# Patient Record
Sex: FEMALE | Race: WHITE | HISPANIC OR LATINO | Employment: OTHER | URBAN - METROPOLITAN AREA
[De-identification: names, ages, dates, MRNs, and addresses within clinical notes are randomized per-mention and may not be internally consistent; named-entity substitution may affect disease eponyms.]

---

## 2017-01-30 ENCOUNTER — ALLSCRIPTS OFFICE VISIT (OUTPATIENT)
Dept: OTHER | Facility: OTHER | Age: 82
End: 2017-01-30

## 2017-02-06 ENCOUNTER — ALLSCRIPTS OFFICE VISIT (OUTPATIENT)
Dept: OTHER | Facility: OTHER | Age: 82
End: 2017-02-06

## 2017-03-01 ENCOUNTER — TRANSCRIBE ORDERS (OUTPATIENT)
Dept: LAB | Facility: HOSPITAL | Age: 82
End: 2017-03-01

## 2017-03-02 ENCOUNTER — LAB CONVERSION - ENCOUNTER (OUTPATIENT)
Dept: OTHER | Facility: OTHER | Age: 82
End: 2017-03-02

## 2017-03-02 LAB
25(OH)D3 SERPL-MCNC: 38 NG/ML (ref 30–100)
CALCIUM SERPL-MCNC: 9.8 MG/DL (ref 8.6–10.4)
CREATININE, RANDOM URINE (HISTORICAL): 146 MG/DL (ref 20–320)
PROT UR-MCNC: 34 MG/DL (ref 5–24)
PROT/CREAT UR: 233 MG/G CREAT (ref 21–161)
PTH-INTACT SERPL-MCNC: 73 PG/ML (ref 14–64)

## 2017-03-03 ENCOUNTER — LAB CONVERSION - ENCOUNTER (OUTPATIENT)
Dept: OTHER | Facility: OTHER | Age: 82
End: 2017-03-03

## 2017-03-03 LAB
25(OH)D3 SERPL-MCNC: 38 NG/ML (ref 30–100)
CALCIUM SERPL-MCNC: 9.8 MG/DL (ref 8.6–10.4)
CREATININE, RANDOM URINE (HISTORICAL): 146 MG/DL (ref 20–320)
CULTURE RESULT (HISTORICAL): NORMAL
PROT UR-MCNC: 34 MG/DL (ref 5–24)
PROT/CREAT UR: 233 MG/G CREAT (ref 21–161)
PTH-INTACT SERPL-MCNC: 73 PG/ML (ref 14–64)

## 2017-03-13 ENCOUNTER — ALLSCRIPTS OFFICE VISIT (OUTPATIENT)
Dept: OTHER | Facility: OTHER | Age: 82
End: 2017-03-13

## 2017-03-24 ENCOUNTER — ALLSCRIPTS OFFICE VISIT (OUTPATIENT)
Dept: OTHER | Facility: OTHER | Age: 82
End: 2017-03-24

## 2017-04-06 DIAGNOSIS — M10.471 OTHER SECONDARY GOUT, RIGHT ANKLE AND FOOT: ICD-10-CM

## 2017-04-26 ENCOUNTER — ALLSCRIPTS OFFICE VISIT (OUTPATIENT)
Dept: OTHER | Facility: OTHER | Age: 82
End: 2017-04-26

## 2017-05-08 ENCOUNTER — ALLSCRIPTS OFFICE VISIT (OUTPATIENT)
Dept: OTHER | Facility: OTHER | Age: 82
End: 2017-05-08

## 2017-06-12 ENCOUNTER — LAB CONVERSION - ENCOUNTER (OUTPATIENT)
Dept: OTHER | Facility: OTHER | Age: 82
End: 2017-06-12

## 2017-06-12 LAB
A/G RATIO (HISTORICAL): 1.2 (CALC) (ref 1–2.5)
ALBUMIN SERPL BCP-MCNC: 4 G/DL (ref 3.6–5.1)
ALP SERPL-CCNC: 82 U/L (ref 33–130)
ALT SERPL W P-5'-P-CCNC: 10 U/L (ref 6–29)
AST SERPL W P-5'-P-CCNC: 14 U/L (ref 10–35)
BILIRUB SERPL-MCNC: 0.4 MG/DL (ref 0.2–1.2)
BUN SERPL-MCNC: 41 MG/DL (ref 7–25)
BUN/CREA RATIO (HISTORICAL): 25 (CALC) (ref 6–22)
CALCIUM SERPL-MCNC: 9.1 MG/DL (ref 8.6–10.4)
CHLORIDE SERPL-SCNC: 103 MMOL/L (ref 98–110)
CHOLEST SERPL-MCNC: 160 MG/DL (ref 125–200)
CHOLEST/HDLC SERPL: 2.3 (CALC)
CO2 SERPL-SCNC: 29 MMOL/L (ref 20–31)
CREAT SERPL-MCNC: 1.64 MG/DL (ref 0.6–0.88)
EGFR AFRICAN AMERICAN (HISTORICAL): 33 ML/MIN/1.73M2
EGFR-AMERICAN CALC (HISTORICAL): 29 ML/MIN/1.73M2
GAMMA GLOBULIN (HISTORICAL): 3.3 G/DL (CALC) (ref 1.9–3.7)
GLUCOSE (HISTORICAL): 124 MG/DL (ref 65–99)
HDLC SERPL-MCNC: 70 MG/DL
LDL CHOLESTEROL (HISTORICAL): 75 MG/DL (CALC)
NON-HDL-CHOL (CHOL-HDL) (HISTORICAL): 90 MG/DL (CALC)
POTASSIUM SERPL-SCNC: 4.3 MMOL/L (ref 3.5–5.3)
SODIUM SERPL-SCNC: 141 MMOL/L (ref 135–146)
TOTAL PROTEIN (HISTORICAL): 7.3 G/DL (ref 6.1–8.1)
TRIGL SERPL-MCNC: 74 MG/DL

## 2017-06-13 ENCOUNTER — LAB CONVERSION - ENCOUNTER (OUTPATIENT)
Dept: OTHER | Facility: OTHER | Age: 82
End: 2017-06-13

## 2017-06-13 LAB
25(OH)D3 SERPL-MCNC: 41 NG/ML (ref 30–100)
HBA1C MFR BLD HPLC: 6.3 % OF TOTAL HGB

## 2017-06-19 ENCOUNTER — ALLSCRIPTS OFFICE VISIT (OUTPATIENT)
Dept: OTHER | Facility: OTHER | Age: 82
End: 2017-06-19

## 2017-06-19 DIAGNOSIS — Z12.31 ENCOUNTER FOR SCREENING MAMMOGRAM FOR MALIGNANT NEOPLASM OF BREAST: ICD-10-CM

## 2017-06-21 ENCOUNTER — GENERIC CONVERSION - ENCOUNTER (OUTPATIENT)
Dept: OTHER | Facility: OTHER | Age: 82
End: 2017-06-21

## 2017-07-01 DIAGNOSIS — N18.30 CHRONIC KIDNEY DISEASE, STAGE III (MODERATE) (HCC): ICD-10-CM

## 2017-07-01 DIAGNOSIS — R10.13 EPIGASTRIC PAIN: ICD-10-CM

## 2017-07-01 DIAGNOSIS — R92.8 OTHER ABNORMAL AND INCONCLUSIVE FINDINGS ON DIAGNOSTIC IMAGING OF BREAST: ICD-10-CM

## 2017-07-03 ENCOUNTER — LAB CONVERSION - ENCOUNTER (OUTPATIENT)
Dept: OTHER | Facility: OTHER | Age: 82
End: 2017-07-03

## 2017-07-03 LAB
A/G RATIO (HISTORICAL): 1.3 (CALC) (ref 1–2.5)
ALBUMIN SERPL BCP-MCNC: 4.2 G/DL (ref 3.6–5.1)
ALP SERPL-CCNC: 83 U/L (ref 33–130)
ALT SERPL W P-5'-P-CCNC: 12 U/L (ref 6–29)
AST SERPL W P-5'-P-CCNC: 17 U/L (ref 10–35)
BASOPHILS # BLD AUTO: 0.5 %
BASOPHILS # BLD AUTO: 39 CELLS/UL (ref 0–200)
BILIRUB SERPL-MCNC: 0.3 MG/DL (ref 0.2–1.2)
BUN SERPL-MCNC: 36 MG/DL (ref 7–25)
BUN/CREA RATIO (HISTORICAL): 25 (CALC) (ref 6–22)
CALCIUM SERPL-MCNC: 10.1 MG/DL (ref 8.6–10.4)
CALCIUM SERPL-MCNC: 10.1 MG/DL (ref 8.6–10.4)
CHLORIDE SERPL-SCNC: 102 MMOL/L (ref 98–110)
CO2 SERPL-SCNC: 25 MMOL/L (ref 20–31)
CREAT SERPL-MCNC: 1.42 MG/DL (ref 0.6–0.88)
DEPRECATED RDW RBC AUTO: 13.7 % (ref 11–15)
EGFR AFRICAN AMERICAN (HISTORICAL): 40 ML/MIN/1.73M2
EGFR-AMERICAN CALC (HISTORICAL): 34 ML/MIN/1.73M2
EOSINOPHIL # BLD AUTO: 0.9 %
EOSINOPHIL # BLD AUTO: 69 CELLS/UL (ref 15–500)
GAMMA GLOBULIN (HISTORICAL): 3.3 G/DL (CALC) (ref 1.9–3.7)
GLUCOSE (HISTORICAL): 129 MG/DL (ref 65–99)
HCT VFR BLD AUTO: 40.4 % (ref 35–45)
HGB BLD-MCNC: 12.7 G/DL (ref 11.7–15.5)
LYMPHOCYTES # BLD AUTO: 3588 CELLS/UL (ref 850–3900)
LYMPHOCYTES # BLD AUTO: 46.6 %
MAGNESIUM SERPL-MCNC: 1.6 MG/DL (ref 1.5–2.5)
MCH RBC QN AUTO: 27.7 PG (ref 27–33)
MCHC RBC AUTO-ENTMCNC: 31.4 G/DL (ref 32–36)
MCV RBC AUTO: 88.1 FL (ref 80–100)
MONOCYTES # BLD AUTO: 400 CELLS/UL (ref 200–950)
MONOCYTES (HISTORICAL): 5.2 %
NEUTROPHILS # BLD AUTO: 3604 CELLS/UL (ref 1500–7800)
NEUTROPHILS # BLD AUTO: 46.8 %
PHOSPHATE SERPL-MCNC: 4 MG/DL (ref 2.1–4.3)
PLATELET # BLD AUTO: 266 THOUSAND/UL (ref 140–400)
PMV BLD AUTO: 10.6 FL (ref 7.5–12.5)
POTASSIUM SERPL-SCNC: 4.8 MMOL/L (ref 3.5–5.3)
RBC # BLD AUTO: 4.58 MILLION/UL (ref 3.8–5.1)
SODIUM SERPL-SCNC: 138 MMOL/L (ref 135–146)
TOTAL PROTEIN (HISTORICAL): 7.5 G/DL (ref 6.1–8.1)
URIC ACID (HISTORICAL): 7.9 MG/DL (ref 2.5–7)
WBC # BLD AUTO: 7.7 THOUSAND/UL (ref 3.8–10.8)

## 2017-07-04 ENCOUNTER — LAB CONVERSION - ENCOUNTER (OUTPATIENT)
Dept: OTHER | Facility: OTHER | Age: 82
End: 2017-07-04

## 2017-07-04 LAB — 25(OH)D3 SERPL-MCNC: 35 NG/ML (ref 30–100)

## 2017-07-05 ENCOUNTER — LAB CONVERSION - ENCOUNTER (OUTPATIENT)
Dept: OTHER | Facility: OTHER | Age: 82
End: 2017-07-05

## 2017-07-05 LAB
25(OH)D3 SERPL-MCNC: 35 NG/ML (ref 30–100)
A/G RATIO (HISTORICAL): 1.3 (CALC) (ref 1–2.5)
ALBUMIN SERPL BCP-MCNC: 4.2 G/DL (ref 3.6–5.1)
ALP SERPL-CCNC: 83 U/L (ref 33–130)
ALT SERPL W P-5'-P-CCNC: 12 U/L (ref 6–29)
AST SERPL W P-5'-P-CCNC: 17 U/L (ref 10–35)
BASOPHILS # BLD AUTO: 0.5 %
BASOPHILS # BLD AUTO: 39 CELLS/UL (ref 0–200)
BILIRUB SERPL-MCNC: 0.3 MG/DL (ref 0.2–1.2)
BUN SERPL-MCNC: 36 MG/DL (ref 7–25)
BUN/CREA RATIO (HISTORICAL): 25 (CALC) (ref 6–22)
CALCIUM SERPL-MCNC: 10.1 MG/DL (ref 8.6–10.4)
CALCIUM SERPL-MCNC: 10.1 MG/DL (ref 8.6–10.4)
CHLORIDE SERPL-SCNC: 102 MMOL/L (ref 98–110)
CO2 SERPL-SCNC: 25 MMOL/L (ref 20–31)
CREAT SERPL-MCNC: 1.42 MG/DL (ref 0.6–0.88)
DEPRECATED RDW RBC AUTO: 13.7 % (ref 11–15)
EGFR AFRICAN AMERICAN (HISTORICAL): 40 ML/MIN/1.73M2
EGFR-AMERICAN CALC (HISTORICAL): 34 ML/MIN/1.73M2
EOSINOPHIL # BLD AUTO: 0.9 %
EOSINOPHIL # BLD AUTO: 69 CELLS/UL (ref 15–500)
GAMMA GLOBULIN (HISTORICAL): 3.3 G/DL (CALC) (ref 1.9–3.7)
GLUCOSE (HISTORICAL): 129 MG/DL (ref 65–99)
HCT VFR BLD AUTO: 40.4 % (ref 35–45)
HGB BLD-MCNC: 12.7 G/DL (ref 11.7–15.5)
LYMPHOCYTES # BLD AUTO: 3588 CELLS/UL (ref 850–3900)
LYMPHOCYTES # BLD AUTO: 46.6 %
MAGNESIUM SERPL-MCNC: 1.6 MG/DL (ref 1.5–2.5)
MCH RBC QN AUTO: 27.7 PG (ref 27–33)
MCHC RBC AUTO-ENTMCNC: 31.4 G/DL (ref 32–36)
MCV RBC AUTO: 88.1 FL (ref 80–100)
MONOCYTES # BLD AUTO: 400 CELLS/UL (ref 200–950)
MONOCYTES (HISTORICAL): 5.2 %
NEUTROPHILS # BLD AUTO: 3604 CELLS/UL (ref 1500–7800)
NEUTROPHILS # BLD AUTO: 46.8 %
PHOSPHATE SERPL-MCNC: 4 MG/DL (ref 2.1–4.3)
PLATELET # BLD AUTO: 266 THOUSAND/UL (ref 140–400)
PMV BLD AUTO: 10.6 FL (ref 7.5–12.5)
POTASSIUM SERPL-SCNC: 4.8 MMOL/L (ref 3.5–5.3)
PTH-INTACT SERPL-MCNC: 55 PG/ML (ref 14–64)
RBC # BLD AUTO: 4.58 MILLION/UL (ref 3.8–5.1)
SODIUM SERPL-SCNC: 138 MMOL/L (ref 135–146)
TOTAL PROTEIN (HISTORICAL): 7.5 G/DL (ref 6.1–8.1)
URIC ACID (HISTORICAL): 7.9 MG/DL (ref 2.5–7)
WBC # BLD AUTO: 7.7 THOUSAND/UL (ref 3.8–10.8)

## 2017-07-10 ENCOUNTER — ALLSCRIPTS OFFICE VISIT (OUTPATIENT)
Dept: OTHER | Facility: OTHER | Age: 82
End: 2017-07-10

## 2017-07-10 ENCOUNTER — TRANSCRIBE ORDERS (OUTPATIENT)
Dept: ADMINISTRATIVE | Facility: HOSPITAL | Age: 82
End: 2017-07-10

## 2017-07-10 DIAGNOSIS — R10.13 DYSPEPSIA: Primary | ICD-10-CM

## 2017-07-17 ENCOUNTER — HOSPITAL ENCOUNTER (OUTPATIENT)
Dept: RADIOLOGY | Age: 82
Discharge: HOME | End: 2017-07-17
Payer: COMMERCIAL

## 2017-07-17 DIAGNOSIS — Z12.31 ENCOUNTER FOR SCREENING MAMMOGRAM FOR MALIGNANT NEOPLASM OF BREAST: ICD-10-CM

## 2017-07-17 PROCEDURE — G0202 SCR MAMMO BI INCL CAD: HCPCS

## 2017-07-21 ENCOUNTER — HOSPITAL ENCOUNTER (OUTPATIENT)
Dept: MAMMOGRAPHY | Facility: CLINIC | Age: 82
Discharge: HOME | End: 2017-07-21
Payer: COMMERCIAL

## 2017-07-21 ENCOUNTER — HOSPITAL ENCOUNTER (OUTPATIENT)
Dept: ULTRASOUND IMAGING | Facility: CLINIC | Age: 82
Discharge: HOME | End: 2017-07-21
Payer: COMMERCIAL

## 2017-07-21 DIAGNOSIS — R92.8 ABNORMAL MAMMOGRAM: ICD-10-CM

## 2017-07-21 DIAGNOSIS — R92.8 OTHER ABNORMAL AND INCONCLUSIVE FINDINGS ON DIAGNOSTIC IMAGING OF BREAST: ICD-10-CM

## 2017-07-21 PROCEDURE — 76642 ULTRASOUND BREAST LIMITED: CPT

## 2017-07-21 PROCEDURE — G0206 DX MAMMO INCL CAD UNI: HCPCS

## 2017-07-21 PROCEDURE — G0279 TOMOSYNTHESIS, MAMMO: HCPCS

## 2017-07-31 ENCOUNTER — HOSPITAL ENCOUNTER (OUTPATIENT)
Dept: ULTRASOUND IMAGING | Facility: CLINIC | Age: 82
Discharge: HOME | End: 2017-07-31
Payer: COMMERCIAL

## 2017-08-02 ENCOUNTER — ALLSCRIPTS OFFICE VISIT (OUTPATIENT)
Dept: OTHER | Facility: OTHER | Age: 82
End: 2017-08-02

## 2017-08-07 ENCOUNTER — HOSPITAL ENCOUNTER (OUTPATIENT)
Dept: MAMMOGRAPHY | Facility: CLINIC | Age: 82
Discharge: HOME | End: 2017-08-07
Payer: COMMERCIAL

## 2017-08-07 ENCOUNTER — HOSPITAL ENCOUNTER (OUTPATIENT)
Dept: ULTRASOUND IMAGING | Facility: CLINIC | Age: 82
Discharge: HOME | End: 2017-08-07
Payer: COMMERCIAL

## 2017-08-07 ENCOUNTER — HOSPITAL ENCOUNTER (OUTPATIENT)
Dept: ULTRASOUND IMAGING | Facility: CLINIC | Age: 82
Discharge: HOME | End: 2017-08-07
Admitting: RADIOLOGY
Payer: COMMERCIAL

## 2017-08-07 DIAGNOSIS — R92.8 OTHER ABNORMAL AND INCONCLUSIVE FINDINGS ON DIAGNOSTIC IMAGING OF BREAST: ICD-10-CM

## 2017-08-07 DIAGNOSIS — R92.8 ABNORMAL ULTRASOUND OF BREAST: ICD-10-CM

## 2017-08-07 DIAGNOSIS — R92.8 ABNORMAL MAMMOGRAM, UNSPECIFIED: ICD-10-CM

## 2017-08-07 DIAGNOSIS — Z98.890 STATUS POST BREAST BIOPSY: ICD-10-CM

## 2017-08-07 PROCEDURE — 19084 BX BREAST ADD LESION US IMAG: CPT

## 2017-08-07 PROCEDURE — 88305 TISSUE EXAM BY PATHOLOGIST: CPT | Performed by: FAMILY MEDICINE

## 2017-08-07 PROCEDURE — 19083 BX BREAST 1ST LESION US IMAG: CPT

## 2017-08-07 PROCEDURE — 88341 IMHCHEM/IMCYTCHM EA ADD ANTB: CPT | Performed by: FAMILY MEDICINE

## 2017-08-07 PROCEDURE — 88342 IMHCHEM/IMCYTCHM 1ST ANTB: CPT | Performed by: FAMILY MEDICINE

## 2017-08-07 RX ORDER — LIDOCAINE HYDROCHLORIDE 10 MG/ML
4 INJECTION, SOLUTION INFILTRATION; PERINEURAL ONCE
Status: CANCELLED | OUTPATIENT
Start: 2017-08-07

## 2017-08-07 RX ORDER — LIDOCAINE HYDROCHLORIDE 10 MG/ML
4 INJECTION, SOLUTION INFILTRATION; PERINEURAL ONCE
Status: COMPLETED | OUTPATIENT
Start: 2017-08-07 | End: 2017-08-07

## 2017-08-07 RX ADMIN — LIDOCAINE HYDROCHLORIDE 4 ML: 10 INJECTION, SOLUTION INFILTRATION; PERINEURAL at 11:25

## 2017-08-07 RX ADMIN — LIDOCAINE HYDROCHLORIDE 4 ML: 10 INJECTION, SOLUTION INFILTRATION; PERINEURAL at 11:30

## 2017-08-15 ENCOUNTER — ALLSCRIPTS OFFICE VISIT (OUTPATIENT)
Dept: OTHER | Facility: OTHER | Age: 82
End: 2017-08-15

## 2017-08-16 ENCOUNTER — TRANSCRIBE ORDERS (OUTPATIENT)
Dept: ADMINISTRATIVE | Facility: HOSPITAL | Age: 82
End: 2017-08-16

## 2017-08-16 DIAGNOSIS — R22.1 LUMP ON NECK: Primary | ICD-10-CM

## 2017-08-17 ENCOUNTER — ALLSCRIPTS OFFICE VISIT (OUTPATIENT)
Dept: OTHER | Facility: OTHER | Age: 82
End: 2017-08-17

## 2017-08-18 ENCOUNTER — HOSPITAL ENCOUNTER (OUTPATIENT)
Dept: RADIOLOGY | Facility: HOSPITAL | Age: 82
Discharge: HOME | End: 2017-08-18
Payer: COMMERCIAL

## 2017-08-18 DIAGNOSIS — R22.1 LUMP ON NECK: ICD-10-CM

## 2017-08-18 PROCEDURE — 76536 US EXAM OF HEAD AND NECK: CPT

## 2017-08-23 ENCOUNTER — GENERIC CONVERSION - ENCOUNTER (OUTPATIENT)
Dept: OTHER | Facility: OTHER | Age: 82
End: 2017-08-23

## 2017-08-23 LAB
LEFT EYE DIABETIC RETINOPATHY: NORMAL
RIGHT EYE DIABETIC RETINOPATHY: NORMAL

## 2017-08-24 ENCOUNTER — ALLSCRIPTS OFFICE VISIT (OUTPATIENT)
Dept: OTHER | Facility: OTHER | Age: 82
End: 2017-08-24

## 2017-08-25 ENCOUNTER — TRANSCRIBE ORDERS (OUTPATIENT)
Dept: ADMINISTRATIVE | Facility: HOSPITAL | Age: 82
End: 2017-08-25

## 2017-08-25 DIAGNOSIS — D05.02 LOBULAR CARCINOMA IN SITU OF LEFT BREAST: Primary | ICD-10-CM

## 2017-08-28 ENCOUNTER — APPOINTMENT (OUTPATIENT)
Dept: PREADMISSION TESTING | Facility: HOSPITAL | Age: 82
End: 2017-08-28
Payer: COMMERCIAL

## 2017-08-28 ENCOUNTER — APPOINTMENT (OUTPATIENT)
Dept: LAB | Facility: HOSPITAL | Age: 82
End: 2017-08-28
Attending: SURGERY
Payer: COMMERCIAL

## 2017-08-28 ENCOUNTER — HOSPITAL ENCOUNTER (OUTPATIENT)
Dept: NON INVASIVE DIAGNOSTICS | Facility: HOSPITAL | Age: 82
Discharge: HOME | End: 2017-08-28
Attending: SURGERY
Payer: COMMERCIAL

## 2017-08-28 ENCOUNTER — ANESTHESIA EVENT (OUTPATIENT)
Dept: PERIOP | Facility: HOSPITAL | Age: 82
End: 2017-08-28
Payer: COMMERCIAL

## 2017-08-28 ENCOUNTER — TRANSCRIBE ORDERS (OUTPATIENT)
Dept: ADMINISTRATIVE | Facility: HOSPITAL | Age: 82
End: 2017-08-28

## 2017-08-28 VITALS
RESPIRATION RATE: 16 BRPM | HEART RATE: 68 BPM | HEIGHT: 62 IN | BODY MASS INDEX: 25.62 KG/M2 | DIASTOLIC BLOOD PRESSURE: 52 MMHG | SYSTOLIC BLOOD PRESSURE: 122 MMHG | WEIGHT: 139.2 LBS | TEMPERATURE: 97 F

## 2017-08-28 DIAGNOSIS — Z01.818 PREOP EXAMINATION: ICD-10-CM

## 2017-08-28 DIAGNOSIS — D05.02 LOBULAR CARCINOMA IN SITU OF LEFT BREAST: ICD-10-CM

## 2017-08-28 DIAGNOSIS — Z01.818 PREOP EXAMINATION: Primary | ICD-10-CM

## 2017-08-28 LAB
ANION GAP SERPL CALCULATED.3IONS-SCNC: 6 MMOL/L (ref 4–13)
APTT PPP: 29 SECONDS (ref 23–35)
ATRIAL RATE: 56 BPM
BUN SERPL-MCNC: 38 MG/DL (ref 5–25)
CALCIUM SERPL-MCNC: 9.3 MG/DL (ref 8.3–10.1)
CHLORIDE SERPL-SCNC: 104 MMOL/L (ref 100–108)
CO2 SERPL-SCNC: 30 MMOL/L (ref 21–32)
CREAT SERPL-MCNC: 1.4 MG/DL (ref 0.6–1.3)
GFR SERPL CREATININE-BSD FRML MDRD: 35 ML/MIN/1.73SQ M
GLUCOSE SERPL-MCNC: 134 MG/DL (ref 65–140)
INR PPP: 0.93 (ref 0.86–1.16)
P AXIS: 59 DEGREES
POTASSIUM SERPL-SCNC: 5.1 MMOL/L (ref 3.5–5.3)
PR INTERVAL: 174 MS
PROTHROMBIN TIME: 12.5 SECONDS (ref 12.1–14.4)
QRS AXIS: 8 DEGREES
QRSD INTERVAL: 84 MS
QT INTERVAL: 396 MS
QTC INTERVAL: 382 MS
SODIUM SERPL-SCNC: 140 MMOL/L (ref 136–145)
T WAVE AXIS: 73 DEGREES
VENTRICULAR RATE: 56 BPM

## 2017-08-28 PROCEDURE — 36415 COLL VENOUS BLD VENIPUNCTURE: CPT

## 2017-08-28 PROCEDURE — 85610 PROTHROMBIN TIME: CPT

## 2017-08-28 PROCEDURE — 80048 BASIC METABOLIC PNL TOTAL CA: CPT

## 2017-08-28 PROCEDURE — 93005 ELECTROCARDIOGRAM TRACING: CPT

## 2017-08-28 PROCEDURE — 85730 THROMBOPLASTIN TIME PARTIAL: CPT

## 2017-08-28 RX ORDER — METOPROLOL TARTRATE 50 MG/1
25 TABLET, FILM COATED ORAL EVERY 12 HOURS SCHEDULED
COMMUNITY
End: 2018-03-07 | Stop reason: SDUPTHER

## 2017-08-28 RX ORDER — PANTOPRAZOLE SODIUM 40 MG/1
40 TABLET, DELAYED RELEASE ORAL 2 TIMES DAILY
COMMUNITY
End: 2018-03-16 | Stop reason: SDUPTHER

## 2017-08-28 RX ORDER — ACETAMINOPHEN 500 MG
500 TABLET ORAL EVERY 6 HOURS PRN
COMMUNITY
End: 2018-01-25 | Stop reason: SDUPTHER

## 2017-08-28 RX ORDER — B-COMPLEX WITH VITAMIN C
1 TABLET ORAL
COMMUNITY

## 2017-08-28 RX ORDER — SODIUM CHLORIDE 9 MG/ML
125 INJECTION, SOLUTION INTRAVENOUS CONTINUOUS
Status: CANCELLED | OUTPATIENT
Start: 2017-09-06

## 2017-08-28 RX ORDER — SPIRONOLACTONE 25 MG/1
25 TABLET ORAL
COMMUNITY
End: 2018-04-28 | Stop reason: SDUPTHER

## 2017-08-28 RX ORDER — GABAPENTIN 100 MG/1
100 CAPSULE ORAL
COMMUNITY
End: 2018-08-31 | Stop reason: SDUPTHER

## 2017-08-28 RX ORDER — NIFEDIPINE 30 MG/1
30 TABLET, EXTENDED RELEASE ORAL DAILY
COMMUNITY
End: 2018-07-06 | Stop reason: SDUPTHER

## 2017-09-06 ENCOUNTER — ANESTHESIA (OUTPATIENT)
Dept: PERIOP | Facility: HOSPITAL | Age: 82
End: 2017-09-06
Payer: COMMERCIAL

## 2017-09-06 ENCOUNTER — HOSPITAL ENCOUNTER (OUTPATIENT)
Dept: MAMMOGRAPHY | Facility: HOSPITAL | Age: 82
Setting detail: OUTPATIENT SURGERY
Discharge: HOME | End: 2017-09-06
Payer: COMMERCIAL

## 2017-09-06 ENCOUNTER — CONVERSION ENCOUNTER (OUTPATIENT)
Dept: MAMMOGRAPHY | Facility: HOSPITAL | Age: 82
End: 2017-09-06

## 2017-09-06 ENCOUNTER — HOSPITAL ENCOUNTER (OUTPATIENT)
Dept: MAMMOGRAPHY | Facility: HOSPITAL | Age: 82
Discharge: HOME | End: 2017-09-06
Attending: SURGERY
Payer: COMMERCIAL

## 2017-09-06 ENCOUNTER — HOSPITAL ENCOUNTER (OUTPATIENT)
Facility: HOSPITAL | Age: 82
Setting detail: OUTPATIENT SURGERY
Discharge: HOME | End: 2017-09-06
Attending: SURGERY | Admitting: SURGERY
Payer: COMMERCIAL

## 2017-09-06 VITALS
BODY MASS INDEX: 25.58 KG/M2 | OXYGEN SATURATION: 95 % | HEIGHT: 62 IN | RESPIRATION RATE: 18 BRPM | HEART RATE: 71 BPM | WEIGHT: 139 LBS | SYSTOLIC BLOOD PRESSURE: 174 MMHG | TEMPERATURE: 97.5 F | DIASTOLIC BLOOD PRESSURE: 71 MMHG

## 2017-09-06 DIAGNOSIS — D05.02 LOBULAR CARCINOMA IN SITU OF LEFT BREAST: ICD-10-CM

## 2017-09-06 LAB
GLUCOSE SERPL-MCNC: 115 MG/DL (ref 65–140)
GLUCOSE SERPL-MCNC: 117 MG/DL (ref 65–140)
GLUCOSE SERPL-MCNC: 129 MG/DL (ref 65–140)

## 2017-09-06 PROCEDURE — 19281 PERQ DEVICE BREAST 1ST IMAG: CPT

## 2017-09-06 PROCEDURE — 88305 TISSUE EXAM BY PATHOLOGIST: CPT | Performed by: SURGERY

## 2017-09-06 PROCEDURE — 88307 TISSUE EXAM BY PATHOLOGIST: CPT | Performed by: SURGERY

## 2017-09-06 PROCEDURE — 82948 REAGENT STRIP/BLOOD GLUCOSE: CPT

## 2017-09-06 RX ORDER — ONDANSETRON 2 MG/ML
INJECTION INTRAMUSCULAR; INTRAVENOUS AS NEEDED
Status: DISCONTINUED | OUTPATIENT
Start: 2017-09-06 | End: 2017-09-06 | Stop reason: SURG

## 2017-09-06 RX ORDER — HYDROCODONE BITARTRATE AND ACETAMINOPHEN 5; 325 MG/1; MG/1
1 TABLET ORAL EVERY 4 HOURS PRN
Qty: 30 TABLET | Refills: 0 | Status: SHIPPED | OUTPATIENT
Start: 2017-09-06 | End: 2017-09-06 | Stop reason: HOSPADM

## 2017-09-06 RX ORDER — MEPERIDINE HYDROCHLORIDE 50 MG/ML
12.5 INJECTION INTRAMUSCULAR; INTRAVENOUS; SUBCUTANEOUS AS NEEDED
Status: DISCONTINUED | OUTPATIENT
Start: 2017-09-06 | End: 2017-09-06 | Stop reason: HOSPADM

## 2017-09-06 RX ORDER — LIDOCAINE WITH 8.4% SOD BICARB 0.9%(10ML)
5 SYRINGE (ML) INJECTION ONCE
Status: DISCONTINUED | OUTPATIENT
Start: 2017-09-06 | End: 2017-09-07 | Stop reason: HOSPADM

## 2017-09-06 RX ORDER — MIDAZOLAM HYDROCHLORIDE 1 MG/ML
INJECTION INTRAMUSCULAR; INTRAVENOUS AS NEEDED
Status: DISCONTINUED | OUTPATIENT
Start: 2017-09-06 | End: 2017-09-06 | Stop reason: SURG

## 2017-09-06 RX ORDER — ACETAMINOPHEN AND CODEINE PHOSPHATE 300; 60 MG/1; MG/1
1 TABLET ORAL EVERY 4 HOURS PRN
Qty: 30 TABLET | Refills: 0 | Status: SHIPPED | OUTPATIENT
Start: 2017-09-06 | End: 2017-09-16

## 2017-09-06 RX ORDER — ACETAMINOPHEN 325 MG/1
650 TABLET ORAL EVERY 6 HOURS PRN
Status: DISCONTINUED | OUTPATIENT
Start: 2017-09-06 | End: 2017-09-06 | Stop reason: HOSPADM

## 2017-09-06 RX ORDER — FENTANYL CITRATE 50 UG/ML
INJECTION, SOLUTION INTRAMUSCULAR; INTRAVENOUS AS NEEDED
Status: DISCONTINUED | OUTPATIENT
Start: 2017-09-06 | End: 2017-09-06 | Stop reason: SURG

## 2017-09-06 RX ORDER — MORPHINE SULFATE 4 MG/ML
2 INJECTION, SOLUTION INTRAMUSCULAR; INTRAVENOUS EVERY 2 HOUR PRN
Status: DISCONTINUED | OUTPATIENT
Start: 2017-09-06 | End: 2017-09-06 | Stop reason: HOSPADM

## 2017-09-06 RX ORDER — PROPOFOL 10 MG/ML
INJECTION, EMULSION INTRAVENOUS AS NEEDED
Status: DISCONTINUED | OUTPATIENT
Start: 2017-09-06 | End: 2017-09-06 | Stop reason: SURG

## 2017-09-06 RX ORDER — SODIUM CHLORIDE, SODIUM LACTATE, POTASSIUM CHLORIDE, CALCIUM CHLORIDE 600; 310; 30; 20 MG/100ML; MG/100ML; MG/100ML; MG/100ML
80 INJECTION, SOLUTION INTRAVENOUS CONTINUOUS
Status: DISCONTINUED | OUTPATIENT
Start: 2017-09-06 | End: 2017-09-06 | Stop reason: HOSPADM

## 2017-09-06 RX ORDER — LIDOCAINE HYDROCHLORIDE 10 MG/ML
INJECTION, SOLUTION INFILTRATION; PERINEURAL AS NEEDED
Status: DISCONTINUED | OUTPATIENT
Start: 2017-09-06 | End: 2017-09-06 | Stop reason: SURG

## 2017-09-06 RX ORDER — HYDROCODONE BITARTRATE AND ACETAMINOPHEN 5; 325 MG/1; MG/1
1 TABLET ORAL EVERY 4 HOURS PRN
Status: DISCONTINUED | OUTPATIENT
Start: 2017-09-06 | End: 2017-09-06 | Stop reason: HOSPADM

## 2017-09-06 RX ORDER — SODIUM CHLORIDE, SODIUM LACTATE, POTASSIUM CHLORIDE, CALCIUM CHLORIDE 600; 310; 30; 20 MG/100ML; MG/100ML; MG/100ML; MG/100ML
75 INJECTION, SOLUTION INTRAVENOUS CONTINUOUS
Status: DISCONTINUED | OUTPATIENT
Start: 2017-09-06 | End: 2017-09-06 | Stop reason: HOSPADM

## 2017-09-06 RX ORDER — FENTANYL CITRATE/PF 50 MCG/ML
25 SYRINGE (ML) INJECTION
Status: DISCONTINUED | OUTPATIENT
Start: 2017-09-06 | End: 2017-09-06 | Stop reason: HOSPADM

## 2017-09-06 RX ORDER — ONDANSETRON 2 MG/ML
4 INJECTION INTRAMUSCULAR; INTRAVENOUS EVERY 4 HOURS PRN
Status: DISCONTINUED | OUTPATIENT
Start: 2017-09-06 | End: 2017-09-06 | Stop reason: HOSPADM

## 2017-09-06 RX ORDER — SODIUM CHLORIDE 9 MG/ML
125 INJECTION, SOLUTION INTRAVENOUS CONTINUOUS
Status: DISCONTINUED | OUTPATIENT
Start: 2017-09-06 | End: 2017-09-06 | Stop reason: HOSPADM

## 2017-09-06 RX ADMIN — ACETAMINOPHEN 650 MG: 325 TABLET, FILM COATED ORAL at 13:36

## 2017-09-06 RX ADMIN — SODIUM CHLORIDE: 0.9 INJECTION, SOLUTION INTRAVENOUS at 12:06

## 2017-09-06 RX ADMIN — SODIUM CHLORIDE 125 ML/HR: 0.9 INJECTION, SOLUTION INTRAVENOUS at 07:37

## 2017-09-06 RX ADMIN — ONDANSETRON HYDROCHLORIDE 4 MG: 2 INJECTION, SOLUTION INTRAVENOUS at 11:56

## 2017-09-06 RX ADMIN — MIDAZOLAM HYDROCHLORIDE 2 MG: 1 INJECTION, SOLUTION INTRAMUSCULAR; INTRAVENOUS at 11:35

## 2017-09-06 RX ADMIN — LIDOCAINE HYDROCHLORIDE 80 MG: 10 INJECTION, SOLUTION INFILTRATION; PERINEURAL at 11:38

## 2017-09-06 RX ADMIN — CEFAZOLIN SODIUM 1000 MG: 1 SOLUTION INTRAVENOUS at 11:39

## 2017-09-06 RX ADMIN — FENTANYL CITRATE 50 MCG: 50 INJECTION, SOLUTION INTRAMUSCULAR; INTRAVENOUS at 11:49

## 2017-09-06 RX ADMIN — FENTANYL CITRATE 50 MCG: 50 INJECTION, SOLUTION INTRAMUSCULAR; INTRAVENOUS at 11:40

## 2017-09-06 RX ADMIN — PROPOFOL 150 MG: 10 INJECTION, EMULSION INTRAVENOUS at 11:38

## 2017-09-06 RX ADMIN — DEXAMETHASONE SODIUM PHOSPHATE 4 MG: 10 INJECTION INTRAMUSCULAR; INTRAVENOUS at 11:56

## 2017-09-19 ENCOUNTER — GENERIC CONVERSION - ENCOUNTER (OUTPATIENT)
Dept: OTHER | Facility: OTHER | Age: 82
End: 2017-09-19

## 2017-09-20 ENCOUNTER — GENERIC CONVERSION - ENCOUNTER (OUTPATIENT)
Dept: OTHER | Facility: OTHER | Age: 82
End: 2017-09-20

## 2017-09-22 ENCOUNTER — GENERIC CONVERSION - ENCOUNTER (OUTPATIENT)
Dept: OTHER | Facility: OTHER | Age: 82
End: 2017-09-22

## 2017-09-26 ENCOUNTER — GENERIC CONVERSION - ENCOUNTER (OUTPATIENT)
Dept: OTHER | Facility: OTHER | Age: 82
End: 2017-09-26

## 2017-10-12 ENCOUNTER — HOSPITAL ENCOUNTER (OUTPATIENT)
Dept: NON INVASIVE DIAGNOSTICS | Facility: CLINIC | Age: 82
Discharge: HOME | End: 2017-10-12
Payer: COMMERCIAL

## 2017-10-12 DIAGNOSIS — I73.9 PERIPHERAL VASCULAR DISEASE (HCC): ICD-10-CM

## 2017-10-12 DIAGNOSIS — I65.29 OCCLUSION AND STENOSIS OF UNSPECIFIED CAROTID ARTERY: ICD-10-CM

## 2017-10-12 DIAGNOSIS — R10.30 LOWER ABDOMINAL PAIN: ICD-10-CM

## 2017-10-12 PROCEDURE — 93923 UPR/LXTR ART STDY 3+ LVLS: CPT

## 2017-10-12 PROCEDURE — 93925 LOWER EXTREMITY STUDY: CPT

## 2017-10-12 PROCEDURE — 93880 EXTRACRANIAL BILAT STUDY: CPT

## 2017-10-24 ENCOUNTER — GENERIC CONVERSION - ENCOUNTER (OUTPATIENT)
Dept: OTHER | Facility: OTHER | Age: 82
End: 2017-10-24

## 2017-10-30 DIAGNOSIS — R10.30 LOWER ABDOMINAL PAIN: ICD-10-CM

## 2017-10-30 DIAGNOSIS — I73.9 PERIPHERAL VASCULAR DISEASE (HCC): ICD-10-CM

## 2017-10-30 DIAGNOSIS — I65.29 OCCLUSION AND STENOSIS OF UNSPECIFIED CAROTID ARTERY: ICD-10-CM

## 2017-12-01 ENCOUNTER — TRANSCRIBE ORDERS (OUTPATIENT)
Dept: LAB | Facility: HOSPITAL | Age: 82
End: 2017-12-01

## 2017-12-01 ENCOUNTER — APPOINTMENT (OUTPATIENT)
Dept: LAB | Facility: HOSPITAL | Age: 82
End: 2017-12-01
Payer: COMMERCIAL

## 2017-12-01 DIAGNOSIS — M10.9 GOUT, ARTHRITIS: ICD-10-CM

## 2017-12-01 DIAGNOSIS — N18.30 CHRONIC KIDNEY DISEASE, STAGE III (MODERATE) (HCC): Primary | ICD-10-CM

## 2017-12-01 DIAGNOSIS — N18.30 CHRONIC KIDNEY DISEASE, STAGE III (MODERATE) (HCC): ICD-10-CM

## 2017-12-01 DIAGNOSIS — E78.5 HYPERLIPIDEMIA: ICD-10-CM

## 2017-12-01 DIAGNOSIS — E11.9 TYPE 2 DIABETES MELLITUS WITHOUT COMPLICATIONS (HCC): ICD-10-CM

## 2017-12-01 DIAGNOSIS — M10.471 OTHER SECONDARY GOUT, RIGHT ANKLE AND FOOT: ICD-10-CM

## 2017-12-01 LAB
ALBUMIN SERPL BCP-MCNC: 3.6 G/DL (ref 3.5–5)
ALP SERPL-CCNC: 88 U/L (ref 46–116)
ALT SERPL W P-5'-P-CCNC: 18 U/L (ref 12–78)
ANION GAP SERPL CALCULATED.3IONS-SCNC: 6 MMOL/L (ref 4–13)
AST SERPL W P-5'-P-CCNC: 14 U/L (ref 5–45)
BACTERIA UR QL AUTO: ABNORMAL /HPF
BASOPHILS # BLD AUTO: 0.01 THOUSANDS/ΜL (ref 0–0.1)
BASOPHILS NFR BLD AUTO: 0 % (ref 0–1)
BILIRUB SERPL-MCNC: 0.29 MG/DL (ref 0.2–1)
BILIRUB UR QL STRIP: NEGATIVE
BUN SERPL-MCNC: 37 MG/DL (ref 5–25)
CALCIUM SERPL-MCNC: 9 MG/DL (ref 8.3–10.1)
CHLORIDE SERPL-SCNC: 105 MMOL/L (ref 100–108)
CHOLEST SERPL-MCNC: 153 MG/DL (ref 50–200)
CLARITY UR: ABNORMAL
CO2 SERPL-SCNC: 29 MMOL/L (ref 21–32)
COLOR UR: YELLOW
CREAT SERPL-MCNC: 1.34 MG/DL (ref 0.6–1.3)
EOSINOPHIL # BLD AUTO: 0.05 THOUSAND/ΜL (ref 0–0.61)
EOSINOPHIL NFR BLD AUTO: 1 % (ref 0–6)
ERYTHROCYTE [DISTWIDTH] IN BLOOD BY AUTOMATED COUNT: 13.2 % (ref 11.6–15.1)
EST. AVERAGE GLUCOSE BLD GHB EST-MCNC: 131 MG/DL
GFR SERPL CREATININE-BSD FRML MDRD: 37 ML/MIN/1.73SQ M
GLUCOSE P FAST SERPL-MCNC: 114 MG/DL (ref 65–99)
GLUCOSE UR STRIP-MCNC: NEGATIVE MG/DL
HBA1C MFR BLD: 6.2 % (ref 4.2–6.3)
HCT VFR BLD AUTO: 38.7 % (ref 34.8–46.1)
HDLC SERPL-MCNC: 72 MG/DL (ref 40–60)
HGB BLD-MCNC: 12.2 G/DL (ref 11.5–15.4)
HGB UR QL STRIP.AUTO: NEGATIVE
HYALINE CASTS #/AREA URNS LPF: ABNORMAL /LPF
KETONES UR STRIP-MCNC: NEGATIVE MG/DL
LDLC SERPL CALC-MCNC: 61 MG/DL (ref 0–100)
LEUKOCYTE ESTERASE UR QL STRIP: ABNORMAL
LYMPHOCYTES # BLD AUTO: 2.73 THOUSANDS/ΜL (ref 0.6–4.47)
LYMPHOCYTES NFR BLD AUTO: 40 % (ref 14–44)
MAGNESIUM SERPL-MCNC: 1.9 MG/DL (ref 1.6–2.6)
MCH RBC QN AUTO: 28.4 PG (ref 26.8–34.3)
MCHC RBC AUTO-ENTMCNC: 31.5 G/DL (ref 31.4–37.4)
MCV RBC AUTO: 90 FL (ref 82–98)
MONOCYTES # BLD AUTO: 0.46 THOUSAND/ΜL (ref 0.17–1.22)
MONOCYTES NFR BLD AUTO: 7 % (ref 4–12)
NEUTROPHILS # BLD AUTO: 3.6 THOUSANDS/ΜL (ref 1.85–7.62)
NEUTS SEG NFR BLD AUTO: 52 % (ref 43–75)
NITRITE UR QL STRIP: NEGATIVE
NON-SQ EPI CELLS URNS QL MICRO: ABNORMAL /HPF
NRBC BLD AUTO-RTO: 0 /100 WBCS
PH UR STRIP.AUTO: 5.5 [PH] (ref 4.5–8)
PLATELET # BLD AUTO: 301 THOUSANDS/UL (ref 149–390)
PMV BLD AUTO: 11.5 FL (ref 8.9–12.7)
POTASSIUM SERPL-SCNC: 4.4 MMOL/L (ref 3.5–5.3)
PROT SERPL-MCNC: 8.3 G/DL (ref 6.4–8.2)
PROT UR STRIP-MCNC: ABNORMAL MG/DL
PTH-INTACT SERPL-MCNC: 242.4 PG/ML (ref 14–72)
RBC # BLD AUTO: 4.3 MILLION/UL (ref 3.81–5.12)
RBC #/AREA URNS AUTO: ABNORMAL /HPF
SODIUM SERPL-SCNC: 140 MMOL/L (ref 136–145)
SP GR UR STRIP.AUTO: 1.02 (ref 1–1.03)
TRIGL SERPL-MCNC: 101 MG/DL
URATE SERPL-MCNC: 8 MG/DL (ref 2–6.8)
UROBILINOGEN UR QL STRIP.AUTO: 1 E.U./DL
WBC # BLD AUTO: 6.85 THOUSAND/UL (ref 4.31–10.16)
WBC #/AREA URNS AUTO: ABNORMAL /HPF

## 2017-12-01 PROCEDURE — 36415 COLL VENOUS BLD VENIPUNCTURE: CPT

## 2017-12-01 PROCEDURE — 80061 LIPID PANEL: CPT

## 2017-12-01 PROCEDURE — 85025 COMPLETE CBC W/AUTO DIFF WBC: CPT

## 2017-12-01 PROCEDURE — 80053 COMPREHEN METABOLIC PANEL: CPT

## 2017-12-01 PROCEDURE — 83735 ASSAY OF MAGNESIUM: CPT

## 2017-12-01 PROCEDURE — 83970 ASSAY OF PARATHORMONE: CPT

## 2017-12-01 PROCEDURE — 84550 ASSAY OF BLOOD/URIC ACID: CPT

## 2017-12-01 PROCEDURE — 83036 HEMOGLOBIN GLYCOSYLATED A1C: CPT

## 2017-12-01 PROCEDURE — 81001 URINALYSIS AUTO W/SCOPE: CPT | Performed by: INTERNAL MEDICINE

## 2017-12-06 ENCOUNTER — APPOINTMENT (OUTPATIENT)
Dept: LAB | Facility: HOSPITAL | Age: 82
End: 2017-12-06
Attending: INTERNAL MEDICINE
Payer: COMMERCIAL

## 2017-12-06 DIAGNOSIS — N18.30 CHRONIC KIDNEY DISEASE, STAGE III (MODERATE) (HCC): ICD-10-CM

## 2017-12-06 PROCEDURE — 83945 ASSAY OF OXALATE: CPT

## 2017-12-13 LAB
OXALATE 24H UR-MRATE: 12 MG/24 HR (ref 4–31)
OXALATE UR-MCNC: 7 MG/L

## 2017-12-14 ENCOUNTER — ALLSCRIPTS OFFICE VISIT (OUTPATIENT)
Dept: OTHER | Facility: OTHER | Age: 82
End: 2017-12-14

## 2017-12-14 DIAGNOSIS — N28.1 ACQUIRED CYST OF KIDNEY: ICD-10-CM

## 2017-12-15 NOTE — PROGRESS NOTES
Assessment  1  Nephrolithiasis (592 0) (N20 0)   2  Renal cyst (753 10) (N28 1)    Plan  Nephrolithiasis    · [address]; Status:Hold For - Scheduling,Exact Date,RetrospectiveAuthorization; Requested for:Approx [id number];    Perform:[facility] Radiology; Due:33Trz3817; Last Updated Clarisa Pérez; 12/14/2017 1:30:45 PM;Ordered;For:Nephrolithiasis; Ordered By:Jess Beavers;  Renal cyst    · [facility]; Status:Hold For - Scheduling,Retrospective Authorization; Requested for:[id number];    Perform:[facility] Radiology; Due:78Ign9178; Last Updated Clarisa Pérez; 12/14/2017 1:29:21 PM;Ordered; For:Renal cyst; Ordered By:Jess Beavers;    Discussion/Summary  Discussion Summary:   59-year-old female managed by Dr Conor Baca  Nephrolithiasis2  Renal cyst containing a thin septationThe patient unfortunately did not have an ultrasound obtained prior to her visit today  She will have this obtained now and will call for the results  If no nephrolithiasis or changes in the cyst she can follow up in 2019 with an ultrasound prior  The patient understands and agrees this treatment plan  All questions and concerns have been addressed answered  Chief Complaint  Chief Complaint Free Text Note Form: pt here for nephrolithiasis; renal cyst      History of Present Illness  HPI: Aamir Brooks is an 59-year-old female here for follow-up evaluation of her nephrolithiasis and a renal cyst containing a thin septation  She presents today with no lower urinary tract complaints but does have some urgency  This is not bothersome to her  She continues to follow with Nephrology  Unfortunately, did not have an ultrasound obtained prior to her visit today  Review of Systems  Complete-Female Urology:  Constitutional: No fever, no chills, feels well, no tiredness, no recent weight gain or weight loss    Respiratory: No complaints of shortness of breath, no wheezing, no cough, no SOB on exertion, no orthopnea, no PND   Cardiovascular: No complaints of slow heart rate, no fast heart rate, no chest pain, no palpitations, no leg claudication, no lower extremity edema  Gastrointestinal: No complaints of abdominal pain, no constipation, no nausea or vomiting, no diarrhea, no bloody stools  Genitourinary: feelings of urinary urgency,-- Empty sensation-- and-- stream quality good, but-- no dysuria,-- no urinary hesitancy,-- no incontinence,-- no hematuria-- and-- no nocturia  Musculoskeletal: No complaints of arthralgias, no myalgias, no joint swelling or stiffness, no limb pain or swelling  Integumentary: No complaints of skin rash or lesions, no itching, no skin wounds, no breast pain or lump  Hematologic/Lymphatic: No complaints of swollen glands, no swollen glands in the neck, does not bleed easily, does not bruise easily  Neurological: No complaints of headache, no confusion, no convulsions, no numbness, no dizziness or fainting, no tingling, no limb weakness, no difficulty walking  ROS Reviewed:   ROS reviewed  Active Problems  1  A Fall Due To Slipping, Tripping, Or Stumbling (E885 9)   2  Abdominal pain, epigastric (789 06) (R10 13)   3  Abnormal EKG (794 31) (R94 31)   4  Abnormal mammogram (793 80) (R92 8)   5  Accelerated essential hypertension (401 0) (I10)   6  Allergic rhinitis (477 9) (J30 9)   7  Aortoiliac stenosis (440 0) (I70 0)   8  Atherosclerosis of native artery of extremity with intermittent claudication (440 21) (I70 219)   9  Back pain (724 5) (M54 9)   10  Benign essential hypertension (401 1) (I10)   11  Breast neoplasm, Tis (LCIS), 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)   12  Carotid stenosis, asymptomatic (433 10) (I65 29)   13  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)   14  Chronic kidney disease (CKD), stage II (mild) (585 2) (N18 2)   15  Chronic myofascial pain (729 1,338 29) (M79 1,G89 29)   16   Chronic pain of lower extremity (717 2,639 69) (M79 606,G89 29)   17  CKD (chronic kidney disease), stage III (585 3) (N18 3)   18  Constipation (564 00) (K59 00)   19  Diabetic retinopathy (250 50,362 01) (E11 319)   20  DMII (diabetes mellitus, type 2) (250 00) (E11 9)   21  Dysphagia (787 20) (R13 10)   22  Epigastric pain (789 06) (R10 13)   23  Esophageal reflux (530 81) (K21 9)   24  Fever of unknown origin (780 60) (R50 9)   25  Gastritis (535 50) (K29 70)   26  Gout (274 9) (M10 9)   27  History of influenza vaccination (V49 89) (Z92 29)   28  Hyperlipidemia (272 4) (E78 5)   29  Hypomagnesemia (275 2) (E83 42)   30  Hypomagnesemia (275 2) (E83 42)   31  Iliac 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)   32  Lymphadenopathy (785 6) (R59 1)   33  Microalbuminuria (791 0) (R80 9)   34  Nasal congestion (478 19) (R09 81)   35  Nausea (787 02) (R11 0)   36  Neck pain (723 1) (M54 2)   37  Nephrolithiasis (592 0) (N20 0)   38  Neuropathic pain, leg, right (355 8) (G57 91)   39  Nevus of cheek (216 3) (D22 39)   40  Osteoporosis (733 00) (M81 0)   41  PAD (peripheral artery disease) (443 9) (I73 9)   42  Pain syndrome, chronic (338 4) (G89 4)   43  Primary generalized (osteo)arthritis (715 09) (M15 0)   44  Proteinuria (791 0) (R80 9)   45  Renal cyst (753 10) (N28 1)   46  Secondary hyperparathyroidism (588 81) (N25 81)   47  Secondary hyperparathyroidism, renal (588 81) (N25 81)   48  Vaginal prolapse (618 00) (N81 10)   49  Vitamin D deficiency (268 9) (E55 9)    Past Medical History  1  History of Abdominal discomfort in right flank (789 09) (R10 9)   2  History of Abscess, neck (682 1) (L02 11)   3  History of Acute gout due to other secondary cause involving toe of right foot (274 01) (M10 471)   4  History of Acute upper respiratory infection (465 9) (J06 9)   5  History of Advance care planning (V65 49) (Z71 89)   6  History of Back muscle spasm (724 8) (M62 830)   7  Benign essential hypertension (401 1) (I10)   8  History of Cellulitis of foot (682 7) (L03 119)   9  History of Denial Of Any Significant Medical History   10  DMII (diabetes mellitus, type 2) (250 00) (E11 9)   11  History of Encounter for gynecological examination without abnormal finding (V72 31)  (Z01 419)   12  History of Encounter for mammogram to establish baseline mammogram (V76 12)  (Z12 31)   13  History of Encounter for routine gynecological examination (V72 31) (Z01 419)   14  History of Exposure to influenza (V01 79) (Z20 828)   15  History of Foreign body of back (911 6) (S20 459A)   16  History of Greater trochanteric bursitis of right hip (726 5) (M70 61)   17  History of abdominal pain (V13 89) (Z87 898)   18  History of acute pharyngitis (V12 69) (Z87 09)   19  History of back pain (V13 59) (Z87 39)   20  History of candidal vulvovaginitis (V13 29) (Z86 19)   21  History of constipation (V12 79) (Z87 19)   22  History of epiglottitis (V12 69) (Z87 09)   23  History of gastroesophageal reflux (GERD) (V12 79) (Z87 19)   24  History of hypercholesterolemia (V12 29) (Z86 39)   25  History of influenza vaccination (V49 89) (Z92 29)   26  History of influenza vaccination (V49 89) (Z92 29)   27  History of joint pain (V13 59) (Z87 39)   28  History of lymphadenopathy (V13 89) (Z87 898)   29  History of pneumococcal vaccination (V49 89) (Z92 29)   30  History of screening mammography (V15 89) (Z92 89)   31  History of shortness of breath (V13 89) (Z87 898)   32  History of sore throat (V12 60) (Z87 09)   33  History of stenosis of renal artery (V13 09) (Z86 79)   34  History of Infection of toe (686 9) (L08 9)   35  History of Knee pain (719 46) (M25 569)   36  History of Limb pain (729 5) (M79 609)   37  History of Nephrolithiasis (V13 01)   38  Normal delivery (650) (O80,Z37 9)   39  History of Pessary maintenance (V53 99) (Z46 89)   40  History of Pre-op evaluation (V72 84) (Z01 818)   41  History of Renovascular hypertension (405 91) (I15 0)   42  History of Rib tenderness (786 50) (R07 81)   43  History of Right groin pain (789 09) (R10 31)   44  History of Right-sided thoracic back pain (724 1) (M54 6)   45  History of Segmental and somatic dysfunction of cervical region (739 1) (M99 01)   46  History of Segmental and somatic dysfunction of lumbar region (739 3) (M99 03)   47  History of Segmental and somatic dysfunction of pelvic region (739 5) (M99 05)   48  History of Segmental and somatic dysfunction of thoracic region (739 2) (M99 02)   49  History of Somatic dysfunction of abdominal region (739 9) (M99 09)   50  History of Somatic dysfunction of head region (739 0) (M99 00)   51  History of Somatic dysfunction of lower extremities (739 6) (M99 06)   52  History of Somatic dysfunction of rib region (739 8) (M99 08)   53  History of Somatic dysfunction of thoracic region (739 2) (M99 02)   54  History of Somatic dysfunction of upper extremities (739 7) (M99 07)  Active Problems And Past Medical History Reviewed: The active problems and past medical history were reviewed and updated today  Surgical History  1  History of Breast Surgery Lumpectomy   2  History of Cataract Surgery   3  History of Cholecystectomy   4  History of Knee Surgery   5  History of PTA Iliac Initial Stenosis With Stent   6  History of Tubal Ligation  Surgical History Reviewed: The surgical history was reviewed and updated today  Family History  Mother    1  No pertinent family history  Maternal Aunt    2  Family history of Stroke Syndrome (V17 1)  Maternal Uncle    3  Family history of Stroke Syndrome (V17 1)  Unknown    4  Family history of Diabetes mellitus of other type with arthropathy, without long-term current use of insulin   5  Family history of hypertension (V17 49) (Z82 49)  Family History    6  Denied: Family history of gout   7  Denied: Family history of rheumatoid arthritis   8  Denied: Family history of systemic lupus erythematosus   9  Family history of Reported Family History Of Heart Disease   10  Family history of Stroke Syndrome (V17 1)  Family History Reviewed: The family history was reviewed and updated today  Social History   · Daily caffeine consumption, 1 serving a day   · Denied: History of Drug Use   · Former smoker (V15 82) (V44 431)   · Never Drank Alcohol   · No illicit drug use  Social History Reviewed: The social history was reviewed and updated today  The social history was reviewed and is unchanged  Current Meds   1  Adult Aspirin EC Low Strength 81 MG Oral Tablet Delayed Release; TAKE 1 TABLET DAILY; Therapy: (Recorded:28Jun2016) to Recorded   2  Calcitriol 0 25 MCG Oral Capsule; TAKE 1 CAPSULE BY MOUTH EVERY FRIDAY; Therapy: 52FLP8248 to (22 399296)  Requested for: 04OAO6454; Last Rx:56Yjq6521 Ordered   3  Fluticasone Propionate 50 MCG/ACT Nasal Suspension; USE 2 SPRAYS IN EACH NOSTRIL ONCE DAILY; Therapy: 12Dua5880 to (Arlette Dill)  Requested for: 69AZL3133; Last SQ:76APN4512 Ordered   4  Gabapentin 100 MG Oral Capsule; TAKE 1 CAPSULE Bedtime; Therapy: (Chon Benitez) to Recorded   5  Glimepiride 1 MG Oral Tablet; take 1/2 tablet by mouth daily with BREAKFAST; Therapy: 67Rqu7932 to (Evaluate:13Mar2018)  Requested for: 01Iow0224; Last Rx:35Whp7782 Ordered   6  Magnesium Oxide 400 MG Oral Tablet; TAKE 1 TABLET TWICE DAILY; Therapy: 76Khw4611 to (Evaluate:23Gxs8819); Last Rx:90Itw3131 Ordered   7  Metoprolol Tartrate 50 MG Oral Tablet; take 1 tablet by mouth twice daily; Therapy: 71RNW6333 to (Evaluate:28Izy7397)  Requested for: 86UFU4494; Last Rx:52Bbv6170 Ordered   8  NIFEdipine ER Osmotic Release 30 MG Oral Tablet Extended Release 24 Hour; Take 1 tablet daily; Therapy: 53Ylt9512 to (Evaluate:48Whw0304)  Requested for: 34Vxo5447; Last Rx:28Srl5652 Ordered   9  OneTouch Delica Lancets 65L Miscellaneous; test twice daily; Therapy: 10RSZ2570 to (Evaluate:16Jan2018)  Requested for: 93DFY6702; Last Rx:28Ibi3691 Ordered   10  OneTouch Ultra 2 w/Device Kit;  Test your sugar once in the morning and once in the  afternoon; Therapy: 29GOW2505 to (Last Rx:26Jan2015)  Requested for: 45NXW5393 Ordered   11  OneTouch Ultra Blue In [employer]; test twice daily; Therapy: 00Jhg5529 to (Evaluate:14Jan2018)  Requested for: 76ZME0331; Last  Rx:16Oct2017 Ordered   12  Oyster Calcium 500 MG TABS; Take 1 tablet  daily; Therapy: 01EGI9455 to Recorded   13  Pantoprazole Sodium 40 MG Oral Tablet Delayed Release; TAKE 1 TABLET BY MOUTH  TWICE DAILY 30 MINUTES BEFORE BREAKFAST AND DINNER; Therapy: 37ICZ0033 to (Evaluate:91Rdm8994)  Requested for: 43HMH4189; Last  Rx:19Fuq4657 Ordered   14  Pravastatin Sodium 40 MG Oral Tablet; take 1 tablet by mouth on monday and thrusday  only while on colcrys  once off of colcrys resume taking medication daily; Therapy: 42UYC2585 to (Davie Gandara)  Requested for: 32DZI9757; Last  Rx:41Wth4927 Ordered   15  Spironolactone 25 MG Oral Tablet; TAKE 1 TABLET BY MOUTH DAILY AT NIGHT BEFORE  BEDTIME; Therapy: 39GMF4471 to (RZJZHBMU:76AUL3757)  Requested for: 25Oct2017; Last  NV:25BSH3355 Ordered   16  Tylenol 325 MG Oral Capsule; TAKE 2 CAPSULE 3 times daily PRN muscle pain; Therapy: 17TZR7549 to (Evaluate:14Oct2017)  Requested for: 11Rjb4538; Last  Rx:35Ykg9042 Ordered  Medication List Reviewed: The medication list was reviewed and updated today  Allergies  1  Clonidine and derivs   2  Flagyl CAPS   3  Carvedilol TABS   4  Colchicine TABS   5  Iodinated Contrast Media   6  Diflucan TABS  7  Latex    Vitals  Vital Signs    Recorded: 42Ust6278 01:21PM   Heart Rate 62   Systolic 894   Diastolic 62   Height 5 ft 2 in   Weight 140 lb    BMI Calculated 25 61   BSA Calculated 1 64     Physical Exam   Constitutional  General appearance: No acute distress, well appearing and well nourished  Eyes  Conjunctiva and lids: No swelling, erythema or discharge     Ears, Nose, Mouth, and Throat  Hearing: Normal    Pulmonary  Respiratory effort: No increased work of breathing or signs of respiratory distress  Abdomen  Abdomen: Non-tender, no masses  Musculoskeletal  Gait and station: Normal    Psychiatric  Mood and affect: Normal        Future Appointments    Date/Time Provider Specialty Site   12/20/2017 01:20 PM CAMILLA Olvera  [address] [place] [address]   02/05/2018 02:40 PM CAMILLA Thakur   Cardiology [facility]     Signatures   Electronically signed by : Stephanie Sevilla, ; Dec 14 2017  1:36PM EST                       (Author)    Electronically signed by : CAMILLA Nieves ; Dec 14 2017  2:22PM EST

## 2017-12-20 ENCOUNTER — GENERIC CONVERSION - ENCOUNTER (OUTPATIENT)
Dept: OTHER | Facility: OTHER | Age: 82
End: 2017-12-20

## 2018-01-09 NOTE — MISCELLANEOUS
Message  patient's magnesium level back was normal  I called patient's  and let her know she can start taking omeprazole with close monitoring of mag level  Will repeat in the next few weeks  Plan   (1) MAGNESIUM; Status:Resulted - Requires Verification;   Done: 63KFT3220 12:00AM  Due:01Apr2017;Ordered;     For:Abdominal pain, epigastric; Ordered By:Yohannes Malagon;      Signatures   Electronically signed by : Gumaro White MD; May  5 2016 12:34PM EST                       (Author)

## 2018-01-10 ENCOUNTER — TRANSCRIBE ORDERS (OUTPATIENT)
Dept: RADIOLOGY | Facility: HOSPITAL | Age: 83
End: 2018-01-10

## 2018-01-10 ENCOUNTER — HOSPITAL ENCOUNTER (OUTPATIENT)
Dept: RADIOLOGY | Facility: HOSPITAL | Age: 83
Discharge: HOME | End: 2018-01-10
Payer: COMMERCIAL

## 2018-01-10 DIAGNOSIS — N28.1 ACQUIRED CYST OF KIDNEY: ICD-10-CM

## 2018-01-10 PROCEDURE — 76770 US EXAM ABDO BACK WALL COMP: CPT

## 2018-01-10 NOTE — MISCELLANEOUS
Message  Pt's son called stating that pts bp yesterday was 77/57 ,pt was very dizzy at the time  Per Dr Watson - pt to decrease nifedipine to 30 mg q am instead of 30 bid  Pt to monitor BP and call with any changes  Active Problems    1  A Fall Due To Slipping, Tripping, Or Stumbling (E885 9)   2  Abdominal discomfort in right flank (789 09) (R10 9)   3  Abdominal pain (789 00) (R10 9)   4  Abdominal pain, epigastric (789 06) (R10 13)   5  Abnormal EKG (794 31) (R94 31)   6  Abscess, neck (682 1) (L02 11)   7  Accelerated essential hypertension (401 0) (I10)   8  Acute pharyngitis (462) (J02 9)   9  Acute upper respiratory infection (465 9) (J06 9)   10  Allergic rhinitis (477 9) (J30 9)   11  Aortoiliac stenosis (440 0) (I70 0)   12  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)    (I70 219)   13  Back muscle spasm (724 8) (M62 830)   14  Back pain (724 5) (M54 9)   15  Backache (724 5) (M54 9)   16  Benign essential hypertension (401 1) (I10)   17  Carotid stenosis, asymptomatic (433 10) (I65 29)   18  Cellulitis of foot (682 7) (L03 119)   19  Chronic kidney disease (CKD), stage II (mild) (585 2) (N18 2)   20  Constipation (564 00) (K59 00)   21  Constipation (564 00) (K59 00)   22  Diabetic retinopathy (250 50,362 01) (E11 319)   23  DMII (diabetes mellitus, type 2) (250 00) (E11 9)   24  Dysphagia (787 20) (R13 10)   25  Encounter for gynecological examination without abnormal finding (V72 31) (Z01 419)   26  Encounter for mammogram to establish baseline mammogram (V76 12) (Z12 31)   27  Encounter for routine gynecological examination (V72 31) (Z01 419)   28  Epiglottitis (464 30) (J05 10)   29  Esophageal reflux (530 81) (K21 9)   30  Exposure to influenza (V01 79) (Z20 828)   31  Foreign body of back (911 6) (S20 459A)   32  Gastritis (535 50) (K29 70)   33  Hyperlipidemia (272 4) (E78 5)   34  Hypomagnesemia (275 2) (E83 42)   35   Hypomagnesemia (275 2) (E83 42)   36  Iliac 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)   37  Infection of toe (686 9) (L08 9)   38  Joint pain (719 40) (M25 50)   39  Knee pain (719 46) (M25 569)   40  Limb pain (729 5) (M79 609)   41  Lymphadenopathy (785 6) (R59 1)   42  Lymphadenopathy (785 6) (R59 1)   43  Microalbuminuria (791 0) (R80 9)   44  Nasal congestion (478 19) (R09 81)   45  Nausea (787 02) (R11 0)   46  Need for immunization against influenza (V04 81) (Z23)   47  Need for pneumococcal vaccine (V03 82) (Z23)   48  Need for prophylactic vaccination and inoculation against influenza (V04 81) (Z23)   49  Nephrolithiasis (592 0) (N20 0)   50  Nevus of cheek (216 3) (D22 39)   51  Osteoporosis (733 00) (M81 0)   52  Pessary maintenance (V53 99) (Z46 89)   53  Pre-op evaluation (V72 84) (Z01 818)   54  Renal artery stenosis (440 1) (I70 1)   55  Renal cyst (753 10) (N28 1)   56  Renovascular hypertension (405 91) (I15 0)   57  Rib tenderness (786 50) (R07 81)   58  Right groin pain (789 09) (R10 30)   59  Right-sided thoracic back pain (724 1) (M54 6)   60  Secondary hyperparathyroidism (588 81) (N25 81)   61  Secondary hyperparathyroidism, renal (588 81) (N25 81)   62  Segmental and somatic dysfunction of cervical region (739 1) (M99 01)   63  Segmental and somatic dysfunction of lumbar region (739 3) (M99 03)   64  Segmental and somatic dysfunction of pelvic region (739 5) (M99 05)   65  Segmental and somatic dysfunction of thoracic region (739 2) (M99 02)   66  Shortness of breath (786 05) (R06 02)   67  Somatic dysfunction of abdominal region (739 9) (M99 09)   68  Somatic dysfunction of head region (739 0) (M99 00)   69  Somatic dysfunction of lower extremities (739 6) (M99 06)   70  Somatic dysfunction of rib region (739 8) (M99 08)   71  Somatic dysfunction of thoracic region (739 2) (M99 02)   72  Somatic dysfunction of upper extremities (739 7) (M99 07)   73  Sore throat (462) (J02 9)   74  Vaginal prolapse (618 00) (N81 10)   75   Visit for screening mammogram (V76 12) (Z12 31)   76  Vitamin D deficiency (268 9) (E55 9)   77  Vulvovaginitis candida albicans (112 1) (B37 3)    Current Meds   1  Adult Aspirin EC Low Strength 81 MG Oral Tablet Delayed Release; TAKE 1 TABLET   DAILY; Therapy: (Recorded:28Jun2016) to Recorded   2  Fluticasone Propionate 50 MCG/ACT Nasal Suspension; USE 2 SPRAYS IN EACH   NOSTRIL ONCE DAILY; Therapy: 47Xdb6503 to (Evaluate:49Huw1182)  Requested for: 35WDE2531; Last   Rx:08Jun2016 Ordered   3  Gabapentin 300 MG Oral Capsule; take 1 capsule by mouth at bedtime; Therapy: 64MFJ5905 to (Evaluate:11Jun2016); Last Rx:44Ani6455 Ordered   4  Glimepiride 1 MG Oral Tablet; TAKE 1/2 TABLET DAILY WITH BREAKFAST  Requested   for: 27SLO2008; Last Rx:29Jun2016 Ordered   5  Lisinopril 10 MG Oral Tablet; Take 1 tablet daily; Therapy: 67Qjr2322 to  Requested for: 85Soo4431 Recorded   6  Magnesium Oxide 400 MG Oral Tablet; TAKE 1 TABLET TWICE DAILY; Therapy: 54Nfd1845 to (Evaluate:30Hmu4326); Last Rx:19Apr2016 Ordered   7  Metoprolol Tartrate 50 MG Oral Tablet; Take 1 tablet twice daily; Therapy: 82Myi1115 to  Requested for: 12Lmd8643 Recorded   8  Nifedical XL 30 MG Oral Tablet Extended Release 24 Hour; Take 1 tablet daily; Therapy: 93IZZ1449 to Recorded   9  Omeprazole 40 MG Oral Capsule Delayed Release; TAKE 1 CAPSULE TWICE DAILY; Therapy: 38PMY4637 to (Evaluate:89Nlu5860)  Requested for: 43Bxn8933; Last   Rx:42Atk4293 Ordered   10  OneTouch Delica Lancets 79J Miscellaneous; TEST 2 TIMES A DAY dx e11 9; Therapy: 28QGQ8472 to (Evaluate:20Apr2017)  Requested for: 78Rdg0396; Last    Rx:74Wns7466 Ordered   11  OneTouch Ultra 2 w/Device Kit; Test your sugar once in the morning and once in the    afternoon; Therapy: 15KWQ9157 to (Last Rx:26Jan2015)  Requested for: 56HUV7542 Ordered   12  OneTouch Ultra Blue In [employer] Strip; TEST 2 TIMES A DAY dx e11 9;     Therapy: 28Ykm0606 to (Evaluate:20Apr2017)  Requested for: 25Apr2016; Last    Rx:83Njp3834 Ordered   13  Oyster Calcium 500 MG TABS; Take 1 tablet  daily; Therapy: 51VXK3702 to Recorded   14  Pravastatin Sodium 40 MG Oral Tablet; take 1 tablet by mouth every day; Therapy: 26CYC3372 to (Evaluate:12Oct2016)  Requested for: 11Cjn5190; Last    Rx:84Tlt2278 Ordered   15  Spironolactone 25 MG Oral Tablet; take 1 tablet by mouth daily; Therapy: 96Ben5636 to (Evaluate:24Nov2016)  Requested for: 42Qtw5461; Last    Rx:92Hak8034 Ordered   16  Sucralfate 1 GM Oral Tablet (Carafate); TAKE 1 TABLET EVERY 12 HOURS DAILY; Therapy: 63NKX8191 to (Evaluate:20Yey7075)  Requested for: 44OVK0015; Last    Rx:47Ynu6648 Ordered    Allergies    1  Carvedilol TABS   2   Diflucan TABS   3  Flagyl CAPS    Signatures   Electronically signed by : Abdulaziz Wilson, ; Sep  7 2016  1:13PM EST                       (Author)

## 2018-01-10 NOTE — PROGRESS NOTES
Assessment    1  Pre-op evaluation (V72 84) (Z01 818)    Plan  Allergic rhinitis, Sore throat    · Fluticasone Propionate 50 MCG/ACT Nasal Suspension; USE 2 SPRAYS IN  EACH NOSTRIL ONCE DAILY    Discussion/Summary  Surgical Clearance: She is at a LOW risk from a cardiovascular standpoint at this time without any additional cardiac testing  Reevaluation needed, if she should present with symptoms prior to surgery/procedure  Comments:  Risk of major cardiac event  4%  Patient may proceed to operating room with no further testing necessary  Surgical clearance faxed to Dr [facility]  Chief Complaint  Surgery for Laryngeal mass      History of Present Illness  Pre-Op Visit (Brief): The patient is being seen for a preoperative visit and Vocal cord mass removal    Surgical Risk Assessment:   Prior Anesthesia: She had prior anesthesia, no prior adverse reaction to edidural anesthesia, no prior adverse reaction to spinal anesthesia and no prior adverse reaction to general anesthesia  Pertinent Past Medical History: CKD stage 2, DM type 2  Exercise Capacity: able to walk four blocks without symptoms and able to walk two flights of stairs without symptoms  Lifestyle Factors: denies alcohol use and Smoked for 74 years, 1 pack a day  Symptoms: no easy bleeding, no easy bruising, no heavy menses, no frequent nosebleeds, no chest pain, cough, no dyspnea, no edema, no palpitations, no wheezing and secondary to the vocal cord mass  Pertinent Family History: no pertinent family history  Living Situation: home is secure and supportive, no post-op concerns with her living situation and Lives alone, but always has her family  HPI: 81 y/o F comes in for preop clearance  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   At this time she denies any difficulty of swallowing  She does state that it was dx because she had trouble swallowing and was almost miss dx for an URI        Review of Systems    Constitutional: no fever, not feeling poorly and not feeling tired  Eyes: eye pain and Dry eyes  ENT: no complaints of earache, no loss of hearing, no nose bleeds, no nasal discharge, no sore throat, no hoarseness  Cardiovascular: No complaints of slow heart rate, no fast heart rate, no chest pain, no palpitations, no leg claudication, no lower extremity edema  Respiratory: No complaints of shortness of breath, no wheezing, no cough, no SOB on exertion, no orthopnea, no PND  Gastrointestinal: No complaints of abdominal pain, no constipation, no nausea or vomiting, no diarrhea, no bloody stools  Musculoskeletal: No complaints of arthralgias, no myalgias, no joint swelling or stiffness, no limb pain or swelling  Hematologic/Lymphatic: no tendency for easy bleeding  Active Problems    1  A Fall Due To Slipping, Tripping, Or Stumbling (E885 9)   2  Abdominal discomfort in right flank (789 09) (R10 9)   3  Abdominal pain, epigastric (789 06) (R10 13)   4  Abscess, neck (682 1) (L02 11)   5  Accelerated essential hypertension (401 0) (I10)   6  Acute pharyngitis (462) (J02 9)   7  Acute upper respiratory infection (465 9) (J06 9)   8  Allergic rhinitis (477 9) (J30 9)   9  Aortoiliac stenosis (440 0) (I70 0)   10  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)    (I70 219)   11  Back muscle spasm (724 8) (M62 830)   12  Back pain (724 5) (M54 9)   13  Backache (724 5) (M54 9)   14  Benign essential hypertension (401 1) (I10)   15  Carotid stenosis, asymptomatic (433 10) (I65 29)   16  Cellulitis of foot (682 7) (L03 119)   17  Chronic kidney disease (CKD), stage II (mild) (585 2) (N18 2)   18  Constipation (564 00) (K59 00)   19  Constipation (564 00) (K59 00)   20  Diabetic retinopathy (250 50,362 01) (E11 319)   21  DMII (diabetes mellitus, type 2) (250 00) (E11 9)   22  Dysphagia (787 20) (R13 10)   23   Encounter for gynecological examination without abnormal finding (V72 31) (Z01 419)   24  Encounter for mammogram to establish baseline mammogram (V76 12) (Z12 31)   25  Encounter for routine gynecological examination (V72 31) (Z01 419)   26  Epiglottitis (464 30) (J05 10)   27  Esophageal reflux (530 81) (K21 9)   28  Exposure to influenza (V01 79) (Z20 828)   29  Gastritis (535 50) (K29 70)   30  Hyperlipidemia (272 4) (E78 5)   31  Hypomagnesemia (275 2) (E83 42)   32  Hypomagnesemia (275 2) (E83 42)   33  Iliac 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)   34  Infection of toe (686 9) (L08 9)   35  Joint pain (719 40) (M25 50)   36  Knee pain (719 46) (M25 569)   37  Limb pain (729 5) (M79 609)   38  Lymphadenopathy (785 6) (R59 1)   39  Lymphadenopathy (785 6) (R59 1)   40  Microalbuminuria (791 0) (R80 9)   41  Nasal congestion (478 19) (R09 81)   42  Nausea (787 02) (R11 0)   43  Need for immunization against influenza (V04 81) (Z23)   44  Need for pneumococcal vaccine (V03 82) (Z23)   45  Need for prophylactic vaccination and inoculation against influenza (V04 81) (Z23)   46  Nephrolithiasis (592 0) (N20 0)   47  Nevus of cheek (216 3) (D22 39)   48  Osteoporosis (733 00) (M81 0)   49  Pessary maintenance (V53 99) (Z46 89)   50  Renal artery stenosis (440 1) (I70 1)   51  Renal cyst (753 10) (N28 1)   52  Rib tenderness (786 50) (R07 81)   53  Right-sided thoracic back pain (724 1) (M54 6)   54  Secondary hyperparathyroidism (588 81) (N25 81)   55  Secondary hyperparathyroidism, renal (588 81) (N25 81)   56  Segmental and somatic dysfunction of cervical region (739 1) (M99 01)   57  Segmental and somatic dysfunction of lumbar region (739 3) (M99 03)   58  Segmental and somatic dysfunction of pelvic region (739 5) (M99 05)   59  Segmental and somatic dysfunction of thoracic region (739 2) (M99 02)   60  Shortness of breath (786 05) (R06 02)   61  Somatic dysfunction of abdominal region (739 9) (M99 09)   62  Somatic dysfunction of head region (739 0) (M99 00)   63  Somatic dysfunction of lower extremities (739 6) (M99 06)   64  Somatic dysfunction of rib region (739 8) (M99 08)   65  Somatic dysfunction of thoracic region (739 2) (M99 02)   66  Somatic dysfunction of upper extremities (739 7) (M99 07)   67  Sore throat (462) (J02 9)   68  Vaginal prolapse (618 00) (N81 10)   69  Visit for screening mammogram (V76 12) (Z12 31)   70  Vitamin D deficiency (268 9) (E55 9)   71  Vulvovaginitis candida albicans (112 1) (B37 3)    Past Medical History    · History of Denial Of Any Significant Medical History   · History of gastroesophageal reflux (GERD) (V12 79) (Z87 19)   · Need for prophylactic vaccination and inoculation against influenza (V04 81) (Z23)   · History of Nephrolithiasis (V13 01)   · Normal delivery (650) (O80,Z37  9)    The active problems and past medical history were reviewed and updated today  Surgical History    · History of Cholecystectomy   · History of Knee Surgery   · History of PTA Iliac Initial Stenosis With Stent   · History of Tubal Ligation    The surgical history was reviewed and updated today  Family History  Mother    · No pertinent family history  Maternal Aunt    · Family history of Stroke Syndrome (V17 1)  Maternal Uncle    · Family history of Stroke Syndrome (V17 1)  Family History    · Family history of Reported Family History Of Heart Disease   · Family history of Stroke Syndrome (V17 1)    The family history was reviewed and updated today  Social History    · Daily caffeine consumption, 1 serving a day   · Denied: History of Drug Use   · Former smoker (N00 27) (Z47 120)   · Never Drank Alcohol  The social history was reviewed and updated today  Current Meds   1  Fluticasone Propionate 50 MCG/ACT Nasal Suspension; USE 2 SPRAYS IN EACH   NOSTRIL ONCE DAILY; Therapy: 22Aab9542 to (Leslie Karl)  Requested for: 41DXS5865; Last   TR:17ZSJ4484 Ordered   2   Gabapentin 300 MG Oral Capsule; take 1 capsule by mouth at bedtime; Therapy: 27GUS2286 to (Evaluate:11Jun2016); Last Rx:00Vyk7227 Ordered   3  Glimepiride 1 MG Oral Tablet; TAKE 1/2 TABLET DAILY WITH BREAKFAST  Requested   for: 30Apr2015; Last Rx:69Yij0544 Ordered   4  Hydrochlorothiazide 25 MG Oral Tablet; TAKE 1 TABLET DAILY; Therapy: 04SYP8526 to (Evaluate:19Mar2016)  Requested for: 78Fib8629; Last   Rx:36Dwq1601 Ordered   5  Lisinopril 30 MG Oral Tablet; Take 1 tablet twice daily; Therapy: 86ZHV7821 to (Evaluate:15Chh6972)  Requested for: 90Ler3763; Last   Rx:45Haz2777 Ordered   6  Magnesium Oxide 400 MG Oral Tablet; TAKE 1 TABLET TWICE DAILY; Therapy: 19Apr2016 to (Evaluate:19May2016); Last Rx:19Apr2016 Ordered   7  Omeprazole 40 MG Oral Capsule Delayed Release; TAKE 1 CAPSULE TWICE DAILY; Therapy: 08VKE3913 to (Evaluate:19Nov2016)  Requested for: 91ONF9178; Last   Rx:23May2016 Ordered   8  OneTouch Delica Lancets 05H Miscellaneous; TEST 2 TIMES A DAY dx e11 9; Therapy: 77EQJ5352 to (Evaluate:20Apr2017)  Requested for: 25Apr2016; Last   Rx:25Apr2016 Ordered   9  OneTouch Ultra 2 w/Device Kit; Test your sugar once in the morning and once in the   afternoon; Therapy: 00MDL5866 to (Last Rx:26Jan2015)  Requested for: 18MYH5005 Ordered   10  OneTouch Ultra Blue In Vitro Strip; TEST 2 TIMES A DAY dx e11 9; Therapy: 43Czs8782 to (Evaluate:20Apr2017)  Requested for: 97Xqy3787; Last    Rx:03Pzm8549 Ordered   11  Oyster Calcium 500 MG Oral Tablet; Take 1 tablet  daily; Therapy: 16RYK9692 to Recorded   12  Pravastatin Sodium 40 MG Oral Tablet; take 1 tablet by mouth every day; Therapy: 79UML8454 to (Evaluate:12Oct2016)  Requested for: 01Yba2558; Last    Rx:31Lvi9367 Ordered    The medication list was reviewed and updated today  Allergies    1  Carvedilol TABS   2   Diflucan TABS   3  Flagyl CAPS    Vitals   Recorded: 09XBE9573 03:11PM   Temperature 97 4 F   Heart Rate 66   Respiration 14   Systolic 394   Diastolic 60   Height 5 ft 5 in   Weight 148 lb 8 0 oz   BMI Calculated 24 71   BSA Calculated 1 75   Pain Scale 0     Physical Exam    Constitutional   General appearance: No acute distress, well appearing and well nourished  Head and Face   Head and face: Normal     Palpation of the face and sinuses: No sinus tenderness  Eyes   Conjunctiva and lids: No swelling, erythema or discharge  Pupils and irises: Equal, round, reactive to light  Ophthalmoscopic examination: Normal fundi and optic discs  Ears, Nose, Mouth, and Throat   External inspection of ears and nose: Normal     Oropharynx: Normal with no erythema, edema, exudate or lesions  Pulmonary   Respiratory effort: No increased work of breathing or signs of respiratory distress  Auscultation of lungs: Clear to auscultation  Cardiovascular   Auscultation of heart: Normal rate and rhythm, normal S1 and S2, no murmurs  Examination of extremities for edema and/or varicosities: Normal     Abdomen   Abdomen: Non-tender, no masses  Musculoskeletal   Gait and station: Normal     Range of motion: Normal     Stability: Normal     Muscle strength/tone: Normal     Neurologic   Cranial nerves: Cranial nerves II-XII intact  Psychiatric   Judgment and insight: Normal     Orientation to person, place, and time: Normal     Mood and affect: Normal        Attending Note  Attending Note: Attending Note: the staff discussed the patient on the day of the visit, I discussed the case with the Resident and reviewed the Resident's note, I supervised the Resident and I agree with the Resident management plan as it was presented to me  Level of Participation: I was present in clinic, but did not examine the patient  I agree with the Resident's note  End of Encounter Meds    1  Fluticasone Propionate 50 MCG/ACT Nasal Suspension; USE 2 SPRAYS IN EACH   NOSTRIL ONCE DAILY; Therapy: 51Cko4865 to (Evaluate:82Yig4253)  Requested for: 61QEF4361; Last   Rx:08Jun2016 Ordered    2   Gabapentin 300 MG Oral Capsule; take 1 capsule by mouth at bedtime; Therapy: 19LCT4536 to (Evaluate:11Jun2016); Last Rx:91Ykp2359 Ordered    3  Hydrochlorothiazide 25 MG Oral Tablet; TAKE 1 TABLET DAILY; Therapy: 41WGO9675 to (Evaluate:19Mar2016)  Requested for: 68Xpw6935; Last   Rx:98Grg6772 Ordered   4  Lisinopril 30 MG Oral Tablet; Take 1 tablet twice daily; Therapy: 76EYO9761 to (Evaluate:37Ygg1943)  Requested for: 45Hpi3297; Last   Rx:84Twx6956 Ordered    5  Glimepiride 1 MG Oral Tablet; TAKE 1/2 TABLET DAILY WITH BREAKFAST  Requested   for: 44Pyt9142; Last Rx:08Gaj5666 Ordered   6  OneTouch Delica Lancets 60Y Miscellaneous; TEST 2 TIMES A DAY dx e11 9; Therapy: 70JEP1701 to (Evaluate:20Apr2017)  Requested for: 64Boq0394; Last   Rx:26Hog0988 Ordered   7  OneTouch Ultra 2 w/Device Kit; Test your sugar once in the morning and once in the   afternoon; Therapy: 05MCE6473 to (Last Rx:26Jan2015)  Requested for: 01LFF7977 Ordered   8  OneTouch Ultra Blue In Vitro Strip; TEST 2 TIMES A DAY dx e11 9; Therapy: 03Gvl3652 to (Evaluate:20Apr2017)  Requested for: 80Udc2908; Last   Rx:64Hne4721 Ordered    9  Omeprazole 40 MG Oral Capsule Delayed Release; TAKE 1 CAPSULE TWICE DAILY; Therapy: 51VPV1044 to (Evaluate:19Nov2016)  Requested for: 39FLW5796; Last   Rx:15Weh3172 Ordered    10  Pravastatin Sodium 40 MG Oral Tablet; take 1 tablet by mouth every day; Therapy: 17WEM0742 to (Evaluate:12Oct2016)  Requested for: 90Pll7584; Last    Rx:32Oge6436 Ordered    11  Magnesium Oxide 400 MG Oral Tablet; TAKE 1 TABLET TWICE DAILY; Therapy: 57Ggw1015 to (Evaluate:11Xub4584); Last Rx:65Bih1607 Ordered    12  Oyster Calcium 500 MG Oral Tablet; Take 1 tablet  daily;     Therapy: 40ORU1927 to Recorded    Future Appointments    Date/Time Provider Specialty Site   06/24/2016 03:00 PM [address], [facility]   08/03/2016 04:00 PM Zara Welch, [facility] Adult [address] 06/23/2016 02:20 PM Chris Michelle, [address]     Signatures   Electronically signed by : CAMILLA Bernabe ; Jun 8 2016  5:41PM EST                       (Author)    Electronically signed by : Flori Walker DO; Jun 14 2016  9:21AM EST                       (Author)

## 2018-01-10 NOTE — PROCEDURES
Procedures by Shanice Kc MD at 9/2/2016  10:20 AM      Author:  Shanice Kc MD Service:  Interventional Radiology  Author Type:  Physician     Filed:  9/2/2016 10:21 AM Date of Service:  9/2/2016 10:20 AM Status:  Signed     :  Shanice Kc MD (Physician)            Procedures    INTERVENTIONAL RADIOLOGY PROCEDURE NOTE    Date: 09/02/16    Procedures:     1  US guided right common femoral arterial access  2  Abdominal aortogram   3  Bilateral selective renal angiogram   4  Groin closure with mynx device    Preoperative diagnosis: Uncontrolled hypertension  Postoperative diagnosis: Same    Surgeon: Shanice Kc MD     Assistant: None  No qualified resident was available  Blood loss: Minimal    Specimens: none    Findings: No significant renal artery stenosis identified  Complications: None    Anesthesia: IV moderate conscious sedation    Plan:  Bed rest 2 hours  Check groin puncture sites and right lower extremity pulses                  Received for:Provider  [facility]   Sep  2 2016 10:22AM [facility] Standard Time

## 2018-01-11 NOTE — CONSULTS
Chief Complaint  Surgery for Laryngeal mass      History of Present Illness  Pre-Op Visit (Brief): The patient is being seen for a preoperative visit and Vocal cord mass removal    Surgical Risk Assessment:   Prior Anesthesia: She had prior anesthesia, no prior adverse reaction to edidural anesthesia, no prior adverse reaction to spinal anesthesia and no prior adverse reaction to general anesthesia  Pertinent Past Medical History: CKD stage 2, DM type 2  Exercise Capacity: able to walk four blocks without symptoms and able to walk two flights of stairs without symptoms  Lifestyle Factors: denies alcohol use and Smoked for 74 years, 1 pack a day  Symptoms: no easy bleeding, no easy bruising, no heavy menses, no frequent nosebleeds, no chest pain, cough, no dyspnea, no edema, no palpitations, no wheezing and secondary to the vocal cord mass  Pertinent Family History: no pertinent family history  Living Situation: home is secure and supportive, no post-op concerns with her living situation and Lives alone, but always has her family  HPI: 81 y/o F comes in for preop clearance  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   At this time she denies any difficulty of swallowing  She does state that it was dx because she had trouble swallowing and was almost miss dx for an URI  Review of Systems    Constitutional: no fever, not feeling poorly and not feeling tired  Eyes: eye pain and Dry eyes  ENT: no complaints of earache, no loss of hearing, no nose bleeds, no nasal discharge, no sore throat, no hoarseness  Cardiovascular: No complaints of slow heart rate, no fast heart rate, no chest pain, no palpitations, no leg claudication, no lower extremity edema  Respiratory: No complaints of shortness of breath, no wheezing, no cough, no SOB on exertion, no orthopnea, no PND  Gastrointestinal: No complaints of abdominal pain, no constipation, no nausea or vomiting, no diarrhea, no bloody stools  Musculoskeletal: No complaints of arthralgias, no myalgias, no joint swelling or stiffness, no limb pain or swelling  Hematologic/Lymphatic: no tendency for easy bleeding  Active Problems    1  A Fall Due To Slipping, Tripping, Or Stumbling (E885 9)   2  Abdominal discomfort in right flank (789 09) (R10 9)   3  Abdominal pain, epigastric (789 06) (R10 13)   4  Abscess, neck (682 1) (L02 11)   5  Accelerated essential hypertension (401 0) (I10)   6  Acute pharyngitis (462) (J02 9)   7  Acute upper respiratory infection (465 9) (J06 9)   8  Allergic rhinitis (477 9) (J30 9)   9  Aortoiliac stenosis (440 0) (I70 0)   10  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)    (I70 219)   11  Back muscle spasm (724 8) (M62 830)   12  Back pain (724 5) (M54 9)   13  Backache (724 5) (M54 9)   14  Benign essential hypertension (401 1) (I10)   15  Carotid stenosis, asymptomatic (433 10) (I65 29)   16  Cellulitis of foot (682 7) (L03 119)   17  Chronic kidney disease (CKD), stage II (mild) (585 2) (N18 2)   18  Constipation (564 00) (K59 00)   19  Constipation (564 00) (K59 00)   20  Diabetic retinopathy (250 50,362 01) (E11 319)   21  DMII (diabetes mellitus, type 2) (250 00) (E11 9)   22  Dysphagia (787 20) (R13 10)   23  Encounter for gynecological examination without abnormal finding (V72 31) (Z01 419)   24  Encounter for mammogram to establish baseline mammogram (V76 12) (Z12 31)   25  Encounter for routine gynecological examination (V72 31) (Z01 419)   26  Epiglottitis (464 30) (J05 10)   27  Esophageal reflux (530 81) (K21 9)   28  Exposure to influenza (V01 79) (Z20 828)   29  Gastritis (535 50) (K29 70)   30  Hyperlipidemia (272 4) (E78 5)   31  Hypomagnesemia (275 2) (E83 42)   32  Hypomagnesemia (275 2) (E83 42)   33  Iliac 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)   34  Infection of toe (686 9) (L08 9)   35  Joint pain (719 40) (M25 50)   36  Knee pain (719 46) (M25 569)   37   Limb pain (729 5) (M79 609)   38  Lymphadenopathy (785 6) (R59 1)   39  Lymphadenopathy (785 6) (R59 1)   40  Microalbuminuria (791 0) (R80 9)   41  Nasal congestion (478 19) (R09 81)   42  Nausea (787 02) (R11 0)   43  Need for immunization against influenza (V04 81) (Z23)   44  Need for pneumococcal vaccine (V03 82) (Z23)   45  Need for prophylactic vaccination and inoculation against influenza (V04 81) (Z23)   46  Nephrolithiasis (592 0) (N20 0)   47  Nevus of cheek (216 3) (D22 39)   48  Osteoporosis (733 00) (M81 0)   49  Pessary maintenance (V53 99) (Z46 89)   50  Renal artery stenosis (440 1) (I70 1)   51  Renal cyst (753 10) (N28 1)   52  Rib tenderness (786 50) (R07 81)   53  Right-sided thoracic back pain (724 1) (M54 6)   54  Secondary hyperparathyroidism (588 81) (N25 81)   55  Secondary hyperparathyroidism, renal (588 81) (N25 81)   56  Segmental and somatic dysfunction of cervical region (739 1) (M99 01)   57  Segmental and somatic dysfunction of lumbar region (739 3) (M99 03)   58  Segmental and somatic dysfunction of pelvic region (739 5) (M99 05)   59  Segmental and somatic dysfunction of thoracic region (739 2) (M99 02)   60  Shortness of breath (786 05) (R06 02)   61  Somatic dysfunction of abdominal region (739 9) (M99 09)   62  Somatic dysfunction of head region (739 0) (M99 00)   63  Somatic dysfunction of lower extremities (739 6) (M99 06)   64  Somatic dysfunction of rib region (739 8) (M99 08)   65  Somatic dysfunction of thoracic region (739 2) (M99 02)   66  Somatic dysfunction of upper extremities (739 7) (M99 07)   67  Sore throat (462) (J02 9)   68  Vaginal prolapse (618 00) (N81 10)   69  Visit for screening mammogram (V76 12) (Z12 31)   70  Vitamin D deficiency (268 9) (E55 9)   71   Vulvovaginitis candida albicans (112 1) (B37 3)    Past Medical History    · History of Denial Of Any Significant Medical History   · History of gastroesophageal reflux (GERD) (V12 79) (Z87 19)   · Need for prophylactic vaccination and inoculation against influenza (V04 81) (Z23)   · History of Nephrolithiasis (V13 01)   · Normal delivery (650) (O80,Z37  9)    The active problems and past medical history were reviewed and updated today  Surgical History    · History of Cholecystectomy   · History of Knee Surgery   · History of PTA Iliac Initial Stenosis With Stent   · History of Tubal Ligation    The surgical history was reviewed and updated today  Family History    · No pertinent family history    · Family history of Stroke Syndrome (V17 1)    · Family history of Stroke Syndrome (V17 1)    · Family history of Reported Family History Of Heart Disease   · Family history of Stroke Syndrome (V17 1)    The family history was reviewed and updated today  Social History    · Daily caffeine consumption, 1 serving a day   · Denied: History of Drug Use   · Former smoker (G15 66) (E06 716)   · Never Drank Alcohol  The social history was reviewed and updated today  Current Meds   1  Fluticasone Propionate 50 MCG/ACT Nasal Suspension; USE 2 SPRAYS IN EACH   NOSTRIL ONCE DAILY; Therapy: 50Oyr8655 to (Shahla Urban)  Requested for: 46DLM0636; Last   FF:72CYU7643 Ordered   2  Gabapentin 300 MG Oral Capsule; take 1 capsule by mouth at bedtime; Therapy: 20EUN4537 to (Evaluate:11Jun2016); Last Rx:80Ewl8104 Ordered   3  Glimepiride 1 MG Oral Tablet; TAKE 1/2 TABLET DAILY WITH BREAKFAST  Requested   for: 86Ker0537; Last Rx:05Adf4226 Ordered   4  Hydrochlorothiazide 25 MG Oral Tablet; TAKE 1 TABLET DAILY; Therapy: 28YCU1796 to (Evaluate:19Mar2016)  Requested for: 31Npf0585; Last   Rx:62Qag3528 Ordered   5  Lisinopril 30 MG Oral Tablet; Take 1 tablet twice daily; Therapy: 73TGI7313 to (Evaluate:76Xfj6713)  Requested for: 74Wnv5793; Last   Rx:83Uln4197 Ordered   6  Magnesium Oxide 400 MG Oral Tablet; TAKE 1 TABLET TWICE DAILY; Therapy: 95Nhn4817 to (Evaluate:93Pxi0657); Last Rx:70Rgq6895 Ordered   7  Omeprazole 40 MG Oral Capsule Delayed Release; TAKE 1 CAPSULE TWICE DAILY; Therapy: 55MGY2331 to (Evaluate:19Nov2016)  Requested for: 47ZBI0020; Last   Rx:06Jjb1358 Ordered   8  OneTouch Delica Lancets 68K Miscellaneous; TEST 2 TIMES A DAY dx e11 9; Therapy: 71PUF7493 to (Evaluate:20Apr2017)  Requested for: 57Opt3841; Last   Rx:12Oby4248 Ordered   9  OneTouch Ultra 2 w/Device Kit; Test your sugar once in the morning and once in the   afternoon; Therapy: 47KVY1289 to (Last Rx:26Jan2015)  Requested for: 88JQP8945 Ordered   10  OneTouch Ultra Blue In Vitro Strip; TEST 2 TIMES A DAY dx e11 9; Therapy: 17Spf7901 to (Evaluate:20Apr2017)  Requested for: 17Ltr5478; Last    Rx:86Wew3710 Ordered   11  Oyster Calcium 500 MG Oral Tablet; Take 1 tablet  daily; Therapy: 25FMY5884 to Recorded   12  Pravastatin Sodium 40 MG Oral Tablet; take 1 tablet by mouth every day; Therapy: 16PDO6637 to (Evaluate:12Oct2016)  Requested for: 67Ilh0274; Last    Rx:41Heu7233 Ordered    The medication list was reviewed and updated today  Allergies    1  Carvedilol TABS   2  Diflucan TABS   3  Flagyl CAPS    Vitals  Signs [Data Includes: Current Encounter]    Temperature: 97 4 F  Heart Rate: 66  Respiration: 14  Systolic: 286  Diastolic: 60  Height: 5 ft 5 in  Weight: 148 lb 8 0 oz  BMI Calculated: 24 71  BSA Calculated: 1 75  Pain Scale: 0    Physical Exam    Constitutional   General appearance: No acute distress, well appearing and well nourished  Head and Face   Head and face: Normal     Palpation of the face and sinuses: No sinus tenderness  Eyes   Conjunctiva and lids: No swelling, erythema or discharge  Pupils and irises: Equal, round, reactive to light  Ophthalmoscopic examination: Normal fundi and optic discs  Ears, Nose, Mouth, and Throat   External inspection of ears and nose: Normal     Oropharynx: Normal with no erythema, edema, exudate or lesions      Pulmonary   Respiratory effort: No increased work of breathing or signs of respiratory distress  Auscultation of lungs: Clear to auscultation  Cardiovascular   Auscultation of heart: Normal rate and rhythm, normal S1 and S2, no murmurs  Examination of extremities for edema and/or varicosities: Normal     Abdomen   Abdomen: Non-tender, no masses  Musculoskeletal   Gait and station: Normal     Range of motion: Normal     Stability: Normal     Muscle strength/tone: Normal     Neurologic   Cranial nerves: Cranial nerves II-XII intact  Psychiatric   Judgment and insight: Normal     Orientation to person, place, and time: Normal     Mood and affect: Normal        Assessment    1  Pre-op evaluation (V72 84) (Z01 818)    Plan  Allergic rhinitis, Sore throat    · Fluticasone Propionate 50 MCG/ACT Nasal Suspension; USE 2 SPRAYS IN  EACH NOSTRIL ONCE DAILY    Discussion/Summary  Surgical Clearance: She is at a LOW risk from a cardiovascular standpoint at this time without any additional cardiac testing  Reevaluation needed, if she should present with symptoms prior to surgery/procedure  Comments:  Risk of major cardiac event  4%  Patient may proceed to operating room with no further testing necessary  Surgical clearance faxed to Dr [facility]  End of Encounter Meds    1  Fluticasone Propionate 50 MCG/ACT Nasal Suspension; USE 2 SPRAYS IN EACH   NOSTRIL ONCE DAILY; Therapy: 29Kwn0412 to (Evaluate:98Ylx4839)  Requested for: 16XQO9831; Last   Rx:08Jun2016 Ordered    2  Gabapentin 300 MG Oral Capsule; take 1 capsule by mouth at bedtime; Therapy: 81TYH0955 to (Evaluate:11Jun2016); Last Rx:21Etz1054 Ordered    3  Hydrochlorothiazide 25 MG Oral Tablet; TAKE 1 TABLET DAILY; Therapy: 47OOW2935 to (Evaluate:19Mar2016)  Requested for: 98Pbw7724; Last   Rx:57Cez7835 Ordered   4  Lisinopril 30 MG Oral Tablet; Take 1 tablet twice daily;    Therapy: 59WZD2597 to (Evaluate:51Jjs3397)  Requested for: 43Iqj8794; Last   Rx:19Uvk6090 Ordered    5  Glimepiride 1 MG Oral Tablet; TAKE 1/2 TABLET DAILY WITH BREAKFAST  Requested   for: 53Lch3508; Last Rx:00Anq1521 Ordered   6  OneTouch Delica Lancets 57M Miscellaneous; TEST 2 TIMES A DAY dx e11 9; Therapy: 70TIK9957 to (Evaluate:97Gvl9873)  Requested for: 54Umf6584; Last   Rx:47Xdj4818 Ordered   7  OneTouch Ultra 2 w/Device Kit; Test your sugar once in the morning and once in the   afternoon; Therapy: 33ELY0363 to (Last Rx:26Jan2015)  Requested for: 17TIB2753 Ordered   8  OneTouch Ultra Blue In Vitro Strip; TEST 2 TIMES A DAY dx e11 9; Therapy: 43Uyk8027 to (Evaluate:13Vwy0728)  Requested for: 60Ovl7360; Last   Rx:45Fxf3463 Ordered    9  Omeprazole 40 MG Oral Capsule Delayed Release; TAKE 1 CAPSULE TWICE DAILY; Therapy: 05SHW2847 to (Evaluate:23Brq2561)  Requested for: 80CUB6409; Last   Rx:36Icr5327 Ordered    10  Pravastatin Sodium 40 MG Oral Tablet; take 1 tablet by mouth every day; Therapy: 63TGR2107 to (Evaluate:98Jcm0898)  Requested for: 11Djz5825; Last    Rx:86Lwv5077 Ordered    11  Magnesium Oxide 400 MG Oral Tablet; TAKE 1 TABLET TWICE DAILY; Therapy: 45Sxc2146 to (Evaluate:88Zus8803); Last Rx:24Rnb2067 Ordered    12  Oyster Calcium 500 MG Oral Tablet; Take 1 tablet  daily;     Therapy: 52FNF2237 to Recorded    Signatures   Electronically signed by : CAMILLA Byrd ; Jun 8 2016  6:01PM EST                       (Author)    Electronically signed by : Valente Myers DO; Jun 14 2016  9:21AM EST                       (Author)

## 2018-01-12 NOTE — MISCELLANEOUS
Message  pt's  called, pt c/o pain R leg s/p agram 9/2  he states pain is constant, at first he stated it was R lat leg from groin to knee, then after he s/w pt he said it's the front of the leg, near the groin  due to language barrier difficult to obtain additional info, he said there is no lump  s/w TOMMY STERN and she can see pt today at [facility], s/w Dariana Caldwell and apt given to  for [phone] today  Active Problems    1  A Fall Due To Slipping, Tripping, Or Stumbling (E885 9)   2  Abdominal discomfort in right flank (789 09) (R10 9)   3  Abdominal pain (789 00) (R10 9)   4  Abdominal pain, epigastric (789 06) (R10 13)   5  Abnormal EKG (794 31) (R94 31)   6  Abscess, neck (682 1) (L02 11)   7  Accelerated essential hypertension (401 0) (I10)   8  Acute pharyngitis (462) (J02 9)   9  Acute upper respiratory infection (465 9) (J06 9)   10  Allergic rhinitis (477 9) (J30 9)   11  Aortoiliac stenosis (440 0) (I70 0)   12  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)    (I70 219)   13  Back muscle spasm (724 8) (M62 830)   14  Back pain (724 5) (M54 9)   15  Backache (724 5) (M54 9)   16  Benign essential hypertension (401 1) (I10)   17  Carotid stenosis, asymptomatic (433 10) (I65 29)   18  Cellulitis of foot (682 7) (L03 119)   19  Chronic kidney disease (CKD), stage II (mild) (585 2) (N18 2)   20  Constipation (564 00) (K59 00)   21  Constipation (564 00) (K59 00)   22  Diabetic retinopathy (250 50,362 01) (E11 319)   23  DMII (diabetes mellitus, type 2) (250 00) (E11 9)   24  Dysphagia (787 20) (R13 10)   25  Encounter for gynecological examination without abnormal finding (V72 31) (Z01 419)   26  Encounter for mammogram to establish baseline mammogram (V76 12) (Z12 31)   27  Encounter for routine gynecological examination (V72 31) (Z01 419)   28  Epiglottitis (464 30) (J05 10)   29  Esophageal reflux (530 81) (K21 9)   30   Exposure to influenza (V01 79) (Z20 828)   31  Foreign body of back (911 6) (S20 459A)   32  Gastritis (535 50) (K29 70)   33  Hyperlipidemia (272 4) (E78 5)   34  Hypomagnesemia (275 2) (E83 42)   35  Hypomagnesemia (275 2) (E83 42)   36  Iliac 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)   37  Infection of toe (686 9) (L08 9)   38  Joint pain (719 40) (M25 50)   39  Knee pain (719 46) (M25 569)   40  Limb pain (729 5) (M79 609)   41  Lymphadenopathy (785 6) (R59 1)   42  Lymphadenopathy (785 6) (R59 1)   43  Microalbuminuria (791 0) (R80 9)   44  Nasal congestion (478 19) (R09 81)   45  Nausea (787 02) (R11 0)   46  Need for immunization against influenza (V04 81) (Z23)   47  Need for pneumococcal vaccine (V03 82) (Z23)   48  Need for prophylactic vaccination and inoculation against influenza (V04 81) (Z23)   49  Nephrolithiasis (592 0) (N20 0)   50  Nevus of cheek (216 3) (D22 39)   51  Osteoporosis (733 00) (M81 0)   52  Pessary maintenance (V53 99) (Z46 89)   53  Pre-op evaluation (V72 84) (Z01 818)   54  Renal artery stenosis (440 1) (I70 1)   55  Renal cyst (753 10) (N28 1)   56  Renovascular hypertension (405 91) (I15 0)   57  Rib tenderness (786 50) (R07 81)   58  Right-sided thoracic back pain (724 1) (M54 6)   59  Secondary hyperparathyroidism (588 81) (N25 81)   60  Secondary hyperparathyroidism, renal (588 81) (N25 81)   61  Segmental and somatic dysfunction of cervical region (739 1) (M99 01)   62  Segmental and somatic dysfunction of lumbar region (739 3) (M99 03)   63  Segmental and somatic dysfunction of pelvic region (739 5) (M99 05)   64  Segmental and somatic dysfunction of thoracic region (739 2) (M99 02)   65  Shortness of breath (786 05) (R06 02)   66  Somatic dysfunction of abdominal region (739 9) (M99 09)   67  Somatic dysfunction of head region (739 0) (M99 00)   68  Somatic dysfunction of lower extremities (739 6) (M99 06)   69  Somatic dysfunction of rib region (739 8) (M99 08)   70   Somatic dysfunction of thoracic region (739 2) (M99 02)   71  Somatic dysfunction of upper extremities (739 7) (M99 07)   72  Sore throat (462) (J02 9)   73  Vaginal prolapse (618 00) (N81 10)   74  Visit for screening mammogram (V76 12) (Z12 31)   75  Vitamin D deficiency (268 9) (E55 9)   76  Vulvovaginitis candida albicans (112 1) (B37 3)    Current Meds   1  Adult Aspirin EC Low Strength 81 MG Oral Tablet Delayed Release; TAKE 1 TABLET   DAILY; Therapy: (Recorded:28Jun2016) to Recorded   2  Fluticasone Propionate 50 MCG/ACT Nasal Suspension; USE 2 SPRAYS IN EACH   NOSTRIL ONCE DAILY; Therapy: 04Unv8332 to (Evaluate:87Ydr7424)  Requested for: 14RLS3498; Last   Rx:08Jun2016 Ordered   3  Gabapentin 300 MG Oral Capsule; take 1 capsule by mouth at bedtime; Therapy: 21KED6666 to (Evaluate:11Jun2016); Last Rx:79Qhk8446 Ordered   4  Glimepiride 1 MG Oral Tablet; TAKE 1/2 TABLET DAILY WITH BREAKFAST  Requested   for: 80TFW9694; Last Rx:29Jun2016 Ordered   5  Hydrochlorothiazide 25 MG Oral Tablet; TAKE 1 TABLET DAILY; Therapy: 28PYM4921 to (Evaluate:86Twl9824)  Requested for: 89Dly6797; Last   Rx:98Bap0701 Ordered   6  Lisinopril 40 MG Oral Tablet; take 1 tablet by mouth daily; Therapy: 79Gxb5739 to (Evaluate:03Mar2017)  Requested for: 95Qte5202; Last   Rx:58Ndj7684 Ordered   7  Magnesium Oxide 400 MG Oral Tablet; TAKE 1 TABLET TWICE DAILY; Therapy: 07Zzq2209 to (Evaluate:38Tgn4304); Last Rx:03Ess5809 Ordered   8  Metoprolol Tartrate 100 MG Oral Tablet; take 1 tablet by mouth twice daily; Therapy: 53Euz4873 to (Aranza Watson)  Requested for: 17Dyp1908; Last   Rx:84Nyv8358 Ordered   9  Omeprazole 40 MG Oral Capsule Delayed Release; TAKE 1 CAPSULE TWICE DAILY; Therapy: 85XPP4938 to (Evaluate:38Brj5658)  Requested for: 71Aop5445; Last   Rx:61Joo1171 Ordered   10  OneTouch Delica Lancets 47Y Miscellaneous; TEST 2 TIMES A DAY dx e11 9; Therapy: 32IQZ2974 to (Evaluate:20Apr2017)  Requested for: 25Apr2016; Last    Rx:25Apr2016 Ordered   11   OneTouch Ultra 2 w/Device Kit; Test your sugar once in the morning and once in the    afternoon; Therapy: 80HRU2629 to (Last Rx:26Jan2015)  Requested for: 00LOH5911 Ordered   12  OneTouch Ultra Blue In Vitro Strip; TEST 2 TIMES A DAY dx e11 9; Therapy: 75Low3266 to (Evaluate:66Nlo7015)  Requested for: 79Qrz4780; Last    Rx:91Lqq8271 Ordered   13  Oyster Calcium 500 MG TABS; Take 1 tablet  daily; Therapy: 77RJQ3390 to Recorded   14  Pravastatin Sodium 40 MG Oral Tablet; take 1 tablet by mouth every day; Therapy: 55DRS8367 to (Evaluate:12Oct2016)  Requested for: 55Tdi9403; Last    Rx:73Vey2396 Ordered   15  Spironolactone 25 MG Oral Tablet; take 1 tablet by mouth daily; Therapy: 64Pjj6583 to (Evaluate:24Nov2016)  Requested for: 78Ple5914; Last    Rx:08Mhc5762 Ordered   16  Sucralfate 1 GM Oral Tablet (Carafate); TAKE 1 TABLET EVERY 12 HOURS DAILY; Therapy: 64VPK7606 to (Evaluate:78End2475)  Requested for: 87DHG8594; Last    Rx:88Ikg2418 Ordered    Allergies    1  Carvedilol TABS   2   Diflucan TABS   3  Flagyl CAPS    Signatures   Electronically signed by : Nidhi Luo, ; Sep  6 2016  9:52AM EST                       (Author)

## 2018-01-13 VITALS
DIASTOLIC BLOOD PRESSURE: 77 MMHG | TEMPERATURE: 97.5 F | HEIGHT: 62 IN | WEIGHT: 135.25 LBS | RESPIRATION RATE: 16 BRPM | BODY MASS INDEX: 24.89 KG/M2 | SYSTOLIC BLOOD PRESSURE: 123 MMHG | HEART RATE: 80 BPM

## 2018-01-13 VITALS
DIASTOLIC BLOOD PRESSURE: 60 MMHG | BODY MASS INDEX: 25.65 KG/M2 | HEIGHT: 62 IN | RESPIRATION RATE: 12 BRPM | TEMPERATURE: 98.8 F | SYSTOLIC BLOOD PRESSURE: 140 MMHG | WEIGHT: 139.38 LBS | HEART RATE: 64 BPM

## 2018-01-13 VITALS
HEART RATE: 69 BPM | DIASTOLIC BLOOD PRESSURE: 82 MMHG | WEIGHT: 138.38 LBS | BODY MASS INDEX: 25.47 KG/M2 | OXYGEN SATURATION: 98 % | SYSTOLIC BLOOD PRESSURE: 138 MMHG | HEIGHT: 62 IN | TEMPERATURE: 97.5 F

## 2018-01-13 VITALS
SYSTOLIC BLOOD PRESSURE: 170 MMHG | DIASTOLIC BLOOD PRESSURE: 55 MMHG | TEMPERATURE: 97.9 F | HEART RATE: 58 BPM | WEIGHT: 139.25 LBS | BODY MASS INDEX: 25.62 KG/M2 | HEIGHT: 62 IN

## 2018-01-13 NOTE — MISCELLANEOUS
Message  Message Free Text Note Form: Spoke with daughter she was concerned about the patient BP being low once yesterday  Her SBP was 117 and she was concerned because she once spoke with neuro or vascular that once wanted her to have her BP >150 to have more oxygen to the brain  I explained to the patients daughter that she was recently admitted for urgency  I told her the importance to have her BP < then 160  After the conversation was over the daughter had a better understanding  however she was very confused and scared about having such a low bp at first  I had her retake the bp on the phone an SBP was 198  I advise her to continue to monitor it   If it continue to stay high to please call the on call dr for guidance  Gave her the signs and symptoms to return to the hospital   At this time the patient is symptom free from any hypertensive or hypotensive symptoms  Plan     1  Sucralfate 1 GM Oral Tablet (Carafate); TAKE 1 TABLET EVERY 12 HOURS DAILY    2  Metoprolol Tartrate 50 MG Oral Tablet; TAKE 1 TABLET DAILY AS DIRECTED    Benign essential hypertension (401 1) (I10)       Abdominal pain (789 00) (R10 9)          Signatures   Electronically signed by : CAMILLA Rider ; Jul 7 2016  9:22PM EST                       (Author)    Electronically signed by :  Jessica Blanco MD; Jul 11 2016  8:56AM EST                       (Author)

## 2018-01-13 NOTE — MISCELLANEOUS
Message   Recorded as Task   Date: 09/07/2017 10:20 AM, Created By: [facility]   Task Name: Follow Up   Assigned To: [facility],Team   Regarding Patient: Jeremy Bailey, Status: Active   CommentCejennifer Co - 07 Sep 2017 10:20 AM     TASK CREATED  Message left for pt to return call for update post Let breast lumpectomy from 9/6  [facility] - 07 Sep 2017 12:53 PM     TASK EDITED  Tori Christian w/ son and pt is doing well, did c/o pain at about 5-6 but has not filled pain medication script yet  Advised to fill script and try apap in the meantime  Drsg is dry and intact on incision and no drainage noted  Moved bowels and is not having any N/V at this time  Advised to ambulate and contact office if any concerns or problems  Post Op f/u is set for 9/22 and pt is aware  Pathology pending  Morris Tamayo - 11 Sep 2017 2:23 PM     TASK EDITED  As per [facility], pathology results are in process  Tete Welch - 15 Sep 2017 3:44 PM     TASK EDITED  Please call [facility] lab to check on pathology  [facility] #[id number]   [facility] - 18 Sep 2017 8:26 AM     TASK EDITED  Spoke w/ Lab and specimen is still pending  Tete Welch - 18 Sep 2017 1:16 PM     TASK EDITED  Received finalized results faxed from pathology/lab  Results haven't yet flowed into [facility] [place] [facility]\""  Places in Dr Sailaja watson    Patient has POPV scheduled with TB this Friday, 9/22/17  [facility] - 18 Sep 2017 1:28 PM     TASK Wendy Gambino - 19 Sep 2017 4:28 PM     TASK EDITED  Pathology results in other task, left message to return call  Active Problems    1  A Fall Due To Slipping, Tripping, Or Stumbling (E885 9)   2  Abdominal pain, epigastric (789 06) (R10 13)   3  Abnormal EKG (794 31) (R94 31)   4  Abnormal mammogram (793 80) (R92 8)   5  Accelerated essential hypertension (401 0) (I10)   6  Allergic rhinitis (477 9) (J30 9)   7  Aortoiliac stenosis (440 0) (I70 0)   8   Atherosclerosis of native artery of extremity with intermittent claudication (440 21)   (I70 219)   9  Back pain (724 5) (M54 9)   10  Benign essential hypertension (401 1) (I10)   11  Breast neoplasm, Tis (LCIS), 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)   12  Carotid stenosis, asymptomatic (433 10) (I65 29)   13  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)   14  Chronic kidney disease (CKD), stage II (mild) (585 2) (N18 2)   15  Chronic myofascial pain (729 1,338 29) (M79 1,G89 29)   16  Chronic pain of lower extremity (729 5,338 29) (M79 606,G89 29)   17  CKD (chronic kidney disease), stage III (585 3) (N18 3)   18  Constipation (564 00) (K59 00)   19  Diabetic retinopathy (250 50,362 01) (E11 319)   20  DMII (diabetes mellitus, type 2) (250 00) (E11 9)   21  Dysphagia (787 20) (R13 10)   22  Epigastric pain (789 06) (R10 13)   23  Esophageal reflux (530 81) (K21 9)   24  Fever of unknown origin (780 60) (R50 9)   25  Gastritis (535 50) (K29 70)   26  History of influenza vaccination (V49 89) (Z92 29)   27  Hyperlipidemia (272 4) (E78 5)   28  Hypomagnesemia (275 2) (E83 42)   29  Hypomagnesemia (275 2) (E83 42)   30  Iliac 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)   31  Lymphadenopathy (785 6) (R59 1)   32  Microalbuminuria (791 0) (R80 9)   33  Nasal congestion (478 19) (R09 81)   34  Nausea (787 02) (R11 0)   35  Neck pain (723 1) (M54 2)   36  Nephrolithiasis (592 0) (N20 0)   37  Neuropathic pain, leg, right (355 8) (G57 91)   38  Nevus of cheek (216 3) (D22 39)   39  Osteoporosis (733 00) (M81 0)   40  PAD (peripheral artery disease) (443 9) (I73 9)   41  Pain syndrome, chronic (338 4) (G89 4)   42  Primary generalized (osteo)arthritis (715 09) (M15 0)   43  Proteinuria (791 0) (R80 9)   44  Renal cyst (753 10) (N28 1)   45  Secondary hyperparathyroidism (588 81) (N25 81)   46  Secondary hyperparathyroidism, renal (588 81) (N25 81)   47  Vaginal prolapse (618 00) (N81 10)   48   Vitamin D deficiency (268 9) (E55 9)    Current Meds   1  Adult Aspirin EC Low Strength 81 MG Oral Tablet Delayed Release; TAKE 1 TABLET   DAILY; Therapy: (Recorded:28Jun2016) to Recorded   2  Allopurinol 100 MG Oral Tablet; Take 1 tablet daily; Therapy: 20Hhu6877 to (Evaluate:29Jan2018)  Requested for: 86Vfr2853; Last   Rx:72Zpe3408 Ordered   3  Calcitriol 0 25 MCG Oral Capsule; TAKE 1 CAPSULE BY MOUTH EVERY FRIDAY; Therapy: 38KIN8789 to (Evaluate:24Sep2017)  Requested for: 24Ico0424; Last   Rx:21Aug2017 Ordered   4  Fluticasone Propionate 50 MCG/ACT Nasal Suspension; USE 2 SPRAYS IN EACH   NOSTRIL ONCE DAILY; Therapy: 21Sdp1338 to (Noemi Morgan)  Requested for: 03YYO0227; Last   DT:15JCU0627 Ordered   5  Gabapentin 100 MG Oral Capsule; TAKE 1 CAPSULE Bedtime; Therapy: (Emmanuel Doe) to Recorded   6  Glimepiride 1 MG Oral Tablet; take 1/2 tablet by mouth daily with BREAKFAST; Therapy: 35Mbz9246 to (Evaluate:13Mar2018)  Requested for: 93Avk5377; Last   Rx:14Sep2017 Ordered   7  Magnesium Oxide 400 MG Oral Tablet; TAKE 1 TABLET TWICE DAILY; Therapy: 16Bff3191 to (Evaluate:66Fny0724); Last Rx:19Apr2016 Ordered   8  Metoprolol Tartrate 50 MG Oral Tablet; take 1 tablet by mouth twice daily; Therapy: 65TSU5312 to (Evaluate:52Hpg9445)  Requested for: 89LFN7314; Last   ZY:53CSP0710 Ordered   9  Metoprolol Tartrate 50 MG Oral Tablet; Take 1 tablet twice daily; Therapy: 95Hby2271 to (Evaluate:12Cbi8648)  Requested for: 31LGQ5673; Last   Rx:94Mne6065 Ordered   10  NIFEdipine ER Osmotic Release 30 MG Oral Tablet Extended Release 24 Hour; Take 1    tablet daily; Therapy: 68Rdy8329 to (Noemi Edouard)  Requested for: 03Pad8760; Last    Rx:78Lim6138 Ordered   11  OneTouch Delica Lancets 21J Miscellaneous; test twice daily; Therapy: 67ZCC1972 to (Evaluate:17Oct2017)  Requested for: 88DVA5634; Last    Rx:67Xdw6022 Ordered   12  OneTouch Ultra 2 w/Device Kit;  Test your sugar once in the morning and once in the afternoon; Therapy: 86ZST4431 to (Last Rx:26Jan2015)  Requested for: 90PPH6476 Ordered   13  OneTouch Ultra Blue In [employer]; test twice daily; Therapy: 48Abn7594 to (Evaluate:17Oct2017)  Requested for: 02PWI8602; Last    Rx:60Lin4754 Ordered   14  Oyster Calcium 500 MG TABS; Take 1 tablet  daily; Therapy: 10SDO6653 to Recorded   15  Pantoprazole Sodium 40 MG Oral Tablet Delayed Release (Protonix); TAKE 1 TABLET    BY MOUTH TWICE DAILY 30 MINUTES BEFORE BREAKFAST AND DINNER; Therapy: 53XWX0259 to (Evaluate:16Aug2017)  Requested for: 39ISA7448; Last    Rx:96Lrh8226 Ordered   16  Pravastatin Sodium 40 MG Oral Tablet; take 1 tablet by mouth on monday and thrusday    only while on colcrys  once off of colcrys resume taking medication daily; Therapy: 07HAN1643 to ([employer]i [employer])  Requested for: 46MSM7218; Last    Rx:51Xdq6472 Ordered   17  PredniSONE 5 MG Oral Tablet; Take 1 tablet daily; Therapy: 07Cts1200 to (Evaluate:29Jan2018)  Requested for: 33Yks7230; Last    Rx:56Drl3479 Ordered   18  Spironolactone 25 MG Oral Tablet; TAKE 1 TABLET BY MOUTH DAILY AT NIGHT    BEFORE YOU GO TO BED;    Therapy: 00XDW3732 to (Marzella Close)  Requested for: 82GMT0813; Last    Rx:09Nov2016 Ordered   19  Tylenol 325 MG Oral Capsule; TAKE 2 CAPSULE 3 times daily PRN muscle pain; Therapy: 65MDU4729 to (Evaluate:14Oct2017)  Requested for: 47Jah1803; Last    Rx:96Wkv9463 Ordered    Allergies    1  Clonidine and derivs   2  Flagyl CAPS   3  Carvedilol TABS   4  Colchicine TABS   5  Iodinated Contrast Media   6  Diflucan TABS    7   Latex    Signatures   Electronically signed by : Wally Batres, ; Sep 19 2017  4:28PM EST                       (Author)

## 2018-01-13 NOTE — MISCELLANEOUS
Message   Recorded as Task   Date: 06/20/2017 01:01 PM, Created By: Carly Mask   Task Name: Miscellaneous   Assigned To: Carly Mask   Regarding Patient: Erickson Booker, Status: Active   Comment:    Nilda Rolle - 20 Jun 2017 1:01 PM     TASK CREATED  pt cancelled her appointment for tomorrow and wcb to schedule   Jose Jacobs - 20 Jun 2017 1:07 PM     TASK REPLIED TO: Previously Assigned To Diandra Gallagher  Provider aware  Thank you  Signatures   Electronically signed by :  Whitney Long, ; Jun 21 2017  7:39AM EST                       (Author)

## 2018-01-13 NOTE — PROGRESS NOTES
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)   2  Primary generalized (osteo)arthritis (715 09) (M15 0)   3  Chronic myofascial pain (729 1,338 29) (M79 1,G89 29)   4  Osteoporosis (733 00) (M81 0)   5  CKD (chronic kidney disease), stage III (585 3) (N18 3)   6  DMII (diabetes mellitus, type 2) (250 00) (E11 9)   7  Esophageal reflux (530 81) (K21 9)   8  Atherosclerosis of native artery of extremity with intermittent claudication (440 21)   (I70 219)   9  Benign essential hypertension (401 1) (I10)    Plan  Primary generalized (osteo)arthritis,     · Allopurinol 100 MG Oral Tablet; Take 1 tablet daily   · PredniSONE 5 MG Oral Tablet; Take 1 tablet daily   · (1) CBC/PLT/DIFF; Status:Active; Requested for:58Jrn8136;    · (1) COMPREHENSIVE METABOLIC PANEL; Status:Active; Requested for:07Qyj4374;    · (1) URIC ACID; Status:Active; Requested for:10Dms2854;    · Follow-up visit in 2 months Evaluation and Treatment  Follow-up  Status: Complete   Done: 29Hns3049  Unlinked    · Call [phone] if: New symptoms occur ; Status:Complete;   Done: 71LER2434   · Call [phone] if: The pain seems worse ; Status:Complete;   Done: 64MPS6760   · Call [phone] if: You have questions or concerns about your problem ;  Status:Complete;   Done: 96HLI7129    Discussion/Summary    Ms Carolyn Wills is an 59-year-old  female who presents the office with a history of gout  She was referred here by her nephrologist  She states that she has had intermittent issues with gout over the last 4 years  She states that she was diagnosed when she developed bilateral foot and ankle pain and swelling  This was a clinical diagnosis, as no crystal diagnosis was ever made  She does report that she went about 2 years without a flare, but then developed a severe flare in the bilateral feet which lasted 2-3 weeks   She did take Tylenol for the flare, and she is currently utilizing gabapentin 100 mg daily at bedtime for diabetic neuropathy, and she does report this does not help with her gout  She denies any trial of urate lowering therapies such as allopurinol or Uloric  She does report flares of only ever occurred in her feet  She complains of pain in her bilateral elbows, which has "always been there " She denies any obvious joint swelling  She reports morning stiffness typically lasting 30 minutes before improvement  She does report occasional difficulty sleeping due to pain, but she denies nonrestorative sleep or fatigue  On exam, there is no active synovitis  She does have mild osteoarthritic changes of the hands, as well as crepitus of the bilateral knees  There is a small tophaceous deposit noted in her right Achilles tendon  There is no objective muscular weakness  Review of laboratory studies from July 3, 2017 revealed a CMP with a creatinine of 1 42 with an estimated GFR 34  LFTs were within normal limits  Uric acid was elevated at 7 9  CBC was essentially normal  At this time, her history, exam, and laboratory studies do appear most consistent with chronic tophaceous gout  Her uric acid is not the goal for a gout patient, which would be less than 6 0 mg/dL  We did discuss several treatment options, and we have opted to start allopurinol 100 mg daily  She was unable to tolerate colchicine in the past due to dizziness, therefore I will prescribe prednisone 5 mg daily as a prophylactic to prevent any gout flares while instituting allopurinol therapy  She was advised to monitor her glucoses while on this medication  I would like to recheck a CBC, CMP, and uric acid before her follow-up  I will reevaluate her in 2 months  She will call in the interim if there are any questions or concerns  Patient is able to Self-Care        Counseling  Rheumatology Counseling Documentation: The patient and patient's family was counseled regarding diagnostic results, instructions for management, impressions and risks and benefits of treatment options  Chief Complaint  History of gout      History of Present Illness  Pt  is an 81 yo  female who presents with history of gout  Referred here by Nephro  Has intermittent issues with gout  Dx'ed with gout about 4 years ago -> had foot and ankle pain and swelling  No crystal Dx at that time or ever  Had gone about 2 years without a flare, but then developed a flare in both feet which lasted 2-3 weeks  Took Tylenol for flare and currently taking gabapentin 100mg QHS which does not help for gout  No trial of allopurinol or Uloric  Flares only occur in feet  c/o pain in elbows which has always been there  No obvious joint swelling  + AM stiffness x 30 minutes  + occasional difficulty sleeping due to pain  No non-restorative sleep  No fatigue   acted as   RAPID3: not completed      Review of Systems    Constitutional: no fever, no recent weight gain, fatigue, no chills, no recent weight loss and no anorexia  HEENT: not feeling congested and no sore throat  Cardiovascular: no chest pain or pressure, no dyspnea on exertion and no swelling in the arms or legs  Respiratory: no unusual or persistent cough, no shortness of breath and no pleurisy  Gastrointestinal: abdominal pain, heartburn and constipation, but no vomiting, no diarrhea, no melena and no BRBPR    The patient presents with complaints of occasional episodes of nausea  Genitourinary: no dysuria and no hematuria  Musculoskeletal: as noted in HPI  Integumentary no rash  Endocrine polyuria, but no polydipsia  Hematologic/Lymphatic: a tendency for easy bruising, but no unusual bleeding  Neurological: tingling, but no headache and no weakness    The patient presents with complaints of occasional episodes of vertigo or dizziness, described as lightheadedness  Symptoms are made worse by getting up quickly     Psychiatric: insomnia, but no non-restorative sleep, no depression and no anxiety  Active Problems    1  A Fall Due To Slipping, Tripping, Or Stumbling (E885 9)   2  Abdominal pain, epigastric (789 06) (R10 13)   3  Abnormal EKG (794 31) (R94 31)   4  Abnormal mammogram (793 80) (R92 8)   5  Accelerated essential hypertension (401 0) (I10)   6  Allergic rhinitis (477 9) (J30 9)   7  Aortoiliac stenosis (440 0) (I70 0)   8  Atherosclerosis of native artery of extremity with intermittent claudication (440 21)   (I70 219)   9  Back pain (724 5) (M54 9)   10  Benign essential hypertension (401 1) (I10)   11  Carotid stenosis, asymptomatic (433 10) (I65 29)   12  Chronic kidney disease (CKD), stage II (mild) (585 2) (N18 2)   13  Chronic myofascial pain (729 1,338 29) (M79 1,G89 29)   14  Chronic pain of lower extremity (729 5,338 29) (M79 606,G89 29)   15  CKD (chronic kidney disease), stage III (585 3) (N18 3)   16  Constipation (564 00) (K59 00)   17  Diabetic retinopathy (250 50,362 01) (E11 319)   18  DMII (diabetes mellitus, type 2) (250 00) (E11 9)   19  Dysphagia (787 20) (R13 10)   20  Epigastric pain (789 06) (R10 13)   21  Esophageal reflux (530 81) (K21 9)   22  Gastritis (535 50) (K29 70)   23  History of influenza vaccination (V49 89) (Z92 29)   24  Hyperlipidemia (272 4) (E78 5)   25  Hypomagnesemia (275 2) (E83 42)   26  Hypomagnesemia (275 2) (E83 42)   27  Iliac 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)   28  Lymphadenopathy (785 6) (R59 1)   29  Microalbuminuria (791 0) (R80 9)   30  Nasal congestion (478 19) (R09 81)   31  Nausea (787 02) (R11 0)   32  Nephrolithiasis (592 0) (N20 0)   33  Neuropathic pain, leg, right (355 8) (G57 91)   34  Nevus of cheek (216 3) (D22 39)   35  Osteoporosis (733 00) (M81 0)   36  PAD (peripheral artery disease) (443 9) (I73 9)   37  Pain syndrome, chronic (338 4) (G89 4)   38  Proteinuria (791 0) (R80 9)   39  Renal cyst (753 10) (N28 1)   40  Secondary hyperparathyroidism (588 81) (N25 81)   41   Secondary hyperparathyroidism, renal (588 81) (N25 81)   42  Vaginal prolapse (618 00) (N81 10)   43  Vitamin D deficiency (268 9) (E55 9)    Past Medical History    1  History of Abdominal discomfort in right flank (789 09) (R10 9)   2  History of Abscess, neck (682 1) (L02 11)   3  History of Acute gout due to other secondary cause involving toe of right foot (274 01)   (M10 471)   4  History of Acute upper respiratory infection (465 9) (J06 9)   5  History of Advance care planning (V65 49) (Z71 89)   6  History of Back muscle spasm (724 8) (M62 830)   7  Benign essential hypertension (401 1) (I10)   8  History of Cellulitis of foot (682 7) (L03 119)   9  History of Denial Of Any Significant Medical History   10  DMII (diabetes mellitus, type 2) (250 00) (E11 9)   11  History of Encounter for gynecological examination without abnormal finding (V72 31)    (Z01 419)   12  History of Encounter for mammogram to establish baseline mammogram (V76 12)    (Z12 31)   13  History of Encounter for routine gynecological examination (V72 31) (Z01 419)   14  History of Exposure to influenza (V01 79) (Z20 828)   15  History of Foreign body of back (911 6) (S20 459A)   16  History of Greater trochanteric bursitis of right hip (726 5) (M70 61)   17  History of abdominal pain (V13 89) (Z87 898)   18  History of acute pharyngitis (V12 69) (Z87 09)   19  History of back pain (V13 59) (Z87 39)   20  History of candidal vulvovaginitis (V13 29) (Z86 19)   21  History of constipation (V12 79) (Z87 19)   22  History of epiglottitis (V12 69) (Z87 09)   23  History of gastroesophageal reflux (GERD) (V12 79) (Z87 19)   24  History of hypercholesterolemia (V12 29) (Z86 39)   25  History of influenza vaccination (V49 89) (Z92 29)   26  History of influenza vaccination (V49 89) (Z92 29)   27  History of joint pain (V13 59) (Z87 39)   28  History of lymphadenopathy (V13 89) (Z87 898)   29  History of pneumococcal vaccination (V49 89) (Z92 29)   30   History of screening mammography (V15 89) (Z92 89)   31  History of shortness of breath (V13 89) (Z87 898)   32  History of sore throat (V12 60) (Z87 09)   33  History of stenosis of renal artery (V13 09) (Z86 79)   34  History of Infection of toe (686 9) (L08 9)   35  History of Knee pain (719 46) (M25 569)   36  History of Limb pain (729 5) (M79 609)   37  History of Nephrolithiasis (V13 01)   38  Normal delivery (650) (O80,Z37 9)   39  History of Pessary maintenance (V53 99) (Z46 89)   40  History of Pre-op evaluation (V72 84) (Z01 818)   41  History of Renovascular hypertension (405 91) (I15 0)   42  History of Rib tenderness (786 50) (R07 81)   43  History of Right groin pain (789 09) (R10 31)   44  History of Right-sided thoracic back pain (724 1) (M54 6)   45  History of Segmental and somatic dysfunction of cervical region (739 1) (M99 01)   46  History of Segmental and somatic dysfunction of lumbar region (739 3) (M99 03)   47  History of Segmental and somatic dysfunction of pelvic region (739 5) (M99 05)   48  History of Segmental and somatic dysfunction of thoracic region (739 2) (M99 02)   49  History of Somatic dysfunction of abdominal region (739 9) (M99 09)   50  History of Somatic dysfunction of head region (739 0) (M99 00)   51  History of Somatic dysfunction of lower extremities (739 6) (M99 06)   52  History of Somatic dysfunction of rib region (739 8) (M99 08)   53  History of Somatic dysfunction of thoracic region (739 2) (M99 02)   54  History of Somatic dysfunction of upper extremities (739 7) (M99 07)    Surgical History    1  History of Cataract Surgery   2  History of Cholecystectomy   3  History of Knee Surgery   4  History of PTA Iliac Initial Stenosis With Stent   5  History of Tubal Ligation    Family History  Maternal Aunt    1  Family history of Stroke Syndrome (V17 1)  Maternal Uncle    2  Family history of Stroke Syndrome (V17 1)  Unknown    3   Family history of Diabetes mellitus of other type with arthropathy, without long-term   current use of insulin   4  Family history of hypertension (V17 49) (Z82 49)  Family History    5  Denied: Family history of gout   6  Denied: Family history of rheumatoid arthritis   7  Denied: Family history of systemic lupus erythematosus   8  Family history of Reported Family History Of Heart Disease   9  Family history of Stroke Syndrome (V17 1)    Social History    · Daily caffeine consumption, 1 serving a day   · Denied: History of Drug Use   · Former smoker (V15 82) (T47 189)   · Never Drank Alcohol   · No illicit drug use    Current Meds   1  Adult Aspirin EC Low Strength 81 MG Oral Tablet Delayed Release; TAKE 1 TABLET   DAILY; Therapy: (Recorded:28Jun2016) to Recorded   2  Calcitriol 0 25 MCG Oral Capsule; please take once every friday; Therapy: 80EJF9089 to (Evaluate:15Jun2017)  Requested for: 24QKJ6662; Last   Rx:17Nov2016 Ordered   3  Fluticasone Propionate 50 MCG/ACT Nasal Suspension; USE 2 SPRAYS IN EACH   NOSTRIL ONCE DAILY; Therapy: 83Fvg1750 to (Benji Arriaza)  Requested for: 57PJT6849; Last   LJ:14DPR1126 Ordered   4  Gabapentin 100 MG Oral Capsule; TAKE 1 CAPSULE Bedtime; Therapy: (Arielle Fernández) to Recorded   5  Glimepiride 1 MG Oral Tablet; TAKE 1/2 TABLET DAILY WITH BREAKFAST  Requested   for: 58KMP0543; Last Rx:66Zsp6769 Ordered   6  Magnesium Oxide 400 MG Oral Tablet; TAKE 1 TABLET TWICE DAILY; Therapy: 11Axj7346 to (Evaluate:64Nes2854); Last Rx:60Gnc6491 Ordered   7  Metoprolol Tartrate 50 MG Oral Tablet; take 1 tablet by mouth twice daily; Therapy: 05YIL3311 to (Evaluate:44Vkp6602)  Requested for: 97MZS5491; Last   GA:89HHE6730 Ordered   8  Metoprolol Tartrate 50 MG Oral Tablet; Take 1 tablet twice daily; Therapy: 85Hya7472 to (Evaluate:16Jqm9966)  Requested for: 51EOB5400; Last   Rx:34Mii3316 Ordered   9  Nifedical XL 30 MG TB24; Take 1 tablet daily;    Therapy: 02Kia9221 to (Evaluate:39Wrh7940)  Requested for: 40Zvp7400; Last Rx: 67UJZ0038 Ordered   10  OneTouch Delica Lancets 79A Miscellaneous; test twice daily; Therapy: 98PDV3407 to (Evaluate:17Oct2017)  Requested for: 45SOP8684; Last    Rx:83Czs2876 Ordered   11  OneTouch Ultra 2 w/Device Kit; Test your sugar once in the morning and once in the    afternoon; Therapy: 92LKI9105 to (Last Rx:26Jan2015)  Requested for: 78FDI3338 Ordered   12  OneTouch Ultra Blue In [employer]; test twice daily; Therapy: 39Iwr5175 to (Evaluate:17Oct2017)  Requested for: 36QIP8669; Last    Rx:25Qpa1084 Ordered   13  Oyster Calcium 500 MG TABS; Take 1 tablet  daily; Therapy: 39WOL1727 to Recorded   14  Pantoprazole Sodium 40 MG Oral Tablet Delayed Release; TAKE 1 TABLET BY MOUTH    TWICE DAILY 30 MINUTES BEFORE BREAKFAST AND DINNER; Therapy: 87EGW0501 to (Evaluate:16Aug2017)  Requested for: 82QMI8242; Last    Rx:41Wjd5862 Ordered   15  Pravastatin Sodium 40 MG Oral Tablet; take 1 tablet by mouth on monday and thrusday    only while on colcrys  once off of colcrys resume taking medication daily; Therapy: 72HTL8473 to (Evaluate:05Sep2017)  Requested for: 00BMQ8474; Last    Rx:89Izj5314 Ordered   16  Spironolactone 25 MG Oral Tablet; TAKE 1 TABLET BY MOUTH DAILY AT NIGHT BEFORE    YOU GO TO BED;    Therapy: 14WLA5685 to (Akin Almanza)  Requested for: 43RGT2121; Last    Rx:09Nov2016 Ordered    Allergies    1  Clonidine and derivs   2  Flagyl CAPS   3  Carvedilol TABS   4  Colchicine TABS   5  Iodinated Contrast Media   6  Diflucan TABS    7  Latex    Vitals  Signs   Recorded: 02Aug2017 02:52PM   Heart Rate: 68  Systolic: 329  Diastolic: 58  Height: 5 ft 2 in  Weight: 140 lb   BMI Calculated: 25 61  BSA Calculated: 1 64    Physical Exam    Constitutional   General appearance: No acute distress, well appearing and well nourished  Eyes   Conjunctiva and lids: No swelling, erythema or discharge  Pupils and irises: Equal, round and reactive to light      Ears, Nose, Mouth, and Throat External inspection of ears and nose: Normal     Oropharynx: Normal with no erythema, edema, exudate lesions, or ulcers  Pulmonary   Respiratory effort: No increased work of breathing or signs of respiratory distress  Auscultation of lungs: Clear to auscultation  Cardiovascular   Auscultation of heart: Normal rate and rhythm, normal S1 and S2, without murmurs  Examination of extremities for edema and/or varicosities: Normal     Lymphatic   Palpation of lymph nodes in neck: No lymphadenopathy  Psychiatric   Orientation to person, place, and time: Normal     Mood and affect: Normal         Right Upper Extremity: Right Hand: Right Hand Appearance: Mable's node at the all PIPs digit  Right Wrist: Right Elbow: Right Shoulder:   Left Upper Extremity: Left Hand: Appearance: all PIPs PIP Mable's node(s)  Left Wrist: Left Elbow: Left Shoulder:   Musculoskeletal - Joints, bones, and muscles: Abnormal  Palpation - bilateral knee crepitus  Muscle strength/tone: Normal  Motor Strength Findings: right hand dominance, but normal upper extremity strength and normal lower extremity strength  Right upper extremity: shoulder flexion 5/5, shoulder extension 5/5, biceps 5/5, triceps 5/5, but normal hand   Left upper extremity shoulder flexion 5/5, shoulder extension 5/5, biceps 5/5, triceps 5/5, but normal hand   Right lower extremity strength: hip flexion 5/5, hip abduction 5/5, hip adduction 5/5, knee flexion 5/5, knee extension 5/5, ankle dorsiflexion 5/5, ankle plantar flexion 5/5  Left lower extremity strength: hip flexion 5/5, hip abduction 5/5, hip adduction 5/5, knee flexion 5/5, knee extension 5/5, ankle dorsiflexion 5/5, ankle plantar flexion 5/5     Skin - Skin and subcutaneous tissue: Normal    Neurologic - Reflexes: Normal  Deep tendon reflexes: 1+ right biceps, 1+ left biceps, 1+ right triceps, 1+ left triceps, 1+ right brachioradialis, 1+ left brachioradialis, 1+ right patella, 1+ left patella, 1+ right ankle jerk and 1+ left ankle jerk  Sensation: Normal       Results/Data  (1) URIC ACID [id number] 10:50AM Susy Kenny     Test Name Result Flag Reference   URIC ACID 7 9 mg/dL H 2 5-7 0   Therapeutic target for gout patients: <6 0 mg/dL     (1) COMPREHENSIVE METABOLIC PANEL [id number] 95:75PO Susy Kenny   REPORT COMMENT:  FASTING:YES     Test Name Result Flag Reference   GLUCOSE 129 mg/dL H 65-99   Fasting reference interval     For someone without known diabetes, a glucose  value >125 mg/dL indicates that they may have  diabetes and this should be confirmed with a  follow-up test    UREA NITROGEN (BUN) 36 mg/dL H 7-25   CREATININE 1 42 mg/dL H 0 60-0 88   For patients >52years of age, the reference limit  for Creatinine is approximately 13% higher for people  identified as -American  eGFR NON-AFR   AMERICAN 34 mL/min/1 73m2 L > OR = 60   eGFR AFRICAN AMERICAN 40 mL/min/1 73m2 L > OR = 60   BUN/CREATININE RATIO 25 (calc) H 6-22   SODIUM 138 mmol/L  135-146   POTASSIUM 4 8 mmol/L  3 5-5 3   CHLORIDE 102 mmol/L     CARBON DIOXIDE 25 mmol/L  20-31   CALCIUM 10 1 mg/dL  8 6-10 4   PROTEIN, TOTAL 7 5 g/dL  6 1-8 1   ALBUMIN 4 2 g/dL  3 6-5 1   GLOBULIN 3 3 g/dL (calc)  1 9-3 7   ALBUMIN/GLOBULIN RATIO 1 3 (calc)  1 0-2 5   BILIRUBIN, TOTAL 0 3 mg/dL  0 2-1 2   ALKALINE PHOSPHATASE 83 U/L     AST 17 U/L  10-35   ALT 12 U/L  6-29     (1) CBC/PLT/DIFF [id number] 10:50AM Susy Kenny   REPORT COMMENT:  FASTING:YES     Test Name Result Flag Reference   WHITE BLOOD CELL COUNT 7 7 Thousand/uL  3 8-10 8   RED BLOOD CELL COUNT 4 58 Million/uL  3 80-5 10   HEMOGLOBIN 12 7 g/dL  11 7-15 5   HEMATOCRIT 40 4 %  35 0-45 0   MCV 88 1 fL  80 0-100 0   MCH 27 7 pg  27 0-33 0   MCHC 31 4 g/dL L 32 0-36 0   RDW 13 7 %  11 0-15 0   PLATELET COUNT 632 Thousand/uL  140-400   ABSOLUTE NEUTROPHILS 3604 cells/uL  0395-3292   ABSOLUTE LYMPHOCYTES 3588 cells/uL  850-3900   ABSOLUTE MONOCYTES 400 cells/uL  200-950   ABSOLUTE EOSINOPHILS 69 cells/uL     ABSOLUTE BASOPHILS 39 cells/uL  0-200   NEUTROPHILS 46 8 %     LYMPHOCYTES 46 6 %     MONOCYTES 5 2 %     EOSINOPHILS 0 9 %     BASOPHILS 0 5 %     MPV 10 6 fL  7 5-12 5       Future Appointments    Date/Time Provider Specialty Site   09/01/2017 10:15 AM Julian Beltran MD Gastroenterology Adult [address] [address]   09/29/2017 03:00 PM Andrew Trevizo DO Rheumatology [facility]   12/21/2017 05:00 PM CAMILLA Pena   [address] [place] [address]     Signatures   Electronically signed by : Willy Arguello DO; Aug  2 2017  5:22PM EST                       (Author)

## 2018-01-14 VITALS
BODY MASS INDEX: 26.52 KG/M2 | HEART RATE: 86 BPM | WEIGHT: 144.13 LBS | HEIGHT: 62 IN | SYSTOLIC BLOOD PRESSURE: 152 MMHG | DIASTOLIC BLOOD PRESSURE: 82 MMHG

## 2018-01-14 VITALS
DIASTOLIC BLOOD PRESSURE: 60 MMHG | SYSTOLIC BLOOD PRESSURE: 124 MMHG | HEART RATE: 70 BPM | HEIGHT: 62 IN | RESPIRATION RATE: 16 BRPM | WEIGHT: 139 LBS | TEMPERATURE: 97.3 F | BODY MASS INDEX: 25.58 KG/M2

## 2018-01-14 VITALS
HEART RATE: 68 BPM | BODY MASS INDEX: 26.31 KG/M2 | WEIGHT: 143 LBS | SYSTOLIC BLOOD PRESSURE: 144 MMHG | HEIGHT: 62 IN | DIASTOLIC BLOOD PRESSURE: 50 MMHG

## 2018-01-14 VITALS
SYSTOLIC BLOOD PRESSURE: 120 MMHG | BODY MASS INDEX: 25.28 KG/M2 | RESPIRATION RATE: 12 BRPM | WEIGHT: 137.38 LBS | DIASTOLIC BLOOD PRESSURE: 60 MMHG | HEIGHT: 62 IN | HEART RATE: 72 BPM | TEMPERATURE: 98.8 F

## 2018-01-14 VITALS
SYSTOLIC BLOOD PRESSURE: 155 MMHG | HEART RATE: 61 BPM | WEIGHT: 141 LBS | TEMPERATURE: 98.4 F | BODY MASS INDEX: 25.95 KG/M2 | DIASTOLIC BLOOD PRESSURE: 58 MMHG | HEIGHT: 62 IN

## 2018-01-15 ENCOUNTER — ALLSCRIPTS OFFICE VISIT (OUTPATIENT)
Dept: OTHER | Facility: OTHER | Age: 83
End: 2018-01-15

## 2018-01-15 VITALS
WEIGHT: 137 LBS | SYSTOLIC BLOOD PRESSURE: 144 MMHG | DIASTOLIC BLOOD PRESSURE: 62 MMHG | BODY MASS INDEX: 25.21 KG/M2 | HEIGHT: 62 IN

## 2018-01-15 VITALS
DIASTOLIC BLOOD PRESSURE: 60 MMHG | BODY MASS INDEX: 25.95 KG/M2 | WEIGHT: 141 LBS | HEIGHT: 62 IN | SYSTOLIC BLOOD PRESSURE: 158 MMHG

## 2018-01-16 NOTE — MISCELLANEOUS
Assessment    1  Abdominal pain (789 00) (R10 9)   2  Accelerated essential hypertension (401 0) (I10)    Plan  Abdominal pain    · Sucralfate 1 GM Oral Tablet (Carafate); TAKE 1 TABLET EVERY 12 HOURS DAILY   Rx By: Minnie Guerra; Dispense: 30 Days ; #:60 Tablet; Refill: 1; For: Abdominal pain; CONNIE = N; Verified Transmission to [place] [address]; Last Updated By: System, [employer]; 7/6/2016 4:13:21 PM  Benign essential hypertension    · Metoprolol Tartrate 50 MG Oral Tablet; TAKE 1 TABLET DAILY AS DIRECTED   Rx By: Minnie Guerra; Dispense: 30 Days ; #:30 Tablet; Refill: 0; For: Benign essential hypertension; CONNIE = N; Verified Transmission to [address] [place] [address]; Msg to Pharmacy: Pleas take a bedtime; Last Updated By: System, [employer]; 7/6/2016 4:13:20 PM    Discussion/Summary  Discussion Summary:   1  Hypertension: Controlled  Patient has been symptomatic for hypotension at nighttime while being on 4 agents of anti hypertensive meds  Having her uncontrolled hyperglycemia recently, and hypotension in the middle of the night  We have decided to switch Lopressor to 100mg AM and 50mg PM  I advised her that our goal SBP  >140 <160  She understood the importance edy given her history of carotid stenosis and renal stenosis  She understands that low SBP won't help perfusion  She is to continue to log her BP daily  We have set up an appointment in 2 weeks to see how adjustment have helped her with her symptoms  2  Abdominal pain: Abdominal ulcer vs gastritis secondary to multiple medications  Patient was started on Carafate today for better protection of stomach  Will continue to monitor to see if she gets relief  She is to continue Omeprazole daily as well  RTC 2 weeks or sooner  Patient understands the return precaution to the ED if her symptoms worsen  Chief Complaint  Chief Complaint Free Text Note Form: Follow up after hospital visit        History of Present Illness  TCM Communication [facility]: The patient is being contacted for follow-up after hospitalization  Hospital records were reviewed  She was hospitalized at [facility]  The dates of hospitalization:, date of admission: 6/23/16, date of discharge: 6/27/16  Diagnosis: DIZZINESS  She was discharged to home  Medications reviewed and updated today  She scheduled a follow up appointment  Follow-up appointments with other specialists: NEED TO SET UP WITH VASCULAR Mikhail Paulino  #[facility]  The patient is currently asymptomatic  Counseling was provided to patient's family  Topics counseled included instructions for management and importance of compliance with treatment  Communication performed and completed by Domenica Link RN   HPI: 79 y/o comes into the clinic today very upset with her medications  She states that she is feeling very sick,headache, dizzy, feels like fainting at night time  Denies any CP or SOB, blurry vision  She has a log book with her, that shows SBP of 150-160 during the day and at night 110-117  She also states that her sugars are extremely uncontrolled since adjustment on meds  She states that she has started delaying taking her pills because she is scared of falling to the floor and her BP tanking  She is constantly drinking juice and salt crackers to make herself feel better  Sugars during these attacks have been 249-259  She also complain of epigastric pain  Review of Systems  Complete-Female:   Constitutional: feeling poorly and feeling tired, but no fever and no chills  Eyes: No complaints of eye pain, no red eyes, no eyesight problems, no discharge, no dry eyes, no itching of eyes  ENT: no complaints of earache, no loss of hearing, no nose bleeds, no nasal discharge, no sore throat, no hoarseness  Cardiovascular: No complaints of slow heart rate, no fast heart rate, no chest pain, no palpitations, no leg claudication, no lower extremity edema  Respiratory: No complaints of shortness of breath, no wheezing, no cough, no SOB on exertion, no orthopnea, no PND  Gastrointestinal: abdominal pain  Genitourinary: No complaints of dysuria, no incontinence, no pelvic pain, no dysmenorrhea, no vaginal discharge or bleeding  Musculoskeletal: No complaints of arthralgias, no myalgias, no joint swelling or stiffness, no limb pain or swelling  Integumentary: No complaints of skin rash or lesions, no itching, no skin wounds, no breast pain or lump  Neurological: headache, dizziness and fainting  Psychiatric: Not suicidal, no sleep disturbance, no anxiety or depression, no change in personality, no emotional problems  Endocrine: No complaints of proptosis, no hot flashes, no muscle weakness, no deepening of the voice, no feelings of weakness  Hematologic/Lymphatic: No complaints of swollen glands, no swollen glands in the neck, does not bleed easily, does not bruise easily  Active Problems     1  A Fall Due To Slipping, Tripping, Or Stumbling (E885 9)   2  Abdominal discomfort in right flank (789 09) (R10 9)   3  Abdominal pain, epigastric (789 06) (R10 13)   4  Abscess, neck (682 1) (L02 11)   5  Accelerated essential hypertension (401 0) (I10)   6  Acute pharyngitis (462) (J02 9)   7  Acute upper respiratory infection (465 9) (J06 9)   8  Allergic rhinitis (477 9) (J30 9)   9  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)   (I70 219)   10  Back muscle spasm (724 8) (M62 830)   11  Back pain (724 5) (M54 9)   12  Backache (724 5) (M54 9)   13  Cellulitis of foot (682 7) (L03 119)   14  Constipation (564 00) (K59 00)   15  Constipation (564 00) (K59 00)   16  Diabetic retinopathy (250 50,362 01) (E11 319)   17  DMII (diabetes mellitus, type 2) (250 00) (E11 9)   18  Dysphagia (787 20) (R13 10)   19  Encounter for gynecological examination without abnormal finding (V72 31) (Z01 419)   20   Encounter for mammogram to establish baseline mammogram (V76 12) (Z12 31)   21  Encounter for routine gynecological examination (V72 31) (Z01 419)   22  Epiglottitis (464 30) (J05 10)   23  Esophageal reflux (530 81) (K21 9)   24  Exposure to influenza (V01 79) (Z20 828)   25  Gastritis (535 50) (K29 70)   26  Hyperlipidemia (272 4) (E78 5)   27  Hypomagnesemia (275 2) (E83 42)   28  Hypomagnesemia (275 2) (E83 42)   29  Iliac 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)   30  Infection of toe (686 9) (L08 9)   31  Joint pain (719 40) (M25 50)   32  Knee pain (719 46) (M25 569)   33  Limb pain (729 5) (M79 609)   34  Lymphadenopathy (785 6) (R59 1)   35  Lymphadenopathy (785 6) (R59 1)   36  Microalbuminuria (791 0) (R80 9)   37  Nasal congestion (478 19) (R09 81)   38  Nausea (787 02) (R11 0)   39  Need for immunization against influenza (V04 81) (Z23)   40  Need for pneumococcal vaccine (V03 82) (Z23)   41  Need for prophylactic vaccination and inoculation against influenza (V04 81) (Z23)   42  Nephrolithiasis (592 0) (N20 0)   43  Nevus of cheek (216 3) (D22 39)   44  Osteoporosis (733 00) (M81 0)   45  Pessary maintenance (V53 99) (Z46 89)   46  Pre-op evaluation (V72 84) (Z01 818)   47  Renal artery stenosis (440 1) (I70 1)   48  Renal cyst (753 10) (N28 1)   49  Rib tenderness (786 50) (R07 81)   50  Right-sided thoracic back pain (724 1) (M54 6)   51  Secondary hyperparathyroidism (588 81) (N25 81)   52  Secondary hyperparathyroidism, renal (588 81) (N25 81)   53  Segmental and somatic dysfunction of cervical region (739 1) (M99 01)   54  Segmental and somatic dysfunction of lumbar region (739 3) (M99 03)   55  Segmental and somatic dysfunction of pelvic region (739 5) (M99 05)   56  Segmental and somatic dysfunction of thoracic region (739 2) (M99 02)   57  Shortness of breath (786 05) (R06 02)   58  Somatic dysfunction of abdominal region (739 9) (M99 09)   59  Somatic dysfunction of head region (739 0) (M99 00)   60   Somatic dysfunction of lower extremities (739 6) (M99 06)   61  Somatic dysfunction of rib region (739 8) (M99 08)   62  Somatic dysfunction of thoracic region (739 2) (M99 02)   63  Somatic dysfunction of upper extremities (739 7) (M99 07)   64  Sore throat (462) (J02 9)   65  Vaginal prolapse (618 00) (N81 10)   66  Visit for screening mammogram (V76 12) (Z12 31)   67  Vitamin D deficiency (268 9) (E55 9)   68  Vulvovaginitis candida albicans (112 1) (B37 3)    Benign essential hypertension (401 1) (I10)       Chronic kidney disease (CKD), stage II (mild) (585 2) (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 2)       Aortoiliac stenosis (440 0) (I70 0)       Carotid stenosis, asymptomatic (433 10) (I65 29)          Past Medical History    1  History of Denial Of Any Significant Medical History   2  History of gastroesophageal reflux (GERD) (V12 79) (Z87 19)   3  Need for prophylactic vaccination and inoculation against influenza (V04 81) (Z23)   4  History of Nephrolithiasis (V13 01)   5  Normal delivery 21   9)    Surgical History    1  History of Cholecystectomy   2  History of Knee Surgery   3  History of PTA Iliac Initial Stenosis With Stent   4  History of Tubal Ligation  Surgical History Reviewed: The surgical history was reviewed and updated today  Family History  Mother    1  No pertinent family history  Maternal Aunt    2  Family history of Stroke Syndrome (V17 1)  Maternal Uncle    3  Family history of Stroke Syndrome (V17 1)  Family History    4  Family history of Reported Family History Of Heart Disease   5  Family history of Stroke Syndrome (V17 1)  Family History Reviewed: The family history was reviewed and updated today  Social History    · Daily caffeine consumption, 1 serving a day   · Denied: History of Drug Use   · Former smoker (P02 20) (Y10 915)   · Never Drank Alcohol  Social History Reviewed: The social history was reviewed and updated today  Current Meds   1   Adult Aspirin EC Low Strength 81 MG Oral Tablet Delayed Release; TAKE 1 TABLET   DAILY; Therapy: (Recorded:28Jun2016) to Recorded   2  AmLODIPine Besylate 5 MG Oral Tablet; take 1 tablet every day as directed; Therapy: (Drury Jeans) to Recorded   3  Fluticasone Propionate 50 MCG/ACT Nasal Suspension; USE 2 SPRAYS IN EACH   NOSTRIL ONCE DAILY; Therapy: 71Bmq7514 to (Evaluate:73Dga1777)  Requested for: 86DIO5543; Last   Rx:08Jun2016 Ordered   4  Gabapentin 300 MG Oral Capsule; take 1 capsule by mouth at bedtime; Therapy: 24OQC9447 to (Evaluate:11Jun2016); Last Rx:44Pqj7754 Ordered   5  Glimepiride 1 MG Oral Tablet; TAKE 1/2 TABLET DAILY WITH BREAKFAST  Requested   for: 30Apr2015; Last Rx:30Apr2015 Ordered   6  Hydrochlorothiazide 25 MG Oral Tablet; TAKE 1 TABLET DAILY; Therapy: 17ZIS2115 to (Evaluate:19Mar2016)  Requested for: 21Xau5273; Last   Rx:17Rcy9586 Ordered   7  Lisinopril 30 MG Oral Tablet; Take 1 tablet twice daily; Therapy: 56YMP3515 to (Evaluate:40Txt2563)  Requested for: 21Gip8906; Last   Rx:78Zzy7248 Ordered   8  Lisinopril 40 MG Oral Tablet; TAKE 1 TABLET DAILY; Therapy: (Drury Jeans) to Recorded   9  Magnesium Oxide 400 MG Oral Tablet; TAKE 1 TABLET TWICE DAILY; Therapy: 19Apr2016 to (Evaluate:19May2016); Last Rx:19Apr2016 Ordered   10  Metoprolol Tartrate 100 MG Oral Tablet; TAKE 1 TABLET TWICE DAILY; Therapy: (Drury Jeans) to Recorded   11  Metoprolol Tartrate 50 MG Oral Tablet; take 1 tablet by mouth twice a day; Therapy: 87OGH4311 to (Jennifer Harrington)  Requested for: 06GRZ2414; Last    Rx:13Jun2016 Ordered   12  Omeprazole 40 MG Oral Capsule Delayed Release; TAKE 1 CAPSULE TWICE DAILY; Therapy: 50UJA9897 to (Evaluate:19Nov2016)  Requested for: 66YTR5494; Last    Rx:23May2016 Ordered   13  OneTouch Delica Lancets 87C Miscellaneous; TEST 2 TIMES A DAY dx e11 9; Therapy: 52OCF5480 to (Evaluate:20Apr2017)  Requested for: 25Apr2016; Last    Rx:25Apr2016 Ordered   14  OneTouch Ultra 2 w/Device Kit;  Test your sugar once in the morning and once in the    afternoon; Therapy: 99GOG5299 to (Last Rx:26Jan2015)  Requested for: 53OVV7706 Ordered   15  OneTouch Ultra Blue In Vitro Strip; TEST 2 TIMES A DAY dx e11 9; Therapy: 44Eod4830 to (Evaluate:20Apr2017)  Requested for: 25Apr2016; Last    Rx:88Fof5541 Ordered   16  Oyster Calcium 500 MG Oral Tablet; Take 1 tablet  daily; Therapy: 64DNA0624 to Recorded   17  Pravastatin Sodium 40 MG Oral Tablet; take 1 tablet by mouth every day; Therapy: 79XYW0255 to (Evaluate:12Oct2016)  Requested for: 78Liu5251; Last    Rx:23Lda9325 Ordered  Medication List Reviewed: The medication list was reviewed and updated today  Allergies    1  Carvedilol TABS   2  Diflucan TABS   3  Flagyl CAPS    Vitals  Signs [Data Includes: Current Encounter]   Recorded: 24ZWB7111 03:16PM   Temperature: 97 2 F  Heart Rate: 76  Respiration: 18  Systolic: 294  Diastolic: 70  Height: 5 ft 5 in  Weight: 144 lb 2 08 oz  BMI Calculated: 23 98  BSA Calculated: 1 72  Pain Scale: 10    Physical Exam    Constitutional   General appearance: No acute distress, well appearing and well nourished  Pulmonary   Respiratory effort: No increased work of breathing or signs of respiratory distress  Auscultation of lungs: Clear to auscultation  Cardiovascular   Auscultation of heart: Normal rate and rhythm, normal S1 and S2, without murmurs  Examination of extremities for edema and/or varicosities: Normal     Carotid pulses: Abnormal   right 1+, right carotid bruit heard, left 1+ and left carotid bruit heard  Abdomen   Abdomen: Non-tender, no masses  Liver and spleen: No hepatomegaly or splenomegaly  Musculoskeletal   Gait and station: Normal     Digits and nails: Normal without clubbing or cyanosis      Inspection/palpation of joints, bones, and muscles: Normal     Psychiatric   Orientation to person, place, and time: Normal     Mood and affect: Normal          Attending Note  Attending Note: Attending Note: the staff discussed the patient on the day of the visit and I agree with the Resident management plan as it was presented to me  Level of Participation: I was present in clinic, but did not examine the patient  Diagnosis and Plan: BP mgmt as above  I agree with the Resident's note  Message   Recorded as Task   Date: 06/27/2016 07:15 PM, Created By: System   Task Name: [facility]   Assigned To: [facility] [place] triage,Team   Regarding Patient: Vicky Mcgovern, Status: In Progress   Comment:    System - 27 Jun 2016 7:15 PM     Patient discharged from hospital   Patient Name: Radha Magallanes  Patient YOB: 1934  Discharge Date: 6/27/2016  Facility: [facility] 83 Jun 2016 8:35 AM     Alecia Pump - 28 Jun 2016 8:35 AM     TASK REASSIGNED: Previously Assigned To Alex Hanna - 28 Jun 2016 2:44 PM     TASK IN PROGRESS     Future Appointments    Date/Time Provider Specialty Site   11/17/2016 01:45 PM Nataliya Gordillo MD Vascular Surgery VASCULAR LAB [place]   07/20/2016 03:00 PM CAMILLA Chandler  Family Medicine [place] [address]   08/03/2016 04:00 PM Josué Hinson, [facility] Gastroenterology Adult [address]     Signatures   Electronically signed by : CAMILLA Rider ; Jul 7 2016  1:43AM EST                       (Author)    Electronically signed by : CAMILLA Rider ; Jul 7 2016  9:22PM EST                       (Author)    Electronically signed by :  Jessica Blanco MD; Jul 8 2016  4:00PM EST                       (Author)

## 2018-01-16 NOTE — PROGRESS NOTES
Assessment   1  CKD (chronic kidney disease), stage III (585 3) (N18 3)   2  Accelerated essential hypertension (401 0) (I10)   3  Proteinuria (791 0) (R80 9)   4  Secondary hyperparathyroidism (588 81) (N25 81)   5  Vitamin D deficiency (268 9) (E55 9)    Plan   CKD (chronic kidney disease), stage III    · (1) CBC/PLT/DIFF; Status:Active; Requested for:01May2018; Perform:[facility]; YZC:80DHM5258;IUMISGD; For:CKD (chronic kidney disease), stage III; Ordered By:Kenneth Watson;   · (1) COMPREHENSIVE METABOLIC PANEL; Status:Active; Requested for:01May2018; Perform:[facility]; KQT:79IKQ7064;KHCPOVL; For:CKD (chronic kidney disease), stage III; Ordered By:Kenneth Watson;   · (1) MAGNESIUM; Status:Active; Requested for:01May2018; Perform:[facility]; RBM:22WZU5556;UEGNYJA; For:CKD (chronic kidney disease), stage III; Ordered By:Kenneth Watson;   · (1) PHOSPHORUS; Status:Active; Requested for:01May2018; Perform:[facility]; NQF:23FCT8522;DGABKJP; For:CKD (chronic kidney disease), stage III; Ordered By:Kenneth Watson;   · (1) PTH N-TERMINAL (INTACT); Status:Active; Requested for:01May2018; Perform:[facility]; YHN:31OQL4786;UUKEOPP; For:CKD (chronic kidney disease), stage III; Ordered By:Kenneth Watson;   · (1) URINALYSIS WITH MICROSCOPIC; Status:Active; Requested for:01May2018; Perform:[facility]; UBT:67FDF6224;QLTOUSW; For:CKD (chronic kidney disease), stage III; Ordered By:Kenneth Watson;   · (1) URINE PROTEIN CREATININE RATIO; Status:Active; Requested for:01May2018; Perform:[facility]; FWA:84BRV6324;XFKHYLG; For:CKD (chronic kidney disease), stage III; Ordered By:Kenneth Watson;   · Follow-up visit in 4 Months Evaluation and Treatment  Follow-up  Status: Hold For -    Scheduling  Requested for: 65OYU6242   Ordered; For: CKD (chronic kidney disease), stage III; Ordered By: Rodolfo Figueroa Performed:  Due: 29LEF0298  Secondary hyperparathyroidism    · (1) COMPREHENSIVE METABOLIC PANEL; Status:Active; Requested for:19Feb2018; Perform:[facility]; OEI:81MHE2150;OOOSPSP; For:Secondary hyperparathyroidism; Ordered By:Kenneth Watson;   · (1) PHOSPHORUS; Status:Active; Requested for:19Feb2018; Perform:[facility]; JWP:56YLT1871;OORBRAV; For:Secondary hyperparathyroidism; Ordered By:Kenneth Watson;   · (1) PTH N-TERMINAL (INTACT); Status:Active; Requested for:19Feb2018; Perform:[facility]; CZP:44REJ8485;GQESBMJ; For:Secondary hyperparathyroidism; Ordered By:Kenneth Watson;  Secondary hyperparathyroidism, renal    · Calcitriol 0 25 MCG Oral Capsule; please take once every  tuesday andfriday   Rx By: Rodolfo Figueroa; Dispense: 30 Days ; #:10 Capsule; Refill: 6;For: Secondary hyperparathyroidism, renal; CONNIE = N; Sent To: [place] [phone]    Discussion/Summary   1  Stage 3 chronic kidney disease:  Baseline creatinine has been 1 3-1 5 and a creatinine is stable at this range 1 34     2  Accelerated essential hypertension:  Her blood pressure at home has been 204-678 systolic  When it had been tried a few months ago to increase her Procardia 60 mg, she had significant dizziness and relative hypotension to her baseline  Her blood pressure at that time I believe was 490-343 systolic  I thing she tolerates being at this blood pressure 140 to 150 will my other concern is that she has lower diastolic numbers and I would like to keep her diastolic blood pressure at least above 60  I did not make any changes in her regimen at this time  She is asymptomatic and feeling well  3   Secondary hyperparathyroidism:  Her last PTH was 55 and his most recent is 242  Her phosphorus levels have been stable  I asked her to increase her Rocaltrol to Tuesday and Friday  She states she has been taking it once a week    Will check follow-up phosphorus calcium and intact PTH levels in approximately 1 month  If all is stable within see her in 4 months here in follow-up          4  Proteinuria:  Tubular range proteinuria recheck protein creatinine ratio and urinalysis before next visit          5  Vitamin-D deficiency:  Last vitamin-D levels of 35 recheck before next visit    The patient, patient's family was counseled regarding diagnostic results,-- instructions for management  The patient has the current Goals: Maintain steady blood pressure and kidney function  The patent has the current Barriers: Patient is chronic kidney disease and is on medications for blood pressure  One of the challenges is remained the diastolic blood pressure getting too low  She is also probably is 2 higher blood pressure IE is systolic of 882-328  I believe she becomes symptomatic when her systolic blood pressures in the 120s to low 130  Patient agrees and allows to involve family/caregiver in development of care plan: Patient's  also serves as a   Possible side effects of new medications were reviewed with the patient/guardian today  The treatment plan was reviewed with the patient/guardian  The patient/guardian understands and agrees with the treatment plan      Reason For Visit   Patient is here for follow-up of kidney function and blood pressure management         History of Present Illness   She has been feeling well  Denies any recent gout flare  She has been ambulating without any chest pressure shortness of breath  Her blood pressures at home have been approximately 575-087 systolic for the top number but her  notices that her bottom number is usually in the 62s with sometimes can be in the low 50s  He usually takes her blood pressure in the morning and the evening      Review of Systems        Constitutional: no anorexia-- and-- no fatigue  Gastrointestinal: no nausea-- and-- no diarrhea        Cardiovascular: no chest pain,-- no palpitations-- and-- no lower extremity edema  Neurological: no headache  Genitourinary: no dysuria,-- no hematuria,-- no change in urinary frequency,-- no incomplete emptying of bladder-- and-- no foamy urine  Current Meds    1  Adult Aspirin EC Low Strength 81 MG Oral Tablet Delayed Release; TAKE 1 TABLET     DAILY; Therapy: (Recorded:28Jun2016) to Recorded   2  Calcitriol 0 25 MCG Oral Capsule; TAKE 1 CAPSULE BY MOUTH EVERY FRIDAY; Therapy: 35JSR6218 to Ingenio Cover)  Requested for: 74QRC9818; Last     Rx:06Jan2018 Ordered   3  Cyclobenzaprine HCl - 5 MG Oral Tablet; TAKE 1 TABLET 3 TIMES DAILY AS NEEDED; Therapy: 82Eal8508 to (Evaluate:60Jhn9300)  Requested for: 47Gnj7304; Last     Rx:32Rik6970 Ordered   4  Fluticasone Propionate 50 MCG/ACT Nasal Suspension; USE 2 SPRAYS IN EACH     NOSTRIL ONCE DAILY; Therapy: 10Hvy0057 to (Evaluate:40Ncp4102)  Requested for: 72Tlp0794; Last     Rx:83Ogy4168 Ordered   5  Gabapentin 100 MG Oral Capsule; TAKE 1 CAPSULE Bedtime; Therapy: (Emory Pereira) to Recorded   6  Glimepiride 1 MG Oral Tablet; take 1/2 tablet by mouth daily with BREAKFAST; Therapy: 51Kfr5616 to (Evaluate:13Mar2018)  Requested for: 23Pup1903; Last     Rx:75Vxe3378 Ordered   7  Magnesium Oxide 400 MG Oral Tablet; TAKE 1 TABLET TWICE DAILY; Therapy: 44Toa6222 to (Evaluate:60Owr0916); Last Rx:58Mrt2735 Ordered   8  Metoprolol Tartrate 50 MG Oral Tablet; take 1 tablet by mouth twice daily; Therapy: 87BRB6127 to (Evaluate:08Hlo7433)  Requested for: 84ZCY0775; Last     Rx:13Nau9048 Ordered   9  NIFEdipine ER Osmotic Release 30 MG Oral Tablet Extended Release 24 Hour; Take 1     tablet daily; Therapy: 71Vuy2296 to (Evaluate:86Gnh5133)  Requested for: 02Pkg8143; Last     Rx:67Yll1274 Ordered   10  OneTouch FinePoint Lancets Miscellaneous; BID USE AS DIRECTED DX e11 9; Therapy: 67SMV8085 to (Evaluate:20Mar2018)  Requested for: 97Umn5932; Last      Rx:56Knc1954 Ordered   11  OneTouch Ultra 2 w/Device Kit; Test your sugar once in the morning and once in the      afternoon; Therapy: 73XVW1890 to (Last Rx:26Jan2015)  Requested for: 33TRW4123 Ordered   12  OneTouch Ultra Blue In [employer]; test twice daily; Therapy: 19Eoy3602 to (Evaluate:12Apr2018)  Requested for: 17AOB3622; Last      Rx:12Jan2018 Ordered   13  Oyster Calcium 500 MG TABS; Take 1 tablet  daily; Therapy: 24BYC3427 to Recorded   14  Pantoprazole Sodium 40 MG Oral Tablet Delayed Release; TAKE 1 TABLET BY MOUTH      TWICE DAILY 30 MINUTES BEFORE BREAKFAST AND DINNER; Therapy: 50VHZ6985 to (Evaluate:26Mfx6885)  Requested for: 47ZNS1203; Last      Rx:98Uds5573 Ordered   15  Pravastatin Sodium 40 MG Oral Tablet; take 1 tablet by mouth on monday and thrusday      only while on colcrys  once off of colcrys resume taking medication daily; Therapy: 03WFC5927 to (Laura Riley)  Requested for: 91OWO8800; Last      Rx:58Eby2785 Ordered   16  Spironolactone 25 MG Oral Tablet; TAKE 1 TABLET BY MOUTH DAILY AT NIGHT BEFORE      BEDTIME; Therapy: 32EMK1121 to (VJOQXCVT:84AOY4459)  Requested for: 25Oct2017; Last      GM:12EIK3959 Ordered   17  Tylenol 325 MG Oral Capsule; TAKE 2 CAPSULE 3 times daily PRN muscle pain; Therapy: 66XBH9937 to (Evaluate:14Oct2017)  Requested for: 51Tpu5154; Last      Rx:34Qvk3202 Ordered    Allergies   1  Clonidine and derivs   2  Flagyl CAPS   3  Carvedilol TABS   4  Colchicine TABS   5  Iodinated Contrast Media   6  Diflucan TABS  7   Latex    Vitals   Vital Signs    Recorded: 78YSV2170 02:53PM Recorded: 73HVP0590 97:01DC   Systolic 402, RUE, Standing 156, RUE, Sitting    Diastolic 62, RUE, Standing 58, RUE, Sitting    BP Comments Patient is asymptomatic with standing approximately 90 seconds     Height   5 ft 2 in   Weight   139 lb 6 oz   BMI Calculated   25 49   BSA Calculated   1 64     Physical Exam        Constitutional: General appearance: No acute distress, well appearing and well nourished  Eyes: Anicteric sclerae  JVD:  No JVD present  Pulmonary:  Auscultation of lungs: Clear to auscultation  Cardiovascular: Auscultation of heart: Normal rate and rhythm, normal S1 and S2, without murmurs  Abdomen: Non-tender, no masses  Rash: No rash present  Neurologic: Non Focal          Results/Data   US KIDNEY AND BLADDER 88FMJ3305 02:01PM Toni Nicole Order Number: [id number]       - Patient Instructions: To schedule this appointment, please contact Central Scheduling at [phone]  Test Name Result Flag Reference   US KIDNEY AND BLADDER (Report)     This is a summary report  The complete report is available in the patient's medical record  If you cannot access the medical record, please contact the sending organization for a detailed fax or copy  RENAL ULTRASOUND           INDICATION: N28 1: Cyst of kidney, acquired  History taken directly from the electronic ordering system  COMPARISON: 10/11/2016; 9/5/2013           TECHNIQUE:  Ultrasound of the retroperitoneum was performed with a curvilinear transducer utilizing volumetric sweeps and still imaging techniques  FINDINGS:           KIDNEYS:      Symmetric and normal size  Right kidney: 8 8 cm  Normal echogenicity and contour  No suspicious masses detected  Within the midpole right kidney there is a 0 8 cm simple cyst  A tiny cortical cyst in the lower pole of the right kidney noted measuring 4 mm  No hydronephrosis  No shadowing calculi  No perinephric fluid collections  Left kidney: 8 4 cm  Normal echogenicity and contour  No suspicious masses detected  There is a 3 4 x 2 x 1 9 cm lobulated cystic lesion within internal septations, slightly larger than on the prior study  No hydronephrosis  No shadowing calculi  No perinephric fluid collections             URETERS: Nonvisualized  BLADDER:       Normally distended  No focal thickening or mass lesions  Bilateral ureteral jets detected  IMPRESSION:                1  Slight interval enlargement of a septated cystic lesion lower pole left kidney  Six-month follow-up ultrasound could be obtained  2  No hydronephrosis noted                 Workstation performed: [id number]           Signed by: Clifford Leon MD      1/11/18        Thank you very much for allowing me to participate in the care of this patient  If you have any questions, please do not hesitate to contact me  Future Appointments      Date/Time Provider Specialty Site   02/05/2018 02:40 PM CAMILLA Pearson   Cardiology [facility]   01/18/2018 02:20 PM Jw Sheppard DO Family Medicine [facility]     Signatures    Electronically signed by : Estevan Almodovar DO; Ayush 15 2018  2:58PM EST                       (Author)

## 2018-01-16 NOTE — MISCELLANEOUS
Message   Recorded as Task   Date: 09/25/2017 04:24 PM, Created By: Seema Amor   Task Name: Review Note   Assigned To: [facility],Team   Regarding Patient: Khushi Mendoza, Status: Active   CommentAlisaler Ras - 25 Sep 2017 4:24 PM     TASK CREATED  This lady probably should see Oncology to consider adjuvant therapy such as tamoxifen or Femara, some sort of estrogen inhibitor  She may not needed but she should at least have a consult  Please refer her to Oncology  Janell Mcbride - 26 Sep 2017 9:13 AM     TASK REASSIGNED: Previously Assigned To Henrik Brown, please call patient and inform her that Dr Alden Butler and I reviewed her pathology together and Dr Alden Butler requests she see Oncology (Dr Anselmo Santos or Dr Saurav Rehman) for an oncology opinion about possible hormone modulators, as above  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 - Marisel Rosario - 26 Sep 2017 10:40 AM     TASK EDITED  Left message to return call  Radha Nolasco - 26 Sep 2017 3:21 PM     TASK EDITED   Stager w/ daughter and made aware, info given for hematology/oncology specialists office  SH        Active Problems    1  A Fall Due To Slipping, Tripping, Or Stumbling (E885 9)   2  Abdominal pain, epigastric (789 06) (R10 13)   3  Abnormal EKG (794 31) (R94 31)   4  Abnormal mammogram (793 80) (R92 8)   5  Accelerated essential hypertension (401 0) (I10)   6  Allergic rhinitis (477 9) (J30 9)   7  Aortoiliac stenosis (440 0) (I70 0)   8  Atherosclerosis of native artery of extremity with intermittent claudication (440 21)   (I70 219)   9  Back pain (724 5) (M54 9)   10  Benign essential hypertension (401 1) (I10)   11  Breast neoplasm, Tis (LCIS), 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)   12  Carotid stenosis, asymptomatic (433 10) (I65 29)   13  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)   14   Chronic kidney disease (CKD), stage II (mild) (585 2) (N18 2)   15  Chronic myofascial pain (729 1,338 29) (M79 1,G89 29)   16  Chronic pain of lower extremity (729 5,338 29) (M79 606,G89 29)   17  CKD (chronic kidney disease), stage III (585 3) (N18 3)   18  Constipation (564 00) (K59 00)   19  Diabetic retinopathy (250 50,362 01) (E11 319)   20  DMII (diabetes mellitus, type 2) (250 00) (E11 9)   21  Dysphagia (787 20) (R13 10)   22  Epigastric pain (789 06) (R10 13)   23  Esophageal reflux (530 81) (K21 9)   24  Fever of unknown origin (780 60) (R50 9)   25  Gastritis (535 50) (K29 70)   26  History of influenza vaccination (V49 89) (Z92 29)   27  Hyperlipidemia (272 4) (E78 5)   28  Hypomagnesemia (275 2) (E83 42)   29  Hypomagnesemia (275 2) (E83 42)   30  Iliac 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)   31  Lymphadenopathy (785 6) (R59 1)   32  Microalbuminuria (791 0) (R80 9)   33  Nasal congestion (478 19) (R09 81)   34  Nausea (787 02) (R11 0)   35  Neck pain (723 1) (M54 2)   36  Nephrolithiasis (592 0) (N20 0)   37  Neuropathic pain, leg, right (355 8) (G57 91)   38  Nevus of cheek (216 3) (D22 39)   39  Osteoporosis (733 00) (M81 0)   40  PAD (peripheral artery disease) (443 9) (I73 9)   41  Pain syndrome, chronic (338 4) (G89 4)   42  Primary generalized (osteo)arthritis (715 09) (M15 0)   43  Proteinuria (791 0) (R80 9)   44  Renal cyst (753 10) (N28 1)   45  Secondary hyperparathyroidism (588 81) (N25 81)   46  Secondary hyperparathyroidism, renal (588 81) (N25 81)   47  Vaginal prolapse (618 00) (N81 10)   48  Vitamin D deficiency (268 9) (E55 9)    Current Meds   1  Adult Aspirin EC Low Strength 81 MG Oral Tablet Delayed Release; TAKE 1 TABLET   DAILY; Therapy: (Recorded:28Jun2016) to Recorded   2  Allopurinol 100 MG Oral Tablet; Take 1 tablet daily; Therapy: 02Aug2017 to (Evaluate:29Jan2018)  Requested for: 02Aug2017; Last   Rx:02Aug2017 Ordered   3   Calcitriol 0 25 MCG Oral Capsule; TAKE 1 CAPSULE BY MOUTH EVERY Royer Barbosa; Therapy: 70DXV9744 to (Prudence Fair)  Requested for: 36FHN0691; Last   Rx:08Eww0442 Ordered   4  Fluticasone Propionate 50 MCG/ACT Nasal Suspension; USE 2 SPRAYS IN EACH   NOSTRIL ONCE DAILY; Therapy: 89Zty6757 to (Ramandeepie Left)  Requested for: 89IKG8194; Last   NE:92BJP1264 Ordered   5  Gabapentin 100 MG Oral Capsule; TAKE 1 CAPSULE Bedtime; Therapy: (Fay Ivey) to Recorded   6  Glimepiride 1 MG Oral Tablet; take 1/2 tablet by mouth daily with BREAKFAST; Therapy: 04Nsl2491 to (Evaluate:13Mar2018)  Requested for: 52Kcz6546; Last   Rx:66Uor0389 Ordered   7  Magnesium Oxide 400 MG Oral Tablet; TAKE 1 TABLET TWICE DAILY; Therapy: 95Mid6096 to (Evaluate:12Gir8115); Last Rx:15Fae2712 Ordered   8  Metoprolol Tartrate 50 MG Oral Tablet; take 1 tablet by mouth twice daily; Therapy: 06LUJ3533 to (Evaluate:95Ypn2951)  Requested for: 18UFT3684; Last   Rx:23Vla3915 Ordered   9  Metoprolol Tartrate 50 MG Oral Tablet; Take 1 tablet twice daily; Therapy: 34Ibe8845 to (Evaluate:30Mct3624)  Requested for: 06XKJ0421; Last   Rx:43Inv4893 Ordered   10  NIFEdipine ER Osmotic Release 30 MG Oral Tablet Extended Release 24 Hour; Take 1    tablet daily; Therapy: 44Dqo0145 to (Avis Roy)  Requested for: 15Dkj3280; Last    Rx:43Otg0275 Ordered   11  OneTouch [employer]ica Lancets 55N Miscellaneous; test twice daily; Therapy: 17SBG6659 to (Evaluate:17Oct2017)  Requested for: 58BQP4000; Last    Rx:80Fjb4192 Ordered   12  OneTouch Ultra 2 w/Device Kit; Test your sugar once in the morning and once in the    afternoon; Therapy: 03DLG0203 to (Last Rx:26Jan2015)  Requested for: 70HCS2865 Ordered   13  OneTouch Ultra Blue In [employer]; test twice daily; Therapy: 02Gbu9873 to (Evaluate:17Oct2017)  Requested for: 27VBH8307; Last    Rx:43Jbx9286 Ordered   14  Oyster Calcium 500 MG TABS; Take 1 tablet  daily; Therapy: 70HRE5574 to Recorded   15   Pantoprazole Sodium 40 MG Oral Tablet Delayed Release (Protonix); TAKE 1 TABLET    BY MOUTH TWICE DAILY 30 MINUTES BEFORE BREAKFAST AND DINNER; Therapy: 80ZZP5673 to (Evaluate:12Qid2751)  Requested for: 04KBE3680; Last    Rx:65Phm2335 Ordered   16  Pravastatin Sodium 40 MG Oral Tablet; take 1 tablet by mouth on monday and thrusday    only while on colcrys  once off of colcrys resume taking medication daily; Therapy: 36GWQ4569 to (Shade Ocasio)  Requested for: 20LMC0732; Last    Rx:44Jef5999 Ordered   17  Spironolactone 25 MG Oral Tablet; TAKE 1 TABLET BY MOUTH DAILY AT NIGHT    BEFORE YOU GO TO BED;    Therapy: 41ZCB3334 to (Cherylle Fall)  Requested for: 03DLC5819; Last    Rx:60Nrs7183 Ordered   18  Tylenol 325 MG Oral Capsule; TAKE 2 CAPSULE 3 times daily PRN muscle pain; Therapy: 13PTO6993 to (Evaluate:14Oct2017)  Requested for: 30Xvq4187; Last    Rx:48Mzu1174 Ordered    Allergies    1  Clonidine and derivs   2  Flagyl CAPS   3  Carvedilol TABS   4  Colchicine TABS   5  Iodinated Contrast Media   6  Diflucan TABS    7   Latex    Signatures   Electronically signed by : Twin Kolb, ; Sep 26 2017  3:21PM EST                       (Author)

## 2018-01-16 NOTE — MISCELLANEOUS
Message  GI Reminder Recall [facility]:   Date: 11/02/2016   Dear Amberly Coombs:     Review of our records shows you are due for the following: EGD  We have been trying to reach you and have been unsuccessful, please contact our office to schedule our procedure  Thank you  Please call the following office to schedule your appointment:   [address] [facility], [address], [address] [facility] [phone]  We look forward to hearing from you!      Sincerely,         Signatures   Electronically signed by : Elvia Duams, ; Nov 2 2016 10:54AM EST                       (Author)

## 2018-01-16 NOTE — RESULT NOTES
Message   normal magnesium  please schedule patient a follow up appointment in 3 months  repeat magnesium level in 2 months  thanks  Verified Results  (1) MAGNESIUM 13Apr2016 03:02PM Kali Yusuf Order Number: [id number]     Test Name Result Flag Reference   MAGNESIUM 1 6 mg/dL  1 6-2 6       Plan  Hypomagnesemia    · (1) MAGNESIUM; Status:Active;  Requested YEX:91XPJ7028;

## 2018-01-17 NOTE — MISCELLANEOUS
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  Will probably need to see an oncologist to consider tamoxifen therapy  But good news is no cancer invasive in the specimen  See back in office  [facility] - 19 Sep 2017 4:24 PM     TASK EDITED  Left message to return call  Tania Quinonez - 20 Sep 2017 11:51 AM     TASK EDITED  [facility] w/ son and made aware of results, will discuss further w/ doctor at post op visit  Active Problems    1  A Fall Due To Slipping, Tripping, Or Stumbling (E885 9)   2  Abdominal pain, epigastric (789 06) (R10 13)   3  Abnormal EKG (794 31) (R94 31)   4  Abnormal mammogram (793 80) (R92 8)   5  Accelerated essential hypertension (401 0) (I10)   6  Allergic rhinitis (477 9) (J30 9)   7  Aortoiliac stenosis (440 0) (I70 0)   8  Atherosclerosis of native artery of extremity with intermittent claudication (440 21)   (I70 219)   9  Back pain (724 5) (M54 9)   10  Benign essential hypertension (401 1) (I10)   11  Breast neoplasm, Tis (LCIS), 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)   12  Carotid stenosis, asymptomatic (433 10) (I65 29)   13  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)   14  Chronic kidney disease (CKD), stage II (mild) (585 2) (N18 2)   15  Chronic myofascial pain (729 1,338 29) (M79 1,G89 29)   16  Chronic pain of lower extremity (729 5,338 29) (M79 606,G89 29)   17  CKD (chronic kidney disease), stage III (585 3) (N18 3)   18  Constipation (564 00) (K59 00)   19  Diabetic retinopathy (250 50,362 01) (E11 319)   20  DMII (diabetes mellitus, type 2) (250 00) (E11 9)   21   Dysphagia (787 20) (R13 10)   22  Epigastric pain (789 06) (R10 13) 23  Esophageal reflux (530 81) (K21 9)   24  Fever of unknown origin (780 60) (R50 9)   25  Gastritis (535 50) (K29 70)   26  History of influenza vaccination (V49 89) (Z92 29)   27  Hyperlipidemia (272 4) (E78 5)   28  Hypomagnesemia (275 2) (E83 42)   29  Hypomagnesemia (275 2) (E83 42)   30  Iliac 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)   31  Lymphadenopathy (785 6) (R59 1)   32  Microalbuminuria (791 0) (R80 9)   33  Nasal congestion (478 19) (R09 81)   34  Nausea (787 02) (R11 0)   35  Neck pain (723 1) (M54 2)   36  Nephrolithiasis (592 0) (N20 0)   37  Neuropathic pain, leg, right (355 8) (G57 91)   38  Nevus of cheek (216 3) (D22 39)   39  Osteoporosis (733 00) (M81 0)   40  PAD (peripheral artery disease) (443 9) (I73 9)   41  Pain syndrome, chronic (338 4) (G89 4)   42  Primary generalized (osteo)arthritis (715 09) (M15 0)   43  Proteinuria (791 0) (R80 9)   44  Renal cyst (753 10) (N28 1)   45  Secondary hyperparathyroidism (588 81) (N25 81)   46  Secondary hyperparathyroidism, renal (588 81) (N25 81)   47  Vaginal prolapse (618 00) (N81 10)   48  Vitamin D deficiency (268 9) (E55 9)    Current Meds   1  Adult Aspirin EC Low Strength 81 MG Oral Tablet Delayed Release; TAKE 1 TABLET   DAILY; Therapy: (Recorded:28Jun2016) to Recorded   2  Allopurinol 100 MG Oral Tablet; Take 1 tablet daily; Therapy: 08Hyn3930 to (Evaluate:29Jan2018)  Requested for: 85Xws0542; Last   Rx:06Jld0730 Ordered   3  Calcitriol 0 25 MCG Oral Capsule; TAKE 1 CAPSULE BY MOUTH EVERY FRIDAY; Therapy: 65OTG4784 to (Evaluate:85Uca0292)  Requested for: 49Svo4416; Last   Rx:83Tpq4867 Ordered   4  Fluticasone Propionate 50 MCG/ACT Nasal Suspension; USE 2 SPRAYS IN EACH   NOSTRIL ONCE DAILY; Therapy: 04Ved0799 to (August Mis)  Requested for: 95VHL5500; Last   HU:30PTA3218 Ordered   5  Gabapentin 100 MG Oral Capsule; TAKE 1 CAPSULE Bedtime; Therapy: (Eleanor Schaffer to Recorded   6   Glimepiride 1 MG Oral Tablet; take 1/2 tablet by mouth daily with BREAKFAST; Therapy: 77Iqv8086 to (Evaluate:13Mar2018)  Requested for: 07Glr5619; Last   Rx:91Nhx8129 Ordered   7  Magnesium Oxide 400 MG Oral Tablet; TAKE 1 TABLET TWICE DAILY; Therapy: 63Xqp1250 to (Evaluate:82Sxt5665); Last Rx:81Zsn7892 Ordered   8  Metoprolol Tartrate 50 MG Oral Tablet; take 1 tablet by mouth twice daily; Therapy: 41NLV2720 to (Evaluate:02Iyj6932)  Requested for: 02MCH6260; Last   DP:87SLL5514 Ordered   9  Metoprolol Tartrate 50 MG Oral Tablet; Take 1 tablet twice daily; Therapy: 78Evf9230 to (Evaluate:20Ayl9472)  Requested for: 55BZI2090; Last   Rx:60Pcz6831 Ordered   10  NIFEdipine ER Osmotic Release 30 MG Oral Tablet Extended Release 24 Hour; Take 1    tablet daily; Therapy: 12Ooc6085 to (Gab Mcgowan)  Requested for: 07Ayq7694; Last    Rx:39Rbv8808 Ordered   11  OneTouch Delica Lancets 09Q Miscellaneous; test twice daily; Therapy: 48CEM6920 to (Evaluate:17Oct2017)  Requested for: 46DMQ1208; Last    Rx:57Glh6744 Ordered   12  OneTouch Ultra 2 w/Device Kit; Test your sugar once in the morning and once in the    afternoon; Therapy: 98RNK6095 to (Last Rx:26Jan2015)  Requested for: 20OPQ2074 Ordered   13  OneTouch Ultra Blue In [employer]; test twice daily; Therapy: 84Fow0804 to (Evaluate:17Oct2017)  Requested for: 21ZOK1097; Last    Rx:09Qyv1161 Ordered   14  Oyster Calcium 500 MG TABS; Take 1 tablet  daily; Therapy: 60RCS1045 to Recorded   15  Pantoprazole Sodium 40 MG Oral Tablet Delayed Release (Protonix); TAKE 1 TABLET    BY MOUTH TWICE DAILY 30 MINUTES BEFORE BREAKFAST AND DINNER; Therapy: 17OMA0034 to (Evaluate:06Ugu3922)  Requested for: 10EKX6771; Last    Rx:06Ppt6626 Ordered   16  Pravastatin Sodium 40 MG Oral Tablet; take 1 tablet by mouth on monday and thrusday    only while on colcrys  once off of colcrys resume taking medication daily; Therapy: 96LYN0115 to (Dorene Bundy)  Requested for: 49ITU4122; Last    Rx:93Ios6638 Ordered   17  PredniSONE 5 MG Oral Tablet; Take 1 tablet daily; Therapy: 21Apr1868 to (Evaluate:29Jan2018)  Requested for: 81Oxs6902; Last    Rx:61Jvr8317 Ordered   18  Spironolactone 25 MG Oral Tablet; TAKE 1 TABLET BY MOUTH DAILY AT NIGHT    BEFORE YOU GO TO BED;    Therapy: 34KVS3755 to (Reagan Hartman)  Requested for: 43LBZ2563; Last    Rx:09Nov2016 Ordered   19  Tylenol 325 MG Oral Capsule; TAKE 2 CAPSULE 3 times daily PRN muscle pain; Therapy: 32QWU7880 to (Evaluate:14Oct2017)  Requested for: 05Way0027; Last    Rx:44Ann4225 Ordered    Allergies    1  Clonidine and derivs   2  Flagyl CAPS   3  Carvedilol TABS   4  Colchicine TABS   5  Iodinated Contrast Media   6  Diflucan TABS    7   Latex    Signatures   Electronically signed by : Malcolm Cortes, ; Sep 20 2017 11:52AM EST                       (Author)

## 2018-01-17 NOTE — PROGRESS NOTES
Chief Complaint  Pt came in today with complaint of swelling in her legs she feels is related to her Norvasc  She has trace pedal and ankle edema and +1 pretibial edema bilaterally  She states it was much worse yesterday and she stopped taking her norvasc the past 2 days and it is improving  Pt bp upon arrival was 180/50 and rechecked by Dr Solis Duran 831 systolic  Dr Solis Duran did evaluate med regimen and will remain off Norvasc and recheck next week  Pt did bring bp log which showed bp in normal range they will continue to monitor at home  Active Problems     1  A Fall Due To Slipping, Tripping, Or Stumbling (E885 9)   2  Abdominal discomfort in right flank (789 09) (R10 9)   3  Abdominal pain (789 00) (R10 9)   4  Abscess, neck (682 1) (L02 11)   5  Accelerated essential hypertension (401 0) (I10)   6  Acute pharyngitis (462) (J02 9)   7  Acute upper respiratory infection (465 9) (J06 9)   8  Allergic rhinitis (477 9) (J30 9)   9  Aortoiliac stenosis (440 0) (I70 0)   10  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)    (I70 219)   11  Back muscle spasm (724 8) (M62 830)   12  Back pain (724 5) (M54 9)   13  Backache (724 5) (M54 9)   14  Benign essential hypertension (401 1) (I10)   15  Carotid stenosis, asymptomatic (433 10) (I65 29)   16  Cellulitis of foot (682 7) (L03 119)   17  Chronic kidney disease (CKD), stage II (mild) (585 2) (N18 2)   18  Constipation (564 00) (K59 00)   19  Constipation (564 00) (K59 00)   20  Diabetic retinopathy (250 50,362 01) (E11 319)   21  DMII (diabetes mellitus, type 2) (250 00) (E11 9)   22  Dysphagia (787 20) (R13 10)   23  Encounter for gynecological examination without abnormal finding (V72 31) (Z01 419)   24  Encounter for mammogram to establish baseline mammogram (V76 12) (Z12 31)   25  Encounter for routine gynecological examination (V72 31) (Z01 419)   26  Epiglottitis (464 30) (J05 10)   27   Esophageal reflux (503 81) (K21 9)   28  Exposure to influenza (V01 79) (Z20 828)   29  Foreign body of back (911 6) (S20 459A)   30  Hyperlipidemia (272 4) (E78 5)   31  Hypomagnesemia (275 2) (E83 42)   32  Hypomagnesemia (275 2) (E83 42)   33  Iliac 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)   34  Infection of toe (686 9) (L08 9)   35  Joint pain (719 40) (M25 50)   36  Knee pain (719 46) (M25 569)   37  Limb pain (729 5) (M79 609)   38  Lymphadenopathy (785 6) (R59 1)   39  Lymphadenopathy (785 6) (R59 1)   40  Microalbuminuria (791 0) (R80 9)   41  Nasal congestion (478 19) (R09 81)   42  Nausea (787 02) (R11 0)   43  Need for immunization against influenza (V04 81) (Z23)   44  Need for pneumococcal vaccine (V03 82) (Z23)   45  Need for prophylactic vaccination and inoculation against influenza (V04 81) (Z23)   46  Nephrolithiasis (592 0) (N20 0)   47  Nevus of cheek (216 3) (D22 39)   48  Osteoporosis (733 00) (M81 0)   49  Pessary maintenance (V53 99) (Z46 89)   50  Pre-op evaluation (V72 84) (Z01 818)   51  Renal artery stenosis (440 1) (I70 1)   52  Renal cyst (753 10) (N28 1)   53  Rib tenderness (786 50) (R07 81)   54  Right-sided thoracic back pain (724 1) (M54 6)   55  Secondary hyperparathyroidism (588 81) (N25 81)   56  Secondary hyperparathyroidism, renal (588 81) (N25 81)   57  Segmental and somatic dysfunction of cervical region (739 1) (M99 01)   58  Segmental and somatic dysfunction of lumbar region (739 3) (M99 03)   59  Segmental and somatic dysfunction of pelvic region (739 5) (M99 05)   60  Segmental and somatic dysfunction of thoracic region (739 2) (M99 02)   61  Shortness of breath (786 05) (R06 02)   62  Somatic dysfunction of abdominal region (739 9) (M99 09)   63  Somatic dysfunction of head region (739 0) (M99 00)   64  Somatic dysfunction of lower extremities (739 6) (M99 06)   65  Somatic dysfunction of rib region (739 8) (M99 08)   66  Somatic dysfunction of thoracic region (739 2) (M99 02)   67   Somatic dysfunction of upper extremities (739 7) (M99 07)   68  Sore throat (462) (J02 9)   69  Vaginal prolapse (618 00) (N81 10)   70  Visit for screening mammogram (V76 12) (Z12 31)   71  Vitamin D deficiency (268 9) (E55 9)   72  Vulvovaginitis candida albicans (112 1) (B37 3)    Abdominal pain, epigastric (789 06) (R10 13)       Gastritis (535 50) (K29 70)          Current Meds   1  Adult Aspirin EC Low Strength 81 MG Oral Tablet Delayed Release; TAKE 1 TABLET   DAILY; Therapy: (Recorded:28Jun2016) to Recorded   2  AmLODIPine Besylate 5 MG Oral Tablet; TAKE 1 TABLET BY MOUTH EVERY DAY AS   DIRECTED; Therapy: 73Bdo0146 to (Evaluate:20Oct2016)  Requested for: 86Frs5011; Last   Rx:14Msh6915 Ordered   3  Fluticasone Propionate 50 MCG/ACT Nasal Suspension; USE 2 SPRAYS IN EACH   NOSTRIL ONCE DAILY; Therapy: 71Xdu3740 to (Evaluate:06Sep2016)  Requested for: 14CUK9963; Last   Rx:08Jun2016 Ordered   4  Gabapentin 300 MG Oral Capsule; take 1 capsule by mouth at bedtime; Therapy: 61RAD7526 to (Evaluate:11Jun2016); Last Rx:38Pwg2342 Ordered   5  Glimepiride 1 MG Oral Tablet; TAKE 1/2 TABLET DAILY WITH BREAKFAST  Requested   for: 96CNY7332; Last Rx:29Jun2016 Ordered   6  Hydrochlorothiazide 25 MG Oral Tablet; TAKE 1 TABLET DAILY; Therapy: 93PSU4226 to (Evaluate:19Mar2016)  Requested for: 88Udx8092; Last   Rx:84Zco4298 Ordered   7  Lisinopril 40 MG Oral Tablet; take 1 tablet by mouth daily; Therapy: 12Nwm1494 to (Evaluate:20Oct2016)  Requested for: 05Eog2342; Last   Rx:46Ono1508 Ordered   8  Magnesium Oxide 400 MG Oral Tablet; TAKE 1 TABLET TWICE DAILY; Therapy: 19Apr2016 to (Evaluate:15Ftf2256); Last Rx:19Apr2016 Ordered   9  Metoprolol Tartrate 100 MG Oral Tablet; TAKE 1 TABLET TWICE DAILY; Therapy: (Hossein Felipe) to Recorded   10  Metoprolol Tartrate 50 MG Oral Tablet; TAKE 1 TABLET DAILY AS DIRECTED; Therapy: 37AUW0730 to (Evaluate:21Jnq8072)  Requested for: 38GWO6118; Last    Rx:16Wbl8509 Ordered   11   Omeprazole 40 MG Oral Capsule Delayed Release; TAKE 1 CAPSULE TWICE DAILY; Therapy: 25UPT1490 to (Evaluate:16Jan2017)  Requested for: 74Dvo9334; Last    Rx:34Wcs3189 Ordered   12  OneTouch Delica Lancets 46T Miscellaneous; TEST 2 TIMES A DAY dx e11 9; Therapy: 83FTL3627 to (Evaluate:20Apr2017)  Requested for: 16Obu9261; Last    Rx:90Fms6482 Ordered   13  OneTouch Ultra 2 w/Device Kit; Test your sugar once in the morning and once in the    afternoon; Therapy: 68LWL6832 to (Last Rx:26Jan2015)  Requested for: 78DVO1571 Ordered   14  OneTouch Ultra Blue In [employer] Strip; TEST 2 TIMES A DAY dx e11 9; Therapy: 98Rhb0989 to (Evaluate:20Apr2017)  Requested for: 29Ujy5994; Last    Rx:58Qto1076 Ordered   15  Oyster Calcium 500 MG TABS; Take 1 tablet  daily; Therapy: 53BHP8525 to Recorded   16  Pravastatin Sodium 40 MG Oral Tablet; take 1 tablet by mouth every day; Therapy: 29PJT2914 to (Evaluate:12Oct2016)  Requested for: 64Cro5770; Last    Rx:56Ycc0603 Ordered   17  Sucralfate 1 GM Oral Tablet; TAKE 1 TABLET EVERY 12 HOURS DAILY; Therapy: 58XXV6905 to (Evaluate:50Cue2437)  Requested for: 53VZC8855; Last    Rx:47Tjq5979 Ordered    Allergies    1  Carvedilol TABS   2  Diflucan TABS   3  Flagyl CAPS    Discussion/Summary    1  HTN: 1, Hypertension: At goal for Vascular recommendations SBP of 140-160 per home log  This patient has anxiety at first when brought into the office  Her repeat BP was 136/62  She is to continue Lopressor to 100mg AM and 50mg PM, lisinopril 40mg daily  HCTZ 25mg  At this time with DC amlodipine  Advised the patient the importance of seeing the nurse in 1 week for another BP Check  Please wait for the patient to settle for 5 mins before taking BP           Future Appointments    Date/Time Provider Specialty Site   10/20/2016 10:00 AM Jere Michael MD Gastroenterology [facility] [place] OUTPATIENT   11/17/2016 01:45 PM Vanita Paul MD Vascular Surgery [facility] VASCULAR [facility]   08/05/2016 03:40 PM CAMILLA Frazier  [address] [facility]   09/23/2016 03:00 PM CAMILLA Frazier  [address] PRACTICE   08/09/2016 03:20 PM CAMILLA Patton   Cardiology [facility]     Signatures   Electronically signed by : CAMILLA Rodriguez ; Jul 27 2016  8:03PM EST                       (Author)    Electronically signed by : Karsten Sesay DO; Aug  3 2016  9:47PM EST                       (Author)

## 2018-01-22 VITALS
HEART RATE: 68 BPM | BODY MASS INDEX: 25.76 KG/M2 | WEIGHT: 140 LBS | HEIGHT: 62 IN | SYSTOLIC BLOOD PRESSURE: 122 MMHG | DIASTOLIC BLOOD PRESSURE: 58 MMHG

## 2018-01-22 VITALS
DIASTOLIC BLOOD PRESSURE: 60 MMHG | TEMPERATURE: 96.7 F | RESPIRATION RATE: 16 BRPM | SYSTOLIC BLOOD PRESSURE: 140 MMHG | BODY MASS INDEX: 25.33 KG/M2 | HEART RATE: 80 BPM | WEIGHT: 138.5 LBS

## 2018-01-22 VITALS
BODY MASS INDEX: 25.58 KG/M2 | SYSTOLIC BLOOD PRESSURE: 140 MMHG | TEMPERATURE: 97.7 F | HEART RATE: 76 BPM | DIASTOLIC BLOOD PRESSURE: 50 MMHG | WEIGHT: 139 LBS | RESPIRATION RATE: 16 BRPM | HEIGHT: 62 IN

## 2018-01-22 VITALS
HEART RATE: 62 BPM | BODY MASS INDEX: 25.76 KG/M2 | WEIGHT: 140 LBS | SYSTOLIC BLOOD PRESSURE: 124 MMHG | HEIGHT: 62 IN | DIASTOLIC BLOOD PRESSURE: 62 MMHG

## 2018-01-23 VITALS
WEIGHT: 139.38 LBS | HEIGHT: 62 IN | SYSTOLIC BLOOD PRESSURE: 158 MMHG | DIASTOLIC BLOOD PRESSURE: 62 MMHG | BODY MASS INDEX: 25.65 KG/M2

## 2018-01-23 NOTE — CONSULTS
I had the pleasure of evaluating your patient, Mitali Vo  My full evaluation follows:      Reason For Visit  Patient is here for follow-up of kidney function and blood pressure management        History of Present Illness  She has been feeling well  Denies any recent gout flare  She has been ambulating without any chest pressure shortness of breath  Her blood pressures at home have been approximately 545-458 systolic for the top number but her  notices that her bottom number is usually in the 62s with sometimes can be in the low 50s  He usually takes her blood pressure in the morning and the evening     Review of Systems    Constitutional: no anorexia and no fatigue  Gastrointestinal: no nausea and no diarrhea  Cardiovascular: no chest pain, no palpitations and no lower extremity edema  Neurological: no headache  Genitourinary: no dysuria, no hematuria, no change in urinary frequency, no incomplete emptying of bladder and no foamy urine  Current Meds   1  Adult Aspirin EC Low Strength 81 MG Oral Tablet Delayed Release; TAKE 1 TABLET   DAILY; Therapy: (Recorded:28Jun2016) to Recorded   2  Calcitriol 0 25 MCG Oral Capsule; TAKE 1 CAPSULE BY MOUTH EVERY FRIDAY; Therapy: 48NND8202 to Deepti Mays)  Requested for: 61SZS0029; Last   Rx:06Jan2018 Ordered   3  Cyclobenzaprine HCl - 5 MG Oral Tablet; TAKE 1 TABLET 3 TIMES DAILY AS NEEDED; Therapy: 10Bdq7951 to (Evaluate:42Aeo2634)  Requested for: 33Wzl8867; Last   Rx:54Xsm5092 Ordered   4  Fluticasone Propionate 50 MCG/ACT Nasal Suspension; USE 2 SPRAYS IN EACH   NOSTRIL ONCE DAILY; Therapy: 89Xdo7922 to (Evaluate:48Hxv5804)  Requested for: 77Pou1073; Last   Rx:71Ysu2186 Ordered   5  Gabapentin 100 MG Oral Capsule; TAKE 1 CAPSULE Bedtime; Therapy: (Evonne Mcdonald) to Recorded   6  Glimepiride 1 MG Oral Tablet; take 1/2 tablet by mouth daily with BREAKFAST;    Therapy: 37Ujy0508 to (Evaluate:13Mar2018)  Requested for: 17XUV3041; Last   Rx:49Vvd1817 Ordered   7  Magnesium Oxide 400 MG Oral Tablet; TAKE 1 TABLET TWICE DAILY; Therapy: 97Dmo6073 to (Evaluate:67Ohi0946); Last Rx:48Giw5317 Ordered   8  Metoprolol Tartrate 50 MG Oral Tablet; take 1 tablet by mouth twice daily; Therapy: 43GJX8819 to (Evaluate:72Hmd2254)  Requested for: 08NAQ7271; Last   Rx:09Jyr3017 Ordered   9  NIFEdipine ER Osmotic Release 30 MG Oral Tablet Extended Release 24 Hour; Take 1   tablet daily; Therapy: 77Oqs9310 to (Evaluate:14Lqd1132)  Requested for: 94Qnj8842; Last   Rx:01Iuv0896 Ordered   10  OneTouch FinePoint Lancets Miscellaneous; BID USE AS DIRECTED DX e11 9; Therapy: 60XWH6968 to (Evaluate:20Mar2018)  Requested for: 99Kpo1183; Last    Rx:52Jqq3918 Ordered   11  OneTouch Ultra 2 w/Device Kit; Test your sugar once in the morning and once in the    afternoon; Therapy: 43XTN2279 to (Last Rx:26Jan2015)  Requested for: 17RWL9090 Ordered   12  OneTouch Ultra Blue In [employer]; test twice daily; Therapy: 86Tup0711 to (Evaluate:12Apr2018)  Requested for: 56KRI7445; Last    Rx:12Jan2018 Ordered   13  Oyster Calcium 500 MG TABS; Take 1 tablet  daily; Therapy: 11QIQ7367 to Recorded   14  Pantoprazole Sodium 40 MG Oral Tablet Delayed Release; TAKE 1 TABLET BY MOUTH    TWICE DAILY 30 MINUTES BEFORE BREAKFAST AND DINNER; Therapy: 97LGS3645 to (Evaluate:85Dsc5352)  Requested for: 66WII1985; Last    Rx:30Tbu9756 Ordered   15  Pravastatin Sodium 40 MG Oral Tablet; take 1 tablet by mouth on monday and thrusday    only while on colcrys  once off of colcrys resume taking medication daily; Therapy: 90UZK1727 to (Trang Rodriguez)  Requested for: 04WXX2637; Last    Rx:45Xem2432 Ordered   16  Spironolactone 25 MG Oral Tablet; TAKE 1 TABLET BY MOUTH DAILY AT NIGHT BEFORE    BEDTIME; Therapy: 46YGF6899 to (PRNEXXZW:76ILA7258)  Requested for: 25Oct2017; Last    GL:04VWK2748 Ordered   17   Tylenol 325 MG Oral Capsule; TAKE 2 CAPSULE 3 times daily PRN muscle pain; Therapy: 48TUC3590 to (Evaluate:07Gwq4127)  Requested for: 58Sce8073; Last    Rx:98Mgl9566 Ordered    Allergies    1  Clonidine and derivs   2  Flagyl CAPS   3  Carvedilol TABS   4  Colchicine TABS   5  Iodinated Contrast Media   6  Diflucan TABS    7  Latex    Vitals   Recorded: [id number] 02:53PM Recorded: 61RWC1625 38:48IR   Systolic 076, RUE, Standing 156, RUE, Sitting    Diastolic 62, RUE, Standing 58, RUE, Sitting    BP Comments Patient is asymptomatic with standing approximately 90 seconds     Height   5 ft 2 in   Weight   139 lb 6 oz   BMI Calculated   25 49   BSA Calculated   1 64     Physical Exam    Constitutional: General appearance: No acute distress, well appearing and well nourished  Eyes: Anicteric sclerae  JVD:  No JVD present  Pulmonary:  Auscultation of lungs: Clear to auscultation  Cardiovascular: Auscultation of heart: Normal rate and rhythm, normal S1 and S2, without murmurs  Abdomen: Non-tender, no masses  Rash: No rash present  Neurologic: Non Focal         Results/Data  US KIDNEY AND BLADDER [id number] 02:01PM [place]  Order Number: [id number]    - Patient Instructions: To schedule this appointment, please contact Central Scheduling at [phone]  Test Name Result Flag Reference   [facility] BLADDER (Report)     This is a summary report  The complete report is available in the patient's medical record  If you cannot access the medical record, please contact the sending organization for a detailed fax or copy  RENAL ULTRASOUND     INDICATION: N28 1: Cyst of kidney, acquired  History taken directly from the electronic ordering system  COMPARISON: 10/11/2016; 9/5/2013     TECHNIQUE:  Ultrasound of the retroperitoneum was performed with a curvilinear transducer utilizing volumetric sweeps and still imaging techniques  FINDINGS:     KIDNEYS:   Symmetric and normal size  Right kidney: 8 8 cm     Normal echogenicity and contour  No suspicious masses detected  Within the midpole right kidney there is a 0 8 cm simple cyst  A tiny cortical cyst in the lower pole of the right kidney noted measuring 4 mm  No hydronephrosis  No shadowing calculi  No perinephric fluid collections  Left kidney: 8 4 cm  Normal echogenicity and contour  No suspicious masses detected  There is a 3 4 x 2 x 1 9 cm lobulated cystic lesion within internal septations, slightly larger than on the prior study  No hydronephrosis  No shadowing calculi  No perinephric fluid collections  URETERS:   Nonvisualized  BLADDER:    Normally distended  No focal thickening or mass lesions  Bilateral ureteral jets detected  IMPRESSION:       1  Slight interval enlargement of a septated cystic lesion lower pole left kidney  Six-month follow-up ultrasound could be obtained  2  No hydronephrosis noted        Workstation performed: [id number]     Signed by: Bran Omalley MD   1/11/18       Assessment    1  CKD (chronic kidney disease), stage III (585 3) (N18 3)   2  Accelerated essential hypertension (401 0) (I10)   3  Proteinuria (791 0) (R80 9)   4  Secondary hyperparathyroidism (588 81) (N25 81)   5  Vitamin D deficiency (268 9) (E55 9)    Plan  CKD (chronic kidney disease), stage III    · (1) CBC/PLT/DIFF; Status:Active; Requested for:01May2018; Perform:[facility]; FELICITA:97VFV8482;ETOONSP; For:CKD (chronic kidney disease), stage III; Ordered By:Kenneth Watson;   · (1) COMPREHENSIVE METABOLIC PANEL; Status:Active; Requested for:01May2018; Perform:[facility]; NOL:42XWZ9014;UHVXHYR; For:CKD (chronic kidney disease), stage III; Ordered By:Kenneth Watson;   · (1) MAGNESIUM; Status:Active; Requested for:01May2018; Perform:[facility]; CLX:10AQC1536;GPDHABS; For:CKD (chronic kidney disease), stage III; Ordered By:Kenneth Watson;   · (1) PHOSPHORUS; Status:Active;  Requested for:01May2018; Perform:[facility]; SWR:55BNW9942;LRLIWYG; For:CKD (chronic kidney disease), stage III; Ordered By:Kenneth Watson;   · (1) PTH N-TERMINAL (INTACT); Status:Active; Requested for:01May2018; Perform:[facility]; VIOLA:95ILI9254;ACIUKRT; For:CKD (chronic kidney disease), stage III; Ordered By:Kenneth Watson;   · (1) URINALYSIS WITH MICROSCOPIC; Status:Active; Requested for:01May2018; Perform:[facility]; MBC:79KAZ6852;SOQNZCZ; For:CKD (chronic kidney disease), stage III; Ordered By:Kenneth Watson;   · (1) URINE PROTEIN CREATININE RATIO; Status:Active; Requested for:01May2018; Perform:[facility]; AHS:76QYH8410;PPGLAFG; For:CKD (chronic kidney disease), stage III; Ordered By:Kenneth Watson;   · Follow-up visit in 4 Months Evaluation and Treatment  Follow-up  Status: Hold For -  Scheduling  Requested for: 91NHQ8431   Ordered; For: CKD (chronic kidney disease), stage III; Ordered By: China Bauer Performed:  Due: 99VGU2474  Secondary hyperparathyroidism    · (1) COMPREHENSIVE METABOLIC PANEL; Status:Active; Requested for:19Feb2018; Perform:[facility]; FOF:58GGP4877;DYDSSXV; For:Secondary hyperparathyroidism; Ordered By:Kenneth Watson;   · (1) PHOSPHORUS; Status:Active; Requested for:19Feb2018; Perform:[facility]; VNF:59LBP1364;XGSMNWE; For:Secondary hyperparathyroidism; Ordered By:Kenneth Watson;   · (1) PTH N-TERMINAL (INTACT); Status:Active; Requested for:19Feb2018; Perform:[facility]; SYY:84YYI9288;OKIMBIT; For:Secondary hyperparathyroidism; Ordered By:Kenneth Watson;  Secondary hyperparathyroidism, renal    · Calcitriol 0 25 MCG Oral Capsule; please take once every  tuesday andfriday   Rx By: China Bauer; Dispense: 30 Days ; #:10 Capsule; Refill: 6; For: Secondary hyperparathyroidism, renal; CONNIE = N; Sent To: [facility] [address] [id number]    Discussion/Summary  1    Stage 3 chronic kidney disease:  Baseline creatinine has been 1 3-1 5 and a creatinine is stable at this range 1 34  2  Accelerated essential hypertension:  Her blood pressure at home has been 059-685 systolic  When it had been tried a few months ago to increase her Procardia 60 mg, she had significant dizziness and relative hypotension to her baseline  Her blood pressure at that time I believe was 857-568 systolic  I thing she tolerates being at this blood pressure 140 to 150 will my other concern is that she has lower diastolic numbers and I would like to keep her diastolic blood pressure at least above 60  I did not make any changes in her regimen at this time  She is asymptomatic and feeling well  3   Secondary hyperparathyroidism:  Her last PTH was 55 and his most recent is 242  Her phosphorus levels have been stable  I asked her to increase her Rocaltrol to Tuesday and Friday  She states she has been taking it once a week  Will check follow-up phosphorus calcium and intact PTH levels in approximately 1 month  If all is stable within see her in 4 months here in follow-up    4  Proteinuria:  Tubular range proteinuria recheck protein creatinine ratio and urinalysis before next visit    5  Vitamin-D deficiency:  Last vitamin-D levels of 35 recheck before next visit   The patient, patient's family was counseled regarding diagnostic results, instructions for management  The patient has the current Goals: Maintain steady blood pressure and kidney function  The patent has the current Barriers: Patient is chronic kidney disease and is on medications for blood pressure  One of the challenges is remained the diastolic blood pressure getting too low  She is also probably is 2 higher blood pressure IE is systolic of 562-210  I believe she becomes symptomatic when her systolic blood pressures in the 120s to low 130     Patient agrees and allows to involve family/caregiver in development of care plan: Patient's  also serves as a   Possible side effects of new medications were reviewed with the patient/guardian today  The treatment plan was reviewed with the patient/guardian  The patient/guardian understands and agrees with the treatment plan      Thank you very much for allowing me to participate in the care of this patient  If you have any questions, please do not hesitate to contact me        Future Appointments    Signatures   Electronically signed by : Norberto Dash DO; Ayush 15 2018  2:58PM EST                       (Author)

## 2018-01-24 VITALS
HEART RATE: 72 BPM | WEIGHT: 138.38 LBS | TEMPERATURE: 96.3 F | HEIGHT: 62 IN | SYSTOLIC BLOOD PRESSURE: 150 MMHG | BODY MASS INDEX: 25.47 KG/M2 | DIASTOLIC BLOOD PRESSURE: 60 MMHG | RESPIRATION RATE: 16 BRPM

## 2018-01-25 ENCOUNTER — OFFICE VISIT (OUTPATIENT)
Dept: FAMILY MEDICINE CLINIC | Facility: CLINIC | Age: 83
End: 2018-01-25
Payer: COMMERCIAL

## 2018-01-25 VITALS — WEIGHT: 140 LBS | BODY MASS INDEX: 25.76 KG/M2 | HEIGHT: 62 IN

## 2018-01-25 DIAGNOSIS — M62.838 NECK MUSCLE SPASM: Primary | ICD-10-CM

## 2018-01-25 DIAGNOSIS — M62.830 BACK MUSCLE SPASM: ICD-10-CM

## 2018-01-25 PROCEDURE — 99213 OFFICE O/P EST LOW 20 MIN: CPT | Performed by: FAMILY MEDICINE

## 2018-01-25 RX ORDER — CYCLOBENZAPRINE HCL 5 MG
1 TABLET ORAL 3 TIMES DAILY PRN
COMMUNITY
Start: 2017-12-20 | End: 2018-04-18 | Stop reason: ALTCHOICE

## 2018-01-25 RX ORDER — CYCLOSPORINE 0.5 MG/ML
EMULSION OPHTHALMIC
Refills: 3 | Status: ON HOLD | COMMUNITY
Start: 2017-10-20 | End: 2019-07-24 | Stop reason: ALTCHOICE

## 2018-01-25 RX ORDER — ASPIRIN 81 MG/1
1 TABLET, COATED ORAL DAILY
COMMUNITY

## 2018-01-25 RX ORDER — FLUTICASONE PROPIONATE 50 MCG
2 SPRAY, SUSPENSION (ML) NASAL DAILY
COMMUNITY
Start: 2015-04-17 | End: 2018-01-25 | Stop reason: SDUPTHER

## 2018-01-25 RX ORDER — CALCITRIOL 0.25 UG/1
CAPSULE, LIQUID FILLED ORAL
COMMUNITY
Start: 2016-11-17 | End: 2018-01-25 | Stop reason: SDUPTHER

## 2018-01-25 RX ORDER — CALCIUM CARBONATE 500(1250)
1 TABLET ORAL DAILY
COMMUNITY
Start: 2014-10-03 | End: 2018-08-23

## 2018-01-25 RX ORDER — GABAPENTIN 100 MG/1
1 CAPSULE ORAL
COMMUNITY
End: 2018-01-25 | Stop reason: SDUPTHER

## 2018-01-25 RX ORDER — NIFEDIPINE 30 MG/1
1 TABLET, EXTENDED RELEASE ORAL DAILY
COMMUNITY
Start: 2016-09-07 | End: 2018-01-25 | Stop reason: SDUPTHER

## 2018-01-25 RX ORDER — ALLOPURINOL 100 MG/1
TABLET ORAL
Refills: 1 | COMMUNITY
Start: 2017-10-30 | End: 2019-03-14 | Stop reason: ALTCHOICE

## 2018-01-25 RX ORDER — GLIMEPIRIDE 1 MG/1
TABLET ORAL
COMMUNITY
Start: 2017-09-14 | End: 2018-03-13 | Stop reason: SDUPTHER

## 2018-01-25 RX ORDER — OMEPRAZOLE 40 MG/1
CAPSULE, DELAYED RELEASE ORAL
Refills: 3 | COMMUNITY
Start: 2017-11-28 | End: 2018-03-16

## 2018-01-25 RX ORDER — SPIRONOLACTONE 25 MG/1
TABLET ORAL
COMMUNITY
Start: 2016-09-22 | End: 2018-01-25 | Stop reason: SDUPTHER

## 2018-01-25 RX ORDER — LANCETS
EACH MISCELLANEOUS
Refills: 0 | COMMUNITY
Start: 2017-12-20 | End: 2018-02-11 | Stop reason: SDUPTHER

## 2018-01-25 RX ORDER — METOPROLOL TARTRATE 50 MG/1
1 TABLET, FILM COATED ORAL 2 TIMES DAILY
COMMUNITY
Start: 2015-01-15 | End: 2018-01-25 | Stop reason: SDUPTHER

## 2018-01-25 RX ORDER — PRAVASTATIN SODIUM 40 MG
TABLET ORAL
COMMUNITY
Start: 2015-06-15 | End: 2018-01-25 | Stop reason: SDUPTHER

## 2018-01-26 NOTE — PROGRESS NOTES
Assessment/Plan     1  Neck muscle spasm/back muscle spasm, chronic and moderately severe  - Somatic dysfunction to cranial, cervical, thoracic, lumbar regions  -OMT to these regions including SCS, MFR and ME  -Pt tolerated OMT well, mm tension and restricted ROM greatly improved  -recommended conservative management including heat, stretching, tylenol as needed for soreness over the next 2 days after treatment    Recommended she return in next 2 weeks for repeat OMT    Diagnoses and all orders for this visit:    Neck muscle spasm    Back muscle spasm    Other orders  -     aspirin (ADULT ASPIRIN EC LOW STRENGTH) 81 mg EC tablet; Take 1 tablet by mouth daily  -     allopurinol (ZYLOPRIM) 100 mg tablet; TK 1 T PO D  -     cyclobenzaprine (FLEXERIL) 5 mg tablet; Take 1 tablet by mouth 3 (three) times a day as needed  -     RESTASIS 0 05 % ophthalmic emulsion; INSTILL 1 GTT INTO OU BID  -     ONE TOUCH ULTRA TEST test strip; TEST BID  -     Lancets (ONETOUCH ULTRASOFT) lancets; U BID UTD  -     omeprazole (PriLOSEC) 40 MG capsule; TK 1 C PO QD  -     Oyster Shell (OYSTER CALCIUM) 500 MG TABS; Take 1 tablet by mouth daily  -     Discontinue: calcitriol (ROCALTROL) 0 25 mcg capsule; Take by mouth  -     Discontinue: fluticasone (FLONASE) 50 mcg/act nasal spray; 2 sprays into each nostril daily  -     Discontinue: gabapentin (NEURONTIN) 100 mg capsule; Take 1 capsule by mouth  -     glimepiride (AMARYL) 1 mg tablet; Take by mouth  -     Discontinue: metoprolol tartrate (LOPRESSOR) 50 mg tablet; Take 1 tablet by mouth 2 (two) times a day  -     Discontinue: pravastatin (PRAVACHOL) 40 mg tablet; Take by mouth  -     Discontinue: NIFEdipine (PROCARDIA XL) 30 mg 24 hr tablet; Take 1 tablet by mouth daily  -     Discontinue: spironolactone (ALDACTONE) 25 mg tablet; Take by mouth  -     Acetaminophen (TYLENOL) 325 MG CAPS;  Take by mouth every 8 (eight) hours         Subjective     Senlizzy Auguste presents for chronic left neck pain and right upper-to-mid back pain, that also radiates to her low back  This has been present for several years and she associates it with arthritis  She denies any aggravating or relieving factors, and says the pain is just constant  She does not take any medications for it  No numbness or tingling of her arms or legs, no weakness  She has previously had physical therapy for this pain years ago, and claims it never helped  The following portions of the patient's history were reviewed and updated as appropriate:   She  has a past medical history of Arthritis; Back pain; Benign essential hypertension; Carotid artery stenosis; CKD (chronic kidney disease) stage 2, GFR 60-89 ml/min; Constipation, chronic; Diabetes mellitus ([facility] [address] ); DMII (diabetes mellitus, type 2) ([facility] [address] ); Epiglottitis; Gastritis; GERD (gastroesophageal reflux disease); Gout; H/O blood clots; History of stenosis of renal artery; Hypercholesteremia; Hypercholesterolemia; Hyperlipidemia; Hyperparathyroidism ([facility] [address] ); Hypertension; Iliac artery stenosis, bilateral ([facility] [address] ); Presence of pessary; Renal artery stenosis ([facility] [address] ); Renal disorder; Renovascular hypertension; Segmental and somatic dysfunction of cervical region; Segmental and somatic dysfunction of lumbar region; Segmental and somatic dysfunction of pelvic region; Segmental and somatic dysfunction of thoracic region; Somatic dysfunction of abdominal region; Somatic dysfunction of head region; Somatic dysfunction of lower extremities; Somatic dysfunction of rib region; Somatic dysfunction of thoracic region; Somatic dysfunction of upper extremities; and Teeth missing  She  does not have any pertinent problems on file  She  has a past surgical history that includes Angioplasty / stenting iliac; Cholecystectomy; Tubal ligation; Eye surgery; Vascular surgery; Throat surgery; pr laryngoscopy,dirct,op scop,exc tumr (N/A, 6/10/2016); Cataract extraction (Bilateral);  Colonoscopy; Knee arthroscopy (Right); Breast surgery (Left); pr mastectomy, partial (Left, 9/6/2017); Breast lumpectomy (Left, 09/06/2017); Knee surgery (Right); and Iliac vein angioplasty / stenting (Right)  Her family history includes Diabetes in her other; Gout in her family; Heart disease in her brother, family, and maternal grandmother; Hypertension in her other; Lupus in her family; No Known Problems in her father and mother; Rheum arthritis in her family; Stroke in her family, maternal aunt, and maternal uncle  She  reports that she quit smoking about 14 years ago  She has a 64 00 pack-year smoking history  She has never used smokeless tobacco  She reports that she does not drink alcohol or use drugs  Current Outpatient Prescriptions   Medication Sig Dispense Refill    Acetaminophen (TYLENOL) 325 MG CAPS Take by mouth every 8 (eight) hours      allopurinol (ZYLOPRIM) 100 mg tablet TK 1 T PO D  1    aspirin (ADULT ASPIRIN EC LOW STRENGTH) 81 mg EC tablet Take 1 tablet by mouth daily      calcitriol (ROCALTROL) 0 25 mcg capsule Take 0 25 mcg by mouth once a week Pt takes every friday       calcium carbonate-vitamin D (OSCAL-D) 500 mg-200 units per tablet Take 1 tablet by mouth daily with breakfast      cyclobenzaprine (FLEXERIL) 5 mg tablet Take 1 tablet by mouth 3 (three) times a day as needed      fluticasone (FLONASE) 50 mcg/act nasal spray 1 spray into each nostril daily        gabapentin (NEURONTIN) 100 mg capsule Take 100 mg by mouth daily at bedtime      glimepiride (AMARYL) 1 mg tablet Take by mouth      Lancets (ONETOUCH ULTRASOFT) lancets U BID UTD  0    magnesium oxide (MAG-OX) 400 mg Take 400 mg by mouth 2 (two) times a day      metoprolol tartrate (LOPRESSOR) 50 mg tablet Take 25 mg by mouth every 12 (twelve) hours      NIFEdipine (NIFEDICAL XL) 30 mg 24 hr tablet Take 30 mg by mouth daily      omeprazole (PriLOSEC) 40 MG capsule TK 1 C PO QD  3    ONE TOUCH ULTRA TEST test strip TEST BID  0    [employer] (OYSTER CALCIUM) 500 MG TABS Take 1 tablet by mouth daily      pantoprazole (PROTONIX) 40 mg tablet Take 40 mg by mouth 2 (two) times a day      RESTASIS 0 05 % ophthalmic emulsion INSTILL 1 GTT INTO OU BID  3    spironolactone (ALDACTONE) 25 mg tablet Take 25 mg by mouth daily at bedtime      pravastatin (PRAVACHOL) 40 mg tablet Take by mouth daily         No current facility-administered medications for this visit  Current Outpatient Prescriptions on File Prior to Visit   Medication Sig    calcitriol (ROCALTROL) 0 25 mcg capsule Take 0 25 mcg by mouth once a week Pt takes every friday     calcium carbonate-vitamin D (OSCAL-D) 500 mg-200 units per tablet Take 1 tablet by mouth daily with breakfast    fluticasone (FLONASE) 50 mcg/act nasal spray 1 spray into each nostril daily   gabapentin (NEURONTIN) 100 mg capsule Take 100 mg by mouth daily at bedtime    magnesium oxide (MAG-OX) 400 mg Take 400 mg by mouth 2 (two) times a day    metoprolol tartrate (LOPRESSOR) 50 mg tablet Take 25 mg by mouth every 12 (twelve) hours    NIFEdipine (NIFEDICAL XL) 30 mg 24 hr tablet Take 30 mg by mouth daily    pantoprazole (PROTONIX) 40 mg tablet Take 40 mg by mouth 2 (two) times a day    spironolactone (ALDACTONE) 25 mg tablet Take 25 mg by mouth daily at bedtime    pravastatin (PRAVACHOL) 40 mg tablet Take by mouth daily       No current facility-administered medications on file prior to visit  She is allergic to clonidine derivatives; diflucan [fluconazole]; flagyl [metronidazole]; latex; other; carvedilol; and lipitor [atorvastatin]       Review of Systems   Constitutional: Negative for chills, fatigue and fever  Respiratory: Negative for cough and shortness of breath  Cardiovascular: Negative for chest pain and palpitations  Gastrointestinal: Negative for abdominal pain, constipation, diarrhea, nausea and vomiting     Genitourinary:        No urinary incontinence   Musculoskeletal: Positive for arthralgias, back pain and myalgias  Skin: Negative for rash  Neurological: Negative for weakness and numbness  Objective     Vitals:Height 5' 2 1" (1 577 m), weight 63 5 kg (140 lb), not currently breastfeeding  Physical Exam:  Physical Exam   Constitutional: She is oriented to person, place, and time  She appears well-developed and well-nourished  No distress  HENT:   Head: Normocephalic and atraumatic  Eyes: Conjunctivae and EOM are normal  Right eye exhibits no discharge  Left eye exhibits no discharge  No scleral icterus  Neck: Normal range of motion  Neck supple  No tracheal deviation present  Cardiovascular: Intact distal pulses  Pulmonary/Chest: Effort normal  No respiratory distress  Musculoskeletal: She exhibits no edema or deformity  Neurological: She is alert and oriented to person, place, and time  She exhibits normal muscle tone  Skin: Skin is warm and dry  No rash noted  She is not diaphoretic  No erythema  Psychiatric: She has a normal mood and affect  Her behavior is normal  Judgment and thought content normal    Vitals reviewed      OMT Exam  Head:   Somatic Dysfunction:  Tissue Texture Changes and Asymmetry   Severity :  1  Osteopathic Findings: hypertonicity of suboccipital region  Treatment Method: MFR  Response: improved  Cervical:   Somatic Dysfunction:  Tissue Texture Changes, Asymmetry , Restriction and Tenderness  Severity :  2  Osteopathic Findings: restriction in ROM with rotation to L and SB to R, cervical paraspinal mm hypertonicity, R>L, upper trapezius mm hypertonicity, C3FRSL  Treatment Method: ME and MFR  Response: improved  Thoracic T1-4:   Somatic Dysfunction:  Tissue Texture Changes, Asymmetry , Restriction and Tenderness  Severity :  2  Osteopathic Findings: extreme hypertonicity of R thoracic paraspinal mm, T3FRSR  Treatment Method: ME and MFR  Response: improved  Thoracic T5-9:   Somatic Dysfunction:  Tissue Texture Changes, Asymmetry , Restriction and Tenderness  Severity :  2  Osteopathic Findings: extreme hypertonicity of right paraspinal mm  Treatment Method: MFR  Response: improved  Thoracic T10-12:  Somatic Dysfunction:  Tissue Texture Changes, Asymmetry , Restriction and Tenderness  Severity :  1  Osteopathic Findings: thoracic paraspinal mm hypertonicity  Treatment Method: MFR  Response: improved  Lumbar:  Somatic Dysfunction:  Tissue Texture Changes and Asymmetry   Severity :  1  Osteopathic Findings: lumbar hypertonicity of paraspinal mm  Treatment Method: MFR  Response: improved    Lab Results:

## 2018-01-29 NOTE — PROGRESS NOTES
Attending Note:  I discussed the case with the resident and reviewed the resident's note  I was present in the clinic and supervised the resident, I agree with the resident management plan as it was presented to me  I interviewed and examined the patient no  I agree with the resident's documentation

## 2018-02-05 ENCOUNTER — OFFICE VISIT (OUTPATIENT)
Dept: CARDIOLOGY CLINIC | Facility: CLINIC | Age: 83
End: 2018-02-05
Payer: COMMERCIAL

## 2018-02-05 VITALS
HEART RATE: 66 BPM | SYSTOLIC BLOOD PRESSURE: 140 MMHG | BODY MASS INDEX: 25.58 KG/M2 | DIASTOLIC BLOOD PRESSURE: 66 MMHG | HEIGHT: 62 IN | WEIGHT: 139 LBS

## 2018-02-05 DIAGNOSIS — I10 ESSENTIAL HYPERTENSION: Primary | ICD-10-CM

## 2018-02-05 DIAGNOSIS — E78.00 HYPERCHOLESTEREMIA: ICD-10-CM

## 2018-02-05 PROCEDURE — 99213 OFFICE O/P EST LOW 20 MIN: CPT | Performed by: INTERNAL MEDICINE

## 2018-02-05 NOTE — PROGRESS NOTES
Cardiology Follow Up    Christopher Rosen  1934  [address] [facility]  [address] [place] [address] [address]    1  Essential hypertension     2  Hypercholesteremia         Interval History:  Cardiology follow-up, overall doing well, admits to be very sedentary, compliant with low-sodium diet, blood pressures been well controlled  Compliant with low-cholesterol diet  On medium intensity statin therapy  LDL 61 last month  With an HDL 72  Excellent control      Patient Active Problem List   Diagnosis    Renal artery atherosclerosis, bilateral ([address] )    Vocal cord mass    Carotid artery stenosis    Iliac artery stenosis, bilateral (HCC)    Nausea    Chronic kidney disease, stage II (mild)    Hyperparathyroidism (HCC)    GERD (gastroesophageal reflux disease)    Arthritis    Diabetes mellitus ([facility] )    Hypercholesteremia    Hyperlipidemia    Hypertension    EKG abnormalities    Anemia     Past Medical History:   Diagnosis Date    Arthritis     Back pain     Benign essential hypertension     LAST ASSESSED: [id number]    Carotid artery stenosis     CKD (chronic kidney disease) stage 2, GFR 60-89 ml/min     Constipation, chronic     "drinks prune juice"    Diabetes mellitus ([facility] [address] [facility] )     DMII (diabetes mellitus, type 2) ([facility]     Epiglottitis     RESOLVED: [id number]    Gastritis     GERD (gastroesophageal reflux disease)     Gout     H/O blood clots     left lower leg,,,11 yrs ago    History of stenosis of renal artery     RESOLVED: [id number]    Hypercholesteremia     Hypercholesterolemia     Hyperlipidemia     Hyperparathyroidism ([facility] [address] [facility] )     Hypertension     Iliac artery stenosis, bilateral (HCC)     Presence of pessary     Renal artery stenosis (HCC)     Renal disorder     Renovascular hypertension     RESOLVED: [id number]    Segmental and somatic dysfunction of cervical region     RESOLVED: 26CDD4921    Segmental and somatic dysfunction of lumbar region     RESOLVED: 23DQW0812    Segmental and somatic dysfunction of pelvic region     RESOLVED: [id number]    Segmental and somatic dysfunction of thoracic region     RESOLVED: 53IPW2337    Somatic dysfunction of abdominal region     RESOLVED: 92MHZ4123    Somatic dysfunction of head region     RESOLVED: 77VPW4586    Somatic dysfunction of lower extremities     RESOLVED: [id number]    Somatic dysfunction of rib region     RESOLVED: 46QNQ0167    Somatic dysfunction of thoracic region     RESOLVED: 27GGD8792    Somatic dysfunction of upper extremities     RESOLVED: 12LTA4144    Teeth missing      Social History     Social History    Marital status:      Spouse name: N/A    Number of children: N/A    Years of education: N/A     Occupational History    Not on file       Social History Main Topics    Smoking status: Former Smoker     Packs/day: 1 00     Years: 64 00     Quit date: 2004    Smokeless tobacco: Never Used      Comment: quit in 2003 1 ppd x 50 years    Alcohol use No    Drug use: No    Sexual activity: Not on file     Other Topics Concern    Not on file     Social History Narrative    Daily caffeine consumption, 1 serving a day       Family History   Problem Relation Age of Onset    Heart disease Brother     Heart disease Maternal Grandmother     Diabetes Other      of other type with arthropathy, without long term current use of insulin    Hypertension Other     No Known Problems Mother     Gout Family     Rheum arthritis Family     Lupus Family      systematic erythematosus    Heart disease Family     Stroke Family      syndrome    Stroke Maternal Uncle      syndrome    Stroke Maternal Aunt      syndrome    No Known Problems Father      Past Surgical History:   Procedure Laterality Date    ANGIOPLASTY / STENTING ILIAC      BREAST LUMPECTOMY Left 09/06/2017    LAST ASSESSED: [id number]    BREAST SURGERY Left biopsy    CATARACT EXTRACTION Bilateral     CHOLECYSTECTOMY      COLONOSCOPY      EYE SURGERY      ILIAC VEIN ANGIOPLASTY / STENTING Right     INITIAL STENOSIS: ONSET: [id number]    KNEE ARTHROSCOPY Right     KNEE SURGERY Right     ONSET: [id number]    MA LARYNGOSCOPY,DIRCT,OP SCOP,EXC TUMR N/A 6/10/2016    Procedure: MICRO DIRECT LARYNGOSCOPY WITH VOCAL FOLD MASS EXCISION ;  Surgeon: Vincent Rios MD;  [facility];  Service: ENT    MA MASTECTOMY, PARTIAL Left 9/6/2017    Procedure: LUMPECTOMY BREAST NEEDLE LOCALIZED WITH FAXITRON NEEDLE @ 0830;  Surgeon: Lia Long MD;  Location: [facility];  Service: General    THROAT SURGERY      lump removed    TUBAL LIGATION      VASCULAR SURGERY         Current Outpatient Prescriptions:     Acetaminophen (TYLENOL) 325 MG CAPS, Take by mouth every 8 (eight) hours, Disp: , Rfl:     allopurinol (ZYLOPRIM) 100 mg tablet, TK 1 T PO D, Disp: , Rfl: 1    aspirin (ADULT ASPIRIN EC LOW STRENGTH) 81 mg EC tablet, Take 1 tablet by mouth daily, Disp: , Rfl:     calcitriol (ROCALTROL) 0 25 mcg capsule, Take 0 25 mcg by mouth once a week Pt takes every friday , Disp: , Rfl:     calcium carbonate-vitamin D (OSCAL-D) 500 mg-200 units per tablet, Take 1 tablet by mouth daily with breakfast, Disp: , Rfl:     cyclobenzaprine (FLEXERIL) 5 mg tablet, Take 1 tablet by mouth 3 (three) times a day as needed, Disp: , Rfl:     fluticasone (FLONASE) 50 mcg/act nasal spray, 1 spray into each nostril daily  , Disp: , Rfl:     gabapentin (NEURONTIN) 100 mg capsule, Take 100 mg by mouth daily at bedtime, Disp: , Rfl:     glimepiride (AMARYL) 1 mg tablet, Take by mouth, Disp: , Rfl:     Lancets (ONETOUCH ULTRASOFT) lancets, U BID UTD, Disp: , Rfl: 0    magnesium oxide (MAG-OX) 400 mg, Take 400 mg by mouth 2 (two) times a day, Disp: , Rfl:     metoprolol tartrate (LOPRESSOR) 50 mg tablet, Take 25 mg by mouth every 12 (twelve) hours, Disp: , Rfl:     NIFEdipine (NIFEDICAL XL) 30 mg 24 hr tablet, Take 30 mg by mouth daily, Disp: , Rfl:     omeprazole (PriLOSEC) 40 MG capsule, TK 1 C PO QD, Disp: , Rfl: 3    ONE TOUCH ULTRA TEST test strip, TEST BID, Disp: , Rfl: 0    Oyster Shell (OYSTER CALCIUM) 500 MG TABS, Take 1 tablet by mouth daily, Disp: , Rfl:     pantoprazole (PROTONIX) 40 mg tablet, Take 40 mg by mouth 2 (two) times a day, Disp: , Rfl:     pravastatin (PRAVACHOL) 40 mg tablet, Take by mouth daily  , Disp: , Rfl:     RESTASIS 0 05 % ophthalmic emulsion, INSTILL 1 GTT INTO OU BID, Disp: , Rfl: 3    spironolactone (ALDACTONE) 25 mg tablet, Take 25 mg by mouth daily at bedtime, Disp: , Rfl:   Allergies   Allergen Reactions    Clonidine Derivatives Swelling    Diflucan [Fluconazole] Rash    Flagyl [Metronidazole] Anaphylaxis    Latex Rash    Other Palpitations     IV DYE    Carvedilol Hives     And legs swelled    Lipitor [Atorvastatin]      Legs swelled       Labs:  Appointment on 12/06/2017   Component Date Value    Oxalate, Ur 12/06/2017 7     Oxalate, 24H Ur 12/06/2017 12    Appointment on 12/01/2017   Component Date Value    Cholesterol 12/01/2017 153     Triglycerides 12/01/2017 101     HDL, Direct 12/01/2017 72*    LDL Calculated 12/01/2017 61     WBC 12/01/2017 6 85     RBC 12/01/2017 4 30     Hemoglobin 12/01/2017 12 2     Hematocrit 12/01/2017 38 7     MCV 12/01/2017 90     MCH 12/01/2017 28 4     MCHC 12/01/2017 31 5     RDW 12/01/2017 13 2     MPV 12/01/2017 11 5     Platelets 22/65/6197 301     nRBC 12/01/2017 0     Neutrophils Relative 12/01/2017 52     Lymphocytes Relative 12/01/2017 40     Monocytes Relative 12/01/2017 7     Eosinophils Relative 12/01/2017 1     Basophils Relative 12/01/2017 0     Neutrophils Absolute 12/01/2017 3 60     Lymphocytes Absolute 12/01/2017 2 73     Monocytes Absolute 12/01/2017 0 46     Eosinophils Absolute 12/01/2017 0 05     Basophils Absolute 12/01/2017 0 01     Uric Acid 12/01/2017 8 0*    Sodium 12/01/2017 140     Potassium 12/01/2017 4 4     Chloride 12/01/2017 105     CO2 12/01/2017 29     Anion Gap 12/01/2017 6     BUN 12/01/2017 37*    Creatinine 12/01/2017 1 34*    Glucose, Fasting 12/01/2017 114*    Calcium 12/01/2017 9 0     AST 12/01/2017 14     ALT 12/01/2017 18     Alkaline Phosphatase 12/01/2017 88     Total Protein 12/01/2017 8 3*    Albumin 12/01/2017 3 6     Total Bilirubin 12/01/2017 0 29     eGFR 12/01/2017 37     Magnesium 12/01/2017 1 9     PTH 12/01/2017 242 4*    Hemoglobin A1C 12/01/2017 6 2     EAG 12/01/2017 131    Transcribe Orders on 12/01/2017   Component Date Value    Color, UA 12/01/2017 Yellow     Clarity, UA 12/01/2017 Cloudy     Specific Gravity, UA 12/01/2017 1 018     pH, UA 12/01/2017 5 5     Leukocytes, UA 12/01/2017 Moderate*    Nitrite, UA 12/01/2017 Negative     Protein, UA 12/01/2017 100 (2+)*    Glucose, UA 12/01/2017 Negative     Ketones, UA 12/01/2017 Negative     Urobilinogen, UA 12/01/2017 1 0     Bilirubin, UA 12/01/2017 Negative     Blood, UA 12/01/2017 Negative     RBC, UA 12/01/2017 None Seen     WBC, UA 12/01/2017 10-20*    Epithelial Cells 12/01/2017 Moderate*    Bacteria, UA 12/01/2017 Occasional     Hyaline Casts, UA 12/01/2017 None Seen    Admission on 09/06/2017, Discharged on 09/06/2017   Component Date Value    POC Glucose 09/06/2017 117     POC Glucose 09/06/2017 129     Case Report 09/06/2017                      Value:Surgical Pathology Report                         Case: [id number]                                   Authorizing Provider:  Garrett Simon MD        Collected:           09/06/2017 1154              Ordering Location:     [facility]        Received:            09/06/2017 [address] [facility] [address]                                                     Pathologist:           Mortimer Adler, MD Specimens:   A) - Breast, Left, Short Superior Long Lateral Left Breast lumpectomy                               B) - Breast, Left, Posterior Lateral Margin Left Breast lumpectomy                         Final Diagnosis 09/06/2017                      Value: This result contains rich text formatting which cannot be displayed here   Note 09/06/2017                      Value: This result contains rich text formatting which cannot be displayed here   Additional Information 09/06/2017                      Value: This result contains rich text formatting which cannot be displayed here  [facility] Gross Description 09/06/2017                      Value: This result contains rich text formatting which cannot be displayed here      POC Glucose 09/06/2017 115    Hospital Outpatient Visit on 08/28/2017   Component Date Value    Ventricular Rate 08/28/2017 56     Atrial Rate 08/28/2017 56     [place] Interval 08/28/2017 174     QRSD Interval 08/28/2017 84     QT Interval 08/28/2017 396     QTC Interval 08/28/2017 382     P Axis 08/28/2017 59     QRS Axis 08/28/2017 8     T Wave [place] 08/28/2017 73    Appointment on 08/28/2017   Component Date Value    Sodium 08/28/2017 140     Potassium 08/28/2017 5 1     Chloride 08/28/2017 104     CO2 08/28/2017 30     Anion Gap 08/28/2017 6     BUN 08/28/2017 38*    Creatinine 08/28/2017 1 40*    Glucose 08/28/2017 134     Calcium 08/28/2017 9 3     eGFR 08/28/2017 35     PTT 08/28/2017 29     Protime 08/28/2017 12 5     INR 08/28/2017 0 93    Hospital Outpatient Visit on 08/07/2017   Component Date Value    Case Report 08/07/2017                      Value:Surgical Pathology Report                         Case: [id number]                                   Authorizing Provider:  Jodi Tamayo MD        Collected:           08/07/2017 1130              Ordering Location:     [facility] Received:            08/07/2017 [address]                                                                       Pathologist:           Beckie Gonzalez DO                                                            Specimens:   A) - Breast, Left, US BX LT BREAST 200 6 CMFN 12G YOHAN  1 PASS                                      B) - Breast, Left, US BX LT BREAST 600 3 CMFN 12G NEEDLE YOHAN  2 PASSES                    Final Diagnosis 08/07/2017                      Value: This result contains rich text formatting which cannot be displayed here   Additional Information 08/07/2017                      Value: This result contains rich text formatting which cannot be displayed here  [facility] Gross Description 08/07/2017                      Value: This result contains rich text formatting which cannot be displayed here   Clinical Information 08/07/2017                      Value:Fixed in formalin     Imaging: Us Kidney And Bladder    Result Date: 1/11/2018  Narrative: RENAL ULTRASOUND INDICATION: N28 1: Cyst of kidney, acquired  History taken directly from the electronic ordering system  COMPARISON: 10/11/2016; 9/5/2013 TECHNIQUE:   Ultrasound of the retroperitoneum was performed with a curvilinear transducer utilizing volumetric sweeps and still imaging techniques  FINDINGS: KIDNEYS: Symmetric and normal size  Right kidney:  8 8 cm  Normal echogenicity and contour  No suspicious masses detected  Within the midpole right kidney there is a 0 8 cm simple cyst   A tiny cortical cyst in the lower pole of the right kidney noted measuring 4 mm  No hydronephrosis  No shadowing calculi  No perinephric fluid collections  Left kidney:  8 4 cm  Normal echogenicity and contour  No suspicious masses detected  There is a 3 4 x 2 x 1 9 cm lobulated cystic lesion within internal septations, slightly larger than on the prior study  No hydronephrosis  No shadowing calculi  No perinephric fluid collections  URETERS: Nonvisualized   BLADDER: Normally distended  No focal thickening or mass lesions  Bilateral ureteral jets detected  Impression: 1  Slight interval enlargement of a septated cystic lesion lower pole left kidney  Six-month follow-up ultrasound could be obtained  2   No hydronephrosis noted Workstation performed: [id number]       Review of Systems:  Review of Systems   Constitutional: Negative for fatigue and unexpected weight change  Respiratory: Negative for shortness of breath  Cardiovascular: Negative for chest pain, palpitations and leg swelling  Gastrointestinal: Negative for abdominal pain  Neurological: Negative for dizziness, seizures, syncope, speech difficulty and headaches  Hematological: Does not bruise/bleed easily  Physical Exam:  Physical Exam   Constitutional: She is oriented to person, place, and time  She appears well-developed and well-nourished  No distress  Neck: No JVD present  Cardiovascular: Normal rate, regular rhythm, normal heart sounds and intact distal pulses  Exam reveals no gallop and no friction rub  No murmur heard  Pulmonary/Chest: Effort normal and breath sounds normal  No respiratory distress  She has no wheezes  She has no rales  She exhibits no tenderness  Neurological: She is alert and oriented to person, place, and time  Skin: Skin is warm and dry  She is not diaphoretic  Psychiatric: She has a normal mood and affect  Discussion/Summary:  Hypertension, well controlled clinical regimen  Questionable renovascular hypertension although an angiogram did not corroborate that  Continue clinical regimen  Lipids are well controlled  Stress test 2016 was negative for ischemia    An echocardiogram revealed normal left ventricular systolic function with moderate left ventricular hypertrophy and left atrial enlargement will reassess that with the another echocardiogram

## 2018-02-11 DIAGNOSIS — E11.9 TYPE 2 DIABETES MELLITUS WITHOUT COMPLICATION, UNSPECIFIED LONG TERM INSULIN USE STATUS: Primary | ICD-10-CM

## 2018-02-11 RX ORDER — LANCETS 33 GAUGE
EACH MISCELLANEOUS
Qty: 200 EACH | Refills: 0 | Status: SHIPPED | OUTPATIENT
Start: 2018-02-11 | End: 2018-03-17 | Stop reason: SDUPTHER

## 2018-02-13 DIAGNOSIS — E11.9 TYPE 2 DIABETES MELLITUS WITHOUT COMPLICATION, WITH LONG-TERM CURRENT USE OF INSULIN (HCC): Primary | ICD-10-CM

## 2018-02-13 DIAGNOSIS — Z79.4 TYPE 2 DIABETES MELLITUS WITHOUT COMPLICATION, WITH LONG-TERM CURRENT USE OF INSULIN (HCC): Primary | ICD-10-CM

## 2018-02-16 ENCOUNTER — HOSPITAL ENCOUNTER (OUTPATIENT)
Dept: NON INVASIVE DIAGNOSTICS | Facility: CLINIC | Age: 83
Discharge: HOME | End: 2018-02-16
Payer: COMMERCIAL

## 2018-02-16 DIAGNOSIS — I10 ESSENTIAL HYPERTENSION: ICD-10-CM

## 2018-02-16 PROCEDURE — 93306 TTE W/DOPPLER COMPLETE: CPT | Performed by: INTERNAL MEDICINE

## 2018-02-16 PROCEDURE — 93306 TTE W/DOPPLER COMPLETE: CPT

## 2018-02-19 DIAGNOSIS — N25.81 SECONDARY HYPERPARATHYROIDISM OF RENAL ORIGIN (HCC): ICD-10-CM

## 2018-03-06 ENCOUNTER — APPOINTMENT (OUTPATIENT)
Dept: LAB | Facility: HOSPITAL | Age: 83
End: 2018-03-06
Attending: INTERNAL MEDICINE
Payer: COMMERCIAL

## 2018-03-06 DIAGNOSIS — N25.81 SECONDARY HYPERPARATHYROIDISM OF RENAL ORIGIN (HCC): ICD-10-CM

## 2018-03-06 LAB
ALBUMIN SERPL BCP-MCNC: 3.8 G/DL (ref 3.5–5)
ALP SERPL-CCNC: 82 U/L (ref 46–116)
ALT SERPL W P-5'-P-CCNC: 20 U/L (ref 12–78)
ANION GAP SERPL CALCULATED.3IONS-SCNC: 6 MMOL/L (ref 4–13)
AST SERPL W P-5'-P-CCNC: 17 U/L (ref 5–45)
BILIRUB SERPL-MCNC: 0.28 MG/DL (ref 0.2–1)
BUN SERPL-MCNC: 38 MG/DL (ref 5–25)
CALCIUM SERPL-MCNC: 9.2 MG/DL (ref 8.3–10.1)
CHLORIDE SERPL-SCNC: 105 MMOL/L (ref 100–108)
CO2 SERPL-SCNC: 29 MMOL/L (ref 21–32)
CREAT SERPL-MCNC: 1.41 MG/DL (ref 0.6–1.3)
GFR SERPL CREATININE-BSD FRML MDRD: 34 ML/MIN/1.73SQ M
GLUCOSE SERPL-MCNC: 112 MG/DL (ref 65–140)
PHOSPHATE SERPL-MCNC: 3.5 MG/DL (ref 2.3–4.1)
POTASSIUM SERPL-SCNC: 4.8 MMOL/L (ref 3.5–5.3)
PROT SERPL-MCNC: 8.4 G/DL (ref 6.4–8.2)
PTH-INTACT SERPL-MCNC: 177.7 PG/ML (ref 14–72)
SODIUM SERPL-SCNC: 140 MMOL/L (ref 136–145)

## 2018-03-06 PROCEDURE — 83970 ASSAY OF PARATHORMONE: CPT

## 2018-03-06 PROCEDURE — 80053 COMPREHEN METABOLIC PANEL: CPT

## 2018-03-06 PROCEDURE — 84100 ASSAY OF PHOSPHORUS: CPT

## 2018-03-06 PROCEDURE — 36415 COLL VENOUS BLD VENIPUNCTURE: CPT

## 2018-03-07 DIAGNOSIS — I10 HYPERTENSION, UNSPECIFIED TYPE: Primary | ICD-10-CM

## 2018-03-08 RX ORDER — METOPROLOL TARTRATE 50 MG/1
TABLET, FILM COATED ORAL
Qty: 180 TABLET | Refills: 0 | Status: SHIPPED | OUTPATIENT
Start: 2018-03-08 | End: 2018-06-03 | Stop reason: SDUPTHER

## 2018-03-13 ENCOUNTER — TELEPHONE (OUTPATIENT)
Dept: NEPHROLOGY | Facility: CLINIC | Age: 83
End: 2018-03-13

## 2018-03-13 DIAGNOSIS — E11.9 TYPE 2 DIABETES MELLITUS WITHOUT COMPLICATION, WITHOUT LONG-TERM CURRENT USE OF INSULIN (HCC): Primary | ICD-10-CM

## 2018-03-13 RX ORDER — GLIMEPIRIDE 1 MG/1
TABLET ORAL
Qty: 90 TABLET | Refills: 0 | Status: SHIPPED | OUTPATIENT
Start: 2018-03-13 | End: 2018-04-26

## 2018-03-14 DIAGNOSIS — E78.5 HLD (HYPERLIPIDEMIA): Primary | ICD-10-CM

## 2018-03-14 RX ORDER — PRAVASTATIN SODIUM 40 MG
TABLET ORAL
Qty: 90 TABLET | Refills: 0 | Status: SHIPPED | OUTPATIENT
Start: 2018-03-14 | End: 2018-04-26

## 2018-03-15 ENCOUNTER — OFFICE VISIT (OUTPATIENT)
Dept: FAMILY MEDICINE CLINIC | Facility: CLINIC | Age: 83
End: 2018-03-15
Payer: COMMERCIAL

## 2018-03-15 VITALS — HEIGHT: 62 IN | WEIGHT: 138 LBS | BODY MASS INDEX: 25.4 KG/M2

## 2018-03-15 DIAGNOSIS — N25.81 SECONDARY HYPERPARATHYROIDISM (HCC): Primary | ICD-10-CM

## 2018-03-15 DIAGNOSIS — M54.2 NECK PAIN: ICD-10-CM

## 2018-03-15 DIAGNOSIS — R30.0 DYSURIA: Primary | ICD-10-CM

## 2018-03-15 LAB
BACTERIA UR QL AUTO: ABNORMAL /HPF
BILIRUB UR QL STRIP: NEGATIVE
CLARITY UR: CLEAR
COLOR UR: YELLOW
GLUCOSE UR STRIP-MCNC: NEGATIVE MG/DL
HGB UR QL STRIP.AUTO: NEGATIVE
HYALINE CASTS #/AREA URNS LPF: ABNORMAL /LPF
KETONES UR STRIP-MCNC: NEGATIVE MG/DL
LEUKOCYTE ESTERASE UR QL STRIP: ABNORMAL
NITRITE UR QL STRIP: NEGATIVE
NON-SQ EPI CELLS URNS QL MICRO: ABNORMAL /HPF
PH UR STRIP.AUTO: 5.5 [PH] (ref 4.5–8)
PROT UR STRIP-MCNC: ABNORMAL MG/DL
RBC #/AREA URNS AUTO: ABNORMAL /HPF
SL AMB  POCT GLUCOSE, UA: NEGATIVE
SL AMB LEUKOCYTE ESTERASE,UA: NORMAL
SL AMB POCT BILIRUBIN,UA: NEGATIVE
SL AMB POCT BLOOD,UA: NEGATIVE
SL AMB POCT CLARITY,UA: CLEAR
SL AMB POCT COLOR,UA: YELLOW
SL AMB POCT KETONES,UA: NEGATIVE
SL AMB POCT NITRITE,UA: NEGATIVE
SL AMB POCT PH,UA: 5
SL AMB POCT SPECIFIC GRAVITY,UA: 1.01
SL AMB POCT URINE PROTEIN: NORMAL
SL AMB POCT UROBILINOGEN: 0.2
SP GR UR STRIP.AUTO: 1.02 (ref 1–1.03)
UROBILINOGEN UR QL STRIP.AUTO: 1 E.U./DL
WBC #/AREA URNS AUTO: ABNORMAL /HPF

## 2018-03-15 PROCEDURE — 99213 OFFICE O/P EST LOW 20 MIN: CPT | Performed by: FAMILY MEDICINE

## 2018-03-15 PROCEDURE — 81001 URINALYSIS AUTO W/SCOPE: CPT | Performed by: FAMILY MEDICINE

## 2018-03-15 PROCEDURE — 81003 URINALYSIS AUTO W/O SCOPE: CPT | Performed by: FAMILY MEDICINE

## 2018-03-15 RX ORDER — CALCITRIOL 0.25 UG/1
0.25 CAPSULE, LIQUID FILLED ORAL 2 TIMES WEEKLY
Qty: 10 CAPSULE | Refills: 5 | Status: SHIPPED | OUTPATIENT
Start: 2018-03-15 | End: 2018-04-18 | Stop reason: ALTCHOICE

## 2018-03-15 NOTE — PROGRESS NOTES
Mily Knott 1934 female MRN: [id number]    Family Medicine Follow-up Visit    ASSESSMENT/PLAN  Problem List Items Addressed This Visit     Neck pain      Other Visit Diagnoses     Dysuria    -  Primary    Relevant Orders    UA w Reflex to Microscopic w Reflex to Culture - Clinic Collect    POCT urine dip auto non-scope (Completed)        Somatic dysfunction to cranial, cervical, thoracic regions  -OMT to these regions including MFR and ME  -Pt tolerated OMT well, mm tension and restricted ROM greatly improved  -recommended conservative management including heat, stretching, tylenol as needed for soreness   - recommend repeat OMT in next 2-4 weeks      **Of note, Pt is complaining of dysuria and sore throat  Unable to address today  Did collect urine sample to send for urinalysis with reflex culture and Pt to schedule acute visit to address these complaints tomorrow**      Future Appointments  Date Time Provider Valdemar Crocker   3/16/2018 12:30 PM Lacy Santa MD [facility]   3/22/2018 3:20 PM Lacy Santa MD [facility]   4/3/2018 3:40 PM Pamela Serrano DO [facility]   7/23/2018 1:00 PM [facility]          SUBJECTIVE  CC: Follow-up (PT IS HERE FOR OMT for neck pain)      HPI:  Mily Knott is a 80 y o  female who presents for OMT to [facility] presents for follow up OMT for her chronic neck pain  She felt significant relief after her last OMT appointment for roughly one week, but she was unable to make it for follow up until today due to a gout attack  She continues to take tylenol 500mg every 4h at this time for both the gout and her neck pain, which does seem to help  Denies any numbness/tingling down her arms, no headaches  No upper back pain  Review of Systems   Constitutional: Negative for chills, fatigue and fever  Respiratory: Negative for cough and shortness of breath      Cardiovascular: Negative for chest pain and palpitations  Gastrointestinal: Negative for abdominal pain, constipation, diarrhea, nausea and vomiting  Genitourinary: Positive for dysuria  Musculoskeletal: Positive for neck pain  Negative for back pain  Skin: Negative for rash  Neurological: Negative for numbness         Historical Information   The patient history was reviewed as follows:    Past Medical History:   Diagnosis Date    Arthritis     Back pain     Benign essential hypertension     LAST ASSESSED: [id number]    Carotid artery stenosis     CKD (chronic kidney disease) stage 2, GFR 60-89 ml/min     Constipation, chronic     "drinks prune juice"    Diabetes mellitus ([facility] )     DMII (diabetes mellitus, type 2) ([facility])     Epiglottitis     RESOLVED: [id number]    Gastritis     GERD (gastroesophageal reflux disease)     Gout     H/O blood clots     left lower leg,,,11 yrs ago    History of stenosis of renal artery     RESOLVED: [id number]    Hypercholesteremia     Hypercholesterolemia     Hyperlipidemia     Hyperparathyroidism ([facility] )     Hypertension     Iliac artery stenosis, bilateral ([facility])     Presence of pessary     Renal artery stenosis ([place] [address] )     Renal disorder     Renovascular hypertension     RESOLVED: 59KNP3761    Segmental and somatic dysfunction of cervical region     RESOLVED: 92EDD4091    Segmental and somatic dysfunction of lumbar region     RESOLVED: 22JSF1351    Segmental and somatic dysfunction of pelvic region     RESOLVED: 32AOH8270    Segmental and somatic dysfunction of thoracic region     RESOLVED: [id number]    Somatic dysfunction of abdominal region     RESOLVED: [id number]    Somatic dysfunction of head region     RESOLVED: 85CMQ0497    Somatic dysfunction of lower extremities     RESOLVED: 36UDL0684    Somatic dysfunction of rib region     RESOLVED: 14IFN3525    Somatic dysfunction of thoracic region     RESOLVED: 00OSH2561    Somatic dysfunction of upper extremities     RESOLVED: 45MJE6148    Teeth missing      Past Surgical History:   Procedure Laterality Date    ANGIOPLASTY / STENTING ILIAC      BREAST LUMPECTOMY Left 09/06/2017    LAST ASSESSED: [id number]    BREAST SURGERY Left     biopsy    CATARACT EXTRACTION Bilateral     CHOLECYSTECTOMY      COLONOSCOPY      EYE SURGERY      ILIAC VEIN ANGIOPLASTY / STENTING Right     INITIAL STENOSIS: ONSET: [id number]    KNEE ARTHROSCOPY Right     KNEE SURGERY Right     ONSET: [id number]    PA LARYNGOSCOPY,DIRCT,OP SCOP,EXC TUMR N/A 6/10/2016    Procedure: MICRO DIRECT LARYNGOSCOPY WITH VOCAL FOLD MASS EXCISION ;  Surgeon: Samuel Altman MD;  [facility];  Service: ENT    PA MASTECTOMY, PARTIAL Left 9/6/2017    Procedure: LUMPECTOMY BREAST NEEDLE LOCALIZED WITH FAXITRON NEEDLE @ 0830;  Surgeon: Meka Mark MD;  [facility];  Service: General    THROAT SURGERY      lump removed    TUBAL LIGATION      VASCULAR SURGERY       Family History   Problem Relation Age of Onset    Heart disease Brother     Heart disease Maternal Grandmother     Diabetes Other      of other type with arthropathy, without long term current use of insulin    Hypertension Other     No Known Problems Mother     Gout Family     Rheum arthritis Family     Lupus Family      systematic erythematosus    Heart disease Family     Stroke Family      syndrome    Stroke Maternal Uncle      syndrome    Stroke Maternal Aunt      syndrome    No Known Problems Father       Social History   History   Alcohol Use No     History   Drug Use No     History   Smoking Status    Former Smoker    Packs/day: 1 00    Years: 64 00    Quit date: 2004   Smokeless Tobacco    Never Used     Comment: quit in 2003 1 ppd x 50 years       Medications:     Current Outpatient Prescriptions:     Acetaminophen (TYLENOL) 325 MG CAPS, Take by mouth every 8 (eight) hours, Disp: , Rfl:     allopurinol (ZYLOPRIM) 100 mg tablet, TK 1 T PO D, Disp: , Rfl: 1    aspirin (ADULT ASPIRIN EC LOW STRENGTH) 81 mg EC tablet, Take 1 tablet by mouth daily, Disp: , Rfl:     calcitriol (ROCALTROL) 0 25 mcg capsule, Take 0 25 mcg by mouth once a week Pt takes every friday , Disp: , Rfl:     calcium carbonate-vitamin D (OSCAL-D) 500 mg-200 units per tablet, Take 1 tablet by mouth daily with breakfast, Disp: , Rfl:     cyclobenzaprine (FLEXERIL) 5 mg tablet, Take 1 tablet by mouth 3 (three) times a day as needed, Disp: , Rfl:     fluticasone (FLONASE) 50 mcg/act nasal spray, 1 spray into each nostril daily  , Disp: , Rfl:     gabapentin (NEURONTIN) 100 mg capsule, Take 100 mg by mouth daily at bedtime, Disp: , Rfl:     glimepiride (AMARYL) 1 mg tablet, TAKE 1/2 TABLET BY MOUTH DAILY WITH BREAKFAST, Disp: 90 tablet, Rfl: 0    magnesium oxide (MAG-OX) 400 mg, Take 400 mg by mouth 2 (two) times a day, Disp: , Rfl:     metoprolol tartrate (LOPRESSOR) 50 mg tablet, TAKE 1 TABLET BY MOUTH TWICE DAILY, Disp: 180 tablet, Rfl: 0    NIFEdipine (NIFEDICAL XL) 30 mg 24 hr tablet, Take 30 mg by mouth daily, Disp: , Rfl:     omeprazole (PriLOSEC) 40 MG capsule, TK 1 C PO QD, Disp: , Rfl: 3    ONE TOUCH ULTRA TEST test strip, 1 each by Other route 2 (two) times a day Use as instructed, Disp: 100 each, Rfl: 5    ONETOUCH DELICA LANCETS 13I MISC, TEST TWICE DAILY, Disp: 200 each, Rfl: 0    Oyster Shell (OYSTER CALCIUM) 500 MG TABS, Take 1 tablet by mouth daily, Disp: , Rfl:     pantoprazole (PROTONIX) 40 mg tablet, Take 40 mg by mouth 2 (two) times a day, Disp: , Rfl:     pravastatin (PRAVACHOL) 40 mg tablet, TAKE 1 TABLET BY MOUTH ON MONDAY AND THURSDAY ONLY WHILE ON COLCRYS   ONCE OFF COLCRYS RESUME TAKING MEDICATION DAILY, Disp: 90 tablet, Rfl: 0    RESTASIS 0 05 % ophthalmic emulsion, INSTILL 1 GTT INTO OU BID, Disp: , Rfl: 3    spironolactone (ALDACTONE) 25 mg tablet, Take 25 mg by mouth daily at bedtime, Disp: , Rfl:   Allergies   Allergen Reactions    Clonidine Derivatives Swelling    Diflucan [Fluconazole] Rash    Flagyl [Metronidazole] Anaphylaxis    Latex Rash    Other Palpitations     IV DYE    Carvedilol Hives     And legs swelled    Lipitor [Atorvastatin]      Legs swelled       OBJECTIVE    Vitals:   Vitals:    03/15/18 1255   Weight: 62 6 kg (138 lb)   Height: 5' 1 8" (1 57 m)     Wt Readings from Last 3 Encounters:   03/15/18 62 6 kg (138 lb)   02/05/18 63 kg (139 lb)   01/25/18 63 5 kg (140 lb)     Body mass index is 25 4 kg/m²  Physical Exam   Constitutional: She is oriented to person, place, and time  She appears well-developed and well-nourished  No distress  HENT:   Head: Normocephalic and atraumatic  Eyes: Conjunctivae and EOM are normal  Right eye exhibits discharge (clear)  Left eye exhibits discharge (clear)  No scleral icterus  Neck: Normal range of motion  Neck supple  No tracheal deviation present  Pulmonary/Chest: Effort normal  No respiratory distress  Musculoskeletal: She exhibits no edema or deformity  Neurological: She is alert and oriented to person, place, and time  She exhibits normal muscle tone  Skin: Skin is warm and dry  No rash noted  She is not diaphoretic  No erythema  Psychiatric: She has a normal mood and affect  Judgment and thought content normal    Vitals reviewed       OMT Exam  Head:   Somatic Dysfunction:  Tissue Texture Changes, Asymmetry  and Restriction  Severity :  1  Osteopathic Findings: suboccipital mm ropey and cold  Treatment Method: MFR  Response: improved  Cervical:   Somatic Dysfunction:  Tissue Texture Changes, Asymmetry , Restriction and Tenderness  Severity :  2  Osteopathic Findings: C3FRSr, C5FRSr, restriction in rotation and SB b/l, SCM ropey and boggy b/l, cervical paraspinal mm ropey and cold on R  Treatment Method: ME and MFR  Response: improved  Thoracic T1-4:   Somatic Dysfunction:  Tissue Texture Changes, Asymmetry  and Restriction  Severity :  1  Osteopathic Findings: T2FRSr  Treatment Method: ME  Response: improved    Lab:  I have personally reviewed all pertinent results       Office Visit on 03/15/2018   Component Date Value Ref Range Status     COLOR,UA 03/15/2018 yellow   Final     CLARITY,UA 03/15/2018 clear   Final     SPECIFIC GRAVITY,UA 03/15/2018 1 015   Final     PH,UA 03/15/2018 5 0   Final    LEUKOCYTE ESTERASE,UA 03/15/2018 large   Final     NITRITE,UA 03/15/2018 negative   Final    GLUCOSE, UA 03/15/2018 negative   Final     KETONES,UA 03/15/2018 negative   Final     BILIRUBIN,UA 03/15/2018 negative   Final     BLOOD,UA 03/15/2018 negative   Final    SL AMB POCT URINE PROTEIN 03/15/2018 large   Final    SL AMB POCT UROBILINOGEN 03/15/2018 0 2   Final   Appointment on 03/06/2018   Component Date Value Ref Range Status    Sodium 03/06/2018 140  136 - 145 mmol/L Final    Potassium 03/06/2018 4 8  3 5 - 5 3 mmol/L Final    Chloride 03/06/2018 105  100 - 108 mmol/L Final    CO2 03/06/2018 29  21 - 32 mmol/L Final    Anion Gap 03/06/2018 6  4 - 13 mmol/L Final    BUN 03/06/2018 38* 5 - 25 mg/dL Final    Creatinine 03/06/2018 1 41* 0 60 - 1 30 mg/dL Final    Glucose 03/06/2018 112  65 - 140 mg/dL Final    Calcium 03/06/2018 9 2  8 3 - 10 1 mg/dL Final    AST 03/06/2018 17  5 - 45 U/L Final    ALT 03/06/2018 20  12 - 78 U/L Final    Alkaline Phosphatase 03/06/2018 82  46 - 116 U/L Final    Total Protein 03/06/2018 8 4* 6 4 - 8 2 g/dL Final    Albumin 03/06/2018 3 8  3 5 - 5 0 g/dL Final    Total Bilirubin 03/06/2018 0 28  0 20 - 1 00 mg/dL Final    eGFR 03/06/2018 34  ml/min/1 73sq m Final    Phosphorus 03/06/2018 3 5  2 3 - 4 1 mg/dL Final    PTH 03/06/2018 177 7* 14 0 - 72 0 pg/mL Final       Appointment on 03/06/2018   Component Date Value Ref Range Status    Sodium 03/06/2018 140  136 - 145 mmol/L Final    Potassium 03/06/2018 4 8  3 5 - 5 3 mmol/L Final    Chloride 03/06/2018 105  100 - 108 mmol/L Final    CO2 03/06/2018 29  21 - 32 mmol/L Final    Anion Gap 03/06/2018 6  4 - 13 mmol/L Final    BUN 03/06/2018 38* 5 - 25 mg/dL Final    Creatinine 03/06/2018 1 41* 0 60 - 1 30 mg/dL Final    Glucose 03/06/2018 112  65 - 140 mg/dL Final    Calcium 03/06/2018 9 2  8 3 - 10 1 mg/dL Final    AST 03/06/2018 17  5 - 45 U/L Final    ALT 03/06/2018 20  12 - 78 U/L Final    Alkaline Phosphatase 03/06/2018 82  46 - 116 U/L Final    Total Protein 03/06/2018 8 4* 6 4 - 8 2 g/dL Final    Albumin 03/06/2018 3 8  3 5 - 5 0 g/dL Final    Total Bilirubin 03/06/2018 0 28  0 20 - 1 00 mg/dL Final    eGFR 03/06/2018 34  ml/min/1 73sq m Final    Phosphorus 03/06/2018 3 5  2 3 - 4 1 mg/dL Final    PTH 03/06/2018 177 7* 14 0 - 72 0 pg/mL Final       Imaging:  I have personally reviewed all pertinent results        Nelda Campbell DO, PGY-3  [facility]   3/15/2018

## 2018-03-15 NOTE — TELEPHONE ENCOUNTER
Pt  states she was advised to take Gaviscon for GERD and was told to check with you if this is okay or if you would recommend something else?

## 2018-03-16 ENCOUNTER — OFFICE VISIT (OUTPATIENT)
Dept: FAMILY MEDICINE CLINIC | Facility: CLINIC | Age: 83
End: 2018-03-16
Payer: COMMERCIAL

## 2018-03-16 VITALS
HEART RATE: 64 BPM | HEIGHT: 62 IN | DIASTOLIC BLOOD PRESSURE: 76 MMHG | SYSTOLIC BLOOD PRESSURE: 132 MMHG | BODY MASS INDEX: 25.4 KG/M2 | TEMPERATURE: 97 F | WEIGHT: 138 LBS | RESPIRATION RATE: 16 BRPM

## 2018-03-16 DIAGNOSIS — K21.9 GASTROESOPHAGEAL REFLUX DISEASE WITHOUT ESOPHAGITIS: ICD-10-CM

## 2018-03-16 DIAGNOSIS — J38.7 MASS OF LARYNX: ICD-10-CM

## 2018-03-16 DIAGNOSIS — K21.9 GASTROESOPHAGEAL REFLUX DISEASE WITHOUT ESOPHAGITIS: Primary | ICD-10-CM

## 2018-03-16 DIAGNOSIS — R30.0 DYSURIA: ICD-10-CM

## 2018-03-16 PROCEDURE — 99213 OFFICE O/P EST LOW 20 MIN: CPT | Performed by: FAMILY MEDICINE

## 2018-03-16 RX ORDER — PANTOPRAZOLE SODIUM 40 MG/1
TABLET, DELAYED RELEASE ORAL
Qty: 180 TABLET | Refills: 0 | Status: SHIPPED | OUTPATIENT
Start: 2018-03-16 | End: 2018-12-07 | Stop reason: ALTCHOICE

## 2018-03-16 RX ORDER — PANTOPRAZOLE SODIUM 40 MG/1
40 TABLET, DELAYED RELEASE ORAL 2 TIMES DAILY
Qty: 60 TABLET | Refills: 0 | Status: SHIPPED | OUTPATIENT
Start: 2018-03-16 | End: 2018-03-16 | Stop reason: SDUPTHER

## 2018-03-16 NOTE — PROGRESS NOTES
Lavelle Anton 1934 female MRN: [id number]    Family Medicine Follow-up Visit    ASSESSMENT/PLAN  Lavelle Anton is a 80 y o  female  Presents for    Diagnoses and all orders for this visit:    Gastroesophageal reflux disease without esophagitis  Comments:  - Uncontrolled, with a history of taking Zantac, Prilosec  Increased Protonix BID, and encourage to continue usings Tums  No signs of bleeding, mild epigastric tenderness on exam  Will refer to GI for EGD  -per patient ENT stated that the patient is suffering from a larynx  mass likely secondary to acid reflux  Orders:  -     Discontinue: pantoprazole (PROTONIX) 40 mg tablet; Take 1 tablet (40 mg total) by mouth 2 (two) times a day  -     Ambulatory referral to Gastroenterology; Future    Mass of larynx  Comments:  - Discovered by ENT  Will need to obtain records for further information  Patient scheduled for MRI for evaluation  Likely will need removal per patient  Dysuria  Comments:  - UA (-) for any signs of infection  - If the symptoms continue , encourage the patient to have a vaginal exam at our next visit  At this time she will continue to hydrate and use supportive care  Disposition: Return to the clinic in for medicare wellness visit  Future Appointments  Date Time Provider Valdemar Crocker   3/16/2018 12:30 PM Buckley Schlatter, MD [facility]   3/22/2018 3:20 PM Buckley Schlatter, MD [facility]   4/3/2018 3:40 PM Alaina Asher DO [facility]   7/23/2018 1:00 PM [facility] 2 [facility]          SUBJECTIVE  CC: Urinary Tract Infection      HPI:  Lavelle Anton is a 80 y o  female who presents for evaluation of worsening reflux  Patient states that she was evaluated by ENT and was found to have a larynx mass  She is scheduled for an MRI  Patient states that her doctor advised her that this is likely due to a collection of acid when patient is sleeping    He advised her to ask me if she could have an increase in her acid reflux medications  Patient states that she has mild epigastric pain but is able to tolerate p  o  intake  She states that the pain is aggravated mostly after meals  Patient also complains of dysuria that has been present for 2-3 days  She denies any bleeding or vaginal discharge  After review of the UA being negative patient will continue supportive care with hydration  She states that she will call us if she has any other difficulties with urination  Review of Systems   Constitutional: Negative for activity change and appetite change  HENT: Negative for congestion and sore throat  Respiratory: Negative for cough, chest tightness and shortness of breath  Cardiovascular: Negative for chest pain  Gastrointestinal: Positive for abdominal pain  Negative for abdominal distention, diarrhea and nausea  Acid reflux uncontrolled   Musculoskeletal: Negative for arthralgias and back pain  Skin: Negative for rash  Neurological: Negative for dizziness  All other systems reviewed and are negative        Historical Information   The patient history was reviewed as follows:    Past Medical History:   Diagnosis Date    Arthritis     Back pain     Benign essential hypertension     LAST ASSESSED: [id number]    Carotid artery stenosis     CKD (chronic kidney disease) stage 2, GFR 60-89 ml/min     Constipation, chronic     "drinks prune juice"    Diabetes mellitus [facility] )     DMII [facility]diabetes mellitus, type 2) ([facility]     Epiglottitis     RESOLVED: [id number]    Gastritis     GERD (gastroesophageal reflux disease)     Gout     H/O blood clots     left lower leg,,,11 yrs ago    History of stenosis of renal artery     RESOLVED: [id number]    Hypercholesteremia     Hypercholesterolemia     Hyperlipidemia     Hyperparathyroidism ([address] )     Hypertension     Iliac artery stenosis, bilateral ([facility])     Presence of pessary     Renal artery stenosis ([facility]     Renal disorder     Renovascular hypertension     RESOLVED: 59QBF4301    Segmental and somatic dysfunction of cervical region     RESOLVED: 97IWD2105    Segmental and somatic dysfunction of lumbar region     RESOLVED: 34NPF6358    Segmental and somatic dysfunction of pelvic region     RESOLVED: 25XQT7791    Segmental and somatic dysfunction of thoracic region     RESOLVED: 29WGW9899    Somatic dysfunction of abdominal region     RESOLVED: 08GFJ1057    Somatic dysfunction of head region     RESOLVED: 05WFJ0365    Somatic dysfunction of lower extremities     RESOLVED: 53ZUW3772    Somatic dysfunction of rib region     RESOLVED: 85HBY1201    Somatic dysfunction of thoracic region     RESOLVED: 48NZN8850    Somatic dysfunction of upper extremities     RESOLVED: 54KUK8011    Teeth missing      Past Surgical History:   Procedure Laterality Date    ANGIOPLASTY / STENTING ILIAC      BREAST LUMPECTOMY Left 09/06/2017    LAST ASSESSED: [id number]    BREAST SURGERY Left     biopsy    CATARACT EXTRACTION Bilateral     CHOLECYSTECTOMY      COLONOSCOPY      EYE SURGERY      ILIAC VEIN ANGIOPLASTY / STENTING Right     INITIAL STENOSIS: ONSET: [id number]    KNEE ARTHROSCOPY Right     KNEE SURGERY Right     ONSET: 24UNI3740    MT LARYNGOSCOPY,DIRCT,OP SCOP,EXC TUMR N/A 6/10/2016    Procedure: MICRO DIRECT LARYNGOSCOPY WITH VOCAL FOLD MASS EXCISION ;  Surgeon: Ramona Winston MD;  [facility];  Service: ENT    MT MASTECTOMY, PARTIAL Left 9/6/2017    Procedure: LUMPECTOMY BREAST NEEDLE LOCALIZED WITH FAXITRON NEEDLE @ 0830;  Surgeon: Nica Eduardo MD;  [facility];  Service: General    THROAT SURGERY      lump removed    TUBAL LIGATION      VASCULAR SURGERY       Family History   Problem Relation Age of Onset    Heart disease Brother     Heart disease Maternal Grandmother     Diabetes Other      of other type with arthropathy, without long term current use of insulin    Hypertension Other     No Known Problems Mother     Gout Family     Rheum arthritis Family     Lupus Family      systematic erythematosus    Heart disease Family     Stroke Family      syndrome    Stroke Maternal Uncle      syndrome    Stroke Maternal Aunt      syndrome    No Known Problems Father       Social History   History   Alcohol Use No     History   Drug Use No     History   Smoking Status    Former Smoker    Packs/day: 1 00    Years: 64 00    Quit date: 2004   Smokeless Tobacco    Never Used     Comment: quit in 2003 1 ppd x 50 years       Medications:     Current Outpatient Prescriptions:     Acetaminophen (TYLENOL) 325 MG CAPS, Take by mouth every 8 (eight) hours, Disp: , Rfl:     allopurinol (ZYLOPRIM) 100 mg tablet, TK 1 T PO D, Disp: , Rfl: 1    aspirin (ADULT ASPIRIN EC LOW STRENGTH) 81 mg EC tablet, Take 1 tablet by mouth daily, Disp: , Rfl:     calcitriol (ROCALTROL) 0 25 mcg capsule, Take 0 25 mcg by mouth once a week Pt takes every friday , Disp: , Rfl:     calcitriol (ROCALTROL) 0 25 mcg capsule, Take 1 capsule (0 25 mcg total) by mouth 2 (two) times a week, Disp: 10 capsule, Rfl: 5    calcium carbonate-vitamin D (OSCAL-D) 500 mg-200 units per tablet, Take 1 tablet by mouth daily with breakfast, Disp: , Rfl:     cyclobenzaprine (FLEXERIL) 5 mg tablet, Take 1 tablet by mouth 3 (three) times a day as needed, Disp: , Rfl:     fluticasone (FLONASE) 50 mcg/act nasal spray, 1 spray into each nostril daily  , Disp: , Rfl:     gabapentin (NEURONTIN) 100 mg capsule, Take 100 mg by mouth daily at bedtime, Disp: , Rfl:     glimepiride (AMARYL) 1 mg tablet, TAKE 1/2 TABLET BY MOUTH DAILY WITH BREAKFAST, Disp: 90 tablet, Rfl: 0    magnesium oxide (MAG-OX) 400 mg, Take 400 mg by mouth 2 (two) times a day, Disp: , Rfl:     metoprolol tartrate (LOPRESSOR) 50 mg tablet, TAKE 1 TABLET BY MOUTH TWICE DAILY, Disp: 180 tablet, Rfl: 0    NIFEdipine (NIFEDICAL XL) 30 mg 24 hr tablet, Take 30 mg by mouth daily, Disp: , Rfl:     omeprazole (PriLOSEC) 40 MG capsule, TK 1 C PO QD, Disp: , Rfl: 3    ONE TOUCH ULTRA TEST test strip, 1 each by Other route 2 (two) times a day Use as instructed, Disp: 100 each, Rfl: 5    ONETOUCH DELICA LANCETS 55F MISC, TEST TWICE DAILY, Disp: 200 each, Rfl: 0    Oyster Shell (OYSTER CALCIUM) 500 MG TABS, Take 1 tablet by mouth daily, Disp: , Rfl:     pantoprazole (PROTONIX) 40 mg tablet, Take 40 mg by mouth 2 (two) times a day, Disp: , Rfl:     pravastatin (PRAVACHOL) 40 mg tablet, TAKE 1 TABLET BY MOUTH ON MONDAY AND THURSDAY ONLY WHILE ON COLCRYS  ONCE OFF COLCRYS RESUME TAKING MEDICATION DAILY, Disp: 90 tablet, Rfl: 0    RESTASIS 0 05 % ophthalmic emulsion, INSTILL 1 GTT INTO OU BID, Disp: , Rfl: 3    spironolactone (ALDACTONE) 25 mg tablet, Take 25 mg by mouth daily at bedtime, Disp: , Rfl:   Allergies   Allergen Reactions    Clonidine Derivatives Swelling    Diflucan [Fluconazole] Rash    Flagyl [Metronidazole] Anaphylaxis    Latex Rash    Other Palpitations     IV DYE    Carvedilol Hives     And legs swelled    Lipitor [Atorvastatin]      Legs swelled       OBJECTIVE    Vitals:   Vitals:    03/16/18 1211   BP: 132/76   Pulse: 64   Resp: 16   Temp: (!) 97 °F (36 1 °C)   Weight: 62 6 kg (138 lb)   Height: 5' 1 8" (1 57 m)           Physical Exam   Constitutional: She appears well-developed and well-nourished  HENT:   Head: Normocephalic and atraumatic  Eyes: Conjunctivae and EOM are normal  Pupils are equal, round, and reactive to light  Neck: Normal range of motion  Neck supple  Cardiovascular: Normal rate and regular rhythm  No murmur heard  Pulmonary/Chest: Effort normal and breath sounds normal  No respiratory distress  She has no wheezes  Abdominal: Soft  Bowel sounds are normal  She exhibits no distension  There is tenderness (Epigastric tenderness)  Musculoskeletal: Normal range of motion  She exhibits no edema  Neurological: She is alert     Skin: Skin is warm and dry  No rash noted  Psychiatric: She has a normal mood and affect  Judgment normal    Vitals reviewed           John Osei MD, PGY-3  [facility]   3/16/2018

## 2018-03-17 DIAGNOSIS — E11.9 TYPE 2 DIABETES MELLITUS WITHOUT COMPLICATION, UNSPECIFIED LONG TERM INSULIN USE STATUS: ICD-10-CM

## 2018-03-17 RX ORDER — LANCETS
EACH MISCELLANEOUS
Qty: 200 EACH | Refills: 0 | Status: SHIPPED | OUTPATIENT
Start: 2018-03-17 | End: 2018-06-27 | Stop reason: SDUPTHER

## 2018-03-19 ENCOUNTER — TRANSCRIBE ORDERS (OUTPATIENT)
Dept: ADMINISTRATIVE | Facility: HOSPITAL | Age: 83
End: 2018-03-19

## 2018-03-19 DIAGNOSIS — J38.7 MASS OF LARYNX: Primary | ICD-10-CM

## 2018-03-22 ENCOUNTER — OFFICE VISIT (OUTPATIENT)
Dept: FAMILY MEDICINE CLINIC | Facility: CLINIC | Age: 83
End: 2018-03-22
Payer: COMMERCIAL

## 2018-03-22 VITALS
HEIGHT: 62 IN | WEIGHT: 138.8 LBS | RESPIRATION RATE: 16 BRPM | SYSTOLIC BLOOD PRESSURE: 132 MMHG | BODY MASS INDEX: 25.54 KG/M2 | HEART RATE: 64 BPM | TEMPERATURE: 96.8 F | DIASTOLIC BLOOD PRESSURE: 58 MMHG

## 2018-03-22 DIAGNOSIS — Z00.00 MEDICARE ANNUAL WELLNESS VISIT, SUBSEQUENT: Primary | ICD-10-CM

## 2018-03-22 PROCEDURE — G0439 PPPS, SUBSEQ VISIT: HCPCS | Performed by: FAMILY MEDICINE

## 2018-03-22 NOTE — PROGRESS NOTES
HPI:  Patrica Geiger is a 80 y o  female here for her Subsequent Wellness Visit    Patient denies any complaints at this time      Patient Active Problem List   Diagnosis    Renal artery atherosclerosis, bilateral ([address] )    Vocal cord mass    Carotid artery stenosis    Iliac artery stenosis, bilateral (HCC)    Nausea    Chronic kidney disease, stage II (mild)    Hyperparathyroidism (HCC)    GERD (gastroesophageal reflux disease)    Arthritis    Diabetes mellitus [facility]    Hypercholesteremia    Hyperlipidemia    Hypertension    EKG abnormalities    Anemia    Neck pain    Dysuria     Past Medical History:   Diagnosis Date    Arthritis     Back pain     Benign essential hypertension     LAST ASSESSED: [id number]    Carotid artery stenosis     CKD (chronic kidney disease) stage 2, GFR 60-89 ml/min     Constipation, chronic     "drinks prune juice"    Diabetes mellitus ([facility]     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[facility]di[facility] mellitus, type 2) ([facility]     Epiglottitis     RESOLVED: [id number]    Gastritis     GERD (gastroesophageal reflux disease)     Gout     H/O blood clots     left lower leg,,,11 yrs ago    History of stenosis of renal artery     RESOLVED: [id number]    Hypercholesteremia     Hypercholesterolemia     Hyperlipidemia     Hyperparathyroidism ([facility]     Hypertension     Iliac artery stenosis, bilateral (HCC)     Presence of pessary     Renal artery stenosis (HCC)     Renal disorder     Renovascular hypertension     RESOLVED: [id number]    Segmental and somatic dysfunction of cervical region     RESOLVED: 33VST3393    Segmental and somatic dysfunction of lumbar region     RESOLVED: 17WVA7869    Segmental and somatic dysfunction of pelvic region     RESOLVED: 88AMI8002    Segmental and somatic dysfunction of thoracic region     RESOLVED: 91KNC0053    Somatic dysfunction of abdominal region     RESOLVED: 30UKB2906    Somatic dysfunction of head region     RESOLVED: 16JQM1830    Somatic dysfunction of lower extremities     RESOLVED: 96DJG0583    Somatic dysfunction of rib region     RESOLVED: 62VUG8278    Somatic dysfunction of thoracic region     RESOLVED: 07OIV2315    Somatic dysfunction of upper extremities     RESOLVED: 21RUU8381    Teeth missing      Past Surgical History:   Procedure Laterality Date    ANGIOPLASTY / STENTING ILIAC      BREAST LUMPECTOMY Left 09/06/2017    LAST ASSESSED: [id number]    BREAST SURGERY Left     biopsy    CATARACT EXTRACTION Bilateral     CHOLECYSTECTOMY      COLONOSCOPY      EYE SURGERY      ILIAC VEIN ANGIOPLASTY / STENTING Right     INITIAL STENOSIS: ONSET: [id number]    KNEE ARTHROSCOPY Right     KNEE SURGERY Right     ONSET: [id number]    CT LARYNGOSCOPY,DIRCT,OP SCOP,EXC TUMR N/A 6/10/2016    Procedure: MICRO DIRECT LARYNGOSCOPY WITH VOCAL FOLD MASS EXCISION ;  Surgeon: Aleisha Zapata MD;  [facility];  Service: ENT    CT MASTECTOMY, PARTIAL Left 9/6/2017    Procedure: LUMPECTOMY BREAST NEEDLE LOCALIZED WITH FAXITRON NEEDLE @ 0830;  Surgeon: Robby Livingston MD;  [facility];  Service: General    THROAT SURGERY      lump removed    TUBAL LIGATION      VASCULAR SURGERY       Family History   Problem Relation Age of Onset    Heart disease Brother     Heart disease Maternal Grandmother     Diabetes Other      of other type with arthropathy, without long term current use of insulin    Hypertension Other     No Known Problems Mother     Gout Family     Rheum arthritis Family     Lupus Family      systematic erythematosus    Heart disease Family     Stroke Family      syndrome    Stroke Maternal Uncle      syndrome    Stroke Maternal Aunt      syndrome    No Known Problems Father      History   Smoking Status    Former Smoker    Packs/day: 1 00    Years: 64 00    Quit date: 2004   Smokeless Tobacco    Never Used     Comment: quit in 2003 1 ppd x 50 years     History   Alcohol Use No      History   Drug Use No     /58 Pulse 64   Temp (!) 96 8 °F (36 °C)   Resp 16   Ht 5' 1 8" (1 57 m)   Wt 63 kg (138 lb 12 8 oz)   LMP  (LMP Unknown)   BMI 25 55 kg/m²       Current Outpatient Prescriptions   Medication Sig Dispense Refill    Acetaminophen (TYLENOL) 325 MG CAPS Take by mouth every 8 (eight) hours      allopurinol (ZYLOPRIM) 100 mg tablet TK 1 T PO D  1    aspirin (ADULT ASPIRIN EC LOW STRENGTH) 81 mg EC tablet Take 1 tablet by mouth daily      calcitriol (ROCALTROL) 0 25 mcg capsule Take 0 25 mcg by mouth once a week Pt takes every friday       calcitriol (ROCALTROL) 0 25 mcg capsule Take 1 capsule (0 25 mcg total) by mouth 2 (two) times a week 10 capsule 5    calcium carbonate-vitamin D (OSCAL-D) 500 mg-200 units per tablet Take 1 tablet by mouth daily with breakfast      cyclobenzaprine (FLEXERIL) 5 mg tablet Take 1 tablet by mouth 3 (three) times a day as needed      fluticasone (FLONASE) 50 mcg/act nasal spray 1 spray into each nostril daily   gabapentin (NEURONTIN) 100 mg capsule Take 100 mg by mouth daily at bedtime      glimepiride (AMARYL) 1 mg tablet TAKE 1/2 TABLET BY MOUTH DAILY WITH BREAKFAST 90 tablet 0    Lancets (ONETOUCH ULTRASOFT) lancets USE TWICE DAILY AS DIRECTED 200 each 0    magnesium oxide (MAG-OX) 400 mg Take 400 mg by mouth 2 (two) times a day      metoprolol tartrate (LOPRESSOR) 50 mg tablet TAKE 1 TABLET BY MOUTH TWICE DAILY 180 tablet 0    NIFEdipine (NIFEDICAL XL) 30 mg 24 hr tablet Take 30 mg by mouth daily      ONE TOUCH ULTRA TEST test strip 1 each by Other route 2 (two) times a day Use as instructed 100 each 5    Oyster Shell (OYSTER CALCIUM) 500 MG TABS Take 1 tablet by mouth daily      pantoprazole (PROTONIX) 40 mg tablet TAKE 1 TABLET(40 MG) BY MOUTH TWICE DAILY 180 tablet 0    pravastatin (PRAVACHOL) 40 mg tablet TAKE 1 TABLET BY MOUTH ON MONDAY AND THURSDAY ONLY WHILE ON COLCRYS   ONCE OFF COLCRYS RESUME TAKING MEDICATION DAILY 90 tablet 0    RESTASIS 0 05 % ophthalmic emulsion INSTILL 1 GTT INTO OU BID  3    spironolactone (ALDACTONE) 25 mg tablet Take 25 mg by mouth daily at bedtime       No current facility-administered medications for this visit        Allergies   Allergen Reactions    Clonidine Derivatives Swelling    Diflucan [Fluconazole] Rash    Flagyl [Metronidazole] Anaphylaxis    Latex Rash    Other Palpitations     IV DYE    Carvedilol Hives     And legs swelled    Lipitor [Atorvastatin]      Legs swelled     Immunization History   Administered Date(s) Administered    Influenza Quadrivalent Preservative Free 3 years and older IM 11/20/2014    Influenza TIV (IM) 01/13/2014    Pneumococcal Polysaccharide PPV23 09/04/2007, 11/20/2014    Tdap 09/04/2007       Patient Care Team:  Mikki Banuelos MD as PCP - General  Kam Rosado, DPCAMILLA Poole, MD Luis Armstrong, MD Quynh Baird, MD Stefanie Gómez, MD Lorin Mehta, DO Tamiko Hall MD      Medicare Screening Tests and Risk Assessments:  AW Clinical     ISAR:   Previous hospitalizations?:  No       Once in a Lifetime Medicare Screening:       Medicare Screening Tests and Risk Assessment:   AAA Risk Assessment    None Indicated:  Yes    Osteoporosis Risk Assessment    HIV Risk Assessment        Drug and Alcohol Use:   Tobacco use    Cigarettes:  never smoker    Tobacco use duration    Tobacco Cessation Readiness    Alcohol use    Alcohol use:  never    Alcohol Treatment Readiness   Illicit Drug Use    Drug use:  never        Diet & Exercise:   Diet   How many servings a day of the following:   Fruits and Vegetables:  0 Meat:  0, 1-2   Whole Grains:  1     Soda:  0   Coffee:  2 Tea:  0   Exercise    Do you currently exercise?:  currently not exercising       Cognitive Impairment Screening:   Cognitive Impairment Screening    Do you have difficulty learning or retaining new information?:  No    Do you have difficulty with reasoning?:  No        Functional Ability/Level of Safety:   Hearing    Hearing difficulties:  No    Hearing Impairment Assessment    Current Activities    Help needed with the folllowing:    Using the phone:  No Transportation:  Yes   Shopping:  No Preparing Meals:  No   Doing Housework:  No Doing Laundry:  No   Managing Medications:  No Managing Money:  No   ADL    Fall Risk   Have you fallen in the last 12 months?:  No Are you unsteady on your feet?:  No   Injury History       Home Safety:   Home Safety Risk Factors    Uneven floors:  No   Stairs or handrail saftey risk:  Yes Loose rugs:  No   Household clutter:  No Poor household lighting:  No   No grab bars in bathroom:  No        Advanced Directives:   Advanced Directives    Living Will:  No Durable POA for healthcare:  No   Advanced directive:  No    Patient's End of Life Decisions        Urinary Incontinence:       Glaucoma:            Provider Screening    No data filed        No exam data present  Reviewed Updated [facility] Prior Wellness Visits:   Last Medicare wellness visit information was reviewed, patient interviewed , no change since last AWV: none  Last Medicare wellness visit information was reviewed, patient interviewed and updates made to the record today : yes    Assessment and Plan:  1  Medicare annual wellness visit, subsequent       -  No concerns at this time  Patient update on all screening evaluation  Will set 3 month appointment for chronic conditions

## 2018-03-24 DIAGNOSIS — K21.9 GASTROESOPHAGEAL REFLUX DISEASE WITHOUT ESOPHAGITIS: Primary | ICD-10-CM

## 2018-03-24 PROCEDURE — 1101F PT FALLS ASSESS-DOCD LE1/YR: CPT | Performed by: FAMILY MEDICINE

## 2018-03-24 RX ORDER — SUCRALFATE ORAL 1 G/10ML
1 SUSPENSION ORAL 4 TIMES DAILY
Qty: 420 ML | Refills: 0 | Status: SHIPPED | OUTPATIENT
Start: 2018-03-24 | End: 2018-04-26 | Stop reason: SDUPTHER

## 2018-03-30 ENCOUNTER — HOSPITAL ENCOUNTER (OUTPATIENT)
Dept: RADIOLOGY | Facility: HOSPITAL | Age: 83
Discharge: HOME | End: 2018-03-30
Attending: OTOLARYNGOLOGY
Payer: COMMERCIAL

## 2018-03-30 DIAGNOSIS — J38.7 MASS OF LARYNX: ICD-10-CM

## 2018-03-30 PROCEDURE — 70540 MRI ORBIT/FACE/NECK W/O DYE: CPT

## 2018-04-03 ENCOUNTER — OFFICE VISIT (OUTPATIENT)
Dept: FAMILY MEDICINE CLINIC | Facility: CLINIC | Age: 83
End: 2018-04-03
Payer: COMMERCIAL

## 2018-04-03 VITALS — BODY MASS INDEX: 25.58 KG/M2 | HEIGHT: 62 IN | WEIGHT: 139 LBS

## 2018-04-03 DIAGNOSIS — M54.2 NECK PAIN: Primary | ICD-10-CM

## 2018-04-03 DIAGNOSIS — G89.29 CHRONIC BILATERAL THORACIC BACK PAIN: ICD-10-CM

## 2018-04-03 DIAGNOSIS — M54.6 CHRONIC BILATERAL THORACIC BACK PAIN: ICD-10-CM

## 2018-04-03 PROCEDURE — 99213 OFFICE O/P EST LOW 20 MIN: CPT | Performed by: FAMILY MEDICINE

## 2018-04-03 NOTE — PROGRESS NOTES
Yariel Houston 1934 female MRN: [id number]    Family Medicine Follow-up Visit    ASSESSMENT/PLAN  Problem List Items Addressed This Visit     Neck pain - Primary    Chronic thoracic back pain          Somatic dysfunction to cranial, cervical, thoracic regions  -OMT to these regions including MFR and ME  -Pt tolerated OMT well, mm tension greatly improved  -recommended conservative management including heat, stretching, tylenol as needed for soreness   -Pt will follow up as needed in the future for OMT    Future Appointments  Date Time Provider Valdemar Crocker   4/26/2018 4:40 PM Stevie Olszewski, MD S [facility]   5/21/2018 2:20 PM Magdaleno Bates DO [address] [place] [address]   7/23/2018 1:00 PM BE [facility]          SUBJECTIVE  CC: Follow-up      HPI:  Yariel Houston is a 80 y o  female who presents for follow up OMT for neck pain    Pt actually has no pain or concerns at this time, but came for OMT because she enjoys it  She and her son explain that after her last treatment her neck and upper back pain improved significantly        Review of Systems   Constitutional: Negative for chills, fatigue and fever  Respiratory: Negative for cough and shortness of breath  Cardiovascular: Negative for chest pain and palpitations  Gastrointestinal: Negative for abdominal pain, constipation, diarrhea, nausea and vomiting  Skin: Negative for rash         Historical Information   The patient history was reviewed as follows:    Past Medical History:   Diagnosis Date    Arthritis     Back pain     Benign essential hypertension     LAST ASSESSED: [id number]    Carotid artery stenosis     CKD (chronic kidney disease) stage 2, GFR 60-89 ml/min     Constipation, chronic     "drinks prune juice"    Diabetes mellitus [facility]     DMII [facility]diabetes mellitus, type 2) ([facility])     Epiglottitis     RESOLVED: [id number]    Gastritis     GERD (gastroesophageal reflux disease)     Gout     H/O blood clots     left lower leg,,,11 yrs ago    History of stenosis of renal artery     RESOLVED: [id number]    Hypercholesteremia     Hypercholesterolemia     Hyperlipidemia     Hyperparathyroidism ([address] )     Hypertension     Iliac artery stenosis, bilateral (HCC)     Presence of pessary     Renal artery stenosis (HCC)     Renal disorder     Renovascular hypertension     RESOLVED: [id number]    Segmental and somatic dysfunction of cervical region     RESOLVED: 63IJH3012    Segmental and somatic dysfunction of lumbar region     RESOLVED: 36NMO3247    Segmental and somatic dysfunction of pelvic region     RESOLVED: 41ASG7209    Segmental and somatic dysfunction of thoracic region     RESOLVED: 86QAH0052    Somatic dysfunction of abdominal region     RESOLVED: 99AQZ2816    Somatic dysfunction of head region     RESOLVED: 65NRS5884    Somatic dysfunction of lower extremities     RESOLVED: 34FMW5913    Somatic dysfunction of rib region     RESOLVED: 92YTX7718    Somatic dysfunction of thoracic region     RESOLVED: 41COJ7282    Somatic dysfunction of upper extremities     RESOLVED: 43JOF3110    Teeth missing      Past Surgical History:   Procedure Laterality Date    ANGIOPLASTY / STENTING ILIAC      BREAST LUMPECTOMY Left 09/06/2017    LAST ASSESSED: [id number]    BREAST SURGERY Left     biopsy    CATARACT EXTRACTION Bilateral     CHOLECYSTECTOMY      COLONOSCOPY      EYE SURGERY      ILIAC VEIN ANGIOPLASTY / STENTING Right     INITIAL STENOSIS: ONSET: [id number]    KNEE ARTHROSCOPY Right     KNEE SURGERY Right     ONSET: 96UWO6537    MN LARYNGOSCOPY,DIRCT,OP SCOP,EXC TUMR N/A 6/10/2016    Procedure: MICRO DIRECT LARYNGOSCOPY WITH VOCAL FOLD MASS EXCISION ;  Surgeon: Mark Howell MD;  [facility];  Service: ENT    MN MASTECTOMY, PARTIAL Left 9/6/2017    Procedure: LUMPECTOMY BREAST NEEDLE LOCALIZED WITH FAXITRON NEEDLE @ 0830;  Surgeon: Nathen Sacks, MD;  [facility]; Service: General    THROAT SURGERY      lump removed    TUBAL LIGATION      VASCULAR SURGERY       Family History   Problem Relation Age of Onset    Heart disease Brother     Heart disease Maternal Grandmother     Diabetes Other      of other type with arthropathy, without long term current use of insulin    Hypertension Other     No Known Problems Mother     Gout Family     Rheum arthritis Family     Lupus Family      systematic erythematosus    Heart disease Family     Stroke Family      syndrome    Stroke Maternal Uncle      syndrome    Stroke Maternal Aunt      syndrome    No Known Problems Father       Social History   History   Alcohol Use No     History   Drug Use No     History   Smoking Status    Former Smoker    Packs/day: 1 00    Years: 64 00    Quit date: 2004   Smokeless Tobacco    Never Used     Comment: quit in 2003 1 ppd x 50 years       Medications:     Current Outpatient Prescriptions:     Acetaminophen (TYLENOL) 325 MG CAPS, Take by mouth every 8 (eight) hours, Disp: , Rfl:     allopurinol (ZYLOPRIM) 100 mg tablet, TK 1 T PO D, Disp: , Rfl: 1    aspirin (ADULT ASPIRIN EC LOW STRENGTH) 81 mg EC tablet, Take 1 tablet by mouth daily, Disp: , Rfl:     calcitriol (ROCALTROL) 0 25 mcg capsule, Take 0 25 mcg by mouth once a week Pt takes every friday , Disp: , Rfl:     calcitriol (ROCALTROL) 0 25 mcg capsule, Take 1 capsule (0 25 mcg total) by mouth 2 (two) times a week, Disp: 10 capsule, Rfl: 5    calcium carbonate-vitamin D (OSCAL-D) 500 mg-200 units per tablet, Take 1 tablet by mouth daily with breakfast, Disp: , Rfl:     cyclobenzaprine (FLEXERIL) 5 mg tablet, Take 1 tablet by mouth 3 (three) times a day as needed, Disp: , Rfl:     fluticasone (FLONASE) 50 mcg/act nasal spray, 1 spray into each nostril daily  , Disp: , Rfl:     gabapentin (NEURONTIN) 100 mg capsule, Take 100 mg by mouth daily at bedtime, Disp: , Rfl:     glimepiride (AMARYL) 1 mg tablet, TAKE 1/2 TABLET BY MOUTH DAILY WITH BREAKFAST, Disp: 90 tablet, Rfl: 0    Lancets (ONETOUCH ULTRASOFT) lancets, USE TWICE DAILY AS DIRECTED, Disp: 200 each, Rfl: 0    magnesium oxide (MAG-OX) 400 mg, Take 400 mg by mouth 2 (two) times a day, Disp: , Rfl:     metoprolol tartrate (LOPRESSOR) 50 mg tablet, TAKE 1 TABLET BY MOUTH TWICE DAILY, Disp: 180 tablet, Rfl: 0    NIFEdipine (NIFEDICAL XL) 30 mg 24 hr tablet, Take 30 mg by mouth daily, Disp: , Rfl:     ONE TOUCH ULTRA TEST test strip, 1 each by Other route 2 (two) times a day Use as instructed, Disp: 100 each, Rfl: 5    Oyster Shell (OYSTER CALCIUM) 500 MG TABS, Take 1 tablet by mouth daily, Disp: , Rfl:     pantoprazole (PROTONIX) 40 mg tablet, TAKE 1 TABLET(40 MG) BY MOUTH TWICE DAILY, Disp: 180 tablet, Rfl: 0    pravastatin (PRAVACHOL) 40 mg tablet, TAKE 1 TABLET BY MOUTH ON MONDAY AND THURSDAY ONLY WHILE ON COLCRYS  ONCE OFF COLCRYS RESUME TAKING MEDICATION DAILY, Disp: 90 tablet, Rfl: 0    RESTASIS 0 05 % ophthalmic emulsion, INSTILL 1 GTT INTO OU BID, Disp: , Rfl: 3    spironolactone (ALDACTONE) 25 mg tablet, Take 25 mg by mouth daily at bedtime, Disp: , Rfl:     sucralfate (CARAFATE) 1 g/10 mL suspension, Take 10 mL (1 g total) by mouth 4 (four) times a day, Disp: 420 mL, Rfl: 0  Allergies   Allergen Reactions    Clonidine Derivatives Swelling    Diflucan [Fluconazole] Rash    Flagyl [Metronidazole] Anaphylaxis    Latex Rash    Other Palpitations     IV DYE    Carvedilol Hives     And legs swelled    Lipitor [Atorvastatin]      Legs swelled       OBJECTIVE    Vitals:   Vitals:    04/03/18 1554   Weight: 63 kg (139 lb)   Height: 5' 1 8" (1 57 m)     Wt Readings from Last 3 Encounters:   04/03/18 63 kg (139 lb)   03/22/18 63 kg (138 lb 12 8 oz)   03/16/18 62 6 kg (138 lb)     Body mass index is 25 59 kg/m²  Physical Exam   Constitutional: She is oriented to person, place, and time  She appears well-developed and well-nourished  No distress     HENT: Head: Normocephalic and atraumatic  Eyes: Conjunctivae and EOM are normal  Right eye exhibits no discharge  Left eye exhibits no discharge  No scleral icterus  Neck: Normal range of motion  Neck supple  No tracheal deviation present  Pulmonary/Chest: Effort normal  No respiratory distress  Musculoskeletal: She exhibits no edema or deformity  Neurological: She is alert and oriented to person, place, and time  She exhibits normal muscle tone  Skin: Skin is warm and dry  No rash noted  She is not diaphoretic  No erythema  Psychiatric: She has a normal mood and affect  Judgment and thought content normal    Vitals reviewed  OMT Exam  Head:   Somatic Dysfunction:  Tissue Texture Changes  Severity :  1  Osteopathic Findings: suboccipital mm hypotonic, cool, mildly ropey, mildly edematous in frontal and maxillary sinuses  Treatment Method: MFR  Response: improved  Cervical:   Somatic Dysfunction:  Tissue Texture Changes, Asymmetry  and Restriction  Severity :  1  Osteopathic Findings: cervical paraspinal mm ropey, hypotonic, cool, thoracic outlet restricted to L and inferiorly   Treatment Method: MFR  Response: improved  Thoracic T1-4:   Somatic Dysfunction:  Tissue Texture Changes and Asymmetry   Severity :  1  Osteopathic Findings: thoracic paraspinal mm hypotonic and ropey, R>L  Treatment Method: MFR  Response: improved  Thoracic T5-9:   Somatic Dysfunction:  Tissue Texture Changes and Asymmetry   Severity :  1  Osteopathic Findings: thoracic paraspinal mm hypotonic and ropey, R>L  Treatment Method: MFR  Response: improved    Lab:  I have personally reviewed all pertinent results  Imaging:  I have personally reviewed all pertinent results        Trey Baumann DO, PGY-3  [facility]   4/4/2018

## 2018-04-04 PROBLEM — G89.29 CHRONIC THORACIC BACK PAIN: Status: ACTIVE | Noted: 2018-04-04

## 2018-04-04 PROBLEM — M54.6 CHRONIC THORACIC BACK PAIN: Status: ACTIVE | Noted: 2018-04-04

## 2018-04-18 ENCOUNTER — OFFICE VISIT (OUTPATIENT)
Dept: OBGYN CLINIC | Facility: CLINIC | Age: 83
End: 2018-04-18
Payer: COMMERCIAL

## 2018-04-18 VITALS — SYSTOLIC BLOOD PRESSURE: 170 MMHG | BODY MASS INDEX: 25.4 KG/M2 | WEIGHT: 138 LBS | DIASTOLIC BLOOD PRESSURE: 50 MMHG

## 2018-04-18 DIAGNOSIS — B37.3 CANDIDAL VULVOVAGINITIS: Primary | ICD-10-CM

## 2018-04-18 PROBLEM — N18.30 CKD (CHRONIC KIDNEY DISEASE), STAGE III (HCC): Status: ACTIVE | Noted: 2017-03-24

## 2018-04-18 PROBLEM — R92.8 ABNORMAL MAMMOGRAM: Status: ACTIVE | Noted: 2017-07-20

## 2018-04-18 PROBLEM — M79.18 CHRONIC MYOFASCIAL PAIN: Status: ACTIVE | Noted: 2017-04-26

## 2018-04-18 PROBLEM — D05.02 BREAST NEOPLASM, TIS (LCIS), LEFT: Status: ACTIVE | Noted: 2017-08-15

## 2018-04-18 PROBLEM — R50.9 FEVER OF UNKNOWN ORIGIN: Status: ACTIVE | Noted: 2017-08-15

## 2018-04-18 PROBLEM — G89.4 PAIN SYNDROME, CHRONIC: Status: ACTIVE | Noted: 2017-04-26

## 2018-04-18 PROBLEM — M79.2 NEUROPATHIC PAIN, LEG, RIGHT: Status: ACTIVE | Noted: 2017-03-13

## 2018-04-18 PROBLEM — M1A.39X1 CHRONIC GOUT DUE TO RENAL IMPAIRMENT OF MULTIPLE SITES WITH TOPHUS: Status: ACTIVE | Noted: 2017-08-02

## 2018-04-18 PROBLEM — M15.0 PRIMARY GENERALIZED (OSTEO)ARTHRITIS: Status: ACTIVE | Noted: 2017-08-02

## 2018-04-18 PROBLEM — R10.13 EPIGASTRIC PAIN: Status: ACTIVE | Noted: 2017-07-10

## 2018-04-18 PROBLEM — M10.9 GOUT: Status: ACTIVE | Noted: 2017-10-24

## 2018-04-18 PROBLEM — I73.9 PAD (PERIPHERAL ARTERY DISEASE) (HCC): Status: ACTIVE | Noted: 2017-01-30

## 2018-04-18 PROBLEM — I65.23 BILATERAL CAROTID ARTERY STENOSIS: Status: ACTIVE | Noted: 2018-01-10

## 2018-04-18 PROBLEM — G89.29 CHRONIC MYOFASCIAL PAIN: Status: ACTIVE | Noted: 2017-04-26

## 2018-04-18 PROCEDURE — 99202 OFFICE O/P NEW SF 15 MIN: CPT | Performed by: OBSTETRICS & GYNECOLOGY

## 2018-04-18 NOTE — PROGRESS NOTES
Patient is here for complaint about burning and since recently also itching on the vulvar region    Patient was seen last 4 years ago for a  Vaginal prolapse   she was subsequently seen by Dr Ricki Sanders  Till this  present day for pessary maintenance   patient denies any vaginal bleeding   patient has some relieved with use of Vagisil     physical exam:    Pessary in place   pessary was removed   normal vulva vagina no erosions no irritation or inflammation no vaginal discharge   healthy cervix   normal size anteverted flexed uterus   no adnexal masses or tenderness     impression vulvovaginitis possible  Candida   plan treat with 7 days of Terazol

## 2018-04-26 ENCOUNTER — OFFICE VISIT (OUTPATIENT)
Dept: FAMILY MEDICINE CLINIC | Facility: CLINIC | Age: 83
End: 2018-04-26
Payer: COMMERCIAL

## 2018-04-26 VITALS
DIASTOLIC BLOOD PRESSURE: 60 MMHG | TEMPERATURE: 97.6 F | WEIGHT: 137.6 LBS | RESPIRATION RATE: 12 BRPM | BODY MASS INDEX: 24.38 KG/M2 | HEIGHT: 63 IN | SYSTOLIC BLOOD PRESSURE: 160 MMHG | HEART RATE: 74 BPM

## 2018-04-26 DIAGNOSIS — K21.9 GASTROESOPHAGEAL REFLUX DISEASE WITHOUT ESOPHAGITIS: ICD-10-CM

## 2018-04-26 DIAGNOSIS — E11.9 TYPE 2 DIABETES MELLITUS WITHOUT COMPLICATION, WITHOUT LONG-TERM CURRENT USE OF INSULIN (HCC): ICD-10-CM

## 2018-04-26 DIAGNOSIS — H10.31 ACUTE BACTERIAL CONJUNCTIVITIS OF RIGHT EYE: Primary | ICD-10-CM

## 2018-04-26 LAB — SL AMB POCT HEMOGLOBIN AIC: 6.1

## 2018-04-26 PROCEDURE — 99213 OFFICE O/P EST LOW 20 MIN: CPT | Performed by: FAMILY MEDICINE

## 2018-04-26 PROCEDURE — 83036 HEMOGLOBIN GLYCOSYLATED A1C: CPT | Performed by: FAMILY MEDICINE

## 2018-04-26 RX ORDER — OFLOXACIN 3 MG/ML
1 SOLUTION/ DROPS OPHTHALMIC 4 TIMES DAILY
Qty: 5 ML | Refills: 0 | Status: SHIPPED | OUTPATIENT
Start: 2018-04-26 | End: 2018-05-03

## 2018-04-26 RX ORDER — SUCRALFATE ORAL 1 G/10ML
1 SUSPENSION ORAL 4 TIMES DAILY
Qty: 420 ML | Refills: 0 | Status: SHIPPED | OUTPATIENT
Start: 2018-04-26 | End: 2018-07-18 | Stop reason: SDUPTHER

## 2018-04-26 RX ORDER — OMEPRAZOLE 40 MG/1
40 CAPSULE, DELAYED RELEASE ORAL 2 TIMES DAILY
Qty: 60 CAPSULE | Refills: 3 | Status: SHIPPED | OUTPATIENT
Start: 2018-04-26 | End: 2018-05-03 | Stop reason: SDUPTHER

## 2018-04-26 NOTE — PROGRESS NOTES
Renzo Bob 1934 female MRN: [id number]    Family Medicine Follow-up Visit    ASSESSMENT/PLAN  Renzo Bob is a 80 y o  female  presents to the office for     1  Dm type 2  - A1c-> 6 1 goal of 8 3  -Given that the patient has symptoms of hypoglycemia will stop glimepiride for the time being and monitor the patient for 2 weeks with her blood sugars  2 Right eye conjunctivitis  - Started on Ofloxacin 2 drops for 7 days  - encourage the patient to call her ophthalmologist, or call her insurance to establish care with a different ophthalmologist who takes her insurance  - no concerns of glaucoma at this time patient has previously suffers from dry eyes likely secondary to this    3  Gerd:   - patient has been taking omeprazole 40 mg b i d given worsening   She states that her GERD is finally under control  She has a GI specialist appointment in May  Recommend endoscopy for further evaluation  - will prescribe sucralfate      -currently under evaluation for laryngeal mass awaiting specialists recommendations    Disposition: Return to the clinic in 2 weeks for follow-up on right eye conjunctivitis      Future Appointments  Date Time Provider Valdemar Crocker   5/11/2018 3:00 PM Haider Diop, DO [address] [facility]   5/21/2018 2:20 PM Dawson Cornejo  [address] [place] [address]   7/23/2018 1:00 PM [facility]          SUBJECTIVE  CC: Follow-up      HPI:  Renzo Bob is a 80 y o  female presents to the office for 3 month follow-up  The patient states that she has been getting worked up for laryngeal mass  She recently had an MRI performed that she does not know the results for  She states that since she increased her GERD medication to omeprazole b i d  dosing she has had control of her reflux  She has an upcoming appointment with her GI specialist in May    The patient says that sucralfate state out on the counter and was spoiled therefore she will need a refill that medication  The patient has had 2-3 days of right eye pain with discharge  The patient denies any past medical history of glaucoma  She states that she called her ophthalmologist who no longer takes her insurance therefore she is currently trying to locate an ophthalmologist through her insurance  At this moment in time she denies any URI symptoms  She states that she just woke up 1 morning with the symptoms  Diabetes type 2:  Continues to take glimepiride  The patient states that she has had moments where her sugars have dropped to 70s and has become symptomatic with hypoglycemia  She recalls that last visit we discussed if her A1c continue to be 6 1 we would monitor her off the clip remember I would she believes that this is a good idea given the fact that she has been hypoglycemic  The patient denies any other complaints at this time  Review of Systems   Constitutional: Negative for activity change and appetite change  HENT: Positive for ear discharge  Negative for congestion, sinus pain, sinus pressure, sore throat and trouble swallowing  Respiratory: Negative for cough, chest tightness and shortness of breath  Cardiovascular: Negative for chest pain  Gastrointestinal: Negative for abdominal distention and abdominal pain  Musculoskeletal: Negative for arthralgias and back pain  Skin: Negative for rash  Neurological: Positive for dizziness  Positive dizziness when hypoglycemic   All other systems reviewed and are negative        Historical Information   The patient history was reviewed as follows:    Past Medical History:   Diagnosis Date    Arthritis     Back pain     Benign essential hypertension     LAST ASSESSED: [id number]    Carotid artery stenosis     CKD (chronic kidney disease) stage 2, GFR 60-89 ml/min     Constipation, chronic     "drinks prune juice"    Diabetes [facility]     DMII (diabetes mellitus, type 2) ([facility] )     Epiglottitis     RESOLVED: [id number]  Gastritis     GERD (gastroesophageal reflux disease)     Gout     H/O blood clots     left lower leg,,,11 yrs ago    History of stenosis of renal artery     RESOLVED: [id number]    Hypercholesteremia     Hypercholesterolemia     Hyperlipidemia     Hyperparathyroidism ([address] )     Hypertension     Iliac artery stenosis, bilateral (HCC)     Presence of pessary     Renal artery stenosis (HCC)     Renal disorder     Renovascular hypertension     RESOLVED: [id number]    Segmental and somatic dysfunction of cervical region     RESOLVED: [id number]    Segmental and somatic dysfunction of lumbar region     RESOLVED: 61LYT9335    Segmental and somatic dysfunction of pelvic region     RESOLVED: [id number]    Segmental and somatic dysfunction of thoracic region     RESOLVED: [id number]    Somatic dysfunction of abdominal region     RESOLVED: [id number]    Somatic dysfunction of head region     RESOLVED: [id number]    Somatic dysfunction of lower extremities     RESOLVED: 60YLN2513    Somatic dysfunction of rib region     RESOLVED: [id number]    Somatic dysfunction of thoracic region     RESOLVED: 66VLK8912    Somatic dysfunction of upper extremities     RESOLVED: [id number]    Teeth missing      Past Surgical History:   Procedure Laterality Date    ANGIOPLASTY / STENTING ILIAC      BREAST LUMPECTOMY Left 09/06/2017    LAST ASSESSED: [id number]    BREAST SURGERY Left     biopsy    CATARACT EXTRACTION Bilateral     CHOLECYSTECTOMY      COLONOSCOPY      EYE SURGERY      ILIAC VEIN ANGIOPLASTY / STENTING Right     INITIAL STENOSIS: ONSET: [id number]    KNEE ARTHROSCOPY Right     KNEE SURGERY Right     ONSET: [id number]    SD LARYNGOSCOPY,DIRCT,OP SCOP,EXC TUMR N/A 6/10/2016    Procedure: MICRO DIRECT LARYNGOSCOPY WITH VOCAL FOLD MASS EXCISION ;  Surgeon: Concepcion Greer MD;  [facility];  Service: ENT    SD MASTECTOMY, PARTIAL Left 9/6/2017    Procedure: LUMPECTOMY BREAST NEEDLE LOCALIZED WITH FAXITRON NEEDLE @ 5722;  Surgeon: Rochelle Bryant MD;  Location: [facility] OR;  Service: General    THROAT SURGERY      lump removed    TUBAL LIGATION      VASCULAR SURGERY       Family History   Problem Relation Age of Onset    Heart disease Brother     Heart disease Maternal Grandmother     Diabetes Other      of other type with arthropathy, without long term current use of insulin    Hypertension Other     No Known Problems Mother     Gout Family     Rheum arthritis Family     Lupus Family      systematic erythematosus    Heart disease Family     Stroke Family      syndrome    Stroke Maternal Uncle      syndrome    Stroke Maternal Aunt      syndrome    No Known Problems Father       Social History   History   Alcohol Use No     History   Drug Use No     History   Smoking Status    Former Smoker    Packs/day: 1 00    Years: 64 00    Quit date: 2004   Smokeless Tobacco    Never Used     Comment: quit in 2003 1 ppd x 50 years       Medications:     Current Outpatient Prescriptions:     Acetaminophen (TYLENOL) 325 MG CAPS, Take by mouth every 8 (eight) hours, Disp: , Rfl:     allopurinol (ZYLOPRIM) 100 mg tablet, TK 1 T PO D, Disp: , Rfl: 1    aspirin (ADULT ASPIRIN EC LOW STRENGTH) 81 mg EC tablet, Take 1 tablet by mouth daily, Disp: , Rfl:     calcitriol (ROCALTROL) 0 25 mcg capsule, Take 0 25 mcg by mouth once a week Pt takes every friday , Disp: , Rfl:     calcium carbonate-vitamin D (OSCAL-D) 500 mg-200 units per tablet, Take 1 tablet by mouth daily with breakfast, Disp: , Rfl:     fluticasone (FLONASE) 50 mcg/act nasal spray, 1 spray into each nostril daily  , Disp: , Rfl:     gabapentin (NEURONTIN) 100 mg capsule, Take 100 mg by mouth daily at bedtime, Disp: , Rfl:     glimepiride (AMARYL) 1 mg tablet, TAKE 1/2 TABLET BY MOUTH DAILY WITH BREAKFAST, Disp: 90 tablet, Rfl: 0    Lancets (ONETOUCH ULTRASOFT) lancets, USE TWICE DAILY AS DIRECTED, Disp: 200 each, Rfl: 0    magnesium oxide (MAG-OX) 400 mg, Take 400 mg by mouth 2 (two) times a day, Disp: , Rfl:     metoprolol tartrate (LOPRESSOR) 50 mg tablet, TAKE 1 TABLET BY MOUTH TWICE DAILY, Disp: 180 tablet, Rfl: 0    NIFEdipine (NIFEDICAL XL) 30 mg 24 hr tablet, Take 30 mg by mouth daily, Disp: , Rfl:     ONE TOUCH ULTRA TEST test strip, 1 each by Other route 2 (two) times a day Use as instructed, Disp: 100 each, Rfl: 5    Oyster Shell (OYSTER CALCIUM) 500 MG TABS, Take 1 tablet by mouth daily, Disp: , Rfl:     pantoprazole (PROTONIX) 40 mg tablet, TAKE 1 TABLET(40 MG) BY MOUTH TWICE DAILY, Disp: 180 tablet, Rfl: 0    pravastatin (PRAVACHOL) 40 mg tablet, TAKE 1 TABLET BY MOUTH ON MONDAY AND THURSDAY ONLY WHILE ON COLCRYS  ONCE OFF COLCRYS RESUME TAKING MEDICATION DAILY, Disp: 90 tablet, Rfl: 0    RESTASIS 0 05 % ophthalmic emulsion, INSTILL 1 GTT INTO OU BID, Disp: , Rfl: 3    spironolactone (ALDACTONE) 25 mg tablet, Take 25 mg by mouth daily at bedtime, Disp: , Rfl:     sucralfate (CARAFATE) 1 g/10 mL suspension, Take 10 mL (1 g total) by mouth 4 (four) times a day, Disp: 420 mL, Rfl: 0    terconazole (TERAZOL 7) 0 4 % vaginal cream, Insert 1 applicator into the vagina daily at bedtime, Disp: 45 g, Rfl: 0  Allergies   Allergen Reactions    Clonidine Derivatives Swelling    Diflucan [Fluconazole] Rash    Flagyl [Metronidazole] Anaphylaxis    Carvedilol Hives     And legs swelled    Latex Rash    Lipitor [Atorvastatin]      Legs swelled    Other Palpitations     IV DYE       OBJECTIVE    Vitals:   Vitals:    04/26/18 1628   BP: 160/60   BP Location: Right arm   Patient Position: Sitting   Cuff Size: Standard   Pulse: 74   Resp: 12   Temp: 97 6 °F (36 4 °C)   TempSrc: Tympanic   Weight: 62 4 kg (137 lb 9 6 oz)   Height: 5' 2 6" (1 59 m)           Physical Exam   Constitutional: She is oriented to person, place, and time  She appears well-developed and well-nourished  HENT:   Head: Normocephalic and atraumatic     Right Ear: External ear normal    Left Ear: External ear normal    Eyes: EOM are normal  Pupils are equal, round, and reactive to light  Right eye exhibits discharge  No foreign body present in the right eye  Left eye exhibits no discharge  Right conjunctiva is injected  Right eye exhibits normal extraocular motion and no nystagmus  Right pupil is round and reactive  Neck: Normal range of motion  Neck supple  Cardiovascular: Normal rate, regular rhythm, normal heart sounds and intact distal pulses  No murmur heard  Pulmonary/Chest: Effort normal and breath sounds normal  No respiratory distress  She has no wheezes  Abdominal: Soft  Bowel sounds are normal  She exhibits no distension  There is no tenderness  Musculoskeletal: Normal range of motion  She exhibits no edema  Neurological: She is alert and oriented to person, place, and time  Skin: Skin is warm and dry  No rash noted  Psychiatric: She has a normal mood and affect  Her behavior is normal  Judgment and thought content normal    Vitals reviewed           Magdaleno Roblero MD, PGY-3  [place] [address]

## 2018-04-27 ENCOUNTER — TELEPHONE (OUTPATIENT)
Dept: FAMILY MEDICINE CLINIC | Facility: CLINIC | Age: 83
End: 2018-04-27

## 2018-04-27 NOTE — TELEPHONE ENCOUNTER
Patient called triage line regarding refill of spironolactone  He saw you in office on 04/25/2018 and said that all the medications were refilled except for spironolactone  I did not see in your note anywhere to stop spironolactone but please can you follow-up  Thank you

## 2018-04-28 DIAGNOSIS — I10 ESSENTIAL HYPERTENSION: Primary | ICD-10-CM

## 2018-04-28 RX ORDER — SPIRONOLACTONE 25 MG/1
25 TABLET ORAL
Qty: 90 TABLET | Refills: 6 | Status: SHIPPED | OUTPATIENT
Start: 2018-04-28 | End: 2018-07-18 | Stop reason: SDUPTHER

## 2018-04-30 ENCOUNTER — TELEPHONE (OUTPATIENT)
Dept: FAMILY MEDICINE CLINIC | Facility: CLINIC | Age: 83
End: 2018-04-30

## 2018-05-01 DIAGNOSIS — N18.30 CHRONIC KIDNEY DISEASE, STAGE III (MODERATE) (HCC): ICD-10-CM

## 2018-05-03 DIAGNOSIS — K21.9 GASTROESOPHAGEAL REFLUX DISEASE WITHOUT ESOPHAGITIS: ICD-10-CM

## 2018-05-03 RX ORDER — OMEPRAZOLE 40 MG/1
CAPSULE, DELAYED RELEASE ORAL
Qty: 180 CAPSULE | Refills: 3 | Status: SHIPPED | OUTPATIENT
Start: 2018-05-03 | End: 2019-03-14 | Stop reason: SDUPTHER

## 2018-05-10 ENCOUNTER — OFFICE VISIT (OUTPATIENT)
Dept: FAMILY MEDICINE CLINIC | Facility: CLINIC | Age: 83
End: 2018-05-10
Payer: COMMERCIAL

## 2018-05-10 VITALS
HEIGHT: 62 IN | TEMPERATURE: 97.7 F | WEIGHT: 136.2 LBS | RESPIRATION RATE: 14 BRPM | DIASTOLIC BLOOD PRESSURE: 76 MMHG | BODY MASS INDEX: 25.06 KG/M2 | HEART RATE: 72 BPM | SYSTOLIC BLOOD PRESSURE: 118 MMHG

## 2018-05-10 DIAGNOSIS — E11.9 TYPE 2 DIABETES MELLITUS WITHOUT COMPLICATION, WITHOUT LONG-TERM CURRENT USE OF INSULIN (HCC): Primary | ICD-10-CM

## 2018-05-10 DIAGNOSIS — H10.9 CONJUNCTIVITIS OF RIGHT EYE, UNSPECIFIED CONJUNCTIVITIS TYPE: ICD-10-CM

## 2018-05-10 PROBLEM — N28.1 ACQUIRED COMPLEX CYST OF KIDNEY: Status: ACTIVE | Noted: 2018-05-10

## 2018-05-10 PROCEDURE — 99213 OFFICE O/P EST LOW 20 MIN: CPT | Performed by: FAMILY MEDICINE

## 2018-05-10 NOTE — PROGRESS NOTES
Montrell Maldonado 1934 female MRN: [id number]    Family Medicine Follow-up Visit    ASSESSMENT/PLAN  Montrell Maldonado is a 80 y o  female  Presents to the office for    1)  Right eye conjunctivitis:  -  Resolved at this time  Encouraged the patient to see Ophthalmology for health maintenance    2)  Type 2 diabetes well controlled without any complications:  -  At her last visit her A1c was 6 1  Given that the patient is 80years old the patient is at high risk of hypoglycemia  She was having hypoglycemic events with taking glimepiride  It was discontinued at that time  Patient has reported sugars greater than 200 to 3 times a week  At this time will like to continue the patient off meds for 3 months  If her A1c goes above 8 then will discuss about restarting a different agent such as Januvia,  However at this time I do not believe the patient needs any medications  Disposition: Return to the clinic in 3 months  for diabetes check    Future Appointments  Date Time Provider Valdemar Crocker   5/11/2018 3:00 PM Dottie Gan DO [facility]   5/21/2018 2:20 PM Junior Cole DO [address] [place] [address]   7/23/2018 1:00 PM [facility]          SUBJECTIVE  CC: pt  in office today for f/u well visit      HPI:  Montrell Maldonado is a 80 y o  female  Presents to a follow-up appointment for her right eye conjunctivitis  The patient states that she used antibiotics for 7 days and the symptoms resolved  She denies any blurry vision or headaches from the pain  -  Diabetes type 2:  Patient has stopped taking her medications as we discussed at her last visit  The patient reports to have 1 sugar greater than 200 postprandial after 2 hours  She is concerned that her sugar levels might increase    She denied any symptoms at the time of the level and stated that it decreased to 160 after couple more hours awaiting   - patient would like us to do an ear exam today to see if she has any cerumen      Denies any complaints or concerns today    Review of Systems   Constitutional: Negative for activity change and appetite change  HENT: Negative for congestion and sore throat  Respiratory: Negative for cough, chest tightness and shortness of breath  Cardiovascular: Negative for chest pain  Gastrointestinal: Negative for abdominal distention and abdominal pain  Musculoskeletal: Negative for arthralgias and back pain  Skin: Negative for rash  Neurological: Negative for dizziness  All other systems reviewed and are negative        Historical Information   The patient history was reviewed as follows:    Past Medical History:   Diagnosis Date    Arthritis     Back pain     Benign essential hypertension     LAST ASSESSED: [id number]    Carotid artery stenosis     CKD (chronic kidney disease) stage 2, GFR 60-89 ml/min     Constipation, chronic     "drinks prune juice"    [facility]     DMII (diabetes mellitus, type 2) ([facility]     Epiglottitis     RESOLVED: [id number]    Gastritis     GERD (gastroesophageal reflux disease)     Gout     H/O blood clots     left lower leg,,,11 yrs ago    History of stenosis of renal artery     RESOLVED: 29APA0067    Hypercholesteremia     Hypercholesterolemia     Hyperlipidemia     Hyperparathyroidism ([place] [address] )     Hypertension     Iliac artery stenosis, bilateral ([facility])     Presence of pessary     Renal artery stenosis ([place] [address] )     Renal disorder     Renovascular hypertension     RESOLVED: [id number]    Segmental and somatic dysfunction of cervical region     RESOLVED: [id number]    Segmental and somatic dysfunction of lumbar region     RESOLVED: [id number]    Segmental and somatic dysfunction of pelvic region     RESOLVED: 34BPN0043    Segmental and somatic dysfunction of thoracic region     RESOLVED: 66VPJ4289    Somatic dysfunction of abdominal region     RESOLVED: 94YYR9989    Somatic dysfunction of head region     RESOLVED: 11ADG2913    Somatic dysfunction of lower extremities     RESOLVED: 99SFA2556    Somatic dysfunction of rib region     RESOLVED: 42SHY1683    Somatic dysfunction of thoracic region     RESOLVED: 79HZY0548    Somatic dysfunction of upper extremities     RESOLVED: 96ZGJ9993    Teeth missing      Past Surgical History:   Procedure Laterality Date    ANGIOPLASTY / STENTING ILIAC      BREAST LUMPECTOMY Left 09/06/2017    LAST ASSESSED: [id number]    BREAST SURGERY Left     biopsy    CATARACT EXTRACTION Bilateral     CHOLECYSTECTOMY      COLONOSCOPY      EYE SURGERY      ILIAC VEIN ANGIOPLASTY / STENTING Right     INITIAL STENOSIS: ONSET: [id number]    KNEE ARTHROSCOPY Right     KNEE SURGERY Right     ONSET: [id number]    UT LARYNGOSCOPY,DIRCT,OP SCOP,EXC TUMR N/A 6/10/2016    Procedure: MICRO DIRECT LARYNGOSCOPY WITH VOCAL FOLD MASS EXCISION ;  Surgeon: Marva Melo MD;  [facility];  Service: ENT    UT MASTECTOMY, PARTIAL Left 9/6/2017    Procedure: LUMPECTOMY BREAST NEEDLE LOCALIZED WITH FAXITRON NEEDLE @ 0830;  Surgeon: José Manuel Bonilla MD;  [facility];  Service: General    THROAT SURGERY      lump removed    TUBAL LIGATION      VASCULAR SURGERY       Family History   Problem Relation Age of Onset    Heart disease Brother     Heart disease Maternal Grandmother     Diabetes Other      of other type with arthropathy, without long term current use of insulin    Hypertension Other     No Known Problems Mother     Gout Family     Rheum arthritis Family     Lupus Family      systematic erythematosus    Heart disease Family     Stroke Family      syndrome    Stroke Maternal Uncle      syndrome    Stroke Maternal Aunt      syndrome    No Known Problems Father       Social History   History   Alcohol Use No     History   Drug Use No     History   Smoking Status    Former Smoker    Packs/day: 1 00    Years: 64 00    Quit date: 2004   Smokeless Tobacco    Never Used Comment: quit in 2003 1 ppd x 50 years       Medications:     Current Outpatient Prescriptions:     Acetaminophen (TYLENOL) 325 MG CAPS, Take by mouth every 8 (eight) hours, Disp: , Rfl:     allopurinol (ZYLOPRIM) 100 mg tablet, TK 1 T PO D, Disp: , Rfl: 1    aspirin (ADULT ASPIRIN EC LOW STRENGTH) 81 mg EC tablet, Take 1 tablet by mouth daily, Disp: , Rfl:     calcitriol (ROCALTROL) 0 25 mcg capsule, Take 0 25 mcg by mouth once a week Pt takes every friday , Disp: , Rfl:     calcium carbonate-vitamin D (OSCAL-D) 500 mg-200 units per tablet, Take 1 tablet by mouth daily with breakfast, Disp: , Rfl:     fluticasone (FLONASE) 50 mcg/act nasal spray, 1 spray into each nostril daily  , Disp: , Rfl:     gabapentin (NEURONTIN) 100 mg capsule, Take 100 mg by mouth daily at bedtime, Disp: , Rfl:     Lancets (ONETOUCH ULTRASOFT) lancets, USE TWICE DAILY AS DIRECTED, Disp: 200 each, Rfl: 0    magnesium oxide (MAG-OX) 400 mg, Take 400 mg by mouth 2 (two) times a day, Disp: , Rfl:     metoprolol tartrate (LOPRESSOR) 50 mg tablet, TAKE 1 TABLET BY MOUTH TWICE DAILY, Disp: 180 tablet, Rfl: 0    NIFEdipine (NIFEDICAL XL) 30 mg 24 hr tablet, Take 30 mg by mouth daily, Disp: , Rfl:     omeprazole (PriLOSEC) 40 MG capsule, TAKE 1 CAPSULE(40 MG) BY MOUTH TWICE DAILY, Disp: 180 capsule, Rfl: 3    ONE TOUCH ULTRA TEST test strip, 1 each by Other route 2 (two) times a day Use as instructed, Disp: 100 each, Rfl: 5    Oyster Shell (OYSTER CALCIUM) 500 MG TABS, Take 1 tablet by mouth daily, Disp: , Rfl:     pantoprazole (PROTONIX) 40 mg tablet, TAKE 1 TABLET(40 MG) BY MOUTH TWICE DAILY, Disp: 180 tablet, Rfl: 0    RESTASIS 0 05 % ophthalmic emulsion, INSTILL 1 GTT INTO OU BID, Disp: , Rfl: 3    spironolactone (ALDACTONE) 25 mg tablet, Take 1 tablet (25 mg total) by mouth daily at bedtime, Disp: 90 tablet, Rfl: 6    sucralfate (CARAFATE) 1 g/10 mL suspension, Take 10 mL (1 g total) by mouth 4 (four) times a day, Disp: 420 mL, Rfl: 0    terconazole (TERAZOL 7) 0 4 % vaginal cream, Insert 1 applicator into the vagina daily at bedtime, Disp: 45 g, Rfl: 0  Allergies   Allergen Reactions    Clonidine Derivatives Swelling    Diflucan [Fluconazole] Rash    Flagyl [Metronidazole] Anaphylaxis    Carvedilol Hives     And legs swelled    Latex Rash    Lipitor [Atorvastatin]      Legs swelled    Other Palpitations     IV DYE       OBJECTIVE    Vitals:   Vitals:    05/10/18 1553   BP: 118/76   BP Location: Left arm   Patient Position: Sitting   Cuff Size: Standard   Pulse: 72   Resp: 14   Temp: 97 7 °F (36 5 °C)   TempSrc: Oral   Weight: 61 8 kg (136 lb 3 2 oz)   Height: 5' 1 7" (1 567 m)           Physical Exam   Constitutional: She is oriented to person, place, and time  She appears well-developed and well-nourished  HENT:   Head: Normocephalic and atraumatic  No cerumen present   Eyes: Conjunctivae and EOM are normal  Pupils are equal, round, and reactive to light  Neck: Normal range of motion  Neck supple  Cardiovascular: Normal rate, regular rhythm, normal heart sounds and intact distal pulses  No murmur heard  Pulmonary/Chest: Effort normal and breath sounds normal  No respiratory distress  She has no wheezes  Abdominal: Soft  Bowel sounds are normal  She exhibits no distension  There is no tenderness  Musculoskeletal: Normal range of motion  She exhibits no edema  Neurological: She is alert and oriented to person, place, and time  Skin: Skin is warm and dry  No rash noted  Psychiatric: She has a normal mood and affect  Her behavior is normal  Judgment and thought content normal    Vitals reviewed           Angela Sifuentes MD, PGY-3  [place] [address]

## 2018-05-11 ENCOUNTER — OFFICE VISIT (OUTPATIENT)
Dept: NEPHROLOGY | Facility: CLINIC | Age: 83
End: 2018-05-11
Payer: COMMERCIAL

## 2018-05-11 VITALS — BODY MASS INDEX: 25.49 KG/M2 | HEIGHT: 61 IN | WEIGHT: 135 LBS

## 2018-05-11 DIAGNOSIS — N28.1 ACQUIRED COMPLEX CYST OF KIDNEY: ICD-10-CM

## 2018-05-11 DIAGNOSIS — R80.1 PERSISTENT PROTEINURIA: ICD-10-CM

## 2018-05-11 DIAGNOSIS — N18.30 CKD (CHRONIC KIDNEY DISEASE), STAGE III (HCC): Primary | ICD-10-CM

## 2018-05-11 DIAGNOSIS — I10 BENIGN ESSENTIAL HYPERTENSION: ICD-10-CM

## 2018-05-11 DIAGNOSIS — N25.81 SECONDARY HYPERPARATHYROIDISM, RENAL (HCC): ICD-10-CM

## 2018-05-11 PROCEDURE — 99213 OFFICE O/P EST LOW 20 MIN: CPT | Performed by: INTERNAL MEDICINE

## 2018-05-11 NOTE — ASSESSMENT & PLAN NOTE
Intact PTH had been 177 calcitriol was increased to Tuesday and Friday in March    Recheck  Intact PTH and phosphorus level before next visit

## 2018-05-11 NOTE — PROGRESS NOTES
Assessment/Plan:    CKD (chronic kidney disease), stage III  Her baseline Cr has been 1 3-1 5  From March 2018 Cr was 1 4  Recheck bmp before next visit    Secondary hyperparathyroidism ([address] )  Last intact pth was 177 and phos is within range  She should have increased the rocaltroal twice a week    Persistent proteinuria  Patient has a prior tubular range proteinuria, check p/c ratio  UA showed trace protein and no red cells    Secondary hyperparathyroidism, renal (HCC)   Intact PTH had been 177 calcitriol was increased to Tuesday and Friday in March  Recheck  Intact PTH and phosphorus level before next visit    Benign essential hypertension   Blood pressure has been stable she is running 130-150 at home  Blood pressure here is 136/62  No change in medications  Subjective:      Patient ID: Alyx [place] is a 80 y o  female  HPI     patient is here in follow-up with her  no acute complaints during his visit medications and lab work reviewed  Her blood pressure at home has been stable    Review of Systems   No chest pressure or shortness of Breath no abdominal discomfort no back pain urgency frequency    Objective:      Ht 5' 1" (1 549 m)   Wt 61 2 kg (135 lb)   LMP  (LMP Unknown)   BMI 25 51 kg/m²      blood pressure is 138/72 in the right arm       Physical Exam   Constitutional: She is oriented to person, place, and time  She appears well-nourished  Eyes: No scleral icterus  Neck: Neck supple  Cardiovascular: Normal rate and regular rhythm  Pulmonary/Chest: Breath sounds normal    Abdominal: Soft  Bowel sounds are normal    Musculoskeletal: She exhibits no edema  Lymphadenopathy:     She has no cervical adenopathy  Neurological: She is alert and oriented to person, place, and time  Skin: Skin is warm and dry

## 2018-05-11 NOTE — PATIENT INSTRUCTIONS
1  Your blood pressure is excellent and your kidney function has been stable    2   Thank you for coming in to see me today; it was nice visiting with you

## 2018-05-11 NOTE — ASSESSMENT & PLAN NOTE
Blood pressure has been stable she is running 130-150 at home  Blood pressure here is 136/62  No change in medications

## 2018-05-11 NOTE — ASSESSMENT & PLAN NOTE
Last intact pth was 177 and phos is within range   She should have increased the rocaltroal twice a week

## 2018-05-11 NOTE — ASSESSMENT & PLAN NOTE
Patient has a prior tubular range proteinuria, check p/c ratio   UA showed trace protein and no red cells

## 2018-05-21 ENCOUNTER — OFFICE VISIT (OUTPATIENT)
Dept: GASTROENTEROLOGY | Facility: CLINIC | Age: 83
End: 2018-05-21
Payer: COMMERCIAL

## 2018-05-21 VITALS
HEIGHT: 61 IN | DIASTOLIC BLOOD PRESSURE: 66 MMHG | WEIGHT: 135.8 LBS | BODY MASS INDEX: 25.64 KG/M2 | HEART RATE: 62 BPM | SYSTOLIC BLOOD PRESSURE: 158 MMHG | TEMPERATURE: 97.5 F

## 2018-05-21 DIAGNOSIS — K21.9 GASTROESOPHAGEAL REFLUX DISEASE WITHOUT ESOPHAGITIS: ICD-10-CM

## 2018-05-21 DIAGNOSIS — K59.04 CHRONIC IDIOPATHIC CONSTIPATION: Primary | ICD-10-CM

## 2018-05-21 PROCEDURE — 99214 OFFICE O/P EST MOD 30 MIN: CPT | Performed by: INTERNAL MEDICINE

## 2018-05-21 NOTE — LETTER
May 21, 2018     Matthew Glez MD  [facility] [address] [place] [address]    Patient: Kam Kelsey   YOB: 1934   Date of Visit: 5/21/2018       Dear Dr Solis Duran: Thank you for referring Kam Kelsey to me for evaluation  Below are my notes for this consultation  If you have questions, please do not hesitate to call me  I look forward to following your patient along with you           Sincerely,        Armani Moncada,         CC: Alberto Silveira MD

## 2018-05-21 NOTE — PROGRESS NOTES
[facility] - Outpatient Follow-up Note  Iris Mac 80 y o  female MRN: [id number]  Encounter: [id number]          ASSESSMENT AND PLAN:      1  Gastroesophageal reflux disease without esophagitis  -continue prilosec and carafate as needed  - no recent CBC so will reorder    2  Chronic idiopathic constipation  -continue prune juice as needed    3  Weight loss and abdominal pain   -will do EGD given her point tenderness in the epigastrium  And if neg will plan CT scan and/or colonoscopy  Will monitor for continued weight loss      Follow up in four months    ______________________________________________________________________    SUBJECTIVE:    Pt here for follow up  She has seen her primary doctor recently and is doing well  Taking PPI BID and carafate as needed  Recent 5 lb weight loss  Tolerating diet fine  Her abdominal pain is much improved but she feels discomfort or a lump near her stomach  REVIEW OF SYSTEMS IS OTHERWISE NEGATIVE        Historical Information   Past Medical History:   Diagnosis Date    Arthritis     Back pain     Benign essential hypertension     LAST ASSESSED: [id number]    Carotid artery stenosis     CKD (chronic kidney disease) stage 2, GFR 60-89 ml/min     Constipation, chronic     "drinks prune juice"    [facility]     DMII (diabetes mellitus, type 2) ([facility]     Epiglottitis     RESOLVED: [id number]    Gastritis     GERD (gastroesophageal reflux disease)     Gout     H/O blood clots     left lower leg,,,11 yrs ago    History of stenosis of renal artery     RESOLVED: [id number]    Hypercholesteremia     Hypercholesterolemia     Hyperlipidemia     [facility]roidism [facility]     Hypertension     Iliac artery stenosis, bilateral ([facility])     Presence of pessary     Ren[facility] artery stenosis [facility]     Renal disorder     Renovascular hypertension     RESOLVED: 89VRN5606    Segmental and somatic dysfunction of cervical region RESOLVED: 04NCY7035    Segmental and somatic dysfunction of lumbar region     RESOLVED: 45WZS4388    Segmental and somatic dysfunction of pelvic region     RESOLVED: 96WZC5902    Segmental and somatic dysfunction of thoracic region     RESOLVED: [id number]    Somatic dysfunction of abdominal region     RESOLVED: 01YPZ0721    Somatic dysfunction of head region     RESOLVED: 68ZHL1228    Somatic dysfunction of lower extremities     RESOLVED: 89UPF5112    Somatic dysfunction of rib region     RESOLVED: 42OVX2096    Somatic dysfunction of thoracic region     RESOLVED: 05SBZ1650    Somatic dysfunction of upper extremities     RESOLVED: [id number]    Teeth missing      Past Surgical History:   Procedure Laterality Date    ANGIOPLASTY / STENTING ILIAC      BREAST LUMPECTOMY Left 09/06/2017    LAST ASSESSED: [id number]    BREAST SURGERY Left     biopsy    CATARACT EXTRACTION Bilateral     CHOLECYSTECTOMY      COLONOSCOPY      EYE SURGERY      ILIAC VEIN ANGIOPLASTY / STENTING Right     INITIAL STENOSIS: ONSET: [id number]    KNEE ARTHROSCOPY Right     KNEE SURGERY Right     ONSET: [id number]    WA LARYNGOSCOPY,DIRCT,OP SCOP,EXC TUMR N/A 6/10/2016    Procedure: MICRO DIRECT LARYNGOSCOPY WITH VOCAL FOLD MASS EXCISION ;  Surgeon: Lorraine Chaudhary MD;  [facility];  Service: ENT    WA MASTECTOMY, PARTIAL Left 9/6/2017    Procedure: LUMPECTOMY BREAST NEEDLE LOCALIZED WITH FAXITRON NEEDLE @ 0830;  Surgeon: Rochelle Bryant MD;  Location: [facility];  Service: General    THROAT SURGERY      lump removed    TUBAL LIGATION      VASCULAR SURGERY       Social History   History   Alcohol Use No     History   Drug Use No     History   Smoking Status    Former Smoker    Packs/day: 1 00    Years: 64 00    Quit date: 2004   Smokeless Tobacco    Never Used     Comment: quit in 2003 1 ppd x 50 years     Family History   Problem Relation Age of Onset    Heart disease Brother     Heart disease Maternal Grandmother     Diabetes Other      of other type with arthropathy, without long term current use of insulin    Hypertension Other     No Known Problems Mother     Gout Family     Rheum arthritis Family     Lupus Family      systematic erythematosus    Heart disease Family     Stroke Family      syndrome    Stroke Maternal Uncle      syndrome    Stroke Maternal Aunt      syndrome    No Known Problems Father        Meds/Allergies       Current Outpatient Prescriptions:     Acetaminophen (TYLENOL) 325 MG CAPS    allopurinol (ZYLOPRIM) 100 mg tablet    aspirin (ADULT ASPIRIN EC LOW STRENGTH) 81 mg EC tablet    calcitriol (ROCALTROL) 0 25 mcg capsule    calcium carbonate-vitamin D (OSCAL-D) 500 mg-200 units per tablet    fluticasone (FLONASE) 50 mcg/act nasal spray    gabapentin (NEURONTIN) 100 mg capsule    Lancets (ONETOUCH ULTRASOFT) lancets    magnesium oxide (MAG-OX) 400 mg    metoprolol tartrate (LOPRESSOR) 50 mg tablet    NIFEdipine (NIFEDICAL XL) 30 mg 24 hr tablet    omeprazole (PriLOSEC) 40 MG capsule    ONE TOUCH ULTRA TEST test strip    Oyster Shell (OYSTER CALCIUM) 500 MG TABS    pantoprazole (PROTONIX) 40 mg tablet    RESTASIS 0 05 % ophthalmic emulsion    spironolactone (ALDACTONE) 25 mg tablet    sucralfate (CARAFATE) 1 g/10 mL suspension    terconazole (TERAZOL 7) 0 4 % vaginal cream    Allergies   Allergen Reactions    Clonidine Derivatives Swelling    Diflucan [Fluconazole] Rash    Flagyl [Metronidazole] Anaphylaxis    Carvedilol Hives     And legs swelled    Latex Rash    Lipitor [Atorvastatin]      Legs swelled    Other Palpitations     IV DYE           Objective     Blood pressure 158/66, pulse 62, temperature 97 5 °F (36 4 °C), temperature source Tympanic, height 5' 1" (1 549 m), weight 61 6 kg (135 lb 12 8 oz), not currently breastfeeding  Body mass index is 25 66 kg/m²        PHYSICAL EXAM:      General Appearance:   Alert, cooperative, no distress   HEENT:   Normocephalic, atraumatic, anicteric      Neck:  Supple, symmetrical, trachea midline   Lungs:   Clear to auscultation bilaterally; no rales, rhonchi or wheezing; respirations unlabored    Heart[de-identified]   Regular rate and rhythm; no murmur, rub, or gallop  Abdomen:   Soft, ttp in the epigastrium, non-distended; normal bowel sounds; no masses, no organomegaly    Genitalia:   Deferred    Rectal:   Deferred    Extremities:  No cyanosis, clubbing or edema    Pulses:  2+ and symmetric    Skin:  No jaundice, rashes, or lesions    Lymph nodes:  No palpable cervical lymphadenopathy        Lab Results:   No visits with results within 1 Day(s) from this visit  Latest known visit with results is:   Office Visit on 04/26/2018   Component Date Value     HGB A1C 04/26/2018 6 1          Radiology Results:   No results found

## 2018-06-03 DIAGNOSIS — I10 HYPERTENSION, UNSPECIFIED TYPE: ICD-10-CM

## 2018-06-04 RX ORDER — METOPROLOL TARTRATE 50 MG/1
TABLET, FILM COATED ORAL
Qty: 180 TABLET | Refills: 0 | Status: SHIPPED | OUTPATIENT
Start: 2018-06-04 | End: 2018-07-18 | Stop reason: SDUPTHER

## 2018-06-10 DIAGNOSIS — E78.5 HLD (HYPERLIPIDEMIA): ICD-10-CM

## 2018-06-11 RX ORDER — PRAVASTATIN SODIUM 40 MG
TABLET ORAL
Qty: 90 TABLET | Refills: 0 | Status: SHIPPED | OUTPATIENT
Start: 2018-06-11 | End: 2018-10-05 | Stop reason: SDUPTHER

## 2018-06-27 DIAGNOSIS — E11.9 TYPE 2 DIABETES MELLITUS WITHOUT COMPLICATION (HCC): ICD-10-CM

## 2018-06-28 RX ORDER — LANCETS
EACH MISCELLANEOUS
Qty: 200 EACH | Refills: 9 | Status: SHIPPED | OUTPATIENT
Start: 2018-06-28 | End: 2019-08-02 | Stop reason: ALTCHOICE

## 2018-07-06 DIAGNOSIS — I10 ESSENTIAL HYPERTENSION: Primary | ICD-10-CM

## 2018-07-06 RX ORDER — NIFEDIPINE 30 MG/1
30 TABLET, EXTENDED RELEASE ORAL DAILY
Qty: 90 TABLET | Refills: 3 | Status: SHIPPED | OUTPATIENT
Start: 2018-07-06 | End: 2018-07-18 | Stop reason: SDUPTHER

## 2018-07-13 ENCOUNTER — TELEPHONE (OUTPATIENT)
Dept: FAMILY MEDICINE CLINIC | Facility: CLINIC | Age: 83
End: 2018-07-13

## 2018-07-16 ENCOUNTER — APPOINTMENT (OUTPATIENT)
Dept: LAB | Facility: HOSPITAL | Age: 83
End: 2018-07-16
Attending: INTERNAL MEDICINE
Payer: COMMERCIAL

## 2018-07-16 DIAGNOSIS — N18.30 CHRONIC KIDNEY DISEASE, STAGE III (MODERATE) (HCC): ICD-10-CM

## 2018-07-16 LAB
ALBUMIN SERPL BCP-MCNC: 3.7 G/DL (ref 3.5–5)
ALP SERPL-CCNC: 83 U/L (ref 46–116)
ALT SERPL W P-5'-P-CCNC: 18 U/L (ref 12–78)
ANION GAP SERPL CALCULATED.3IONS-SCNC: 7 MMOL/L (ref 4–13)
AST SERPL W P-5'-P-CCNC: 18 U/L (ref 5–45)
BACTERIA UR QL AUTO: ABNORMAL /HPF
BASOPHILS # BLD AUTO: 0.02 THOUSANDS/ΜL (ref 0–0.1)
BASOPHILS NFR BLD AUTO: 0 % (ref 0–1)
BILIRUB SERPL-MCNC: 0.39 MG/DL (ref 0.2–1)
BILIRUB UR QL STRIP: NEGATIVE
BUN SERPL-MCNC: 34 MG/DL (ref 5–25)
CALCIUM SERPL-MCNC: 9.6 MG/DL (ref 8.3–10.1)
CHLORIDE SERPL-SCNC: 106 MMOL/L (ref 100–108)
CLARITY UR: ABNORMAL
CO2 SERPL-SCNC: 25 MMOL/L (ref 21–32)
COLOR UR: YELLOW
CREAT SERPL-MCNC: 1.38 MG/DL (ref 0.6–1.3)
CREAT UR-MCNC: 74.3 MG/DL
EOSINOPHIL # BLD AUTO: 0.06 THOUSAND/ΜL (ref 0–0.61)
EOSINOPHIL NFR BLD AUTO: 1 % (ref 0–6)
ERYTHROCYTE [DISTWIDTH] IN BLOOD BY AUTOMATED COUNT: 13 % (ref 11.6–15.1)
GFR SERPL CREATININE-BSD FRML MDRD: 35 ML/MIN/1.73SQ M
GLUCOSE SERPL-MCNC: 112 MG/DL (ref 65–140)
GLUCOSE UR STRIP-MCNC: NEGATIVE MG/DL
HCT VFR BLD AUTO: 40.2 % (ref 34.8–46.1)
HGB BLD-MCNC: 12.3 G/DL (ref 11.5–15.4)
HGB UR QL STRIP.AUTO: NEGATIVE
HYALINE CASTS #/AREA URNS LPF: ABNORMAL /LPF
IMM GRANULOCYTES # BLD AUTO: 0.01 THOUSAND/UL (ref 0–0.2)
IMM GRANULOCYTES NFR BLD AUTO: 0 % (ref 0–2)
KETONES UR STRIP-MCNC: NEGATIVE MG/DL
LEUKOCYTE ESTERASE UR QL STRIP: ABNORMAL
LYMPHOCYTES # BLD AUTO: 2.89 THOUSANDS/ΜL (ref 0.6–4.47)
LYMPHOCYTES NFR BLD AUTO: 42 % (ref 14–44)
MAGNESIUM SERPL-MCNC: 1.9 MG/DL (ref 1.6–2.6)
MCH RBC QN AUTO: 28 PG (ref 26.8–34.3)
MCHC RBC AUTO-ENTMCNC: 30.6 G/DL (ref 31.4–37.4)
MCV RBC AUTO: 92 FL (ref 82–98)
MONOCYTES # BLD AUTO: 0.52 THOUSAND/ΜL (ref 0.17–1.22)
MONOCYTES NFR BLD AUTO: 8 % (ref 4–12)
NEUTROPHILS # BLD AUTO: 3.37 THOUSANDS/ΜL (ref 1.85–7.62)
NEUTS SEG NFR BLD AUTO: 49 % (ref 43–75)
NITRITE UR QL STRIP: NEGATIVE
NON-SQ EPI CELLS URNS QL MICRO: ABNORMAL /HPF
NRBC BLD AUTO-RTO: 0 /100 WBCS
PH UR STRIP.AUTO: 5.5 [PH] (ref 4.5–8)
PHOSPHATE SERPL-MCNC: 3.9 MG/DL (ref 2.3–4.1)
PLATELET # BLD AUTO: 267 THOUSANDS/UL (ref 149–390)
PMV BLD AUTO: 11.3 FL (ref 8.9–12.7)
POTASSIUM SERPL-SCNC: 5.5 MMOL/L (ref 3.5–5.3)
PROT SERPL-MCNC: 8.2 G/DL (ref 6.4–8.2)
PROT UR STRIP-MCNC: ABNORMAL MG/DL
PROT UR-MCNC: 55 MG/DL
PROT/CREAT UR: 0.74 MG/G{CREAT} (ref 0–0.1)
PTH-INTACT SERPL-MCNC: 122.6 PG/ML (ref 18.4–80.1)
RBC # BLD AUTO: 4.39 MILLION/UL (ref 3.81–5.12)
RBC #/AREA URNS AUTO: ABNORMAL /HPF
SODIUM SERPL-SCNC: 138 MMOL/L (ref 136–145)
SP GR UR STRIP.AUTO: 1.01 (ref 1–1.03)
UROBILINOGEN UR QL STRIP.AUTO: 0.2 E.U./DL
WBC # BLD AUTO: 6.87 THOUSAND/UL (ref 4.31–10.16)
WBC #/AREA URNS AUTO: ABNORMAL /HPF

## 2018-07-16 PROCEDURE — 83735 ASSAY OF MAGNESIUM: CPT

## 2018-07-16 PROCEDURE — 81001 URINALYSIS AUTO W/SCOPE: CPT

## 2018-07-16 PROCEDURE — 36415 COLL VENOUS BLD VENIPUNCTURE: CPT

## 2018-07-16 PROCEDURE — 83970 ASSAY OF PARATHORMONE: CPT

## 2018-07-16 PROCEDURE — 80053 COMPREHEN METABOLIC PANEL: CPT

## 2018-07-16 PROCEDURE — 82570 ASSAY OF URINE CREATININE: CPT

## 2018-07-16 PROCEDURE — 85025 COMPLETE CBC W/AUTO DIFF WBC: CPT

## 2018-07-16 PROCEDURE — 84156 ASSAY OF PROTEIN URINE: CPT

## 2018-07-16 PROCEDURE — 84100 ASSAY OF PHOSPHORUS: CPT

## 2018-07-17 ENCOUNTER — DOCUMENTATION (OUTPATIENT)
Dept: NEPHROLOGY | Facility: CLINIC | Age: 83
End: 2018-07-17

## 2018-07-17 DIAGNOSIS — E87.5 HYPERKALEMIA: Primary | ICD-10-CM

## 2018-07-18 ENCOUNTER — OFFICE VISIT (OUTPATIENT)
Dept: FAMILY MEDICINE CLINIC | Facility: CLINIC | Age: 83
End: 2018-07-18
Payer: COMMERCIAL

## 2018-07-18 VITALS
HEIGHT: 61 IN | SYSTOLIC BLOOD PRESSURE: 150 MMHG | BODY MASS INDEX: 25.11 KG/M2 | TEMPERATURE: 97.3 F | WEIGHT: 133 LBS | RESPIRATION RATE: 16 BRPM | DIASTOLIC BLOOD PRESSURE: 70 MMHG | HEART RATE: 68 BPM

## 2018-07-18 DIAGNOSIS — J30.9 ALLERGIC RHINITIS, UNSPECIFIED SEASONALITY, UNSPECIFIED TRIGGER: Primary | ICD-10-CM

## 2018-07-18 DIAGNOSIS — I10 ESSENTIAL HYPERTENSION: ICD-10-CM

## 2018-07-18 DIAGNOSIS — K21.9 GASTROESOPHAGEAL REFLUX DISEASE WITHOUT ESOPHAGITIS: ICD-10-CM

## 2018-07-18 DIAGNOSIS — I10 HYPERTENSION, UNSPECIFIED TYPE: ICD-10-CM

## 2018-07-18 PROBLEM — R30.0 DYSURIA: Status: RESOLVED | Noted: 2018-03-16 | Resolved: 2018-07-18

## 2018-07-18 PROBLEM — R10.13 EPIGASTRIC PAIN: Status: RESOLVED | Noted: 2017-07-10 | Resolved: 2018-07-18

## 2018-07-18 PROBLEM — G89.4 PAIN SYNDROME, CHRONIC: Status: RESOLVED | Noted: 2017-04-26 | Resolved: 2018-07-18

## 2018-07-18 PROCEDURE — 3725F SCREEN DEPRESSION PERFORMED: CPT | Performed by: FAMILY MEDICINE

## 2018-07-18 PROCEDURE — 99213 OFFICE O/P EST LOW 20 MIN: CPT | Performed by: FAMILY MEDICINE

## 2018-07-18 RX ORDER — FLUTICASONE PROPIONATE 50 MCG
1 SPRAY, SUSPENSION (ML) NASAL DAILY
Qty: 16 G | Refills: 0 | Status: SHIPPED | OUTPATIENT
Start: 2018-07-18 | End: 2018-09-18 | Stop reason: SDUPTHER

## 2018-07-18 RX ORDER — AMOXICILLIN 500 MG/1
CAPSULE ORAL
Refills: 0 | COMMUNITY
Start: 2018-07-12 | End: 2018-08-23

## 2018-07-18 RX ORDER — NIFEDIPINE 30 MG/1
30 TABLET, EXTENDED RELEASE ORAL DAILY
Qty: 90 TABLET | Refills: 0 | Status: SHIPPED | OUTPATIENT
Start: 2018-07-18 | End: 2019-01-16 | Stop reason: SDUPTHER

## 2018-07-18 RX ORDER — SUCRALFATE ORAL 1 G/10ML
1 SUSPENSION ORAL 4 TIMES DAILY
Qty: 420 ML | Refills: 0 | Status: SHIPPED | OUTPATIENT
Start: 2018-07-18 | End: 2019-01-16 | Stop reason: ALTCHOICE

## 2018-07-18 RX ORDER — METOPROLOL TARTRATE 50 MG/1
50 TABLET, FILM COATED ORAL 2 TIMES DAILY
Qty: 180 TABLET | Refills: 0 | Status: SHIPPED | OUTPATIENT
Start: 2018-07-18 | End: 2018-12-04 | Stop reason: SDUPTHER

## 2018-07-18 RX ORDER — GLIMEPIRIDE 1 MG/1
TABLET ORAL
Refills: 0 | COMMUNITY
Start: 2018-06-10 | End: 2018-09-12 | Stop reason: SDUPTHER

## 2018-07-18 RX ORDER — SPIRONOLACTONE 25 MG/1
25 TABLET ORAL
Qty: 90 TABLET | Refills: 0 | Status: SHIPPED | OUTPATIENT
Start: 2018-07-18 | End: 2018-10-15 | Stop reason: SDUPTHER

## 2018-07-18 NOTE — PROGRESS NOTES
Assessment/Plan:    Hypertension  Patient is a 79 yo F who comes to the office for a clearance for a dental procedure     -Dental procedure approved if BZ<122 systolic on dental appointment  -BP previously uncontrolled  Today 150/70  -No new medications added   -Previous history of dizziness and hypotension with higher does of the current medications  - Follow up in 1 month or previous scheduled appointment       Diagnoses and all orders for this visit:    Allergic rhinitis, unspecified seasonality, unspecified trigger  -     fluticasone (FLONASE) 50 mcg/act nasal spray; 1 spray into each nostril daily    Gastroesophageal reflux disease without esophagitis  -     sucralfate (CARAFATE) 1 g/10 mL suspension; Take 10 mL (1 g total) by mouth 4 (four) times a day    Essential hypertension  -     NIFEdipine (NIFEDICAL XL) 30 mg 24 hr tablet; Take 1 tablet (30 mg total) by mouth daily  -     spironolactone (ALDACTONE) 25 mg tablet; Take 1 tablet (25 mg total) by mouth daily at bedtime    Hypertension, unspecified type  -     metoprolol tartrate (LOPRESSOR) 50 mg tablet; Take 1 tablet (50 mg total) by mouth 2 (two) times a day        Subjective:      Patient ID: Grant Darby is a 80 y o  female  Patient is a 79 yo F who comes to the office for clearance for dental procedure  Patient has previous hx of uncontrolled blood pressure  At dental office she had 155/54 and 162/49  Today 150/70  The following portions of the patient's history were reviewed and updated as appropriate: past family history and past social history  Review of Systems   Constitutional: Negative for appetite change and fatigue  HENT: Positive for dental problem  Respiratory: Negative for cough, choking, chest tightness, shortness of breath and wheezing  Cardiovascular: Negative for chest pain, palpitations and leg swelling  Gastrointestinal: Positive for constipation   Negative for abdominal distention, abdominal pain, diarrhea, nausea and vomiting  Chronic constipation  Better with plum juice  Neurological: Negative for dizziness, syncope, speech difficulty, light-headedness, numbness and headaches  Psychiatric/Behavioral: Negative for agitation, behavioral problems and confusion  The patient is not nervous/anxious  Objective:      /70   Pulse 68   Temp (!) 97 3 °F (36 3 °C)   Resp 16   Ht 5' 1" (1 549 m)   Wt 60 3 kg (133 lb)   LMP  (LMP Unknown)   BMI 25 13 kg/m²          Physical Exam   Constitutional: She is oriented to person, place, and time  She appears well-developed and well-nourished  No distress  HENT:   Head: Normocephalic and atraumatic  Eyes: Conjunctivae and EOM are normal  Pupils are equal, round, and reactive to light  Right eye exhibits no discharge  Left eye exhibits no discharge  No scleral icterus  Neck: Normal range of motion  Neck supple  No tracheal deviation present  No thyromegaly present  Cardiovascular: Normal rate, regular rhythm, normal heart sounds and intact distal pulses  Exam reveals no gallop and no friction rub  No murmur heard  Pulmonary/Chest: Effort normal and breath sounds normal  No respiratory distress  She has no wheezes  She has no rales  Abdominal: Soft  Bowel sounds are normal  She exhibits distension  There is no tenderness  There is no rebound and no guarding  Musculoskeletal: She exhibits no edema, tenderness or deformity  Neurological: She is alert and oriented to person, place, and time  Skin: Skin is warm  She is not diaphoretic  Psychiatric: She has a normal mood and affect   Her behavior is normal  Judgment normal

## 2018-07-18 NOTE — ASSESSMENT & PLAN NOTE
Patient is a 79 yo F who comes to the office for a clearance for a dental procedure     -Dental procedure approved if GS<198 systolic on dental appointment  -BP previously uncontrolled  Today 150/70  -No new medications added   -Previous history of dizziness and hypotension with higher does of the current medications    - Follow up in 1 month or previous scheduled appointment

## 2018-07-23 ENCOUNTER — HOSPITAL ENCOUNTER (OUTPATIENT)
Dept: RADIOLOGY | Age: 83
Discharge: HOME | End: 2018-07-23
Payer: COMMERCIAL

## 2018-07-23 ENCOUNTER — TRANSCRIBE ORDERS (OUTPATIENT)
Dept: ADMINISTRATIVE | Age: 83
End: 2018-07-23

## 2018-07-23 DIAGNOSIS — Z12.39 BREAST CANCER SCREENING: Primary | ICD-10-CM

## 2018-07-23 DIAGNOSIS — Z12.31 ENCOUNTER FOR SCREENING MAMMOGRAM FOR MALIGNANT NEOPLASM OF BREAST: ICD-10-CM

## 2018-07-23 DIAGNOSIS — Z12.39 BREAST CANCER SCREENING: ICD-10-CM

## 2018-07-23 PROCEDURE — 77067 SCR MAMMO BI INCL CAD: CPT

## 2018-07-26 ENCOUNTER — ANESTHESIA (OUTPATIENT)
Dept: GASTROENTEROLOGY | Facility: HOSPITAL | Age: 83
End: 2018-07-26
Payer: COMMERCIAL

## 2018-07-26 ENCOUNTER — HOSPITAL ENCOUNTER (OUTPATIENT)
Facility: HOSPITAL | Age: 83
Setting detail: OUTPATIENT SURGERY
Discharge: HOME | End: 2018-07-26
Attending: INTERNAL MEDICINE | Admitting: INTERNAL MEDICINE
Payer: COMMERCIAL

## 2018-07-26 ENCOUNTER — ANESTHESIA EVENT (OUTPATIENT)
Dept: GASTROENTEROLOGY | Facility: HOSPITAL | Age: 83
End: 2018-07-26
Payer: COMMERCIAL

## 2018-07-26 VITALS
HEIGHT: 64 IN | HEART RATE: 64 BPM | WEIGHT: 134 LBS | OXYGEN SATURATION: 94 % | RESPIRATION RATE: 20 BRPM | SYSTOLIC BLOOD PRESSURE: 155 MMHG | TEMPERATURE: 97.7 F | DIASTOLIC BLOOD PRESSURE: 63 MMHG | BODY MASS INDEX: 22.88 KG/M2

## 2018-07-26 DIAGNOSIS — K21.9 GASTROESOPHAGEAL REFLUX DISEASE WITHOUT ESOPHAGITIS: ICD-10-CM

## 2018-07-26 PROCEDURE — 88305 TISSUE EXAM BY PATHOLOGIST: CPT | Performed by: PATHOLOGY

## 2018-07-26 PROCEDURE — 43239 EGD BIOPSY SINGLE/MULTIPLE: CPT | Performed by: INTERNAL MEDICINE

## 2018-07-26 RX ORDER — LIDOCAINE HYDROCHLORIDE 10 MG/ML
INJECTION, SOLUTION INFILTRATION; PERINEURAL AS NEEDED
Status: DISCONTINUED | OUTPATIENT
Start: 2018-07-26 | End: 2018-07-26 | Stop reason: SURG

## 2018-07-26 RX ORDER — ONDANSETRON 2 MG/ML
4 INJECTION INTRAMUSCULAR; INTRAVENOUS ONCE AS NEEDED
Status: CANCELLED | OUTPATIENT
Start: 2018-07-26

## 2018-07-26 RX ORDER — PROPOFOL 10 MG/ML
INJECTION, EMULSION INTRAVENOUS CONTINUOUS PRN
Status: DISCONTINUED | OUTPATIENT
Start: 2018-07-26 | End: 2018-07-26 | Stop reason: SURG

## 2018-07-26 RX ORDER — SODIUM CHLORIDE 9 MG/ML
INJECTION, SOLUTION INTRAVENOUS CONTINUOUS PRN
Status: DISCONTINUED | OUTPATIENT
Start: 2018-07-26 | End: 2018-07-26 | Stop reason: SURG

## 2018-07-26 RX ORDER — PROPOFOL 10 MG/ML
INJECTION, EMULSION INTRAVENOUS AS NEEDED
Status: DISCONTINUED | OUTPATIENT
Start: 2018-07-26 | End: 2018-07-26 | Stop reason: SURG

## 2018-07-26 RX ORDER — ALBUTEROL SULFATE 2.5 MG/3ML
2.5 SOLUTION RESPIRATORY (INHALATION) ONCE AS NEEDED
Status: CANCELLED | OUTPATIENT
Start: 2018-07-26

## 2018-07-26 RX ORDER — SODIUM CHLORIDE 9 MG/ML
125 INJECTION, SOLUTION INTRAVENOUS CONTINUOUS
Status: CANCELLED | OUTPATIENT
Start: 2018-07-26

## 2018-07-26 RX ORDER — MEPERIDINE HYDROCHLORIDE 25 MG/ML
12.5 INJECTION INTRAMUSCULAR; INTRAVENOUS; SUBCUTANEOUS AS NEEDED
Status: CANCELLED | OUTPATIENT
Start: 2018-07-26

## 2018-07-26 RX ADMIN — LIDOCAINE HYDROCHLORIDE 50 MG: 10 INJECTION, SOLUTION INFILTRATION; PERINEURAL at 15:28

## 2018-07-26 RX ADMIN — PROPOFOL 50 MG: 10 INJECTION, EMULSION INTRAVENOUS at 15:30

## 2018-07-26 RX ADMIN — PROPOFOL 100 MCG/KG/MIN: 10 INJECTION, EMULSION INTRAVENOUS at 15:30

## 2018-07-26 RX ADMIN — SODIUM CHLORIDE: 0.9 INJECTION, SOLUTION INTRAVENOUS at 15:27

## 2018-07-26 NOTE — ANESTHESIA POSTPROCEDURE EVALUATION
Post-Op Assessment Note      CV Status:  Stable    Mental Status:  Alert and awake    Hydration Status:  Euvolemic    PONV Controlled:  Controlled    Airway Patency:  Patent    Post Op Vitals Reviewed: Yes          Staff: CRNA           BP  124/53   Temp      Pulse  78   Resp   14   SpO2   98       Patient conversing with staff    Instructed to take deep breaths

## 2018-07-26 NOTE — OP NOTE
**** GI/ENDOSCOPY REPORT ****     PATIENT NAME: Jacobo Wood - VISIT ID:  Patient ID: [id number] YOB: 1934     INTRODUCTION: Esophagogastroduodenoscopy - A 80 female patient presents   for an outpatient Esophagogastroduodenoscopy at [facility]  INDICATIONS:     CONSENT: The benefits, risks, and alternatives to the procedure were   discussed and informed consent was obtained from the patient  PREPARATION:  EKG, pulse, pulse oximetry and blood pressure were monitored   throughout the procedure  ASA Classification: Class 3 - Patient has severe   systemic disturbance that may or may not be related to the disorder   requiring surgery  MEDICATIONS: See anesthesia report  PROCEDURE:  The endoscope was passed without difficulty through the mouth   under direct visualization and advanced to the 2nd portion of the   duodenum  The scope was withdrawn and the mucosa was carefully examined  FINDINGS:   Esophagus: The esophagus appeared to be normal   GE junction:   The GE junction appeared to be normal   Stomach: The stomach appeared to   be normal  A cold forceps biopsy was taken  The specimen was collected to   assess for H  pylori  On retroflexed view, the stomach appeared to be   normal   Duodenum: The 1st portion of the duodenum and 2nd portion of the   duodenum appeared to be normal      COMPLICATIONS: There were no complications  IMPRESSIONS: Normal esophagus  Normal GE junction  Normal stomach  Biopsy   taken  Normal 1st portion of the duodenum and 2nd portion of the duodenum  RECOMMENDATIONS:Follow pathology report and follow up at the office     ESTIMATED BLOOD LOSS:     PATHOLOGY SPECIMENS: Cold forceps random biopsy taken  Specimen collected   for an H  pylori antibody test      PROCEDURE CODES:     ICD-9 Codes:     ICD-10 Codes:     PERFORMED BY: JULIETA Segal  on 07/26/2018    Version 1, electronically signed by   JULIETA Saavedra  on   07/26/2018 at 15:47

## 2018-07-31 ENCOUNTER — APPOINTMENT (OUTPATIENT)
Dept: LAB | Facility: HOSPITAL | Age: 83
End: 2018-07-31
Attending: INTERNAL MEDICINE
Payer: COMMERCIAL

## 2018-07-31 DIAGNOSIS — D05.02 LOBULAR CARCINOMA IN SITU OF LEFT BREAST: ICD-10-CM

## 2018-07-31 DIAGNOSIS — E87.5 HYPERKALEMIA: ICD-10-CM

## 2018-07-31 DIAGNOSIS — K59.04 CHRONIC IDIOPATHIC CONSTIPATION: ICD-10-CM

## 2018-07-31 DIAGNOSIS — K21.9 GASTROESOPHAGEAL REFLUX DISEASE WITHOUT ESOPHAGITIS: ICD-10-CM

## 2018-07-31 LAB
ANION GAP SERPL CALCULATED.3IONS-SCNC: 6 MMOL/L (ref 4–13)
BASOPHILS # BLD AUTO: 0.02 THOUSANDS/ΜL (ref 0–0.1)
BASOPHILS NFR BLD AUTO: 0 % (ref 0–1)
BUN SERPL-MCNC: 34 MG/DL (ref 5–25)
CALCIUM SERPL-MCNC: 9.5 MG/DL (ref 8.3–10.1)
CHLORIDE SERPL-SCNC: 105 MMOL/L (ref 100–108)
CO2 SERPL-SCNC: 28 MMOL/L (ref 21–32)
CREAT SERPL-MCNC: 1.39 MG/DL (ref 0.6–1.3)
EOSINOPHIL # BLD AUTO: 0.07 THOUSAND/ΜL (ref 0–0.61)
EOSINOPHIL NFR BLD AUTO: 1 % (ref 0–6)
ERYTHROCYTE [DISTWIDTH] IN BLOOD BY AUTOMATED COUNT: 13.1 % (ref 11.6–15.1)
GFR SERPL CREATININE-BSD FRML MDRD: 35 ML/MIN/1.73SQ M
GLUCOSE P FAST SERPL-MCNC: 106 MG/DL (ref 65–99)
HCT VFR BLD AUTO: 39.4 % (ref 34.8–46.1)
HGB BLD-MCNC: 12.2 G/DL (ref 11.5–15.4)
IMM GRANULOCYTES # BLD AUTO: 0.01 THOUSAND/UL (ref 0–0.2)
IMM GRANULOCYTES NFR BLD AUTO: 0 % (ref 0–2)
LYMPHOCYTES # BLD AUTO: 2.86 THOUSANDS/ΜL (ref 0.6–4.47)
LYMPHOCYTES NFR BLD AUTO: 47 % (ref 14–44)
MCH RBC QN AUTO: 28.2 PG (ref 26.8–34.3)
MCHC RBC AUTO-ENTMCNC: 31 G/DL (ref 31.4–37.4)
MCV RBC AUTO: 91 FL (ref 82–98)
MONOCYTES # BLD AUTO: 0.46 THOUSAND/ΜL (ref 0.17–1.22)
MONOCYTES NFR BLD AUTO: 8 % (ref 4–12)
NEUTROPHILS # BLD AUTO: 2.65 THOUSANDS/ΜL (ref 1.85–7.62)
NEUTS SEG NFR BLD AUTO: 44 % (ref 43–75)
NRBC BLD AUTO-RTO: 0 /100 WBCS
PLATELET # BLD AUTO: 278 THOUSANDS/UL (ref 149–390)
PMV BLD AUTO: 11.9 FL (ref 8.9–12.7)
POTASSIUM SERPL-SCNC: 5.1 MMOL/L (ref 3.5–5.3)
RBC # BLD AUTO: 4.33 MILLION/UL (ref 3.81–5.12)
SODIUM SERPL-SCNC: 139 MMOL/L (ref 136–145)
WBC # BLD AUTO: 6.07 THOUSAND/UL (ref 4.31–10.16)

## 2018-07-31 PROCEDURE — 36415 COLL VENOUS BLD VENIPUNCTURE: CPT

## 2018-07-31 PROCEDURE — 80048 BASIC METABOLIC PNL TOTAL CA: CPT

## 2018-07-31 PROCEDURE — 85025 COMPLETE CBC W/AUTO DIFF WBC: CPT

## 2018-08-02 ENCOUNTER — HOSPITAL ENCOUNTER (OUTPATIENT)
Dept: MAMMOGRAPHY | Facility: CLINIC | Age: 83
Discharge: HOME | End: 2018-08-02
Payer: COMMERCIAL

## 2018-08-02 ENCOUNTER — HOSPITAL ENCOUNTER (OUTPATIENT)
Dept: ULTRASOUND IMAGING | Facility: CLINIC | Age: 83
Discharge: HOME | End: 2018-08-02
Payer: COMMERCIAL

## 2018-08-02 DIAGNOSIS — R92.8 ABNORMAL MAMMOGRAM: ICD-10-CM

## 2018-08-02 PROCEDURE — 76642 ULTRASOUND BREAST LIMITED: CPT

## 2018-08-02 PROCEDURE — G0279 TOMOSYNTHESIS, MAMMO: HCPCS

## 2018-08-02 PROCEDURE — 77065 DX MAMMO INCL CAD UNI: CPT

## 2018-08-02 NOTE — PROGRESS NOTES
Met with patient and Dr Tana Alexis  regarding recommendation for;      _____ RIGHT __x____LEFT      __x_Ultrasound guided  ______Stereotactic  Breast biopsy  ___x__Verbalized understanding  Blood thinners:  ___x__yes _____no  Pearly Buttner    Date stopped: ___n/a_______    Biopsy teaching sheet given:  ___x____yes ______no Jossue and Michael Krishnamurthy understands some English but her daughter Olga Garayting interpreted  Lena/Zoraida state she has hx of NIDDM, htn, reflux, neuropathy and stents in both legs

## 2018-08-10 ENCOUNTER — HOSPITAL ENCOUNTER (OUTPATIENT)
Dept: MAMMOGRAPHY | Facility: CLINIC | Age: 83
Discharge: HOME | End: 2018-08-10

## 2018-08-10 ENCOUNTER — HOSPITAL ENCOUNTER (OUTPATIENT)
Dept: ULTRASOUND IMAGING | Facility: CLINIC | Age: 83
Discharge: HOME | End: 2018-08-10
Payer: COMMERCIAL

## 2018-08-10 ENCOUNTER — HOSPITAL ENCOUNTER (OUTPATIENT)
Dept: ULTRASOUND IMAGING | Facility: CLINIC | Age: 83
Discharge: HOME | End: 2018-08-10
Admitting: RADIOLOGY
Payer: COMMERCIAL

## 2018-08-10 VITALS — DIASTOLIC BLOOD PRESSURE: 84 MMHG | SYSTOLIC BLOOD PRESSURE: 142 MMHG | HEART RATE: 80 BPM

## 2018-08-10 DIAGNOSIS — R92.8 ABNORMAL FINDINGS ON DIAGNOSTIC IMAGING OF BREAST: ICD-10-CM

## 2018-08-10 DIAGNOSIS — R92.8 ABNORMAL MAMMOGRAM: ICD-10-CM

## 2018-08-10 DIAGNOSIS — Z98.890 STATUS POST BREAST BIOPSY: ICD-10-CM

## 2018-08-10 PROCEDURE — 19083 BX BREAST 1ST LESION US IMAG: CPT

## 2018-08-10 PROCEDURE — 88361 TUMOR IMMUNOHISTOCHEM/COMPUT: CPT | Performed by: PATHOLOGY

## 2018-08-10 PROCEDURE — 88305 TISSUE EXAM BY PATHOLOGIST: CPT | Performed by: PATHOLOGY

## 2018-08-10 RX ORDER — LIDOCAINE HYDROCHLORIDE 10 MG/ML
5 INJECTION, SOLUTION INFILTRATION; PERINEURAL ONCE
Status: COMPLETED | OUTPATIENT
Start: 2018-08-10 | End: 2018-08-10

## 2018-08-10 RX ADMIN — LIDOCAINE HYDROCHLORIDE 5 ML: 10 INJECTION, SOLUTION INFILTRATION; PERINEURAL at 14:20

## 2018-08-10 NOTE — PROGRESS NOTES
Procedure type:    __X___ultrasound guided _____stereotactic    Breast:    ___X__Left _____Right    Location:     Needle:12ga Marquee    # of passes:4    Clip: Nicole(Cynarely)    Performed by:Dr Maritza Keating held for 5 minutes by:  Shawnee FieldsPCA)    Steri Strips:    __X___yes _____no    Band aid:    ___X__yes_____no    Tape and guaze:    _____yes _____no    Tolerated procedure:    ___X__yes _____no

## 2018-08-10 NOTE — PROGRESS NOTES
Ice pack given:    ___X__yes _____no    Discharge instructions signed by patient:    ___X__yes _____no    Discharge instructions given to patient:    __X___yes _____no    Discharged via:    ___X__amulatory    _____wheelchair    _____stretcher    Stable on discharge:    __X___yes ____no

## 2018-08-13 NOTE — PROGRESS NOTES
Post procedure call completed on 8/13/18 at 1504    Bleeding: _____yes __x___no    Pain: _____yes ___x___no    Redness/Swelling: ___x___yes ______no    Band aid removed: _____yes ___x__no    Steri-Strips intact: ___x___yes _____no  Pt and her male friend supplied post procedure information   Pt states she has bruising and swelling in her left breast

## 2018-08-14 ENCOUNTER — TELEPHONE (OUTPATIENT)
Dept: MAMMOGRAPHY | Facility: CLINIC | Age: 83
End: 2018-08-14

## 2018-08-15 ENCOUNTER — TELEPHONE (OUTPATIENT)
Dept: MAMMOGRAPHY | Facility: CLINIC | Age: 83
End: 2018-08-15

## 2018-08-15 PROBLEM — C50.912 MUCINOUS CARCINOMA OF BREAST, LEFT (HCC): Status: ACTIVE | Noted: 2018-08-15

## 2018-08-15 NOTE — PROGRESS NOTES
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

## 2018-08-16 DIAGNOSIS — I73.9 PERIPHERAL VASCULAR DISEASE (HCC): ICD-10-CM

## 2018-08-16 DIAGNOSIS — R10.30 LOWER ABDOMINAL PAIN: ICD-10-CM

## 2018-08-16 DIAGNOSIS — I65.29 OCCLUSION AND STENOSIS OF UNSPECIFIED CAROTID ARTERY: Primary | ICD-10-CM

## 2018-08-17 ENCOUNTER — OFFICE VISIT (OUTPATIENT)
Dept: SURGICAL ONCOLOGY | Facility: CLINIC | Age: 83
End: 2018-08-17
Payer: COMMERCIAL

## 2018-08-17 VITALS
HEIGHT: 64 IN | SYSTOLIC BLOOD PRESSURE: 140 MMHG | TEMPERATURE: 98.4 F | RESPIRATION RATE: 14 BRPM | BODY MASS INDEX: 23.08 KG/M2 | HEART RATE: 70 BPM | WEIGHT: 135.2 LBS | DIASTOLIC BLOOD PRESSURE: 70 MMHG

## 2018-08-17 DIAGNOSIS — C50.912 MUCINOUS CARCINOMA OF BREAST, LEFT (HCC): Primary | ICD-10-CM

## 2018-08-17 PROCEDURE — 99205 OFFICE O/P NEW HI 60 MIN: CPT | Performed by: SURGERY

## 2018-08-17 RX ORDER — OXYCODONE HYDROCHLORIDE AND ACETAMINOPHEN 5; 325 MG/1; MG/1
1 TABLET ORAL EVERY 4 HOURS PRN
Qty: 6 TABLET | Refills: 0 | Status: SHIPPED | OUTPATIENT
Start: 2018-08-17 | End: 2018-10-04

## 2018-08-17 NOTE — LETTER
August 17, 2018     Alberto Silveira MD  [address] [facility] [address]    Patient: Kam Kelsey   YOB: 1934   Date of Visit: 8/17/2018       Dear Dr Toño Garcia: Thank you for referring Kam Kelsey to me for evaluation  Below are my notes for this consultation  If you have questions, please do not hesitate to call me  I look forward to following your patient along with you  Sincerely,        Khang Ybarra MD        CC: No Recipients  Khang Ybarra MD  8/17/2018 11:12 AM  Sign at close encounter               Surgical Oncology Consult Note       [address] [place] [address]  1934  [id number]  [address]  [facility] [place] [id number]      Chief Complaint:     Chief Complaint   Patient presents with    Consult     New diagnosis left breast mucinous carcinoma, 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  Assessment and Plan:   Assessment/Plan   Patient presents with a new diagnosis of left breast cancer  Patient was diagnosed last year with 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  This tumor is a mucinous cancer in the upper inner quadrant  Patient presents now for an opinion regarding further management  Oncology History:        Mucinous carcinoma of breast, left ([address] )    8/10/2018 Biopsy     Left breast biopsy  Invasive mucinous  Grade 1      Her 2 1+            History of Present Illness: This is a 15-year-old woman who presents with a new diagnosis of left breast cancer  The patient went for a screening mammogram which demonstrated a new 1 2 cm mass  This was in a separate area from her previous biopsy  The tumor was at the 2 o'clock position 8 cm from nipple  This was biopsied and shown to be a mucinous carcinoma 1 2 cm in size grade 1 % % HER2 negative  Patient has no family history of breast cancer  She presents now for an opinion regarding further management  Review of Systems:   Review of Systems   Constitutional: Negative for activity change, appetite change and fatigue  HENT: Negative  Eyes: Negative  Respiratory: Negative for cough, shortness of breath and wheezing  Cardiovascular: Negative for chest pain and leg swelling  Gastrointestinal: Negative  Endocrine: Negative  Genitourinary: Negative  Musculoskeletal: Positive for arthralgias  No new changes or complaints of bone pain   Skin: Negative  Allergic/Immunologic: Negative  Neurological: Negative  Hematological: Negative  Psychiatric/Behavioral: Negative          Past Medical History:      Patient Active Problem List   Diagnosis    Renal artery atherosclerosis, bilateral (HCC)    Vocal cord mass    Iliac artery stenosis, bilateral (HCC)    [facility]roidism [facility]    Arthritis    Hypertension    EKG abnormalities    Anemia    Neck pain    Chronic thoracic back pain    Fall from other slipping, tripping, or stumbling    Abnormal EKG    Abnormal mammogram    Accelerated essential hypertension    Allergic rhinitis    Atherosclerosis of native artery of extremity with intermittent claudication ([facility])    Back pain    Benign essential hypertension    Bilateral carotid artery stenosis    Breast neoplasm, Tis (LCIS), left    Carotid stenosis, asymptomatic    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    Chronic myofascial pain    Chronic pain of lower extremity    CKD (chronic kidney disease), stage III    Constipation    Diabetic retinopathy ([address] )    DMII (diabetes mellitus, type 2) ([facility])    Dysphagia    Fever of unknown origin    Gastritis    Gout    Hypomagnesemia    Atherosclerosis of other specified arteries    Lymphadenopathy    Microalbuminuria    Nephrolithiasis    Neuropathic pain, leg, right    Osteoporosis    PAD [facility]peripheral artery disease) [facility]    Primary generalized (osteo)arthritis    Persistent proteinuria    Renal cyst    Secondary hyperparathyroidism, renal (HCC)    Vaginal prolapse    Vitamin D deficiency    Acquired complex cyst of kidney    Gastroesophageal reflux disease without esophagitis    Mucinous carcinoma of breast, left ([facility])        Past Medical History:   Diagnosis Date    Arthritis     Back pain     Benign essential hypertension     LAST ASSESSED: [id number]    Carotid artery stenosis     CKD (chronic kidney disease) stage 2, GFR 60-89 ml/min     Constipation, chronic     "drinks prune juice"    Diabetes mellitus ([facility] )     DMII (diabetes mellitus, type 2) ([facility]     Epiglottitis     RESOLVED: [id number]    Gastritis     GERD (gastroesophageal reflux disease)     Gout     H/O blood clots     left lower leg,,,11 yrs ago    History of stenosis of renal artery     RESOLVED: [id number]    Hypercholesteremia     Hypercholesterolemia     Hyperlipidemia     Hyperparathyroidism ([facility] )     Hypertension     Iliac artery stenosis, bilateral (HCC)     Mucinous carcinoma of breast, left ([facility] ) 8/15/2018    Presence of pessary     Renal artery stenosis ([facility]     Renal disorder     Renovascular hypertension     RESOLVED: [id number]    Segmental and somatic dysfunction of cervical region     RESOLVED: 48PEV5149    Segmental and somatic dysfunction of lumbar region     RESOLVED: [id number]    Segmental and somatic dysfunction of pelvic region     RESOLVED: [id number]    Segmental and somatic dysfunction of thoracic region     RESOLVED: [id number]    Somatic dysfunction of abdominal region     RESOLVED: [id number]    Somatic dysfunction of head region     RESOLVED: [id number]    Somatic dysfunction of lower extremities     RESOLVED: [id number]    Somatic dysfunction of rib region     RESOLVED: 11AHU5356    Somatic dysfunction of thoracic region     RESOLVED: [id number]    Somatic dysfunction of upper extremities     RESOLVED: [id number]    Teeth missing         Past Surgical History:   Procedure Laterality Date    ANGIOPLASTY / STENTING ILIAC      BREAST LUMPECTOMY Left 09/06/2017    LAST ASSESSED: [id number]    BREAST SURGERY Left     biopsy    CATARACT EXTRACTION Bilateral     CHOLECYSTECTOMY      COLONOSCOPY      EYE SURGERY      ILIAC VEIN ANGIOPLASTY / STENTING Right     INITIAL STENOSIS: ONSET: [id number]    KNEE ARTHROSCOPY Right     KNEE SURGERY Right     ONSET: [id number]    SC ESOPHAGOGASTRODUODENOSCOPY TRANSORAL DIAGNOSTIC N/A 7/26/2018    Procedure: ESOPHAGOGASTRODUODENOSCOPY (EGD); Surgeon: Magdaleno Bates DO;  [facility]; Service: Gastroenterology    SC LARYNGOSCOPY,DIRCT,OP [facility] N/A 6/10/2016    Procedure: MICRO DIRECT LARYNGOSCOPY WITH VOCAL FOLD MASS EXCISION ;  Surgeon: Leslie Belle MD;  [facility];  Service: ENT    SC MASTECTOMY, PARTIAL Left 9/6/2017    Procedure: LUMPECTOMY BREAST NEEDLE LOCALIZED WITH FAXITRON NEEDLE @ 0830;  Surgeon: Ayse Walters MD;  [facility];  Service: General    THROAT SURGERY      lump removed    TUBAL LIGATION      US GUIDED BREAST BIOPSY LEFT COMPLETE Left 8/10/2018    VASCULAR SURGERY          Family History   Problem Relation Age of Onset    Heart disease Brother     Heart disease Maternal Grandmother     Diabetes Other         of other type with arthropathy, without long term current use of insulin    Hypertension Other     No Known Problems Mother     Gout Family     Rheum arthritis Family     Lupus Family         systematic erythematosus    Heart disease Family     Stroke Family         syndrome    Stroke Maternal Uncle         syndrome    Stroke Maternal Aunt         syndrome    No Known Problems Father         Social History     Social History    Marital status:       Spouse name: N/A    Number of children: N/A    Years of education: N/A Occupational History    Not on file       Social History Main Topics    Smoking status: Former Smoker     Packs/day: 1 00     Years: 64 00     Quit date: 2004    Smokeless tobacco: Never Used      Comment: quit in 2003 1 ppd x 50 years    Alcohol use No    Drug use: No    Sexual activity: Not on file     Other Topics Concern    Not on file     Social History Narrative    Daily caffeine consumption, 1 serving a day         Current Outpatient Prescriptions:     Acetaminophen (TYLENOL) 325 MG CAPS, Take by mouth every 8 (eight) hours, Disp: , Rfl:     allopurinol (ZYLOPRIM) 100 mg tablet, TK 1 T PO D, Disp: , Rfl: 1    amoxicillin (AMOXIL) 500 mg capsule, TK 1 T PO Q 8 H, Disp: , Rfl: 0    aspirin (ADULT ASPIRIN EC LOW STRENGTH) 81 mg EC tablet, Take 1 tablet by mouth daily, Disp: , Rfl:     calcitriol (ROCALTROL) 0 25 mcg capsule, Take 0 25 mcg by mouth once a week Pt takes every friday , Disp: , Rfl:     calcium carbonate-vitamin D (OSCAL-D) 500 mg-200 units per tablet, Take 1 tablet by mouth daily with breakfast, Disp: , Rfl:     fluticasone (FLONASE) 50 mcg/act nasal spray, 1 spray into each nostril daily, Disp: 16 g, Rfl: 0    gabapentin (NEURONTIN) 100 mg capsule, Take 100 mg by mouth daily at bedtime, Disp: , Rfl:     glimepiride (AMARYL) 1 mg tablet, TK 1/2 T PO D WITH BREAKFAST, Disp: , Rfl: 0    Lancets (ONETOUCH ULTRASOFT) lancets, USE TWICE DAILY, Disp: 200 each, Rfl: 9    magnesium oxide (MAG-OX) 400 mg, Take 400 mg by mouth 2 (two) times a day, Disp: , Rfl:     metoprolol tartrate (LOPRESSOR) 50 mg tablet, Take 1 tablet (50 mg total) by mouth 2 (two) times a day, Disp: 180 tablet, Rfl: 0    NIFEdipine (NIFEDICAL XL) 30 mg 24 hr tablet, Take 1 tablet (30 mg total) by mouth daily, Disp: 90 tablet, Rfl: 0    omeprazole (PriLOSEC) 40 MG capsule, TAKE 1 CAPSULE(40 MG) BY MOUTH TWICE DAILY, Disp: 180 capsule, Rfl: 3    ONE TOUCH ULTRA TEST test strip, 1 each by Other route 2 (two) times a day Use as instructed, Disp: 100 each, Rfl: 5    Oyster Shell (OYSTER CALCIUM) 500 MG TABS, Take 1 tablet by mouth daily, Disp: , Rfl:     pantoprazole (PROTONIX) 40 mg tablet, TAKE 1 TABLET(40 MG) BY MOUTH TWICE DAILY, Disp: 180 tablet, Rfl: 0    pravastatin (PRAVACHOL) 40 mg tablet, TAKE 1 TABLET BY MOUTH ON MONDAY AND THURSDAY WHILE ON COLCRYS  ONCE OFF COLCRYS RESUME TAKING 1 TABLET BY MOUTH EVERY DAY, Disp: 90 tablet, Rfl: 0    RESTASIS 0 05 % ophthalmic emulsion, INSTILL 1 GTT INTO OU BID, Disp: , Rfl: 3    spironolactone (ALDACTONE) 25 mg tablet, Take 1 tablet (25 mg total) by mouth daily at bedtime, Disp: 90 tablet, Rfl: 0    sucralfate (CARAFATE) 1 g/10 mL suspension, Take 10 mL (1 g total) by mouth 4 (four) times a day, Disp: 420 mL, Rfl: 0    terconazole (TERAZOL 7) 0 4 % vaginal cream, Insert 1 applicator into the vagina daily at bedtime, Disp: 45 g, Rfl: 0     Allergies   Allergen Reactions    Clonidine Derivatives Swelling    Diflucan [Fluconazole] Rash    Flagyl [Metronidazole] Anaphylaxis    Carvedilol Hives     And legs swelled    Latex Rash    Lipitor [Atorvastatin]      Legs swelled    Other Palpitations     IV DYE       Physical Exam:     Vitals:    08/17/18 1009   BP: 140/70   Pulse: 70   Resp: 14   Temp: 98 4 °F (36 9 °C)     Physical Exam   Constitutional: She is oriented to person, place, and time  She appears well-developed and well-nourished  HENT:   Head: Normocephalic and atraumatic  Mouth/Throat: Oropharynx is clear and moist    Eyes: EOM are normal  Pupils are equal, round, and reactive to light  Neck: Normal range of motion  Neck supple  No JVD present  No tracheal deviation present  No thyromegaly present  Cardiovascular: Normal rate, regular rhythm, normal heart sounds and intact distal pulses  Exam reveals no gallop and no friction rub  No murmur heard  Pulmonary/Chest: Effort normal and breath sounds normal  No respiratory distress  She has no wheezes  She has no rales  Examination the right breast in both the sitting and supine position demonstrate no skin changes dominant masses or axillary adenopathy  Examination of the left breast demonstrates a lower outer quadrant incision  There is ecchymosis in the upper outer quadrant from her biopsy with a small underlying hematoma  There is no evidence of any axillary adenopathy  The remainder of the breast is free of masses  Abdominal: Soft  She exhibits no distension and no mass  There is no hepatomegaly  There is no tenderness  There is no rebound and no guarding  Musculoskeletal: Normal range of motion  She exhibits no edema or tenderness  Lymphadenopathy:     She has no cervical adenopathy  Neurological: She is alert and oriented to person, place, and time  No cranial nerve deficit  Skin: Skin is warm and dry  No rash noted  No erythema  Psychiatric: She has a normal mood and affect  Her behavior is normal    Vitals reviewed  Results:   Pathology:  I reviewed her pathology  Imaging  I reviewed her imaging  The patient does not have all of her studies on line  The patient had a 2nd clip placed at the time of her original biopsy to with the LCIS some films are missing those will be loaded I have talked to Radiology about this  This area is well away on the MLO view  Discussion/Summary:   Clinically this is a mucinous ER positive stage I left breast cancer  I have recommended a needle localization lumpectomy  Since she is clinically node-negative and at the age of 80 I have recommended not perform a sentinel lymph node  Also explained that she may not need radiation therapy but that would be decided afterwards  We also talked about the possibility of taken anti hormonal pill  We subsequent to the process of informed consent for left breast needle localization lumpectomy  All questions were answered the patient's satisfaction    This was translated through the language line, [id number]     Advance Care Planning/Advance Directives:  I discussed the disease status, treatment plans and follow-up with the patient

## 2018-08-17 NOTE — PROGRESS NOTES
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  Assessment and Plan:   Assessment/Plan   Patient presents with a new diagnosis of left breast cancer  Patient was diagnosed last year with 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  This tumor is a mucinous cancer in the upper inner quadrant  Patient presents now for an opinion regarding further management  Oncology History:        Mucinous carcinoma of breast, left ([address] )    8/10/2018 Biopsy     Left breast biopsy  Invasive mucinous  Grade 1      Her 2 1+            History of Present Illness: This is a 55-year-old woman who presents with a new diagnosis of left breast cancer  The patient went for a screening mammogram which demonstrated a new 1 2 cm mass  This was in a separate area from her previous biopsy  The tumor was at the 2 o'clock position 8 cm from nipple  This was biopsied and shown to be a mucinous carcinoma 1 2 cm in size grade 1 % % HER2 negative  Patient has no family history of breast cancer  She presents now for an opinion regarding further management  Review of Systems:   Review of Systems   Constitutional: Negative for activity change, appetite change and fatigue  HENT: Negative  Eyes: Negative  Respiratory: Negative for cough, shortness of breath and wheezing  Cardiovascular: Negative for chest pain and leg swelling  Gastrointestinal: Negative  Endocrine: Negative  Genitourinary: Negative  Musculoskeletal: Positive for arthralgias          No new changes or complaints of bone pain   Skin: Negative  Allergic/Immunologic: Negative  Neurological: Negative  Hematological: Negative  Psychiatric/Behavioral: Negative          Past Medical History:      Patient Active Problem List   Diagnosis    Renal artery atherosclerosis, bilateral (HCC)    Vocal cord mass    Iliac artery stenosis, bilateral (HCC)    Hyperparathyroidism ([address] )    Arthritis    Hypertension    EKG abnormalities    Anemia    Neck pain    Chronic thoracic back pain    Fall from other slipping, tripping, or stumbling    Abnormal EKG    Abnormal mammogram    Accelerated essential hypertension    Allergic rhinitis    Atherosclerosis of native artery of extremity with intermittent claudication ([facility])    Back pain    Benign essential hypertension    Bilateral carotid artery stenosis    Breast neoplasm, Tis (LCIS), left    Carotid stenosis, asymptomatic    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    Chronic myofascial pain    Chronic pain of lower extremity    CKD (chronic kidney disease), stage III    Constipation    Diabetic retinopathy ([address] )    DMII (diabetes mellitus, type 2) ([facility])    Dysphagia    Fever of unknown origin    Gastritis    Gout    Hypomagnesemia    Atherosclerosis of other specified arteries    Lymphadenopathy    Microalbuminuria    Nephrolithiasis    Neuropathic pain, leg, right    Osteoporosis    PAD (peripheral artery disease) ([address] )    Primary generalized (osteo)arthritis    Persistent proteinuria    Renal cyst    Secondary hyperparathyroidism, renal ([facility] )    Vaginal prolapse    Vitamin D deficiency    Acquired complex cyst of kidney    Gastroesophageal reflux disease without esophagitis    Mucinous carcinoma of breast, left ([facility])        Past Medical History:   Diagnosis Date    Arthritis     Back pain     Benign essential hypertension     LAST ASSESSED: [id number]    Carotid artery stenosis     CKD (chronic kidney disease) stage 2, GFR 60-89 ml/min     Constipation, chronic     "drinks prune juice"    Diabetes mellitus ([facility] )     DMII (diabetes mellitus, type 2) ([facility])     Epiglottitis     RESOLVED: [id number]    Gastritis     GERD (gastroesophageal reflux disease)     Gout     H/O blood clots     left lower leg,,,11 yrs ago    History of stenosis of renal artery     RESOLVED: [id number]    Hypercholesteremia     Hypercholesterolemia     Hyperlipidemia     Hyperparathyroidism ([facility] )     Hypertension     Iliac artery stenosis, bilateral (HCC)     Mucinous carcinoma of breast, left ([facility] ) 8/15/2018    Presence of pessary     Renal artery stenosis (HCC)     Renal disorder     Renovascular hypertension     RESOLVED: [id number]    Segmental and somatic dysfunction of cervical region     RESOLVED: [id number]    Segmental and somatic dysfunction of lumbar region     RESOLVED: [id number]    Segmental and somatic dysfunction of pelvic region     RESOLVED: [id number]    Segmental and somatic dysfunction of thoracic region     RESOLVED: [id number]    Somatic dysfunction of abdominal region     RESOLVED: [id number]    Somatic dysfunction of head region     RESOLVED: 79HUT2399    Somatic dysfunction of lower extremities     RESOLVED: [id number]    Somatic dysfunction of rib region     RESOLVED: [id number]    Somatic dysfunction of thoracic region     RESOLVED: 71TGW4003    Somatic dysfunction of upper extremities     RESOLVED: [id number]    Teeth missing         Past Surgical History:   Procedure Laterality Date    ANGIOPLASTY / STENTING ILIAC      BREAST LUMPECTOMY Left 09/06/2017    LAST ASSESSED: [id number]    BREAST SURGERY Left     biopsy    CATARACT EXTRACTION Bilateral     CHOLECYSTECTOMY      COLONOSCOPY      EYE SURGERY      ILIAC VEIN ANGIOPLASTY / STENTING Right     INITIAL STENOSIS: ONSET: [id number]    KNEE ARTHROSCOPY Right     KNEE SURGERY Right     ONSET: [id number]    CA ESOPHAGOGASTRODUODENOSCOPY TRANSORAL DIAGNOSTIC N/A 7/26/2018    Procedure: ESOPHAGOGASTRODUODENOSCOPY (EGD); Surgeon: [employer], DO;  Location: [facility]; Service: Gastroenterology    TN LARYNGOSCOPY,DIRCT,OP [facility] N/A 6/10/2016    Procedure: MICRO DIRECT LARYNGOSCOPY WITH VOCAL FOLD MASS EXCISION ;  Surgeon: Karsten Vilchis MD;  Location[facility];  Service: ENT    TN MASTECTOMY, PARTIAL Left 9/6/2017    Procedure: LUMPECTOMY BREAST NEEDLE LOCALIZED WITH FAXITRON NEEDLE @ 0830;  Surgeon: Ricki Pereira MD;  Location: [facility];  Service: General    THROAT SURGERY      lump removed    TUBAL LIGATION      US GUIDED BREAST BIOPSY LEFT COMPLETE Left 8/10/2018    VASCULAR SURGERY          Family History   Problem Relation Age of Onset    Heart disease Brother     Heart disease Maternal Grandmother     Diabetes Other         of other type with arthropathy, without long term current use of insulin    Hypertension Other     No Known Problems Mother     Gout Family     Rheum arthritis Family     Lupus Family         systematic erythematosus    Heart disease Family     Stroke Family         syndrome    Stroke Maternal Uncle         syndrome    Stroke Maternal Aunt         syndrome    No Known Problems Father         Social History     Social History    Marital status:      Spouse name: N/A    Number of children: N/A    Years of education: N/A     Occupational History    Not on file       Social History Main Topics    Smoking status: Former Smoker     Packs/day: 1 00     Years: 64 00     Quit date: 2004    Smokeless tobacco: Never Used      Comment: quit in 2003 1 ppd x 50 years    Alcohol use No    Drug use: No    Sexual activity: Not on file     Other Topics Concern    Not on file     Social History Narrative    Daily caffeine consumption, 1 serving a day         Current Outpatient Prescriptions:     Acetaminophen (TYLENOL) 325 MG CAPS, Take by mouth every 8 (eight) hours, Disp: , Rfl:     allopurinol (ZYLOPRIM) 100 mg tablet, TK 1 T PO D, Disp: , Rfl: 1    amoxicillin (AMOXIL) 500 mg capsule, TK 1 T PO Q 8 H, Disp: , Rfl: 0    aspirin (ADULT ASPIRIN EC LOW STRENGTH) 81 mg EC tablet, Take 1 tablet by mouth daily, Disp: , Rfl:     calcitriol (ROCALTROL) 0 25 mcg capsule, Take 0 25 mcg by mouth once a week Pt takes every friday , Disp: , Rfl:     calcium carbonate-vitamin D (OSCAL-D) 500 mg-200 units per tablet, Take 1 tablet by mouth daily with breakfast, Disp: , Rfl:     fluticasone (FLONASE) 50 mcg/act nasal spray, 1 spray into each nostril daily, Disp: 16 g, Rfl: 0    gabapentin (NEURONTIN) 100 mg capsule, Take 100 mg by mouth daily at bedtime, Disp: , Rfl:     glimepiride (AMARYL) 1 mg tablet, TK 1/2 T PO D WITH BREAKFAST, Disp: , Rfl: 0    Lancets (ONETOUCH ULTRASOFT) lancets, USE TWICE DAILY, Disp: 200 each, Rfl: 9    magnesium oxide (MAG-OX) 400 mg, Take 400 mg by mouth 2 (two) times a day, Disp: , Rfl:     metoprolol tartrate (LOPRESSOR) 50 mg tablet, Take 1 tablet (50 mg total) by mouth 2 (two) times a day, Disp: 180 tablet, Rfl: 0    NIFEdipine (NIFEDICAL XL) 30 mg 24 hr tablet, Take 1 tablet (30 mg total) by mouth daily, Disp: 90 tablet, Rfl: 0    omeprazole (PriLOSEC) 40 MG capsule, TAKE 1 CAPSULE(40 MG) BY MOUTH TWICE DAILY, Disp: 180 capsule, Rfl: 3    ONE TOUCH ULTRA TEST test strip, 1 each by Other route 2 (two) times a day Use as instructed, Disp: 100 each, Rfl: 5    Oyster Shell (OYSTER CALCIUM) 500 MG TABS, Take 1 tablet by mouth daily, Disp: , Rfl:     pantoprazole (PROTONIX) 40 mg tablet, TAKE 1 TABLET(40 MG) BY MOUTH TWICE DAILY, Disp: 180 tablet, Rfl: 0    pravastatin (PRAVACHOL) 40 mg tablet, TAKE 1 TABLET BY MOUTH ON MONDAY AND THURSDAY WHILE ON COLCRYS   ONCE OFF COLCRYS RESUME TAKING 1 TABLET BY MOUTH EVERY DAY, Disp: 90 tablet, Rfl: 0    RESTASIS 0 05 % ophthalmic emulsion, INSTILL 1 GTT INTO OU BID, Disp: , Rfl: 3    spironolactone (ALDACTONE) 25 mg tablet, Take 1 tablet (25 mg total) by mouth daily at bedtime, Disp: 90 tablet, Rfl: 0    sucralfate (CARAFATE) 1 g/10 mL suspension, Take 10 mL (1 g total) by mouth 4 (four) times a day, Disp: 420 mL, Rfl: 0    terconazole (TERAZOL 7) 0 4 % vaginal cream, Insert 1 applicator into the vagina daily at bedtime, Disp: 45 g, Rfl: 0     Allergies   Allergen Reactions    Clonidine Derivatives Swelling    Diflucan [Fluconazole] Rash    Flagyl [Metronidazole] Anaphylaxis    Carvedilol Hives     And legs swelled    Latex Rash    Lipitor [Atorvastatin]      Legs swelled    Other Palpitations     IV DYE       Physical Exam:     Vitals:    08/17/18 1009   BP: 140/70   Pulse: 70   Resp: 14   Temp: 98 4 °F (36 9 °C)     Physical Exam   Constitutional: She is oriented to person, place, and time  She appears well-developed and well-nourished  HENT:   Head: Normocephalic and atraumatic  Mouth/Throat: Oropharynx is clear and moist    Eyes: EOM are normal  Pupils are equal, round, and reactive to light  Neck: Normal range of motion  Neck supple  No JVD present  Carotid bruit is present (on the left side, the patient and family are aware, they follow with Dr Austin Miller)  No tracheal deviation present  No thyromegaly present  Cardiovascular: Normal rate, regular rhythm, normal heart sounds and intact distal pulses  Exam reveals no gallop and no friction rub  No murmur heard  Pulmonary/Chest: Effort normal and breath sounds normal  No respiratory distress  She has no wheezes  She has no rales  Examination the right breast in both the sitting and supine position demonstrate no skin changes dominant masses or axillary adenopathy  Examination of the left breast demonstrates a lower outer quadrant incision  There is ecchymosis in the upper outer quadrant from her biopsy with a small underlying hematoma  There is no evidence of any axillary adenopathy  The remainder of the breast is free of masses  Abdominal: Soft  She exhibits no distension and no mass  There is no hepatomegaly  There is no tenderness  There is no rebound and no guarding  Musculoskeletal: Normal range of motion  She exhibits no edema or tenderness  Lymphadenopathy:     She has no cervical adenopathy  Neurological: She is alert and oriented to person, place, and time  No cranial nerve deficit  Skin: Skin is warm and dry  No rash noted  No erythema  Psychiatric: She has a normal mood and affect  Her behavior is normal    Vitals reviewed  Results:   Pathology:  I reviewed her pathology  Imaging  I reviewed her imaging  The patient does not have all of her studies on line  The patient had a 2nd clip placed at the time of her original biopsy to with the LCIS some films are missing those will be loaded I have talked to Radiology about this  This area is well away on the MLO view  Discussion/Summary:   Clinically this is a mucinous ER positive stage I left breast cancer  I have recommended a needle localization lumpectomy  Since she is clinically node-negative and at the age of 80 I have recommended not perform a sentinel lymph node  Also explained that she may not need radiation therapy but that would be decided afterwards  We also talked about the possibility of taken anti hormonal pill  We subsequent to the process of informed consent for left breast needle localization lumpectomy  All questions were answered the patient's satisfaction  This was translated through the language line, [id number]     Advance Care Planning/Advance Directives:  I discussed the disease status, treatment plans and follow-up with the patient

## 2018-08-21 ENCOUNTER — HOSPITAL ENCOUNTER (OUTPATIENT)
Dept: RADIOLOGY | Facility: HOSPITAL | Age: 83
Discharge: HOME | End: 2018-08-21
Payer: COMMERCIAL

## 2018-08-21 ENCOUNTER — APPOINTMENT (OUTPATIENT)
Dept: LAB | Facility: HOSPITAL | Age: 83
End: 2018-08-21
Attending: SURGERY
Payer: COMMERCIAL

## 2018-08-21 DIAGNOSIS — C50.912 MUCINOUS CARCINOMA OF BREAST, LEFT (HCC): ICD-10-CM

## 2018-08-21 LAB
ALBUMIN SERPL BCP-MCNC: 3.6 G/DL (ref 3.5–5)
ALP SERPL-CCNC: 84 U/L (ref 46–116)
ALT SERPL W P-5'-P-CCNC: 16 U/L (ref 12–78)
ANION GAP SERPL CALCULATED.3IONS-SCNC: 4 MMOL/L (ref 4–13)
AST SERPL W P-5'-P-CCNC: 14 U/L (ref 5–45)
ATRIAL RATE: 57 BPM
BASOPHILS # BLD AUTO: 0.02 THOUSANDS/ΜL (ref 0–0.1)
BASOPHILS NFR BLD AUTO: 0 % (ref 0–1)
BILIRUB SERPL-MCNC: 0.42 MG/DL (ref 0.2–1)
BUN SERPL-MCNC: 39 MG/DL (ref 5–25)
CALCIUM SERPL-MCNC: 9.7 MG/DL (ref 8.3–10.1)
CHLORIDE SERPL-SCNC: 104 MMOL/L (ref 100–108)
CO2 SERPL-SCNC: 30 MMOL/L (ref 21–32)
CREAT SERPL-MCNC: 1.32 MG/DL (ref 0.6–1.3)
EOSINOPHIL # BLD AUTO: 0.05 THOUSAND/ΜL (ref 0–0.61)
EOSINOPHIL NFR BLD AUTO: 1 % (ref 0–6)
ERYTHROCYTE [DISTWIDTH] IN BLOOD BY AUTOMATED COUNT: 13.1 % (ref 11.6–15.1)
GFR SERPL CREATININE-BSD FRML MDRD: 37 ML/MIN/1.73SQ M
GLUCOSE P FAST SERPL-MCNC: 111 MG/DL (ref 65–99)
HCT VFR BLD AUTO: 40 % (ref 34.8–46.1)
HGB BLD-MCNC: 12.3 G/DL (ref 11.5–15.4)
IMM GRANULOCYTES # BLD AUTO: 0.01 THOUSAND/UL (ref 0–0.2)
IMM GRANULOCYTES NFR BLD AUTO: 0 % (ref 0–2)
LYMPHOCYTES # BLD AUTO: 2.77 THOUSANDS/ΜL (ref 0.6–4.47)
LYMPHOCYTES NFR BLD AUTO: 41 % (ref 14–44)
MCH RBC QN AUTO: 27.9 PG (ref 26.8–34.3)
MCHC RBC AUTO-ENTMCNC: 30.8 G/DL (ref 31.4–37.4)
MCV RBC AUTO: 91 FL (ref 82–98)
MONOCYTES # BLD AUTO: 0.48 THOUSAND/ΜL (ref 0.17–1.22)
MONOCYTES NFR BLD AUTO: 7 % (ref 4–12)
NEUTROPHILS # BLD AUTO: 3.38 THOUSANDS/ΜL (ref 1.85–7.62)
NEUTS SEG NFR BLD AUTO: 51 % (ref 43–75)
NRBC BLD AUTO-RTO: 0 /100 WBCS
P AXIS: 67 DEGREES
PLATELET # BLD AUTO: 286 THOUSANDS/UL (ref 149–390)
PMV BLD AUTO: 11.8 FL (ref 8.9–12.7)
POTASSIUM SERPL-SCNC: 5.5 MMOL/L (ref 3.5–5.3)
PR INTERVAL: 164 MS
PROT SERPL-MCNC: 8.2 G/DL (ref 6.4–8.2)
QRS AXIS: -9 DEGREES
QRSD INTERVAL: 88 MS
QT INTERVAL: 408 MS
QTC INTERVAL: 397 MS
RBC # BLD AUTO: 4.41 MILLION/UL (ref 3.81–5.12)
SODIUM SERPL-SCNC: 138 MMOL/L (ref 136–145)
T WAVE AXIS: 67 DEGREES
VENTRICULAR RATE: 57 BPM
WBC # BLD AUTO: 6.71 THOUSAND/UL (ref 4.31–10.16)

## 2018-08-21 PROCEDURE — 93010 ELECTROCARDIOGRAM REPORT: CPT | Performed by: INTERNAL MEDICINE

## 2018-08-21 PROCEDURE — 93005 ELECTROCARDIOGRAM TRACING: CPT

## 2018-08-21 PROCEDURE — 36415 COLL VENOUS BLD VENIPUNCTURE: CPT | Performed by: SURGERY

## 2018-08-21 PROCEDURE — 80053 COMPREHEN METABOLIC PANEL: CPT | Performed by: SURGERY

## 2018-08-21 PROCEDURE — 85025 COMPLETE CBC W/AUTO DIFF WBC: CPT | Performed by: SURGERY

## 2018-08-21 PROCEDURE — 71046 X-RAY EXAM CHEST 2 VIEWS: CPT

## 2018-08-22 ENCOUNTER — OFFICE VISIT (OUTPATIENT)
Dept: FAMILY MEDICINE CLINIC | Facility: CLINIC | Age: 83
End: 2018-08-22
Payer: COMMERCIAL

## 2018-08-22 VITALS
WEIGHT: 132.6 LBS | DIASTOLIC BLOOD PRESSURE: 74 MMHG | BODY MASS INDEX: 24.4 KG/M2 | RESPIRATION RATE: 16 BRPM | HEIGHT: 62 IN | SYSTOLIC BLOOD PRESSURE: 138 MMHG | HEART RATE: 72 BPM | TEMPERATURE: 97.7 F

## 2018-08-22 DIAGNOSIS — K21.9 GASTROESOPHAGEAL REFLUX DISEASE WITHOUT ESOPHAGITIS: ICD-10-CM

## 2018-08-22 DIAGNOSIS — E11.21 CONTROLLED TYPE 2 DIABETES MELLITUS WITH DIABETIC NEPHROPATHY, WITHOUT LONG-TERM CURRENT USE OF INSULIN (HCC): ICD-10-CM

## 2018-08-22 DIAGNOSIS — B37.0 ORAL CANDIDIASIS: Primary | ICD-10-CM

## 2018-08-22 PROCEDURE — 99214 OFFICE O/P EST MOD 30 MIN: CPT | Performed by: FAMILY MEDICINE

## 2018-08-22 NOTE — ASSESSMENT & PLAN NOTE
Patient is a 79 yo who complains of pain in her tongue and oral trush  - Mycostatin 100,000 units/ml suspension twice a day for 2 weeks    - FU in 3 months

## 2018-08-22 NOTE — PROGRESS NOTES
Assessment/Plan:    Oral candidiasis  Patient is a 79 yo who complains of pain in her tongue and oral trush  - Mycostatin 100,000 units/ml suspension twice a day for 2 weeks  - FU in 3 months    DMII (diabetes mellitus, type 2) ([facility] )  Lab Results   Component Value Date    HGBA1C 6 1 04/26/2018       - Ordered micoralbumin/creatinine ratio   - Continue home medications      Hypertension  Patient states BP has been stable  -Today /74  - Continue current medication regimen         Randi Laboy Subjective:      Patient ID: Yesy Ruiz is a 80 y o  female  Patient is a 79 yo who comes to the office complaining of pain and a whitish material in her tongue that she noticed a few days ago  She has a PMH of HTN, DM type 2, GERD, and a new diagnosis of mucinous carcinoma of the breast on 8/17  She's being followed up by oncology  CBC is WNL  BMP with K of 5 5 closely followed up by nephrology, and Cr  1 32 showing mild improvement form baseline  She states her BG is stable at home, fasting between 100-110, posprandial <160  The following portions of the patient's history were reviewed and updated as appropriate: allergies, current medications, past family history, past medical history, past social history, past surgical history and problem list       Review of Systems   Constitutional: Negative for activity change and fatigue  HENT: Negative for sore throat and trouble swallowing  Whitish material and pain in her tongue   Respiratory: Negative for cough, chest tightness and shortness of breath  Cardiovascular: Negative for chest pain, palpitations and leg swelling  Gastrointestinal: Negative for abdominal distention, abdominal pain, constipation and diarrhea  Neurological: Negative for weakness and headaches           Objective:      /74 (BP Location: Right arm, Patient Position: Sitting, Cuff Size: Standard)   Pulse 72   Temp 97 7 °F (36 5 °C) (Tympanic)   Resp 16   Ht 5' 2" (1 575 m)   Wt 60 1 kg (132 lb 9 6 oz)   LMP  (LMP Unknown)   BMI 24 25 kg/m²          Physical Exam   Constitutional: She is oriented to person, place, and time  She appears well-developed and well-nourished  No distress  HENT:   Head: Normocephalic and atraumatic  Oral trush noted   Cardiovascular: Regular rhythm, normal heart sounds and intact distal pulses  Exam reveals no gallop and no friction rub  No murmur heard  Bradychardic   Pulmonary/Chest: Effort normal and breath sounds normal  No respiratory distress  She has no wheezes  She has no rales  Abdominal: Soft  Bowel sounds are normal  She exhibits no distension  There is no tenderness  There is no rebound  Musculoskeletal: Normal range of motion  She exhibits no edema or tenderness  Neurological: She is alert and oriented to person, place, and time  Skin: Skin is warm  No rash noted  She is not diaphoretic  No erythema  Psychiatric: She has a normal mood and affect   Her behavior is normal  Thought content normal

## 2018-08-22 NOTE — ASSESSMENT & PLAN NOTE
Lab Results   Component Value Date    HGBA1C 6 1 04/26/2018       - Ordered micoralbumin/creatinine ratio   - Continue home medications

## 2018-08-23 NOTE — PRE-PROCEDURE INSTRUCTIONS
Pre-Surgery Instructions:   Medication Instructions    Acetaminophen (TYLENOL) 325 MG CAPS Instructed patient per Anesthesia Guidelines   allopurinol (ZYLOPRIM) 100 mg tablet Instructed patient per Anesthesia Guidelines   aspirin (ADULT ASPIRIN EC LOW STRENGTH) 81 mg EC tablet Patient was instructed by Physician and understands   calcitriol (ROCALTROL) 0 25 mcg capsule Patient was instructed by Physician and understands   calcium carbonate-vitamin D (OSCAL-D) 500 mg-200 units per tablet Patient was instructed by Physician and understands   fluticasone (FLONASE) 50 mcg/act nasal spray Instructed patient per Anesthesia Guidelines   gabapentin (NEURONTIN) 100 mg capsule Instructed patient per Anesthesia Guidelines   glimepiride (AMARYL) 1 mg tablet Patient was instructed by Physician and understands   metoprolol tartrate (LOPRESSOR) 50 mg tablet Instructed patient per Anesthesia Guidelines   NIFEdipine (NIFEDICAL XL) 30 mg 24 hr tablet Instructed patient per Anesthesia Guidelines   nystatin (MYCOSTATIN) 100,000 units/mL suspension Instructed patient per Anesthesia Guidelines   omeprazole (PriLOSEC) 40 MG capsule Instructed patient per Anesthesia Guidelines   pantoprazole (PROTONIX) 40 mg tablet Instructed patient per Anesthesia Guidelines   pravastatin (PRAVACHOL) 40 mg tablet Instructed patient per Anesthesia Guidelines   spironolactone (ALDACTONE) 25 mg tablet Instructed patient per Anesthesia Guidelines   sucralfate (CARAFATE) 1 g/10 mL suspension Instructed patient per Anesthesia Guidelines  Pre op and bathing instructions reviewed  Pt will get hibiclens

## 2018-08-28 DIAGNOSIS — E87.5 HYPERKALEMIA: Primary | ICD-10-CM

## 2018-08-29 ENCOUNTER — LAB (OUTPATIENT)
Dept: LAB | Facility: HOSPITAL | Age: 83
End: 2018-08-29
Attending: INTERNAL MEDICINE
Payer: COMMERCIAL

## 2018-08-29 DIAGNOSIS — E87.5 HYPERKALEMIA: ICD-10-CM

## 2018-08-29 LAB
ANION GAP SERPL CALCULATED.3IONS-SCNC: 4 MMOL/L (ref 4–13)
BUN SERPL-MCNC: 37 MG/DL (ref 5–25)
CALCIUM SERPL-MCNC: 9.1 MG/DL (ref 8.3–10.1)
CHLORIDE SERPL-SCNC: 104 MMOL/L (ref 100–108)
CO2 SERPL-SCNC: 30 MMOL/L (ref 21–32)
CREAT SERPL-MCNC: 1.44 MG/DL (ref 0.6–1.3)
CREAT UR-MCNC: 217 MG/DL
GFR SERPL CREATININE-BSD FRML MDRD: 34 ML/MIN/1.73SQ M
GLUCOSE P FAST SERPL-MCNC: 203 MG/DL (ref 65–99)
MICROALBUMIN UR-MCNC: 425 MG/L (ref 0–20)
MICROALBUMIN/CREAT 24H UR: 196 MG/G CREATININE (ref 0–30)
POTASSIUM SERPL-SCNC: 4.4 MMOL/L (ref 3.5–5.3)
SODIUM SERPL-SCNC: 138 MMOL/L (ref 136–145)

## 2018-08-29 PROCEDURE — 82570 ASSAY OF URINE CREATININE: CPT | Performed by: FAMILY MEDICINE

## 2018-08-29 PROCEDURE — 80048 BASIC METABOLIC PNL TOTAL CA: CPT

## 2018-08-29 PROCEDURE — 36415 COLL VENOUS BLD VENIPUNCTURE: CPT

## 2018-08-29 PROCEDURE — 82043 UR ALBUMIN QUANTITATIVE: CPT | Performed by: FAMILY MEDICINE

## 2018-08-29 NOTE — PROGRESS NOTES
I was emailed by Dr Raymundo Hernandez re: increased K  This is for pre-op in anticipation of surgery on sep 11  Patient is on aldactone  Cr is stable 1 3  Will repeat bmp this week

## 2018-08-30 ENCOUNTER — TELEPHONE (OUTPATIENT)
Dept: FAMILY MEDICINE CLINIC | Facility: CLINIC | Age: 83
End: 2018-08-30

## 2018-08-30 ENCOUNTER — DOCUMENTATION (OUTPATIENT)
Dept: NEPHROLOGY | Facility: CLINIC | Age: 83
End: 2018-08-30

## 2018-08-30 NOTE — TELEPHONE ENCOUNTER
Pt called needs refill of her Gabapentin , sent to [facility] , she is currently out of this medication   She last seen Lianet Barker on 7/18

## 2018-08-31 DIAGNOSIS — G89.29 CHRONIC PAIN: Primary | ICD-10-CM

## 2018-08-31 RX ORDER — GABAPENTIN 100 MG/1
100 CAPSULE ORAL
Qty: 60 CAPSULE | Refills: 1 | Status: SHIPPED | OUTPATIENT
Start: 2018-08-31 | End: 2019-01-08 | Stop reason: SDUPTHER

## 2018-09-06 ENCOUNTER — OFFICE VISIT (OUTPATIENT)
Dept: VASCULAR SURGERY | Facility: CLINIC | Age: 83
End: 2018-09-06
Payer: COMMERCIAL

## 2018-09-06 VITALS
SYSTOLIC BLOOD PRESSURE: 140 MMHG | TEMPERATURE: 98.1 F | HEART RATE: 60 BPM | HEIGHT: 62 IN | WEIGHT: 131 LBS | DIASTOLIC BLOOD PRESSURE: 58 MMHG | BODY MASS INDEX: 24.11 KG/M2

## 2018-09-06 DIAGNOSIS — I65.23 BILATERAL CAROTID ARTERY STENOSIS: ICD-10-CM

## 2018-09-06 DIAGNOSIS — I70.213 ATHEROSCLEROSIS OF NATIVE ARTERY OF BOTH LOWER EXTREMITIES WITH INTERMITTENT CLAUDICATION (HCC): Primary | ICD-10-CM

## 2018-09-06 PROCEDURE — 99213 OFFICE O/P EST LOW 20 MIN: CPT | Performed by: NURSE PRACTITIONER

## 2018-09-06 NOTE — PATIENT INSTRUCTIONS
Enfermedad arterial periférica   CUIDADO AMBULATORIO:   La enfermedad arterial periférica (EAP)  se refiere a las arterias estrechas, débiles u obstruidas  Puede afectar cualquier arteria fuera del corazón y el cerebro  La enfermedad arterial periférica generalmente es el resultado de la acumulación de grasfransico y [place], (también se le denomina placa), alrededor de las marin de la arteria  Dianna inflamación, un coágulo de jose m o un crecimiento anormal de las células puede también obstruir las arterias  La enfermedad arterial periférica impide el flujo normal de jose m a sara piernas y brazos  Usted corre el riesgo de dianna amputación si la falta de circulación de la jose m impide que las heridas cicatricen o produce grangrena (muerte del tejido)  Sin tratamiento, la enfermedad arterial periférica también puede causar un ataque cardíaco o un derrame cerebral   Los síntomas comunes incluyen:  La enfermedad arterial periférica leve por lo general no causa síntomas  A medida que el tiempo pasa y la enfermedad afshan MEDRANO puede tener cualquiera de lo siguiente:  · Dolor o calambres en las piernas o en las caderas mientras usted camina     · Dianna sensación de entumecimiento, debilidad o pesadez en sara piernas     · La piel de sara piernas está seca, escamosa, enrojecida o pálida     · Uñas gruesas o quebradizas o la caída del vello en sara brazos y piernas     · Llagas en los pies que no cicatrizan     · Sensación quemante o dolorosa en sara pies o dedos de los pies mientras está en reposo (se puede empeorar al acostarse)  Llame al 911 en madhav de presentar lo siguiente:   · Usted tiene alguno de los siguientes signos de un ataque cardíaco:      ¨ Estornudos, presión, o dolor en bethea pecho que dura mas de 5 minutos o regresa      ¨ Malestar o dolor en bethea espalda, jes, mandíbula, abdomen, o brazo     ¨ Dificultad para respirar    ¨ Náuseas o vómito    ¨ Siente un desvanecimiento o tiene sudores fríos especialmente en el [place] para respirar  · Usted tiene alguno de los siguientes signos de derrame cerebral:      ¨ Adormecimiento o caída de un lado de bethea ruben     ¨ Debilidad en un brazo o dianna pierna    ¨ Confusión o debilidad para hablar    ¨ Mareos o dolor de manuel intenso, o pérdida de la visión  Busque atención médica de inmediato si:   · Usted tiene llagas o heridas que no cicatrizan  · Usted nota que la piel en jihan brazos o piernas tiene dianna tonalidad laura o está descolorida  · Bethea piel se siente fría al tacto  Pregúntele a bethea [place] vitaminas y minerales son adecuados para usted  · Usted tiene [employer] piernas cuando camina 1/8 stevo (200 metros) o menos, incluso con [place]  · Jihan piernas están enrojecidas, resecas o pálidas, incluso con el tratamiento  · Usted tiene preguntas o inquietudes acerca de bethea condición o cuidado  El tratamiento para la enfermedad arterial periférica  puede ayudar a reducir el riesgo de un ataque cardíaco, un accidente cerebrovascular o dianna amputación  Usted podría necesitar más de fabricio de los siguientes:  · Medicamentos,  que se administran para prevenir coágulos de jose m y reducir el riesgo de un ataque cardíaco o accidente cerebrovascular  Es posible que le administren medicamento para evitar que empeore la enfermedad arterial periférica  · Un programa de ejercicio supervisado  lo ayuda a mantenerse [employer] de la tato cotidiana y podría prevenir la discapacidad  Los médicos le van ayudar a caminar o realizar ejercicios de resistencia en un entorno seguro 3 veces a la semana dylan 30 a 60 minutos  Usted va hacer esto por varios meses, luego hace la transición de caminar por sí mismo  · Angioplastia  es un procedimiento para abrir bethea arteria para que la jose m pueda circular normalmente  Un catéter, es dianna sonda diminuta, que se [place] para introducir un globo en bethea arteria   El globo se infla para abrir bethea arteria bloqueada y luego se procede a extraerlo  Un tubo de drea metálica que se conoce brenda stent se coloca en el interior de la arteria para mantenerla abierta  · Cirugía de bypass o derivación  se utiliza para crear dianna nueva conexión a bethea arteria con dianna vena de otra parte de bethea cuerpo, o un injerto artificial  La vena o el injerto se conecta a bethea arteria por encima o por debajo de la obstrucción  Lo cual permite que la jose m fluya alrededor del tramo de la arteria bloqueada  Controle y evite la enfermedad arterial periférica:   · El control de la enfermedad arterial periférica:Camine de 30 a 60 minutos por lo menos 4 veces a la semana  Bethea médico también podría remitirlo a un programa de ejercicio supervisado  El programa ayuda a aumentar la distancia que usted puede caminar sin sentir dolor  El programa lo ayuda a mantenerse [employer] actividades de la tato cotidiana y podría prevenir la discapacidad provocada por la enfermedad arterial periférica  · No fume  La nicotina y otros químicos en los cigarrillos y cigarros pueden empeorar la enfermedad arterial periférica  El tabaquismo también aumenta el riesgo de un ataque cardíaco o accidente cerebrovascular  Pida información a bethea médico si usted actualmente fuma y necesita ayuda para dejar de fumar  Los cigarrillos electrónicos o tabaco sin humo todavía contienen nicotina  Consulte con bethea médico antes de [employer]  · Controle otras afecciones de [place] sara medicamentos según lo indicado  Siga las instrucciones de bethea médico si usted sufre de hipertensión o colesterol alto  Cuide de sara pies y revise sara niveles de azúcar en la jose m según indicaciones y si tiene diabetes  · Consuma alimentos saludables para bethea corazón  Consuma granos enteros, frutas y vegetales diariamente  Limite la sal y los alimentos altos en grasas  Consulte a bethea médico si desea obtener más información acerca de dianna dieta saludable para el corazón  Pregunte si es necesario que baje de [place]  Bethea médico puede ayudarle a crear un plan para bajar de peso de manera saludable  Acuda a sara consultas de control con bethea médico según le indicaron  Anote sara preguntas para que se acuerde de hacerlas dylan sara visitas  © 2017 [address] Information is for End User's use only and may not be sold, redistributed or otherwise used for commercial purposes  All illustrations and images included in CareNotes® are the copyrighted property of A D A M , Inc  or Yoan Posada  Esta información es sólo para uso en educación  Bethea intención no es darle un consejo médico sobre enfermedades o tratamientos  Colsulte con bethea Catherne Marco farmacéutico antes de seguir cualquier régimen médico para saber si es seguro y efectivo para usted

## 2018-09-06 NOTE — PROGRESS NOTES
Assessment/Plan:  80year old female with HTN,HLD,DM,CKDII,  carotid stenosis, Aortoiliac occlusive disease with history of R EIA/VIDA and L EIA PTA/stent 1/7/2010 and left breast adenocarcinoma who presents for vascular risk factor modification visit  1    Aortoiliac peripheral arterial occlusive disease  -Last imaging 10/12/17 showed R SFA stenosis MICHEAL 0 61 and L CFA stenosis MICHEAL 0 83  -She is experiencing bilateral calf claudication symptoms without limitation or daily activities  She is minimally active  -Upcoming imaging scheduled in October will include aortoiliac and lower extremity arterial study  -Continue aspirin and pravastatin  -Encouraged routine walking given her peripheral arterial occlusive disease  -Yearly office visit for risk factor modification    2  Carotid artery stenosis  Asymptomatic  -Last imaging 10/12/17 showed right ICA 50-69% stenosis in left ICA less than 50% stenosis  -Upcoming carotid duplex scheduled  -Continue current medical regimen  -Yearly office visit for risk factor modification     Problem List Items Addressed This Visit        Cardiovascular and Mediastinum    Atherosclerosis of native artery of extremity with intermittent claudication ([facility] [address] ) - Primary    Bilateral carotid artery stenosis            Subjective:      Patient ID: King Blackburn is a 80 y o  female  Patient is here today for a routine follow up visit  She states that she does get pain in both legs while walking from time to time, however she continues to walk and it goes away  Patient is unable to describe the type of pain she gets while walking  HPI  80year old female with HTN,HLD,DM,CKDII,  carotid stenosis, Aortoiliac occlusive disease with history of R EIA/VIDA and L EIA PTA/stent 1/7/2010 and left breast adenocarcinoma who presents for vascular risk factor modification visit  Patient was lost to follow up though office recently asked for clearance for upcoming left breast lumpectomy   Patient is scheduled for vascular studies 10/15 + 10/22 and comes in today for risk factor modification visit  She is ambulating with bilateral calf claudication symptoms though no limitation in her daily activities  She has not exercised a walk routinely  She denies ischemic rest pain and is not experiencing any tissue loss  She denies any focal neurological events consistent with TIA/CVA  She does have history of renal artery stenosis on duplex and underwent renal angiography without any significant renal artery disease  The following portions of the patient's history were reviewed and updated as appropriate: allergies, current medications, past family history, past medical history, past social history, past surgical history and problem list     Review of Systems   Constitutional: Positive for unexpected weight change  Weight loss   HENT: Positive for dental problem  Eyes: Negative  Respiratory: Negative  Cardiovascular: Negative  Gastrointestinal: Negative  Endocrine: Negative  Genitourinary: Negative  Musculoskeletal: Negative  Skin: Negative  Allergic/Immunologic: Negative  Hematological: Negative  Psychiatric/Behavioral: Negative            Objective:    Vitals:    09/06/18 1533   BP: 140/58   BP Location: Left arm   Patient Position: Sitting   Pulse: 60   Temp: 98 1 °F (36 7 °C)   TempSrc: Tympanic   Weight: 59 4 kg (131 lb)   Height: 5' 2" (1 575 m)       Patient Active Problem List   Diagnosis    Vocal cord mass    Iliac artery stenosis, bilateral (HCC)    Hyperparathyroidism (HCC)    Arthritis    Hypertension    EKG abnormalities    Anemia    Neck pain    Chronic thoracic back pain    Fall from other slipping, tripping, or stumbling    Abnormal EKG    Abnormal mammogram    Accelerated essential hypertension    Allergic rhinitis    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)    Back pain    Bilateral carotid artery stenosis    Breast neoplasm, Tis (LCIS), left    Carotid stenosis, asymptomatic    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    Chronic myofascial pain    Chronic pain of lower extremity    CKD (chronic kidney disease), stage III    Constipation    Di[facility] retinopathy ([facility])    DMII (diabetes mellitus, type 2) ([facility])    Dysphagia    Fever of unknown origin    Gastritis    Gout    Hypomagnesemia    Atherosclerosis of other specified arteries    Lymphadenopathy    Microalbuminuria    Nephrolithiasis    Neuropathic pain, leg, right    Osteoporosis    PAD (peripheral artery disease) ([facility])    Primary generalized (osteo)arthritis    Persistent proteinuria    Renal cyst    Secondary hyperparathyroidism, renal ([address] )    Vaginal prolapse    Vitamin D deficiency    Acquired complex cyst of kidney    Gastroesophageal reflux disease without esophagitis    Mucinous carcinoma of breast, left ([facility])    Oral candidiasis       Past Surgical History:   Procedure Laterality Date    ANGIOPLASTY / STENTING ILIAC      BREAST LUMPECTOMY Left 09/06/2017    LAST ASSESSED: [id number]    BREAST SURGERY Left     biopsy    CATARACT EXTRACTION Bilateral     CHOLECYSTECTOMY      COLONOSCOPY      EYE SURGERY      ILIAC VEIN ANGIOPLASTY / STENTING Right     INITIAL STENOSIS: ONSET: [id number]    KNEE ARTHROSCOPY Right     KNEE SURGERY Right     ONSET: [id number]    GA ESOPHAGOGASTRODUODENOSCOPY TRANSORAL DIAGNOSTIC N/A 7/26/2018    Procedure: ESOPHAGOGASTRODUODENOSCOPY (EGD); Surgeon: Macho Corona DO;  [facility];   Service: Gastroenterology    GA LARYNGOSCOPY,DIRCT,OP [facility] N/A 6/10/2016    Procedure: MICRO DIRECT LARYNGOSCOPY WITH VOCAL FOLD MASS EXCISION ;  Surgeon: Helder Pavon MD;  [facility];  Service: ENT    GA MASTECTOMY, PARTIAL Left 9/6/2017    Procedure: LUMPECTOMY BREAST NEEDLE LOCALIZED WITH FAXITRON NEEDLE @ 0830;  Surgeon: Andrea Ogden MD;  [facility];  Service: General    THROAT SURGERY      lump removed    TUBAL LIGATION      US GUIDED BREAST BIOPSY LEFT COMPLETE Left 8/10/2018    VASCULAR SURGERY         Family History   Problem Relation Age of Onset    Heart disease Brother     Heart disease Maternal Grandmother     Diabetes Other         of other type with arthropathy, without long term current use of insulin    Hypertension Other     No Known Problems Mother     Gout Family     Rheum arthritis Family     Lupus Family         systematic erythematosus    Heart disease Family     Stroke Family         syndrome    Stroke Maternal Uncle         syndrome    Stroke Maternal Aunt         syndrome    No Known Problems Father        Social History     Social History    Marital status:      Spouse name: N/A    Number of children: N/A    Years of education: N/A     Occupational History    Not on file       Social History Main Topics    Smoking status: Former Smoker     Packs/day: 1 00     Years: 64 00     Quit date: 2004    Smokeless tobacco: Never Used      Comment: quit in 2003 1 ppd x 50 years    Alcohol use No    Drug use: No    Sexual activity: Not on file     Other Topics Concern    Not on file     Social History Narrative    Daily caffeine consumption, 1 serving a day        Allergies   Allergen Reactions    Clonidine Derivatives Swelling    Diflucan [Fluconazole] Rash    Flagyl [Metronidazole] Anaphylaxis    Carvedilol Hives     And legs swelled    Latex Rash    Lipitor [Atorvastatin]      Legs swelled    Other Palpitations     IV DYE         Current Outpatient Prescriptions:     Acetaminophen (TYLENOL) 325 MG CAPS, Take by mouth every 8 (eight) hours, Disp: , Rfl:     allopurinol (ZYLOPRIM) 100 mg tablet, TK 1 T PO D, Disp: , Rfl: 1    aspirin (ADULT ASPIRIN EC LOW STRENGTH) 81 mg EC tablet, Take 1 tablet by mouth daily, Disp: , Rfl:     calcitriol (ROCALTROL) 0 25 mcg capsule, Take 0 25 mcg by mouth once a week Pt takes every friday , Disp: , Rfl:     calcium carbonate-vitamin D (OSCAL-D) 500 mg-200 units per tablet, Take 1 tablet by mouth daily with breakfast, Disp: , Rfl:     fluticasone (FLONASE) 50 mcg/act nasal spray, 1 spray into each nostril daily, Disp: 16 g, Rfl: 0    gabapentin (NEURONTIN) 100 mg capsule, Take 1 capsule (100 mg total) by mouth daily at bedtime, Disp: 60 capsule, Rfl: 1    glimepiride (AMARYL) 1 mg tablet, TK 1/2 T PO D WITH BREAKFAST, Disp: , Rfl: 0    Lancets (ONETOUCH ULTRASOFT) lancets, USE TWICE DAILY, Disp: 200 each, Rfl: 9    metoprolol tartrate (LOPRESSOR) 50 mg tablet, Take 1 tablet (50 mg total) by mouth 2 (two) times a day, Disp: 180 tablet, Rfl: 0    NIFEdipine (NIFEDICAL XL) 30 mg 24 hr tablet, Take 1 tablet (30 mg total) by mouth daily, Disp: 90 tablet, Rfl: 0    nystatin (MYCOSTATIN) 100,000 units/mL suspension, Apply 5 mL (500,000 Units total) to the mouth or throat 4 (four) times a day, Disp: 60 mL, Rfl: 0    omeprazole (PriLOSEC) 40 MG capsule, TAKE 1 CAPSULE(40 MG) BY MOUTH TWICE DAILY, Disp: 180 capsule, Rfl: 3    ONE TOUCH ULTRA TEST test strip, 1 each by Other route 2 (two) times a day Use as instructed, Disp: 100 each, Rfl: 5    pantoprazole (PROTONIX) 40 mg tablet, TAKE 1 TABLET(40 MG) BY MOUTH TWICE DAILY, Disp: 180 tablet, Rfl: 0    pravastatin (PRAVACHOL) 40 mg tablet, TAKE 1 TABLET BY MOUTH ON MONDAY AND THURSDAY WHILE ON COLCRYS   ONCE OFF COLCRYS RESUME TAKING 1 TABLET BY MOUTH EVERY DAY, Disp: 90 tablet, Rfl: 0    RESTASIS 0 05 % ophthalmic emulsion, INSTILL 1 GTT INTO OU BID, Disp: , Rfl: 3    spironolactone (ALDACTONE) 25 mg tablet, Take 1 tablet (25 mg total) by mouth daily at bedtime, Disp: 90 tablet, Rfl: 0    sucralfate (CARAFATE) 1 g/10 mL suspension, Take 10 mL (1 g total) by mouth 4 (four) times a day, Disp: 420 mL, Rfl: 0    terconazole (TERAZOL 7) 0 4 % vaginal cream, Insert 1 applicator into the vagina daily at bedtime, Disp: 45 g, Rfl: 0    oxyCODONE-acetaminophen (PERCOCET) 5-325 mg per tablet, Take 1 tablet by mouth every 4 (four) hours as needed for moderate pain Max Daily Amount: 6 tablets (Patient not taking: Reported on 9/6/2018 ), Disp: 6 tablet, Rfl: 0    /58 (BP Location: Left arm, Patient Position: Sitting)   Pulse 60   Temp 98 1 °F (36 7 °C) (Tympanic)   Ht 5' 2" (1 575 m)   Wt 59 4 kg (131 lb)   LMP  (LMP Unknown)   BMI 23 96 kg/m²          Physical Exam   Constitutional: She is oriented to person, place, and time  She appears well-nourished  HENT:   Head: Normocephalic and atraumatic  Eyes: EOM are normal    Neck:   + L carotid bruit    Cardiovascular: Normal heart sounds  Pulses:       Femoral pulses are 1+ on the right side with bruit, and 1+ on the left side with bruit  Dorsalis pedis pulses are 1+ on the right side, and 0 on the left side  Posterior tibial pulses are 0 on the right side, and 0 on the left side  Pulmonary/Chest: Effort normal and breath sounds normal    Abdominal: Soft  Bowel sounds are normal    Musculoskeletal: Normal range of motion  She exhibits no edema  Neurological: She is alert and oriented to person, place, and time  Skin: Skin is warm  Psychiatric: Her behavior is normal    Nursing note and vitals reviewed

## 2018-09-10 ENCOUNTER — ANESTHESIA EVENT (OUTPATIENT)
Dept: PERIOP | Facility: HOSPITAL | Age: 83
End: 2018-09-10
Payer: COMMERCIAL

## 2018-09-11 ENCOUNTER — HOSPITAL ENCOUNTER (OUTPATIENT)
Facility: HOSPITAL | Age: 83
Setting detail: OUTPATIENT SURGERY
Discharge: HOME | End: 2018-09-11
Attending: SURGERY | Admitting: SURGERY
Payer: COMMERCIAL

## 2018-09-11 ENCOUNTER — CONVERSION ENCOUNTER (OUTPATIENT)
Dept: MAMMOGRAPHY | Facility: HOSPITAL | Age: 83
End: 2018-09-11

## 2018-09-11 ENCOUNTER — HOSPITAL ENCOUNTER (OUTPATIENT)
Dept: MAMMOGRAPHY | Facility: HOSPITAL | Age: 83
Discharge: HOME | End: 2018-09-11
Attending: SURGERY
Payer: COMMERCIAL

## 2018-09-11 ENCOUNTER — ANESTHESIA (OUTPATIENT)
Dept: PERIOP | Facility: HOSPITAL | Age: 83
End: 2018-09-11
Payer: COMMERCIAL

## 2018-09-11 ENCOUNTER — APPOINTMENT (OUTPATIENT)
Dept: MAMMOGRAPHY | Facility: HOSPITAL | Age: 83
End: 2018-09-11
Payer: COMMERCIAL

## 2018-09-11 VITALS
TEMPERATURE: 97.3 F | WEIGHT: 132 LBS | HEART RATE: 60 BPM | BODY MASS INDEX: 24.29 KG/M2 | DIASTOLIC BLOOD PRESSURE: 68 MMHG | OXYGEN SATURATION: 96 % | SYSTOLIC BLOOD PRESSURE: 136 MMHG | RESPIRATION RATE: 18 BRPM | HEIGHT: 62 IN

## 2018-09-11 DIAGNOSIS — C50.912 MUCINOUS CARCINOMA OF BREAST, LEFT (HCC): ICD-10-CM

## 2018-09-11 LAB
GLUCOSE SERPL-MCNC: 128 MG/DL (ref 65–140)
GLUCOSE SERPL-MCNC: 158 MG/DL (ref 65–140)

## 2018-09-11 PROCEDURE — 88307 TISSUE EXAM BY PATHOLOGIST: CPT | Performed by: PATHOLOGY

## 2018-09-11 PROCEDURE — 19281 PERQ DEVICE BREAST 1ST IMAG: CPT

## 2018-09-11 PROCEDURE — 82948 REAGENT STRIP/BLOOD GLUCOSE: CPT

## 2018-09-11 PROCEDURE — 19301 PARTIAL MASTECTOMY: CPT | Performed by: SURGERY

## 2018-09-11 RX ORDER — DIPHENHYDRAMINE HYDROCHLORIDE 50 MG/ML
12.5 INJECTION INTRAMUSCULAR; INTRAVENOUS ONCE AS NEEDED
Status: DISCONTINUED | OUTPATIENT
Start: 2018-09-11 | End: 2018-09-11 | Stop reason: HOSPADM

## 2018-09-11 RX ORDER — SODIUM CHLORIDE, SODIUM LACTATE, POTASSIUM CHLORIDE, CALCIUM CHLORIDE 600; 310; 30; 20 MG/100ML; MG/100ML; MG/100ML; MG/100ML
125 INJECTION, SOLUTION INTRAVENOUS CONTINUOUS
Status: DISCONTINUED | OUTPATIENT
Start: 2018-09-11 | End: 2018-09-11 | Stop reason: HOSPADM

## 2018-09-11 RX ORDER — SODIUM CHLORIDE 9 MG/ML
INJECTION, SOLUTION INTRAVENOUS CONTINUOUS PRN
Status: DISCONTINUED | OUTPATIENT
Start: 2018-09-11 | End: 2018-09-11

## 2018-09-11 RX ORDER — FENTANYL CITRATE/PF 50 MCG/ML
25 SYRINGE (ML) INJECTION
Status: DISCONTINUED | OUTPATIENT
Start: 2018-09-11 | End: 2018-09-11 | Stop reason: HOSPADM

## 2018-09-11 RX ORDER — BUPIVACAINE HYDROCHLORIDE AND EPINEPHRINE 2.5; 5 MG/ML; UG/ML
INJECTION, SOLUTION INFILTRATION; PERINEURAL AS NEEDED
Status: DISCONTINUED | OUTPATIENT
Start: 2018-09-11 | End: 2018-09-11 | Stop reason: HOSPADM

## 2018-09-11 RX ORDER — ONDANSETRON 2 MG/ML
INJECTION INTRAMUSCULAR; INTRAVENOUS AS NEEDED
Status: DISCONTINUED | OUTPATIENT
Start: 2018-09-11 | End: 2018-09-11 | Stop reason: SURG

## 2018-09-11 RX ORDER — PROPOFOL 10 MG/ML
INJECTION, EMULSION INTRAVENOUS AS NEEDED
Status: DISCONTINUED | OUTPATIENT
Start: 2018-09-11 | End: 2018-09-11 | Stop reason: SURG

## 2018-09-11 RX ORDER — LIDOCAINE WITH 8.4% SOD BICARB 0.9%(10ML)
5 SYRINGE (ML) INJECTION ONCE
Status: COMPLETED | OUTPATIENT
Start: 2018-09-11 | End: 2018-09-11

## 2018-09-11 RX ORDER — SODIUM CHLORIDE 9 MG/ML
50 INJECTION, SOLUTION INTRAVENOUS CONTINUOUS
Status: DISCONTINUED | OUTPATIENT
Start: 2018-09-11 | End: 2018-09-11 | Stop reason: HOSPADM

## 2018-09-11 RX ORDER — FENTANYL CITRATE 50 UG/ML
INJECTION, SOLUTION INTRAMUSCULAR; INTRAVENOUS AS NEEDED
Status: DISCONTINUED | OUTPATIENT
Start: 2018-09-11 | End: 2018-09-11 | Stop reason: SURG

## 2018-09-11 RX ORDER — MAGNESIUM HYDROXIDE 1200 MG/15ML
LIQUID ORAL AS NEEDED
Status: DISCONTINUED | OUTPATIENT
Start: 2018-09-11 | End: 2018-09-11 | Stop reason: HOSPADM

## 2018-09-11 RX ORDER — ONDANSETRON 2 MG/ML
4 INJECTION INTRAMUSCULAR; INTRAVENOUS ONCE AS NEEDED
Status: DISCONTINUED | OUTPATIENT
Start: 2018-09-11 | End: 2018-09-11 | Stop reason: HOSPADM

## 2018-09-11 RX ORDER — OXYCODONE HYDROCHLORIDE AND ACETAMINOPHEN 5; 325 MG/1; MG/1
1 TABLET ORAL EVERY 4 HOURS PRN
Status: DISCONTINUED | OUTPATIENT
Start: 2018-09-11 | End: 2018-09-11 | Stop reason: HOSPADM

## 2018-09-11 RX ADMIN — FENTANYL CITRATE 25 MCG: 50 INJECTION INTRAMUSCULAR; INTRAVENOUS at 14:34

## 2018-09-11 RX ADMIN — FENTANYL CITRATE 25 MCG: 50 INJECTION INTRAMUSCULAR; INTRAVENOUS at 15:51

## 2018-09-11 RX ADMIN — FENTANYL CITRATE 25 MCG: 50 INJECTION INTRAMUSCULAR; INTRAVENOUS at 14:23

## 2018-09-11 RX ADMIN — OXYCODONE HYDROCHLORIDE AND ACETAMINOPHEN 1 TABLET: 5; 325 TABLET ORAL at 16:28

## 2018-09-11 RX ADMIN — FENTANYL CITRATE 25 MCG: 50 INJECTION INTRAMUSCULAR; INTRAVENOUS at 14:21

## 2018-09-11 RX ADMIN — SODIUM CHLORIDE: 0.9 INJECTION, SOLUTION INTRAVENOUS at 12:30

## 2018-09-11 RX ADMIN — FENTANYL CITRATE 25 MCG: 50 INJECTION INTRAMUSCULAR; INTRAVENOUS at 15:57

## 2018-09-11 RX ADMIN — LIDOCAINE HYDROCHLORIDE 80 MG: 20 INJECTION, SOLUTION INTRAVENOUS at 14:20

## 2018-09-11 RX ADMIN — FENTANYL CITRATE 25 MCG: 50 INJECTION INTRAMUSCULAR; INTRAVENOUS at 15:44

## 2018-09-11 RX ADMIN — FENTANYL CITRATE 25 MCG: 50 INJECTION INTRAMUSCULAR; INTRAVENOUS at 15:10

## 2018-09-11 RX ADMIN — PROPOFOL 130 MG: 10 INJECTION, EMULSION INTRAVENOUS at 14:20

## 2018-09-11 RX ADMIN — DEXAMETHASONE SODIUM PHOSPHATE 5 MG: 10 INJECTION INTRAMUSCULAR; INTRAVENOUS at 14:23

## 2018-09-11 RX ADMIN — LIDOCAINE HYDROCHLORIDE: 10 INJECTION, SOLUTION INFILTRATION; PERINEURAL at 13:12

## 2018-09-11 RX ADMIN — ONDANSETRON 4 MG: 2 INJECTION INTRAMUSCULAR; INTRAVENOUS at 15:09

## 2018-09-11 RX ADMIN — CEFAZOLIN SODIUM 1000 MG: 1 SOLUTION INTRAVENOUS at 14:19

## 2018-09-11 NOTE — H&P (VIEW-ONLY)
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  Assessment and Plan:   Assessment/Plan   Patient presents with a new diagnosis of left breast cancer  Patient was diagnosed last year with 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  This tumor is a mucinous cancer in the upper inner quadrant  Patient presents now for an opinion regarding further management  Oncology History:        Mucinous carcinoma of breast, left ([address] )    8/10/2018 Biopsy     Left breast biopsy  Invasive mucinous  Grade 1      Her 2 1+            History of Present Illness: This is a 28-year-old woman who presents with a new diagnosis of left breast cancer  The patient went for a screening mammogram which demonstrated a new 1 2 cm mass  This was in a separate area from her previous biopsy  The tumor was at the 2 o'clock position 8 cm from nipple  This was biopsied and shown to be a mucinous carcinoma 1 2 cm in size grade 1 % % HER2 negative  Patient has no family history of breast cancer  She presents now for an opinion regarding further management  Review of Systems:   Review of Systems   Constitutional: Negative for activity change, appetite change and fatigue  HENT: Negative  Eyes: Negative  Respiratory: Negative for cough, shortness of breath and wheezing  Cardiovascular: Negative for chest pain and leg swelling  Gastrointestinal: Negative  Endocrine: Negative  Genitourinary: Negative  Musculoskeletal: Positive for arthralgias          No new changes or complaints of bone pain   Skin: Negative  Allergic/Immunologic: Negative  Neurological: Negative  Hematological: Negative  Psychiatric/Behavioral: Negative          Past Medical History:      Patient Active Problem List   Diagnosis    Renal artery atherosclerosis, bilateral (HCC)    Vocal cord mass    Iliac artery stenosis, bilateral (HCC)    [facility]roidism ([facility] )    Arthritis    Hypertension    EKG abnormalities    Anemia    Neck pain    Chronic thoracic back pain    Fall from other slipping, tripping, or stumbling    Abnormal EKG    Abnormal mammogram    Accelerated essential hypertension    Allergic rhinitis    Atherosclerosis of native artery of extremity with intermittent claudication ([facility])    Back pain    Benign essential hypertension    Bilateral carotid artery stenosis    Breast neoplasm, Tis (LCIS), left    Carotid stenosis, asymptomatic    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    Chronic myofascial pain    Chronic pain of lower extremity    CKD (chronic kidney disease), stage III    Constipation    Diabetic retinopathy ([facility] )    DMII (diabetes mellitus, type 2) ([facility])    Dysphagia    Fever of unknown origin    Gastritis    Gout    Hypomagnesemia    Atherosclerosis of other specified arteries    Lymphadenopathy    Microalbuminuria    Nephrolithiasis    Neuropathic pain, leg, right    Osteoporosis    PAD (peripheral artery disease) ([facility] )    Primary generalized (osteo)arthritis    Persistent proteinuria    Renal cyst    Secondary hyperparathyroidism, renal ([facility] )    Vaginal prolapse    Vitamin D deficiency    Acquired complex cyst of kidney    Gastroesophageal reflux disease without esophagitis    Mucinous carcinoma of breast, left ([facility])        Past Medical History:   Diagnosis Date    Arthritis     Back pain     Benign essential hypertension     LAST ASSESSED: [id number]    Carotid artery stenosis     CKD (chronic kidney disease) stage 2, GFR 60-89 ml/min     Constipation, chronic     "drinks prune juice"    Diabetes mellitus ([facility] )     DMII (diabetes mellitus, type 2) ([facility]     Epiglottitis     RESOLVED: [id number]    Gastritis     GERD (gastroesophageal reflux disease)     Gout     H/O blood clots     left lower leg,,,11 yrs ago    History of stenosis of renal artery     RESOLVED: [id number]    Hypercholesteremia     Hypercholesterolemia     Hyperlipidemia     Hyperparathyroidism ([facility] )     Hypertension     Iliac artery stenosis, bilateral (HCC)     Mucinous carcinoma of breast, left ([facility] ) 8/15/2018    Presence of pessary     Renal artery stenosis (HCC)     Renal disorder     Renovascular hypertension     RESOLVED: [id number]    Segmental and somatic dysfunction of cervical region     RESOLVED: [id number]    Segmental and somatic dysfunction of lumbar region     RESOLVED: 68BSC0654    Segmental and somatic dysfunction of pelvic region     RESOLVED: 07JSH8514    Segmental and somatic dysfunction of thoracic region     RESOLVED: 52AWG0053    Somatic dysfunction of abdominal region     RESOLVED: 07JIV7031    Somatic dysfunction of head region     RESOLVED: 64MHQ4959    Somatic dysfunction of lower extremities     RESOLVED: 07GSG5896    Somatic dysfunction of rib region     RESOLVED: 61NEG5620    Somatic dysfunction of thoracic region     RESOLVED: 57PXX4524    Somatic dysfunction of upper extremities     RESOLVED: 40QTK7335    Teeth missing         Past Surgical History:   Procedure Laterality Date    ANGIOPLASTY / STENTING ILIAC      BREAST LUMPECTOMY Left 09/06/2017    LAST ASSESSED: [id number]    BREAST SURGERY Left     biopsy    CATARACT EXTRACTION Bilateral     CHOLECYSTECTOMY      COLONOSCOPY      EYE SURGERY      ILIAC VEIN ANGIOPLASTY / STENTING Right     INITIAL STENOSIS: ONSET: [id number]    KNEE ARTHROSCOPY Right     KNEE SURGERY Right     ONSET: [id number]    MA ESOPHAGOGASTRODUODENOSCOPY TRANSORAL DIAGNOSTIC N/A 7/26/2018    Procedure: ESOPHAGOGASTRODUODENOSCOPY (EGD); Surgeon: Magdaleno Bates DO;  [facility]; Service: Gastroenterology    SD LARYNGOSCOPY,DIRCT,OP [facility] N/A 6/10/2016    Procedure: MICRO DIRECT LARYNGOSCOPY WITH VOCAL FOLD MASS EXCISION ;  Surgeon: Leslie Belle MD;  Location: [facility];  Service: ENT    SD MASTECTOMY, PARTIAL Left 9/6/2017    Procedure: LUMPECTOMY BREAST NEEDLE LOCALIZED WITH FAXITRON NEEDLE @ 0830;  Surgeon: Talia Swartz MD;  Location: [facility];  Service: General    THROAT SURGERY      lump removed    TUBAL LIGATION      US GUIDED BREAST BIOPSY LEFT COMPLETE Left 8/10/2018    VASCULAR SURGERY          Family History   Problem Relation Age of Onset    Heart disease Brother     Heart disease Maternal Grandmother     Diabetes Other         of other type with arthropathy, without long term current use of insulin    Hypertension Other     No Known Problems Mother     Gout Family     Rheum arthritis Family     Lupus Family         systematic erythematosus    Heart disease Family     Stroke Family         syndrome    Stroke Maternal Uncle         syndrome    Stroke Maternal Aunt         syndrome    No Known Problems Father         Social History     Social History    Marital status:      Spouse name: N/A    Number of children: N/A    Years of education: N/A     Occupational History    Not on file       Social History Main Topics    Smoking status: Former Smoker     Packs/day: 1 00     Years: 64 00     Quit date: 2004    Smokeless tobacco: Never Used      Comment: quit in 2003 1 ppd x 50 years    Alcohol use No    Drug use: No    Sexual activity: Not on file     Other Topics Concern    Not on file     Social History Narrative    Daily caffeine consumption, 1 serving a day         Current Outpatient Prescriptions:     Acetaminophen (TYLENOL) 325 MG CAPS, Take by mouth every 8 (eight) hours, Disp: , Rfl:     allopurinol (ZYLOPRIM) 100 mg tablet, TK 1 T PO D, Disp: , Rfl: 1    amoxicillin (AMOXIL) 500 mg capsule, TK 1 T PO Q 8 H, Disp: , Rfl: 0    aspirin (ADULT ASPIRIN EC LOW STRENGTH) 81 mg EC tablet, Take 1 tablet by mouth daily, Disp: , Rfl:     calcitriol (ROCALTROL) 0 25 mcg capsule, Take 0 25 mcg by mouth once a week Pt takes every friday , Disp: , Rfl:     calcium carbonate-vitamin D (OSCAL-D) 500 mg-200 units per tablet, Take 1 tablet by mouth daily with breakfast, Disp: , Rfl:     fluticasone (FLONASE) 50 mcg/act nasal spray, 1 spray into each nostril daily, Disp: 16 g, Rfl: 0    gabapentin (NEURONTIN) 100 mg capsule, Take 100 mg by mouth daily at bedtime, Disp: , Rfl:     glimepiride (AMARYL) 1 mg tablet, TK 1/2 T PO D WITH BREAKFAST, Disp: , Rfl: 0    Lancets (ONETOUCH ULTRASOFT) lancets, USE TWICE DAILY, Disp: 200 each, Rfl: 9    magnesium oxide (MAG-OX) 400 mg, Take 400 mg by mouth 2 (two) times a day, Disp: , Rfl:     metoprolol tartrate (LOPRESSOR) 50 mg tablet, Take 1 tablet (50 mg total) by mouth 2 (two) times a day, Disp: 180 tablet, Rfl: 0    NIFEdipine (NIFEDICAL XL) 30 mg 24 hr tablet, Take 1 tablet (30 mg total) by mouth daily, Disp: 90 tablet, Rfl: 0    omeprazole (PriLOSEC) 40 MG capsule, TAKE 1 CAPSULE(40 MG) BY MOUTH TWICE DAILY, Disp: 180 capsule, Rfl: 3    ONE TOUCH ULTRA TEST test strip, 1 each by Other route 2 (two) times a day Use as instructed, Disp: 100 each, Rfl: 5    Oyster Shell (OYSTER CALCIUM) 500 MG TABS, Take 1 tablet by mouth daily, Disp: , Rfl:     pantoprazole (PROTONIX) 40 mg tablet, TAKE 1 TABLET(40 MG) BY MOUTH TWICE DAILY, Disp: 180 tablet, Rfl: 0    pravastatin (PRAVACHOL) 40 mg tablet, TAKE 1 TABLET BY MOUTH ON MONDAY AND THURSDAY WHILE ON COLCRYS   ONCE OFF COLCRYS RESUME TAKING 1 TABLET BY MOUTH EVERY DAY, Disp: 90 tablet, Rfl: 0    RESTASIS 0 05 % ophthalmic emulsion, INSTILL 1 GTT INTO OU BID, Disp: , Rfl: 3    spironolactone (ALDACTONE) 25 mg tablet, Take 1 tablet (25 mg total) by mouth daily at bedtime, Disp: 90 tablet, Rfl: 0    sucralfate (CARAFATE) 1 g/10 mL suspension, Take 10 mL (1 g total) by mouth 4 (four) times a day, Disp: 420 mL, Rfl: 0    terconazole (TERAZOL 7) 0 4 % vaginal cream, Insert 1 applicator into the vagina daily at bedtime, Disp: 45 g, Rfl: 0     Allergies   Allergen Reactions    Clonidine Derivatives Swelling    Diflucan [Fluconazole] Rash    Flagyl [Metronidazole] Anaphylaxis    Carvedilol Hives     And legs swelled    Latex Rash    Lipitor [Atorvastatin]      Legs swelled    Other Palpitations     IV DYE       Physical Exam:     Vitals:    08/17/18 1009   BP: 140/70   Pulse: 70   Resp: 14   Temp: 98 4 °F (36 9 °C)     Physical Exam   Constitutional: She is oriented to person, place, and time  She appears well-developed and well-nourished  HENT:   Head: Normocephalic and atraumatic  Mouth/Throat: Oropharynx is clear and moist    Eyes: EOM are normal  Pupils are equal, round, and reactive to light  Neck: Normal range of motion  Neck supple  No JVD present  Carotid bruit is present (on the left side, the patient and family are aware, they follow with Dr Vanessa Severin)  No tracheal deviation present  No thyromegaly present  Cardiovascular: Normal rate, regular rhythm, normal heart sounds and intact distal pulses  Exam reveals no gallop and no friction rub  No murmur heard  Pulmonary/Chest: Effort normal and breath sounds normal  No respiratory distress  She has no wheezes  She has no rales  Examination the right breast in both the sitting and supine position demonstrate no skin changes dominant masses or axillary adenopathy  Examination of the left breast demonstrates a lower outer quadrant incision  There is ecchymosis in the upper outer quadrant from her biopsy with a small underlying hematoma  There is no evidence of any axillary adenopathy  The remainder of the breast is free of masses  Abdominal: Soft  She exhibits no distension and no mass  There is no hepatomegaly  There is no tenderness  There is no rebound and no guarding  Musculoskeletal: Normal range of motion  She exhibits no edema or tenderness  Lymphadenopathy:     She has no cervical adenopathy  Neurological: She is alert and oriented to person, place, and time  No cranial nerve deficit  Skin: Skin is warm and dry  No rash noted  No erythema  Psychiatric: She has a normal mood and affect  Her behavior is normal    Vitals reviewed  Results:   Pathology:  I reviewed her pathology  Imaging  I reviewed her imaging  The patient does not have all of her studies on line  The patient had a 2nd clip placed at the time of her original biopsy to with the LCIS some films are missing those will be loaded I have talked to Radiology about this  This area is well away on the MLO view  Discussion/Summary:   Clinically this is a mucinous ER positive stage I left breast cancer  I have recommended a needle localization lumpectomy  Since she is clinically node-negative and at the age of 80 I have recommended not perform a sentinel lymph node  Also explained that she may not need radiation therapy but that would be decided afterwards  We also talked about the possibility of taken anti hormonal pill  We subsequent to the process of informed consent for left breast needle localization lumpectomy  All questions were answered the patient's satisfaction  This was translated through the language line, [id number]     Advance Care Planning/Advance Directives:  I discussed the disease status, treatment plans and follow-up with the patient

## 2018-09-11 NOTE — ANESTHESIA PREPROCEDURE EVALUATION
Review of Systems/Medical History          Cardiovascular  Hyperlipidemia, Hypertension , DVT   Pulmonary       GI/Hepatic    GERD ,        Kidney stones,        Endo/Other  Diabetes ,      GYN       Hematology  Anemia ,     Musculoskeletal    Arthritis     Neurology   Psychology           Physical Exam    Airway    Mallampati score: II         Dental   No notable dental hx     Cardiovascular      Pulmonary      Other Findings        Anesthesia Plan  ASA Score- 3     Anesthesia Type- general with ASA Monitors  Additional Monitors:   Airway Plan:     Comment: I, Dr Chico Nunez, the attending physician, have personally seen and evaluated the patient prior to anesthetic care  I have reviewed the pre-anesthetic record, and other medical records if appropriate to the anesthetic care  If a CRNA is involved in the case, I have reviewed the CRNA assessment, if present, and agree  The patient is in a suitable condition to proceed with my formulated anesthetic plan        Plan Factors-    Induction- intravenous  Postoperative Plan-     Informed Consent- Anesthetic plan and risks discussed with patient  I personally reviewed this patient with the CRNA  Discussed and agreed on the Anesthesia Plan with the CRNA  Nicole Mann

## 2018-09-11 NOTE — ANESTHESIA POSTPROCEDURE EVALUATION
Post-Op Assessment Note      CV Status:  Stable    Mental Status:  Alert and awake    Hydration Status:  Euvolemic    PONV Controlled:  Controlled    Airway Patency:  Patent and adequate    Post Op Vitals Reviewed: Yes          Staff: CRNA           BP  155/69   Temp  97 5   Pulse  58   Resp   15   SpO2   100%

## 2018-09-11 NOTE — OP NOTE
OPERATIVE REPORT  PATIENT NAME: Dharmesh Morales    :  1934  MRN: [id number]  Pt Location: [facility] OR ROOM 02    SURGERY DATE: 2018    Surgeon(s) and Role:     * Alison Artis MD - Primary     * Machelle Bowling MD - Assisting    Preop Diagnosis:  Mucinous carcinoma of breast, left ([facility] ) [C50 912]    Post-Op Diagnosis Codes:     * Mucinous carcinoma of breast, left ([facility] ) [C50 912]    Procedure(s) (LRB):  BREAST LUMPECTOMY; BREAST NEEDLE LOCALIZATION (NEEDLE LOC AT 1330) (Left)    Specimen(s):  ID Type Source Tests Collected by Time Destination   1 : Left breast lumpectomy Tissue Breast, Left TISSUE EXAM Alison Artis MD 2018 1437    2 : Left breast lumpectomy-anterior margin-green Tissue Breast, Left TISSUE EXAM Alison Artis MD 2018 1438    3 : Left breast lumpectomy-inferior margin-blue Tissue Breast, Left TISSUE EXAM Alison Artis MD 2018 1439    4 : Left breast lumpectomy-lateral margin-red Tissue Breast, Left TISSUE EXAM Alison Artis MD 2018 1439    5 : Left breast lumpectomy-medial margin-yellow Tissue Breast, Left TISSUE EXAM Alison Artis MD 2018 1439    6 : Left breast lumpectomy-superior margin-orange Tissue Breast, Left TISSUE EXAM Alison Artis MD 2018 1439        Estimated Blood Loss:   Minimal    Drains:       Anesthesia Type:   General    Operative Indications:  Mucinous carcinoma of breast, left ([facility] ) [C50 912]      Operative Findings:  Good specimen radiograph    Complications:   None    Procedure and Technique:  I previously reviewed the needle localization images  Lumpectomy  The patient was brought to the operation room and placed under general anesthesia  Attention was paid to ensure appropriate padding and correct positioning  The left breast was prepped and draped in a sterile fashion  I initiated a time-out, identifying the patient, the correct side and the above procedure  All parties agreed with the time out       The planned incision was then injected with 0 25% Marcaine and epinephrine for local anesthesia  The incision was sharply incised  The localizing wire was used to guide the dissection  Superior, inferior, medial and lateral planes were developed around the localizing wire and the specimen truncated posteriorly with electrocautery just beyond the tip of the wire  The specimen was then inked for orientation purposes  A specimen radiograph was taken in two dimensions and additional margins were removed to optimize complete removal of the tumor  The additional margins were inked on the most distal area  Muscle was posterior  All specimens were sent to pathology for permanent analysis  Superficial bleeding controlled with electrocautery and the wound was extensively irrigated  The wound was closed with two layers, an inner layer of 3-0 vicryl and a subcuticular layer of 4-0 monocryl  Histocryl and steri-strips were applied     I was present for the entire procedure    Patient Disposition:  PACU     SIGNATURE: Roberth Orosco MD  DATE: September 11, 2018  TIME: 3:06 PM

## 2018-09-11 NOTE — DISCHARGE INSTRUCTIONS
POST-OPERATIVE BREAST CARE INSTRUCTIONS    Wound Closure/Dressing: Your wound is closed with:   Steri strips-white pieces of tape that hold your incision together along with surgical glue           Dissolvable sutures-underneath the skin    Wound care:     If you have gauze over your wound you may remove it the day after surgery  Leave the Steri-strips on, they will fall off on their own in 1-2 weeks   You may shower using soap and water to clean your wound  Gently pat dry  Drain Care: If you do not have a drain, disregard this section   Visiting Nursing should have been arranged upon discharge from hospital   A nurse will call your home to schedule an initial visit  Please call the number below if you have not received a call within 48 hours of hospital discharge   You may shower with the TAO drains  Wash area around the drains with soap and water and pat dry   Record drain outputs on chart given to you at pre op visit and bring that chart to office at post op appt   TAO drains can be removed in the office by a nurse either at post op visit OR when drain output is 30 ccs or less in a 24 hour period for three days in a row   If you had plastic surgery or reconstructive surgery, the plastic surgeon will manage the drains  Other:       You can begin wearing your own bra when it feels comfortable   You may resume using deodorant the day after surgery                 Post-op visit:  your post-op visit is scheduled for:   2018 @ 12:30 PM    Call our office at [phone] if you have any  of the followin  Redness, swelling, heat, unusual drainage or heavy bleeding from wound  2  Fever greater than 101 °  3  Pain not relieved by medication

## 2018-09-12 DIAGNOSIS — Z79.4 TYPE 2 DIABETES MELLITUS WITHOUT COMPLICATION, WITH LONG-TERM CURRENT USE OF INSULIN (HCC): Primary | ICD-10-CM

## 2018-09-12 DIAGNOSIS — E11.9 TYPE 2 DIABETES MELLITUS WITHOUT COMPLICATION, WITH LONG-TERM CURRENT USE OF INSULIN (HCC): Primary | ICD-10-CM

## 2018-09-12 RX ORDER — GLIMEPIRIDE 1 MG/1
0.5 TABLET ORAL
Qty: 90 TABLET | Refills: 1 | Status: SHIPPED | OUTPATIENT
Start: 2018-09-12 | End: 2019-01-16 | Stop reason: SDUPTHER

## 2018-09-13 ENCOUNTER — TELEPHONE (OUTPATIENT)
Dept: SURGICAL ONCOLOGY | Facility: CLINIC | Age: 83
End: 2018-09-13

## 2018-09-13 NOTE — TELEPHONE ENCOUNTER
Post op phone call  No questions or concerns following surgery on 9/11    Patient has follow up appointment with Dr Melissa Francisco on 9/26

## 2018-09-18 DIAGNOSIS — J30.9 ALLERGIC RHINITIS, UNSPECIFIED SEASONALITY, UNSPECIFIED TRIGGER: ICD-10-CM

## 2018-09-18 RX ORDER — FLUTICASONE PROPIONATE 50 MCG
SPRAY, SUSPENSION (ML) NASAL
Qty: 1 BOTTLE | Refills: 0 | Status: SHIPPED | OUTPATIENT
Start: 2018-09-18 | End: 2018-12-07 | Stop reason: SDUPTHER

## 2018-09-20 DIAGNOSIS — Z79.4 TYPE 2 DIABETES MELLITUS WITHOUT COMPLICATION, WITH LONG-TERM CURRENT USE OF INSULIN (HCC): ICD-10-CM

## 2018-09-20 DIAGNOSIS — E11.9 TYPE 2 DIABETES MELLITUS WITHOUT COMPLICATION, WITH LONG-TERM CURRENT USE OF INSULIN (HCC): ICD-10-CM

## 2018-09-20 NOTE — TELEPHONE ENCOUNTER
Pt is out of test strips please address ASAP although it does appear this refill just came in today from the pharmcy   Thank you

## 2018-09-26 ENCOUNTER — OFFICE VISIT (OUTPATIENT)
Dept: SURGICAL ONCOLOGY | Facility: CLINIC | Age: 83
End: 2018-09-26

## 2018-09-26 VITALS
WEIGHT: 133 LBS | HEART RATE: 74 BPM | SYSTOLIC BLOOD PRESSURE: 124 MMHG | TEMPERATURE: 98.3 F | DIASTOLIC BLOOD PRESSURE: 66 MMHG | HEIGHT: 62 IN | RESPIRATION RATE: 16 BRPM | BODY MASS INDEX: 24.48 KG/M2

## 2018-09-26 DIAGNOSIS — C50.912 MUCINOUS CARCINOMA OF BREAST, LEFT (HCC): Primary | ICD-10-CM

## 2018-09-26 PROCEDURE — 99024 POSTOP FOLLOW-UP VISIT: CPT | Performed by: SURGERY

## 2018-09-26 NOTE — LETTER
September 26, 2018     Louis Molina, [address] [facility] [address]    Patient: Naomi Akbar   YOB: 1934   Date of Visit: 9/26/2018       Dear Dr Idris Ramirez: Thank you for referring Naomi Akbar to me for evaluation  Below are my notes for this consultation  If you have questions, please do not hesitate to call me  I look forward to following your patient along with you  Sincerely,        Gary Rivera MD        CC: No Recipients  Gary Rivera MD  9/26/2018 12:42 PM  Sign at close encounter     Surgical Oncology Breast Post-Op       [address]  1934  [id number]  [address]  [facility] [place] [id number]    Chief Complaint:   Michel Bauer is seen for a post-operative visit of her recent left breast surgery  Oncology History:        Mucinous carcinoma of breast, left ([facility] [address] )    8/10/2018 Biopsy     Left breast biopsy  Invasive mucinous  Grade 1      Her 2 1+         9/11/2018 Surgery     Left lumpectomy  Invasive mucinous carcinoma  Grade 1  1 1 cm in size  Margins negative      Her 2 1+  Stage 1A            Assessment & Plan:   Assessment/Plan    The patient is doing well from her surgery  She has no complaints referable to her surgery  Her margins were all clean  I recommended she see Dr Giana Garcia for an opinion regarding adjuvant hormonal therapy  Given her overall good prognosis I have not sent her to radiation therapy  I will see her back in 3 months    History of Present Illness:   See above    Interval History:   See above    Review of Systems:   Review of Systems    Past Medical History:     Patient Active Problem List   Diagnosis    Vocal cord mass    Iliac artery stenosis, bilateral ([address]    Hyperparathyroidism ([address] )    Arthritis    Hypertension    EKG abnormalities    Anemia    Neck pain    Chronic thoracic back pain    Fall from other slipping, tripping, or stumbling    Abnormal EKG    Abnormal mammogram    Accelerated essential hypertension    Allergic rhinitis    Atherosclerosis of native artery of extremity with intermittent claudication ([facility])    Back pain    Bilateral carotid artery stenosis    Carotid stenosis, asymptomatic    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    Chronic myofascial pain    Chronic pain of lower extremity    CKD (chronic kidney disease), stage III    Constipation    Diabetic retinopathy ([facility])    DMII (diabetes mellitus, type 2) ([facility])    Dysphagia    Fever of unknown origin    Gastritis    Gout    Hypomagnesemia    Atherosclerosis of other specified arteries    Lymphadenopathy    Microalbuminuria    Nephrolithiasis    Neuropathic pain, leg, right    Osteoporosis    PAD (peripheral artery disease) ([facility])    Primary generalized (osteo)arthritis    Persistent proteinuria    Renal cyst    Secondary hyperparathyroidism, renal ([facility])    Vaginal prolapse    Vitamin D deficiency    Acquired complex cyst of kidney    Gastroesophageal reflux disease without esophagitis    Mucinous carcinoma of breast, left ([facility])    Oral candidiasis     Past Medical History:   Diagnosis Date    Arthritis     Back pain     Benign essential hypertension     LAST ASSESSED: [id number]    Carotid artery stenosis     CKD (chronic kidney disease) stage 2, GFR 60-89 ml/min     Constipation, chronic     "drinks prune juice"    Diabetes mellitus ([address] )     DMII (diabetes mellitus, type 2) ([facility])     Epiglottitis     RESOLVED: [id number]    Gastritis     GERD (gastroesophageal reflux disease)     Gout     H/O blood clots     left lower leg,,,11 yrs ago    History of stenosis of renal artery     RESOLVED: [id number]    Hypercholesteremia     Hypercholesterolemia     Hyperlipidemia     Hyperparathyroidism ([facility]     Hypertension     Iliac artery stenosis, bilateral (HCC)     Kidney stone     Mucinous carcinoma of breast, left ([facility] ) 8/15/2018    Presence of pessary     Renal artery stenosis (HCC)     Renal disorder     Renovascular hypertension     RESOLVED: [id number]    Segmental and somatic dysfunction of cervical region     RESOLVED: 76NUO9144    Segmental and somatic dysfunction of lumbar region     RESOLVED: 66BEW1569    Segmental and somatic dysfunction of pelvic region     RESOLVED: [id number]    Segmental and somatic dysfunction of thoracic region     RESOLVED: [id number]    Somatic dysfunction of abdominal region     RESOLVED: [id number]    Somatic dysfunction of head region     RESOLVED: 16ZFY1276    Somatic dysfunction of lower extremities     RESOLVED: 78KWO3598    Somatic dysfunction of rib region     RESOLVED: 40ESC5958    Somatic dysfunction of thoracic region     RESOLVED: 70PFQ2403    Somatic dysfunction of upper extremities     RESOLVED: 92PKC8400    Teeth missing      Past Surgical History:   Procedure Laterality Date    ANGIOPLASTY / STENTING ILIAC      BREAST LUMPECTOMY Left 09/06/2017    LAST ASSESSED: [id number]    BREAST SURGERY Left     biopsy    CATARACT EXTRACTION Bilateral     CHOLECYSTECTOMY      COLONOSCOPY      EYE SURGERY      ILIAC VEIN ANGIOPLASTY / STENTING Right     INITIAL STENOSIS: ONSET: [id number]    KNEE ARTHROSCOPY Right     KNEE SURGERY Right     ONSET: [id number]    MAMMO NEEDLE LOCALIZATION LEFT (ALL INC) Left 9/11/2018    GA ESOPHAGOGASTRODUODENOSCOPY TRANSORAL DIAGNOSTIC N/A 7/26/2018    Procedure: ESOPHAGOGASTRODUODENOSCOPY (EGD); Surgeon: Trent Spicer DO;  [facility];   Service: Gastroenterology    GA LARYNGOSCOPY,DIRCT,OP [facility] N/A 6/10/2016    Procedure: MICRO DIRECT LARYNGOSCOPY WITH VOCAL FOLD MASS EXCISION ;  Surgeon: Carl Liao MD;  [facility];  Service: ENT    GA MASTECTOMY, PARTIAL Left 9/6/2017    Procedure: LUMPECTOMY BREAST NEEDLE LOCALIZED WITH FAXITRON NEEDLE @ 0830;  Surgeon: Georgia Álvarez MD;  [facility];  Service: General    SC PERQ DEVICE PLACEMT BREAST LOC 1ST LES W [place] Left 9/11/2018    Procedure: BREAST LUMPECTOMY; BREAST NEEDLE LOCALIZATION (NEEDLE LOC AT 1330); Surgeon: Codie Jaime MD;  [facility];  Service: Surgical Oncology    THROAT SURGERY      lump removed    TUBAL LIGATION      US GUIDED BREAST BIOPSY LEFT COMPLETE Left 8/10/2018    VASCULAR SURGERY       Family History   Problem Relation Age of Onset    Heart disease Brother     Heart disease Maternal Grandmother     Diabetes Other         of other type with arthropathy, without long term current use of insulin    Hypertension Other     No Known Problems Mother     Gout Family     Rheum arthritis Family     Lupus Family         systematic erythematosus    Heart disease Family     Stroke Family         syndrome    Stroke Maternal Uncle         syndrome    Stroke Maternal Aunt         syndrome    No Known Problems Father      Social History     Social History    Marital status:      Spouse name: N/A    Number of children: N/A    Years of education: N/A     Occupational History    Not on file       Social History Main Topics    Smoking status: Former Smoker     Packs/day: 1 00     Years: 64 00     Quit date: 2004    Smokeless tobacco: Never Used      Comment: quit in 2003 1 ppd x 50 years    Alcohol use No    Drug use: No    Sexual activity: Not on file     Other Topics Concern    Not on file     Social History Narrative    Daily caffeine consumption, 1 serving a day        Current Outpatient Prescriptions:     Acetaminophen (TYLENOL) 325 MG CAPS, Take by mouth every 8 (eight) hours, Disp: , Rfl:     allopurinol (ZYLOPRIM) 100 mg tablet, TK 1 T PO D, Disp: , Rfl: 1    aspirin (ADULT ASPIRIN EC LOW STRENGTH) 81 mg EC tablet, Take 1 tablet by mouth daily, Disp: , Rfl:     calcitriol (ROCALTROL) 0 25 mcg capsule, Take 0 25 mcg by mouth once a week Pt takes every friday , Disp: , Rfl:     calcium carbonate-vitamin D (OSCAL-D) 500 mg-200 units per tablet, Take 1 tablet by mouth daily with breakfast, Disp: , Rfl:     fluticasone (FLONASE) 50 mcg/act nasal spray, INHALE 1 SPRAY IN EACH NOSTRIL DAILY, Disp: 1 Bottle, Rfl: 0    gabapentin (NEURONTIN) 100 mg capsule, Take 1 capsule (100 mg total) by mouth daily at bedtime, Disp: 60 capsule, Rfl: 1    glimepiride (AMARYL) 1 mg tablet, Take 0 5 tablets (0 5 mg total) by mouth daily with breakfast, Disp: 90 tablet, Rfl: 1    Lancets (ONETOUCH ULTRASOFT) lancets, USE TWICE DAILY, Disp: 200 each, Rfl: 9    metoprolol tartrate (LOPRESSOR) 50 mg tablet, Take 1 tablet (50 mg total) by mouth 2 (two) times a day, Disp: 180 tablet, Rfl: 0    NIFEdipine (NIFEDICAL XL) 30 mg 24 hr tablet, Take 1 tablet (30 mg total) by mouth daily, Disp: 90 tablet, Rfl: 0    nystatin (MYCOSTATIN) 100,000 units/mL suspension, Apply 5 mL (500,000 Units total) to the mouth or throat 4 (four) times a day, Disp: 60 mL, Rfl: 0    omeprazole (PriLOSEC) 40 MG capsule, TAKE 1 CAPSULE(40 MG) BY MOUTH TWICE DAILY, Disp: 180 capsule, Rfl: 3    ONE TOUCH ULTRA TEST test strip, TEST TWICE DAILY AS INSTRUCTED, Disp: 100 each, Rfl: 1    oxyCODONE-acetaminophen (PERCOCET) 5-325 mg per tablet, Take 1 tablet by mouth every 4 (four) hours as needed for moderate pain Max Daily Amount: 6 tablets, Disp: 6 tablet, Rfl: 0    pantoprazole (PROTONIX) 40 mg tablet, TAKE 1 TABLET(40 MG) BY MOUTH TWICE DAILY, Disp: 180 tablet, Rfl: 0    pravastatin (PRAVACHOL) 40 mg tablet, TAKE 1 TABLET BY MOUTH ON MONDAY AND THURSDAY WHILE ON COLCRYS   ONCE OFF COLCRYS RESUME TAKING 1 TABLET BY MOUTH EVERY DAY, Disp: 90 tablet, Rfl: 0    RESTASIS 0 05 % ophthalmic emulsion, INSTILL 1 GTT INTO OU BID, Disp: , Rfl: 3    spironolactone (ALDACTONE) 25 mg tablet, Take 1 tablet (25 mg total) by mouth daily at bedtime, Disp: 90 tablet, Rfl: 0    sucralfate (CARAFATE) 1 g/10 mL suspension, Take 10 mL (1 g total) by mouth 4 (four) times a day, Disp: 420 mL, Rfl: 0    terconazole (TERAZOL 7) 0 4 % vaginal cream, Insert 1 applicator into the vagina daily at bedtime, Disp: 45 g, Rfl: 0  Allergies   Allergen Reactions    Clonidine Derivatives Swelling    Diflucan [Fluconazole] Rash    Flagyl [Metronidazole] Anaphylaxis    Carvedilol Hives     And legs swelled    Latex Rash    Lipitor [Atorvastatin]      Legs swelled    Other Palpitations     IV DYE       Physical Exams:     Vitals:    09/26/18 1226   BP: 124/66   Pulse: 74   Resp: 16   Temp: 98 3 °F (36 8 °C)     Physical Exam   Pulmonary/Chest:       Examination of the left breast demonstrates a well-healed incision  I removed her Steri-Strips  There is no evidence of infection         Results:       Labs:       Discussion/Summary:   See above

## 2018-09-26 NOTE — PROGRESS NOTES
Surgical Oncology Breast Post-Op       [address]  [facility] [phone]    Bubba Pittman  1934  [id number]  [address] [facility] [address]  [facility] [phone]    Chief Complaint:   Romayne Poncho is seen for a post-operative visit of her recent left breast surgery  Oncology History:        Mucinous carcinoma of breast, left ([address] )    8/10/2018 Biopsy     Left breast biopsy  Invasive mucinous  Grade 1      Her 2 1+         9/11/2018 Surgery     Left lumpectomy  Invasive mucinous carcinoma  Grade 1  1 1 cm in size  Margins negative      Her 2 1+  Stage 1A            Assessment & Plan:   Assessment/Plan    The patient is doing well from her surgery  She has no complaints referable to her surgery  Her margins were all clean  I recommended she see Dr Nida Crawford for an opinion regarding adjuvant hormonal therapy  Given her overall good prognosis I have not sent her to radiation therapy  I will see her back in 3 months    History of Present Illness:   See above    Interval History:   See above    Review of Systems:   Review of Systems    Past Medical History:     Patient Active Problem List   Diagnosis    Vocal cord mass    Iliac artery stenosis, bilateral ([address] )    Hyperparathyroidism ([address] )    Arthritis    Hypertension    EKG abnormalities    Anemia    Neck pain    Chronic thoracic back pain    Fall from other slipping, tripping, or stumbling    Abnormal EKG    Abnormal mammogram    Accelerated essential hypertension    Allergic rhinitis    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)    Back pain    Bilateral carotid artery stenosis    Carotid stenosis, asymptomatic    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    Chronic myofascial pain    Chronic pain of lower extremity  CKD (chronic kidney disease), stage III    Constipation    Diabetic retinopathy ([facility])    DMII (diabetes mellitus, type 2) ([facility])    Dysphagia    Fever of unknown origin    Gastritis    Gout    Hypomagnesemia    Atherosclerosis of other specified arteries    Lymphadenopathy    Microalbuminuria    Nephrolithiasis    Neuropathic pain, leg, right    Osteoporosis    [facility]) [facility]    Primary generalized (osteo)arthritis    Persistent proteinuria    Renal cyst    Secondary hyperparathyroidism, renal ([facility]    Vaginal prolapse    Vitamin D deficiency    Acquired complex cyst of kidney    Gastroesophageal reflux disease without esophagitis    Mucinous carcinoma of breast, left ([facility])    Oral candidiasis     Past Medical History:   Diagnosis Date    Arthritis     Back pain     Benign essential hypertension     LAST ASSESSED: [id number]    Carotid artery stenosis     CKD (chronic kidney disease) stage 2, GFR 60-89 ml/min     Constipation, chronic     "drinks prune juice"    Diabetes mellitus ([facility] )     DMII (diabetes mellitus, type 2) ([facility])     Epiglottitis     RESOLVED: [id number]    Gastritis     GERD (gastroesophageal reflux disease)     Gout     H/O blood clots     left lower leg,,,11 yrs ago    History of stenosis of renal artery     RESOLVED: 82KPE2489    Hypercholesteremia     Hypercholesterolemia     Hyperlipidemia     Hyperparathyroidism ([facility] )     Hypertension     Iliac artery stenosis, bilateral ([facility]     Kidney stone     Mucinous carcinoma of breast, left ([facility] ) 8/15/2018    Presence of pessary     Renal artery stenosis ([facility])     Renal disorder     Renovascular hypertension     RESOLVED: [id number]    Segmental and somatic dysfunction of cervical region     RESOLVED: 75ZES4058    Segmental and somatic dysfunction of lumbar region     RESOLVED: [id number]    Segmental and somatic dysfunction of pelvic region     RESOLVED: 49YIN8614    Segmental and somatic dysfunction of thoracic region     RESOLVED: [id number]    Somatic dysfunction of abdominal region     RESOLVED: 98FRH9946    Somatic dysfunction of head region     RESOLVED: 93LAL8393    Somatic dysfunction of lower extremities     RESOLVED: [id number]    Somatic dysfunction of rib region     RESOLVED: 43DVN6548    Somatic dysfunction of thoracic region     RESOLVED: 99ILR9844    Somatic dysfunction of upper extremities     RESOLVED: 74FKS1575    Teeth missing      Past Surgical History:   Procedure Laterality Date    ANGIOPLASTY / STENTING ILIAC      BREAST LUMPECTOMY Left 09/06/2017    LAST ASSESSED: [id number]    BREAST SURGERY Left     biopsy    CATARACT EXTRACTION Bilateral     CHOLECYSTECTOMY      COLONOSCOPY      EYE SURGERY      ILIAC VEIN ANGIOPLASTY / STENTING Right     INITIAL STENOSIS: ONSET: [id number]    KNEE ARTHROSCOPY Right     KNEE SURGERY Right     ONSET: 92MMH2863    MAMMO NEEDLE LOCALIZATION LEFT (ALL INC) Left 9/11/2018    MS ESOPHAGOGASTRODUODENOSCOPY TRANSORAL DIAGNOSTIC N/A 7/26/2018    Procedure: ESOPHAGOGASTRODUODENOSCOPY (EGD); Surgeon: Macho Corona DO;  [facility]; Service: Gastroenterology    MS [facility] N/A 6/10/2016    Procedure: MICRO DIRECT LARYNGOSCOPY WITH VOCAL FOLD MASS EXCISION ;  Surgeon: Helder Pavon MD;  [facility];  Service: ENT    MS MASTECTOMY, PARTIAL Left 9/6/2017    Procedure: LUMPECTOMY BREAST NEEDLE LOCALIZED WITH FAXITRON NEEDLE @ 0830;  Surgeon: Andrea Ogden MD;  [facility];  Service: General    MS PERQ DEVICE PLACEMT BREAST LOC 1ST LES W Timoteo Barron Left 9/11/2018    Procedure: BREAST LUMPECTOMY; BREAST NEEDLE LOCALIZATION (NEEDLE LOC AT 1330);   Surgeon: Smiley Garcia MD;  [facility];  Service: Surgical Oncology    THROAT SURGERY      lump removed    TUBAL LIGATION      US GUIDED BREAST BIOPSY LEFT COMPLETE Left 8/10/2018    VASCULAR SURGERY       Family History   Problem Relation Age of Onset    Heart disease Brother     Heart disease Maternal Grandmother     Diabetes Other         of other type with arthropathy, without long term current use of insulin    Hypertension Other     No Known Problems Mother     Gout Family     Rheum arthritis Family     Lupus Family         systematic erythematosus    Heart disease Family     Stroke Family         syndrome    Stroke Maternal Uncle         syndrome    Stroke Maternal Aunt         syndrome    No Known Problems Father      Social History     Social History    Marital status:      Spouse name: N/A    Number of children: N/A    Years of education: N/A     Occupational History    Not on file       Social History Main Topics    Smoking status: Former Smoker     Packs/day: 1 00     Years: 64 00     Quit date: 2004    Smokeless tobacco: Never Used      Comment: quit in 2003 1 ppd x 50 years    Alcohol use No    Drug use: No    Sexual activity: Not on file     Other Topics Concern    Not on file     Social History Narrative    Daily caffeine consumption, 1 serving a day        Current Outpatient Prescriptions:     Acetaminophen (TYLENOL) 325 MG CAPS, Take by mouth every 8 (eight) hours, Disp: , Rfl:     allopurinol (ZYLOPRIM) 100 mg tablet, TK 1 T PO D, Disp: , Rfl: 1    aspirin (ADULT ASPIRIN EC LOW STRENGTH) 81 mg EC tablet, Take 1 tablet by mouth daily, Disp: , Rfl:     calcitriol (ROCALTROL) 0 25 mcg capsule, Take 0 25 mcg by mouth once a week Pt takes every friday , Disp: , Rfl:     calcium carbonate-vitamin D (OSCAL-D) 500 mg-200 units per tablet, Take 1 tablet by mouth daily with breakfast, Disp: , Rfl:     fluticasone (FLONASE) 50 mcg/act nasal spray, INHALE 1 SPRAY IN EACH NOSTRIL DAILY, Disp: 1 Bottle, Rfl: 0    gabapentin (NEURONTIN) 100 mg capsule, Take 1 capsule (100 mg total) by mouth daily at bedtime, Disp: 60 capsule, Rfl: 1    glimepiride (AMARYL) 1 mg tablet, Take 0 5 tablets (0 5 mg total) by mouth daily with breakfast, Disp: 90 tablet, Rfl: 1    Lancets (ONETOUCH ULTRASOFT) lancets, USE TWICE DAILY, Disp: 200 each, Rfl: 9    metoprolol tartrate (LOPRESSOR) 50 mg tablet, Take 1 tablet (50 mg total) by mouth 2 (two) times a day, Disp: 180 tablet, Rfl: 0    NIFEdipine (NIFEDICAL XL) 30 mg 24 hr tablet, Take 1 tablet (30 mg total) by mouth daily, Disp: 90 tablet, Rfl: 0    nystatin (MYCOSTATIN) 100,000 units/mL suspension, Apply 5 mL (500,000 Units total) to the mouth or throat 4 (four) times a day, Disp: 60 mL, Rfl: 0    omeprazole (PriLOSEC) 40 MG capsule, TAKE 1 CAPSULE(40 MG) BY MOUTH TWICE DAILY, Disp: 180 capsule, Rfl: 3    ONE TOUCH ULTRA TEST test strip, TEST TWICE DAILY AS INSTRUCTED, Disp: 100 each, Rfl: 1    oxyCODONE-acetaminophen (PERCOCET) 5-325 mg per tablet, Take 1 tablet by mouth every 4 (four) hours as needed for moderate pain Max Daily Amount: 6 tablets, Disp: 6 tablet, Rfl: 0    pantoprazole (PROTONIX) 40 mg tablet, TAKE 1 TABLET(40 MG) BY MOUTH TWICE DAILY, Disp: 180 tablet, Rfl: 0    pravastatin (PRAVACHOL) 40 mg tablet, TAKE 1 TABLET BY MOUTH ON MONDAY AND THURSDAY WHILE ON COLCRYS   ONCE OFF COLCRYS RESUME TAKING 1 TABLET BY MOUTH EVERY DAY, Disp: 90 tablet, Rfl: 0    RESTASIS 0 05 % ophthalmic emulsion, INSTILL 1 GTT INTO OU BID, Disp: , Rfl: 3    spironolactone (ALDACTONE) 25 mg tablet, Take 1 tablet (25 mg total) by mouth daily at bedtime, Disp: 90 tablet, Rfl: 0    sucralfate (CARAFATE) 1 g/10 mL suspension, Take 10 mL (1 g total) by mouth 4 (four) times a day, Disp: 420 mL, Rfl: 0    terconazole (TERAZOL 7) 0 4 % vaginal cream, Insert 1 applicator into the vagina daily at bedtime, Disp: 45 g, Rfl: 0  Allergies   Allergen Reactions    Clonidine Derivatives Swelling    Diflucan [Fluconazole] Rash    Flagyl [Metronidazole] Anaphylaxis    Carvedilol Hives     And legs swelled    Latex Rash    Lipitor [Atorvastatin]      Legs swelled    Other Palpitations     IV DYE       Physical Exams:     Vitals:    09/26/18 1226   BP: 124/66   Pulse: 74   Resp: 16   Temp: 98 3 °F (36 8 °C)     Physical Exam   Pulmonary/Chest:       Examination of the left breast demonstrates a well-healed incision  I removed her Steri-Strips  There is no evidence of infection         Results:       Labs:       Discussion/Summary:   See above

## 2018-09-28 ENCOUNTER — OFFICE VISIT (OUTPATIENT)
Dept: NEPHROLOGY | Facility: CLINIC | Age: 83
End: 2018-09-28
Payer: COMMERCIAL

## 2018-09-28 VITALS — BODY MASS INDEX: 24.37 KG/M2 | HEIGHT: 62 IN | WEIGHT: 132.4 LBS

## 2018-09-28 DIAGNOSIS — R80.1 PERSISTENT PROTEINURIA: ICD-10-CM

## 2018-09-28 DIAGNOSIS — N18.30 CKD (CHRONIC KIDNEY DISEASE), STAGE III (HCC): Primary | ICD-10-CM

## 2018-09-28 DIAGNOSIS — N25.81 SECONDARY HYPERPARATHYROIDISM, RENAL (HCC): ICD-10-CM

## 2018-09-28 DIAGNOSIS — I10 BENIGN ESSENTIAL HYPERTENSION: ICD-10-CM

## 2018-09-28 PROCEDURE — 99214 OFFICE O/P EST MOD 30 MIN: CPT | Performed by: INTERNAL MEDICINE

## 2018-09-28 RX ORDER — AMOXICILLIN 500 MG/1
500 CAPSULE ORAL EVERY 8 HOURS SCHEDULED
COMMUNITY
End: 2018-10-04

## 2018-09-28 NOTE — PATIENT INSTRUCTIONS
1  Your kidney function and blood pressure is stable      2  Thank you for coming in to see me today

## 2018-09-28 NOTE — ASSESSMENT & PLAN NOTE
She has a baseline creatinine of 1 4-1 6    Labs from August 29 showed a creatinine 1 44 and she is within her baseline range

## 2018-09-28 NOTE — ASSESSMENT & PLAN NOTE
Her prior intact PTH level had been 177  This was in March 2018 She is on a vitamin-D analog  In July 2018 her intact PTH had decreased to 126  Will recheck labs next month  Check phosphorus calcium intact PTH and vitamin-D level  she is only able a tolerate taking the calcitriol once weekly as when she takes it twice weekly she gets nauseous  Recheck blood work before next visit

## 2018-09-28 NOTE — PROGRESS NOTES
Assessment/Plan:    CKD (chronic kidney disease), stage III  She has a baseline creatinine of 1 4-1 6  Labs from August 29 showed a creatinine 1 44 and she is within her baseline range    Secondary hyperparathyroidism, renal ([address] )  Her prior intact PTH level had been 177  This was in March 2018 She is on a vitamin-D analog  In July 2018 her intact PTH had decreased to 126  Will recheck labs next month  Check phosphorus calcium intact PTH and vitamin-D level  she is only able a tolerate taking the calcitriol once weekly as when she takes it twice weekly she gets nauseous  Recheck blood work before next visit  Persistent proteinuria  She is an albumin creatinine ratio 425  This may be secondary to diabetic nephropathy  In July her protein creatinine ratio 0 7 which is higher than 0 2  She is on Aldactone unable to add ACE-inhibitor or ARB secondary to history of renal artery stenosis  Hypertension  Her blood pressure has been stable on current medication it was noted in August her blood pressure was measured at 138/74  Today, her blood pressure is    Benign essential hypertension  Blood pressure is stable at 136/60 in the right arm no change in current regimen  Subjective:      Patient ID: Yesy Ruiz is a 80 y o  female  HPI    Patient here in follow-up for chronic kidney disease and blood pressure management  No acute complaints  No chest pressure or shortness of Breath  Nausea vomiting diarrhea or dizziness  Patient had recent lumpectomy and tolerated procedure well  Her last potassium was 4 4  Review of Systems      Objective:      Ht 5' 2" (1 575 m)   Wt 60 1 kg (132 lb 6 4 oz)   LMP  (LMP Unknown)   BMI 24 22 kg/m²     Blood pressure is 136/60 in the right arm     Physical Exam   Constitutional: She appears well-nourished  Eyes: No scleral icterus  Neck: Neck supple  Cardiovascular: Regular rhythm      Pulmonary/Chest: Effort normal    Abdominal: Bowel sounds are normal    Musculoskeletal: She exhibits no edema  Lymphadenopathy:     She has no cervical adenopathy  Neurological:   nonfocal   Skin: Skin is warm and dry

## 2018-09-28 NOTE — ASSESSMENT & PLAN NOTE
She is an albumin creatinine ratio 425  This may be secondary to diabetic nephropathy  In July her protein creatinine ratio 0 7 which is higher than 0 2  She is on Aldactone unable to add ACE-inhibitor or ARB secondary to history of renal artery stenosis

## 2018-09-28 NOTE — ASSESSMENT & PLAN NOTE
Her blood pressure has been stable on current medication it was noted in August her blood pressure was measured at 138/74    Today, her blood pressure is

## 2018-10-04 ENCOUNTER — TELEPHONE (OUTPATIENT)
Dept: HEMATOLOGY ONCOLOGY | Facility: CLINIC | Age: 83
End: 2018-10-04

## 2018-10-04 ENCOUNTER — OFFICE VISIT (OUTPATIENT)
Dept: HEMATOLOGY ONCOLOGY | Facility: CLINIC | Age: 83
End: 2018-10-04
Payer: COMMERCIAL

## 2018-10-04 VITALS
OXYGEN SATURATION: 98 % | RESPIRATION RATE: 16 BRPM | WEIGHT: 132 LBS | TEMPERATURE: 97.6 F | BODY MASS INDEX: 24.29 KG/M2 | SYSTOLIC BLOOD PRESSURE: 122 MMHG | DIASTOLIC BLOOD PRESSURE: 60 MMHG | HEIGHT: 62 IN | HEART RATE: 59 BPM

## 2018-10-04 DIAGNOSIS — C50.912 MUCINOUS CARCINOMA OF BREAST, LEFT (HCC): ICD-10-CM

## 2018-10-04 PROCEDURE — 99204 OFFICE O/P NEW MOD 45 MIN: CPT | Performed by: INTERNAL MEDICINE

## 2018-10-04 NOTE — TELEPHONE ENCOUNTER
Left message to see if patient can move her 4pm appointment for today (10/4) to 12:40pm today instead  Asked for a call back

## 2018-10-04 NOTE — PROGRESS NOTES
Hematology / Oncology Outpatient Consult Note    Steff Leong 80 y o  female [id number]         Date:  10/4/2018    Assessment / Plan:  A 79-year-old postmenopausal woman with newly diagnosed stage IA left breast cancer with invasive mucinous histology, grade 1, % positive, % positive, HER2 negative disease  She underwent lumpectomy, resulting in TANGELA  She presents today with her son to discuss adjuvant treatment options  We had extensive discussion regarding the diagnosis, staging information, low-grade histology, tumor phenotype, good prognosis and treatment options  Since she has mucinous histology, this is considered to be low-grade histology  She has very good prognosis  We discussed possible adjuvant hormonal therapy with aromatase inhibitor  We discussed the side effects of AI which include but not limited to hot flashes, musculoskeletal symptoms and bone mineral density loss  Because of the low-grade histology, as well as stage and her age, her absolute benefit from aromatase inhibitor is quite small  Therefore, she has an option not to have adjuvant hormonal therapy  After the lengthy discussion, she feels most comfortable not to take aromatase inhibitor  She is going to be followed by Dr Caitlyn Ley clinically as well as annual mammography  She and her son's questions were answered to their satisfaction  Subjective:     HPI:  A 79-year-old postmenopausal woman who was found to have radiographic abnormality in her left breast, based on a screening mammography  Therefore, she underwent left breast biopsy in August 10, 2018 which showed invasive mucinous carcinoma, grade 1  This was % positive, % positive, HER2 negative disease  She subsequently underwent lumpectomy by Dr Caitlyn Ley in September 4, 2018  She had 1 1 cm of invasive mucinous carcinoma, grade 1  There was no evidence of lymphovascular invasion    Because of her age, sentinel lymph node biopsy was not performed  She presents today with her son to discuss adjuvant treatment options  She has multiple medical condition, including diabetes, gastritis, hypertension, CKD as well as arthritis  She lived by herself  She has some soreness in left breast   Otherwise, she has no breast related symptomatology  Her weight is stable  She has no respiratory symptoms  She is fully ambulatory  She has no family history of breast or ovarian cancer  Interval History:          Objective:     Primary Diagnosis:    Left breast cancer, stage IA (pT1c, pNx, M0) with invasive mucinous histology, grade 1, % positive, % positive, HER2 negative disease  Diagnosed in September 2018  Cancer Staging:  Cancer Staging  Mucinous carcinoma of breast, left (HCC)  Staging form: Breast, AJCC 8th Edition  - Pathologic: Stage IA (pT1c, pN0, cM0, G1, ER: Positive, MT: Positive, HER2: Negative) - Unsigned        Previous Hematologic/ Oncologic Treatment:         Current Hematologic/ Oncologic Treatment:      Observation  Disease Status:     TANGELA status post lumpectomy with sentinel lymph node biopsy  Test Results:    Pathology:    1 1 cm of invasive mucinous carcinoma, grade 1  No evidence of lymphovascular invasion  [place] lymph node was not taken  % positive, % positive, HER2 negative disease  Stage IA (pT1c, pNX, M0)    Radiology:    Chest x-ray was negative for pulmonary disease  Laboratory:    Creatinine 1 3  Otherwise unremarkable CBC and CMP  Physical Exam:      General Appearance:    Alert, oriented        Eyes:    PERRL   Ears:    Normal external ear canals, both ears   Nose:   Nares normal, septum midline   Throat:   Mucosa moist  Pharynx without injection      Neck:   Supple       Lungs:     Clear to auscultation bilaterally   Chest Wall:    No tenderness or deformity    Heart:    Regular rate and rhythm       Abdomen:     Soft, non-tender, bowel sounds +, no organomegaly           Extremities:   Extremities no cyanosis or edema       Skin:   no rash or icterus  Lymph nodes:   Cervical, supraclavicular, and axillary nodes normal   Neurologic:   CNII-XII intact, normal strength, sensation and reflexes     Throughout          Breast exam:   Lumpectomy scar at outer upper quadrant of the left breast with no palpable abnormality  Right breast exam is negative  ROS: Review of Systems   All other systems reviewed and are negative  Imaging: Mammo Needle Localization Left ([employer])    Result Date: 9/11/2018  Narrative: Mammo Needle Localization Left ([employer]): September 11, 2018 - Check In #: [de-identified] CC and LM view(s) were taken of the left breast  Technologist: LEAH Palomares)(M) Patient was prepped in standard sterile fashion  Under mammographic guidance, a 5 cm localization needle was advanced via a left lateral approach  Appropriate positioning was confirmed via mammography  Localization wire was subsequently deployed  Patient was sent to the operating room in good condition  Subsequent specimen radiographs demonstrate presence of target clip   Transcription Location: [address] [place], [place] [address]way: [id number]        Labs:   Lab Results   Component Value Date    WBC 6 71 08/21/2018    HGB 12 3 08/21/2018    HCT 40 0 08/21/2018    MCV 91 08/21/2018     08/21/2018     Lab Results   Component Value Date     08/29/2018    K 4 4 08/29/2018     08/29/2018    CO2 30 08/29/2018    ANIONGAP 7 12/09/2015    BUN 37 (H) 08/29/2018    CREATININE 1 44 (H) 08/29/2018    GLUCOSE 210 (H) 12/09/2015    GLUF 203 (H) 08/29/2018    CALCIUM 9 1 08/29/2018    AST 14 08/21/2018    ALT 16 08/21/2018    ALKPHOS 84 08/21/2018    PROT 7 5 07/03/2017    PROT 7 5 07/03/2017    BILITOT 0 3 07/03/2017    BILITOT 0 3 07/03/2017    EGFR 34 08/29/2018       Lab Results   Component Value Date    UPEP  12/09/2013     The serum total protein and albumin are within normal limits  The serumprotein electrophoresis shows a faint possible band in the gamma region  Immunofixation to be performed  Reviewed by: Radha Swartz, [id number]) **Electronic Signature**         Vital Sign:    Body surface area is 1 6 meters squared      Wt Readings from Last 3 Encounters:   10/04/18 59 9 kg (132 lb)   09/28/18 60 1 kg (132 lb 6 4 oz)   09/26/18 60 3 kg (133 lb)        Temp Readings from Last 3 Encounters:   10/04/18 97 6 °F (36 4 °C) (Tympanic)   09/26/18 98 3 °F (36 8 °C) (Oral)   09/11/18 (!) 97 3 °F (36 3 °C)        BP Readings from Last 3 Encounters:   10/04/18 122/60   09/26/18 124/66   09/11/18 136/68         Pulse Readings from Last 3 Encounters:   10/04/18 59   09/26/18 74   09/11/18 60     @LASTSAO2(3)@    Active Problems:   Patient Active Problem List   Diagnosis    Vocal cord mass    Iliac artery stenosis, bilateral ([address] )    Hyperparathyroidism ([address] )    Arthritis    Hypertension    EKG abnormalities    Anemia    Neck pain    Chronic thoracic back pain    Fall from other slipping, tripping, or stumbling    Abnormal EKG    Abnormal mammogram    Accelerated essential hypertension    Allergic rhinitis    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)    Back pain    Bilateral carotid artery stenosis    Carotid stenosis, asymptomatic    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    Chronic myofascial pain    Chronic pain of lower extremity    CKD (chronic kidney disease), stage III (HCC)    Constipation    Diabetic retinopathy ([address] )    DMII (diabetes mellitus, type 2) (HCC)    Dysphagia    Fever of unknown origin    Gastritis    Gout    Hypomagnesemia    Atherosclerosis of other specified arteries    Lymphadenopathy    Microalbuminuria    Nephrolithiasis    Neuropathic pain, leg, right    Osteoporosis    PAD (peripheral artery disease) ([address] )    Primary generalized (osteo)arthritis    Persistent proteinuria    Renal cyst    Secondary hyperparathyroidism, renal (HCC)    Vaginal prolapse    Vitamin D deficiency    Acquired complex cyst of kidney    Gastroesophageal reflux disease without esophagitis    Mucinous carcinoma of breast, left ([facility])    Oral candidiasis       Past Medical History:   Past Medical History:   Diagnosis Date    Arthritis     Back pain     Benign essential hypertension     LAST ASSESSED: [id number]    Carotid artery stenosis     CKD (chronic kidney disease) stage 2, GFR 60-89 ml/min     Constipation, chronic     "drinks prune juice"    Diabetes mellitus [facility]     DMII (diabetes mellitus, type 2) ([facility])     Epiglottitis     RESOLVED: [id number]    Gastritis     GERD (gastroesophageal reflux disease)     Gout     H/O blood clots     left lower leg,,,11 yrs ago    History of stenosis of renal artery     RESOLVED: [id number]    Hypercholesteremia     Hypercholesterolemia     Hyperlipidemia     Hyperparathyroidism ([facility] )     Hypertension     Iliac artery stenosis, bilateral (HCC)     Kidney stone     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    Presence of pessary     Renal artery stenosis ([facility])     Renal disorder     Renovascular hypertension     RESOLVED: [id number]    Segmental and somatic dysfunction of cervical region     RESOLVED: [id number]    Segmental and somatic dysfunction of lumbar region     RESOLVED: 76QSX4700    Segmental and somatic dysfunction of pelvic region     RESOLVED: 95GWL9711    Segmental and somatic dysfunction of thoracic region     RESOLVED: [id number]    Somatic dysfunction of abdominal region     RESOLVED: [id number]    Somatic dysfunction of head region     RESOLVED: 25QAF0697    Somatic dysfunction of lower extremities     RESOLVED: [id number]    Somatic dysfunction of rib region     RESOLVED: 42VWN0201    Somatic dysfunction of thoracic region     RESOLVED: 66VXN4091    Somatic dysfunction of upper extremities     RESOLVED: 65HGW2504    Teeth missing        Surgical History:   Past Surgical History:   Procedure Laterality Date    ANGIOPLASTY / STENTING ILIAC      BREAST LUMPECTOMY Left 09/06/2017    LAST ASSESSED: [id number]    BREAST SURGERY Left     biopsy    CATARACT EXTRACTION Bilateral     CHOLECYSTECTOMY      COLONOSCOPY      EYE SURGERY      ILIAC VEIN ANGIOPLASTY / STENTING Right     INITIAL STENOSIS: ONSET: [id number]    KNEE ARTHROSCOPY Right     KNEE SURGERY Right     ONSET: [id number]    MAMMO NEEDLE LOCALIZATION LEFT ([employer]) Left 9/11/2018    MA ESOPHAGOGASTRODUODENOSCOPY TRANSORAL DIAGNOSTIC N/A 7/26/2018    Procedure: ESOPHAGOGASTRODUODENOSCOPY (EGD); Surgeon: Vi Yanez DO;  [facility]: [facility]; Service: Gastroenterology    MA LARYNGOSCOPY,DIRCT,OP [facility] N/A 6/10/2016    Procedure: MICRO DIRECT LARYNGOSCOPY WITH VOCAL FOLD MASS EXCISION ;  Surgeon: Radha Gurrola MD;  [facility];  Service: ENT    MA MASTECTOMY, PARTIAL Left 9/6/2017    Procedure: LUMPECTOMY BREAST NEEDLE LOCALIZED WITH FAXITRON NEEDLE @ 0830;  Surgeon: Aspen Vasques MD;  [facility];  Service: General    MA PERQ DEVICE PLACEMT BREAST LOC 1ST LES W Dodie Stager Left 9/11/2018    Procedure: BREAST LUMPECTOMY; BREAST NEEDLE LOCALIZATION (NEEDLE LOC AT 1330);   Surgeon: China Cobian MD;  [facility];  Service: Surgical Oncology    THROAT SURGERY      lump removed    TUBAL LIGATION      US GUIDED BREAST BIOPSY LEFT COMPLETE Left 8/10/2018    VASCULAR SURGERY         Family History:    Family History   Problem Relation Age of Onset    Heart disease Brother     Heart disease Maternal Grandmother     Diabetes Other         of other type with arthropathy, without long term current use of insulin    Hypertension Other     No Known Problems Mother     Gout Family     Rheum arthritis Family     Lupus Family         systematic erythematosus    Heart disease Family     Stroke Family         syndrome    Stroke Maternal Uncle         syndrome    Stroke Maternal Aunt         syndrome    No Known Problems Father        Cancer-related family history is not on file  Social History:   Social History     Social History    Marital status:      Spouse name: N/A    Number of children: N/A    Years of education: N/A     Occupational History    Not on file       Social History Main Topics    Smoking status: Former Smoker     Packs/day: 1 00     Years: 64 00     Quit date: 2004    Smokeless tobacco: Never Used      Comment: quit in 2003 1 ppd x 50 years    Alcohol use No    Drug use: No    Sexual activity: Not on file     Other Topics Concern    Not on file     Social History Narrative    Daily caffeine consumption, 1 serving a day        Current Medications:   Current Outpatient Prescriptions   Medication Sig Dispense Refill    Acetaminophen (TYLENOL) 325 MG CAPS Take by mouth every 8 (eight) hours      allopurinol (ZYLOPRIM) 100 mg tablet TK 1 T PO D  1    aspirin (ADULT ASPIRIN EC LOW STRENGTH) 81 mg EC tablet Take 1 tablet by mouth daily      calcitriol (ROCALTROL) 0 25 mcg capsule Take 0 25 mcg by mouth once a week Pt takes every friday       calcium carbonate-vitamin D (OSCAL-D) 500 mg-200 units per tablet Take 1 tablet by mouth daily with breakfast      fluticasone (FLONASE) 50 mcg/act nasal spray INHALE 1 SPRAY IN EACH NOSTRIL DAILY 1 Bottle 0    gabapentin (NEURONTIN) 100 mg capsule Take 1 capsule (100 mg total) by mouth daily at bedtime 60 capsule 1    glimepiride (AMARYL) 1 mg tablet Take 0 5 tablets (0 5 mg total) by mouth daily with breakfast 90 tablet 1    Lancets (ONETOUCH ULTRASOFT) lancets USE TWICE DAILY 200 each 9    Magnesium 400 MG CAPS Take 400 mg by mouth daily      metoprolol tartrate (LOPRESSOR) 50 mg tablet Take 1 tablet (50 mg total) by mouth 2 (two) times a day 180 tablet 0    NIFEdipine (NIFEDICAL XL) 30 mg 24 hr tablet Take 1 tablet (30 mg total) by mouth daily 90 tablet 0    nystatin (MYCOSTATIN) 100,000 units/mL suspension Apply 5 mL (500,000 Units total) to the mouth or throat 4 (four) times a day 60 mL 0    omeprazole (PriLOSEC) 40 MG capsule TAKE 1 CAPSULE(40 MG) BY MOUTH TWICE DAILY 180 capsule 3    ONE TOUCH ULTRA TEST test strip TEST TWICE DAILY AS INSTRUCTED 100 each 1    pantoprazole (PROTONIX) 40 mg tablet TAKE 1 TABLET(40 MG) BY MOUTH TWICE DAILY 180 tablet 0    pravastatin (PRAVACHOL) 40 mg tablet TAKE 1 TABLET BY MOUTH ON MONDAY AND THURSDAY WHILE ON COLCRYS  ONCE OFF COLCRYS RESUME TAKING 1 TABLET BY MOUTH EVERY DAY 90 tablet 0    RESTASIS 0 05 % ophthalmic emulsion INSTILL 1 GTT INTO OU BID  3    spironolactone (ALDACTONE) 25 mg tablet Take 1 tablet (25 mg total) by mouth daily at bedtime 90 tablet 0    sucralfate (CARAFATE) 1 g/10 mL suspension Take 10 mL (1 g total) by mouth 4 (four) times a day 420 mL 0    terconazole (TERAZOL 7) 0 4 % vaginal cream Insert 1 applicator into the vagina daily at bedtime 45 g 0     No current facility-administered medications for this visit  Allergies:    Allergies   Allergen Reactions    Clonidine Derivatives Swelling    Diflucan [Fluconazole] Rash    Flagyl [Metronidazole] Anaphylaxis    Carvedilol Hives     And legs swelled    Latex Rash    Lipitor [Atorvastatin]      Legs swelled    Other Palpitations     IV DYE

## 2018-10-04 NOTE — LETTER
October 4, 2018     Annie Brody MD  [address] [place] [id number]    Patient: Seymour Nissen   YOB: 1934   Date of Visit: 10/4/2018       Dear Dr Marcy Mondragon:    Thank you for referring Seymour Nissen to me for evaluation  Below are my notes for this consultation  If you have questions, please do not hesitate to call me  I look forward to following your patient along with you  Sincerely,        Ina Driscoll MD        CC: MD Ina Blakely MD  10/4/2018  1:17 PM  Sign at close encounter  Hematology / Oncology Outpatient Consult Note    Seymour Nissen 80 y o  female [id number]         Date:  10/4/2018    Assessment / Plan:  A 14-year-old postmenopausal woman with newly diagnosed stage IA left breast cancer with invasive mucinous histology, grade 1, % positive, % positive, HER2 negative disease  She underwent lumpectomy, resulting in TANGELA  She presents today with her son to discuss adjuvant treatment options  We had extensive discussion regarding the diagnosis, staging information, low-grade histology, tumor phenotype, good prognosis and treatment options  Since she has mucinous histology, this is considered to be low-grade histology  She has very good prognosis  We discussed possible adjuvant hormonal therapy with aromatase inhibitor  We discussed the side effects of AI which include but not limited to hot flashes, musculoskeletal symptoms and bone mineral density loss  Because of the low-grade histology, as well as stage and her age, her absolute benefit from aromatase inhibitor is quite small  Therefore, she has an option not to have adjuvant hormonal therapy  After the lengthy discussion, she feels most comfortable not to take aromatase inhibitor  She is going to be followed by Dr Marcy Mondragon clinically as well as annual mammography  She and her son's questions were answered to their satisfaction          Subjective: HPI:  A 72-year-old postmenopausal woman who was found to have radiographic abnormality in her left breast, based on a screening mammography  Therefore, she underwent left breast biopsy in August 10, 2018 which showed invasive mucinous carcinoma, grade 1  This was % positive, % positive, HER2 negative disease  She subsequently underwent lumpectomy by Dr Consuelo Fletcher in September 4, 2018  She had 1 1 cm of invasive mucinous carcinoma, grade 1  There was no evidence of lymphovascular invasion  Because of her age, sentinel lymph node biopsy was not performed  She presents today with her son to discuss adjuvant treatment options  She has multiple medical condition, including diabetes, gastritis, hypertension, CKD as well as arthritis  She lived by herself  She has some soreness in left breast   Otherwise, she has no breast related symptomatology  Her weight is stable  She has no respiratory symptoms  She is fully ambulatory  She has no family history of breast or ovarian cancer  Interval History:          Objective:     Primary Diagnosis:    Left breast cancer, stage IA (pT1c, pNx, M0) with invasive mucinous histology, grade 1, % positive, % positive, HER2 negative disease  Diagnosed in September 2018  Cancer Staging:  Cancer Staging  Mucinous carcinoma of breast, left (HCC)  Staging form: Breast, AJCC 8th Edition  - Pathologic: Stage IA (pT1c, pN0, cM0, G1, ER: Positive, TN: Positive, HER2: Negative) - Unsigned        Previous Hematologic/ Oncologic Treatment:         Current Hematologic/ Oncologic Treatment:      Observation  Disease Status:     TANGELA status post lumpectomy with sentinel lymph node biopsy  Test Results:    Pathology:    1 1 cm of invasive mucinous carcinoma, grade 1  No evidence of lymphovascular invasion  [place] lymph node was not taken  % positive, % positive, HER2 negative disease    Stage IA (pT1c, pNX, M0)    Radiology:    Chest x-ray was negative for pulmonary disease  Laboratory:    Creatinine 1 3  Otherwise unremarkable CBC and CMP  Physical Exam:      General Appearance:    Alert, oriented        Eyes:    PERRL   Ears:    Normal external ear canals, both ears   Nose:   Nares normal, septum midline   Throat:   Mucosa moist  Pharynx without injection  Neck:   Supple       Lungs:     Clear to auscultation bilaterally   Chest Wall:    No tenderness or deformity    Heart:    Regular rate and rhythm       Abdomen:     Soft, non-tender, bowel sounds +, no organomegaly           Extremities:   Extremities no cyanosis or edema       Skin:   no rash or icterus  Lymph nodes:   Cervical, supraclavicular, and axillary nodes normal   Neurologic:   CNII-XII intact, normal strength, sensation and reflexes     Throughout          Breast exam:   Lumpectomy scar at outer upper quadrant of the left breast with no palpable abnormality  Right breast exam is negative  ROS: Review of Systems   All other systems reviewed and are negative  Imaging: Mammo Needle Localization Left ([employer])    Result Date: 9/11/2018  Narrative: Mammo Needle Localization Left ([employer]): September 11, 2018 - Check In #: [de-identified] CC and LM view(s) were taken of the left breast  Technologist: LEAH Melendez (LEAH)(M) Patient was prepped in standard sterile fashion  Under mammographic guidance, a 5 cm localization needle was advanced via a left lateral approach  Appropriate positioning was confirmed via mammography  Localization wire was subsequently deployed  Patient was sent to the operating room in good condition  Subsequent specimen radiographs demonstrate presence of target clip   Transcription Location: [facility]: [id number]        Labs:   Lab Results   Component Value Date    WBC 6 71 08/21/2018    HGB 12 3 08/21/2018    HCT 40 0 08/21/2018    MCV 91 08/21/2018     08/21/2018     Lab Results Component Value Date     08/29/2018    K 4 4 08/29/2018     08/29/2018    CO2 30 08/29/2018    ANIONGAP 7 12/09/2015    BUN 37 (H) 08/29/2018    CREATININE 1 44 (H) 08/29/2018    GLUCOSE 210 (H) 12/09/2015    GLUF 203 (H) 08/29/2018    CALCIUM 9 1 08/29/2018    AST 14 08/21/2018    ALT 16 08/21/2018    ALKPHOS 84 08/21/2018    PROT 7 5 07/03/2017    PROT 7 5 07/03/2017    BILITOT 0 3 07/03/2017    BILITOT 0 3 07/03/2017    EGFR 34 08/29/2018       Lab Results   Component Value Date    UPEP  12/09/2013     The serum total protein and albumin are within normal limits  The serumprotein electrophoresis shows a faint possible band in the gamma region  Immunofixation to be performed  Reviewed by: Jimmy Swartz, [id number]) **Electronic Signature**         Vital Sign:    Body surface area is 1 6 meters squared      Wt Readings from Last 3 Encounters:   10/04/18 59 9 kg (132 lb)   09/28/18 60 1 kg (132 lb 6 4 oz)   09/26/18 60 3 kg (133 lb)        Temp Readings from Last 3 Encounters:   10/04/18 97 6 °F (36 4 °C) (Tympanic)   09/26/18 98 3 °F (36 8 °C) (Oral)   09/11/18 (!) 97 3 °F (36 3 °C)        BP Readings from Last 3 Encounters:   10/04/18 122/60   09/26/18 124/66   09/11/18 136/68         Pulse Readings from Last 3 Encounters:   10/04/18 59   09/26/18 74   09/11/18 60     @LASTSAO2(3)@    Active Problems:   Patient Active Problem List   Diagnosis    Vocal cord mass    Iliac artery stenosis, bilateral ([address] )    Hyperparathyroidism ([facility] [address] )    Arthritis    Hypertension    EKG abnormalities    Anemia    Neck pain    Chronic thoracic back pain    Fall from other slipping, tripping, or stumbling    Abnormal EKG    Abnormal mammogram    Accelerated essential hypertension    Allergic rhinitis    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)    Back pain    Bilateral carotid artery stenosis    Carotid stenosis, asymptomatic    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)    Constipation    Diabetic retinopathy ([facility]    DMII (diabetes mellitus, type 2) ([facility])    Dysphagia    Fever of unknown origin    Gastritis    Gout    Hypomagnesemia    Atherosclerosis of other specified arteries    Lymphadenopathy    Microalbuminuria    Nephrolithiasis    Neuropathic pain, leg, right    Osteoporosis    PAD (peripheral artery disease) ([facility] )    Primary generalized (osteo)arthritis    Persistent proteinuria    Renal cyst    Secondary hyperparathyroidism, renal ([facility] )    Vaginal prolapse    Vitamin D deficiency    Acquired complex cyst of kidney    Gastroesophageal reflux disease without esophagitis    Mucinous carcinoma of breast, left ([facility])    Oral candidiasis       Past Medical History:   Past Medical History:   Diagnosis Date    Arthritis     Back pain     Benign essential hypertension     LAST ASSESSED: [id number]    Carotid artery stenosis     CKD (chronic kidney disease) stage 2, GFR 60-89 ml/min     Constipation, chronic     "drinks prune juice"    Diabetes mellitus ([facility]     DMII (diabetes mellitus, type 2) ([facility])     Epiglottitis     RESOLVED: [id number]    Gastritis     GERD (gastroesophageal reflux disease)     Gout     H/O blood clots     left lower leg,,,11 yrs ago    History of stenosis of renal artery     RESOLVED: [id number]    Hypercholesteremia     Hypercholesterolemia     Hyperlipidemia     Hyperparathyroidism ([facility] )     Hypertension     Iliac artery stenosis, bilateral ([facility] [address] )     Kidney stone     [facility] 8/15/2018    Presence of pessary     Renal artery stenosis ([facility])     Renal disorder     Renovascular hypertension     RESOLVED: [id number]    Segmental and somatic dysfunction of cervical region     RESOLVED: 03GXN2891    Segmental and somatic dysfunction of lumbar region     RESOLVED: 37FVN3373    Segmental and somatic dysfunction of pelvic region     RESOLVED: 90PIM0740    Segmental and somatic dysfunction of thoracic region     RESOLVED: 47MRA9682    Somatic dysfunction of abdominal region     RESOLVED: 17MYN7549    Somatic dysfunction of head region     RESOLVED: 16ZHA0606    Somatic dysfunction of lower extremities     RESOLVED: 13TVJ5182    Somatic dysfunction of rib region     RESOLVED: 83SMU7888    Somatic dysfunction of thoracic region     RESOLVED: 56SAJ2350    Somatic dysfunction of upper extremities     RESOLVED: 97VUC3231    Teeth missing        Surgical History:   Past Surgical History:   Procedure Laterality Date    ANGIOPLASTY / STENTING ILIAC      BREAST LUMPECTOMY Left 09/06/2017    LAST ASSESSED: [id number]    BREAST SURGERY Left     biopsy    CATARACT EXTRACTION Bilateral     CHOLECYSTECTOMY      COLONOSCOPY      EYE SURGERY      ILIAC VEIN ANGIOPLASTY / STENTING Right     INITIAL STENOSIS: ONSET: [id number]    KNEE ARTHROSCOPY Right     KNEE SURGERY Right     ONSET: 16WEP9103    MAMMO NEEDLE LOCALIZATION LEFT ([employer]) Left 9/11/2018    MN ESOPHAGOGASTRODUODENOSCOPY TRANSORAL DIAGNOSTIC N/A 7/26/2018    Procedure: ESOPHAGOGASTRODUODENOSCOPY (EGD); Surgeon: Kenzie Murillo DO;  Location: [facility]; Service: Gastroenterology    MN LARYNGOSCOPY,DIRCT,[facility] N/A 6/10/2016    Procedure: MICRO DIRECT LARYNGOSCOPY WITH VOCAL FOLD MASS EXCISION ;  Surgeon: Caryle Billings, MD;  [facility];  Service: ENT    MN MASTECTOMY, PARTIAL Left 9/6/2017    Procedure: LUMPECTOMY BREAST NEEDLE LOCALIZED WITH FAXITRON NEEDLE @ 0830;  Surgeon: Ca Oden MD;  [facility];  Service: General    MN PERQ DEVICE PLACEMT BREAST LOC 1ST LES W Ailyn Men Left 9/11/2018    Procedure: BREAST LUMPECTOMY; BREAST NEEDLE LOCALIZATION (NEEDLE LOC AT 1330);   Surgeon: Primo Mcarthur MD;  [facility];  Service: Surgical Oncology    THROAT SURGERY      lump removed    TUBAL LIGATION      US GUIDED BREAST BIOPSY LEFT COMPLETE Left 8/10/2018    VASCULAR SURGERY         Family History:    Family History   Problem Relation Age of Onset    Heart disease Brother     Heart disease Maternal Grandmother     Diabetes Other         of other type with arthropathy, without long term current use of insulin    Hypertension Other     No Known Problems Mother     Gout Family     Rheum arthritis Family     Lupus Family         systematic erythematosus    Heart disease Family     Stroke Family         syndrome    Stroke Maternal Uncle         syndrome    Stroke Maternal Aunt         syndrome    No Known Problems Father        Cancer-related family history is not on file  Social History:   Social History     Social History    Marital status:      Spouse name: N/A    Number of children: N/A    Years of education: N/A     Occupational History    Not on file       Social History Main Topics    Smoking status: Former Smoker     Packs/day: 1 00     Years: 64 00     Quit date: 2004    Smokeless tobacco: Never Used      Comment: quit in 2003 1 ppd x 50 years    Alcohol use No    Drug use: No    Sexual activity: Not on file     Other Topics Concern    Not on file     Social History Narrative    Daily caffeine consumption, 1 serving a day        Current Medications:   Current Outpatient Prescriptions   Medication Sig Dispense Refill    Acetaminophen (TYLENOL) 325 MG CAPS Take by mouth every 8 (eight) hours      allopurinol (ZYLOPRIM) 100 mg tablet TK 1 T PO D  1    aspirin (ADULT ASPIRIN EC LOW STRENGTH) 81 mg EC tablet Take 1 tablet by mouth daily      calcitriol (ROCALTROL) 0 25 mcg capsule Take 0 25 mcg by mouth once a week Pt takes every friday       calcium carbonate-vitamin D (OSCAL-D) 500 mg-200 units per tablet Take 1 tablet by mouth daily with breakfast      fluticasone (FLONASE) 50 mcg/act nasal spray INHALE 1 SPRAY IN EACH NOSTRIL DAILY 1 Bottle 0    gabapentin (NEURONTIN) 100 mg capsule Take 1 capsule (100 mg total) by mouth daily at bedtime 60 capsule 1    glimepiride (AMARYL) 1 mg tablet Take 0 5 tablets (0 5 mg total) by mouth daily with breakfast 90 tablet 1    Lancets (ONETOUCH ULTRASOFT) lancets USE TWICE DAILY 200 each 9    Magnesium 400 MG CAPS Take 400 mg by mouth daily      metoprolol tartrate (LOPRESSOR) 50 mg tablet Take 1 tablet (50 mg total) by mouth 2 (two) times a day 180 tablet 0    NIFEdipine (NIFEDICAL XL) 30 mg 24 hr tablet Take 1 tablet (30 mg total) by mouth daily 90 tablet 0    nystatin (MYCOSTATIN) 100,000 units/mL suspension Apply 5 mL (500,000 Units total) to the mouth or throat 4 (four) times a day 60 mL 0    omeprazole (PriLOSEC) 40 MG capsule TAKE 1 CAPSULE(40 MG) BY MOUTH TWICE DAILY 180 capsule 3    ONE TOUCH ULTRA TEST test strip TEST TWICE DAILY AS INSTRUCTED 100 each 1    pantoprazole (PROTONIX) 40 mg tablet TAKE 1 TABLET(40 MG) BY MOUTH TWICE DAILY 180 tablet 0    pravastatin (PRAVACHOL) 40 mg tablet TAKE 1 TABLET BY MOUTH ON MONDAY AND THURSDAY WHILE ON COLCRYS  ONCE OFF COLCRYS RESUME TAKING 1 TABLET BY MOUTH EVERY DAY 90 tablet 0    RESTASIS 0 05 % ophthalmic emulsion INSTILL 1 GTT INTO OU BID  3    spironolactone (ALDACTONE) 25 mg tablet Take 1 tablet (25 mg total) by mouth daily at bedtime 90 tablet 0    sucralfate (CARAFATE) 1 g/10 mL suspension Take 10 mL (1 g total) by mouth 4 (four) times a day 420 mL 0    terconazole (TERAZOL 7) 0 4 % vaginal cream Insert 1 applicator into the vagina daily at bedtime 45 g 0     No current facility-administered medications for this visit  Allergies:    Allergies   Allergen Reactions    Clonidine Derivatives Swelling    Diflucan [Fluconazole] Rash    Flagyl [Metronidazole] Anaphylaxis    Carvedilol Hives     And legs swelled    Latex Rash    Lipitor [Atorvastatin]      Legs swelled    Other Palpitations IV DYE

## 2018-10-05 ENCOUNTER — TELEPHONE (OUTPATIENT)
Dept: FAMILY MEDICINE CLINIC | Facility: CLINIC | Age: 83
End: 2018-10-05

## 2018-10-05 DIAGNOSIS — E78.5 HYPERLIPIDEMIA, UNSPECIFIED HYPERLIPIDEMIA TYPE: ICD-10-CM

## 2018-10-05 RX ORDER — PRAVASTATIN SODIUM 40 MG
40 TABLET ORAL DAILY
Qty: 90 TABLET | Refills: 0 | Status: SHIPPED | OUTPATIENT
Start: 2018-10-05 | End: 2018-12-22 | Stop reason: SDUPTHER

## 2018-10-05 NOTE — TELEPHONE ENCOUNTER
Pt son called and mentioned pt needs refill on pravastatin , pt has none left and hasn't taken it for 2 days already  Pharmacy said they sent a fax over to us  please have it sent to [facility]   Thanks :)

## 2018-10-15 DIAGNOSIS — I10 ESSENTIAL HYPERTENSION: ICD-10-CM

## 2018-10-15 RX ORDER — SPIRONOLACTONE 25 MG/1
TABLET ORAL
Qty: 90 TABLET | Refills: 0 | Status: SHIPPED | OUTPATIENT
Start: 2018-10-15 | End: 2019-01-16 | Stop reason: SDUPTHER

## 2018-10-25 ENCOUNTER — HOSPITAL ENCOUNTER (OUTPATIENT)
Dept: NON INVASIVE DIAGNOSTICS | Facility: CLINIC | Age: 83
Discharge: HOME | End: 2018-10-25
Payer: COMMERCIAL

## 2018-10-25 DIAGNOSIS — I65.29 OCCLUSION AND STENOSIS OF UNSPECIFIED CAROTID ARTERY: ICD-10-CM

## 2018-10-25 DIAGNOSIS — R10.30 LOWER ABDOMINAL PAIN: ICD-10-CM

## 2018-10-25 DIAGNOSIS — I73.9 PERIPHERAL VASCULAR DISEASE (HCC): ICD-10-CM

## 2018-10-25 PROCEDURE — 93925 LOWER EXTREMITY STUDY: CPT | Performed by: SURGERY

## 2018-10-25 PROCEDURE — 93923 UPR/LXTR ART STDY 3+ LVLS: CPT

## 2018-10-25 PROCEDURE — 93880 EXTRACRANIAL BILAT STUDY: CPT

## 2018-10-25 PROCEDURE — 93922 UPR/L XTREMITY ART 2 LEVELS: CPT | Performed by: SURGERY

## 2018-10-25 PROCEDURE — 93880 EXTRACRANIAL BILAT STUDY: CPT | Performed by: SURGERY

## 2018-10-25 PROCEDURE — 93925 LOWER EXTREMITY STUDY: CPT

## 2018-11-01 ENCOUNTER — HOSPITAL ENCOUNTER (OUTPATIENT)
Dept: NON INVASIVE DIAGNOSTICS | Facility: CLINIC | Age: 83
Discharge: HOME | End: 2018-11-01
Payer: COMMERCIAL

## 2018-11-01 DIAGNOSIS — I70.0 ATHEROSCLEROSIS OF AORTA (HCC): ICD-10-CM

## 2018-11-01 DIAGNOSIS — I70.1 ATHEROSCLEROSIS OF RENAL ARTERY (HCC): ICD-10-CM

## 2018-11-01 PROCEDURE — 93978 VASCULAR STUDY: CPT

## 2018-11-01 PROCEDURE — 93975 VASCULAR STUDY: CPT

## 2018-11-02 PROCEDURE — 93978 VASCULAR STUDY: CPT | Performed by: SURGERY

## 2018-11-02 PROCEDURE — 93975 VASCULAR STUDY: CPT | Performed by: SURGERY

## 2018-12-01 DIAGNOSIS — I10 HYPERTENSION, UNSPECIFIED TYPE: ICD-10-CM

## 2018-12-01 RX ORDER — METOPROLOL TARTRATE 50 MG/1
TABLET, FILM COATED ORAL
Qty: 180 TABLET | Refills: 0 | Status: CANCELLED | OUTPATIENT
Start: 2018-12-01

## 2018-12-03 ENCOUNTER — PATIENT OUTREACH (OUTPATIENT)
Dept: OTHER | Facility: HOSPITAL | Age: 83
End: 2018-12-03

## 2018-12-04 DIAGNOSIS — I10 HYPERTENSION, UNSPECIFIED TYPE: ICD-10-CM

## 2018-12-04 RX ORDER — METOPROLOL TARTRATE 50 MG/1
50 TABLET, FILM COATED ORAL 2 TIMES DAILY
Qty: 180 TABLET | Refills: 1 | Status: SHIPPED | OUTPATIENT
Start: 2018-12-04 | End: 2019-01-16 | Stop reason: SDUPTHER

## 2018-12-07 ENCOUNTER — OFFICE VISIT (OUTPATIENT)
Dept: FAMILY MEDICINE CLINIC | Facility: CLINIC | Age: 83
End: 2018-12-07
Payer: COMMERCIAL

## 2018-12-07 VITALS
HEIGHT: 62 IN | RESPIRATION RATE: 12 BRPM | BODY MASS INDEX: 24.03 KG/M2 | TEMPERATURE: 98.1 F | SYSTOLIC BLOOD PRESSURE: 126 MMHG | HEART RATE: 64 BPM | DIASTOLIC BLOOD PRESSURE: 72 MMHG | WEIGHT: 130.6 LBS

## 2018-12-07 DIAGNOSIS — K21.9 GASTROESOPHAGEAL REFLUX DISEASE WITHOUT ESOPHAGITIS: Primary | ICD-10-CM

## 2018-12-07 DIAGNOSIS — J30.9 ALLERGIC RHINITIS, UNSPECIFIED SEASONALITY, UNSPECIFIED TRIGGER: ICD-10-CM

## 2018-12-07 DIAGNOSIS — K21.9 GASTROESOPHAGEAL REFLUX DISEASE WITHOUT ESOPHAGITIS: ICD-10-CM

## 2018-12-07 PROCEDURE — 4040F PNEUMOC VAC/ADMIN/RCVD: CPT | Performed by: EMERGENCY MEDICINE

## 2018-12-07 PROCEDURE — 99213 OFFICE O/P EST LOW 20 MIN: CPT | Performed by: EMERGENCY MEDICINE

## 2018-12-07 PROCEDURE — 1160F RVW MEDS BY RX/DR IN RCRD: CPT | Performed by: EMERGENCY MEDICINE

## 2018-12-07 PROCEDURE — 3008F BODY MASS INDEX DOCD: CPT | Performed by: EMERGENCY MEDICINE

## 2018-12-07 RX ORDER — FLUTICASONE PROPIONATE 50 MCG
1 SPRAY, SUSPENSION (ML) NASAL DAILY
Qty: 1 BOTTLE | Refills: 0 | Status: SHIPPED | OUTPATIENT
Start: 2018-12-07 | End: 2019-04-04 | Stop reason: SDUPTHER

## 2018-12-07 RX ORDER — RANITIDINE 150 MG/1
150 TABLET ORAL 2 TIMES DAILY
Qty: 90 TABLET | Refills: 2 | Status: SHIPPED | OUTPATIENT
Start: 2018-12-07 | End: 2018-12-07 | Stop reason: SDUPTHER

## 2018-12-07 NOTE — PATIENT INSTRUCTIONS
Please take the ranitidine twice a day as directed along with your regularly scheduled omeprazole, and make sure to elevate the head of your bed and not to eat anything before sleeping

## 2018-12-07 NOTE — PROGRESS NOTES
Assessment/Plan:    No problem-specific Assessment & Plan notes found for this encounter  Diagnoses and all orders for this visit:    Gastroesophageal reflux disease without esophagitis  -     ranitidine (ZANTAC) 150 mg tablet; Take 1 tablet (150 mg total) by mouth 2 (two) times a day    Allergic rhinitis, unspecified seasonality, unspecified trigger  -     fluticasone (FLONASE) 50 mcg/act nasal spray; 1 spray into each nostril daily          Subjective: Acid reflux     Patient ID: Bubba Figueroa is a 80 y o  female  Patient states for approximately the last 3 months she has been having increasing acid reflux, while continuing to take her normally dosed omeprazole of 40mg/day and elevating the head of her bed and not eating about 2 hours prior to sleeping  She states the symptoms only occur at night, but that they occur every night and they are very uncomfortable, and she also has been having a bad/bitter taste in her mouth with this  Denies sore throat, cough, abdominal pain, nausea/vomiting, diarrhea, melena, hematochezia, hematemesis  Denies any history of ulcers  Heartburn   She complains of heartburn and a sore throat (related to reflux)  She reports no abdominal pain, no chest pain, no choking, no coughing, no nausea or no wheezing  Pertinent negatives include no fatigue  The following portions of the patient's history were reviewed and updated as appropriate: allergies, current medications, past family history, past medical history, past social history, past surgical history and problem list     Review of Systems   Constitutional: Negative for chills, diaphoresis, fatigue and fever  HENT: Positive for sore throat (related to reflux)  Negative for congestion, ear discharge, ear pain, facial swelling, mouth sores, nosebleeds, postnasal drip, rhinorrhea, sinus pain, sinus pressure and sneezing      Respiratory: Negative for cough, choking, chest tightness, shortness of breath and wheezing  Cardiovascular: Negative for chest pain, palpitations and leg swelling  Gastrointestinal: Positive for constipation (chronic and well controlled per patient/family) and heartburn  Negative for abdominal distention, abdominal pain, diarrhea, nausea and vomiting  Genitourinary: Negative for difficulty urinating, dysuria, frequency and urgency  Musculoskeletal: Negative for back pain, joint swelling, myalgias, neck pain and neck stiffness  Skin: Negative for color change, pallor, rash and wound  Neurological: Negative for dizziness, weakness, light-headedness, numbness and headaches  Objective:      /72 (BP Location: Left arm, Patient Position: Sitting, Cuff Size: Standard)   Pulse 64   Temp 98 1 °F (36 7 °C) (Tympanic)   Resp 12   Ht 5' 2" (1 575 m)   Wt 59 2 kg (130 lb 9 6 oz)   LMP  (LMP Unknown)   BMI 23 89 kg/m²          Physical Exam   Constitutional: She is oriented to person, place, and time  She appears well-developed and well-nourished  No distress  HENT:   Head: Normocephalic and atraumatic  Right Ear: External ear normal    Left Ear: External ear normal    Nose: Nose normal    Mouth/Throat: Oropharynx is clear and moist and mucous membranes are normal  No oropharyngeal exudate, posterior oropharyngeal edema, posterior oropharyngeal erythema or tonsillar abscesses  Eyes: Conjunctivae are normal  Right eye exhibits no discharge  Left eye exhibits no discharge  No scleral icterus  Neck: Normal range of motion  Neck supple  Cardiovascular: Normal rate, regular rhythm and normal heart sounds  Exam reveals no gallop and no friction rub  No murmur heard  Pulmonary/Chest: Effort normal and breath sounds normal  No respiratory distress  She has no wheezes  She has no rales  She exhibits no tenderness  Abdominal: Soft  Bowel sounds are normal  She exhibits no distension and no mass  There is no tenderness  There is no rebound and no guarding  Musculoskeletal: Normal range of motion  She exhibits no edema, tenderness or deformity  Neurological: She is alert and oriented to person, place, and time  Skin: Skin is warm and dry  No rash noted  She is not diaphoretic  No erythema  No pallor  Psychiatric: She has a normal mood and affect  Her behavior is normal  Judgment and thought content normal    Nursing note and vitals reviewed

## 2018-12-10 RX ORDER — RANITIDINE 150 MG/1
TABLET ORAL
Qty: 180 TABLET | Refills: 2 | Status: SHIPPED | OUTPATIENT
Start: 2018-12-10 | End: 2019-01-16 | Stop reason: ALTCHOICE

## 2018-12-20 DIAGNOSIS — E11.9 TYPE 2 DIABETES MELLITUS WITHOUT COMPLICATION, WITH LONG-TERM CURRENT USE OF INSULIN (HCC): ICD-10-CM

## 2018-12-20 DIAGNOSIS — Z79.4 TYPE 2 DIABETES MELLITUS WITHOUT COMPLICATION, WITH LONG-TERM CURRENT USE OF INSULIN (HCC): ICD-10-CM

## 2018-12-22 DIAGNOSIS — E78.5 HYPERLIPIDEMIA, UNSPECIFIED HYPERLIPIDEMIA TYPE: ICD-10-CM

## 2018-12-23 RX ORDER — PRAVASTATIN SODIUM 40 MG
TABLET ORAL
Qty: 90 TABLET | Refills: 0 | Status: SHIPPED | OUTPATIENT
Start: 2018-12-23 | End: 2019-01-16 | Stop reason: SDUPTHER

## 2019-01-02 ENCOUNTER — OFFICE VISIT (OUTPATIENT)
Dept: SURGICAL ONCOLOGY | Facility: CLINIC | Age: 84
End: 2019-01-02
Payer: COMMERCIAL

## 2019-01-02 VITALS
HEIGHT: 62 IN | BODY MASS INDEX: 24.66 KG/M2 | HEART RATE: 61 BPM | RESPIRATION RATE: 14 BRPM | TEMPERATURE: 98.7 F | DIASTOLIC BLOOD PRESSURE: 76 MMHG | WEIGHT: 134 LBS | SYSTOLIC BLOOD PRESSURE: 144 MMHG

## 2019-01-02 DIAGNOSIS — C50.912 MUCINOUS CARCINOMA OF BREAST, LEFT (HCC): Primary | ICD-10-CM

## 2019-01-02 PROCEDURE — 99214 OFFICE O/P EST MOD 30 MIN: CPT | Performed by: SURGERY

## 2019-01-02 NOTE — LETTER
January 2, 2019     Javan Stack, [address] [facility] [address] [place] [address]    Patient: Lavelle Anton   YOB: 1934   Date of Visit: 1/2/2019       Dear Dr Antonette Murphy: Thank you for referring Lavelle Anton to me for evaluation  Below are my notes for this consultation  If you have questions, please do not hesitate to call me  I look forward to following your patient along with you  Sincerely,        Herminia Reeves MD        CC: No Recipients  Herminia Reeves MD  1/2/2019  1:39 PM  Sign at close encounter     Surgical Oncology Follow Up       [address]  1934  [id number]  [address]  [facility] [place] [id number]    Chief Complaint   Patient presents with    Breast Cancer     Pt is here for a three month follow up          Assessment & Plan:   Patient presents for a three-month follow-up visit  She has no complaints other than some occasional sharp spasms in the incision site  Clinical exam shows no evidence of local regional or distant recurrence disease  We will coordinate her mammograms for 6 months  We will see her back shortly thereafter  She is agreeable to seeing our advanced practitioner at that time  Cancer History:        Mucinous carcinoma of breast, left ([address] )    8/10/2018 Biopsy     Left breast biopsy  Invasive mucinous  Grade 1      Her 2 1+         9/11/2018 Surgery     Left lumpectomy  Invasive mucinous carcinoma  Grade 1  1 1 cm in size  Margins negative      Her 2 1+  Stage 1A         10/4/2018 -  Hormone Therapy     Dr Elissa Loo  No benefit of AI              Interval History:   See above    Review of Systems:   Review of Systems   Constitutional: Negative for activity change, appetite change and fatigue  HENT: Negative      Eyes: Negative  Respiratory: Negative for cough, shortness of breath and wheezing  Cardiovascular: Negative for chest pain and leg swelling  Gastrointestinal: Negative  Endocrine: Negative  Genitourinary: Negative  Musculoskeletal:        No new changes or complaints of bone pain   Skin: Negative  Allergic/Immunologic: Negative  Neurological: Negative  Hematological: Negative  Psychiatric/Behavioral: Negative          Past Medical History     Patient Active Problem List   Diagnosis    Vocal cord mass    Iliac artery stenosis, bilateral ([place] [address] )    Hyperparathyroidism ([place] [address] )    Arthritis    Hypertension    EKG abnormalities    Anemia    Neck pain    Chronic thoracic back pain    Fall from other slipping, tripping, or stumbling    Abnormal EKG    Abnormal mammogram    Accelerated essential hypertension    Allergic rhinitis    Atherosclerosis of native artery of extremity with intermittent claudication ([facility])    Back pain    Bilateral carotid artery stenosis    Carotid stenosis, asymptomatic    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    Chronic myofascial pain    Chronic pain of lower extremity    CKD (chronic kidney disease), stage III ([facility])    Constipation    Diabetic retinopathy ([place] [address] )    DMII (diabetes mellitus, type 2) ([facility])    Dysphagia    Fever of unknown origin    Gastritis    Gout    Hypomagnesemia    Atherosclerosis of other specified arteries    Lymphadenopathy    Microalbuminuria    Nephrolithiasis    Neuropathic pain, leg, right    Osteoporosis    PAD (peripheral artery disease) ([place] [address] )    Primary generalized (osteo)arthritis    Persistent proteinuria    Renal cyst    Secondary hyperparathyroidism, renal ([facility])    Vaginal prolapse    Vitamin D deficiency    Acquired complex cyst of kidney    Gastroesophageal reflux disease without esophagitis    Mucinous carcinoma of breast, left ([facility])    Oral candidiasis     Past Medical History:   Diagnosis Date    Arthritis     Back pain     Benign essential hypertension     LAST ASSESSED: [id number]    Carotid artery stenosis     CKD (chronic kidney disease) stage 2, GFR 60-89 ml/min     Constipation, chronic     "drinks prune juice"    Diabetes mellitus ([facility] )     DMII (diabetes mellitus, type 2) ([facility])     Epiglottitis     RESOLVED: [id number]    Gastritis     GERD (gastroesophageal reflux disease)     Gout     H/O blood clots     left lower leg,,,11 yrs ago    History of stenosis of renal artery     RESOLVED: [id number]    Hypercholesteremia     Hypercholesterolemia     Hyperlipidemia     Hyperparathyroidism ([facility] )     Hypertension     Iliac artery stenosis, bilateral ([facility] )     Kidney stone     Mucinous carcinoma of breast, left ([place] [address] ) 8/15/2018    Presence of pessary     Renal artery stenosis ([facility])     Renal disorder     Renovascular hypertension     RESOLVED: [id number]    Segmental and somatic dysfunction of cervical region     RESOLVED: [id number]    Segmental and somatic dysfunction of lumbar region     RESOLVED: [id number]    Segmental and somatic dysfunction of pelvic region     RESOLVED: [id number]    Segmental and somatic dysfunction of thoracic region     RESOLVED: [id number]    Somatic dysfunction of abdominal region     RESOLVED: [id number]    Somatic dysfunction of head region     RESOLVED: [id number]    Somatic dysfunction of lower extremities     RESOLVED: [id number]    Somatic dysfunction of rib region     RESOLVED: [id number]    Somatic dysfunction of thoracic region     RESOLVED: [id number]    Somatic dysfunction of upper extremities     RESOLVED: [id number]    Teeth missing      Past Surgical History:   Procedure Laterality Date    ANGIOPLASTY / STENTING ILIAC      BREAST LUMPECTOMY Left 09/06/2017    LAST ASSESSED: [id number]    BREAST SURGERY Left     biopsy    CATARACT EXTRACTION Bilateral     CHOLECYSTECTOMY      COLONOSCOPY      EYE SURGERY      ILIAC VEIN ANGIOPLASTY / STENTING Right     INITIAL STENOSIS: ONSET: [id number]    KNEE ARTHROSCOPY Right     KNEE SURGERY Right     ONSET: [id number]    MAMMO NEEDLE LOCALIZATION LEFT (ALL INC) Left 9/11/2018    MAMMO NEEDLE LOCALIZATION LEFT ([employer]) Left 9/6/2017    NJ ESOPHAGOGASTRODUODENOSCOPY TRANSORAL DIAGNOSTIC N/A 7/26/2018    Procedure: ESOPHAGOGASTRODUODENOSCOPY (EGD); Surgeon: Kenzie Murillo DO;  [facility]: [facility]; Service: Gastroenterology    NJ LARYNGOSCOPY,DIRCT,OP [facility] TUMR N/A 6/10/2016    Procedure: MICRO DIRECT LARYNGOSCOPY WITH VOCAL FOLD MASS EXCISION ;  Surgeon: Caryle Billings, MD;  [facility];  Service: ENT    NJ MASTECTOMY, PARTIAL Left 9/6/2017    Procedure: LUMPECTOMY BREAST NEEDLE LOCALIZED WITH FAXITRON NEEDLE @ 0830;  Surgeon: Ca Oden MD;  [facility];  Service: General    NJ PERQ DEVICE PLACEMT BREAST LOC 1ST LES W Ailyn Men Left 9/11/2018    Procedure: BREAST LUMPECTOMY; BREAST NEEDLE LOCALIZATION (NEEDLE LOC AT 1330);   Surgeon: Primo Mcarthur MD;  [facility];  Service: Surgical Oncology    THROAT SURGERY      lump removed    TUBAL LIGATION      US GUIDANCE BREAST BIOPSY LEFT EACH ADDITIONAL Left 8/7/2017    US GUIDED BREAST BIOPSY LEFT COMPLETE Left 8/10/2018    US GUIDED BREAST BIOPSY LEFT COMPLETE Left 8/7/2017    VASCULAR SURGERY       Family History   Problem Relation Age of Onset    Heart disease Brother     Heart disease Maternal Grandmother     Diabetes Other         of other type with arthropathy, without long term current use of insulin    Hypertension Other     No Known Problems Mother     Gout Family     Rheum arthritis Family     Lupus Family         systematic erythematosus    Heart disease Family     Stroke Family         syndrome    Stroke Maternal Uncle         syndrome    Stroke Maternal Aunt         syndrome    No Known Problems Father      Social History     Social History    Marital status:      Spouse name: N/A    Number of children: N/A    Years of education: N/A     Occupational History    Not on file       Social History Main Topics    Smoking status: Former Smoker     Packs/day: 1 00     Years: 64 00     Quit date: 2004    Smokeless tobacco: Never Used      Comment: quit in 2003 1 ppd x 50 years    Alcohol use No    Drug use: No    Sexual activity: Not on file     Other Topics Concern    Not on file     Social History Narrative    Daily caffeine consumption, 1 serving a day        Current Outpatient Prescriptions:     Acetaminophen (TYLENOL) 325 MG CAPS, Take by mouth every 8 (eight) hours, Disp: , Rfl:     allopurinol (ZYLOPRIM) 100 mg tablet, TK 1 T PO D, Disp: , Rfl: 1    aspirin (ADULT ASPIRIN EC LOW STRENGTH) 81 mg EC tablet, Take 1 tablet by mouth daily, Disp: , Rfl:     calcitriol (ROCALTROL) 0 25 mcg capsule, Take 0 25 mcg by mouth once a week Pt takes every friday , Disp: , Rfl:     calcium carbonate-vitamin D (OSCAL-D) 500 mg-200 units per tablet, Take 1 tablet by mouth daily with breakfast, Disp: , Rfl:     fluticasone (FLONASE) 50 mcg/act nasal spray, 1 spray into each nostril daily, Disp: 1 Bottle, Rfl: 0    gabapentin (NEURONTIN) 100 mg capsule, Take 1 capsule (100 mg total) by mouth daily at bedtime, Disp: 60 capsule, Rfl: 1    glimepiride (AMARYL) 1 mg tablet, Take 0 5 tablets (0 5 mg total) by mouth daily with breakfast, Disp: 90 tablet, Rfl: 1    Lancets (ONETOUCH ULTRASOFT) lancets, USE TWICE DAILY, Disp: 200 each, Rfl: 9    Magnesium 400 MG CAPS, Take 400 mg by mouth daily, Disp: , Rfl:     metoprolol tartrate (LOPRESSOR) 50 mg tablet, Take 1 tablet (50 mg total) by mouth 2 (two) times a day, Disp: 180 tablet, Rfl: 1    NIFEdipine (NIFEDICAL XL) 30 mg 24 hr tablet, Take 1 tablet (30 mg total) by mouth daily, Disp: 90 tablet, Rfl: 0    nystatin (MYCOSTATIN) 100,000 units/mL suspension, Apply 5 mL (500,000 Units total) to the mouth or throat 4 (four) times a day, Disp: 60 mL, Rfl: 0    omeprazole (PriLOSEC) 40 MG capsule, TAKE 1 CAPSULE(40 MG) BY MOUTH TWICE DAILY, Disp: 180 capsule, Rfl: 3    ONE TOUCH ULTRA TEST test strip, TEST BLOOD SUGAR TWICE DAILY, Disp: 100 each, Rfl: 0    pravastatin (PRAVACHOL) 40 mg tablet, TAKE 1 TABLET(40 MG) BY MOUTH DAILY, Disp: 90 tablet, Rfl: 0    ranitidine (ZANTAC) 150 mg tablet, TAKE 1 TABLET BY MOUTH(150 MG TOTAL) TWICE DAILY, Disp: 180 tablet, Rfl: 2    RESTASIS 0 05 % ophthalmic emulsion, INSTILL 1 GTT INTO OU BID, Disp: , Rfl: 3    spironolactone (ALDACTONE) 25 mg tablet, TAKE 1 TABLET BY MOUTH AT BEDTIME, Disp: 90 tablet, Rfl: 0    sucralfate (CARAFATE) 1 g/10 mL suspension, Take 10 mL (1 g total) by mouth 4 (four) times a day, Disp: 420 mL, Rfl: 0  Allergies   Allergen Reactions    Clonidine Derivatives Swelling    Diflucan [Fluconazole] Rash    Flagyl [Metronidazole] Anaphylaxis    Carvedilol Hives     And legs swelled    Latex Rash    Lipitor [Atorvastatin]      Legs swelled    Other Palpitations     IV DYE       Physical Exam:     Vitals:    01/02/19 1311   BP: 144/76   Pulse: 61   Resp: 14   Temp: 98 7 °F (37 1 °C)     Physical Exam   Constitutional: She is oriented to person, place, and time  She appears well-developed and well-nourished  HENT:   Head: Normocephalic and atraumatic  Mouth/Throat: Oropharynx is clear and moist    Eyes: Pupils are equal, round, and reactive to light  EOM are normal    Neck: Normal range of motion  Neck supple  No JVD present  No tracheal deviation present  No thyromegaly present  Cardiovascular: Normal rate, regular rhythm, normal heart sounds and intact distal pulses  Exam reveals no gallop and no friction rub  No murmur heard  Pulmonary/Chest: Effort normal and breath sounds normal  No respiratory distress  She has no wheezes  She has no rales     Examination of the left breast incision demonstrates some dimpling modest amount of scar tissue but no worrisome findings  There is no axillary adenopathy no dominant masses  Examination of the right breast demonstrates no skin changes nipple discharge dominant masses or axillary adenopathy   Abdominal: Soft  She exhibits no distension and no mass  There is no hepatomegaly  There is no tenderness  There is no rebound and no guarding  Musculoskeletal: Normal range of motion  She exhibits no edema or tenderness  Lymphadenopathy:     She has no cervical adenopathy  Neurological: She is alert and oriented to person, place, and time  No cranial nerve deficit  Skin: Skin is warm and dry  No rash noted  No erythema  Psychiatric: She has a normal mood and affect  Her behavior is normal    Vitals reviewed  Results:   No current imaging          Advance Care Planning/Advance Directives:  I discussed the disease status, treatment plans and follow-up with the patient

## 2019-01-02 NOTE — PROGRESS NOTES
Surgical Oncology Follow Up       [address]  [facility] [address] [place] [address]  1934  [phone]  1600 [facility] 8057  [facility] [id number]    Chief Complaint   Patient presents with    Breast Cancer     Pt is here for a three month follow up          Assessment & Plan:   Patient presents for a three-month follow-up visit  She has no complaints other than some occasional sharp spasms in the incision site  Clinical exam shows no evidence of local regional or distant recurrence disease  We will coordinate her mammograms for 6 months  We will see her back shortly thereafter  She is agreeable to seeing our advanced practitioner at that time  Cancer History:        Mucinous carcinoma of breast, left ([address] )    8/10/2018 Biopsy     Left breast biopsy  Invasive mucinous  Grade 1      Her 2 1+         9/11/2018 Surgery     Left lumpectomy  Invasive mucinous carcinoma  Grade 1  1 1 cm in size  Margins negative      Her 2 1+  Stage 1A         10/4/2018 -  Hormone Therapy     Dr Son To  No benefit of AI              Interval History:   See above    Review of Systems:   Review of Systems   Constitutional: Negative for activity change, appetite change and fatigue  HENT: Negative  Eyes: Negative  Respiratory: Negative for cough, shortness of breath and wheezing  Cardiovascular: Negative for chest pain and leg swelling  Gastrointestinal: Negative  Endocrine: Negative  Genitourinary: Negative  Musculoskeletal:        No new changes or complaints of bone pain   Skin: Negative  Allergic/Immunologic: Negative  Neurological: Negative  Hematological: Negative  Psychiatric/Behavioral: Negative          Past Medical History     Patient Active Problem List   Diagnosis    Vocal cord mass    [facility]liac artery stenosis, bilateral ([facility] )    [facility]athyroidism ([facility]    Arthritis    Hypertension    EKG abnormalities    Anemia    Neck pain    Chronic thoracic back pain    Fall from other slipping, tripping, or stumbling    Abnormal EKG    Abnormal mammogram    Accelerated essential hypertension    Allergic rhinitis    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)    Back pain    Bilateral carotid artery stenosis    Carotid stenosis, asymptomatic    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)    Constipation    Diabetic retinopathy ([facility])    DMII (diabetes mellitus, type 2) ([facility])    Dysphagia    Fever of unknown origin    Gastritis    Gout    Hypomagnesemia    Atherosclerosis of other specified arteries    Lymphadenopathy    Microalbuminuria    Nephrolithiasis    Neuropathic pain, leg, right    Osteoporosis    PAD (peripheral artery disease) ([facility])    Primary generalized (osteo)arthritis    Persistent proteinuria    Renal cyst    Secondary hyperparathyroidism, renal ([facility])    Vaginal prolapse    Vitamin D deficiency    Acquired complex cyst of kidney    Gastroesophageal reflux disease without esophagitis    Mucinous carcinoma of breast, left ([facility])    Oral candidiasis     Past Medical History:   Diagnosis Date    Arthritis     Back pain     Benign essential hypertension     LAST ASSESSED: [id number]    Carotid artery stenosis     CKD (chronic kidney disease) stage 2, GFR 60-89 ml/min     Constipation, chronic     "drinks prune juice"    Diabetes mellitus ([facility] )     DMII (diabetes mellitus, type 2) ([facility])     Epiglottitis     RESOLVED: [id number]    Gastritis     GERD (gastroesophageal reflux disease)     Gout     H/O blood clots     left lower leg,,,11 yrs ago    History of stenosis of renal artery RESOLVED: [id number]    Hypercholesteremia     Hypercholesterolemia     Hyperlipidemia     Hyperparathyroidism ([address] )     Hypertension     Iliac artery stenosis, bilateral (HCC)     Kidney stone     Mucinous carcinoma of breast, 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    Presence of pessary     Renal artery stenosis (HCC)     Renal disorder     Renovascular hypertension     RESOLVED: [id number]    Segmental and somatic dysfunction of cervical region     RESOLVED: [id number]    Segmental and somatic dysfunction of lumbar region     RESOLVED: [id number]    Segmental and somatic dysfunction of pelvic region     RESOLVED: [id number]    Segmental and somatic dysfunction of thoracic region     RESOLVED: [id number]    Somatic dysfunction of abdominal region     RESOLVED: [id number]    Somatic dysfunction of head region     RESOLVED: [id number]    Somatic dysfunction of lower extremities     RESOLVED: [id number]    Somatic dysfunction of rib region     RESOLVED: [id number]    Somatic dysfunction of thoracic region     RESOLVED: [id number]    Somatic dysfunction of upper extremities     RESOLVED: [id number]    Teeth missing      Past Surgical History:   Procedure Laterality Date    ANGIOPLASTY / STENTING ILIAC      BREAST LUMPECTOMY Left 09/06/2017    LAST ASSESSED: [id number]    BREAST SURGERY Left     biopsy    CATARACT EXTRACTION Bilateral     CHOLECYSTECTOMY      COLONOSCOPY      EYE SURGERY      ILIAC VEIN ANGIOPLASTY / STENTING Right     INITIAL STENOSIS: ONSET: [id number]    KNEE ARTHROSCOPY Right     KNEE SURGERY Right     ONSET: 06LQS3295    MAMMO NEEDLE LOCALIZATION LEFT (ALL INC) Left 9/11/2018    MAMMO NEEDLE LOCALIZATION LEFT (ALL INC) Left 9/6/2017    WA ESOPHAGOGASTRODUODENOSCOPY TRANSORAL DIAGNOSTIC N/A 7/26/2018    Procedure: ESOPHAGOGASTRODUODENOSCOPY (EGD); Surgeon: Bharathi Lopez DO;  [facility]: [facility];   Service: Gastroenterology    WA LARYNGOSCOPY,DIRCT,[facility] N/A 6/10/2016 Procedure: MICRO DIRECT LARYNGOSCOPY WITH VOCAL FOLD MASS EXCISION ;  Surgeon: Jose Miguel Ricketts MD;  [facility]: [facility];  Service: ENT    NV MASTECTOMY, PARTIAL Left 9/6/2017    Procedure: LUMPECTOMY BREAST NEEDLE LOCALIZED WITH FAXITRON NEEDLE @ 0830;  Surgeon: Melissa Pathak MD;  Location: [facility];  Service: General    NV PERQ DEVICE PLACEMT BREAST LOC 1ST LES W [place] Left 9/11/2018    Procedure: BREAST LUMPECTOMY; BREAST NEEDLE LOCALIZATION (NEEDLE LOC AT 1330); Surgeon: Marcio Pulliam MD;  [facility];  Service: Surgical Oncology    THROAT SURGERY      lump removed    TUBAL LIGATION      US GUIDANCE BREAST BIOPSY LEFT EACH ADDITIONAL Left 8/7/2017    US GUIDED BREAST BIOPSY LEFT COMPLETE Left 8/10/2018    US GUIDED BREAST BIOPSY LEFT COMPLETE Left 8/7/2017    VASCULAR SURGERY       Family History   Problem Relation Age of Onset    Heart disease Brother     Heart disease Maternal Grandmother     Diabetes Other         of other type with arthropathy, without long term current use of insulin    Hypertension Other     No Known Problems Mother     Gout Family     Rheum arthritis Family     Lupus Family         systematic erythematosus    Heart disease Family     Stroke Family         syndrome    Stroke Maternal Uncle         syndrome    Stroke Maternal Aunt         syndrome    No Known Problems Father      Social History     Social History    Marital status:      Spouse name: N/A    Number of children: N/A    Years of education: N/A     Occupational History    Not on file       Social History Main Topics    Smoking status: Former Smoker     Packs/day: 1 00     Years: 64 00     Quit date: 2004    Smokeless tobacco: Never Used      Comment: quit in 2003 1 ppd x 50 years    Alcohol use No    Drug use: No    Sexual activity: Not on file     Other Topics Concern    Not on file     Social History Narrative    Daily caffeine consumption, 1 serving a day        Current Outpatient Prescriptions:     Acetaminophen (TYLENOL) 325 MG CAPS, Take by mouth every 8 (eight) hours, Disp: , Rfl:     allopurinol (ZYLOPRIM) 100 mg tablet, TK 1 T PO D, Disp: , Rfl: 1    aspirin (ADULT ASPIRIN EC LOW STRENGTH) 81 mg EC tablet, Take 1 tablet by mouth daily, Disp: , Rfl:     calcitriol (ROCALTROL) 0 25 mcg capsule, Take 0 25 mcg by mouth once a week Pt takes every friday , Disp: , Rfl:     calcium carbonate-vitamin D (OSCAL-D) 500 mg-200 units per tablet, Take 1 tablet by mouth daily with breakfast, Disp: , Rfl:     fluticasone (FLONASE) 50 mcg/act nasal spray, 1 spray into each nostril daily, Disp: 1 Bottle, Rfl: 0    gabapentin (NEURONTIN) 100 mg capsule, Take 1 capsule (100 mg total) by mouth daily at bedtime, Disp: 60 capsule, Rfl: 1    glimepiride (AMARYL) 1 mg tablet, Take 0 5 tablets (0 5 mg total) by mouth daily with breakfast, Disp: 90 tablet, Rfl: 1    Lancets (ONETOUCH ULTRASOFT) lancets, USE TWICE DAILY, Disp: 200 each, Rfl: 9    Magnesium 400 MG CAPS, Take 400 mg by mouth daily, Disp: , Rfl:     metoprolol tartrate (LOPRESSOR) 50 mg tablet, Take 1 tablet (50 mg total) by mouth 2 (two) times a day, Disp: 180 tablet, Rfl: 1    NIFEdipine (NIFEDICAL XL) 30 mg 24 hr tablet, Take 1 tablet (30 mg total) by mouth daily, Disp: 90 tablet, Rfl: 0    nystatin (MYCOSTATIN) 100,000 units/mL suspension, Apply 5 mL (500,000 Units total) to the mouth or throat 4 (four) times a day, Disp: 60 mL, Rfl: 0    omeprazole (PriLOSEC) 40 MG capsule, TAKE 1 CAPSULE(40 MG) BY MOUTH TWICE DAILY, Disp: 180 capsule, Rfl: 3    ONE TOUCH ULTRA TEST test strip, TEST BLOOD SUGAR TWICE DAILY, Disp: 100 each, Rfl: 0    pravastatin (PRAVACHOL) 40 mg tablet, TAKE 1 TABLET(40 MG) BY MOUTH DAILY, Disp: 90 tablet, Rfl: 0    ranitidine (ZANTAC) 150 mg tablet, TAKE 1 TABLET BY MOUTH(150 MG TOTAL) TWICE DAILY, Disp: 180 tablet, Rfl: 2    RESTASIS 0 05 % ophthalmic emulsion, INSTILL 1 GTT INTO OU BID, Disp: , Rfl: 3    spironolactone (ALDACTONE) 25 mg tablet, TAKE 1 TABLET BY MOUTH AT BEDTIME, Disp: 90 tablet, Rfl: 0    sucralfate (CARAFATE) 1 g/10 mL suspension, Take 10 mL (1 g total) by mouth 4 (four) times a day, Disp: 420 mL, Rfl: 0  Allergies   Allergen Reactions    Clonidine Derivatives Swelling    Diflucan [Fluconazole] Rash    Flagyl [Metronidazole] Anaphylaxis    Carvedilol Hives     And legs swelled    Latex Rash    Lipitor [Atorvastatin]      Legs swelled    Other Palpitations     IV DYE       Physical Exam:     Vitals:    01/02/19 1311   BP: 144/76   Pulse: 61   Resp: 14   Temp: 98 7 °F (37 1 °C)     Physical Exam   Constitutional: She is oriented to person, place, and time  She appears well-developed and well-nourished  HENT:   Head: Normocephalic and atraumatic  Mouth/Throat: Oropharynx is clear and moist    Eyes: Pupils are equal, round, and reactive to light  EOM are normal    Neck: Normal range of motion  Neck supple  No JVD present  No tracheal deviation present  No thyromegaly present  Cardiovascular: Normal rate, regular rhythm, normal heart sounds and intact distal pulses  Exam reveals no gallop and no friction rub  No murmur heard  Pulmonary/Chest: Effort normal and breath sounds normal  No respiratory distress  She has no wheezes  She has no rales  Examination of the left breast incision demonstrates some dimpling modest amount of scar tissue but no worrisome findings  There is no axillary adenopathy no dominant masses  Examination of the right breast demonstrates no skin changes nipple discharge dominant masses or axillary adenopathy   Abdominal: Soft  She exhibits no distension and no mass  There is no hepatomegaly  There is no tenderness  There is no rebound and no guarding  Musculoskeletal: Normal range of motion  She exhibits no edema or tenderness  Lymphadenopathy:     She has no cervical adenopathy     Neurological: She is alert and oriented to person, place, and time  No cranial nerve deficit  Skin: Skin is warm and dry  No rash noted  No erythema  Psychiatric: She has a normal mood and affect  Her behavior is normal    Vitals reviewed  Results:   No current imaging          Advance Care Planning/Advance Directives:  I discussed the disease status, treatment plans and follow-up with the patient

## 2019-01-07 ENCOUNTER — TELEPHONE (OUTPATIENT)
Dept: FAMILY MEDICINE CLINIC | Facility: CLINIC | Age: 84
End: 2019-01-07

## 2019-01-07 DIAGNOSIS — M54.6 CHRONIC THORACIC BACK PAIN, UNSPECIFIED BACK PAIN LATERALITY: Primary | ICD-10-CM

## 2019-01-07 DIAGNOSIS — G89.29 CHRONIC THORACIC BACK PAIN, UNSPECIFIED BACK PAIN LATERALITY: Primary | ICD-10-CM

## 2019-01-07 NOTE — TELEPHONE ENCOUNTER
Voice message requesting refill of Gabapentin  Patient has not been seen since August  Please call to schedule  Thanks!

## 2019-01-08 RX ORDER — GABAPENTIN 100 MG/1
100 CAPSULE ORAL
Qty: 10 CAPSULE | Refills: 0 | Status: SHIPPED | OUTPATIENT
Start: 2019-01-08 | End: 2019-01-09 | Stop reason: SDUPTHER

## 2019-01-08 NOTE — TELEPHONE ENCOUNTER
4th Attempt:I was able to speak with patient and made her an appointment for 01/16 with Dr Garza  Are we able to refill til then per patient she's been off of the medication for 4 days  Updated Pharmacy: [facility] [phone]      Thank you in advance

## 2019-01-09 DIAGNOSIS — G89.29 CHRONIC THORACIC BACK PAIN, UNSPECIFIED BACK PAIN LATERALITY: ICD-10-CM

## 2019-01-09 DIAGNOSIS — M54.6 CHRONIC THORACIC BACK PAIN, UNSPECIFIED BACK PAIN LATERALITY: ICD-10-CM

## 2019-01-09 RX ORDER — GABAPENTIN 100 MG/1
100 CAPSULE ORAL
Qty: 10 CAPSULE | Refills: 0 | Status: SHIPPED | OUTPATIENT
Start: 2019-01-09 | End: 2019-01-16 | Stop reason: SDUPTHER

## 2019-01-16 ENCOUNTER — OFFICE VISIT (OUTPATIENT)
Dept: FAMILY MEDICINE CLINIC | Facility: CLINIC | Age: 84
End: 2019-01-16

## 2019-01-16 ENCOUNTER — DOCUMENTATION (OUTPATIENT)
Dept: FAMILY MEDICINE CLINIC | Facility: CLINIC | Age: 84
End: 2019-01-16

## 2019-01-16 VITALS
WEIGHT: 132.2 LBS | BODY MASS INDEX: 24.33 KG/M2 | TEMPERATURE: 97.7 F | HEIGHT: 62 IN | RESPIRATION RATE: 14 BRPM | SYSTOLIC BLOOD PRESSURE: 142 MMHG | DIASTOLIC BLOOD PRESSURE: 78 MMHG | HEART RATE: 70 BPM

## 2019-01-16 DIAGNOSIS — I10 HYPERTENSION, UNSPECIFIED TYPE: ICD-10-CM

## 2019-01-16 DIAGNOSIS — N18.30 CKD (CHRONIC KIDNEY DISEASE), STAGE III (HCC): Primary | ICD-10-CM

## 2019-01-16 DIAGNOSIS — E11.9 TYPE 2 DIABETES MELLITUS WITHOUT COMPLICATION, WITH LONG-TERM CURRENT USE OF INSULIN (HCC): ICD-10-CM

## 2019-01-16 DIAGNOSIS — Z79.4 TYPE 2 DIABETES MELLITUS WITHOUT COMPLICATION, WITH LONG-TERM CURRENT USE OF INSULIN (HCC): ICD-10-CM

## 2019-01-16 DIAGNOSIS — M54.6 CHRONIC THORACIC BACK PAIN, UNSPECIFIED BACK PAIN LATERALITY: ICD-10-CM

## 2019-01-16 DIAGNOSIS — I10 ESSENTIAL HYPERTENSION: ICD-10-CM

## 2019-01-16 DIAGNOSIS — G89.29 CHRONIC THORACIC BACK PAIN, UNSPECIFIED BACK PAIN LATERALITY: ICD-10-CM

## 2019-01-16 DIAGNOSIS — E78.5 HYPERLIPIDEMIA, UNSPECIFIED HYPERLIPIDEMIA TYPE: ICD-10-CM

## 2019-01-16 PROCEDURE — 99213 OFFICE O/P EST LOW 20 MIN: CPT | Performed by: FAMILY MEDICINE

## 2019-01-16 RX ORDER — GLIMEPIRIDE 1 MG/1
0.5 TABLET ORAL
Qty: 90 TABLET | Refills: 1 | Status: ON HOLD | OUTPATIENT
Start: 2019-01-16 | End: 2019-07-22 | Stop reason: ALTCHOICE

## 2019-01-16 RX ORDER — PRAVASTATIN SODIUM 40 MG
40 TABLET ORAL DAILY
Qty: 90 TABLET | Refills: 0 | Status: SHIPPED | OUTPATIENT
Start: 2019-01-16 | End: 2019-07-15 | Stop reason: SDUPTHER

## 2019-01-16 RX ORDER — GABAPENTIN 100 MG/1
100 CAPSULE ORAL
Qty: 10 CAPSULE | Refills: 0 | Status: SHIPPED | OUTPATIENT
Start: 2019-01-16 | End: 2019-01-28 | Stop reason: SDUPTHER

## 2019-01-16 RX ORDER — SPIRONOLACTONE 25 MG/1
25 TABLET ORAL
Qty: 90 TABLET | Refills: 0 | Status: SHIPPED | OUTPATIENT
Start: 2019-01-16 | End: 2019-06-18 | Stop reason: HOSPADM

## 2019-01-16 RX ORDER — NIFEDIPINE 30 MG/1
30 TABLET, EXTENDED RELEASE ORAL DAILY
Qty: 90 TABLET | Refills: 0 | Status: SHIPPED | OUTPATIENT
Start: 2019-01-16 | End: 2019-05-17 | Stop reason: SDUPTHER

## 2019-01-16 RX ORDER — METOPROLOL TARTRATE 50 MG/1
50 TABLET, FILM COATED ORAL 2 TIMES DAILY
Qty: 180 TABLET | Refills: 1 | Status: SHIPPED | OUTPATIENT
Start: 2019-01-16 | End: 2019-06-18 | Stop reason: HOSPADM

## 2019-01-16 NOTE — ASSESSMENT & PLAN NOTE
Lab Results   Component Value Date    HGBA1C 6 1 04/26/2018       No results for input(s): POCGLU in the last 72 hours  Blood Sugar Average: Last 72 hrs:     - Continue Glimepiride 0 5 mg daily   Patient encouraged to have BG diary   - Ordered A1c at this visit  - Will continue to follow up

## 2019-01-16 NOTE — PROGRESS NOTES
Antoinette Apley 1934 female MRN: [id number]    Family Medicine Follow-up Visit    ASSESSMENT/PLAN  Problem List Items Addressed This Visit     Hypertension     BP Readings from Last 3 Encounters:   01/16/19 142/78   01/02/19 144/76   12/07/18 126/72     Controlled  - Continue current regimen: Metoprolol tartrate 50 mg BID, nifedipine 30 mg daily  - Will continue to monitor         Relevant Medications    spironolactone (ALDACTONE) 25 mg tablet    metoprolol tartrate (LOPRESSOR) 50 mg tablet    NIFEdipine (NIFEDICAL XL) 30 mg 24 hr tablet    Back pain    Relevant Medications    gabapentin (NEURONTIN) 100 mg capsule    CKD (chronic kidney disease), stage III ([facility]) - Primary     Lab Results   Component Value Date    CREATININE 1 44 (H) 08/29/2018     At baseline   - Creatinine/Microalbumin ratio 8/29/18 was 196  Patient was advised to add lisinopril as nephroprotective agent  She will like to discuss with nephrologist on next appointment  - Patient will continue to follow up lab work ordered by Dr Sharifa Bangura  Relevant Medications    spironolactone (ALDACTONE) 25 mg tablet    DMII (diabetes mellitus, type 2) ([facility])     Lab Results   Component Value Date    HGBA1C 6 1 04/26/2018       No results for input(s): POCGLU in the last 72 hours  Blood Sugar Average: Last 72 hrs:     - Continue Glimepiride 0 5 mg daily   Patient encouraged to have BG diary   - Ordered A1c at this visit  - Will continue to follow up           Relevant Medications    glimepiride (AMARYL) 1 mg tablet    Other Relevant Orders    HEMOGLOBIN A1C W/ EAG ESTIMATION      Other Visit Diagnoses     Hyperlipidemia, unspecified hyperlipidemia type        Relevant Medications    pravastatin (PRAVACHOL) 40 mg tablet              Future Appointments  Date Time Provider Valdemar Crocker   4/24/2019 3:30 PM Zoraida Doan MD  FP [facility]   7/2/2019 1:30 PM JARRED Knight SURG ONC [facility]   7/26/2019 1:40 PM BE MAMMO SLN 2 BE SLN Mammo [facility]   9/19/2019 3:30 PM JARRED Storey VAS CTR [facility]          SUBJECTIVE  CC: Follow-up      HPI:  Mily Knott is a 80 y o  female with PMH of hypertension, type 2 diabetes mellitus, CKD stage III, osteoporosis, mucinous carcinoma of left breast who presents for follow up on chronic problems  Patient is currently complaining of occasional hypoglycemia ( lowest in the 70s) ans states that when glucose is normal she does not take Glimepiride  Otherwise patient feeling well  She had left lumpectomy for invasive mucinous carcinoma on 9/11/18  She is being followed by oncology  Review of Systems   Constitutional: Negative for appetite change, diaphoresis, fatigue and fever  HENT: Negative for congestion, rhinorrhea and sinus pain  Respiratory: Negative for cough, chest tightness and shortness of breath  Cardiovascular: Negative for chest pain, palpitations and leg swelling  Gastrointestinal: Negative for abdominal distention, abdominal pain, constipation, diarrhea, nausea and vomiting  Genitourinary: Negative for difficulty urinating, frequency and urgency  Musculoskeletal: Negative for back pain and neck pain  Neurological: Negative for weakness, light-headedness, numbness and headaches  Psychiatric/Behavioral: Negative for agitation and behavioral problems  The patient is not nervous/anxious          Historical Information   The patient history was reviewed as follows:    Past Medical History:   Diagnosis Date    Arthritis     Back pain     Benign essential hypertension     LAST ASSESSED: [id number]    Carotid artery stenosis     CKD (chronic kidney disease) stage 2, GFR 60-89 ml/min     Constipation, chronic     "drinks prune juice"    Diabetes mellitus ([address] )     DMII (diabetes mellitus, type 2) (HCC)     Epiglottitis     RESOLVED: [id number]    Gastritis     GERD (gastroesophageal reflux disease)     Gout     H/O blood clots left lower leg,,,11 yrs ago    History of stenosis of renal artery     RESOLVED: [id number]    Hypercholesteremia     Hypercholesterolemia     Hyperlipidemia     Hyperparathyroidism ([facility] [address] )     Hypertension     Iliac artery stenosis, bilateral (HCC)     Kidney stone     Mucinous carcinoma of breast, 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    Presence of pessary     Renal artery stenosis (HCC)     Renal disorder     Renovascular hypertension     RESOLVED: [id number]    Segmental and somatic dysfunction of cervical region     RESOLVED: 12ARQ2431    Segmental and somatic dysfunction of lumbar region     RESOLVED: [id number]    Segmental and somatic dysfunction of pelvic region     RESOLVED: [id number]    Segmental and somatic dysfunction of thoracic region     RESOLVED: [id number]    Somatic dysfunction of abdominal region     RESOLVED: [id number]    Somatic dysfunction of head region     RESOLVED: [id number]    Somatic dysfunction of lower extremities     RESOLVED: 58JAD0639    Somatic dysfunction of rib region     RESOLVED: [id number]    Somatic dysfunction of thoracic region     RESOLVED: 04KQY1000    Somatic dysfunction of upper extremities     RESOLVED: [id number]    Teeth missing      Past Surgical History:   Procedure Laterality Date    ANGIOPLASTY / STENTING ILIAC      BREAST LUMPECTOMY Left 09/06/2017    LAST ASSESSED: [id number]    BREAST SURGERY Left     biopsy    CATARACT EXTRACTION Bilateral     CHOLECYSTECTOMY      COLONOSCOPY      EYE SURGERY      ILIAC VEIN ANGIOPLASTY / STENTING Right     INITIAL STENOSIS: ONSET: [id number]    KNEE ARTHROSCOPY Right     KNEE SURGERY Right     ONSET: [id number]    MAMMO NEEDLE LOCALIZATION LEFT (ALL INC) Left 9/11/2018    MAMMO NEEDLE LOCALIZATION LEFT (ALL INC) Left 9/6/2017    MA ESOPHAGOGASTRODUODENOSCOPY TRANSORAL DIAGNOSTIC N/A 7/26/2018    Procedure: ESOPHAGOGASTRODUODENOSCOPY (EGD); Surgeon: Ayad Cuadra DO;  Location: [facility];   Service: Gastroenterology    NV LARYNGOSCOPY,DIRCT,OP [facility] TUMR N/A 6/10/2016    Procedure: MICRO DIRECT LARYNGOSCOPY WITH VOCAL FOLD MASS EXCISION ;  Surgeon: Jayshree Cunningham MD;  Location: [facility];  Service: ENT    NV MASTECTOMY, PARTIAL Left 9/6/2017    Procedure: LUMPECTOMY BREAST NEEDLE LOCALIZED WITH FAXITRON NEEDLE @ 0830;  Surgeon: Carla Cisneros MD;  [facility];  Service: General    NV PERQ DEVICE PLACEMT BREAST LOC 1ST LES W Lopez Pyo Left 9/11/2018    Procedure: BREAST LUMPECTOMY; BREAST NEEDLE LOCALIZATION (NEEDLE LOC AT 1330);   Surgeon: Madison Jones MD;  Location[facility];  Service: Surgical Oncology    THROAT SURGERY      lump removed    TUBAL LIGATION      US GUIDANCE BREAST BIOPSY LEFT EACH ADDITIONAL Left 8/7/2017    US GUIDED BREAST BIOPSY LEFT COMPLETE Left 8/10/2018    US GUIDED BREAST BIOPSY LEFT COMPLETE Left 8/7/2017    VASCULAR SURGERY       Family History   Problem Relation Age of Onset    Heart disease Brother     Heart disease Maternal Grandmother     Diabetes Other         of other type with arthropathy, without long term current use of insulin    Hypertension Other     No Known Problems Mother     Gout Family     Rheum arthritis Family     Lupus Family         systematic erythematosus    Heart disease Family     Stroke Family         syndrome    Stroke Maternal Uncle         syndrome    Stroke Maternal Aunt         syndrome    No Known Problems Father       Social History   History   Alcohol Use No     History   Drug Use No     History   Smoking Status    Former Smoker    Packs/day: 1 00    Years: 64 00    Quit date: 2004   Smokeless Tobacco    Never Used     Comment: quit in 2003 1 ppd x 50 years       Medications:     Current Outpatient Prescriptions:     Acetaminophen (TYLENOL) 325 MG CAPS, Take by mouth every 8 (eight) hours, Disp: , Rfl:     allopurinol (ZYLOPRIM) 100 mg tablet, TK 1 T PO D, Disp: , Rfl: 1    aspirin (ADULT ASPIRIN EC LOW STRENGTH) 81 mg EC tablet, Take 1 tablet by mouth daily, Disp: , Rfl:     calcitriol (ROCALTROL) 0 25 mcg capsule, Take 0 25 mcg by mouth once a week Pt takes every friday , Disp: , Rfl:     calcium carbonate-vitamin D (OSCAL-D) 500 mg-200 units per tablet, Take 1 tablet by mouth daily with breakfast, Disp: , Rfl:     fluticasone (FLONASE) 50 mcg/act nasal spray, 1 spray into each nostril daily, Disp: 1 Bottle, Rfl: 0    gabapentin (NEURONTIN) 100 mg capsule, Take 1 capsule (100 mg total) by mouth daily at bedtime for 10 days, Disp: 10 capsule, Rfl: 0    glimepiride (AMARYL) 1 mg tablet, Take 0 5 tablets (0 5 mg total) by mouth daily with breakfast, Disp: 90 tablet, Rfl: 1    metoprolol tartrate (LOPRESSOR) 50 mg tablet, Take 1 tablet (50 mg total) by mouth 2 (two) times a day, Disp: 180 tablet, Rfl: 1    NIFEdipine (NIFEDICAL XL) 30 mg 24 hr tablet, Take 1 tablet (30 mg total) by mouth daily, Disp: 90 tablet, Rfl: 0    omeprazole (PriLOSEC) 40 MG capsule, TAKE 1 CAPSULE(40 MG) BY MOUTH TWICE DAILY, Disp: 180 capsule, Rfl: 3    pravastatin (PRAVACHOL) 40 mg tablet, Take 1 tablet (40 mg total) by mouth daily, Disp: 90 tablet, Rfl: 0    RESTASIS 0 05 % ophthalmic emulsion, INSTILL 1 GTT INTO OU BID, Disp: , Rfl: 3    spironolactone (ALDACTONE) 25 mg tablet, Take 1 tablet (25 mg total) by mouth daily at bedtime, Disp: 90 tablet, Rfl: 0    Lancets (ONETOUCH ULTRASOFT) lancets, USE TWICE DAILY, Disp: 200 each, Rfl: 9    Magnesium 400 MG CAPS, Take 400 mg by mouth daily, Disp: , Rfl:     ONE TOUCH ULTRA TEST test strip, TEST BLOOD SUGAR TWICE DAILY, Disp: 100 each, Rfl: 0  Allergies   Allergen Reactions    Clonidine Derivatives Swelling    Diflucan [Fluconazole] Rash    Flagyl [Metronidazole] Anaphylaxis    Carvedilol Hives     And legs swelled    Latex Rash    Lipitor [Atorvastatin]      Legs swelled    Other Palpitations     IV DYE       OBJECTIVE    Vitals:   Vitals:    01/16/19 1523   BP: 142/78 BP Location: Right arm   Patient Position: Sitting   Cuff Size: Standard   Pulse: 70   Resp: 14   Temp: 97 7 °F (36 5 °C)   TempSrc: Tympanic   Weight: 60 kg (132 lb 3 2 oz)   Height: 5' 2 2" (1 58 m)           Physical Exam   Constitutional: She is oriented to person, place, and time  She appears well-developed and well-nourished  No distress  HENT:   Head: Normocephalic and atraumatic  Neck: Normal range of motion  Neck supple  No thyromegaly present  Cardiovascular: Normal rate, regular rhythm, normal heart sounds and intact distal pulses  Exam reveals no gallop and no friction rub  No murmur heard  Pulmonary/Chest: Effort normal and breath sounds normal  No respiratory distress  She has no wheezes  She has no rales  She exhibits no tenderness  Abdominal: Soft  Bowel sounds are normal  She exhibits no distension and no mass  There is no tenderness  There is no rebound and no guarding  Musculoskeletal: Normal range of motion  She exhibits no edema, tenderness or deformity  Lymphadenopathy:     She has no cervical adenopathy  Neurological: She is alert and oriented to person, place, and time  Skin: Skin is warm and dry  No rash noted  She is not diaphoretic  No erythema  Psychiatric: She has a normal mood and affect  Her behavior is normal  Judgment and thought content normal    Vitals reviewed                   Troy Miramontes MD, PGY-1  [facility]   1/16/2019

## 2019-01-16 NOTE — PROGRESS NOTES
This patient was discussed with Dr Toi Halsted  Subjective:  Ms Lieutenant Willams is a 80year old female who presents to the office for a wellness visit after recent worsening of GERD and addition of ranitidine therapy in December 2018  Today patient claims that the ranitidine was not helping her acid reflux and is no longer taking  She reports her GERD is stable  She reports that her blood sugars run around 121-140 and about once a week her blood sugars will drop to <70  When she experiences low blood sugars she drinks apple juice  Patient reports eating two primary meals each day plus one snack  Her meals vary but typically consist of meat and rice  Patients  reported that she is experiencing constipation often, for which she drinks prune juice since it is the only thing that helps  Objective:  Labs as of 1/16/19:  /78  Pulse 70     Labs as of 12/7/18:  /72  Pulse 64     Labs as of 8/9/18:  SCr 1 44  eGFR 34  BUN 37         Labs as of 4/26/18:  A1C 6 1%     Assessment/Plan:  HTN-controlled at goal <140/90  Continue current regimen     DM- controlled- A1C at goal < 7%  Patient had MCR >30 on previous lab work and a history of CKD therefore qualifies for a low dose ACE-I for renal protection  Please consider initiating Lisinopril 5 mg once daily  GERD- controlled  Continue current regimen with omeprazole 40 mg BID  Constipation-uncontrolled- no major adverse effects of current medications could be contributing to constipation  Continue using prune juice as needed and consider initiating a stimulant laxative for next visit if necessary

## 2019-01-16 NOTE — ASSESSMENT & PLAN NOTE
BP Readings from Last 3 Encounters:   01/16/19 142/78   01/02/19 144/76   12/07/18 126/72     Controlled  - Continue current regimen: Metoprolol tartrate 50 mg BID, nifedipine 30 mg daily    - Will continue to monitor

## 2019-01-16 NOTE — ASSESSMENT & PLAN NOTE
Lab Results   Component Value Date    CREATININE 1 44 (H) 08/29/2018     At baseline   - Creatinine/Microalbumin ratio 8/29/18 was 196  Patient was advised to add lisinopril as nephroprotective agent  She will like to discuss with nephrologist on next appointment  - Patient will continue to follow up lab work ordered by Dr Laura Esparza

## 2019-01-24 ENCOUNTER — TELEPHONE (OUTPATIENT)
Dept: NEPHROLOGY | Facility: CLINIC | Age: 84
End: 2019-01-24

## 2019-01-24 NOTE — TELEPHONE ENCOUNTER
I called and left message for patient to call back to schedule February follow up appt with Dr Flora Sifuentes

## 2019-01-25 DIAGNOSIS — M54.6 CHRONIC THORACIC BACK PAIN, UNSPECIFIED BACK PAIN LATERALITY: ICD-10-CM

## 2019-01-25 DIAGNOSIS — G89.29 CHRONIC THORACIC BACK PAIN, UNSPECIFIED BACK PAIN LATERALITY: ICD-10-CM

## 2019-01-27 RX ORDER — GABAPENTIN 100 MG/1
CAPSULE ORAL
Qty: 10 CAPSULE | Refills: 0 | Status: CANCELLED | OUTPATIENT
Start: 2019-01-27

## 2019-01-28 ENCOUNTER — TELEPHONE (OUTPATIENT)
Dept: FAMILY MEDICINE CLINIC | Facility: CLINIC | Age: 84
End: 2019-01-28

## 2019-01-28 DIAGNOSIS — M54.6 CHRONIC THORACIC BACK PAIN, UNSPECIFIED BACK PAIN LATERALITY: ICD-10-CM

## 2019-01-28 DIAGNOSIS — G89.29 CHRONIC THORACIC BACK PAIN, UNSPECIFIED BACK PAIN LATERALITY: ICD-10-CM

## 2019-01-28 RX ORDER — GABAPENTIN 100 MG/1
100 CAPSULE ORAL
Qty: 90 CAPSULE | Refills: 1 | Status: SHIPPED | OUTPATIENT
Start: 2019-01-28 | End: 2019-08-05 | Stop reason: SDUPTHER

## 2019-01-28 NOTE — TELEPHONE ENCOUNTER
Patient was seen on 1/16/19 and was given refill on Gabapentin  She was only given 10 pills she was expecting 80   Please address

## 2019-01-30 DIAGNOSIS — Z79.4 TYPE 2 DIABETES MELLITUS WITHOUT COMPLICATION, WITH LONG-TERM CURRENT USE OF INSULIN (HCC): ICD-10-CM

## 2019-01-30 DIAGNOSIS — E11.9 TYPE 2 DIABETES MELLITUS WITHOUT COMPLICATION, WITH LONG-TERM CURRENT USE OF INSULIN (HCC): ICD-10-CM

## 2019-02-04 ENCOUNTER — APPOINTMENT (OUTPATIENT)
Dept: LAB | Facility: HOSPITAL | Age: 84
End: 2019-02-04
Payer: COMMERCIAL

## 2019-02-04 DIAGNOSIS — N18.30 CHRONIC KIDNEY DISEASE, STAGE III (MODERATE) (HCC): ICD-10-CM

## 2019-02-04 DIAGNOSIS — R80.1 PERSISTENT PROTEINURIA: ICD-10-CM

## 2019-02-04 DIAGNOSIS — E11.9 TYPE 2 DIABETES MELLITUS WITHOUT COMPLICATION, WITH LONG-TERM CURRENT USE OF INSULIN (HCC): ICD-10-CM

## 2019-02-04 DIAGNOSIS — N25.81 SECONDARY HYPERPARATHYROIDISM OF RENAL ORIGIN (HCC): Primary | ICD-10-CM

## 2019-02-04 DIAGNOSIS — Z79.4 TYPE 2 DIABETES MELLITUS WITHOUT COMPLICATION, WITH LONG-TERM CURRENT USE OF INSULIN (HCC): ICD-10-CM

## 2019-02-04 LAB
25(OH)D3 SERPL-MCNC: 32 NG/ML (ref 30–100)
ALBUMIN SERPL BCP-MCNC: 3.6 G/DL (ref 3.5–5)
ALP SERPL-CCNC: 101 U/L (ref 46–116)
ALT SERPL W P-5'-P-CCNC: 18 U/L (ref 12–78)
ANION GAP SERPL CALCULATED.3IONS-SCNC: 7 MMOL/L (ref 4–13)
AST SERPL W P-5'-P-CCNC: 16 U/L (ref 5–45)
BACTERIA UR QL AUTO: ABNORMAL /HPF
BILIRUB SERPL-MCNC: 0.38 MG/DL (ref 0.2–1)
BILIRUB UR QL STRIP: NEGATIVE
BUN SERPL-MCNC: 37 MG/DL (ref 5–25)
CALCIUM SERPL-MCNC: 9 MG/DL (ref 8.3–10.1)
CHLORIDE SERPL-SCNC: 105 MMOL/L (ref 100–108)
CLARITY UR: ABNORMAL
CO2 SERPL-SCNC: 26 MMOL/L (ref 21–32)
COLOR UR: YELLOW
CREAT SERPL-MCNC: 1.45 MG/DL (ref 0.6–1.3)
CREAT UR-MCNC: 148 MG/DL
ERYTHROCYTE [DISTWIDTH] IN BLOOD BY AUTOMATED COUNT: 13.6 % (ref 11.6–15.1)
EST. AVERAGE GLUCOSE BLD GHB EST-MCNC: 146 MG/DL
GFR SERPL CREATININE-BSD FRML MDRD: 33 ML/MIN/1.73SQ M
GLUCOSE P FAST SERPL-MCNC: 119 MG/DL (ref 65–99)
GLUCOSE UR STRIP-MCNC: NEGATIVE MG/DL
HBA1C MFR BLD: 6.7 % (ref 4.2–6.3)
HCT VFR BLD AUTO: 37.4 % (ref 34.8–46.1)
HGB BLD-MCNC: 11.3 G/DL (ref 11.5–15.4)
HGB UR QL STRIP.AUTO: NEGATIVE
HYALINE CASTS #/AREA URNS LPF: ABNORMAL /LPF
KETONES UR STRIP-MCNC: NEGATIVE MG/DL
LEUKOCYTE ESTERASE UR QL STRIP: ABNORMAL
MCH RBC QN AUTO: 27.8 PG (ref 26.8–34.3)
MCHC RBC AUTO-ENTMCNC: 30.2 G/DL (ref 31.4–37.4)
MCV RBC AUTO: 92 FL (ref 82–98)
NITRITE UR QL STRIP: NEGATIVE
NON-SQ EPI CELLS URNS QL MICRO: ABNORMAL /HPF
PH UR STRIP.AUTO: 6 [PH] (ref 4.5–8)
PHOSPHATE SERPL-MCNC: 3.3 MG/DL (ref 2.3–4.1)
PLATELET # BLD AUTO: 272 THOUSANDS/UL (ref 149–390)
PMV BLD AUTO: 11.3 FL (ref 8.9–12.7)
POTASSIUM SERPL-SCNC: 4.5 MMOL/L (ref 3.5–5.3)
PROT SERPL-MCNC: 8.1 G/DL (ref 6.4–8.2)
PROT UR STRIP-MCNC: ABNORMAL MG/DL
PROT UR-MCNC: 54 MG/DL
PROT/CREAT UR: 0.36 MG/G{CREAT} (ref 0–0.1)
PTH-INTACT SERPL-MCNC: 165.6 PG/ML (ref 18.4–80.1)
RBC # BLD AUTO: 4.07 MILLION/UL (ref 3.81–5.12)
RBC #/AREA URNS AUTO: ABNORMAL /HPF
SODIUM SERPL-SCNC: 138 MMOL/L (ref 136–145)
SP GR UR STRIP.AUTO: 1.02 (ref 1–1.03)
UROBILINOGEN UR QL STRIP.AUTO: 1 E.U./DL
WBC # BLD AUTO: 6.82 THOUSAND/UL (ref 4.31–10.16)
WBC #/AREA URNS AUTO: ABNORMAL /HPF

## 2019-02-04 PROCEDURE — 85027 COMPLETE CBC AUTOMATED: CPT

## 2019-02-04 PROCEDURE — 84156 ASSAY OF PROTEIN URINE: CPT

## 2019-02-04 PROCEDURE — 82306 VITAMIN D 25 HYDROXY: CPT

## 2019-02-04 PROCEDURE — 84100 ASSAY OF PHOSPHORUS: CPT

## 2019-02-04 PROCEDURE — 36415 COLL VENOUS BLD VENIPUNCTURE: CPT

## 2019-02-04 PROCEDURE — 81001 URINALYSIS AUTO W/SCOPE: CPT

## 2019-02-04 PROCEDURE — 80053 COMPREHEN METABOLIC PANEL: CPT

## 2019-02-04 PROCEDURE — 82570 ASSAY OF URINE CREATININE: CPT

## 2019-02-04 PROCEDURE — 83970 ASSAY OF PARATHORMONE: CPT

## 2019-02-04 PROCEDURE — 83036 HEMOGLOBIN GLYCOSYLATED A1C: CPT

## 2019-02-20 DIAGNOSIS — N25.81 SECONDARY HYPERPARATHYROIDISM (HCC): Primary | ICD-10-CM

## 2019-02-20 RX ORDER — CALCITRIOL 0.25 UG/1
0.25 CAPSULE, LIQUID FILLED ORAL WEEKLY
Qty: 4 CAPSULE | Refills: 6 | Status: SHIPPED | OUTPATIENT
Start: 2019-02-20 | End: 2019-02-22 | Stop reason: SDUPTHER

## 2019-02-21 NOTE — PROGRESS NOTES
[employer] case migration for [employer]  Dorsal Nasal Flap Text: The defect edges were debeveled with a #15 scalpel blade.  Given the location of the defect and the proximity to free margins a dorsal nasal flap was deemed most appropriate.  Using a sterile surgical marker, an appropriate dorsal nasal flap was drawn around the defect.    The area thus outlined was incised deep to adipose tissue with a #15 scalpel blade.  The skin margins were undermined to an appropriate distance in all directions utilizing iris scissors.

## 2019-02-22 ENCOUNTER — OFFICE VISIT (OUTPATIENT)
Dept: NEPHROLOGY | Facility: CLINIC | Age: 84
End: 2019-02-22
Payer: COMMERCIAL

## 2019-02-22 VITALS — HEIGHT: 62 IN | BODY MASS INDEX: 24.29 KG/M2 | WEIGHT: 132 LBS

## 2019-02-22 DIAGNOSIS — R80.1 PERSISTENT PROTEINURIA: ICD-10-CM

## 2019-02-22 DIAGNOSIS — N18.30 CKD (CHRONIC KIDNEY DISEASE), STAGE III (HCC): Primary | ICD-10-CM

## 2019-02-22 DIAGNOSIS — N25.81 SECONDARY HYPERPARATHYROIDISM (HCC): ICD-10-CM

## 2019-02-22 DIAGNOSIS — E55.9 VITAMIN D DEFICIENCY: ICD-10-CM

## 2019-02-22 DIAGNOSIS — I10 ESSENTIAL HYPERTENSION: ICD-10-CM

## 2019-02-22 PROCEDURE — 99214 OFFICE O/P EST MOD 30 MIN: CPT | Performed by: INTERNAL MEDICINE

## 2019-02-22 RX ORDER — CALCITRIOL 0.25 UG/1
0.25 CAPSULE, LIQUID FILLED ORAL 2 TIMES WEEKLY
Qty: 10 CAPSULE | Refills: 6 | Status: SHIPPED | OUTPATIENT
Start: 2019-02-25 | End: 2019-10-31 | Stop reason: SDUPTHER

## 2019-02-22 NOTE — PATIENT INSTRUCTIONS
1  Please increase your calcitriol to twice a week taking it on Tuesday and Friday  Please call the office and let us know if you have any problems when you take the medication on Tuesday  2  Your kidney function is stable as is your blood pressure       3  Thank you for coming in to see me today

## 2019-02-22 NOTE — PROGRESS NOTES
Assessment/Plan:    CKD (chronic kidney disease), stage III  She has a baseline creatinine of 1 4-1 6  Labs from August 29 showed a creatinine 1 44; Cr is stable Cr 1 4 from 2/19      Secondary hyperparathyroidism ([address] )  Intact pth is 165 and phos is 3 3  Calcium is 9 0 and alb is 3 6  Increase rocaltrol to twice a week    Hypertension  In reviewing chart notes, her blood pressure  Has been relatively preserved  She has renal artery dopplers as far back as 2016 showing more than 60% stenosis in the right renal artery, however, she had an angiogram in 2016 which did not show any evidence of RACHELLE and had patent renal arteries  Blood pressure is 144/60 today no acute changes in her medication regimen    Secondary hyperparathyroidism, renal (HCC)  Intact pth is 165 and phos is 3 3  Calcium is 9 0 and alb is 3 6  Increase rocaltrol to twice a week      Persistent proteinuria  P/c ratio is 0 36  Patient is on aldactone; better than prior was 0 42  Vitamin D deficiency  Vit d level is 28  Subjective:      Patient ID: Lyly Gracia is a 80 y o  female  HPI    Patient is here for follow-up of chronic kidney disease and blood pressure management  She feels okay denies any dizziness lightheadedness  No abdominal or back discomfort  Medications and labs reviewed  Review of Systems  she denies any nausea vomiting diarrhea no urgency frequency or lightheadedness  Objective:      Ht 5' 2 2" (1 58 m)   Wt 59 9 kg (132 lb)   LMP  (LMP Unknown)   BMI 23 99 kg/m²     Blood pressure is 144/60 in the right arm  Physical Exam   Constitutional: She appears well-nourished  Eyes: No scleral icterus  Neck: Neck supple  Cardiovascular: Normal rate and regular rhythm  Pulmonary/Chest: Effort normal and breath sounds normal    Abdominal: Soft  Bowel sounds are normal    Musculoskeletal: She exhibits no edema  Lymphadenopathy:     She has no cervical adenopathy     Neurological:    nonfocal   Skin: Skin is warm and dry

## 2019-02-22 NOTE — ASSESSMENT & PLAN NOTE
She has a baseline creatinine of 1 4-1 6  Labs from August 29 showed a creatinine 1 44;  Cr is stable Cr 1 4 from 2/19

## 2019-02-22 NOTE — ASSESSMENT & PLAN NOTE
Intact pth is 165 and phos is 3 3  Calcium is 9 0 and alb is 3 6   Increase rocaltrol to twice a week

## 2019-02-22 NOTE — ASSESSMENT & PLAN NOTE
In reviewing chart notes, her blood pressure  Has been relatively preserved  She has renal artery dopplers as far back as 2016 showing more than 60% stenosis in the right renal artery, however, she had an angiogram in 2016 which did not show any evidence of RACHELLE and had patent renal arteries    Blood pressure is 144/60 today no acute changes in her medication regimen

## 2019-03-12 ENCOUNTER — OFFICE VISIT (OUTPATIENT)
Dept: FAMILY MEDICINE CLINIC | Facility: CLINIC | Age: 84
End: 2019-03-12

## 2019-03-12 VITALS
TEMPERATURE: 97.7 F | HEART RATE: 70 BPM | SYSTOLIC BLOOD PRESSURE: 140 MMHG | BODY MASS INDEX: 24.29 KG/M2 | RESPIRATION RATE: 18 BRPM | WEIGHT: 132 LBS | HEIGHT: 62 IN | DIASTOLIC BLOOD PRESSURE: 70 MMHG

## 2019-03-12 DIAGNOSIS — M25.521 RIGHT ELBOW PAIN: Primary | ICD-10-CM

## 2019-03-12 PROCEDURE — 99213 OFFICE O/P EST LOW 20 MIN: CPT | Performed by: FAMILY MEDICINE

## 2019-03-12 RX ORDER — LIDOCAINE 50 MG/G
1 PATCH TOPICAL DAILY
Qty: 30 PATCH | Refills: 0 | Status: ON HOLD | OUTPATIENT
Start: 2019-03-12 | End: 2019-07-24 | Stop reason: ALTCHOICE

## 2019-03-12 NOTE — ASSESSMENT & PLAN NOTE
Right elbow pain over 3 week:  Clinically not septic: r o pseudogout, gout  Vs medial epicondylitis     1  Order Urinc acid, sed rate, crp, cbc  2  Order Xray right elbow: r o fractures, effusions  3  REST, ICE, elevate  4   May use Tylenol 650mg q6hrs prn

## 2019-03-12 NOTE — PROGRESS NOTES
Stanley Lopez 1934 female MRN: [id number]    Family Medicine Acute Visit    ASSESSMENT/PLAN   Problem List Items Addressed This Visit        Other    Right elbow pain - Primary     Right elbow pain over 3 week:  Clinically not septic: r o pseudogout, gout  Vs medial epicondylitis     1  Order Urinc acid, sed rate, crp, cbc  2  Order Xray right elbow: r o fractures, effusions  3  REST, ICE, elevate  4  May use Tylenol 650mg q6hrs prn         Relevant Medications    lidocaine (LIDODERM) 5 %    Other Relevant Orders    XR elbow 3+ vw right    Sedimentation rate, automated    Uric acid    C-reactive protein    CBC and differential              Future Appointments   Date Time Provider Valdemar Crocker   3/28/2019  2:40 PM Quiana Damon MD  FP [facility]   4/24/2019  3:30 PM Rabia Tong MD [facility]   6/17/2019  3:00 PM Damien Swartz MD [address] [place] [address] ENT  SPA   7/2/2019  1:30 PM JARRED Lo SURG ONC [facility]   7/26/2019  1:40 PM BE MAMMO SLN 2 BE SLN Mammo [facility]   9/19/2019  3:30 PM JARRED Storey VAS CTR [facility]          SUBJECTIVE  CC: Arm Pain and Shoulder Pain      HPI:  Stanley Lopez is a 80 y o  female who presents for acute visit:    1  Right arm pain: 3 weeks hx: slowly worsening   Pain: 10/10  Pain: episodes of burning, constant and sharp   Aggravated: at rest, but mild pain on movement specifically on pronation  Improved by: Nothing   Radiation: down the arm and up arm; at times to the neck and left shoulder   Medication: Tylenol 650mg q4hrs: helps slightly     Denies trauma    Hx of Gout    DM: non-insulin  - HbA1c: 6 7 ( 2/2019)         Review of Systems   Constitutional: Positive for activity change  Negative for chills, fatigue, fever and unexpected weight change  HENT: Negative for congestion  Eyes: Negative for visual disturbance  Respiratory: Negative for cough, chest tightness and shortness of breath      Cardiovascular: Negative for chest pain  Gastrointestinal: Negative for abdominal pain  Endocrine: Negative for polyuria  Genitourinary: Negative for difficulty urinating  Musculoskeletal: Positive for arthralgias  Elbow pain  Forearm pain   Skin: Negative for color change  Allergic/Immunologic: Negative for immunocompromised state  Neurological: Negative for tremors, seizures, syncope, speech difficulty and numbness  Hematological: Negative for adenopathy  Psychiatric/Behavioral: Negative for agitation  The patient is not nervous/anxious          Historical Information   The patient history was reviewed and updated as follows:  Past Medical History:   Diagnosis Date    Arthritis     Back pain     Benign essential hypertension     LAST ASSESSED: [id number]    Carotid artery stenosis     CKD (chronic kidney disease) stage 2, GFR 60-89 ml/min     Constipation, chronic     "drinks prune juice"    Diabetes mellitus ([facility] [address] [facility]     DMII (diabetes mellitus, type 2) ([facility]     Epiglottitis     RESOLVED: [id number]    Gastritis     GERD (gastroesophageal reflux disease)     Gout     H/O blood clots     left lower leg,,,11 yrs ago    History of stenosis of renal artery     RESOLVED: [id number]    Hypercholesteremia     Hypercholesterolemia     Hyperlipidemia     [facility]     Hypertension     Iliac artery stenosis, bilateral ([facility])     Kidney stone     Mucinous carcinoma of breast, 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    Presence of pessary     Renal artery stenosis ([facility]     Renal disorder     Renovascular hypertension     RESOLVED: [id number]    Segmental and somatic dysfunction of cervical region     RESOLVED: [id number]    Segmental and somatic dysfunction of lumbar region     RESOLVED: [id number]    Segmental and somatic dysfunction of pelvic region     RESOLVED: [id number]    Segmental and somatic dysfunction of thoracic region     RESOLVED: [id number]    Somatic dysfunction of abdominal region RESOLVED: [id number]    Somatic dysfunction of head region     RESOLVED: [id number]    Somatic dysfunction of lower extremities     RESOLVED: [id number]    Somatic dysfunction of rib region     RESOLVED: [id number]    Somatic dysfunction of thoracic region     RESOLVED: [id number]    Somatic dysfunction of upper extremities     RESOLVED: [id number]    Teeth missing          Past Surgical History:   Procedure Laterality Date    ANGIOPLASTY / STENTING ILIAC      BREAST LUMPECTOMY Left 09/06/2017    LAST ASSESSED: [id number]    BREAST SURGERY Left     biopsy    CATARACT EXTRACTION Bilateral     CHOLECYSTECTOMY      COLONOSCOPY      EYE SURGERY      ILIAC VEIN ANGIOPLASTY / STENTING Right     INITIAL STENOSIS: ONSET: [id number]    KNEE ARTHROSCOPY Right     KNEE SURGERY Right     ONSET: [id number]    MAMMO NEEDLE LOCALIZATION LEFT (ALL INC) Left 9/11/2018    MAMMO NEEDLE LOCALIZATION LEFT (ALL INC) Left 9/6/2017    DE ESOPHAGOGASTRODUODENOSCOPY TRANSORAL DIAGNOSTIC N/A 7/26/2018    Procedure: ESOPHAGOGASTRODUODENOSCOPY (EGD); Surgeon: Cristino Salcido DO;  Location: [facility] GI LAB; Service: Gastroenterology    [facility] N/A 6/10/2016    Procedure: MICRO DIRECT LARYNGOSCOPY WITH VOCAL FOLD MASS EXCISION ;  Surgeon: Benjie Ledesma MD;  Location: [facility] Main OR;  Service: ENT    DE MASTECTOMY, PARTIAL Left 9/6/2017    Procedure: LUMPECTOMY BREAST NEEDLE LOCALIZED WITH FAXITRON NEEDLE @ 0830;  Surgeon: Yoko Sommers MD;  Location: [facility];  Service: General    DE PERQ DEVICE PLACEMT BREAST LOC 1ST LES W Peri Cargo Left 9/11/2018    Procedure: BREAST LUMPECTOMY; BREAST NEEDLE LOCALIZATION (NEEDLE LOC AT 1330);   Surgeon: Uli Butler MD;  Location: [facility];  Service: Surgical Oncology    THROAT SURGERY      lump removed    TUBAL LIGATION      US GUIDANCE BREAST BIOPSY LEFT EACH ADDITIONAL Left 8/7/2017    US GUIDED BREAST BIOPSY LEFT COMPLETE Left 8/10/2018    US GUIDED BREAST BIOPSY LEFT COMPLETE Left 8/7/2017    VASCULAR SURGERY       Family History   Problem Relation Age of Onset    Heart disease Brother     Heart disease Maternal Grandmother     Diabetes Other         of other type with arthropathy, without long term current use of insulin    Hypertension Other     No Known Problems Mother     Gout Family     Rheum arthritis Family     Lupus Family         systematic erythematosus    Heart disease Family     Stroke Family         syndrome    Stroke Maternal Uncle         syndrome    Stroke Maternal Aunt         syndrome    No Known Problems Father       Social History   Social History     Substance and Sexual Activity   Alcohol Use No     Social History     Substance and Sexual Activity   Drug Use No     Social History     Tobacco Use   Smoking Status Former Smoker    Packs/day: 1 00    Years: 64 00    Pack years: 56 65    Last attempt to quit: 2004    Years since quitting: 15 2   Smokeless Tobacco Never Used   Tobacco Comment    quit in 2003 1 ppd x 50 years       Medications:     Current Outpatient Medications:     Acetaminophen (TYLENOL) 325 MG CAPS, Take by mouth every 8 (eight) hours, Disp: , Rfl:     allopurinol (ZYLOPRIM) 100 mg tablet, TK 1 T PO D, Disp: , Rfl: 1    aspirin (ADULT ASPIRIN EC LOW STRENGTH) 81 mg EC tablet, Take 1 tablet by mouth daily, Disp: , Rfl:     calcitriol (ROCALTROL) 0 25 mcg capsule, Take 1 capsule (0 25 mcg total) by mouth 2 (two) times a week Please take every Tuesday and friday, Disp: 10 capsule, Rfl: 6    calcium carbonate-vitamin D (OSCAL-D) 500 mg-200 units per tablet, Take 1 tablet by mouth daily with breakfast, Disp: , Rfl:     fluticasone (FLONASE) 50 mcg/act nasal spray, 1 spray into each nostril daily, Disp: 1 Bottle, Rfl: 0    gabapentin (NEURONTIN) 100 mg capsule, Take 1 capsule (100 mg total) by mouth daily at bedtime for 10 days, Disp: 90 capsule, Rfl: 1    glimepiride (AMARYL) 1 mg tablet, Take 0 5 tablets (0 5 mg total) by mouth daily with breakfast, Disp: 90 tablet, Rfl: 1    Lancets (ONETOUCH ULTRASOFT) lancets, USE TWICE DAILY, Disp: 200 each, Rfl: 9    Magnesium 400 MG CAPS, Take 400 mg by mouth daily, Disp: , Rfl:     metoprolol tartrate (LOPRESSOR) 50 mg tablet, Take 1 tablet (50 mg total) by mouth 2 (two) times a day, Disp: 180 tablet, Rfl: 1    NIFEdipine (NIFEDICAL XL) 30 mg 24 hr tablet, Take 1 tablet (30 mg total) by mouth daily, Disp: 90 tablet, Rfl: 0    omeprazole (PriLOSEC) 40 MG capsule, TAKE 1 CAPSULE(40 MG) BY MOUTH TWICE DAILY, Disp: 180 capsule, Rfl: 3    ONE TOUCH ULTRA TEST test strip, 1 each by Other route 2 (two) times a day Use as instructed, Disp: 100 each, Rfl: 5    pravastatin (PRAVACHOL) 40 mg tablet, Take 1 tablet (40 mg total) by mouth daily, Disp: 90 tablet, Rfl: 0    RESTASIS 0 05 % ophthalmic emulsion, INSTILL 1 GTT INTO OU BID, Disp: , Rfl: 3    spironolactone (ALDACTONE) 25 mg tablet, Take 1 tablet (25 mg total) by mouth daily at bedtime, Disp: 90 tablet, Rfl: 0    lidocaine (LIDODERM) 5 %, Apply 1 patch topically daily Remove & Discard patch within 12 hours or as directed by MD, Disp: 30 patch, Rfl: 0    Allergies   Allergen Reactions    Clonidine Derivatives Swelling    Diflucan [Fluconazole] Rash    Flagyl [Metronidazole] Anaphylaxis    Carvedilol Hives     And legs swelled    Latex Rash    Lipitor [Atorvastatin]      Legs swelled    Other Palpitations     IV DYE       OBJECTIVE  Vitals:   Vitals:    03/12/19 1439   BP: 140/70   Pulse: 70   Resp: 18   Temp: 97 7 °F (36 5 °C)   Weight: 59 9 kg (132 lb)   Height: 5' 2" (1 575 m)         Physical Exam   Constitutional: She is oriented to person, place, and time  HENT:   Head: Normocephalic  Neck: Normal range of motion  Cardiovascular: Normal rate  Pulmonary/Chest: Effort normal and breath sounds normal    Abdominal: Soft   Bowel sounds are normal    Musculoskeletal:        Right elbow: She exhibits decreased range of motion  She exhibits no swelling, no effusion, no deformity and no laceration  Tenderness found  Mild pain on flexion  Moderate-severe: pronation  No localized swelling no erythema   ROM elbow: limited due to pain   Neurological: She is alert and oriented to person, place, and time  Skin: Skin is dry  No pallor  Psychiatric: She has a normal mood and affect   Her behavior is normal  Judgment and thought content normal                 Kenny Arguello MD, PGY-3  [facility]   3/12/2019

## 2019-03-13 ENCOUNTER — HOSPITAL ENCOUNTER (OUTPATIENT)
Dept: RADIOLOGY | Facility: HOSPITAL | Age: 84
Discharge: HOME | End: 2019-03-13
Payer: COMMERCIAL

## 2019-03-13 ENCOUNTER — APPOINTMENT (OUTPATIENT)
Dept: LAB | Facility: HOSPITAL | Age: 84
End: 2019-03-13
Payer: COMMERCIAL

## 2019-03-13 ENCOUNTER — TRANSCRIBE ORDERS (OUTPATIENT)
Dept: RADIOLOGY | Facility: HOSPITAL | Age: 84
End: 2019-03-13

## 2019-03-13 DIAGNOSIS — M25.521 RIGHT ELBOW PAIN: ICD-10-CM

## 2019-03-13 LAB
BASOPHILS # BLD AUTO: 0.02 THOUSANDS/ΜL (ref 0–0.1)
BASOPHILS NFR BLD AUTO: 0 % (ref 0–1)
CRP SERPL QL: 4.1 MG/L
EOSINOPHIL # BLD AUTO: 0.04 THOUSAND/ΜL (ref 0–0.61)
EOSINOPHIL NFR BLD AUTO: 1 % (ref 0–6)
ERYTHROCYTE [DISTWIDTH] IN BLOOD BY AUTOMATED COUNT: 13.2 % (ref 11.6–15.1)
ERYTHROCYTE [SEDIMENTATION RATE] IN BLOOD: 32 MM/HOUR (ref 0–20)
HCT VFR BLD AUTO: 37.6 % (ref 34.8–46.1)
HGB BLD-MCNC: 11.4 G/DL (ref 11.5–15.4)
IMM GRANULOCYTES # BLD AUTO: 0.02 THOUSAND/UL (ref 0–0.2)
IMM GRANULOCYTES NFR BLD AUTO: 0 % (ref 0–2)
LYMPHOCYTES # BLD AUTO: 2.05 THOUSANDS/ΜL (ref 0.6–4.47)
LYMPHOCYTES NFR BLD AUTO: 35 % (ref 14–44)
MCH RBC QN AUTO: 28.1 PG (ref 26.8–34.3)
MCHC RBC AUTO-ENTMCNC: 30.3 G/DL (ref 31.4–37.4)
MCV RBC AUTO: 93 FL (ref 82–98)
MONOCYTES # BLD AUTO: 0.39 THOUSAND/ΜL (ref 0.17–1.22)
MONOCYTES NFR BLD AUTO: 7 % (ref 4–12)
NEUTROPHILS # BLD AUTO: 3.4 THOUSANDS/ΜL (ref 1.85–7.62)
NEUTS SEG NFR BLD AUTO: 57 % (ref 43–75)
NRBC BLD AUTO-RTO: 0 /100 WBCS
PLATELET # BLD AUTO: 263 THOUSANDS/UL (ref 149–390)
PMV BLD AUTO: 11.7 FL (ref 8.9–12.7)
RBC # BLD AUTO: 4.05 MILLION/UL (ref 3.81–5.12)
URATE SERPL-MCNC: 7.6 MG/DL (ref 2–6.8)
WBC # BLD AUTO: 5.92 THOUSAND/UL (ref 4.31–10.16)

## 2019-03-13 PROCEDURE — 36415 COLL VENOUS BLD VENIPUNCTURE: CPT

## 2019-03-13 PROCEDURE — 85652 RBC SED RATE AUTOMATED: CPT

## 2019-03-13 PROCEDURE — 86140 C-REACTIVE PROTEIN: CPT

## 2019-03-13 PROCEDURE — 84550 ASSAY OF BLOOD/URIC ACID: CPT

## 2019-03-13 PROCEDURE — 73080 X-RAY EXAM OF ELBOW: CPT

## 2019-03-13 PROCEDURE — 85025 COMPLETE CBC W/AUTO DIFF WBC: CPT

## 2019-03-14 ENCOUNTER — TELEPHONE (OUTPATIENT)
Dept: FAMILY MEDICINE CLINIC | Facility: CLINIC | Age: 84
End: 2019-03-14

## 2019-03-14 DIAGNOSIS — K21.9 GASTROESOPHAGEAL REFLUX DISEASE WITHOUT ESOPHAGITIS: Primary | ICD-10-CM

## 2019-03-14 RX ORDER — OMEPRAZOLE 40 MG/1
40 CAPSULE, DELAYED RELEASE ORAL DAILY
Qty: 90 CAPSULE | Refills: 0 | Status: SHIPPED | OUTPATIENT
Start: 2019-03-14 | End: 2019-06-25

## 2019-03-14 RX ORDER — NAPROXEN 500 MG/1
500 TABLET ORAL 2 TIMES DAILY WITH MEALS
Qty: 28 TABLET | Refills: 0 | Status: SHIPPED | OUTPATIENT
Start: 2019-03-14 | End: 2019-06-18 | Stop reason: HOSPADM

## 2019-03-14 NOTE — PROGRESS NOTES
Follow labs: Right arm burning pain ( 3/12/19)     Crp: 4 1  CBC: hb 11 4  Uric acid: 7 6  Cr 1 45, bun 37, egfr 33      Acute on chronic gout:  1  Unable to treat as acute given the time frame of over 3 weeks  2  Start short course of Naproxen 500mg BID 2 weeks  -make sure she is taking her Omeprazole daily  3  Monitor Symptoms    Imaging:  XRAY: pending official read      * Attempted to call patient: no answer  Spoke with Daughter Elvira Nowak) she will ask mother to call clinic  * Allopurinol is in medication list: unsure when last taken  Please ask patient         Ash Biggs

## 2019-03-28 ENCOUNTER — OFFICE VISIT (OUTPATIENT)
Dept: FAMILY MEDICINE CLINIC | Facility: CLINIC | Age: 84
End: 2019-03-28

## 2019-03-28 VITALS
WEIGHT: 133.8 LBS | TEMPERATURE: 98 F | SYSTOLIC BLOOD PRESSURE: 130 MMHG | BODY MASS INDEX: 24.62 KG/M2 | HEART RATE: 62 BPM | HEIGHT: 62 IN | RESPIRATION RATE: 16 BRPM | DIASTOLIC BLOOD PRESSURE: 80 MMHG

## 2019-03-28 DIAGNOSIS — M25.9 MULTIPLE JOINT COMPLAINTS: ICD-10-CM

## 2019-03-28 DIAGNOSIS — M35.3 PMR (POLYMYALGIA RHEUMATICA) (HCC): Primary | ICD-10-CM

## 2019-03-28 DIAGNOSIS — M25.521 RIGHT ELBOW PAIN: ICD-10-CM

## 2019-03-28 PROCEDURE — 99213 OFFICE O/P EST LOW 20 MIN: CPT | Performed by: FAMILY MEDICINE

## 2019-03-28 RX ORDER — PREDNISONE 20 MG/1
20 TABLET ORAL DAILY
Qty: 7 TABLET | Refills: 0 | Status: SHIPPED | OUTPATIENT
Start: 2019-03-28 | End: 2019-04-04

## 2019-03-28 NOTE — PATIENT INSTRUCTIONS
La polimialgia reumática   LO QUE NECESITA SABER:   ¿Qué es la polimialgia reumática? La polimialgia reumática es dianna afección que causa delaney musculares y rigidez debido a inflamación  Los síntomas son peores después que usted ha dejado de usar los músculos por cierto tiempo  Por ejemplo, puede ser dificil levantarse de la cama cuando se despierta en la mañana  La polimialgia reumática usualmente afecta a personas, mayores de [address], con frecuencia después de los [address]  La polimialgia reumática puede ocurrir con dianna condición grave conocida brenda arteritis de células gigantes, o arteritis temporal  Esta afección causa que las marin de las arterias se inflamen  ¿Cuáles son los signos y síntomas de la polimialgia reumática? Cualquiera de los siguientes puede desarrollarse súbitamente o en un par de días o semanas:  · Dolor, molestias o rigidez [employer], el jes, la parte de arriba de los brazos o las caderas    · Dolor o rigidez que mejora con el movimiento    · Dificultad en subir los brazos por encima de sara hombros    · Dificultad para vestirse o incorporarse de dianna posición sentada    · Disminución en la habilidad de  sara articulaciones en galvan totalidad    · Fatiga, falta de apetito o bajar de peso sin proponérselo  ¿Cómo se diagnostica y se trata la polimialgia reumática? · Informe a galvan médico sobre sara síntomas y cuándo le empezaron  Infórmele si el dolor y la rigidez son peores en la mañana o después de un tiempo de inactividad  Es posible que le ordene un examen de jose m para medir la cantidad de inflamación  También le puede administrar pequeñas cantidades de un medicamento esteroide por vía oral o muscular  En madhav que tenga polimialgia reumática, el esteroide sirve para liberar la inflamación que le está causando el dolor  · Usted puede sentir alivio de inmediato o puede que necesite reddy varias dosis antes de sentirse mejor   Galvan médico le incrementará la dosis [employer] sara síntomas estén bajo control  El proveedor [employer] reducirá la dosis por un par de días o semanas hasta encontrar la dosis mínima que sea [place]  Usted tendrá que seguir tomando el medicamento en radny dosificación, usualmente dylan 6 meses  Es posible que necesite seguir tómandolo hasta por 3 años para controlar el dolor  Los analgésicos antiinflamatorios no esteroides pueden ayudar a aliviar el dolor leve o rigidez  ¿Qué puedo hacer para manejar la polimialgia reumática? · Ejercítese según indicaciones  Dianna actividad física puede ayudar a prevenir y a reducir el dolor  El ejercicio además puede ayudar a mantener la masa muscular y a prevenir caídas  · Vaya a terapia física según indicaciones  Dianna fisioterapeuta puede enseñarle ejercicios para ayudarle a mantener la masa muscular  También le puede enseñar ejercicios para mejorar el rango de movimiento de las articulaciones que son difíciles de   · Use dispositivos de asistencia según las necesidades  Un asiento elevado para el sanitario puede ayudarle a ponerse de pie con facilidad  Los dispositivos están disponibles para ayudarle a alcanzar cosas en los estantes elevados si usted tiene dificultad en alcanzarlos  Galvan médico puede recomendarle un bastón o caminador para ayudarle a mantener el equilibrio  · Consuma alimentos saludables y variados  Los alimentos saludables incluyen frutas, verduras, pan integral, productos lácteos bajos en grasa, frijoles, tete magras y pescado  Los alimentos sanos pueden servirle para tener más energía  Pregunte si necesita seguir dianna dieta especial   ¿Cuándo amy buscar atención inmediata? · Usted tiene signos o síntomas de dianna arteritis de células gigantes, brenda dolor de [place], [place] adolorida, cambios en la visión o [place]       ¿Cuándo amy comunicarme con mi médico?   · Usted tiene signos o síntomas de polimialgia reumática nuevos o que regresan [facility] están reduciendo [employer] esteroide  · Usted tiene preguntas o inquietudes acerca de munoz condición o cuidado  ACUERDOS SOBRE MUNOZ CUIDADO:   Usted tiene el derecho de ayudar a planear munoz cuidado  Aprenda todo lo que pueda sobre munoz condición y brenda darle tratamiento  Discuta sara opciones de tratamiento con sara médicos para decidir el cuidado que usted desea recibir  Usted siempre tiene el derecho de rechazar el tratamiento  Esta información es sólo para uso en educación  Munoz intención no es darle un consejo médico sobre enfermedades o tratamientos  Colsulte con munoz Hortencia Antis farmacéutico antes de seguir cualquier régimen médico para saber si es seguro y efectivo para usted  © 2017 [address] Information is for End User's use only and may not be sold, redistributed or otherwise used for commercial purposes  All illustrations and images included in CareNotes® are the copyrighted property of A D A M , Inc  or Yoan Posada  La polimialgia reumática   LO QUE NECESITA SABER:   La polimialgia reumática es dianna afección que causa delaney musculares y rigidez debido a inflamación  Los síntomas son peores después que usted ha dejado de usar los músculos por cierto tiempo  Por ejemplo, puede ser dificil levantarse de la cama cuando se despierta en la mañana  La polimialgia reumática usualmente afecta a personas, mayores de [address], con frecuencia después de los [address]  La polimialgia reumática puede ocurrir con dianna condición grave conocida brenda arteritis de células gigantes, o arteritis temporal  Esta afección causa que las marin de las arterias se inflamen  INSTRUCCIONES SOBRE [facility]:   Regrese a la landry de emergencias si:   · Usted tiene signos o síntomas de dianna arteritis de células gigantes, brenda dolor de [place], [place] adolorida, cambios en la visión o fiebre  Pregúntele a munoz [place] vitaminas y minerales son adecuados para usted     · Usted tiene signos o síntomas de polimialgia reumática nuevos o que regresan [employer] están reduciendo el medicamento esteroide  · Usted tiene preguntas o inquietudes acerca de bethea condición o cuidado  Medicamentos:   · Medicamentos esteroideos  se lo debe reddy exactamente brenda se lo indicaron  No  suspenda neli medicamento repentinamente  Bethea médico necesitará reducir bethea dosis poco a poco con el transcurso del [place]  · AINEs (Analgésicos antiinflamatorios no esteroides)  pueden ayudar a reducir la inflamación leve y el dolor  Estos medicamentos pueden ser comprados sin orden de un médico  Bethea médico puede indicarle cuál medicamento es el adecuado para usted  Conny Ling cuándo tomarlo y por cuánto [phone]  Cuando no se hitesh de la [employer], los medicamentos antiinflamatorios no esteroides pueden causar sangrado estomacal o problemas renales  Si usted red un medicamento anticoagulante, siempre pregúntele a bethea médico si los KEL son seguros para usted  · Winter sara medicamentos brenda se le haya indicado  Consulte con bethea médico si usted dustin que bethea medicamento no le está ayudando o si presenta efectos secundarios  Infórmele si es alérgico a cualquier medicamento  Mantenga dianna lista actualizada de los [place], las vitaminas y los productos herbales que red  Incluya los siguientes datos de los medicamentos: cantidad, frecuencia y motivo de administración  Traiga con usted la lista o los envases de la píldoras a sara citas de seguimiento  Lleve la lista de los medicamentos con usted en madhav de dianna emergencia  Controlando la polimialgia reumática:   · Ejercítese según indicaciones  Dianna actividad física puede ayudar a prevenir y a reducir el dolor  El ejercicio además puede ayudar a mantener la masa muscular y a prevenir caídas  · Vaya a terapia física según indicaciones  Dianna fisioterapeuta puede enseñarle ejercicios para ayudarle a mantener la masa muscular  También le puede enseñar ejercicios para mejorar el rango de movimiento de las articulaciones que son difíciles de   · Use dispositivos de asistencia según las necesidades  Un asiento elevado para el sanitario puede ayudarle a ponerse de pie con facilidad  Los dispositivos están disponibles para ayudarle a alcanzar cosas en los estantes elevados si usted tiene dificultad en alcanzarlos  Bethea médico puede recomendarle un bastón o caminador para ayudarle a mantener el equilibrio  · Consuma alimentos saludables y variados  Los alimentos saludables incluyen frutas, verduras, pan integral, productos lácteos bajos en grasa, frijoles, tete magras y pescado  Los alimentos sanos pueden servirle para tener más energía  Pregunte si necesita seguir dianna dieta especial   Yao Chock a sara consultas de control con bethea médico según le indicaron  Bethea médico necesitará examinar sara síntomas y ajustarle bethea dosis de esteroides con el tiempo  También le revisará por los efectos secundarios que le produce el medicamento, brenda niveles altos de azúcar en la jose m o latidos cardíacos rápidos  Anote sara preguntas para que se acuerde de hacerlas dylan sara visitas  © 2017 [address]  Information is for End User's use only and may not be sold, redistributed or otherwise used for commercial purposes  All illustrations and images included in CareNotes® are the copyrighted property of A D A M , Inc  or Yoan Posada  Esta información es sólo para uso en educación  Bethea intención no es darle un consejo médico sobre enfermedades o tratamientos  Colsulte con bethea Laruth Patrick farmacéutico antes de seguir cualquier régimen médico para saber si es seguro y efectivo para usted

## 2019-03-28 NOTE — ASSESSMENT & PLAN NOTE
Bilateral upper extremity pain/ weakness:  Likely PMR    Lab:  Sed rate: 32  CPR: 4 1  CBC: hb 11 4  Uric acid: 7 6  Cr 1 45, bun 37, eGFR 33    1  Start treatment of PMR given elevated sed rate and CRP; in addition to toe clinical signs and symptoms  Start  Prednisone 20 mg daily for a week    2  Patient and son agree with plan  -monitor symptoms and follow up in one week    Of note:  - if symptoms improve with one week of prednisone 20 mg:  Plan is to start tapering down every 3-4 weeks depending on the improvement of symptoms  - if symptoms do not improve:    Full consider the possibility of a statin Baldev Mole causing some pain:   Differential diagnosis myositis  - consider ordering CPK

## 2019-03-28 NOTE — PROGRESS NOTES
Rose Mabry 1934 female MRN: [id number]    Family Medicine Follow-up Visit    ASSESSMENT/PLAN  Problem List Items Addressed This Visit        Other    Multiple joint complaints     Multiple joint pain and weakness: B/L shoulders, neck, and right elbow: likely PMR vs myositis      1  Start treatment of PMR given elevated sed rate and CRP; in addition to clinical signs and symptoms  Start  Prednisone 20 mg daily for a week    2  Patient and son agree with plan  -monitor symptoms and follow up in one week    Of note:  - if symptoms improve with one week of prednisone 20 mg:  Plan is to start tapering down every 3-4 weeks depending on the improvement of symptoms  - if symptoms do not improve: Full consider the possibility of a statin causing some pain:   Differential diagnosis myositis  - consider ordering CPK      Lab:  Sed rate: 32  CPR: 4 1  CBC: hb 11 4  Uric acid: 7 6  Cr 1 45, bun 37, eGFR 33    Imaging:  XRAY: No acute osseous abnormality  RESOLVED: Right elbow pain                   Other Visit Diagnoses     PMR (polymyalgia rheumatica) ([facility] [address] )    -  Primary    Relevant Medications    predniSONE 20 mg tablet              Future Appointments   Date Time Provider Valdemar Crocker   2019  3:20 PM Roy Apgar, MD  FP [facility]   2019  3:30 PM Kelly Patton MD  FP [facility]   2019  3:00 PM Ray Saleem MD [address] [place] [address] ENT CC [address] [place] [address]   2019  1:30 PM JARRED Dhaliwal SURG ONC [facility]   2019  1:40 PM BE MAMMO SLN 2 BE [facility]N [facility] [place]   2019  3:30 PM JARRED Storey VAS CTR [facility]          SUBJECTIVE  CC: Follow-up ( 1 week)      HPI  Rose Mabry is a 80 y o  female who presents for follow up from acute visit:    1  Bilateral upper extremity pain:   No resolution pain after trial of Tylenol     Pain: -9/10  Pain characterization:   Aching, weakness, at times burning sensation  Pain localized to the bicep area bilaterally  Patient feels arms are weak, difficult to lift things, difficult to put clothes on specifically sure it is, difficulty in doing her hair    -son specifically says she has been doing things slow lower:   Such as cooking, cleaning  - pain aggravated:   Slight movement     -denies chest pain, shortness of breath, headache, visual disturbance, recent URIs, diarrhea, vomiting         Review of Systems   Constitutional: Positive for activity change  Negative for appetite change, chills and fever  HENT: Negative for congestion  Eyes: Negative for photophobia, pain, redness and visual disturbance  Respiratory: Negative for cough, choking, chest tightness, shortness of breath, wheezing and stridor  Cardiovascular: Negative for chest pain, palpitations and leg swelling  Gastrointestinal: Negative for abdominal pain, constipation, diarrhea, nausea and vomiting  Endocrine: Negative for polyuria  Genitourinary: Negative for difficulty urinating, dysuria, flank pain, pelvic pain, urgency and vaginal bleeding  Neurological: Positive for weakness  Negative for dizziness, tremors, seizures, syncope, facial asymmetry, speech difficulty, light-headedness, numbness and headaches  Hematological: Does not bruise/bleed easily  Psychiatric/Behavioral: Negative for behavioral problems and confusion  The patient is not nervous/anxious          Historical Information   The patient history was reviewed as follows:    Past Medical History:   Diagnosis Date    Arthritis     Back pain     Benign essential hypertension     LAST ASSESSED: [id number]    Carotid artery stenosis     CKD (chronic kidney disease) stage 2, GFR 60-89 ml/min     Constipation, chronic     "drinks prune juice"    Diabetes mellitus ([facility] [address] )     DMII [facility]di[facility] mellitus, type 2) ([facility])     Epiglottitis     RESOLVED: [id number]    Gastritis     GERD (gastroesophageal reflux disease)     Gout     H/O blood clots     left lower leg,,,11 yrs ago    History of stenosis of renal artery     RESOLVED: [id number]    Hypercholesteremia     Hypercholesterolemia     Hyperlipidemia     [facility]parathyroidism (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    Presence of pessary     Renal artery stenosis (HCC)     Renal disorder     Renovascular hypertension     RESOLVED: [id number]    Segmental and somatic dysfunction of cervical region     RESOLVED: [id number]    Segmental and somatic dysfunction of lumbar region     RESOLVED: [id number]    Segmental and somatic dysfunction of pelvic region     RESOLVED: [id number]    Segmental and somatic dysfunction of thoracic region     RESOLVED: [id number]    Somatic dysfunction of abdominal region     RESOLVED: 29HJG2584    Somatic dysfunction of head region     RESOLVED: [id number]    Somatic dysfunction of lower extremities     RESOLVED: [id number]    Somatic dysfunction of rib region     RESOLVED: [id number]    Somatic dysfunction of thoracic region     RESOLVED: [id number]    Somatic dysfunction of upper extremities     RESOLVED: [id number]    Teeth missing      Past Surgical History:   Procedure Laterality Date    ANGIOPLASTY / STENTING ILIAC      BREAST LUMPECTOMY Left 09/06/2017    LAST ASSESSED: [id number]    BREAST SURGERY Left     biopsy    CATARACT EXTRACTION Bilateral     CHOLECYSTECTOMY      COLONOSCOPY      EYE SURGERY      ILIAC VEIN ANGIOPLASTY / STENTING Right     INITIAL STENOSIS: ONSET: [id number]    KNEE ARTHROSCOPY Right     KNEE SURGERY Right     ONSET: [id number]    MAMMO NEEDLE LOCALIZATION LEFT (ALL INC) Left 9/11/2018    MAMMO NEEDLE LOCALIZATION LEFT (ALL INC) Left 9/6/2017    DC ESOPHAGOGASTRODUODENOSCOPY TRANSORAL DIAGNOSTIC N/A 7/26/2018    Procedure: ESOPHAGOGASTRODUODENOSCOPY (EGD); Surgeon: Ade Orourke DO;  Location: [facility];   Service: Gastroenterology    DC LARYNGOSCOPY,DIRCT,OP SCOP,EXC TUMR N/A 6/10/2016    Procedure: MICRO DIRECT LARYNGOSCOPY WITH VOCAL FOLD MASS EXCISION ;  Surgeon: Samuel Altman MD;  Location[facility];  Service: ENT    WV MASTECTOMY, PARTIAL Left 9/6/2017    Procedure: LUMPECTOMY BREAST NEEDLE LOCALIZED WITH FAXITRON NEEDLE @ 0830;  Surgeon: Meka Mark MD;  [facility];  Service: General    WV PERQ DEVICE PLACEMT BREAST LOC 1ST LES W Ariel Flores Left 9/11/2018    Procedure: BREAST LUMPECTOMY; BREAST NEEDLE LOCALIZATION (NEEDLE LOC AT 1330);   Surgeon: Flora Ackerman MD;  [facility];  Service: Surgical Oncology    THROAT SURGERY      lump removed    TUBAL LIGATION      US GUIDANCE BREAST BIOPSY LEFT EACH ADDITIONAL Left 8/7/2017    US GUIDED BREAST BIOPSY LEFT COMPLETE Left 8/10/2018    US GUIDED BREAST BIOPSY LEFT COMPLETE Left 8/7/2017    VASCULAR SURGERY       Family History   Problem Relation Age of Onset    Heart disease Brother     Heart disease Maternal Grandmother     Diabetes Other         of other type with arthropathy, without long term current use of insulin    Hypertension Other     No Known Problems Mother     Gout Family     Rheum arthritis Family     Lupus Family         systematic erythematosus    Heart disease Family     Stroke Family         syndrome    Stroke Maternal Uncle         syndrome    Stroke Maternal Aunt         syndrome    No Known Problems Father       Social History   Social History     Substance and Sexual Activity   Alcohol Use No     Social History     Substance and Sexual Activity   Drug Use No     Social History     Tobacco Use   Smoking Status Former Smoker    Packs/day: 1 00    Years: 64 00    Pack years: 56 65    Last attempt to quit: 2004    Years since quitting: 15 2   Smokeless Tobacco Never Used   Tobacco Comment    quit in 2003 1 ppd x 50 years       Medications:     Current Outpatient Medications:     Acetaminophen (TYLENOL) 325 MG CAPS, Take by mouth every 8 (eight) hours, Disp: , Rfl:     aspirin (ADULT ASPIRIN EC LOW STRENGTH) 81 mg EC tablet, Take 1 tablet by mouth daily, Disp: , Rfl:     calcitriol (ROCALTROL) 0 25 mcg capsule, Take 1 capsule (0 25 mcg total) by mouth 2 (two) times a week Please take every Tuesday and friday, Disp: 10 capsule, Rfl: 6    calcium carbonate-vitamin D (OSCAL-D) 500 mg-200 units per tablet, Take 1 tablet by mouth daily with breakfast, Disp: , Rfl:     fluticasone (FLONASE) 50 mcg/act nasal spray, 1 spray into each nostril daily, Disp: 1 Bottle, Rfl: 0    glimepiride (AMARYL) 1 mg tablet, Take 0 5 tablets (0 5 mg total) by mouth daily with breakfast, Disp: 90 tablet, Rfl: 1    Lancets (ONETOUCH ULTRASOFT) lancets, USE TWICE DAILY, Disp: 200 each, Rfl: 9    lidocaine (LIDODERM) 5 %, Apply 1 patch topically daily Remove & Discard patch within 12 hours or as directed by MD, Disp: 30 patch, Rfl: 0    Magnesium 400 MG CAPS, Take 400 mg by mouth daily, Disp: , Rfl:     metoprolol tartrate (LOPRESSOR) 50 mg tablet, Take 1 tablet (50 mg total) by mouth 2 (two) times a day, Disp: 180 tablet, Rfl: 1    NIFEdipine (NIFEDICAL XL) 30 mg 24 hr tablet, Take 1 tablet (30 mg total) by mouth daily, Disp: 90 tablet, Rfl: 0    omeprazole (PriLOSEC) 40 MG capsule, Take 1 capsule (40 mg total) by mouth daily for 90 days, Disp: 90 capsule, Rfl: 0    ONE TOUCH ULTRA TEST test strip, 1 each by Other route 2 (two) times a day Use as instructed, Disp: 100 each, Rfl: 5    pravastatin (PRAVACHOL) 40 mg tablet, Take 1 tablet (40 mg total) by mouth daily, Disp: 90 tablet, Rfl: 0    RESTASIS 0 05 % ophthalmic emulsion, INSTILL 1 GTT INTO OU BID, Disp: , Rfl: 3    spironolactone (ALDACTONE) 25 mg tablet, Take 1 tablet (25 mg total) by mouth daily at bedtime, Disp: 90 tablet, Rfl: 0    gabapentin (NEURONTIN) 100 mg capsule, Take 1 capsule (100 mg total) by mouth daily at bedtime for 10 days, Disp: 90 capsule, Rfl: 1    naproxen (NAPROSYN) 500 mg tablet, Take 1 tablet (500 mg total) by mouth 2 (two) times a day with meals for 14 days (Patient not taking: Reported on 3/28/2019), Disp: 28 tablet, Rfl: 0    predniSONE 20 mg tablet, Take 1 tablet (20 mg total) by mouth daily for 7 days, Disp: 7 tablet, Rfl: 0  Allergies   Allergen Reactions    Clonidine Derivatives Swelling    Diflucan [Fluconazole] Rash    Flagyl [Metronidazole] Anaphylaxis    Carvedilol Hives     And legs swelled    Latex Rash    Lipitor [Atorvastatin]      Legs swelled    Other Palpitations     IV DYE       OBJECTIVE    Vitals:   Vitals:    03/28/19 1830 03/28/19 1914   BP: (!) 184/52 130/80   BP Location: Left arm    Patient Position: Sitting    Cuff Size: Adult    Pulse: 62    Resp: 16    Temp: 98 °F (36 7 °C)    TempSrc: Tympanic    Weight: 60 7 kg (133 lb 12 8 oz)    Height: 5' 2" (1 575 m)            Physical Exam   Constitutional: She appears well-developed and well-nourished  No distress  Cardiovascular: Normal rate  Pulmonary/Chest: Effort normal and breath sounds normal    Abdominal: Soft  Bowel sounds are normal    Musculoskeletal:        Right shoulder: She exhibits decreased range of motion, tenderness and decreased strength  She exhibits no effusion, no deformity, no laceration, no pain, no spasm and normal pulse  Left shoulder: She exhibits decreased range of motion, tenderness and decreased strength  She exhibits no bony tenderness, no swelling, no effusion, no crepitus, no deformity, no laceration, no pain, no spasm and normal pulse  Right elbow: She exhibits no swelling, no effusion, no deformity and no laceration  Tenderness found  Left elbow: Tenderness found  Upper extremity:  Strength 4-5 bilaterally  No localized erythema, ecchymosis, lacerations     Skin: Skin is dry  She is not diaphoretic  Psychiatric: She has a normal mood and affect   Her behavior is normal  Thought content normal                   Olivia Smith MD, PGY-3  [address] [facility]   4/4/2019

## 2019-04-02 ENCOUNTER — HOSPITAL ENCOUNTER (EMERGENCY)
Facility: HOSPITAL | Age: 84
Discharge: HOME | End: 2019-04-02
Attending: EMERGENCY MEDICINE
Payer: COMMERCIAL

## 2019-04-02 ENCOUNTER — APPOINTMENT (EMERGENCY)
Dept: RADIOLOGY | Facility: HOSPITAL | Age: 84
End: 2019-04-02
Payer: COMMERCIAL

## 2019-04-02 VITALS
RESPIRATION RATE: 18 BRPM | DIASTOLIC BLOOD PRESSURE: 66 MMHG | HEART RATE: 69 BPM | WEIGHT: 133 LBS | OXYGEN SATURATION: 99 % | TEMPERATURE: 97.6 F | SYSTOLIC BLOOD PRESSURE: 184 MMHG | BODY MASS INDEX: 24.33 KG/M2

## 2019-04-02 DIAGNOSIS — E11.9 TYPE 2 DIABETES MELLITUS (HCC): ICD-10-CM

## 2019-04-02 DIAGNOSIS — R73.9 HYPERGLYCEMIA: Primary | ICD-10-CM

## 2019-04-02 LAB
ALBUMIN SERPL BCP-MCNC: 3.9 G/DL (ref 3.5–5)
ALP SERPL-CCNC: 86 U/L (ref 46–116)
ALT SERPL W P-5'-P-CCNC: 21 U/L (ref 12–78)
ANION GAP SERPL CALCULATED.3IONS-SCNC: 8 MMOL/L (ref 4–13)
AST SERPL W P-5'-P-CCNC: 20 U/L (ref 5–45)
BASOPHILS # BLD AUTO: 0.01 THOUSANDS/ΜL (ref 0–0.1)
BASOPHILS NFR BLD AUTO: 0 % (ref 0–1)
BILIRUB SERPL-MCNC: 0.24 MG/DL (ref 0.2–1)
BUN SERPL-MCNC: 42 MG/DL (ref 5–25)
CALCIUM SERPL-MCNC: 9.4 MG/DL (ref 8.3–10.1)
CHLORIDE SERPL-SCNC: 102 MMOL/L (ref 100–108)
CO2 SERPL-SCNC: 23 MMOL/L (ref 21–32)
CREAT SERPL-MCNC: 1.42 MG/DL (ref 0.6–1.3)
EOSINOPHIL # BLD AUTO: 0.03 THOUSAND/ΜL (ref 0–0.61)
EOSINOPHIL NFR BLD AUTO: 0 % (ref 0–6)
ERYTHROCYTE [DISTWIDTH] IN BLOOD BY AUTOMATED COUNT: 13.4 % (ref 11.6–15.1)
GFR SERPL CREATININE-BSD FRML MDRD: 34 ML/MIN/1.73SQ M
GLUCOSE SERPL-MCNC: 169 MG/DL (ref 65–140)
GLUCOSE SERPL-MCNC: 314 MG/DL (ref 65–140)
GLUCOSE SERPL-MCNC: 331 MG/DL (ref 65–140)
HCT VFR BLD AUTO: 43.5 % (ref 34.8–46.1)
HGB BLD-MCNC: 13.4 G/DL (ref 11.5–15.4)
IMM GRANULOCYTES # BLD AUTO: 0.03 THOUSAND/UL (ref 0–0.2)
IMM GRANULOCYTES NFR BLD AUTO: 0 % (ref 0–2)
LYMPHOCYTES # BLD AUTO: 1.55 THOUSANDS/ΜL (ref 0.6–4.47)
LYMPHOCYTES NFR BLD AUTO: 22 % (ref 14–44)
MCH RBC QN AUTO: 28 PG (ref 26.8–34.3)
MCHC RBC AUTO-ENTMCNC: 30.8 G/DL (ref 31.4–37.4)
MCV RBC AUTO: 91 FL (ref 82–98)
MONOCYTES # BLD AUTO: 0.09 THOUSAND/ΜL (ref 0.17–1.22)
MONOCYTES NFR BLD AUTO: 1 % (ref 4–12)
NEUTROPHILS # BLD AUTO: 5.49 THOUSANDS/ΜL (ref 1.85–7.62)
NEUTS SEG NFR BLD AUTO: 77 % (ref 43–75)
NRBC BLD AUTO-RTO: 0 /100 WBCS
PLATELET # BLD AUTO: 176 THOUSANDS/UL (ref 149–390)
PMV BLD AUTO: 12.7 FL (ref 8.9–12.7)
POTASSIUM SERPL-SCNC: 5.1 MMOL/L (ref 3.5–5.3)
PROT SERPL-MCNC: 8.7 G/DL (ref 6.4–8.2)
RBC # BLD AUTO: 4.79 MILLION/UL (ref 3.81–5.12)
SODIUM SERPL-SCNC: 133 MMOL/L (ref 136–145)
TROPONIN I SERPL-MCNC: <0.02 NG/ML
WBC # BLD AUTO: 7.2 THOUSAND/UL (ref 4.31–10.16)

## 2019-04-02 PROCEDURE — 99285 EMERGENCY DEPT VISIT HI MDM: CPT

## 2019-04-02 PROCEDURE — 71046 X-RAY EXAM CHEST 2 VIEWS: CPT

## 2019-04-02 PROCEDURE — 80053 COMPREHEN METABOLIC PANEL: CPT | Performed by: EMERGENCY MEDICINE

## 2019-04-02 PROCEDURE — 99284 EMERGENCY DEPT VISIT MOD MDM: CPT | Performed by: EMERGENCY MEDICINE

## 2019-04-02 PROCEDURE — 93005 ELECTROCARDIOGRAM TRACING: CPT

## 2019-04-02 PROCEDURE — 85025 COMPLETE CBC W/AUTO DIFF WBC: CPT | Performed by: EMERGENCY MEDICINE

## 2019-04-02 PROCEDURE — 96365 THER/PROPH/DIAG IV INF INIT: CPT

## 2019-04-02 PROCEDURE — 82948 REAGENT STRIP/BLOOD GLUCOSE: CPT

## 2019-04-02 PROCEDURE — 96372 THER/PROPH/DIAG INJ SC/IM: CPT

## 2019-04-02 PROCEDURE — 84484 ASSAY OF TROPONIN QUANT: CPT | Performed by: EMERGENCY MEDICINE

## 2019-04-02 PROCEDURE — 36415 COLL VENOUS BLD VENIPUNCTURE: CPT

## 2019-04-02 PROCEDURE — 96366 THER/PROPH/DIAG IV INF ADDON: CPT

## 2019-04-02 RX ADMIN — INSULIN HUMAN 5 UNITS: 100 INJECTION, SOLUTION PARENTERAL at 21:49

## 2019-04-02 RX ADMIN — SODIUM CHLORIDE, SODIUM LACTATE, POTASSIUM CHLORIDE, AND CALCIUM CHLORIDE 2000 ML: .6; .31; .03; .02 INJECTION, SOLUTION INTRAVENOUS at 20:58

## 2019-04-03 ENCOUNTER — PATIENT OUTREACH (OUTPATIENT)
Dept: FAMILY MEDICINE CLINIC | Facility: CLINIC | Age: 84
End: 2019-04-03

## 2019-04-03 LAB
ATRIAL RATE: 79 BPM
P AXIS: 67 DEGREES
PR INTERVAL: 144 MS
QRS AXIS: -12 DEGREES
QRSD INTERVAL: 74 MS
QT INTERVAL: 348 MS
QTC INTERVAL: 399 MS
T WAVE AXIS: 88 DEGREES
VENTRICULAR RATE: 79 BPM

## 2019-04-03 PROCEDURE — 93010 ELECTROCARDIOGRAM REPORT: CPT | Performed by: INTERNAL MEDICINE

## 2019-04-04 ENCOUNTER — OFFICE VISIT (OUTPATIENT)
Dept: FAMILY MEDICINE CLINIC | Facility: CLINIC | Age: 84
End: 2019-04-04

## 2019-04-04 VITALS
HEIGHT: 62 IN | WEIGHT: 132 LBS | RESPIRATION RATE: 18 BRPM | HEART RATE: 72 BPM | BODY MASS INDEX: 24.29 KG/M2 | TEMPERATURE: 97.5 F | SYSTOLIC BLOOD PRESSURE: 160 MMHG | DIASTOLIC BLOOD PRESSURE: 70 MMHG

## 2019-04-04 DIAGNOSIS — J30.9 ALLERGIC RHINITIS, UNSPECIFIED SEASONALITY, UNSPECIFIED TRIGGER: ICD-10-CM

## 2019-04-04 DIAGNOSIS — M25.9 MULTIPLE JOINT COMPLAINTS: Primary | ICD-10-CM

## 2019-04-04 PROBLEM — M25.521 RIGHT ELBOW PAIN: Status: RESOLVED | Noted: 2019-03-12 | Resolved: 2019-04-04

## 2019-04-04 PROCEDURE — 99213 OFFICE O/P EST LOW 20 MIN: CPT | Performed by: FAMILY MEDICINE

## 2019-04-04 RX ORDER — FLUTICASONE PROPIONATE 50 MCG
1 SPRAY, SUSPENSION (ML) NASAL DAILY
Qty: 1 BOTTLE | Refills: 0 | Status: SHIPPED | OUTPATIENT
Start: 2019-04-04 | End: 2019-06-03 | Stop reason: SDUPTHER

## 2019-04-04 NOTE — ASSESSMENT & PLAN NOTE
Multiple joint pain and weakness: B/L shoulders, neck, and right elbow: likely PMR vs myositis      1  Start treatment of PMR given elevated sed rate and CRP; in addition to clinical signs and symptoms  Start  Prednisone 20 mg daily for a week    2  Patient and son agree with plan  -monitor symptoms and follow up in one week    Of note:  - if symptoms improve with one week of prednisone 20 mg:  Plan is to start tapering down every 3-4 weeks depending on the improvement of symptoms  - if symptoms do not improve: Full consider the possibility of a statin causing some pain:   Differential diagnosis myositis  - consider ordering CPK      Lab:  Sed rate: 32  CPR: 4 1  CBC: hb 11 4  Uric acid: 7 6  Cr 1 45, bun 37, eGFR 33    Imaging:  XRAY: No acute osseous abnormality

## 2019-04-05 ENCOUNTER — PATIENT OUTREACH (OUTPATIENT)
Dept: FAMILY MEDICINE CLINIC | Facility: CLINIC | Age: 84
End: 2019-04-05

## 2019-04-10 ENCOUNTER — PATIENT OUTREACH (OUTPATIENT)
Dept: FAMILY MEDICINE CLINIC | Facility: CLINIC | Age: 84
End: 2019-04-10

## 2019-04-18 ENCOUNTER — PATIENT OUTREACH (OUTPATIENT)
Dept: FAMILY MEDICINE CLINIC | Facility: CLINIC | Age: 84
End: 2019-04-18

## 2019-04-22 PROBLEM — E11.51 TYPE 2 DIABETES MELLITUS WITH DIABETIC PERIPHERAL ANGIOPATHY WITHOUT GANGRENE (HCC): Status: ACTIVE | Noted: 2019-04-22

## 2019-04-22 PROBLEM — I77.1 STRICTURE OF ARTERY (HCC): Status: ACTIVE | Noted: 2019-04-22

## 2019-04-24 ENCOUNTER — OFFICE VISIT (OUTPATIENT)
Dept: FAMILY MEDICINE CLINIC | Facility: CLINIC | Age: 84
End: 2019-04-24

## 2019-04-24 VITALS
WEIGHT: 131.4 LBS | BODY MASS INDEX: 24.18 KG/M2 | TEMPERATURE: 97.8 F | HEIGHT: 62 IN | SYSTOLIC BLOOD PRESSURE: 132 MMHG | HEART RATE: 88 BPM | RESPIRATION RATE: 14 BRPM | DIASTOLIC BLOOD PRESSURE: 76 MMHG

## 2019-04-24 DIAGNOSIS — M79.10 MYALGIA: Primary | ICD-10-CM

## 2019-04-24 DIAGNOSIS — I10 ESSENTIAL HYPERTENSION: ICD-10-CM

## 2019-04-24 PROCEDURE — 99213 OFFICE O/P EST LOW 20 MIN: CPT | Performed by: FAMILY MEDICINE

## 2019-04-30 ENCOUNTER — APPOINTMENT (OUTPATIENT)
Dept: LAB | Facility: HOSPITAL | Age: 84
End: 2019-04-30
Payer: COMMERCIAL

## 2019-04-30 DIAGNOSIS — M79.10 MYALGIA: ICD-10-CM

## 2019-04-30 LAB — CK SERPL-CCNC: 42 U/L (ref 26–192)

## 2019-04-30 PROCEDURE — 36415 COLL VENOUS BLD VENIPUNCTURE: CPT

## 2019-04-30 PROCEDURE — 82550 ASSAY OF CK (CPK): CPT

## 2019-05-06 ENCOUNTER — TELEPHONE (OUTPATIENT)
Dept: NEPHROLOGY | Facility: CLINIC | Age: 84
End: 2019-05-06

## 2019-05-17 ENCOUNTER — OFFICE VISIT (OUTPATIENT)
Dept: FAMILY MEDICINE CLINIC | Facility: CLINIC | Age: 84
End: 2019-05-17

## 2019-05-17 VITALS
BODY MASS INDEX: 24.07 KG/M2 | WEIGHT: 130.8 LBS | HEIGHT: 62 IN | TEMPERATURE: 98.6 F | SYSTOLIC BLOOD PRESSURE: 140 MMHG | RESPIRATION RATE: 14 BRPM | DIASTOLIC BLOOD PRESSURE: 80 MMHG | HEART RATE: 74 BPM

## 2019-05-17 DIAGNOSIS — M79.18 MUSCULOSKELETAL PAIN, CHRONIC: Primary | ICD-10-CM

## 2019-05-17 DIAGNOSIS — G89.29 MUSCULOSKELETAL PAIN, CHRONIC: Primary | ICD-10-CM

## 2019-05-17 DIAGNOSIS — I10 ESSENTIAL HYPERTENSION: ICD-10-CM

## 2019-05-17 PROCEDURE — 99213 OFFICE O/P EST LOW 20 MIN: CPT | Performed by: FAMILY MEDICINE

## 2019-05-17 RX ORDER — NIFEDIPINE 30 MG/1
30 TABLET, EXTENDED RELEASE ORAL DAILY
Qty: 90 TABLET | Refills: 1 | Status: SHIPPED | OUTPATIENT
Start: 2019-05-17 | End: 2019-07-24 | Stop reason: HOSPADM

## 2019-05-17 RX ORDER — DULOXETIN HYDROCHLORIDE 30 MG/1
30 CAPSULE, DELAYED RELEASE ORAL DAILY
Qty: 60 CAPSULE | Refills: 1 | Status: SHIPPED | OUTPATIENT
Start: 2019-05-17 | End: 2019-06-16

## 2019-06-03 DIAGNOSIS — J30.9 ALLERGIC RHINITIS, UNSPECIFIED SEASONALITY, UNSPECIFIED TRIGGER: ICD-10-CM

## 2019-06-04 ENCOUNTER — OFFICE VISIT (OUTPATIENT)
Dept: FAMILY MEDICINE CLINIC | Facility: CLINIC | Age: 84
End: 2019-06-04

## 2019-06-04 VITALS
DIASTOLIC BLOOD PRESSURE: 60 MMHG | HEART RATE: 70 BPM | WEIGHT: 130.8 LBS | TEMPERATURE: 98.3 F | SYSTOLIC BLOOD PRESSURE: 140 MMHG | BODY MASS INDEX: 24.07 KG/M2 | HEIGHT: 62 IN | RESPIRATION RATE: 14 BRPM

## 2019-06-04 DIAGNOSIS — K64.9 HEMORRHOIDS, UNSPECIFIED HEMORRHOID TYPE: Primary | ICD-10-CM

## 2019-06-04 PROCEDURE — 1101F PT FALLS ASSESS-DOCD LE1/YR: CPT | Performed by: PHYSICIAN ASSISTANT

## 2019-06-04 PROCEDURE — 99213 OFFICE O/P EST LOW 20 MIN: CPT | Performed by: PHYSICIAN ASSISTANT

## 2019-06-04 RX ORDER — NIFEDIPINE 30 MG/1
30 TABLET, FILM COATED, EXTENDED RELEASE ORAL DAILY
Refills: 0 | COMMUNITY
Start: 2019-05-17 | End: 2019-06-16 | Stop reason: SDUPTHER

## 2019-06-04 RX ORDER — FLUTICASONE PROPIONATE 50 MCG
SPRAY, SUSPENSION (ML) NASAL
Qty: 16 G | Refills: 0 | Status: SHIPPED | OUTPATIENT
Start: 2019-06-04 | End: 2019-07-15 | Stop reason: SDUPTHER

## 2019-06-05 ENCOUNTER — APPOINTMENT (OUTPATIENT)
Dept: LAB | Facility: HOSPITAL | Age: 84
End: 2019-06-05
Attending: INTERNAL MEDICINE
Payer: COMMERCIAL

## 2019-06-05 DIAGNOSIS — N18.30 CKD (CHRONIC KIDNEY DISEASE), STAGE III (HCC): ICD-10-CM

## 2019-06-05 DIAGNOSIS — R80.1 PERSISTENT PROTEINURIA: ICD-10-CM

## 2019-06-05 DIAGNOSIS — N25.81 SECONDARY HYPERPARATHYROIDISM (HCC): ICD-10-CM

## 2019-06-05 LAB
ALBUMIN SERPL BCP-MCNC: 3.5 G/DL (ref 3.5–5)
ALP SERPL-CCNC: 87 U/L (ref 46–116)
ALT SERPL W P-5'-P-CCNC: 14 U/L (ref 12–78)
ANION GAP SERPL CALCULATED.3IONS-SCNC: 7 MMOL/L (ref 4–13)
AST SERPL W P-5'-P-CCNC: 12 U/L (ref 5–45)
BACTERIA UR QL AUTO: ABNORMAL /HPF
BILIRUB SERPL-MCNC: 0.34 MG/DL (ref 0.2–1)
BILIRUB UR QL STRIP: NEGATIVE
BUN SERPL-MCNC: 31 MG/DL (ref 5–25)
CALCIUM SERPL-MCNC: 8.9 MG/DL (ref 8.3–10.1)
CHLORIDE SERPL-SCNC: 104 MMOL/L (ref 100–108)
CLARITY UR: ABNORMAL
CO2 SERPL-SCNC: 28 MMOL/L (ref 21–32)
COLOR UR: YELLOW
CREAT SERPL-MCNC: 1.29 MG/DL (ref 0.6–1.3)
CREAT UR-MCNC: 80.9 MG/DL
ERYTHROCYTE [DISTWIDTH] IN BLOOD BY AUTOMATED COUNT: 13.2 % (ref 11.6–15.1)
GFR SERPL CREATININE-BSD FRML MDRD: 38 ML/MIN/1.73SQ M
GLUCOSE P FAST SERPL-MCNC: 122 MG/DL (ref 65–99)
GLUCOSE UR STRIP-MCNC: NEGATIVE MG/DL
HCT VFR BLD AUTO: 36.4 % (ref 34.8–46.1)
HGB BLD-MCNC: 11.2 G/DL (ref 11.5–15.4)
HGB UR QL STRIP.AUTO: ABNORMAL
HYALINE CASTS #/AREA URNS LPF: ABNORMAL /LPF
KETONES UR STRIP-MCNC: NEGATIVE MG/DL
LEUKOCYTE ESTERASE UR QL STRIP: ABNORMAL
MCH RBC QN AUTO: 28.5 PG (ref 26.8–34.3)
MCHC RBC AUTO-ENTMCNC: 30.8 G/DL (ref 31.4–37.4)
MCV RBC AUTO: 93 FL (ref 82–98)
NITRITE UR QL STRIP: NEGATIVE
NON-SQ EPI CELLS URNS QL MICRO: ABNORMAL /HPF
PH UR STRIP.AUTO: 6 [PH]
PHOSPHATE SERPL-MCNC: 3.3 MG/DL (ref 2.3–4.1)
PLATELET # BLD AUTO: 171 THOUSANDS/UL (ref 149–390)
PMV BLD AUTO: 13.6 FL (ref 8.9–12.7)
POTASSIUM SERPL-SCNC: 4.4 MMOL/L (ref 3.5–5.3)
PROT SERPL-MCNC: 7.3 G/DL (ref 6.4–8.2)
PROT UR STRIP-MCNC: ABNORMAL MG/DL
PROT UR-MCNC: 51 MG/DL
PROT/CREAT UR: 0.63 MG/G{CREAT} (ref 0–0.1)
PTH-INTACT SERPL-MCNC: 84.2 PG/ML (ref 18.4–80.1)
RBC # BLD AUTO: 3.93 MILLION/UL (ref 3.81–5.12)
RBC #/AREA URNS AUTO: ABNORMAL /HPF
SODIUM SERPL-SCNC: 139 MMOL/L (ref 136–145)
SP GR UR STRIP.AUTO: 1.01 (ref 1–1.03)
UROBILINOGEN UR QL STRIP.AUTO: 0.2 E.U./DL
WBC # BLD AUTO: 5.95 THOUSAND/UL (ref 4.31–10.16)
WBC #/AREA URNS AUTO: ABNORMAL /HPF

## 2019-06-05 PROCEDURE — 84100 ASSAY OF PHOSPHORUS: CPT

## 2019-06-05 PROCEDURE — 80053 COMPREHEN METABOLIC PANEL: CPT

## 2019-06-05 PROCEDURE — 36415 COLL VENOUS BLD VENIPUNCTURE: CPT

## 2019-06-05 PROCEDURE — 84156 ASSAY OF PROTEIN URINE: CPT

## 2019-06-05 PROCEDURE — 81001 URINALYSIS AUTO W/SCOPE: CPT

## 2019-06-05 PROCEDURE — 82570 ASSAY OF URINE CREATININE: CPT

## 2019-06-05 PROCEDURE — 83970 ASSAY OF PARATHORMONE: CPT

## 2019-06-05 PROCEDURE — 85027 COMPLETE CBC AUTOMATED: CPT

## 2019-06-12 ENCOUNTER — OFFICE VISIT (OUTPATIENT)
Dept: PODIATRY | Facility: CLINIC | Age: 84
End: 2019-06-12
Payer: COMMERCIAL

## 2019-06-12 VITALS
SYSTOLIC BLOOD PRESSURE: 132 MMHG | DIASTOLIC BLOOD PRESSURE: 73 MMHG | BODY MASS INDEX: 23.92 KG/M2 | HEIGHT: 62 IN | WEIGHT: 130 LBS

## 2019-06-12 DIAGNOSIS — E11.42 DIABETIC POLYNEUROPATHY ASSOCIATED WITH TYPE 2 DIABETES MELLITUS (HCC): Primary | ICD-10-CM

## 2019-06-12 DIAGNOSIS — B35.1 TINEA UNGUIUM: ICD-10-CM

## 2019-06-12 PROCEDURE — 11721 DEBRIDE NAIL 6 OR MORE: CPT | Performed by: PODIATRIST

## 2019-06-13 PROBLEM — B35.1 TINEA UNGUIUM: Status: ACTIVE | Noted: 2019-06-13

## 2019-06-13 PROBLEM — E11.40 DIABETIC NEUROPATHY (HCC): Status: ACTIVE | Noted: 2019-06-13

## 2019-06-16 ENCOUNTER — APPOINTMENT (EMERGENCY)
Dept: RADIOLOGY | Facility: HOSPITAL | Age: 84
End: 2019-06-16
Payer: COMMERCIAL

## 2019-06-16 ENCOUNTER — HOSPITAL ENCOUNTER (OUTPATIENT)
Facility: HOSPITAL | Age: 84
Setting detail: OBSERVATION
Discharge: HOME | End: 2019-06-18
Attending: EMERGENCY MEDICINE | Admitting: FAMILY MEDICINE
Payer: COMMERCIAL

## 2019-06-16 DIAGNOSIS — R00.1 BRADYCARDIA: Primary | ICD-10-CM

## 2019-06-16 DIAGNOSIS — I10 HYPERTENSION: ICD-10-CM

## 2019-06-16 PROBLEM — E87.5 HYPERKALEMIA: Status: ACTIVE | Noted: 2019-06-16

## 2019-06-16 PROBLEM — B37.0 ORAL CANDIDIASIS: Status: RESOLVED | Noted: 2018-08-22 | Resolved: 2019-06-16

## 2019-06-16 LAB
ALBUMIN SERPL BCP-MCNC: 3.7 G/DL (ref 3.5–5)
ALP SERPL-CCNC: 101 U/L (ref 46–116)
ALT SERPL W P-5'-P-CCNC: 41 U/L (ref 12–78)
ANION GAP SERPL CALCULATED.3IONS-SCNC: 5 MMOL/L (ref 4–13)
AST SERPL W P-5'-P-CCNC: 42 U/L (ref 5–45)
BASOPHILS # BLD AUTO: 0.02 THOUSANDS/ΜL (ref 0–0.1)
BASOPHILS NFR BLD AUTO: 0 % (ref 0–1)
BILIRUB SERPL-MCNC: 0.18 MG/DL (ref 0.2–1)
BUN SERPL-MCNC: 45 MG/DL (ref 5–25)
CALCIUM SERPL-MCNC: 9.2 MG/DL (ref 8.3–10.1)
CHLORIDE SERPL-SCNC: 105 MMOL/L (ref 100–108)
CO2 SERPL-SCNC: 25 MMOL/L (ref 21–32)
CREAT SERPL-MCNC: 1.4 MG/DL (ref 0.6–1.3)
EOSINOPHIL # BLD AUTO: 0.04 THOUSAND/ΜL (ref 0–0.61)
EOSINOPHIL NFR BLD AUTO: 1 % (ref 0–6)
ERYTHROCYTE [DISTWIDTH] IN BLOOD BY AUTOMATED COUNT: 13.7 % (ref 11.6–15.1)
GFR SERPL CREATININE-BSD FRML MDRD: 35 ML/MIN/1.73SQ M
GLUCOSE SERPL-MCNC: 125 MG/DL (ref 65–140)
GLUCOSE SERPL-MCNC: 126 MG/DL (ref 65–140)
HCT VFR BLD AUTO: 38.4 % (ref 34.8–46.1)
HGB BLD-MCNC: 12.1 G/DL (ref 11.5–15.4)
IMM GRANULOCYTES # BLD AUTO: 0.02 THOUSAND/UL (ref 0–0.2)
IMM GRANULOCYTES NFR BLD AUTO: 0 % (ref 0–2)
LYMPHOCYTES # BLD AUTO: 2.4 THOUSANDS/ΜL (ref 0.6–4.47)
LYMPHOCYTES NFR BLD AUTO: 28 % (ref 14–44)
MCH RBC QN AUTO: 28.5 PG (ref 26.8–34.3)
MCHC RBC AUTO-ENTMCNC: 31.5 G/DL (ref 31.4–37.4)
MCV RBC AUTO: 91 FL (ref 82–98)
MONOCYTES # BLD AUTO: 0.57 THOUSAND/ΜL (ref 0.17–1.22)
MONOCYTES NFR BLD AUTO: 7 % (ref 4–12)
NEUTROPHILS # BLD AUTO: 5.68 THOUSANDS/ΜL (ref 1.85–7.62)
NEUTS SEG NFR BLD AUTO: 64 % (ref 43–75)
NRBC BLD AUTO-RTO: 0 /100 WBCS
PLATELET # BLD AUTO: 293 THOUSANDS/UL (ref 149–390)
PMV BLD AUTO: 11.6 FL (ref 8.9–12.7)
POTASSIUM SERPL-SCNC: 5.9 MMOL/L (ref 3.5–5.3)
PROT SERPL-MCNC: 8 G/DL (ref 6.4–8.2)
RBC # BLD AUTO: 4.24 MILLION/UL (ref 3.81–5.12)
SODIUM SERPL-SCNC: 135 MMOL/L (ref 136–145)
TROPONIN I SERPL-MCNC: <0.02 NG/ML
WBC # BLD AUTO: 8.73 THOUSAND/UL (ref 4.31–10.16)

## 2019-06-16 PROCEDURE — 96360 HYDRATION IV INFUSION INIT: CPT

## 2019-06-16 PROCEDURE — 93005 ELECTROCARDIOGRAM TRACING: CPT

## 2019-06-16 PROCEDURE — 99285 EMERGENCY DEPT VISIT HI MDM: CPT

## 2019-06-16 PROCEDURE — 84484 ASSAY OF TROPONIN QUANT: CPT | Performed by: EMERGENCY MEDICINE

## 2019-06-16 PROCEDURE — 80053 COMPREHEN METABOLIC PANEL: CPT | Performed by: EMERGENCY MEDICINE

## 2019-06-16 PROCEDURE — 85025 COMPLETE CBC W/AUTO DIFF WBC: CPT | Performed by: EMERGENCY MEDICINE

## 2019-06-16 PROCEDURE — 82948 REAGENT STRIP/BLOOD GLUCOSE: CPT

## 2019-06-16 PROCEDURE — 36415 COLL VENOUS BLD VENIPUNCTURE: CPT | Performed by: EMERGENCY MEDICINE

## 2019-06-16 PROCEDURE — 71046 X-RAY EXAM CHEST 2 VIEWS: CPT

## 2019-06-16 PROCEDURE — 99284 EMERGENCY DEPT VISIT MOD MDM: CPT | Performed by: EMERGENCY MEDICINE

## 2019-06-16 RX ORDER — FLUTICASONE PROPIONATE 50 MCG
1 SPRAY, SUSPENSION (ML) NASAL DAILY
Status: DISCONTINUED | OUTPATIENT
Start: 2019-06-17 | End: 2019-06-18 | Stop reason: HOSPADM

## 2019-06-16 RX ORDER — ASPIRIN 81 MG/1
81 TABLET ORAL DAILY
Status: DISCONTINUED | OUTPATIENT
Start: 2019-06-17 | End: 2019-06-18 | Stop reason: HOSPADM

## 2019-06-16 RX ORDER — NIFEDIPINE 30 MG/1
30 TABLET, EXTENDED RELEASE ORAL DAILY
Status: DISCONTINUED | OUTPATIENT
Start: 2019-06-17 | End: 2019-06-18 | Stop reason: HOSPADM

## 2019-06-16 RX ORDER — B-COMPLEX WITH VITAMIN C
1 TABLET ORAL
Status: DISCONTINUED | OUTPATIENT
Start: 2019-06-17 | End: 2019-06-18 | Stop reason: HOSPADM

## 2019-06-16 RX ORDER — GABAPENTIN 100 MG/1
100 CAPSULE ORAL
Status: DISCONTINUED | OUTPATIENT
Start: 2019-06-16 | End: 2019-06-18 | Stop reason: HOSPADM

## 2019-06-16 RX ORDER — PANTOPRAZOLE SODIUM 40 MG/1
40 TABLET, DELAYED RELEASE ORAL
Status: DISCONTINUED | OUTPATIENT
Start: 2019-06-17 | End: 2019-06-18 | Stop reason: HOSPADM

## 2019-06-16 RX ORDER — CYCLOSPORINE 0.5 MG/ML
1 EMULSION OPHTHALMIC 2 TIMES DAILY
Status: DISCONTINUED | OUTPATIENT
Start: 2019-06-16 | End: 2019-06-18 | Stop reason: RX

## 2019-06-16 RX ORDER — ACETAMINOPHEN 325 MG/1
650 TABLET ORAL EVERY 8 HOURS PRN
Status: DISCONTINUED | OUTPATIENT
Start: 2019-06-16 | End: 2019-06-18

## 2019-06-16 RX ORDER — PRAVASTATIN SODIUM 40 MG
40 TABLET ORAL
Status: DISCONTINUED | OUTPATIENT
Start: 2019-06-17 | End: 2019-06-18 | Stop reason: HOSPADM

## 2019-06-16 RX ADMIN — SODIUM CHLORIDE 500 ML: 0.9 INJECTION, SOLUTION INTRAVENOUS at 20:49

## 2019-06-17 LAB
ANION GAP SERPL CALCULATED.3IONS-SCNC: 7 MMOL/L (ref 4–13)
ATRIAL RATE: 50 BPM
ATRIAL RATE: 62 BPM
BASOPHILS # BLD AUTO: 0.02 THOUSANDS/ΜL (ref 0–0.1)
BASOPHILS NFR BLD AUTO: 0 % (ref 0–1)
BUN SERPL-MCNC: 38 MG/DL (ref 5–25)
CALCIUM SERPL-MCNC: 8.8 MG/DL (ref 8.3–10.1)
CHLORIDE SERPL-SCNC: 110 MMOL/L (ref 100–108)
CO2 SERPL-SCNC: 24 MMOL/L (ref 21–32)
CREAT SERPL-MCNC: 1.11 MG/DL (ref 0.6–1.3)
EOSINOPHIL # BLD AUTO: 0.06 THOUSAND/ΜL (ref 0–0.61)
EOSINOPHIL NFR BLD AUTO: 1 % (ref 0–6)
ERYTHROCYTE [DISTWIDTH] IN BLOOD BY AUTOMATED COUNT: 13.6 % (ref 11.6–15.1)
EST. AVERAGE GLUCOSE BLD GHB EST-MCNC: 137 MG/DL
GFR SERPL CREATININE-BSD FRML MDRD: 46 ML/MIN/1.73SQ M
GLUCOSE SERPL-MCNC: 103 MG/DL (ref 65–140)
GLUCOSE SERPL-MCNC: 111 MG/DL (ref 65–140)
GLUCOSE SERPL-MCNC: 122 MG/DL (ref 65–140)
GLUCOSE SERPL-MCNC: 201 MG/DL (ref 65–140)
GLUCOSE SERPL-MCNC: 97 MG/DL (ref 65–140)
HBA1C MFR BLD: 6.4 % (ref 4.2–6.3)
HCT VFR BLD AUTO: 33.8 % (ref 34.8–46.1)
HGB BLD-MCNC: 10.5 G/DL (ref 11.5–15.4)
IMM GRANULOCYTES # BLD AUTO: 0.01 THOUSAND/UL (ref 0–0.2)
IMM GRANULOCYTES NFR BLD AUTO: 0 % (ref 0–2)
LYMPHOCYTES # BLD AUTO: 3.09 THOUSANDS/ΜL (ref 0.6–4.47)
LYMPHOCYTES NFR BLD AUTO: 44 % (ref 14–44)
MAGNESIUM SERPL-MCNC: 1.7 MG/DL (ref 1.6–2.6)
MCH RBC QN AUTO: 28.1 PG (ref 26.8–34.3)
MCHC RBC AUTO-ENTMCNC: 31.1 G/DL (ref 31.4–37.4)
MCV RBC AUTO: 90 FL (ref 82–98)
MONOCYTES # BLD AUTO: 0.44 THOUSAND/ΜL (ref 0.17–1.22)
MONOCYTES NFR BLD AUTO: 6 % (ref 4–12)
NEUTROPHILS # BLD AUTO: 3.34 THOUSANDS/ΜL (ref 1.85–7.62)
NEUTS SEG NFR BLD AUTO: 49 % (ref 43–75)
NRBC BLD AUTO-RTO: 0 /100 WBCS
P AXIS: 30 DEGREES
P AXIS: 78 DEGREES
PHOSPHATE SERPL-MCNC: 3.1 MG/DL (ref 2.3–4.1)
PLATELET # BLD AUTO: 262 THOUSANDS/UL (ref 149–390)
PMV BLD AUTO: 11.4 FL (ref 8.9–12.7)
POTASSIUM SERPL-SCNC: 4.9 MMOL/L (ref 3.5–5.3)
PR INTERVAL: 154 MS
PR INTERVAL: 154 MS
QRS AXIS: -8 DEGREES
QRS AXIS: 70 DEGREES
QRSD INTERVAL: 74 MS
QRSD INTERVAL: 84 MS
QT INTERVAL: 402 MS
QT INTERVAL: 428 MS
QTC INTERVAL: 390 MS
QTC INTERVAL: 408 MS
RBC # BLD AUTO: 3.74 MILLION/UL (ref 3.81–5.12)
SODIUM SERPL-SCNC: 141 MMOL/L (ref 136–145)
T WAVE AXIS: 27 DEGREES
T WAVE AXIS: 63 DEGREES
TSH SERPL DL<=0.05 MIU/L-ACNC: 2.54 UIU/ML (ref 0.36–3.74)
VENTRICULAR RATE: 50 BPM
VENTRICULAR RATE: 62 BPM
WBC # BLD AUTO: 6.96 THOUSAND/UL (ref 4.31–10.16)

## 2019-06-17 PROCEDURE — 93005 ELECTROCARDIOGRAM TRACING: CPT

## 2019-06-17 PROCEDURE — 84100 ASSAY OF PHOSPHORUS: CPT | Performed by: FAMILY MEDICINE

## 2019-06-17 PROCEDURE — 82948 REAGENT STRIP/BLOOD GLUCOSE: CPT

## 2019-06-17 PROCEDURE — NC001 PR NO CHARGE: Performed by: FAMILY MEDICINE

## 2019-06-17 PROCEDURE — 83036 HEMOGLOBIN GLYCOSYLATED A1C: CPT | Performed by: FAMILY MEDICINE

## 2019-06-17 PROCEDURE — 80048 BASIC METABOLIC PNL TOTAL CA: CPT | Performed by: FAMILY MEDICINE

## 2019-06-17 PROCEDURE — 36415 COLL VENOUS BLD VENIPUNCTURE: CPT | Performed by: FAMILY MEDICINE

## 2019-06-17 PROCEDURE — 93010 ELECTROCARDIOGRAM REPORT: CPT | Performed by: INTERNAL MEDICINE

## 2019-06-17 PROCEDURE — 84443 ASSAY THYROID STIM HORMONE: CPT | Performed by: FAMILY MEDICINE

## 2019-06-17 PROCEDURE — 99220 PR INITIAL OBSERVATION CARE/DAY 70 MINUTES: CPT | Performed by: FAMILY MEDICINE

## 2019-06-17 PROCEDURE — 83735 ASSAY OF MAGNESIUM: CPT | Performed by: FAMILY MEDICINE

## 2019-06-17 PROCEDURE — 85025 COMPLETE CBC W/AUTO DIFF WBC: CPT | Performed by: FAMILY MEDICINE

## 2019-06-17 RX ORDER — HYDRALAZINE HYDROCHLORIDE 20 MG/ML
5 INJECTION INTRAMUSCULAR; INTRAVENOUS EVERY 6 HOURS PRN
Status: DISCONTINUED | OUTPATIENT
Start: 2019-06-17 | End: 2019-06-18 | Stop reason: HOSPADM

## 2019-06-17 RX ORDER — LOSARTAN POTASSIUM 50 MG/1
50 TABLET ORAL DAILY
Status: DISCONTINUED | OUTPATIENT
Start: 2019-06-17 | End: 2019-06-18 | Stop reason: HOSPADM

## 2019-06-17 RX ADMIN — MAGNESIUM OXIDE TAB 400 MG (241.3 MG ELEMENTAL MG) 400 MG: 400 (241.3 MG) TAB at 09:57

## 2019-06-17 RX ADMIN — GABAPENTIN 100 MG: 100 CAPSULE ORAL at 21:22

## 2019-06-17 RX ADMIN — PRAVASTATIN SODIUM 40 MG: 40 TABLET ORAL at 21:22

## 2019-06-17 RX ADMIN — INSULIN LISPRO 1 UNITS: 100 INJECTION, SOLUTION INTRAVENOUS; SUBCUTANEOUS at 17:30

## 2019-06-17 RX ADMIN — GABAPENTIN 100 MG: 100 CAPSULE ORAL at 00:12

## 2019-06-17 RX ADMIN — ENOXAPARIN SODIUM 30 MG: 30 INJECTION SUBCUTANEOUS at 09:56

## 2019-06-17 RX ADMIN — PANTOPRAZOLE SODIUM 40 MG: 40 TABLET, DELAYED RELEASE ORAL at 06:22

## 2019-06-17 RX ADMIN — ACETAMINOPHEN 650 MG: 325 TABLET ORAL at 21:22

## 2019-06-17 RX ADMIN — NIFEDIPINE 30 MG: 30 TABLET, FILM COATED, EXTENDED RELEASE ORAL at 09:56

## 2019-06-17 RX ADMIN — HYDRALAZINE HYDROCHLORIDE 5 MG: 20 INJECTION INTRAMUSCULAR; INTRAVENOUS at 10:34

## 2019-06-17 RX ADMIN — CALCIUM CARBONATE 500 MG (1,250 MG)-VITAMIN D3 200 UNIT TABLET 1 TABLET: at 09:55

## 2019-06-17 RX ADMIN — PRAVASTATIN SODIUM 40 MG: 40 TABLET ORAL at 01:26

## 2019-06-17 RX ADMIN — ASPIRIN 81 MG: 81 TABLET, COATED ORAL at 09:56

## 2019-06-17 RX ADMIN — FLUTICASONE PROPIONATE 1 SPRAY: 50 SPRAY, METERED NASAL at 09:55

## 2019-06-17 RX ADMIN — LOSARTAN POTASSIUM 50 MG: 50 TABLET, FILM COATED ORAL at 13:11

## 2019-06-18 VITALS
HEIGHT: 62 IN | RESPIRATION RATE: 18 BRPM | SYSTOLIC BLOOD PRESSURE: 144 MMHG | HEART RATE: 76 BPM | BODY MASS INDEX: 25.11 KG/M2 | OXYGEN SATURATION: 97 % | DIASTOLIC BLOOD PRESSURE: 49 MMHG | WEIGHT: 136.47 LBS | TEMPERATURE: 99.3 F

## 2019-06-18 LAB
ANION GAP SERPL CALCULATED.3IONS-SCNC: 5 MMOL/L (ref 4–13)
BASOPHILS # BLD AUTO: 0.02 THOUSANDS/ΜL (ref 0–0.1)
BASOPHILS NFR BLD AUTO: 0 % (ref 0–1)
BUN SERPL-MCNC: 36 MG/DL (ref 5–25)
CALCIUM SERPL-MCNC: 9.1 MG/DL (ref 8.3–10.1)
CHLORIDE SERPL-SCNC: 106 MMOL/L (ref 100–108)
CO2 SERPL-SCNC: 25 MMOL/L (ref 21–32)
CREAT SERPL-MCNC: 1.19 MG/DL (ref 0.6–1.3)
EOSINOPHIL # BLD AUTO: 0.06 THOUSAND/ΜL (ref 0–0.61)
EOSINOPHIL NFR BLD AUTO: 1 % (ref 0–6)
ERYTHROCYTE [DISTWIDTH] IN BLOOD BY AUTOMATED COUNT: 13.9 % (ref 11.6–15.1)
GFR SERPL CREATININE-BSD FRML MDRD: 42 ML/MIN/1.73SQ M
GLUCOSE SERPL-MCNC: 102 MG/DL (ref 65–140)
GLUCOSE SERPL-MCNC: 104 MG/DL (ref 65–140)
GLUCOSE SERPL-MCNC: 111 MG/DL (ref 65–140)
HCT VFR BLD AUTO: 36.8 % (ref 34.8–46.1)
HGB BLD-MCNC: 11.3 G/DL (ref 11.5–15.4)
IMM GRANULOCYTES # BLD AUTO: 0.01 THOUSAND/UL (ref 0–0.2)
IMM GRANULOCYTES NFR BLD AUTO: 0 % (ref 0–2)
LYMPHOCYTES # BLD AUTO: 2.61 THOUSANDS/ΜL (ref 0.6–4.47)
LYMPHOCYTES NFR BLD AUTO: 45 % (ref 14–44)
MAGNESIUM SERPL-MCNC: 1.8 MG/DL (ref 1.6–2.6)
MCH RBC QN AUTO: 27.8 PG (ref 26.8–34.3)
MCHC RBC AUTO-ENTMCNC: 30.7 G/DL (ref 31.4–37.4)
MCV RBC AUTO: 91 FL (ref 82–98)
MONOCYTES # BLD AUTO: 0.44 THOUSAND/ΜL (ref 0.17–1.22)
MONOCYTES NFR BLD AUTO: 8 % (ref 4–12)
NEUTROPHILS # BLD AUTO: 2.68 THOUSANDS/ΜL (ref 1.85–7.62)
NEUTS SEG NFR BLD AUTO: 46 % (ref 43–75)
NRBC BLD AUTO-RTO: 0 /100 WBCS
PHOSPHATE SERPL-MCNC: 3.2 MG/DL (ref 2.3–4.1)
PLATELET # BLD AUTO: 286 THOUSANDS/UL (ref 149–390)
PMV BLD AUTO: 12.1 FL (ref 8.9–12.7)
POTASSIUM SERPL-SCNC: 4.5 MMOL/L (ref 3.5–5.3)
RBC # BLD AUTO: 4.06 MILLION/UL (ref 3.81–5.12)
SODIUM SERPL-SCNC: 136 MMOL/L (ref 136–145)
WBC # BLD AUTO: 5.82 THOUSAND/UL (ref 4.31–10.16)

## 2019-06-18 PROCEDURE — 84100 ASSAY OF PHOSPHORUS: CPT | Performed by: FAMILY MEDICINE

## 2019-06-18 PROCEDURE — 82948 REAGENT STRIP/BLOOD GLUCOSE: CPT

## 2019-06-18 PROCEDURE — 80048 BASIC METABOLIC PNL TOTAL CA: CPT | Performed by: FAMILY MEDICINE

## 2019-06-18 PROCEDURE — 83735 ASSAY OF MAGNESIUM: CPT | Performed by: FAMILY MEDICINE

## 2019-06-18 PROCEDURE — 85025 COMPLETE CBC W/AUTO DIFF WBC: CPT | Performed by: FAMILY MEDICINE

## 2019-06-18 PROCEDURE — 99217 PR OBSERVATION CARE DISCHARGE MANAGEMENT: CPT | Performed by: FAMILY MEDICINE

## 2019-06-18 RX ORDER — ACETAMINOPHEN 325 MG/1
325 TABLET ORAL EVERY 4 HOURS PRN
Status: DISCONTINUED | OUTPATIENT
Start: 2019-06-18 | End: 2019-06-18 | Stop reason: HOSPADM

## 2019-06-18 RX ORDER — LOSARTAN POTASSIUM 50 MG/1
50 TABLET ORAL DAILY
Qty: 90 TABLET | Refills: 2 | Status: SHIPPED | OUTPATIENT
Start: 2019-06-19 | End: 2019-07-26 | Stop reason: HOSPADM

## 2019-06-18 RX ADMIN — PANTOPRAZOLE SODIUM 40 MG: 40 TABLET, DELAYED RELEASE ORAL at 05:35

## 2019-06-18 RX ADMIN — ENOXAPARIN SODIUM 30 MG: 30 INJECTION SUBCUTANEOUS at 08:36

## 2019-06-18 RX ADMIN — ASPIRIN 81 MG: 81 TABLET, COATED ORAL at 08:36

## 2019-06-18 RX ADMIN — LOSARTAN POTASSIUM 50 MG: 50 TABLET, FILM COATED ORAL at 08:36

## 2019-06-18 RX ADMIN — NIFEDIPINE 30 MG: 30 TABLET, FILM COATED, EXTENDED RELEASE ORAL at 08:40

## 2019-06-18 RX ADMIN — CALCIUM CARBONATE 500 MG (1,250 MG)-VITAMIN D3 200 UNIT TABLET 1 TABLET: at 08:36

## 2019-06-18 RX ADMIN — MAGNESIUM OXIDE TAB 400 MG (241.3 MG ELEMENTAL MG) 400 MG: 400 (241.3 MG) TAB at 08:36

## 2019-06-18 RX ADMIN — FLUTICASONE PROPIONATE 1 SPRAY: 50 SPRAY, METERED NASAL at 12:59

## 2019-06-19 ENCOUNTER — PATIENT OUTREACH (OUTPATIENT)
Dept: FAMILY MEDICINE CLINIC | Facility: CLINIC | Age: 84
End: 2019-06-19

## 2019-06-19 ENCOUNTER — TRANSITIONAL CARE MANAGEMENT (OUTPATIENT)
Dept: FAMILY MEDICINE CLINIC | Facility: CLINIC | Age: 84
End: 2019-06-19

## 2019-06-19 ENCOUNTER — TELEPHONE (OUTPATIENT)
Dept: FAMILY MEDICINE CLINIC | Facility: CLINIC | Age: 84
End: 2019-06-19

## 2019-06-20 ENCOUNTER — PATIENT OUTREACH (OUTPATIENT)
Dept: FAMILY MEDICINE CLINIC | Facility: CLINIC | Age: 84
End: 2019-06-20

## 2019-06-20 ENCOUNTER — TELEPHONE (OUTPATIENT)
Dept: GASTROENTEROLOGY | Facility: CLINIC | Age: 84
End: 2019-06-20

## 2019-06-20 ENCOUNTER — OFFICE VISIT (OUTPATIENT)
Dept: NEPHROLOGY | Facility: CLINIC | Age: 84
End: 2019-06-20
Payer: COMMERCIAL

## 2019-06-20 VITALS — BODY MASS INDEX: 23.74 KG/M2 | HEIGHT: 62 IN | WEIGHT: 129 LBS

## 2019-06-20 DIAGNOSIS — E55.9 VITAMIN D DEFICIENCY: ICD-10-CM

## 2019-06-20 DIAGNOSIS — N25.81 SECONDARY HYPERPARATHYROIDISM, RENAL (HCC): ICD-10-CM

## 2019-06-20 DIAGNOSIS — R10.13 EPIGASTRIC PAIN: ICD-10-CM

## 2019-06-20 DIAGNOSIS — N28.1 ACQUIRED COMPLEX CYST OF KIDNEY: ICD-10-CM

## 2019-06-20 DIAGNOSIS — R80.1 PERSISTENT PROTEINURIA: ICD-10-CM

## 2019-06-20 DIAGNOSIS — N18.30 CKD (CHRONIC KIDNEY DISEASE), STAGE III (HCC): Primary | ICD-10-CM

## 2019-06-20 DIAGNOSIS — E21.3 HYPERPARATHYROIDISM (HCC): ICD-10-CM

## 2019-06-20 DIAGNOSIS — I10 ESSENTIAL HYPERTENSION: ICD-10-CM

## 2019-06-20 PROCEDURE — 99214 OFFICE O/P EST MOD 30 MIN: CPT | Performed by: INTERNAL MEDICINE

## 2019-06-20 RX ORDER — SIMETHICONE 180 MG
180 CAPSULE ORAL 2 TIMES DAILY
Qty: 14 CAPSULE | Refills: 5 | Status: ON HOLD | OUTPATIENT
Start: 2019-06-20 | End: 2019-07-24 | Stop reason: ALTCHOICE

## 2019-06-21 ENCOUNTER — TELEPHONE (OUTPATIENT)
Dept: GASTROENTEROLOGY | Facility: CLINIC | Age: 84
End: 2019-06-21

## 2019-06-25 ENCOUNTER — OFFICE VISIT (OUTPATIENT)
Dept: FAMILY MEDICINE CLINIC | Facility: CLINIC | Age: 84
End: 2019-06-25

## 2019-06-25 VITALS
HEART RATE: 84 BPM | DIASTOLIC BLOOD PRESSURE: 46 MMHG | RESPIRATION RATE: 16 BRPM | BODY MASS INDEX: 24.07 KG/M2 | HEIGHT: 62 IN | SYSTOLIC BLOOD PRESSURE: 122 MMHG | TEMPERATURE: 98 F | WEIGHT: 130.8 LBS

## 2019-06-25 DIAGNOSIS — R00.1 BRADYCARDIA: Primary | ICD-10-CM

## 2019-06-25 DIAGNOSIS — I73.9 PAD (PERIPHERAL ARTERY DISEASE) (HCC): ICD-10-CM

## 2019-06-25 DIAGNOSIS — K21.9 GASTROESOPHAGEAL REFLUX DISEASE WITHOUT ESOPHAGITIS: ICD-10-CM

## 2019-06-25 DIAGNOSIS — E87.5 HYPERKALEMIA: ICD-10-CM

## 2019-06-25 PROBLEM — K08.89 PAIN IN TOOTH: Status: ACTIVE | Noted: 2019-06-25

## 2019-06-25 PROCEDURE — 99495 TRANSJ CARE MGMT MOD F2F 14D: CPT | Performed by: FAMILY MEDICINE

## 2019-06-25 RX ORDER — PANTOPRAZOLE SODIUM 40 MG/1
40 TABLET, DELAYED RELEASE ORAL DAILY
Qty: 30 TABLET | Refills: 5 | Status: SHIPPED | OUTPATIENT
Start: 2019-06-25 | End: 2019-11-26 | Stop reason: SDUPTHER

## 2019-06-27 ENCOUNTER — TELEPHONE (OUTPATIENT)
Dept: GASTROENTEROLOGY | Facility: CLINIC | Age: 84
End: 2019-06-27

## 2019-07-03 ENCOUNTER — TELEPHONE (OUTPATIENT)
Dept: NEPHROLOGY | Facility: CLINIC | Age: 84
End: 2019-07-03

## 2019-07-03 DIAGNOSIS — R60.0 LOWER EXTREMITY EDEMA: Primary | ICD-10-CM

## 2019-07-03 RX ORDER — FUROSEMIDE 20 MG/1
20 TABLET ORAL DAILY
Qty: 30 TABLET | Refills: 3 | Status: SHIPPED | OUTPATIENT
Start: 2019-07-03 | End: 2019-10-26 | Stop reason: SDUPTHER

## 2019-07-03 NOTE — TELEPHONE ENCOUNTER
called  Since starting Losartan, feet and ankles are swollen,more so the past three days  BP systolic runs anywhere from 144 to 185  He would like a call back    [phone]

## 2019-07-03 NOTE — PROGRESS NOTES
Called patient's  due to his call about his Lena's swollen ankles  I added lasix 20mg daily and told them to take this until her swelling improves then decrease it to every other day dosing  I also asked him to get blood work in 1 week  He understands the plan and is agreeable

## 2019-07-09 ENCOUNTER — CONSULT (OUTPATIENT)
Dept: GASTROENTEROLOGY | Facility: CLINIC | Age: 84
End: 2019-07-09
Payer: COMMERCIAL

## 2019-07-09 VITALS
WEIGHT: 132 LBS | BODY MASS INDEX: 24.29 KG/M2 | HEIGHT: 62 IN | TEMPERATURE: 98.4 F | SYSTOLIC BLOOD PRESSURE: 148 MMHG | HEART RATE: 76 BPM | DIASTOLIC BLOOD PRESSURE: 56 MMHG

## 2019-07-09 DIAGNOSIS — R10.13 EPIGASTRIC PAIN: ICD-10-CM

## 2019-07-09 PROCEDURE — 99204 OFFICE O/P NEW MOD 45 MIN: CPT | Performed by: STUDENT IN AN ORGANIZED HEALTH CARE EDUCATION/TRAINING PROGRAM

## 2019-07-09 RX ORDER — DICYCLOMINE HCL 20 MG
20 TABLET ORAL EVERY 6 HOURS
Qty: 120 TABLET | Refills: 4 | Status: ON HOLD | OUTPATIENT
Start: 2019-07-09 | End: 2019-07-22 | Stop reason: ALTCHOICE

## 2019-07-09 NOTE — PROGRESS NOTES
[facility] - Outpatient Consultation  King Blackburn 80 y o  female MRN: [id number]  Encounter: [id number]    ASSESSMENT AND PLAN:    Ms Nat Jasso is a 49-year-old  Belgian-speaking female history of hypertension on PTA metoprolol, CKD stage IIIB, non-insulin diabetic, and breast cancer who presents for epigastric pain  #Chronic Epigastric pain  #Functional Dyspepsia  Suspect patient's epigastric pain is secondary to her functional dyspepsia  Patient underwent EGD in 2018 with negative H  Pylori and no evidence of PUD  Patient with some benefit from PPI but also states that she has some abdominal pain that worsens when her blood pressure is low which could be suggestive of chronic mesenetic ischemia  We will get abdominal US and evaluate her vasculature to rule out biliary or vascular component of her pain  Will also prescribe Bentyl to see if she gets any relief     -encouarge increased fiber intake  -US abdomen limited; Future  -Tissue transglutaminase, IgA; Future  -IgA; Future  -VAS celiac and/or mesenteric duplex; complete study; Future  -dicyclomine (BENTYL) 20 mg tablet; Take 1 tablet (20 mg total) by mouth every 6 (six) hours    #Chronic Idiopathic Constipation  Patient with long-standing history of constipation refractory to multiple pharmacological interventions  Patient has tried all types of stool softeners without much benefit  At this time she would prefer to continue using prune juice    -continue high fiber diet  -continue prune juice  -avoid fried, fatty foods    #GERD  -continue taking protonix   -continue carafate  ______________________________________________________________________    HPI:      Patient reffered by Chayito Rees MD and Dr Kia Ruiz  Patient with longstanding history of abdominal discomfort  Patient states she has pain after eating  Denies significant spicy or fried foods  She has some weight loss but no diarrhea   Some mild improvement in pain while taking protonix but not significantly  Simethicone prescribed by nephrologist a few weeks ago without much relief  Pain improved by defecation  In the past for her constipation she has tried all the stool softners without benefit  She gets some benefit solely from prune juice  She states she has no  hematemesis, no melena, no hematochezia  In 6 months lost 10lbs down to 129lbs, with some improvement to 132lbs  Last colonoscopy over 8-9 years ago which she states was normal  She had EGD last year which did not show any evidence of gastritis  Biopsy negative for H  Pylori  REVIEW OF SYSTEMS:    CONSTITUTIONAL: Denies any fever, chills, rigors, and weight loss  HEENT: No earache or tinnitus  Denies hearing loss or visual disturbances  CARDIOVASCULAR: No chest pain or palpitations  RESPIRATORY: Denies any cough, hemoptysis, shortness of breath or dyspnea on exertion  GASTROINTESTINAL: As noted in the History of Present Illness  GENITOURINARY: No problems with urination  Denies any hematuria or dysuria  NEUROLOGIC: No dizziness or vertigo, denies headaches  MUSCULOSKELETAL: Denies any muscle or joint pain  SKIN: Denies skin rashes or itching  ENDOCRINE: Denies excessive thirst  Denies intolerance to heat or cold  PSYCHOSOCIAL: Denies depression or anxiety  Denies any recent memory loss       Historical Information   Past Medical History:   Diagnosis Date    Arthritis     Back pain     Benign essential hypertension     LAST ASSESSED: [id number]    Carotid artery stenosis     CKD (chronic kidney disease) stage 2, GFR 60-89 ml/min     Constipation, chronic     "drinks prune juice"    Diabetes mellitus ([address] )     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[facility], type 2) [facility]     Epiglottitis     RESOLVED: [id number]    Gastritis     GERD (gastroesophageal reflux disease)     Gout     H/O blood clots     left lower leg,,,11 yrs ago    History of stenosis of renal artery     RESOLVED: [id number]    Hypercholesteremia     Hypercholesterolemia     Hyperlipidemia     Hyperparathyroidism [facility] [address] )     Hypertension     Iliac artery stenosis, bilateral (HCC)     Kidney stone     Mucinous carcinoma of breast, 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    Presence of pessary     Renal artery stenosis (HCC)     Renal disorder     Renovascular hypertension     RESOLVED: [id number]    Segmental and somatic dysfunction of cervical region     RESOLVED: [id number]    Segmental and somatic dysfunction of lumbar region     RESOLVED: [id number]    Segmental and somatic dysfunction of pelvic region     RESOLVED: [id number]    Segmental and somatic dysfunction of thoracic region     RESOLVED: 62SLH6748    Somatic dysfunction of abdominal region     RESOLVED: [id number]    Somatic dysfunction of head region     RESOLVED: 10IHD5063    Somatic dysfunction of lower extremities     RESOLVED: 84MZZ5852    Somatic dysfunction of rib region     RESOLVED: 42PJX7409    Somatic dysfunction of thoracic region     RESOLVED: 06QRR4516    Somatic dysfunction of upper extremities     RESOLVED: 67YKY9569    Teeth missing      Past Surgical History:   Procedure Laterality Date    ANGIOPLASTY / STENTING ILIAC      BREAST LUMPECTOMY Left 09/06/2017    LAST ASSESSED: [id number]    BREAST SURGERY Left     biopsy    CATARACT EXTRACTION Bilateral     CHOLECYSTECTOMY      COLONOSCOPY      EYE SURGERY      ILIAC VEIN ANGIOPLASTY / STENTING Right     INITIAL STENOSIS: ONSET: [id number]    KNEE ARTHROSCOPY Right     KNEE SURGERY Right     ONSET: [id number]    MAMMO NEEDLE LOCALIZATION LEFT (ALL INC) Left 9/11/2018    MAMMO NEEDLE LOCALIZATION LEFT (ALL INC) Left 9/6/2017    FL ESOPHAGOGASTRODUODENOSCOPY TRANSORAL DIAGNOSTIC N/A 7/26/2018    Procedure: ESOPHAGOGASTRODUODENOSCOPY (EGD); Surgeon: Twila Breen DO;  [facility];   Service: Gastroenterology    FL LARYNGOSCOPY,DIRCT,OP [facility] N/A 6/10/2016    Procedure: MICRO DIRECT LARYNGOSCOPY WITH VOCAL FOLD MASS EXCISION ;  Surgeon: Esteban Wahl MD;  [facility];  Service: ENT    IN MASTECTOMY, PARTIAL Left 9/6/2017    Procedure: LUMPECTOMY BREAST NEEDLE LOCALIZED WITH FAXITRON NEEDLE @ 0830;  Surgeon: Padma Hernandez MD;  [facility];  Service: General    IN PERQ DEVICE PLACEMT BREAST LOC 1ST LES W Priti Pleasant Left 9/11/2018    Procedure: BREAST LUMPECTOMY; BREAST NEEDLE LOCALIZATION (NEEDLE LOC AT 1330);   Surgeon: Ky Ba MD;  [facility];  Service: Surgical Oncology    THROAT SURGERY      lump removed    TUBAL LIGATION      UPPER GASTROINTESTINAL ENDOSCOPY      US GUIDANCE BREAST BIOPSY LEFT EACH ADDITIONAL Left 8/7/2017    US GUIDED BREAST BIOPSY LEFT COMPLETE Left 8/10/2018    US GUIDED BREAST BIOPSY LEFT COMPLETE Left 8/7/2017    VASCULAR SURGERY       Social History   Social History     Substance and Sexual Activity   Alcohol Use Not Currently    Frequency: Never     Social History     Substance and Sexual Activity   Drug Use No     Social History     Tobacco Use   Smoking Status Former Smoker    Packs/day: 1 00    Years: 64 00    Pack years: 56 65    Last attempt to quit: 2004    Years since quitting: 15 5   Smokeless Tobacco Never Used   Tobacco Comment    quit in 2003 1 ppd x 50 years     Family History   Problem Relation Age of Onset    Heart disease Brother     Heart disease Maternal Grandmother     Diabetes Other         of other type with arthropathy, without long term current use of insulin    Hypertension Other     No Known Problems Mother     Gout Family     Rheum arthritis Family     Lupus Family         systematic erythematosus    Heart disease Family     Stroke Family         syndrome    Stroke Maternal Uncle         syndrome    Stroke Maternal Aunt         syndrome    No Known Problems Father        Meds/Allergies       Current Outpatient Medications:     Acetaminophen (TYLENOL) 325 MG CAPS    aspirin (ADULT ASPIRIN EC LOW STRENGTH) 81 mg EC tablet    calcitriol (ROCALTROL) 0 25 mcg capsule    calcium carbonate-vitamin D (OSCAL-D) 500 mg-200 units per tablet    fluticasone (FLONASE) 50 mcg/act nasal spray    furosemide (LASIX) 20 mg tablet    glimepiride (AMARYL) 1 mg tablet    hydrocortisone (ANUSOL-HC, PROCTOSOL HC,) 2 5 % rectal cream    Lancets (ONETOUCH ULTRASOFT) lancets    lidocaine (LIDODERM) 5 %    losartan (COZAAR) 50 mg tablet    Magnesium 400 MG CAPS    NIFEdipine (NIFEDICAL XL) 30 mg 24 hr tablet    ONE TOUCH ULTRA TEST test strip    pantoprazole (PROTONIX) 40 mg tablet    pravastatin (PRAVACHOL) 40 mg tablet    RESTASIS 0 05 % ophthalmic emulsion    simethicone (MYLICON,GAS-X) 568 MG capsule    gabapentin (NEURONTIN) 100 mg capsule    Allergies   Allergen Reactions    Clonidine Derivatives Swelling    Diflucan [Fluconazole] Rash    Flagyl [Metronidazole] Anaphylaxis    Carvedilol Hives     And legs swelled    Latex Rash    Lipitor [Atorvastatin]      Legs swelled    Other Palpitations     IV DYE     Objective     Blood pressure 148/56, pulse 76, temperature 98 4 °F (36 9 °C), temperature source Tympanic, height 5' 2" (1 575 m), weight 59 9 kg (132 lb), not currently breastfeeding  Body mass index is 24 14 kg/m²  PHYSICAL EXAM:      General Appearance:   Elder female in NAD  HEENT:   Normocephalic, atraumatic, anicteric  Neck:  Supple, symmetrical, trachea midline   Lungs:   Clear to auscultation bilaterally; no rales, rhonchi or wheezing; respirations unlabored    Heart[de-identified]   Regular rate and rhythm; no murmur, rub, or gallop     Abdomen:   Soft, mildly tender non-tender, non-distended; normal bowel sounds; no masses, no organomegaly    Genitalia:   Deferred    Rectal:   Deferred    Extremities:  No cyanosis, clubbing or edema    Pulses:  2+ and symmetric    Skin:  No jaundice, rashes, or lesions    Lymph nodes:  No palpable cervical lymphadenopathy      Lab Results:   No visits with results within 1 Day(s) from this visit     Latest known visit with results is:   Admission on 06/16/2019, Discharged on 06/18/2019   Component Date Value    WBC 06/16/2019 8 73     RBC 06/16/2019 4 24     Hemoglobin 06/16/2019 12 1     Hematocrit 06/16/2019 38 4     MCV 06/16/2019 91     MCH 06/16/2019 28 5     MCHC 06/16/2019 31 5     RDW 06/16/2019 13 7     MPV 06/16/2019 11 6     Platelets 76/73/1219 293     nRBC 06/16/2019 0     Neutrophils Relative 06/16/2019 64     Immat GRANS % 06/16/2019 0     Lymphocytes Relative 06/16/2019 28     Monocytes Relative 06/16/2019 7     Eosinophils Relative 06/16/2019 1     Basophils Relative 06/16/2019 0     Neutrophils Absolute 06/16/2019 5 68     Immature Grans Absolute 06/16/2019 0 02     Lymphocytes Absolute 06/16/2019 2 40     Monocytes Absolute 06/16/2019 0 57     Eosinophils Absolute 06/16/2019 0 04     Basophils Absolute 06/16/2019 0 02     Sodium 06/16/2019 135*    Potassium 06/16/2019 5 9*    Chloride 06/16/2019 105     CO2 06/16/2019 25     ANION GAP 06/16/2019 5     BUN 06/16/2019 45*    Creatinine 06/16/2019 1 40*    Glucose 06/16/2019 126     Calcium 06/16/2019 9 2     AST 06/16/2019 42     ALT 06/16/2019 41     Alkaline Phosphatase 06/16/2019 101     Total Protein 06/16/2019 8 0     Albumin 06/16/2019 3 7     Total Bilirubin 06/16/2019 0 18*    eGFR 06/16/2019 35     Troponin I 06/16/2019 <0 02     POC Glucose 06/16/2019 125     Sodium 06/17/2019 141     Potassium 06/17/2019 4 9     Chloride 06/17/2019 110*    CO2 06/17/2019 24     ANION GAP 06/17/2019 7     BUN 06/17/2019 38*    Creatinine 06/17/2019 1 11     Glucose 06/17/2019 97     Calcium 06/17/2019 8 8     eGFR 06/17/2019 46     Magnesium 06/17/2019 1 7     Phosphorus 06/17/2019 3 1     WBC 06/17/2019 6 96     RBC 06/17/2019 3 74*    Hemoglobin 06/17/2019 10 5*    Hematocrit 06/17/2019 33 8*    MCV 06/17/2019 90     MCH 06/17/2019 28 1  MCHC 06/17/2019 31 1*    RDW 06/17/2019 13 6     MPV 06/17/2019 11 4     Platelets 01/10/0374 262     nRBC 06/17/2019 0     Neutrophils Relative 06/17/2019 49     Immat GRANS % 06/17/2019 0     Lymphocytes Relative 06/17/2019 44     Monocytes Relative 06/17/2019 6     Eosinophils Relative 06/17/2019 1     Basophils Relative 06/17/2019 0     Neutrophils Absolute 06/17/2019 3 34     Immature Grans Absolute 06/17/2019 0 01     Lymphocytes Absolute 06/17/2019 3 09     Monocytes Absolute 06/17/2019 0 44     Eosinophils Absolute 06/17/2019 0 06     Basophils Absolute 06/17/2019 0 02     Hemoglobin A1C 06/17/2019 6 4*    EAG 06/17/2019 137     TSH 3RD GENERATON 06/17/2019 2 540     POC Glucose 06/17/2019 103     Ventricular Rate 06/16/2019 50     Atrial Rate 06/16/2019 50     OK Interval 06/16/2019 154     QRSD Interval 06/16/2019 84     QT Interval 06/16/2019 428     QTC Interval 06/16/2019 390     P Axis 06/16/2019 78     QRS Axis 06/16/2019 -8     T Wave Axis 06/16/2019 63     POC Glucose 06/17/2019 122     POC Glucose 06/17/2019 201*    Ventricular Rate 06/17/2019 62     Atrial Rate 06/17/2019 62     OK Interval 06/17/2019 154     QRSD Interval 06/17/2019 74     QT Interval 06/17/2019 402     QTC Interval 06/17/2019 408     P Axis 06/17/2019 30     QRS Axis 06/17/2019 70     T Wave Axis 06/17/2019 27     POC Glucose 06/17/2019 111     WBC 06/18/2019 5 82     RBC 06/18/2019 4 06     Hemoglobin 06/18/2019 11 3*    Hematocrit 06/18/2019 36 8     MCV 06/18/2019 91     MCH 06/18/2019 27 8     MCHC 06/18/2019 30 7*    RDW 06/18/2019 13 9     MPV 06/18/2019 12 1     Platelets 01/22/5197 286     nRBC 06/18/2019 0     Neutrophils Relative 06/18/2019 46     Immat GRANS % 06/18/2019 0     Lymphocytes Relative 06/18/2019 45*    Monocytes Relative 06/18/2019 8     Eosinophils Relative 06/18/2019 1     Basophils Relative 06/18/2019 0     Neutrophils Absolute 06/18/2019 2 68     Immature Grans Absolute 06/18/2019 0 01     Lymphocytes Absolute 06/18/2019 2 61     Monocytes Absolute 06/18/2019 0 44     Eosinophils Absolute 06/18/2019 0 06     Basophils Absolute 06/18/2019 0 02     Sodium 06/18/2019 136     Potassium 06/18/2019 4 5     Chloride 06/18/2019 106     CO2 06/18/2019 25     ANION GAP 06/18/2019 5     BUN 06/18/2019 36*    Creatinine 06/18/2019 1 19     Glucose 06/18/2019 104     Calcium 06/18/2019 9 1     eGFR 06/18/2019 42     Magnesium 06/18/2019 1 8     Phosphorus 06/18/2019 3 2     POC Glucose 06/18/2019 111     POC Glucose 06/18/2019 102      Radiology Results:   Xr Chest 2 Views    Result Date: 6/17/2019  Narrative: CHEST INDICATION:   Chest Pain  COMPARISON:  4/2/2019 EXAM PERFORMED/VIEWS:  XR CHEST PA & LATERAL FINDINGS: Heart shadow appears unremarkable  Atherosclerotic vascular calcifications are noted  The lungs are clear  No pneumothorax or pleural effusion  Osseous structures appear within normal limits for patient age  Impression: No acute cardiopulmonary disease   Workstation performed: [id number]     ---------------------------------------------------  Note Electronically Signed By:    MD Ilana Archibald 73 Gastroenterology Fellow PGY-4  PI #: [id number]

## 2019-07-09 NOTE — PATIENT INSTRUCTIONS
3 month f/u scheduled 9/26/19 w/Frances in [place] and US orders printed and given to patient, instructions given for patient to schedule

## 2019-07-10 ENCOUNTER — APPOINTMENT (OUTPATIENT)
Dept: LAB | Facility: HOSPITAL | Age: 84
End: 2019-07-10
Payer: COMMERCIAL

## 2019-07-10 DIAGNOSIS — R60.0 LOWER EXTREMITY EDEMA: ICD-10-CM

## 2019-07-10 LAB
ANION GAP SERPL CALCULATED.3IONS-SCNC: 5 MMOL/L (ref 4–13)
BUN SERPL-MCNC: 37 MG/DL (ref 5–25)
CALCIUM SERPL-MCNC: 8.9 MG/DL (ref 8.3–10.1)
CHLORIDE SERPL-SCNC: 105 MMOL/L (ref 100–108)
CO2 SERPL-SCNC: 29 MMOL/L (ref 21–32)
CREAT SERPL-MCNC: 1.58 MG/DL (ref 0.6–1.3)
GFR SERPL CREATININE-BSD FRML MDRD: 30 ML/MIN/1.73SQ M
GLUCOSE SERPL-MCNC: 152 MG/DL (ref 65–140)
POTASSIUM SERPL-SCNC: 3.6 MMOL/L (ref 3.5–5.3)
SODIUM SERPL-SCNC: 139 MMOL/L (ref 136–145)

## 2019-07-10 PROCEDURE — 80048 BASIC METABOLIC PNL TOTAL CA: CPT

## 2019-07-10 PROCEDURE — 36415 COLL VENOUS BLD VENIPUNCTURE: CPT

## 2019-07-11 ENCOUNTER — TELEPHONE (OUTPATIENT)
Dept: NEPHROLOGY | Facility: CLINIC | Age: 84
End: 2019-07-11

## 2019-07-11 DIAGNOSIS — N18.32 CHRONIC KIDNEY DISEASE (CKD) STAGE G3B/A3, MODERATELY DECREASED GLOMERULAR FILTRATION RATE (GFR) BETWEEN 30-44 ML/MIN/1.73 SQUARE METER AND ALBUMINURIA CREATININE RATIO GREATER THAN 300 MG/G (HCC): Primary | ICD-10-CM

## 2019-07-11 NOTE — TELEPHONE ENCOUNTER
says ankles are better but her legs feels numb  Will get repeat BMP next week  Order placed in [place]         ----- Message from Renetta Blair [address] [place] [address] sent at 7/10/2019  3:57 PM EDT -----  Please call patient's   Ask about Lena's swollen ankles, if they got better with lasix and if he has now reduced dose to every other day  If they have not already, have patient reduce dose to lasix 20mg every other day  Please have them repeat BMP in 1 week as creatinine elevated to 1 5

## 2019-07-13 RX ORDER — SPIRONOLACTONE 25 MG/1
TABLET ORAL
Qty: 90 TABLET | Refills: 5 | OUTPATIENT
Start: 2019-07-13

## 2019-07-15 DIAGNOSIS — J30.9 ALLERGIC RHINITIS, UNSPECIFIED SEASONALITY, UNSPECIFIED TRIGGER: ICD-10-CM

## 2019-07-15 DIAGNOSIS — G89.29 MUSCULOSKELETAL PAIN, CHRONIC: ICD-10-CM

## 2019-07-15 DIAGNOSIS — M79.18 MUSCULOSKELETAL PAIN, CHRONIC: ICD-10-CM

## 2019-07-15 DIAGNOSIS — E78.5 HYPERLIPIDEMIA, UNSPECIFIED HYPERLIPIDEMIA TYPE: ICD-10-CM

## 2019-07-16 ENCOUNTER — HOSPITAL ENCOUNTER (OUTPATIENT)
Dept: RADIOLOGY | Facility: HOSPITAL | Age: 84
Discharge: HOME | End: 2019-07-16
Attending: INTERNAL MEDICINE
Payer: COMMERCIAL

## 2019-07-16 DIAGNOSIS — R10.13 EPIGASTRIC PAIN: ICD-10-CM

## 2019-07-16 PROCEDURE — 76705 ECHO EXAM OF ABDOMEN: CPT

## 2019-07-17 ENCOUNTER — APPOINTMENT (OUTPATIENT)
Dept: LAB | Facility: HOSPITAL | Age: 84
End: 2019-07-17
Attending: INTERNAL MEDICINE
Payer: COMMERCIAL

## 2019-07-17 DIAGNOSIS — R10.13 EPIGASTRIC PAIN: ICD-10-CM

## 2019-07-17 DIAGNOSIS — N18.32 CHRONIC KIDNEY DISEASE (CKD) STAGE G3B/A3, MODERATELY DECREASED GLOMERULAR FILTRATION RATE (GFR) BETWEEN 30-44 ML/MIN/1.73 SQUARE METER AND ALBUMINURIA CREATININE RATIO GREATER THAN 300 MG/G (HCC): ICD-10-CM

## 2019-07-17 LAB
ANION GAP SERPL CALCULATED.3IONS-SCNC: 3 MMOL/L (ref 4–13)
BUN SERPL-MCNC: 38 MG/DL (ref 5–25)
CALCIUM SERPL-MCNC: 9.1 MG/DL (ref 8.3–10.1)
CHLORIDE SERPL-SCNC: 105 MMOL/L (ref 100–108)
CO2 SERPL-SCNC: 30 MMOL/L (ref 21–32)
CREAT SERPL-MCNC: 1.43 MG/DL (ref 0.6–1.3)
GFR SERPL CREATININE-BSD FRML MDRD: 34 ML/MIN/1.73SQ M
GLUCOSE SERPL-MCNC: 168 MG/DL (ref 65–140)
IGA SERPL-MCNC: 547 MG/DL (ref 70–400)
POTASSIUM SERPL-SCNC: 3.9 MMOL/L (ref 3.5–5.3)
SODIUM SERPL-SCNC: 138 MMOL/L (ref 136–145)

## 2019-07-17 PROCEDURE — 36415 COLL VENOUS BLD VENIPUNCTURE: CPT

## 2019-07-17 PROCEDURE — 80048 BASIC METABOLIC PNL TOTAL CA: CPT

## 2019-07-17 PROCEDURE — 82784 ASSAY IGA/IGD/IGG/IGM EACH: CPT

## 2019-07-17 PROCEDURE — 83516 IMMUNOASSAY NONANTIBODY: CPT

## 2019-07-17 RX ORDER — PRAVASTATIN SODIUM 40 MG
TABLET ORAL
Qty: 90 TABLET | Refills: 0 | Status: SHIPPED | OUTPATIENT
Start: 2019-07-17 | End: 2019-10-17 | Stop reason: SDUPTHER

## 2019-07-17 RX ORDER — DULOXETIN HYDROCHLORIDE 30 MG/1
CAPSULE, DELAYED RELEASE ORAL
Qty: 90 CAPSULE | Refills: 0 | Status: ON HOLD | OUTPATIENT
Start: 2019-07-17 | End: 2019-07-24 | Stop reason: ALTCHOICE

## 2019-07-17 RX ORDER — FLUTICASONE PROPIONATE 50 MCG
SPRAY, SUSPENSION (ML) NASAL
Qty: 16 G | Refills: 2 | Status: SHIPPED | OUTPATIENT
Start: 2019-07-17 | End: 2020-02-10 | Stop reason: SDUPTHER

## 2019-07-17 NOTE — TELEPHONE ENCOUNTER
Aldactone was placed on hold when hospitalized in June for hyperkalemia  Seen by Nephrology medication not reordered

## 2019-07-17 NOTE — ASSESSMENT & PLAN NOTE
Lab Results   Component Value Date    HGBA1C 6 4 (H) 06/17/2019       No results for input(s): POCGLU in the last 72 hours      Blood Sugar Average: Last 72 hrs:

## 2019-07-17 NOTE — TELEPHONE ENCOUNTER
Chart review  SNRI began May 2019 and has not seen PCP since to re-asess if it is helping  Has PCP appt  In Aug 2019- I will refill for 90 days with no more refills  If med to continue to be addressed at PCP Aug visit  Statin changed  Restarted in May 2019 after previous statin stopped due to concern atorvastatin causing myalgia however CPCHUNG MADRIGAL  Has diagnosis (CAD, CKD) that support benefit of statin use if life expectancy is > 10 years  Does have cancer diagnosis  I will refill statin for 90 days with no refills for continued conversation by PCP  I am refilling nasal steroid spray and no documentation on evaluation of this medication and medical condition for more than a year- to be addressed at next visit    On multiple medications that are concerning in her age cohort  PCP to consider consultation with PharmD

## 2019-07-18 LAB — TTG IGA SER-ACNC: <2 U/ML (ref 0–3)

## 2019-07-20 PROBLEM — J04.0 REFLUX LARYNGITIS: Status: ACTIVE | Noted: 2019-07-20

## 2019-07-20 PROBLEM — R49.0 DYSPHONIA: Status: ACTIVE | Noted: 2019-07-20

## 2019-07-20 PROBLEM — R49.0 MUSCLE TENSION DYSPHONIA: Status: ACTIVE | Noted: 2019-07-20

## 2019-07-20 PROBLEM — Q31.0 ANTERIOR GLOTTIC WEB: Status: ACTIVE | Noted: 2019-07-20

## 2019-07-20 PROBLEM — J38.01 PARESIS OF LEFT VOCAL FOLD: Status: ACTIVE | Noted: 2019-07-20

## 2019-07-20 PROBLEM — K21.9 GASTROESOPHAGEAL REFLUX DISEASE: Status: ACTIVE | Noted: 2019-07-20

## 2019-07-20 PROBLEM — J38.3 GLOTTIC INSUFFICIENCY: Status: ACTIVE | Noted: 2019-07-20

## 2019-07-20 PROBLEM — K21.9 REFLUX LARYNGITIS: Status: ACTIVE | Noted: 2019-07-20

## 2019-07-20 PROBLEM — Q31.3 LARYNGOCELE: Status: ACTIVE | Noted: 2019-07-20

## 2019-07-22 ENCOUNTER — APPOINTMENT (EMERGENCY)
Dept: RADIOLOGY | Facility: HOSPITAL | Age: 84
End: 2019-07-22
Payer: COMMERCIAL

## 2019-07-22 ENCOUNTER — HOSPITAL ENCOUNTER (OUTPATIENT)
Facility: HOSPITAL | Age: 84
Setting detail: OBSERVATION
Discharge: HOME | End: 2019-07-24
Attending: EMERGENCY MEDICINE | Admitting: FAMILY MEDICINE
Payer: COMMERCIAL

## 2019-07-22 DIAGNOSIS — R07.9 CHEST PAIN: Primary | ICD-10-CM

## 2019-07-22 DIAGNOSIS — I10 ESSENTIAL HYPERTENSION: ICD-10-CM

## 2019-07-22 LAB
ALBUMIN SERPL BCP-MCNC: 3.6 G/DL (ref 3.5–5)
ALP SERPL-CCNC: 93 U/L (ref 46–116)
ALT SERPL W P-5'-P-CCNC: 15 U/L (ref 12–78)
ANION GAP SERPL CALCULATED.3IONS-SCNC: 9 MMOL/L (ref 4–13)
AST SERPL W P-5'-P-CCNC: 18 U/L (ref 5–45)
ATRIAL RATE: 101 BPM
BASOPHILS # BLD AUTO: 0.02 THOUSANDS/ΜL (ref 0–0.1)
BASOPHILS NFR BLD AUTO: 0 % (ref 0–1)
BILIRUB SERPL-MCNC: 0.36 MG/DL (ref 0.2–1)
BUN SERPL-MCNC: 32 MG/DL (ref 5–25)
CALCIUM SERPL-MCNC: 9.7 MG/DL (ref 8.3–10.1)
CHLORIDE SERPL-SCNC: 104 MMOL/L (ref 100–108)
CO2 SERPL-SCNC: 27 MMOL/L (ref 21–32)
CREAT SERPL-MCNC: 1.36 MG/DL (ref 0.6–1.3)
EOSINOPHIL # BLD AUTO: 0.06 THOUSAND/ΜL (ref 0–0.61)
EOSINOPHIL NFR BLD AUTO: 1 % (ref 0–6)
ERYTHROCYTE [DISTWIDTH] IN BLOOD BY AUTOMATED COUNT: 12.9 % (ref 11.6–15.1)
GFR SERPL CREATININE-BSD FRML MDRD: 36 ML/MIN/1.73SQ M
GLUCOSE SERPL-MCNC: 159 MG/DL (ref 65–140)
GLUCOSE SERPL-MCNC: 162 MG/DL (ref 65–140)
HCT VFR BLD AUTO: 38.1 % (ref 34.8–46.1)
HGB BLD-MCNC: 11.7 G/DL (ref 11.5–15.4)
IMM GRANULOCYTES # BLD AUTO: 0.02 THOUSAND/UL (ref 0–0.2)
IMM GRANULOCYTES NFR BLD AUTO: 0 % (ref 0–2)
LIPASE SERPL-CCNC: 246 U/L (ref 73–393)
LYMPHOCYTES # BLD AUTO: 2.86 THOUSANDS/ΜL (ref 0.6–4.47)
LYMPHOCYTES NFR BLD AUTO: 45 % (ref 14–44)
MCH RBC QN AUTO: 28 PG (ref 26.8–34.3)
MCHC RBC AUTO-ENTMCNC: 30.7 G/DL (ref 31.4–37.4)
MCV RBC AUTO: 91 FL (ref 82–98)
MONOCYTES # BLD AUTO: 0.48 THOUSAND/ΜL (ref 0.17–1.22)
MONOCYTES NFR BLD AUTO: 8 % (ref 4–12)
NEUTROPHILS # BLD AUTO: 2.99 THOUSANDS/ΜL (ref 1.85–7.62)
NEUTS SEG NFR BLD AUTO: 46 % (ref 43–75)
NRBC BLD AUTO-RTO: 0 /100 WBCS
NT-PROBNP SERPL-MCNC: 138 PG/ML
P AXIS: 75 DEGREES
PLATELET # BLD AUTO: 254 THOUSANDS/UL (ref 149–390)
PLATELET # BLD AUTO: 258 THOUSANDS/UL (ref 149–390)
PMV BLD AUTO: 11.5 FL (ref 8.9–12.7)
PMV BLD AUTO: 11.9 FL (ref 8.9–12.7)
POTASSIUM SERPL-SCNC: 3.8 MMOL/L (ref 3.5–5.3)
PR INTERVAL: 150 MS
PROT SERPL-MCNC: 7.8 G/DL (ref 6.4–8.2)
QRS AXIS: 12 DEGREES
QRSD INTERVAL: 84 MS
QT INTERVAL: 328 MS
QTC INTERVAL: 425 MS
RBC # BLD AUTO: 4.18 MILLION/UL (ref 3.81–5.12)
SODIUM SERPL-SCNC: 140 MMOL/L (ref 136–145)
T WAVE AXIS: 118 DEGREES
TROPONIN I SERPL-MCNC: 0.03 NG/ML
TROPONIN I SERPL-MCNC: <0.02 NG/ML
TROPONIN I SERPL-MCNC: <0.02 NG/ML
VENTRICULAR RATE: 101 BPM
WBC # BLD AUTO: 6.43 THOUSAND/UL (ref 4.31–10.16)

## 2019-07-22 PROCEDURE — 83880 ASSAY OF NATRIURETIC PEPTIDE: CPT | Performed by: EMERGENCY MEDICINE

## 2019-07-22 PROCEDURE — 93005 ELECTROCARDIOGRAM TRACING: CPT

## 2019-07-22 PROCEDURE — 93010 ELECTROCARDIOGRAM REPORT: CPT | Performed by: INTERNAL MEDICINE

## 2019-07-22 PROCEDURE — 84484 ASSAY OF TROPONIN QUANT: CPT | Performed by: EMERGENCY MEDICINE

## 2019-07-22 PROCEDURE — 84484 ASSAY OF TROPONIN QUANT: CPT | Performed by: FAMILY MEDICINE

## 2019-07-22 PROCEDURE — 99284 EMERGENCY DEPT VISIT MOD MDM: CPT | Performed by: EMERGENCY MEDICINE

## 2019-07-22 PROCEDURE — 85025 COMPLETE CBC W/AUTO DIFF WBC: CPT | Performed by: EMERGENCY MEDICINE

## 2019-07-22 PROCEDURE — 85049 AUTOMATED PLATELET COUNT: CPT | Performed by: FAMILY MEDICINE

## 2019-07-22 PROCEDURE — NC001 PR NO CHARGE: Performed by: FAMILY MEDICINE

## 2019-07-22 PROCEDURE — 71046 X-RAY EXAM CHEST 2 VIEWS: CPT

## 2019-07-22 PROCEDURE — 99285 EMERGENCY DEPT VISIT HI MDM: CPT

## 2019-07-22 PROCEDURE — 36415 COLL VENOUS BLD VENIPUNCTURE: CPT | Performed by: EMERGENCY MEDICINE

## 2019-07-22 PROCEDURE — 80053 COMPREHEN METABOLIC PANEL: CPT | Performed by: EMERGENCY MEDICINE

## 2019-07-22 PROCEDURE — 82948 REAGENT STRIP/BLOOD GLUCOSE: CPT

## 2019-07-22 PROCEDURE — 83690 ASSAY OF LIPASE: CPT | Performed by: EMERGENCY MEDICINE

## 2019-07-22 RX ORDER — GABAPENTIN 100 MG/1
100 CAPSULE ORAL
Status: DISCONTINUED | OUTPATIENT
Start: 2019-07-22 | End: 2019-07-24 | Stop reason: HOSPADM

## 2019-07-22 RX ORDER — NIFEDIPINE 30 MG/1
30 TABLET, EXTENDED RELEASE ORAL DAILY
Status: DISCONTINUED | OUTPATIENT
Start: 2019-07-23 | End: 2019-07-23

## 2019-07-22 RX ORDER — POLYVINYL ALCOHOL 14 MG/ML
1 SOLUTION/ DROPS OPHTHALMIC EVERY 12 HOURS SCHEDULED
Status: DISCONTINUED | OUTPATIENT
Start: 2019-07-22 | End: 2019-07-22

## 2019-07-22 RX ORDER — ASPIRIN 81 MG/1
324 TABLET, CHEWABLE ORAL ONCE
Status: DISCONTINUED | OUTPATIENT
Start: 2019-07-22 | End: 2019-07-23

## 2019-07-22 RX ORDER — SODIUM CHLORIDE 9 MG/ML
50 INJECTION, SOLUTION INTRAVENOUS CONTINUOUS
Status: DISCONTINUED | OUTPATIENT
Start: 2019-07-22 | End: 2019-07-23

## 2019-07-22 RX ORDER — PANTOPRAZOLE SODIUM 40 MG/1
40 TABLET, DELAYED RELEASE ORAL DAILY
Status: DISCONTINUED | OUTPATIENT
Start: 2019-07-23 | End: 2019-07-24 | Stop reason: HOSPADM

## 2019-07-22 RX ORDER — ASPIRIN 81 MG/1
81 TABLET ORAL DAILY
Status: DISCONTINUED | OUTPATIENT
Start: 2019-07-23 | End: 2019-07-24 | Stop reason: HOSPADM

## 2019-07-22 RX ORDER — LOSARTAN POTASSIUM 50 MG/1
50 TABLET ORAL DAILY
Status: DISCONTINUED | OUTPATIENT
Start: 2019-07-23 | End: 2019-07-24 | Stop reason: HOSPADM

## 2019-07-22 RX ORDER — SENNOSIDES 8.6 MG
2 TABLET ORAL DAILY
Status: DISCONTINUED | OUTPATIENT
Start: 2019-07-22 | End: 2019-07-24 | Stop reason: HOSPADM

## 2019-07-22 RX ORDER — HEPARIN SODIUM 5000 [USP'U]/ML
5000 INJECTION, SOLUTION INTRAVENOUS; SUBCUTANEOUS EVERY 8 HOURS SCHEDULED
Status: DISCONTINUED | OUTPATIENT
Start: 2019-07-22 | End: 2019-07-24 | Stop reason: HOSPADM

## 2019-07-22 RX ADMIN — DEXTRAN 70 AND HYPROMELLOSE 2910 1 DROP: 1; 3 SOLUTION/ DROPS OPHTHALMIC at 22:08

## 2019-07-22 RX ADMIN — SODIUM CHLORIDE 50 ML/HR: 0.9 INJECTION, SOLUTION INTRAVENOUS at 21:46

## 2019-07-22 RX ADMIN — SENNOSIDES 17.2 MG: 8.6 TABLET, FILM COATED ORAL at 21:46

## 2019-07-22 RX ADMIN — HEPARIN SODIUM 5000 UNITS: 5000 INJECTION INTRAVENOUS; SUBCUTANEOUS at 21:46

## 2019-07-22 RX ADMIN — GABAPENTIN 100 MG: 100 CAPSULE ORAL at 21:46

## 2019-07-22 NOTE — ED ATTENDING ATTESTATION
Glenna Montelongo MD, saw and evaluated the patient  I have discussed the patient with the resident/non-physician practitioner and agree with the resident's/non-physician practitioner's findings, Plan of Care, and MDM as documented in the resident's/non-physician practitioner's note, except where noted  All available labs and Radiology studies were reviewed  I was present for key portions of any procedure(s) performed by the resident/non-physician practitioner and I was immediately available to provide assistance  At this point I agree with the current assessment done in the Emergency Department  I have conducted an independent evaluation of this patient a history and physical is as follows:  Chest pressure and heaviness, started earlier today  Patient was coloring on a tablet when it occurred  Describes pain as being in her chest as well as her back  Does not radiate, but was in both locations at the same time  Started all at once, and lasted for quite some time  Patient states it was not pleuritic  She felt chilled, but did not have diaphoresis  She has not had cough  She has not had pain like this before  No associated nausea or abdominal pain  Review of systems otherwise negative in 12 systems reviewed  Patient is currently pain free  On exam Vital signs were reviewed  Patient is awake, alert, interactive  The patient's pupils are equally round reactive to light  Oropharynx is clear with moist mucous membranes  Neck is supple and nontender with no adenopathy or JVD  Heart is regular with no murmurs, rubs, or gallops  Lungs are clear and equal with no wheezes, rales, or rhonchi  Abdomen is soft and nontender with no masses, rebound, or guarding  There is no CVA tenderness  The patient was completely exposed  There is no skin breakdown  There are no rashes or skin changes  Extremities are warm and well perfused with good pulses   The patient has normal strength, sensation, and cranial nerves  EKG with flipped Ts inferiorly, which are new  Impression:  Concern for cardiac chest pain    Will plan to do cardiac evaluation, give aspirin, admit to the hospital     Critical Care Time  Procedures

## 2019-07-22 NOTE — ED NOTES
Pt denies chest pain, but does c/o a "burning" feeling in middle of upper stomach/chest and in her back        Blas Noble RN  13/75/52 [id number]

## 2019-07-22 NOTE — ED PROVIDER NOTES
History  Chief Complaint   Patient presents with    Chest Pain     chest pain, as per EMS pt had EKG changes on the way here  80-year-old woman with a past medical history of PAD on daily 81 mg aspirin, diabetes, hypertension, hld presents for evaluation of chest pain  Chest pain started roughly 2 hours ago while patient was at rest   It is a squeezing pain located in left side of her chest   It radiates to her left shoulder  Is not worsened with deep breathing  She is not taking medication for the pain  EMS was called and noted some nonspecific EKG changes upon arrival here she states that the pain has improved but is still present  She denies nausea, diaphoresis, vomiting  She denies fever, chills  She was feeling well and in her normal state of health prior to the onset of chest pain  She denies similar episodes in the past           Prior to Admission Medications   Prescriptions Last Dose Informant Patient Reported? Taking?    Acetaminophen (TYLENOL) 325 MG CAPS 2019 at Unknown time Self Yes Yes   Sig: Take by mouth every 8 (eight) hours   DULoxetine (CYMBALTA) 30 mg delayed release capsule Not Taking at Unknown time  No No   Sig: take 1 capsule by mouth once daily   Patient not taking: Reported on 2019   Lancets (ONETOUCH ULTRASOFT) lancets  Self No Yes   Sig: USE TWICE DAILY   Magnesium 400 MG CAPS Not Taking at Unknown time Self Yes No   Sig: Take 400 mg by mouth daily   NIFEdipine (NIFEDICAL XL) 30 mg 24 hr tablet 2019 at Unknown time Self No Yes   Sig: Take 1 tablet (30 mg total) by mouth daily   ONE TOUCH ULTRA TEST test strip  Self No No   Si each by Other route 2 (two) times a day Use as instructed   RESTASIS 0 05 % ophthalmic emulsion Not Taking at Unknown time Self Yes No   Sig: INSTILL 1 GTT INTO OU BID   aspirin (ADULT ASPIRIN EC LOW STRENGTH) 81 mg EC tablet 2019 at Unknown time Self Yes Yes   Sig: Take 1 tablet by mouth daily   calcitriol (ROCALTROL) 0 25 mcg capsule Past Week at Unknown time Self No Yes   Sig: Take 1 capsule (0 25 mcg total) by mouth 2 (two) times a week Please take every Tuesday and friday   calcium carbonate-vitamin D (OSCAL-D) 500 mg-200 units per tablet 7/21/2019 at Unknown time Self Yes Yes   Sig: Take 1 tablet by mouth daily with breakfast   fluticasone (FLONASE) 50 mcg/act nasal spray 7/21/2019 at Unknown time  No Yes   Sig: instill 1 spray into each nostril once daily   furosemide (LASIX) 20 mg tablet 7/21/2019 at Unknown time Self No Yes   Sig: Take 1 tablet (20 mg total) by mouth daily Daily with swelling, every other day once swelling resolves   gabapentin (NEURONTIN) 100 mg capsule 7/21/2019 at Unknown time Self No Yes   Sig: Take 1 capsule (100 mg total) by mouth daily at bedtime for 10 days   hydrocortisone (ANUSOL-HC, PROCTOSOL HC,) 2 5 % rectal cream Not Taking at Unknown time Self No No   Sig: Insert into the rectum 3 (three) times a day as needed for hemorrhoids   Patient not taking: Reported on 7/22/2019   lidocaine (LIDODERM) 5 % Not Taking at Unknown time Self No No   Sig: Apply 1 patch topically daily Remove & Discard patch within 12 hours or as directed by MD   Patient not taking: Reported on 7/22/2019   losartan (COZAAR) 50 mg tablet 7/22/2019 at Unknown time Self No Yes   Sig: Take 1 tablet (50 mg total) by mouth daily   pantoprazole (PROTONIX) 40 mg tablet 7/22/2019 at Unknown time Self No Yes   Sig: Take 1 tablet (40 mg total) by mouth daily   pravastatin (PRAVACHOL) 40 mg tablet 7/22/2019 at Unknown time  No Yes   Sig: take 1 tablet by mouth daily   simethicone (MYLICON,GAS-X) 431 MG capsule Not Taking at Unknown time Self No No   Sig: Take 1 capsule (180 mg total) by mouth 2 (two) times a day As needed every 12 hrs prn gas and bloating   Patient not taking: Reported on 7/22/2019      Facility-Administered Medications: None       Past Medical History:   Diagnosis Date    Arthritis     Back pain     Benign essential hypertension     LAST ASSESSED: [id number]    Carotid artery stenosis     CKD (chronic kidney disease) stage 2, GFR 60-89 ml/min     Constipation, chronic     "drinks prune juice"    Diabetes mellitus ([facility] )     DMII (diabetes mellitus, type 2) ([facility])     Epiglottitis     RESOLVED: [id number]    Gastritis     GERD (gastroesophageal reflux disease)     Gout     H/O blood clots     left lower leg,,,11 yrs ago    History of stenosis of renal artery     RESOLVED: [id number]    Hypercholesteremia     Hypercholesterolemia     Hyperlipidemia     Hyperparathyroidism ([facility] )     Hypertension     Iliac artery stenosis, bilateral (HCC)     Kidney stone     Mucinous carcinoma of breast, 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    Presence of pessary     Renal artery stenosis ([facility])     Renal disorder     Renovascular hypertension     RESOLVED: [id number]    Segmental and somatic dysfunction of cervical region     RESOLVED: [id number]    Segmental and somatic dysfunction of lumbar region     RESOLVED: [id number]    Segmental and somatic dysfunction of pelvic region     RESOLVED: 41SYV5043    Segmental and somatic dysfunction of thoracic region     RESOLVED: [id number]    Somatic dysfunction of abdominal region     RESOLVED: [id number]    Somatic dysfunction of head region     RESOLVED: [id number]    Somatic dysfunction of lower extremities     RESOLVED: 77RTU4271    Somatic dysfunction of rib region     RESOLVED: [id number]    Somatic dysfunction of thoracic region     RESOLVED: 01DBR8852    Somatic dysfunction of upper extremities     RESOLVED: 93YPQ9062    Teeth missing        Past Surgical History:   Procedure Laterality Date    ANGIOPLASTY / STENTING ILIAC      BREAST LUMPECTOMY Left 09/06/2017    LAST ASSESSED: [id number]    BREAST SURGERY Left     biopsy    CATARACT EXTRACTION Bilateral     CHOLECYSTECTOMY      COLONOSCOPY      EYE SURGERY      ILIAC VEIN ANGIOPLASTY / STENTING Right     INITIAL STENOSIS: ONSET: [id number]    KNEE ARTHROSCOPY Right     KNEE SURGERY Right     ONSET: [id number]    MAMMO NEEDLE LOCALIZATION LEFT (ALL INC) Left 2018    MAMMO NEEDLE LOCALIZATION LEFT (ALL INC) Left 2017    IL ESOPHAGOGASTRODUODENOSCOPY TRANSORAL DIAGNOSTIC N/A 2018    Procedure: ESOPHAGOGASTRODUODENOSCOPY (EGD); Surgeon: Louretta Apgar, DO;  [facility]: [facility]; Service: Gastroenterology    IL LARYNGOSCOPY,DIRCT,OP [facility] TUMR N/A 6/10/2016    Procedure: MICRO DIRECT LARYNGOSCOPY WITH VOCAL FOLD MASS EXCISION ;  Surgeon: Dorina Nelson MD;  [facility];  Service: ENT    IL MASTECTOMY, PARTIAL Left 2017    Procedure: LUMPECTOMY BREAST NEEDLE LOCALIZED WITH FAXITRON NEEDLE @ 0830;  Surgeon: Arna Simmonds, MD;  [facility];  Service: General    IL PERQ DEVICE PLACEMT BREAST LOC 1ST LES [address] [place] Left 2018    Procedure: BREAST LUMPECTOMY; BREAST NEEDLE LOCALIZATION (NEEDLE LOC AT 1330); Surgeon: Kareen Young MD;  [facility];  Service: Surgical Oncology    THROAT SURGERY      lump removed    TUBAL LIGATION      UPPER GASTROINTESTINAL ENDOSCOPY      US GUIDANCE BREAST BIOPSY LEFT EACH ADDITIONAL Left 2017    US GUIDED BREAST BIOPSY LEFT COMPLETE Left 8/10/2018    US GUIDED BREAST BIOPSY LEFT COMPLETE Left 2017    VASCULAR SURGERY         Family History   Problem Relation Age of Onset    Heart disease Brother     Heart disease Maternal Grandmother     Diabetes Other         of other type with arthropathy, without long term current use of insulin    Hypertension Other     No Known Problems Mother     Gout Family     Rheum arthritis Family     Lupus Family         systematic erythematosus    Heart disease Family     Stroke Family         syndrome    Stroke Maternal Uncle         syndrome    Stroke Maternal Aunt         syndrome    No Known Problems Father      I have reviewed and agree with the history as documented      Social History     Tobacco Use    Smoking status: Former Smoker     Packs/day: 1 00     Years: 64 00     Pack years: 64 00     Last attempt to quit: 2004     Years since quitting: 15 5    Smokeless tobacco: Never Used    Tobacco comment: quit in 2003 1 ppd x 50 years   Substance Use Topics    Alcohol use: Not Currently     Frequency: Never    Drug use: No        Review of Systems   Constitutional: Negative for appetite change, chills, diaphoresis, fatigue and fever  HENT: Negative for congestion, rhinorrhea and sore throat  Respiratory: Negative for cough, shortness of breath, wheezing and stridor  Cardiovascular: Positive for chest pain  Negative for palpitations and leg swelling  Gastrointestinal: Negative for abdominal distention, abdominal pain, constipation, diarrhea, nausea and vomiting  Endocrine: Negative for polydipsia and polyuria  Genitourinary: Negative for dysuria and hematuria  Musculoskeletal: Negative for back pain, neck pain and neck stiffness  Skin: Negative for pallor, rash and wound  Neurological: Negative for dizziness, light-headedness and headaches  Psychiatric/Behavioral: Negative for behavioral problems and confusion  Physical Exam  ED Triage Vitals   Temperature Pulse Respirations Blood Pressure SpO2   07/22/19 1444 07/22/19 1444 07/22/19 1444 07/22/19 1444 07/22/19 1444   98 9 °F (37 2 °C) 99 18 (!) 178/74 99 %      Temp Source Heart Rate Source Patient Position - Orthostatic VS BP Location FiO2 (%)   07/22/19 1444 07/22/19 1444 07/22/19 1627 07/22/19 1444 --   Oral Monitor Lying Right arm       Pain Score       07/22/19 1444       No Pain             Orthostatic Vital Signs  Vitals:    07/23/19 2341 07/24/19 0532 07/24/19 0711 07/24/19 1027   BP: 155/55 163/58 158/57 167/56   Pulse: 76 82 71 74   Patient Position - Orthostatic VS:           Physical Exam   Constitutional: She is oriented to person, place, and time  She appears well-developed and well-nourished  No distress     HENT: Head: Normocephalic and atraumatic  Eyes: Conjunctivae are normal  No scleral icterus  Neck: Normal range of motion  Neck supple  Cardiovascular: Normal rate, regular rhythm and intact distal pulses  Murmur (ii/vi end systolic murmur) heard  Pulmonary/Chest: Effort normal and breath sounds normal  No respiratory distress  She has no wheezes  Abdominal: Soft  Bowel sounds are normal  She exhibits no distension  There is no tenderness  Musculoskeletal: Normal range of motion  She exhibits no edema, tenderness or deformity  Neurological: She is alert and oriented to person, place, and time  Skin: Skin is warm and dry  No rash noted  She is not diaphoretic  No erythema  No pallor  Psychiatric: She has a normal mood and affect  Her behavior is normal    Nursing note and vitals reviewed        ED Medications  Medications   aspirin (ECOTRIN LOW STRENGTH) EC tablet 81 mg (81 mg Oral Given 7/24/19 0933)   gabapentin (NEURONTIN) capsule 100 mg (100 mg Oral Given 7/23/19 2217)   losartan (COZAAR) tablet 50 mg (50 mg Oral Given 7/24/19 0933)   pantoprazole (PROTONIX) EC tablet 40 mg (40 mg Oral Given 7/24/19 0933)   senna (SENOKOT) tablet 17 2 mg (17 2 mg Oral Given 7/24/19 0933)   heparin (porcine) subcutaneous injection 5,000 Units (5,000 Units Subcutaneous Given 7/24/19 1432)   dextran 70-hypromellose (GENTEAL TEARS) 0 1-0 3 % ophthalmic solution 1 drop (1 drop Both Eyes Given 7/24/19 0933)   insulin lispro (HumaLOG) 100 units/mL subcutaneous injection 1-5 Units (1 Units Subcutaneous Not Given 7/24/19 1047)   NIFEdipine (PROCARDIA XL) 24 hr tablet 60 mg (60 mg Oral Given 7/24/19 0933)   potassium chloride (K-DUR,KLOR-CON) CR tablet 40 mEq (40 mEq Oral Given 7/23/19 1314)   potassium chloride (K-DUR,KLOR-CON) CR tablet 40 mEq (40 mEq Oral Given 7/23/19 0838)   magnesium oxide (MAG-OX) tablet 800 mg (800 mg Oral Given 7/23/19 1313)   NIFEdipine (PROCARDIA XL) 24 hr tablet 30 mg (30 mg Oral Given 7/23/19 1316)   magnesium oxide (MAG-OX) tablet 400 mg (400 mg Oral Given 7/24/19 1055)   magnesium oxide (MAG-OX) tablet 800 mg (800 mg Oral Given 7/24/19 1203)   magnesium oxide (MAG-OX) tablet 800 mg (800 mg Oral Given 7/24/19 1432)       Diagnostic Studies  Results Reviewed     Procedure Component Value Units Date/Time    Troponin I [[id number]]  (Normal) Collected:  07/22/19 1721    Lab Status:  Final result Specimen:  Blood from Arm, Left Updated:  07/22/19 1753     Troponin I 0 03 ng/mL     Comprehensive metabolic panel [[id number]]  (Abnormal) Collected:  07/22/19 1505    Lab Status:  Final result Specimen:  Blood from Arm, Right Updated:  07/22/19 1540     Sodium 140 mmol/L      Potassium 3 8 mmol/L      Chloride 104 mmol/L      CO2 27 mmol/L      ANION GAP 9 mmol/L      BUN 32 mg/dL      Creatinine 1 36 mg/dL      Glucose 159 mg/dL      Calcium 9 7 mg/dL      AST 18 U/L      ALT 15 U/L      Alkaline Phosphatase 93 U/L      Total Protein 7 8 g/dL      Albumin 3 6 g/dL      Total Bilirubin 0 36 mg/dL      eGFR 36 ml/min/1 73sq m     Narrative:       [facility] guidelines for Chronic Kidney Disease (CKD):     Stage 1 with normal or high GFR (GFR > 90 mL/min/1 73 square meters)    Stage 2 Mild CKD (GFR = 60-89 mL/min/1 73 square meters)    Stage 3A Moderate CKD (GFR = 45-59 mL/min/1 73 square meters)    Stage 3B Moderate CKD (GFR = 30-44 mL/min/1 73 square meters)    Stage 4 Severe CKD (GFR = 15-29 mL/min/1 73 square meters)    Stage 5 End Stage CKD (GFR <15 mL/min/1 73 square meters)  Note: GFR calculation is accurate only with a steady state creatinine    Lipase [[id number]]  (Normal) Collected:  07/22/19 1505    Lab Status:  Final result Specimen:  Blood from Arm, Right Updated:  07/22/19 1540     Lipase 246 u/L     B-type natriuretic peptide [[id number]]  (Normal) Collected:  07/22/19 1505    Lab Status:  Final result Specimen:  Blood from Arm, Right Updated:  07/22/19 1540     NT-proBNP 138 pg/mL     Troponin I [[id number]]  (Normal) Collected:  07/22/19 1505    Lab Status:  Final result Specimen:  Blood from Arm, Right Updated:  07/22/19 1539     Troponin I <0 02 ng/mL     CBC and differential [[id number]]  (Abnormal) Collected:  07/22/19 1505    Lab Status:  Final result Specimen:  Blood from Arm, Right Updated:  07/22/19 1515     WBC 6 43 Thousand/uL      RBC 4 18 Million/uL      Hemoglobin 11 7 g/dL      Hematocrit 38 1 %      MCV 91 fL      MCH 28 0 pg      MCHC 30 7 g/dL      RDW 12 9 %      MPV 11 9 fL      Platelets 499 Thousands/uL      nRBC 0 /100 WBCs      Neutrophils Relative 46 %      Immat GRANS % 0 %      Lymphocytes Relative 45 %      Monocytes Relative 8 %      Eosinophils Relative 1 %      Basophils Relative 0 %      Neutrophils Absolute 2 99 Thousands/µL      Immature Grans Absolute 0 02 Thousand/uL      Lymphocytes Absolute 2 86 Thousands/µL      Monocytes Absolute 0 48 Thousand/µL      Eosinophils Absolute 0 06 Thousand/µL      Basophils Absolute 0 02 Thousands/µL                  XR chest 2 views   Final Result by Daryle Fox, MD (07/22 1655)      No acute cardiopulmonary disease              Workstation performed: [id number]               Procedures  ECG 12 Lead Documentation Only  Date/Time: 7/22/2019 3:52 PM  Performed by: Elisa Carpenter MD  Authorized by: Elisa Carpenter MD     Indications / Diagnosis:  Chest pain  ECG reviewed by me, the ED Provider: yes    Patient location:  ED  Previous ECG:     Previous ECG:  Compared to current    Similarity:  Changes noted  Interpretation:     Interpretation: abnormal    Rate:     ECG rate:  101    ECG rate assessment: tachycardic    Rhythm:     Rhythm: sinus tachycardia    Ectopy:     Ectopy: none    QRS:     QRS axis:  Normal    QRS intervals:  Normal  Conduction:     Conduction: normal    ST segments:     ST segments:  Non-specific    Depression:  II and III  T waves:     T waves: inverted      Inverted:  I and aVL            ED Course  ED Course as of Jul 24 1457   Mon Jul 22, 2019   1548 Troponin I: <0 02   1548 Baseline   Creatinine(!): 1 36         HEART Risk Score      Most Recent Value   History  1 Filed at: 07/22/2019 1719   ECG  2 Filed at: 07/22/2019 1719   Age  2 Filed at: 07/22/2019 1719   Risk Factors  2 Filed at: 07/22/2019 1719   Troponin  0 Filed at: 07/22/2019 1719   Heart Score Risk Calculator   History  1 Filed at: 07/22/2019 1719   ECG  2 Filed at: 07/22/2019 1719   Age  2 Filed at: 07/22/2019 1719   Risk Factors  2 Filed at: 07/22/2019 1719   Troponin  0 Filed at: 07/22/2019 1719   HEART Score  7 Filed at: 07/22/2019 1719   HEART Score  7 Filed at: 07/22/2019 1719        Identification of Seniors at Risk      Most Recent Value   (ISAR) Identification of Seniors at Risk   Before the illness or injury that brought you to the Emergency, did you need someone to help you on a regular basis? 0 Filed at: 07/22/2019 1500   In the last 24 hours, have you needed more help than usual?  0 Filed at: 07/22/2019 1500   Have you been hospitalized for one or more nights during the past 6 months?    In general, do you see well?  0 Filed at: 07/22/2019 1500   In general, do you have serious problems with your memory? 0 Filed at: 07/22/2019 1500   Do you take more than three different medications every day?    ISAR Score  0 Filed at: 07/22/2019 1500                          MDM  Number of Diagnoses or Management Options  Chest pain: new and requires workup  Diagnosis management comments: A 3 old woman presents with chest pain  It is a squeezing pain that is constant  Pain radiates to the left shoulder  No radiation to the back  Patient denies dyspnea  Physical exam patient has stable vital signs  She appears mildly uncomfortable but otherwise well appearing  Concern for ACS  Will order labs, EKG, troponin, chest x-ray  Will give 324 aspirin  Initial troponin negative    Patient observed briefly in the emergency department  Vital signs remained stable  EKG shows T-wave inversion and lateral leads  Patient has a heart score of 7 and EKG changes  Will admit to [facility] for ACS rule out  Amount and/or Complexity of Data Reviewed  Clinical lab tests: ordered and reviewed  Tests in the radiology section of CPT®: reviewed and ordered  Decide to obtain previous medical records or to obtain history from someone other than the patient: yes  Obtain history from someone other than the patient: yes  Review and summarize past medical records: yes  Discuss the patient with other providers: yes  Independent visualization of images, tracings, or specimens: yes    Risk of Complications, Morbidity, and/or Mortality  Presenting problems: moderate  Diagnostic procedures: low  Management options: low    Patient Progress  Patient progress: stable      Disposition  Final diagnoses:   Chest pain     Time reflects when diagnosis was documented in both MDM as applicable and the Disposition within this note     Time User Action Codes Description Comment    7/22/2019  5:20 PM Mimi Patricio Add [R07 9] Chest pain       ED Disposition     ED Disposition Condition Date/Time Comment    Admit Stable Mon Jul 22, 2019  5:20 PM Case was discussed with FP and the patient's admission status was agreed to be Admission Status: observation status to the service of Dr Kade Zuluaga          Follow-up Information    None         Current Discharge Medication List      CONTINUE these medications which have NOT CHANGED    Details   Acetaminophen (TYLENOL) 325 MG CAPS Take by mouth every 8 (eight) hours      aspirin (ADULT ASPIRIN EC LOW STRENGTH) 81 mg EC tablet Take 1 tablet by mouth daily      calcitriol (ROCALTROL) 0 25 mcg capsule Take 1 capsule (0 25 mcg total) by mouth 2 (two) times a week Please take every Tuesday and friday  Qty: 10 capsule, Refills: 6    Comments: Note change in dose  Associated Diagnoses: Secondary hyperparathyroidism ([facility] )      calcium carbonate-vitamin D (OSCAL-D) 500 mg-200 units per tablet Take 1 tablet by mouth daily with breakfast      fluticasone (FLONASE) 50 mcg/act nasal spray instill 1 spray into each nostril once daily  Qty: 16 g, Refills: 2    Associated Diagnoses:  Allergic rhinitis, unspecified seasonality, unspecified trigger      furosemide (LASIX) 20 mg tablet Take 1 tablet (20 mg total) by mouth daily Daily with swelling, every other day once swelling resolves  Qty: 30 tablet, Refills: 3    Associated Diagnoses: Lower extremity edema      gabapentin (NEURONTIN) 100 mg capsule Take 1 capsule (100 mg total) by mouth daily at bedtime for 10 days  Qty: 90 capsule, Refills: 1    Associated Diagnoses: Chronic thoracic back pain, unspecified back pain laterality      Lancets (ONETOUCH ULTRASOFT) lancets USE TWICE DAILY  Qty: 200 each, Refills: 9    Associated Diagnoses: Type 2 diabetes mellitus without complication (HCC)      losartan (COZAAR) 50 mg tablet Take 1 tablet (50 mg total) by mouth daily  Qty: 90 tablet, Refills: 2    Associated Diagnoses: Hypertension      NIFEdipine (NIFEDICAL XL) 30 mg 24 hr tablet Take 1 tablet (30 mg total) by mouth daily  Qty: 90 tablet, Refills: 1    Associated Diagnoses: Essential hypertension      pantoprazole (PROTONIX) 40 mg tablet Take 1 tablet (40 mg total) by mouth daily  Qty: 30 tablet, Refills: 5    Associated Diagnoses: Gastroesophageal reflux disease without esophagitis      pravastatin (PRAVACHOL) 40 mg tablet take 1 tablet by mouth daily  Qty: 90 tablet, Refills: 0    Associated Diagnoses: Hyperlipidemia, unspecified hyperlipidemia type      DULoxetine (CYMBALTA) 30 mg delayed release capsule take 1 capsule by mouth once daily  Qty: 90 capsule, Refills: 0    Associated Diagnoses: Musculoskeletal pain, chronic      hydrocortisone (ANUSOL-HC, PROCTOSOL HC,) 2 5 % rectal cream Insert into the rectum 3 (three) times a day as needed for hemorrhoids  Qty: 30 g, Refills: 0 Associated Diagnoses: Hemorrhoids, unspecified hemorrhoid type      lidocaine (LIDODERM) 5 % Apply 1 patch topically daily Remove & Discard patch within 12 hours or as directed by MD  Qty: 30 patch, Refills: 0    Associated Diagnoses: Right elbow pain      Magnesium 400 MG CAPS Take 400 mg by mouth daily      ONE TOUCH ULTRA TEST test strip 1 each by Other route 2 (two) times a day Use as instructed  Qty: 100 each, Refills: 5    Associated Diagnoses: Type 2 diabetes mellitus without complication, with long-term current use of insulin ([facility])      RESTASIS 0 05 % ophthalmic emulsion INSTILL 1 GTT INTO OU BID  Refills: 3      simethicone (MYLICON,GAS-X) 873 MG capsule Take 1 capsule (180 mg total) by mouth 2 (two) times a day As needed every 12 hrs prn gas and bloating  Qty: 14 capsule, Refills: 5    Associated Diagnoses: Epigastric pain           No discharge procedures on file  ED Provider  Attending physically available and evaluated Montrell Maldonado  [facility] managed the patient along with the ED Attending      Electronically Signed by         Yesenia Christian MD  07/24/19 4522

## 2019-07-23 ENCOUNTER — PATIENT OUTREACH (OUTPATIENT)
Dept: FAMILY MEDICINE CLINIC | Facility: CLINIC | Age: 84
End: 2019-07-23

## 2019-07-23 PROBLEM — N17.9 AKI (ACUTE KIDNEY INJURY) (HCC): Status: ACTIVE | Noted: 2019-07-23

## 2019-07-23 LAB
ANION GAP SERPL CALCULATED.3IONS-SCNC: 6 MMOL/L (ref 4–13)
ATRIAL RATE: 77 BPM
ATRIAL RATE: 78 BPM
BUN SERPL-MCNC: 29 MG/DL (ref 5–25)
CALCIUM SERPL-MCNC: 8.8 MG/DL (ref 8.3–10.1)
CHLORIDE SERPL-SCNC: 109 MMOL/L (ref 100–108)
CO2 SERPL-SCNC: 27 MMOL/L (ref 21–32)
CREAT SERPL-MCNC: 1.04 MG/DL (ref 0.6–1.3)
ERYTHROCYTE [DISTWIDTH] IN BLOOD BY AUTOMATED COUNT: 13.1 % (ref 11.6–15.1)
GFR SERPL CREATININE-BSD FRML MDRD: 49 ML/MIN/1.73SQ M
GLUCOSE SERPL-MCNC: 110 MG/DL (ref 65–140)
GLUCOSE SERPL-MCNC: 110 MG/DL (ref 65–140)
GLUCOSE SERPL-MCNC: 117 MG/DL (ref 65–140)
GLUCOSE SERPL-MCNC: 143 MG/DL (ref 65–140)
GLUCOSE SERPL-MCNC: 82 MG/DL (ref 65–140)
HCT VFR BLD AUTO: 32.4 % (ref 34.8–46.1)
HGB BLD-MCNC: 10.1 G/DL (ref 11.5–15.4)
MAGNESIUM SERPL-MCNC: 1.3 MG/DL (ref 1.6–2.6)
MCH RBC QN AUTO: 28.7 PG (ref 26.8–34.3)
MCHC RBC AUTO-ENTMCNC: 31.2 G/DL (ref 31.4–37.4)
MCV RBC AUTO: 92 FL (ref 82–98)
P AXIS: 65 DEGREES
P AXIS: 72 DEGREES
PLATELET # BLD AUTO: 228 THOUSANDS/UL (ref 149–390)
PMV BLD AUTO: 12 FL (ref 8.9–12.7)
POTASSIUM SERPL-SCNC: 3.3 MMOL/L (ref 3.5–5.3)
PR INTERVAL: 152 MS
PR INTERVAL: 164 MS
QRS AXIS: 10 DEGREES
QRS AXIS: 4 DEGREES
QRSD INTERVAL: 86 MS
QRSD INTERVAL: 86 MS
QT INTERVAL: 374 MS
QT INTERVAL: 378 MS
QTC INTERVAL: 423 MS
QTC INTERVAL: 430 MS
RBC # BLD AUTO: 3.52 MILLION/UL (ref 3.81–5.12)
SODIUM SERPL-SCNC: 142 MMOL/L (ref 136–145)
T WAVE AXIS: 64 DEGREES
T WAVE AXIS: 82 DEGREES
TROPONIN I SERPL-MCNC: <0.02 NG/ML
VENTRICULAR RATE: 77 BPM
VENTRICULAR RATE: 78 BPM
WBC # BLD AUTO: 5.49 THOUSAND/UL (ref 4.31–10.16)

## 2019-07-23 PROCEDURE — 82948 REAGENT STRIP/BLOOD GLUCOSE: CPT

## 2019-07-23 PROCEDURE — 84484 ASSAY OF TROPONIN QUANT: CPT | Performed by: FAMILY MEDICINE

## 2019-07-23 PROCEDURE — 99219 PR INITIAL OBSERVATION CARE/DAY 50 MINUTES: CPT | Performed by: FAMILY MEDICINE

## 2019-07-23 PROCEDURE — 93010 ELECTROCARDIOGRAM REPORT: CPT | Performed by: INTERNAL MEDICINE

## 2019-07-23 PROCEDURE — 83735 ASSAY OF MAGNESIUM: CPT | Performed by: FAMILY MEDICINE

## 2019-07-23 PROCEDURE — 85027 COMPLETE CBC AUTOMATED: CPT | Performed by: FAMILY MEDICINE

## 2019-07-23 PROCEDURE — 80048 BASIC METABOLIC PNL TOTAL CA: CPT | Performed by: FAMILY MEDICINE

## 2019-07-23 PROCEDURE — 93005 ELECTROCARDIOGRAM TRACING: CPT

## 2019-07-23 RX ORDER — SODIUM CHLORIDE 9 MG/ML
50 INJECTION, SOLUTION INTRAVENOUS CONTINUOUS
Status: DISCONTINUED | OUTPATIENT
Start: 2019-07-23 | End: 2019-07-23

## 2019-07-23 RX ORDER — SODIUM CHLORIDE 9 MG/ML
50 INJECTION, SOLUTION INTRAVENOUS CONTINUOUS
Status: DISCONTINUED | OUTPATIENT
Start: 2019-07-22 | End: 2019-07-23

## 2019-07-23 RX ORDER — NIFEDIPINE 60 MG/1
60 TABLET, EXTENDED RELEASE ORAL DAILY
Status: DISCONTINUED | OUTPATIENT
Start: 2019-07-23 | End: 2019-07-23

## 2019-07-23 RX ORDER — NIFEDIPINE 30 MG/1
30 TABLET, EXTENDED RELEASE ORAL ONCE
Status: COMPLETED | OUTPATIENT
Start: 2019-07-23 | End: 2019-07-23

## 2019-07-23 RX ORDER — POTASSIUM CHLORIDE 20 MEQ/1
40 TABLET, EXTENDED RELEASE ORAL ONCE
Status: COMPLETED | OUTPATIENT
Start: 2019-07-23 | End: 2019-07-23

## 2019-07-23 RX ORDER — NIFEDIPINE 60 MG/1
60 TABLET, EXTENDED RELEASE ORAL DAILY
Status: DISCONTINUED | OUTPATIENT
Start: 2019-07-24 | End: 2019-07-24 | Stop reason: HOSPADM

## 2019-07-23 RX ORDER — NIFEDIPINE 60 MG/1
60 TABLET, EXTENDED RELEASE ORAL DAILY
Status: DISCONTINUED | OUTPATIENT
Start: 2019-07-24 | End: 2019-07-23

## 2019-07-23 RX ADMIN — DEXTRAN 70 AND HYPROMELLOSE 2910 1 DROP: 1; 3 SOLUTION/ DROPS OPHTHALMIC at 22:23

## 2019-07-23 RX ADMIN — PANTOPRAZOLE SODIUM 40 MG: 40 TABLET, DELAYED RELEASE ORAL at 08:38

## 2019-07-23 RX ADMIN — HEPARIN SODIUM 5000 UNITS: 5000 INJECTION INTRAVENOUS; SUBCUTANEOUS at 22:23

## 2019-07-23 RX ADMIN — ASPIRIN 81 MG: 81 TABLET, COATED ORAL at 08:38

## 2019-07-23 RX ADMIN — HEPARIN SODIUM 5000 UNITS: 5000 INJECTION INTRAVENOUS; SUBCUTANEOUS at 13:18

## 2019-07-23 RX ADMIN — HEPARIN SODIUM 5000 UNITS: 5000 INJECTION INTRAVENOUS; SUBCUTANEOUS at 06:44

## 2019-07-23 RX ADMIN — LOSARTAN POTASSIUM 50 MG: 50 TABLET ORAL at 08:38

## 2019-07-23 RX ADMIN — NIFEDIPINE 30 MG: 30 TABLET, FILM COATED, EXTENDED RELEASE ORAL at 13:16

## 2019-07-23 RX ADMIN — SENNOSIDES 17.2 MG: 8.6 TABLET, FILM COATED ORAL at 08:38

## 2019-07-23 RX ADMIN — NIFEDIPINE 30 MG: 30 TABLET, FILM COATED, EXTENDED RELEASE ORAL at 08:39

## 2019-07-23 RX ADMIN — POTASSIUM CHLORIDE 40 MEQ: 1500 TABLET, EXTENDED RELEASE ORAL at 13:14

## 2019-07-23 RX ADMIN — POTASSIUM CHLORIDE 40 MEQ: 1500 TABLET, EXTENDED RELEASE ORAL at 08:38

## 2019-07-23 RX ADMIN — GABAPENTIN 100 MG: 100 CAPSULE ORAL at 22:17

## 2019-07-23 RX ADMIN — MAGNESIUM OXIDE TAB 400 MG (241.3 MG ELEMENTAL MG) 800 MG: 400 (241.3 MG) TAB at 13:13

## 2019-07-23 RX ADMIN — DEXTRAN 70 AND HYPROMELLOSE 2910 1 DROP: 1; 3 SOLUTION/ DROPS OPHTHALMIC at 13:13

## 2019-07-23 NOTE — ASSESSMENT & PLAN NOTE
- cardiac vs GI  - EKG changes: ST depression in 2 and 3, T-wave inversion in 1 and aVL  - initial troponin negative  - chest pain resolved  - trend troponins/EKGs  - monitor for symptoms

## 2019-07-23 NOTE — SOCIAL WORK
CM met w/ pt for d/c/ planning  Pt aware of CM role  PTA pt lives w/ significant other Farhat Olivares  In ranch home w/ 5 steps to enter  Emergency contact is Jorge Avalos [phone] who will transport to home at d/c  Pt is independent w/ all ADLS & Ambulates without assistive device  Pt drives, is retired  Pt uses [facility] on [address]  PCP DR Sabiha Kulkarni  No POA/Advanced directives  Pt has had no VNA, or short term rehab, psych or D & A Admission history  CM reviewed d/c planning process including the following: identifying help at home, patient preference for d/c planning needs, Discharge Lounge, [facility] Meds to Bed program, availability of treatment team to discuss questions or concerns patient and/or family may have regarding understanding medications and recognizing signs and symptoms once discharged  CM also encouraged patient to follow up with all recommended appointments after discharge  Patient advised of importance for patient and family to participate in managing patients medical well being    Discharge checklist discussed with patient

## 2019-07-23 NOTE — ASSESSMENT & PLAN NOTE
- Patient had chest pain initially at home and at rest which resolved prior to being seen in the ED  - EKG changes: ST depression in 2 and 3, T-wave inversion in 1 and aVL  - Troponin trend: <0 02 -> 0 03 -> <0 02   - No further chest pain  - Continuing to follow blood pressure  - 1 episode of tachycardia to low 110s, patient was asymptomatic  - Continue to monitor on telemetry   - Will obtain repeat EKG prior to discharge

## 2019-07-23 NOTE — PROGRESS NOTES
Progress Note - King Blackburn 1934, 80 y o  female MRN: [id number]    Unit/Bed#: CW2 214-02 Encounter: [id number]    Primary Care Provider: Chayito Rees MD   Date and time admitted to hospital: 7/22/2019  2:39 PM    Assessment and Plan:     Hypertension  Assessment & Plan  - Elevated between 627K-269 systolic and 53F-31Y diastolic, typically below 150/90 as outpatient, 178/58 this morning   - Continue to monitor closely   - Continue PTA Cozaar 50 mg daily  - PTA Lasix and Pravachol held during admission  - Increase Nifedipine to 60 mg daily from 30 mg  - Anticipate discharge tomorrow pending improvement in blood pressure over next 24 hours       * Chest pain  Assessment & Plan  - Patient had chest pain initially at home and at rest which resolved prior to being seen in the ED  - EKG changes: ST depression in 2 and 3, T-wave inversion in 1 and aVL  - Troponin trend: <0 02 -> 0 03 -> <0 02   - No further chest pain  - Continuing to follow blood pressure  - 1 episode of tachycardia to low 110s, patient was asymptomatic  - Continue to monitor on telemetry   - Will obtain repeat EKG prior to discharge       CKD (chronic kidney disease), stage III [facility])  Assessment & Plan  Lab Results   Component Value Date    CREATININE 1 04 07/23/2019     - Creatinine: 1 36 on admission, baseline: 1 1-1 4, GFR:  36, at baseline  - Improve BP control, goal <140/90      DMII (diabetes mellitus, type 2) ([facility])  Assessment & Plan  Lab Results   Component Value Date    HGBA1C 6 4 (H) 06/17/2019       Recent Labs     07/22/19  2134 07/23/19  0607 07/23/19  1056   POCGLU 162* 117 82       - Well controlled  - Patient reporting 1 month history of decreased sensation in both feet  Will correct electrolytes and monitor for improvement   Consider possible etiology of numbness peripheral neuropathy secondary to diabetes   - Not currently on any PTA medical regimen  - SSI #2  - Continue PTA gabapentin 100 mg HS, consider increasing to TID     GERD (gastroesophageal reflux disease)  Assessment & Plan  Stable  Continue PTA Protonix    DVT Prophylaxis: Heparin, SCDs  Diet: Diabetic diet   Code Status: Level 1- Full Code  Disposition: Continue inpatient care, monitor blood pressures     Discussed with attending physician, Dr Reji Romero, who agrees with the assessment and plan  Subjective:   Patient reports no chest pain, shortness of breath, or episodes of dizziness overnight  She was able to walk around her room and to the bathroom without any symptoms  She had one episode of tachycardia this morning to the 110s and she was asymptomatic at that time  She reports constipation, which is a chronic problem, but she was able to tolerate her normal diet without nausea or worsening reflux symptoms  She reports a one month history of decreased sensation in her feet  She says that this started when she began taking Losartan  She has no pain in her legs or swelling       Objective:   Current Facility-Administered Medications   Medication Dose Route Frequency    aspirin (ECOTRIN LOW STRENGTH) EC tablet 81 mg  81 mg Oral Daily    dextran 70-hypromellose (GENTEAL TEARS) 0 1-0 3 % ophthalmic solution 1 drop  1 drop Both Eyes Q12H    gabapentin (NEURONTIN) capsule 100 mg  100 mg Oral HS    heparin (porcine) subcutaneous injection 5,000 Units  5,000 Units Subcutaneous Q8H [facility]    insulin lispro (HumaLOG) 100 units/mL subcutaneous injection 1-5 Units  1-5 Units Subcutaneous TID AC    losartan (COZAAR) tablet 50 mg  50 mg Oral Daily    [START ON 7/24/2019] NIFEdipine (PROCARDIA XL) 24 hr tablet 60 mg  60 mg Oral Daily    pantoprazole (PROTONIX) EC tablet 40 mg  40 mg Oral Daily    senna (SENOKOT) tablet 17 2 mg  2 tablet Oral Daily     Allergies   Allergen Reactions    Clonidine Derivatives Swelling    Diflucan [Fluconazole] Rash    Flagyl [Metronidazole] Anaphylaxis    Carvedilol Hives     And legs swelled    Latex Rash    Lipitor [Atorvastatin] Legs swelled    Other Palpitations     IV DYE       Labs:  Results Reviewed     Procedure Component Value Units Date/Time    Troponin I [[id number]]  (Normal) Collected:  07/22/19 1721    Lab Status:  Final result Specimen:  Blood from Arm, Left Updated:  07/22/19 1753     Troponin I 0 03 ng/mL     Comprehensive metabolic panel [[id number]]  (Abnormal) Collected:  07/22/19 1505    Lab Status:  Final result Specimen:  Blood from Arm, Right Updated:  07/22/19 1540     Sodium 140 mmol/L      Potassium 3 8 mmol/L      Chloride 104 mmol/L      CO2 27 mmol/L      ANION GAP 9 mmol/L      BUN 32 mg/dL      Creatinine 1 36 mg/dL      Glucose 159 mg/dL      Calcium 9 7 mg/dL      AST 18 U/L      ALT 15 U/L      Alkaline Phosphatase 93 U/L      Total Protein 7 8 g/dL      Albumin 3 6 g/dL      Total Bilirubin 0 36 mg/dL      eGFR 36 ml/min/1 73sq m     Narrative:       [place] guidelines for Chronic Kidney Disease (CKD):     Stage 1 with normal or high GFR (GFR > 90 mL/min/1 73 square meters)    Stage 2 Mild CKD (GFR = 60-89 mL/min/1 73 square meters)    Stage 3A Moderate CKD (GFR = 45-59 mL/min/1 73 square meters)    Stage 3B Moderate CKD (GFR = 30-44 mL/min/1 73 square meters)    Stage 4 Severe CKD (GFR = 15-29 mL/min/1 73 square meters)    Stage 5 End Stage CKD (GFR <15 mL/min/1 73 square meters)  Note: GFR calculation is accurate only with a steady state creatinine    Lipase [[id number]]  (Normal) Collected:  07/22/19 1505    Lab Status:  Final result Specimen:  Blood from Arm, Right Updated:  07/22/19 1540     Lipase 246 u/L     B-type natriuretic peptide [[id number]]  (Normal) Collected:  07/22/19 1505    Lab Status:  Final result Specimen:  Blood from Arm, Right Updated:  07/22/19 1540     NT-proBNP 138 pg/mL     Troponin I [[id number]]  (Normal) Collected:  07/22/19 1505    Lab Status:  Final result Specimen:  Blood from Arm, Right Updated:  07/22/19 1539     Troponin I <0 02 ng/mL     CBC and differential [[id number]]  (Abnormal) Collected:  07/22/19 1505    Lab Status:  Final result Specimen:  Blood from Arm, Right Updated:  07/22/19 1515     WBC 6 43 Thousand/uL      RBC 4 18 Million/uL      Hemoglobin 11 7 g/dL      Hematocrit 38 1 %      MCV 91 fL      MCH 28 0 pg      MCHC 30 7 g/dL      RDW 12 9 %      MPV 11 9 fL      Platelets 855 Thousands/uL      nRBC 0 /100 WBCs      Neutrophils Relative 46 %      Immat GRANS % 0 %      Lymphocytes Relative 45 %      Monocytes Relative 8 %      Eosinophils Relative 1 %      Basophils Relative 0 %      Neutrophils Absolute 2 99 Thousands/µL      Immature Grans Absolute 0 02 Thousand/uL      Lymphocytes Absolute 2 86 Thousands/µL      Monocytes Absolute 0 48 Thousand/µL      Eosinophils Absolute 0 06 Thousand/µL      Basophils Absolute 0 02 Thousands/µL         Imaging/Other:    CXR 7/22: IMPRESSION "No acute cardiopulmonary disease"    Vitals:  Vitals:    07/22/19 1919 07/23/19 0603 07/23/19 0702 07/23/19 1208   BP: 168/56 170/56 (!) 174/57 (!) 178/58   BP Location:   Right arm    Pulse: 86 73 75 78   Resp: 19 17 17 17   Temp: 98 1 °F (36 7 °C) 97 9 °F (36 6 °C) 98 1 °F (36 7 °C) 98 °F (36 7 °C)   TempSrc:   Oral    SpO2: 96% 93% 96% 98%   Weight:             Intake/Output Summary (Last 24 hours) at 7/23/2019 1409  Last data filed at 7/23/2019 0849  Gross per 24 hour   Intake 120 ml   Output 500 ml   Net -380 ml       Physical Exam:   Physical Exam   Constitutional: She is oriented to person, place, and time  She appears well-developed and well-nourished  No distress  HENT:   Head: Normocephalic and atraumatic  Eyes: Conjunctivae are normal  Right eye exhibits no discharge  Left eye exhibits no discharge  No scleral icterus  Cardiovascular: Normal rate, regular rhythm, normal heart sounds and intact distal pulses  Exam reveals no gallop and no friction rub  No murmur heard  Pulmonary/Chest: Effort normal and breath sounds normal  No stridor   No respiratory distress  She has no wheezes  She has no rales  She exhibits no tenderness  Abdominal: Soft  Bowel sounds are normal  She exhibits no distension  There is no tenderness  There is no guarding  Neurological: She is alert and oriented to person, place, and time  A sensory deficit (Decreased sensation to light touch bilaterally in lower calf and feet) is present  Skin: Skin is warm and dry  She is not diaphoretic  Psychiatric: She has a normal mood and affect   Her behavior is normal  Thought content normal        Invasive Devices     Peripheral Intravenous Line            Peripheral IV 07/22/19 Left Hand less than 1 day    Peripheral IV 07/22/19 Proximal;Right;Ventral (anterior) Forearm less than 1 day                Mina De Jesus DO  [facility] Medicine, PGY-3

## 2019-07-23 NOTE — ASSESSMENT & PLAN NOTE
- Elevated between 032Z-387 systolic and 79J-72K diastolic, typically below 150/90 as outpatient, 178/58 this morning   - Continue to monitor closely   - Continue PTA Cozaar 50 mg daily  - PTA Lasix and Pravachol held during admission  - Increase Nifedipine to 60 mg daily from 30 mg  - Anticipate discharge tomorrow pending improvement in blood pressure over next 24 hours

## 2019-07-23 NOTE — UTILIZATION REVIEW
Initial Clinical Review    Admission: Date/Time/Statement:  07/22/2019 @ 1721  Orders Placed This Encounter   Procedures    Place in Observation (expected length of stay for this patient is less than two midnights)     Standing Status:   Standing     Number of Occurrences:   1     Order Specific Question:   Admitting Physician     Answer: Shaina Robison [[id number]]     Order Specific Question:   Level of Care     Answer:   Med Surg [[id number]]     ED Arrival Information     Expected Arrival Acuity Means of Arrival Escorted By Service Admission Type    - 7/22/2019 14:39 Emergent Ambulance [facility] Emergency    Arrival Complaint    -Chest Pain        Chief Complaint   Patient presents with    Chest Pain     chest pain, as per EMS pt had EKG changes on the way here  Assessment/Plan: 80year old female, presented to the ED from home via EMS  Admitted as OBSERVATION due to chest pain  Began @ 1430 while coloring on her iPad  * Chest pain:  cardiac vs GI    EKG changes: ST depression in 2 and 3, T-wave inversion in 1 and aVL  initial troponin negative  chest pain resolved  trend troponins/EKGs   monitor for symptoms  07/23/2019  Progress Note:  Hypertension:   Elevated between 535S-608 systolic and 15P-64K diastolic, typically below 150/90 as outpatient, 178/58 this morning  Continue to monitor closely  Continue PTA Cozaar 50 mg daily  PTA Lasix and Pravachol held during admission  Increase Nifedipine to 60 mg daily from 30 mg          ED Triage Vitals   Temperature Pulse Respirations Blood Pressure SpO2   07/22/19 1444 07/22/19 1444 07/22/19 1444 07/22/19 1444 07/22/19 1444   98 9 °F (37 2 °C) 99 18 (!) 178/74 99 %      Temp Source Heart Rate Source Patient Position - Orthostatic VS BP Location FiO2 (%)   07/22/19 1444 07/22/19 1444 07/22/19 1627 07/22/19 1444 --   Oral Monitor Lying Right arm       Pain Score       07/22/19 1444       No Pain        Wt Readings from Last 1 Encounters:   07/22/19 67 8 kg (149 lb 7 6 oz)     Additional Vital Signs:   Date/Time  Temp  Pulse  Resp  BP  SpO2  O2 Device  Patient Position - Orthostatic VS   07/23/19 07:02:30  98 1 °F (36 7 °C)  75  17  174/57Abnormal   96 %  None (Room air)  Lying   07/23/19 06:03:07  97 9 °F (36 6 °C)  73  17  170/56  93 %       07/22/19 19:19:36  98 1 °F (36 7 °C)  86  19  168/56  96 %       07/22/19 1800    82  16  178/77Abnormal   98 %  None (Room air)  Lying   07/22/19 1700    81  16  171/76Abnormal   96 %  None (Room air)  Sitting   07/22/19 1627    75  16  168/73  97 %  None (Room air)  Lying     Pertinent Labs/Diagnostic Test Results:   Results from last 7 days   Lab Units 07/23/19  0615 07/22/19  2151 07/22/19  1505   WBC Thousand/uL 5 49  --  6 43   HEMOGLOBIN g/dL 10 1*  --  11 7   HEMATOCRIT % 32 4*  --  38 1   PLATELETS Thousands/uL 228 254 258   NEUTROS ABS Thousands/µL  --   --  2 99     Results from last 7 days   Lab Units 07/23/19  0615 07/22/19  1505 07/17/19  1329   SODIUM mmol/L 142 140 138   POTASSIUM mmol/L 3 3* 3 8 3 9   CHLORIDE mmol/L 109* 104 105   CO2 mmol/L 27 27 30   ANION GAP mmol/L 6 9 3*   BUN mg/dL 29* 32* 38*   CREATININE mg/dL 1 04 1 36* 1 43*   EGFR ml/min/1 73sq m 49 36 34   CALCIUM mg/dL 8 8 9 7 9 1   MAGNESIUM mg/dL 1 3*  --   --      Results from last 7 days   Lab Units 07/22/19  1505   AST U/L 18   ALT U/L 15   ALK PHOS U/L 93   TOTAL PROTEIN g/dL 7 8   ALBUMIN g/dL 3 6   TOTAL BILIRUBIN mg/dL 0 36     Results from last 7 days   Lab Units 07/23/19  0607 07/22/19  2134   POC GLUCOSE mg/dl 117 162*     Results from last 7 days   Lab Units 07/23/19  0615 07/22/19  1505 07/17/19  1329   GLUCOSE RANDOM mg/dL 110 159* 168*     Results from last 7 days   Lab Units 07/23/19  0058 07/22/19  2151 07/22/19  1721 07/22/19  1505   TROPONIN I ng/mL <0 02 <0 02 0 03 <0 02     Results from last 7 days   Lab Units 07/22/19  1505   NT-PRO BNP pg/mL 138     Results from last 7 days   Lab Units 19  1505   LIPASE u/L 246     2019 @ 1637  Chest X:  No acute cardiopulmonary disease  2019 @ 1446  EC, Sinus tachycardia, Septal infarct (cited on or before 2019)  ST & T wave abnormality, consider lateral ischemia      ED Treatment:   Medication Administration from 2019 1439 to 2019 1854       Date/Time Order Dose Route Action Comments     2019 1618 aspirin chewable tablet 324 mg 324 mg Oral Not Given Per pt, she received 4 aspirin pre hospital by EMS          Past Medical History:   Diagnosis Date    Arthritis     Back pain     Benign essential hypertension     LAST ASSESSED: [id number]    Carotid artery stenosis     CKD (chronic kidney disease) stage 2, GFR 60-89 ml/min     Constipation, chronic     "drinks prune juice"    Diabetes [facility]     DMII [facility]diabetes mellitus, type 2) ([facility])     Epiglottitis     RESOLVED: [id number]    Gastritis     GERD (gastroesophageal reflux disease)     Gout     H/O blood clots     left lower leg,,,11 yrs ago    History of stenosis of renal artery     RESOLVED: [id number]    Hypercholesteremia     Hypercholesterolemia     Hyperlipidemia     Hyperparathyroidism ([facility] )     Hypertension     Iliac artery stenosis, bilateral ([facility] )     Kidney stone     [facility]    Presence of pessary     Renal artery stenosis ([facility])     Renal disorder     Renovascular hypertension     RESOLVED: [id number]    Segmental and somatic dysfunction of cervical region     RESOLVED: [id number]    Segmental and somatic dysfunction of lumbar region     RESOLVED: [id number]    Segmental and somatic dysfunction of pelvic region     RESOLVED: [id number]    Segmental and somatic dysfunction of thoracic region     RESOLVED: [id number]    Somatic dysfunction of abdominal region     RESOLVED: [id number]    Somatic dysfunction of head region     RESOLVED: [id number]    Somatic dysfunction of lower extremities     RESOLVED: [id number]    Somatic dysfunction of rib region     RESOLVED: [id number]    Somatic dysfunction of thoracic region     RESOLVED: [id number]    Somatic dysfunction of upper extremities     RESOLVED: [id number]    Teeth missing      Present on Admission:   Chest pain   Hypertension   CKD (chronic kidney disease), stage III (HCC)   DMII (diabetes mellitus, type 2) ([facility])   GERD (gastroesophageal reflux disease)      Admitting Diagnosis: Chest pain [R07 9]  Age/Sex: 80 y o  female  Admission Orders:  Current Facility-Administered Medications:  aspirin 81 mg Oral Daily   dextran 70-hypromellose 1 drop Both Eyes Q12H   gabapentin 100 mg Oral HS   heparin (porcine) 5,000 Units Subcutaneous Q8H [facility]   insulin lispro 1-5 Units Subcutaneous TID AC   losartan 50 mg Oral Daily   NIFEdipine 30 mg Oral Daily   pantoprazole 40 mg Oral Daily   potassium chloride 40 mEq Oral Once   senna 2 tablet Oral Daily     TELM  Chaim SCDs      Network Utilization Review Department  Phone: [phone]; Fax [phone]  [email or web address]  ATTENTION: Please call with any questions or concerns to [phone]  and carefully listen to the prompts so that you are directed to the right person  Send all requests for admission clinical reviews, approved or denied determinations and any other requests to fax [phone]   All voicemails are confidential

## 2019-07-23 NOTE — ASSESSMENT & PLAN NOTE
Lab Results   Component Value Date    CREATININE 1 36 (H) 07/22/2019     - NORTH on CKD  - creatinine:  1 36, baseline: 1 1, GFR:  36, at baseline  - will provide gentle hydration w/IV NS 1L @50cc/hr, G1DD, EF:70% on 02/2018

## 2019-07-23 NOTE — ASSESSMENT & PLAN NOTE
- elevated, on evaluation 168/56, typically below 150/90 as outpatient  - Asx, continue to monitor  - continue PTA nifedipine 30 mg daily, Cozaar 50 mg daily, ASA 81 mg daily,   - PTA Lasix and Pravachol held

## 2019-07-23 NOTE — H&P
H&P- Sisi Sheehan 1934, 80 y o  female MRN: [id number]    Unit/Bed#: CW2 214-02 Encounter: [id number]    Primary Care Provider: Martinez Shannon MD   Date and time admitted to hospital: 7/22/2019  2:39 PM        * Chest pain  Assessment & Plan  - cardiac vs GI  - EKG changes: ST depression in 2 and 3, T-wave inversion in 1 and aVL  - initial troponin negative  - chest pain resolved  - trend troponins/EKGs  - monitor for symptoms      Hypertension  Assessment & Plan  - elevated, on evaluation 168/56, typically below 150/90 as outpatient  - Asx, continue to monitor  - continue PTA nifedipine 30 mg daily, Cozaar 50 mg daily, ASA 81 mg daily,   - PTA Lasix and Pravachol held      DMII (diabetes mellitus, type 2) (HCC)  Assessment & Plan  Lab Results   Component Value Date    HGBA1C 6 4 (H) 06/17/2019       Recent Labs     07/22/19  2134   POCGLU 162*       Blood Sugar Average: Last 72 hrs:  (P) 162     - well controlled  - not currently on any PTA medical regimen  - SSI#2  - continue PTA gabapentin 100 mg q h s  CKD (chronic kidney disease), stage III [facility])  Assessment & Plan  Lab Results   Component Value Date    CREATININE 1 36 (H) 07/22/2019     - NORTH on CKD  - creatinine:  1 36, baseline: 1 1, GFR:  36, at baseline  - will provide gentle hydration w/IV NS 1L @50cc/hr, G1DD, EF:70% on 02/2018      GERD (gastroesophageal reflux disease)  Assessment & Plan  Stable  Continue PTA Protonix              PPX:  Heparin  Diet:  Diabetic  Code Status:  Full code  Dispo:  Observation    Discussed Plan with [facility] team who agree with the assessment and plan  History of Present Illness     HPI:  Sisi Sheehan is a 80 y o  female w PMHX significant for HTN, CKD stage 3B, DM, PAD, 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),  GERD, initially presented with chest pain  Reports the chest pain began @ 1430 while coloring on her iPad  Describes the chest pain as a heaviness in the substernal area  Pain does not radiate  Denies diaphoresis or other associated symptoms  Patient's chest pain resolved in the ED, on my exam patient denies chest pain  Family reports that patient was out all day yesterday in the heat, However patient has been home all day today  ROS as below  ED Management:  EKG,  mg, troponin, BNP:  138, Lipase:  246      Review of Systems   Constitutional: Positive for chills  Negative for fever  HENT: Negative for congestion and rhinorrhea  Eyes: Negative for visual disturbance  Respiratory: Negative for chest tightness and shortness of breath  Cardiovascular: Negative for chest pain and palpitations  Gastrointestinal: Positive for constipation  Negative for abdominal pain, blood in stool, nausea and vomiting  Genitourinary: Negative for decreased urine volume and urgency  Skin: Negative for rash  Neurological: Negative for light-headedness, numbness and headaches  Psychiatric/Behavioral: Negative for agitation, confusion and dysphoric mood           Historical Information   Past Medical History:   Diagnosis Date    Arthritis     Back pain     Benign essential hypertension     LAST ASSESSED: [id number]    Carotid artery stenosis     CKD (chronic kidney disease) stage 2, GFR 60-89 ml/min     Constipation, chronic     "drinks prune juice"    Diabetes mellitus ([facility] [address] )     DMII (diabetes mellitus, type 2) (HCC)     Epiglottitis     RESOLVED: [id number]    Gastritis     GERD (gastroesophageal reflux disease)     Gout     H/O blood clots     left lower leg,,,11 yrs ago    History of stenosis of renal artery     RESOLVED: [id number]    Hypercholesteremia     Hypercholesterolemia     Hyperlipidemia     Hyperparathyroidism ([address] )     Hypertension     Iliac artery stenosis, bilateral ([facility] [address] )     Kidney stone     Mucinous carcinoma of breast, left ([facility] [address] ) 8/15/2018    Presence of pessary     Renal artery stenosis (HCC)     Renal disorder     Renovascular hypertension     RESOLVED: 22VLP1467    Segmental and somatic dysfunction of cervical region     RESOLVED: 63HFU8750    Segmental and somatic dysfunction of lumbar region     RESOLVED: 28NVO3183    Segmental and somatic dysfunction of pelvic region     RESOLVED: 23UFQ2573    Segmental and somatic dysfunction of thoracic region     RESOLVED: 18EUX2241    Somatic dysfunction of abdominal region     RESOLVED: 41XJU1096    Somatic dysfunction of head region     RESOLVED: 42YKK5624    Somatic dysfunction of lower extremities     RESOLVED: 98MNU1487    Somatic dysfunction of rib region     RESOLVED: 37SOL1225    Somatic dysfunction of thoracic region     RESOLVED: 86YSH2172    Somatic dysfunction of upper extremities     RESOLVED: 85KOW4695    Teeth missing      Past Surgical History:   Procedure Laterality Date    ANGIOPLASTY / STENTING ILIAC      BREAST LUMPECTOMY Left 09/06/2017    LAST ASSESSED: [id number]    BREAST SURGERY Left     biopsy    CATARACT EXTRACTION Bilateral     CHOLECYSTECTOMY      COLONOSCOPY      EYE SURGERY      ILIAC VEIN ANGIOPLASTY / STENTING Right     INITIAL STENOSIS: ONSET: [id number]    KNEE ARTHROSCOPY Right     KNEE SURGERY Right     ONSET: 03ZOQ8939    MAMMO NEEDLE LOCALIZATION LEFT (ALL INC) Left 9/11/2018    MAMMO NEEDLE LOCALIZATION LEFT (ALL INC) Left 9/6/2017    MD ESOPHAGOGASTRODUODENOSCOPY TRANSORAL DIAGNOSTIC N/A 7/26/2018    Procedure: ESOPHAGOGASTRODUODENOSCOPY (EGD); Surgeon: Berta Soria DO;  Location: [facility];   Service: Gastroenterology    MD LARYNGOSCOPY,DIRCT,[facility] N/A 6/10/2016    Procedure: MICRO DIRECT LARYNGOSCOPY WITH VOCAL FOLD MASS EXCISION ;  Surgeon: Paul Carmichael MD;  [facility];  Service: ENT    MD MASTECTOMY, PARTIAL Left 9/6/2017    Procedure: LUMPECTOMY BREAST NEEDLE LOCALIZED WITH FAXITRON NEEDLE @ 0830;  Surgeon: Joseph Hilton MD;  Location: [facility];  Service: General     [facility] DEVICE PLACEMT BREAST LOC 1ST LES W GUIDNCE Left 9/11/2018    Procedure: BREAST LUMPECTOMY; BREAST NEEDLE LOCALIZATION (NEEDLE LOC AT 1330); Surgeon: Lisa Douglas MD;  Location: [facility];  Service: Surgical Oncology    THROAT SURGERY      lump removed    TUBAL LIGATION      UPPER GASTROINTESTINAL ENDOSCOPY      US GUIDANCE BREAST BIOPSY LEFT EACH ADDITIONAL Left 8/7/2017    US GUIDED BREAST BIOPSY LEFT COMPLETE Left 8/10/2018    US GUIDED BREAST BIOPSY LEFT COMPLETE Left 8/7/2017    VASCULAR SURGERY       Social History   Social History     Substance and Sexual Activity   Alcohol Use Not Currently    Frequency: Never     Social History     Substance and Sexual Activity   Drug Use No     Social History     Tobacco Use   Smoking Status Former Smoker    Packs/day: 1 00    Years: 64 00    Pack years: 56 65    Last attempt to quit: 2004    Years since quitting: 15 5   Smokeless Tobacco Never Used   Tobacco Comment    quit in 2003 1 ppd x 50 years     Family History: non-contributory    Meds/Allergies   all medications and allergies reviewed  Allergies   Allergen Reactions    Clonidine Derivatives Swelling    Diflucan [Fluconazole] Rash    Flagyl [Metronidazole] Anaphylaxis    Carvedilol Hives     And legs swelled    Latex Rash    Lipitor [Atorvastatin]      Legs swelled    Other Palpitations     IV DYE       Objective   Vitals: Blood pressure 168/56, pulse 86, temperature 98 1 °F (36 7 °C), resp  rate 19, weight 67 8 kg (149 lb 7 6 oz), SpO2 96 %, not currently breastfeeding  Intake/Output Summary (Last 24 hours) at 7/23/2019 0240  Last data filed at 7/22/2019 2146  Gross per 24 hour   Intake 0 ml   Output    Net 0 ml       Invasive Devices     Peripheral Intravenous Line            Peripheral IV 07/22/19 Left Hand less than 1 day                Physical Exam:  Physical Exam   Constitutional: She appears well-developed  No distress  HENT:   Head: Normocephalic and atraumatic     Mouth/Throat: Oropharynx is clear and moist    Eyes: Conjunctivae are normal    Cardiovascular: Normal rate, regular rhythm and normal heart sounds  Pulmonary/Chest: Effort normal and breath sounds normal  No respiratory distress  She has no wheezes  Abdominal: Soft  Bowel sounds are normal    Musculoskeletal: She exhibits no tenderness  Neurological: She is alert  Skin: Skin is warm and dry  Capillary refill takes less than 2 seconds  She is not diaphoretic  Psychiatric: She has a normal mood and affect  Vitals reviewed  Lab Results: I have personally reviewed pertinent reports  Imaging: I have personally reviewed pertinent reports  EKG, Pathology, and Other Studies: I have personally reviewed pertinent reports        Code Status: Level 1 - Full Code      Margareth Devine MD  [facility]  2  PGY-2  7/22/19

## 2019-07-23 NOTE — ASSESSMENT & PLAN NOTE
Lab Results   Component Value Date    HGBA1C 6 4 (H) 06/17/2019       Recent Labs     07/22/19  2134   POCGLU 162*       Blood Sugar Average: Last 72 hrs:  (P) 162     - well controlled  - not currently on any PTA medical regimen  - SSI#2  - continue PTA gabapentin 100 mg q h s

## 2019-07-23 NOTE — DISCHARGE SUMMARY
Discharge- Markos Vipin 1934, 80 y o  female MRN: [id number]    Unit/Bed#: CW2 214-02 Encounter: [id number]    Primary Care Provider: Andi See MD   Date and time admitted to hospital: 7/22/2019  2:39 PM    Admission Date: 7/22/2019   Discharge Date: 7/24/2019    Admitting Diagnosis:   Principal Problem:    Chest pain  Active Problems:    CKD (chronic kidney disease), stage III ([facility])    Hypertension    GERD (gastroesophageal reflux disease)    DMII (diabetes mellitus, type 2) ([facility])        * Chest pain  Assessment & Plan  - Patient had chest pain initially at home and at rest which resolved prior to being seen in the ED  - EKGs unchanged over course of admission  - Troponin trend: <0 02 -> 0 03 -> <0 02--> <0 02   - No further chest pain episodes, ACS workup negative  - Was stable for discharge 7/23/19 from chest pain perspective; kept overnight to follow BP as noted below  - Has follow up with Cardiology (Dr Brandon Lee) 8/6/19      CKD (chronic kidney disease), stage III [facility])  Assessment & Plan  Lab Results   Component Value Date    CREATININE 1 03 07/24/2019   - Creatinine: 1 36 on admission, baseline: 1 1-1 4, GFR: 36, at baseline  - Improve BP control, goal <140/90      Hypertension  Assessment & Plan  - BP typically fairly well-controlled as outpatient per Nephrology records (Dr Maddie House)  - Elevated during admission >170s/60s-70s   - Continue PTA Cozaar 50 mg QD    - ARB not increased due to patient's request, as she has concerns regarding side effects  - Nifedipine-XL increased to 60 mg daily from 30 mg yesterday  - BP overnight 150s-160s/50s-60s, improved from previous  - Continue Nifedipine-XL 60 mg QD on discharge  - Follow up with Nephrology outpatient   - PTA Lasix and Pravachol held during admission, resume on discharge      DMII (diabetes mellitus, type 2) [facility])  Assessment & Plan  Lab Results   Component Value Date    HGBA1C 6 4 (H) 06/17/2019       Recent Labs     07/23/19  1637 07/23/19 2122 07/24/19  0531 07/24/19  1026   POCGLU 110 143* 134 90   - Well-controlled with diet only  - 1 month history of decreased sensation in both feet, likely peripheral neuropathy 2/2 T2DM  - Continue PTA gabapentin 100 mg HS  - May benefit from increase to 100 mg TID, advised to discuss with PCP on discharge    GERD (gastroesophageal reflux disease)  Assessment & Plan  - Continue PTA Protonix 40 mg on discharge      HPI: Patient presented to ED on 7/22 with complaint of chest pain at rest when she was coloring on her iPad  Her chest pain had resolved by the time she was seen in the ED  She had been outside in the heat the day prior to the event but had stayed inside on the day of the event  She had no other symptoms at that time, no SOB or diaphoresis  Hospital Course: In the ED she was given Aspirin 324mg and they did an EKG which showed ST depression in leads 2 and 3 and T wave inversion in leads 1 and aVL  Her troponin was trended and peaked at 0 03 then fell back below 0 02 for the rest of her hospital course  Repeat EKGs remained unchanged  She did not have any additional episodes of chest pain over the course of admission, and she was able to tolerate walking without chest pain or dizziness  Accordingly, she was considered stable for discharge on 7/23/19 from her ACS workup  She has a follow up appointment scheduled with outpatient Cardiology (Dr Ector Parrish) in early August  However, due to her persistently uncontrolled HTN (>170s/50s), she was kept overnight on 7/23/19 for BP monitoring after Nifedipine-XL was increased to 60 mg QD  Her blood pressure improved to 150s-160s/50s on 7/24/19, and she was deemed appropriate for discharge to home with recommendation to follow up with outpatient Nephrologist (Dr Giovani Varner) regarding HTN       Exam on Day of Discharge:    07/24/19 0532 07/24/19 0711 07/24/19 1027   BP: 163/58 158/57 167/56   Pulse: 82 71 74   Resp:  18 18   Temp:  97 7 °F (36 5 °C) TempSrc:      SpO2: 97% 97% 98%   Weight:        Physical Exam   Constitutional: She is oriented to person, place, and time  She appears well-developed and well-nourished  HENT:   Head: Normocephalic and atraumatic  Eyes: Right eye exhibits no discharge  Left eye exhibits no discharge  Neck: Normal range of motion  Cardiovascular: Normal rate, regular rhythm and intact distal pulses  Pulmonary/Chest: Effort normal and breath sounds normal    Abdominal: Soft  Bowel sounds are normal    Musculoskeletal: Normal range of motion  She exhibits no edema  Neurological: She is alert and oriented to person, place, and time  Reduced sensation B/L feet grossly   Skin: Skin is warm and dry  Capillary refill takes less than 2 seconds  Psychiatric: She has a normal mood and affect  Her behavior is normal    Vitals reviewed        Significant Findings/Abnormal Results with this admission:  Labs:  Results Reviewed     Procedure Component Value Units Date/Time    Troponin I [[id number]]  (Normal) Collected:  07/22/19 1721    Lab Status:  Final result Specimen:  Blood from Arm, Left Updated:  07/22/19 1753     Troponin I 0 03 ng/mL     Comprehensive metabolic panel [[id number]]  (Abnormal) Collected:  07/22/19 1505    Lab Status:  Final result Specimen:  Blood from Arm, Right Updated:  07/22/19 1540     Sodium 140 mmol/L      Potassium 3 8 mmol/L      Chloride 104 mmol/L      CO2 27 mmol/L      ANION GAP 9 mmol/L      BUN 32 mg/dL      Creatinine 1 36 mg/dL      Glucose 159 mg/dL      Calcium 9 7 mg/dL      AST 18 U/L      ALT 15 U/L      Alkaline Phosphatase 93 U/L      Total Protein 7 8 g/dL      Albumin 3 6 g/dL      Total Bilirubin 0 36 mg/dL      eGFR 36 ml/min/1 73sq m     Narrative:       Joaquin guidelines for Chronic Kidney Disease (CKD):     Stage 1 with normal or high GFR (GFR > 90 mL/min/1 73 square meters)    Stage 2 Mild CKD (GFR = 60-89 mL/min/1 73 square meters)    Stage 3A Moderate CKD (GFR = 45-59 mL/min/1 73 square meters)    Stage 3B Moderate CKD (GFR = 30-44 mL/min/1 73 square meters)    Stage 4 Severe CKD (GFR = 15-29 mL/min/1 73 square meters)    Stage 5 End Stage CKD (GFR <15 mL/min/1 73 square meters)  Note: GFR calculation is accurate only with a steady state creatinine    Lipase [[id number]]  (Normal) Collected:  07/22/19 1505    Lab Status:  Final result Specimen:  Blood from Arm, Right Updated:  07/22/19 1540     Lipase 246 u/L     B-type natriuretic peptide [[id number]]  (Normal) Collected:  07/22/19 1505    Lab Status:  Final result Specimen:  Blood from Arm, Right Updated:  07/22/19 1540     NT-proBNP 138 pg/mL     Troponin I [[id number]]  (Normal) Collected:  07/22/19 1505    Lab Status:  Final result Specimen:  Blood from Arm, Right Updated:  07/22/19 1539     Troponin I <0 02 ng/mL     CBC and differential [[id number]]  (Abnormal) Collected:  07/22/19 1505    Lab Status:  Final result Specimen:  Blood from Arm, Right Updated:  07/22/19 1515     WBC 6 43 Thousand/uL      RBC 4 18 Million/uL      Hemoglobin 11 7 g/dL      Hematocrit 38 1 %      MCV 91 fL      MCH 28 0 pg      MCHC 30 7 g/dL      RDW 12 9 %      MPV 11 9 fL      Platelets 075 Thousands/uL      nRBC 0 /100 WBCs      Neutrophils Relative 46 %      Immat GRANS % 0 %      Lymphocytes Relative 45 %      Monocytes Relative 8 %      Eosinophils Relative 1 %      Basophils Relative 0 %      Neutrophils Absolute 2 99 Thousands/µL      Immature Grans Absolute 0 02 Thousand/uL      Lymphocytes Absolute 2 86 Thousands/µL      Monocytes Absolute 0 48 Thousand/µL      Eosinophils Absolute 0 06 Thousand/µL      Basophils Absolute 0 02 Thousands/µL         Imaging/Other:  XR chest 2 views   Final Result by Alyx Francis MD (07/22 1655)      No acute cardiopulmonary disease              Workstation performed: [id number]           Complications: None apparent    Procedures Performed: None    Discharge Medications:  See after visit summary for reconciled discharge medications provided to patient and family  Medication changes made with this admission:  · Continue: All PTA medications, except for Nifedipine-XL 30 mg QD  · Start: Nifedipine-XL 60 mg QD    Important Physician Related Follow Up:   · PCP: Dr Pebbles Joseph ([facility] )  · [facility] visit in 1-2 weeks  · Cardiology appointment with Dr Guilherme Pritchard on 8/6/19 at 3:00PM   · Follow up with Nephrology (Dr Danae Walker) regarding HTN within 2-4 weeks    Condition at Discharge: Stable  Disposition: Home   Discharge instructions/Information to patient and family: See after visit summary for information provided to patient and family  Provisions for Follow-Up Care: See after visit summary for information related to follow-up care and any pertinent home health orders  Planned Readmission: No   Discharge Statement: I spent 30 minutes discharging the patient  This time was spent on the day of discharge  I had direct contact with the patient on the day of discharge  Additional documentation is required if more than 30 minutes were spent on discharge  The attending physician, Dr Lo Hernandez, agreed with the assessment and plan      Hailee Wills MD, PGY-3  [facility]   7/24/2019

## 2019-07-23 NOTE — ASSESSMENT & PLAN NOTE
Lab Results   Component Value Date    HGBA1C 6 4 (H) 06/17/2019       Recent Labs     07/22/19  2134 07/23/19  0607 07/23/19  1056   POCGLU 162* 117 82       - Well controlled  - Patient reporting 1 month history of decreased sensation in both feet  Will correct electrolytes and monitor for improvement   Consider possible etiology of numbness peripheral neuropathy secondary to diabetes   - Not currently on any PTA medical regimen  - SSI #2  - Continue PTA gabapentin 100 mg HS, consider increasing to TID

## 2019-07-23 NOTE — ASSESSMENT & PLAN NOTE
Lab Results   Component Value Date    CREATININE 1 04 07/23/2019     - Creatinine: 1 36 on admission, baseline: 1 1-1 4, GFR:  36, at baseline  - Improve BP control, goal <140/90

## 2019-07-24 ENCOUNTER — HOSPITAL ENCOUNTER (OUTPATIENT)
Facility: HOSPITAL | Age: 84
Setting detail: OBSERVATION
Discharge: HOME | End: 2019-07-26
Attending: EMERGENCY MEDICINE | Admitting: FAMILY MEDICINE
Payer: COMMERCIAL

## 2019-07-24 ENCOUNTER — APPOINTMENT (EMERGENCY)
Dept: RADIOLOGY | Facility: HOSPITAL | Age: 84
End: 2019-07-24
Payer: COMMERCIAL

## 2019-07-24 VITALS
BODY MASS INDEX: 27.34 KG/M2 | TEMPERATURE: 98.2 F | RESPIRATION RATE: 18 BRPM | DIASTOLIC BLOOD PRESSURE: 56 MMHG | SYSTOLIC BLOOD PRESSURE: 166 MMHG | OXYGEN SATURATION: 97 % | WEIGHT: 149.47 LBS | HEART RATE: 75 BPM

## 2019-07-24 DIAGNOSIS — R19.7 DIARRHEA: ICD-10-CM

## 2019-07-24 DIAGNOSIS — R00.2 PALPITATIONS: Primary | ICD-10-CM

## 2019-07-24 DIAGNOSIS — I16.0 HYPERTENSIVE URGENCY: ICD-10-CM

## 2019-07-24 DIAGNOSIS — I10 ESSENTIAL HYPERTENSION: ICD-10-CM

## 2019-07-24 LAB
ALBUMIN SERPL BCP-MCNC: 3.7 G/DL (ref 3.5–5)
ALP SERPL-CCNC: 89 U/L (ref 46–116)
ALT SERPL W P-5'-P-CCNC: 20 U/L (ref 12–78)
ANION GAP SERPL CALCULATED.3IONS-SCNC: 3 MMOL/L (ref 4–13)
ANION GAP SERPL CALCULATED.3IONS-SCNC: 7 MMOL/L (ref 4–13)
AST SERPL W P-5'-P-CCNC: 19 U/L (ref 5–45)
ATRIAL RATE: 73 BPM
BASOPHILS # BLD AUTO: 0.01 THOUSANDS/ΜL (ref 0–0.1)
BASOPHILS # BLD AUTO: 0.03 THOUSANDS/ΜL (ref 0–0.1)
BASOPHILS NFR BLD AUTO: 0 % (ref 0–1)
BASOPHILS NFR BLD AUTO: 0 % (ref 0–1)
BILIRUB SERPL-MCNC: 0.26 MG/DL (ref 0.2–1)
BUN SERPL-MCNC: 32 MG/DL (ref 5–25)
BUN SERPL-MCNC: 36 MG/DL (ref 5–25)
CALCIUM SERPL-MCNC: 9.1 MG/DL (ref 8.3–10.1)
CALCIUM SERPL-MCNC: 9.4 MG/DL (ref 8.3–10.1)
CHLORIDE SERPL-SCNC: 106 MMOL/L (ref 100–108)
CHLORIDE SERPL-SCNC: 107 MMOL/L (ref 100–108)
CO2 SERPL-SCNC: 27 MMOL/L (ref 21–32)
CO2 SERPL-SCNC: 27 MMOL/L (ref 21–32)
CREAT SERPL-MCNC: 1.03 MG/DL (ref 0.6–1.3)
CREAT SERPL-MCNC: 1.37 MG/DL (ref 0.6–1.3)
EOSINOPHIL # BLD AUTO: 0.08 THOUSAND/ΜL (ref 0–0.61)
EOSINOPHIL # BLD AUTO: 0.08 THOUSAND/ΜL (ref 0–0.61)
EOSINOPHIL NFR BLD AUTO: 1 % (ref 0–6)
EOSINOPHIL NFR BLD AUTO: 2 % (ref 0–6)
ERYTHROCYTE [DISTWIDTH] IN BLOOD BY AUTOMATED COUNT: 13 % (ref 11.6–15.1)
ERYTHROCYTE [DISTWIDTH] IN BLOOD BY AUTOMATED COUNT: 13.2 % (ref 11.6–15.1)
GFR SERPL CREATININE-BSD FRML MDRD: 35 ML/MIN/1.73SQ M
GFR SERPL CREATININE-BSD FRML MDRD: 50 ML/MIN/1.73SQ M
GLUCOSE SERPL-MCNC: 124 MG/DL (ref 65–140)
GLUCOSE SERPL-MCNC: 134 MG/DL (ref 65–140)
GLUCOSE SERPL-MCNC: 217 MG/DL (ref 65–140)
GLUCOSE SERPL-MCNC: 90 MG/DL (ref 65–140)
HCT VFR BLD AUTO: 34.1 % (ref 34.8–46.1)
HCT VFR BLD AUTO: 37.4 % (ref 34.8–46.1)
HGB BLD-MCNC: 10.6 G/DL (ref 11.5–15.4)
HGB BLD-MCNC: 11.7 G/DL (ref 11.5–15.4)
IMM GRANULOCYTES # BLD AUTO: 0.01 THOUSAND/UL (ref 0–0.2)
IMM GRANULOCYTES # BLD AUTO: 0.02 THOUSAND/UL (ref 0–0.2)
IMM GRANULOCYTES NFR BLD AUTO: 0 % (ref 0–2)
IMM GRANULOCYTES NFR BLD AUTO: 0 % (ref 0–2)
LYMPHOCYTES # BLD AUTO: 2.53 THOUSANDS/ΜL (ref 0.6–4.47)
LYMPHOCYTES # BLD AUTO: 3.74 THOUSANDS/ΜL (ref 0.6–4.47)
LYMPHOCYTES NFR BLD AUTO: 43 % (ref 14–44)
LYMPHOCYTES NFR BLD AUTO: 46 % (ref 14–44)
MAGNESIUM SERPL-MCNC: 1.4 MG/DL (ref 1.6–2.6)
MCH RBC QN AUTO: 28.5 PG (ref 26.8–34.3)
MCH RBC QN AUTO: 28.6 PG (ref 26.8–34.3)
MCHC RBC AUTO-ENTMCNC: 31.1 G/DL (ref 31.4–37.4)
MCHC RBC AUTO-ENTMCNC: 31.3 G/DL (ref 31.4–37.4)
MCV RBC AUTO: 91 FL (ref 82–98)
MCV RBC AUTO: 92 FL (ref 82–98)
MONOCYTES # BLD AUTO: 0.46 THOUSAND/ΜL (ref 0.17–1.22)
MONOCYTES # BLD AUTO: 0.49 THOUSAND/ΜL (ref 0.17–1.22)
MONOCYTES NFR BLD AUTO: 5 % (ref 4–12)
MONOCYTES NFR BLD AUTO: 9 % (ref 4–12)
NEUTROPHILS # BLD AUTO: 2.32 THOUSANDS/ΜL (ref 1.85–7.62)
NEUTROPHILS # BLD AUTO: 4.32 THOUSANDS/ΜL (ref 1.85–7.62)
NEUTS SEG NFR BLD AUTO: 43 % (ref 43–75)
NEUTS SEG NFR BLD AUTO: 51 % (ref 43–75)
NRBC BLD AUTO-RTO: 0 /100 WBCS
NRBC BLD AUTO-RTO: 0 /100 WBCS
P AXIS: 68 DEGREES
PHOSPHATE SERPL-MCNC: 3.5 MG/DL (ref 2.3–4.1)
PLATELET # BLD AUTO: 218 THOUSANDS/UL (ref 149–390)
PLATELET # BLD AUTO: 268 THOUSANDS/UL (ref 149–390)
PMV BLD AUTO: 11.5 FL (ref 8.9–12.7)
PMV BLD AUTO: 11.6 FL (ref 8.9–12.7)
POTASSIUM SERPL-SCNC: 3.8 MMOL/L (ref 3.5–5.3)
POTASSIUM SERPL-SCNC: 4.2 MMOL/L (ref 3.5–5.3)
PR INTERVAL: 158 MS
PROT SERPL-MCNC: 8.3 G/DL (ref 6.4–8.2)
QRS AXIS: -3 DEGREES
QRSD INTERVAL: 88 MS
QT INTERVAL: 402 MS
QTC INTERVAL: 442 MS
RBC # BLD AUTO: 3.71 MILLION/UL (ref 3.81–5.12)
RBC # BLD AUTO: 4.1 MILLION/UL (ref 3.81–5.12)
SODIUM SERPL-SCNC: 137 MMOL/L (ref 136–145)
SODIUM SERPL-SCNC: 140 MMOL/L (ref 136–145)
T WAVE AXIS: 61 DEGREES
TROPONIN I SERPL-MCNC: <0.02 NG/ML
VENTRICULAR RATE: 73 BPM
WBC # BLD AUTO: 5.44 THOUSAND/UL (ref 4.31–10.16)
WBC # BLD AUTO: 8.65 THOUSAND/UL (ref 4.31–10.16)

## 2019-07-24 PROCEDURE — 85025 COMPLETE CBC W/AUTO DIFF WBC: CPT | Performed by: EMERGENCY MEDICINE

## 2019-07-24 PROCEDURE — 99284 EMERGENCY DEPT VISIT MOD MDM: CPT | Performed by: EMERGENCY MEDICINE

## 2019-07-24 PROCEDURE — 99217 PR OBSERVATION CARE DISCHARGE MANAGEMENT: CPT | Performed by: FAMILY MEDICINE

## 2019-07-24 PROCEDURE — 83735 ASSAY OF MAGNESIUM: CPT | Performed by: FAMILY MEDICINE

## 2019-07-24 PROCEDURE — 82948 REAGENT STRIP/BLOOD GLUCOSE: CPT

## 2019-07-24 PROCEDURE — 80053 COMPREHEN METABOLIC PANEL: CPT | Performed by: EMERGENCY MEDICINE

## 2019-07-24 PROCEDURE — 84484 ASSAY OF TROPONIN QUANT: CPT | Performed by: EMERGENCY MEDICINE

## 2019-07-24 PROCEDURE — 93010 ELECTROCARDIOGRAM REPORT: CPT | Performed by: INTERNAL MEDICINE

## 2019-07-24 PROCEDURE — 93005 ELECTROCARDIOGRAM TRACING: CPT

## 2019-07-24 PROCEDURE — 84100 ASSAY OF PHOSPHORUS: CPT | Performed by: FAMILY MEDICINE

## 2019-07-24 PROCEDURE — 80048 BASIC METABOLIC PNL TOTAL CA: CPT | Performed by: FAMILY MEDICINE

## 2019-07-24 PROCEDURE — 85025 COMPLETE CBC W/AUTO DIFF WBC: CPT | Performed by: FAMILY MEDICINE

## 2019-07-24 PROCEDURE — 36415 COLL VENOUS BLD VENIPUNCTURE: CPT

## 2019-07-24 PROCEDURE — 99285 EMERGENCY DEPT VISIT HI MDM: CPT

## 2019-07-24 RX ORDER — MAGNESIUM SULFATE HEPTAHYDRATE 40 MG/ML
2 INJECTION, SOLUTION INTRAVENOUS ONCE
Status: DISCONTINUED | OUTPATIENT
Start: 2019-07-24 | End: 2019-07-24

## 2019-07-24 RX ORDER — NIFEDIPINE 60 MG/1
60 TABLET, FILM COATED, EXTENDED RELEASE ORAL DAILY
Qty: 90 TABLET | Refills: 0 | Status: ON HOLD | OUTPATIENT
Start: 2019-07-25 | End: 2019-07-26 | Stop reason: SDUPTHER

## 2019-07-24 RX ADMIN — DEXTRAN 70 AND HYPROMELLOSE 2910 1 DROP: 1; 3 SOLUTION/ DROPS OPHTHALMIC at 09:33

## 2019-07-24 RX ADMIN — MAGNESIUM SULFATE HEPTAHYDRATE 2 G: 40 INJECTION, SOLUTION INTRAVENOUS at 10:11

## 2019-07-24 RX ADMIN — MAGNESIUM OXIDE TAB 400 MG (241.3 MG ELEMENTAL MG) 400 MG: 400 (241.3 MG) TAB at 10:55

## 2019-07-24 RX ADMIN — HEPARIN SODIUM 5000 UNITS: 5000 INJECTION INTRAVENOUS; SUBCUTANEOUS at 06:55

## 2019-07-24 RX ADMIN — HEPARIN SODIUM 5000 UNITS: 5000 INJECTION INTRAVENOUS; SUBCUTANEOUS at 14:32

## 2019-07-24 RX ADMIN — PANTOPRAZOLE SODIUM 40 MG: 40 TABLET, DELAYED RELEASE ORAL at 09:33

## 2019-07-24 RX ADMIN — SENNOSIDES 17.2 MG: 8.6 TABLET, FILM COATED ORAL at 09:33

## 2019-07-24 RX ADMIN — NIFEDIPINE 60 MG: 60 TABLET, FILM COATED, EXTENDED RELEASE ORAL at 09:33

## 2019-07-24 RX ADMIN — ASPIRIN 81 MG: 81 TABLET, COATED ORAL at 09:33

## 2019-07-24 RX ADMIN — LOSARTAN POTASSIUM 50 MG: 50 TABLET ORAL at 09:33

## 2019-07-24 RX ADMIN — MAGNESIUM OXIDE TAB 400 MG (241.3 MG ELEMENTAL MG) 800 MG: 400 (241.3 MG) TAB at 14:32

## 2019-07-24 RX ADMIN — MAGNESIUM OXIDE TAB 400 MG (241.3 MG ELEMENTAL MG) 800 MG: 400 (241.3 MG) TAB at 12:03

## 2019-07-24 NOTE — PLAN OF CARE
Problem: Potential for Falls  Goal: Patient will remain free of falls  Description  INTERVENTIONS:  - Assess patient frequently for physical needs  -  Identify cognitive and physical deficits and behaviors that affect risk of falls    -  [place] fall precautions as indicated by assessment   - Educate patient/family on patient safety including physical limitations  - Instruct patient to call for assistance with activity based on assessment  - Modify environment to reduce risk of injury  - Consider OT/PT consult to assist with strengthening/mobility  7/24/2019 1618 by Ad Vale RN  Outcome: Completed  7/24/2019 1028 by Ad Vale RN  Outcome: Adequate for Discharge     Problem: PAIN - ADULT  Goal: Verbalizes/displays adequate comfort level or baseline comfort level  Description  Interventions:  - Encourage patient to monitor pain and request assistance  - Assess pain using appropriate pain scale  - Administer analgesics based on type and severity of pain and evaluate response  - Implement non-pharmacological measures as appropriate and evaluate response  - Consider cultural and social influences on pain and pain management  - Notify physician/advanced practitioner if interventions unsuccessful or patient reports new pain  7/24/2019 1618 by Ad Vale RN  Outcome: Completed  7/24/2019 1028 by Ad Vale RN  Outcome: Adequate for Discharge     Problem: SAFETY ADULT  Goal: Maintain or return to baseline ADL function  Description  INTERVENTIONS:  -  Assess patient's ability to carry out ADLs; assess patient's baseline for ADL function and identify physical deficits which impact ability to perform ADLs (bathing, care of mouth/teeth, toileting, grooming, dressing, etc )  - Assess/evaluate cause of self-care deficits   - Assess range of motion  - Assess patient's mobility; develop plan if impaired  - Assess patient's need for assistive devices and provide as appropriate  - Encourage maximum independence but intervene and supervise when necessary  ¯ Involve family in performance of ADLs  ¯ Assess for home care needs following discharge   ¯ Request OT consult to assist with ADL evaluation and planning for discharge  ¯ Provide patient education as appropriate  7/24/2019 1618 by Amanuel Queen RN  Outcome: Completed  7/24/2019 1028 by Amanuel Queen RN  Outcome: Adequate for Discharge  Goal: Maintain or return mobility status to optimal level  Description  INTERVENTIONS:  - Assess patient's baseline mobility status (ambulation, transfers, stairs, etc )    - Identify cognitive and physical deficits and behaviors that affect mobility  - Identify mobility aids required to assist with transfers and/or ambulation (gait belt, sit-to-stand, lift, walker, cane, etc )  - [place] fall precautions as indicated by assessment  - Record patient progress and toleration of activity level on Mobility SBAR; progress patient to next Phase/Stage  - Instruct patient to call for assistance with activity based on assessment  - Request Rehabilitation consult to assist with strengthening/weightbearing, etc   7/24/2019 1618 by Amanuel Queen RN  Outcome: Completed  7/24/2019 1028 by Amanuel Queen RN  Outcome: Adequate for Discharge     Problem: Knowledge Deficit  Goal: Patient/family/caregiver demonstrates understanding of disease process, treatment plan, medications, and discharge instructions  Description  Complete learning assessment and assess knowledge base    Interventions:  - Provide teaching at level of understanding  - Provide teaching via preferred learning methods  7/24/2019 1618 by Amanuel Queen RN  Outcome: Completed  7/24/2019 1028 by Amanuel Queen RN  Outcome: Progressing

## 2019-07-24 NOTE — ASSESSMENT & PLAN NOTE
- BP typically fairly well-controlled as outpatient per Nephrology records (Dr Sangita Zepeda)  - Elevated during admission >170s/60s-70s   - Continue PTA Cozaar 50 mg QD    - ARB not increased due to patient's request, as she has concerns regarding side effects  - Nifedipine-XL increased to 60 mg daily from 30 mg yesterday  - BP overnight 150s-160s/50s-60s, improved from previous  - Continue Nifedipine-XL 60 mg QD on discharge  - Follow up with Nephrology outpatient   - PTA Lasix and Pravachol held during admission, resume on discharge

## 2019-07-24 NOTE — ASSESSMENT & PLAN NOTE
Lab Results   Component Value Date    HGBA1C 6 4 (H) 06/17/2019       Recent Labs     07/23/19  1637 07/23/19  2122 07/24/19  0531 07/24/19  1026   POCGLU 110 143* 134 90   - Well-controlled with diet only  - 1 month history of decreased sensation in both feet, likely peripheral neuropathy 2/2 T2DM  - Continue PTA gabapentin 100 mg HS  - May benefit from increase to 100 mg TID, advised to discuss with PCP on discharge

## 2019-07-24 NOTE — PLAN OF CARE
Problem: Potential for Falls  Goal: Patient will remain free of falls  Description  INTERVENTIONS:  - Assess patient frequently for physical needs  -  Identify cognitive and physical deficits and behaviors that affect risk of falls    -  [place] fall precautions as indicated by assessment   - Educate patient/family on patient safety including physical limitations  - Instruct patient to call for assistance with activity based on assessment  - Modify environment to reduce risk of injury  - Consider OT/PT consult to assist with strengthening/mobility  Outcome: Progressing     Problem: PAIN - ADULT  Goal: Verbalizes/displays adequate comfort level or baseline comfort level  Description  Interventions:  - Encourage patient to monitor pain and request assistance  - Assess pain using appropriate pain scale  - Administer analgesics based on type and severity of pain and evaluate response  - Implement non-pharmacological measures as appropriate and evaluate response  - Consider cultural and social influences on pain and pain management  - Notify physician/advanced practitioner if interventions unsuccessful or patient reports new pain  Outcome: Progressing     Problem: SAFETY ADULT  Goal: Maintain or return to baseline ADL function  Description  INTERVENTIONS:  -  Assess patient's ability to carry out ADLs; assess patient's baseline for ADL function and identify physical deficits which impact ability to perform ADLs (bathing, care of mouth/teeth, toileting, grooming, dressing, etc )  - Assess/evaluate cause of self-care deficits   - Assess range of motion  - Assess patient's mobility; develop plan if impaired  - Assess patient's need for assistive devices and provide as appropriate  - Encourage maximum independence but intervene and supervise when necessary  ¯ Involve family in performance of ADLs  ¯ Assess for home care needs following discharge   ¯ Request OT consult to assist with ADL evaluation and planning for discharge  ¯ Provide patient education as appropriate  Outcome: Progressing  Goal: Maintain or return mobility status to optimal level  Description  INTERVENTIONS:  - Assess patient's baseline mobility status (ambulation, transfers, stairs, etc )    - Identify cognitive and physical deficits and behaviors that affect mobility  - Identify mobility aids required to assist with transfers and/or ambulation (gait belt, sit-to-stand, lift, walker, cane, etc )  - [place] fall precautions as indicated by assessment  - Record patient progress and toleration of activity level on Mobility SBAR; progress patient to next Phase/Stage  - Instruct patient to call for assistance with activity based on assessment  - Request Rehabilitation consult to assist with strengthening/weightbearing, etc   Outcome: Progressing     Problem: Knowledge Deficit  Goal: Patient/family/caregiver demonstrates understanding of disease process, treatment plan, medications, and discharge instructions  Description  Complete learning assessment and assess knowledge base    Interventions:  - Provide teaching at level of understanding  - Provide teaching via preferred learning methods  Outcome: Progressing

## 2019-07-24 NOTE — DISCHARGE INSTRUCTIONS
Dolor de pecho   LO QUE NECESITA SABER:   El dolor en el pecho puede ser provocado por dianna variedad de condiciones, algunas no tan serias y otras que son de peligro mortal  [employer] ser un síntoma de un problema digestivo, brenda la acidez o dianna úlcera estomacal  Un ataque de ansiedad o dianna emoción clemente, brenda el enojo, también pueden provocar dolor de [place]  Dianna infección, inflamación o fractura en un hueso o cartílago en el pecho podría provocar dolor o molestia  En ocasiones el dolor torácico o la presión en el pecho pueden ser el resultado de neftali circulación de la jose m al corazón (angina)  El dolor de pecho también puede ser causado por trastornos potencialmente mortales brenda un ataque al corazón o un coágulo de [employer]  [facility]BRE [facility]:   Llame al 911 si presenta:   · Usted tiene alguno de los siguientes signos de un ataque cardíaco:      ¨ Estornudos, presión, o dolor en bethea pecho que dura mas de 5 minutos o regresa  ¨ Malestar o dolor en bethea espalda, jes, mandíbula, abdomen, o brazo     ¨ Dificultad para respirar    ¨ Náuseas o vómito    ¨ Siente un desvanecimiento o tiene sudores fríos especialmente en el pecho o dificultad para respirar  Busque atención médica de inmediato si:   · La inflamación en bethea pecho empeora, aun con tratamiento  · Usted tose o vomita jose m  · Jihan heces son negras o tienen jose m  · Usted no puede dejar de vomitar o le duele al tragar  Pregúntele a bethea Martina Embs vitaminas y minerales son adecuados para usted  · Usted tiene preguntas o inquietudes acerca de bethea condición o cuidado  Medicamentos:   · Medicamentos,  pueden administrarse para tratar la causa del dolor de pecho  Por ejemplo, analgésicos, medicamentos para la ansiedad o medicamentos para aumentar el flujo de jose m al corazón       · No tome ciertos medicamentos sin antes preguntarle a bethea médico   Estos incluyen KEL, suplementos vitamínicos o a base de hierbas u hormonas (estrógeno o progestágeno)  · [place] sara medicamentos brenda se le haya indicado  Consulte con bethea médico si usted dustin que bethea medicamento no le está ayudando o si presenta efectos secundarios  Infórmele si es alérgico a cualquier medicamento  Mantenga dianna lista actualizada de los [place], las vitaminas y los productos herbales que red  Incluya los siguientes datos de los medicamentos: cantidad, frecuencia y motivo de administración  Traiga con usted la lista o los envases de la píldoras a sara citas de seguimiento  Lleve la lista de los medicamentos con usted en madhav de dianna emergencia  Programe dianna dee con bethea médico dentro de 67 horas o brenda se le indique:  Es posible que deba regresar para hacerse más pruebas para encontrar la causa del dolor de [place]  Es probable que lo refieran a un especialista, brenda un cardiólogo o un gastroenterólogo  Anote sara preguntas para que se acuerde de hacerlas dylan sara visitas  Consejos para vivir saludable:  Los siguientes son consejos generales de rosalba  Si bethea dolor de pecho es causado por un problema cardíaco, bethea médico le dará consejos específicos a seguir  · No fume  La nicotina y otros químicos contenidos en los cigarrillos y cigarros pueden causar daño a sara pulmones y el corazón  Pida información a bethea médico si usted actualmente fuma y necesita ayuda para dejar de fumar  Los cigarrillos electrónicos o tabaco sin humo todavía contienen nicotina  Consulte con bethea médico antes de [employer]  · Consuma idanna variedad de alimentos saludables y bajos en grasas  Los alimentos saludables incluyen frutas, verduras, pan integral, productos lácteos bajos en grasa, frijoles, tete magras y pescado  Pida más información acerca de dianna dieta saludable para el corazón  · Pregunte acerca de la [place]  Bethea médico le dirá cuáles actividades limitar y cuáles evitar   Pregunte cuándo [employer], regresar a bethea trabajo y [employer]  Pida más información acerca de un plan de ejercicio adecuado para usted  · Mantenga un peso saludable  Consulte con galvan médico cuánto debería pesar  [place] que lo ayude a crear un plan para bajar de peso si tiene sobrepeso  © 2017 2600 Jermaine Singh Information is for End User's use only and may not be sold, redistributed or otherwise used for commercial purposes  All illustrations and images included in CareNotes® are the copyrighted property of A D A M , Inc  or Yoan Posada  Esta información es sólo para uso en educación  Galvan intención no es darle un consejo médico sobre enfermedades o tratamientos  Colsulte con galvan Stormy Binder farmacéutico antes de seguir cualquier régimen médico para saber si es seguro y efectivo para usted  Hipertensión crónica   LO QUE NECESITA SABER:   La hipertensión es la presión arterial [place]  La presión arterial es la fuerza que ejerce la jose m contra las marin de las arterias  La presión arterial normal debería estar a menos de 120/80  La pre-hipertensión estaría entre 120/80 y 139/ 80  La presión arterial [place] estaría a 140/90 o más [place]  La hipertensión causa que galvan presión arterial se eleve tanto que galvan corazón se ve forzado a trabajar [employer] de lo normal  [place] puede dañar galvan corazón  La hipertensión crónica es dianna condición de pearl plazo que usted puede controlar con un estilo de tato alli o con medicamentos  La presión [place] a proteger sara órganos brenda galvan corazón, pulmones, cerebro, y riñones  [facility]CCIONES [facility]BRE [facility]:   Llame al 911 en madhav de presentar lo siguiente:   · Usted tiene malestar en el pecho que se siente brenda estrujamiento, presión, Minor Mariner o dolor  · Usted se siente confundido o tiene dificultad para hablar  · Repentinamente se siente aturdido o con dificultad para respirar      · Usted tiene dolor o [employer] espalda, [place], [place], abdomen o [place]  Busque atención médica de inmediato si:   · Usted tiene un clemente dolor de manuel o pérdida de la visión  · Usted tiene debilidad en un brazo o en dianna pierna  Pregúntele a bethea [place] vitaminas y minerales son adecuados para usted  · Usted se siente mareado, confundido, somnoliento o brenda si se fuera a desmayar  · Usted se ha tomado bethea medicamento para la presión arterial caleb bethea presión arterial todavía está más jeannette de lo que le indicó bethea médico     · Usted tiene preguntas o inquietudes acerca de bethea condición o cuidado  Medicamentos:  Es posible que usted necesite alguno de los siguientes:  · Medicamento  podría usarse para ayudar a disminuir la presión arterial  Es posible que necesite más de un tipo de [place]  [place] el medicamento exactamente brenda indicado  · Diuréticos  ayudan a eliminar el exceso de líquido que se acumula en el organismo  [place] contribuirá a bajar bethea presión arterial  Es posible que orine más seguido mientras red neli medicamento  · Los medicamentos para el colesterol  ayudan a bajar los niveles de [place]  Un nivel bajo de colesterol ayuda a prevenir enfermedades cardíacas y facilita el control de la presión arterial      · [place] sara medicamentos brenda se le haya indicado  Consulte con bethea médico si usted dustin que bethea medicamento no le está ayudando o si presenta efectos secundarios  Infórmele si es alérgico a cualquier medicamento  Mantenga dianna lista actualizada de los [place], las vitaminas y los productos herbales que red  Incluya los siguientes datos de los medicamentos: cantidad, frecuencia y motivo de administración  Traiga con usted la lista o los envases de la píldoras a sara citas de seguimiento  Lleve la lista de los medicamentos con usted en madhav de dianna emergencia  Acuda a sara consultas de control con bethea médico según le indicaron  Usted necesitará regresar para medir bethea presión arterial y realizar otros exámenes de laboratorio   Anote sara preguntas para que se acuerde de hacerlas dylan sara visitas  Controle la hipertensión crónica:  Hable con bethea médico sobre las siguientes recomendaciones y otras formas de controlar la hipertensión:  · Tómese la presión arterial en bethea casa  Siéntese y descanse por 5 minutos antes de tomarse la presión arterial  Extienda bethea brazo y apóyelo en dianna superficie plana  Bethea brazo debe estar a la misma altura que bethea corazón  Siga las instrucciones que vienen con el monitor para la presión arterial o tensiómetro  Si es posible tome por lo menos 2 lecturas de la presión cada vez  Tómese la presión arterial por lo CARDFREE al día a la misma hora todos los días, dianna en la mañana y la otra en la noche  Mantenga un registro de las lecturas de bethea presión arterial y llévelo consigo a sara consultas  Pregúntele a bethea médico cuál debería ser bethea presión arterial            · Limite el sodio (la sal) brenda se le haya indicado  Demasiado sodio puede afectar el equilibrio de líquidos  Revise las etiquetas para buscar alimentos bajos en sodio o sin sal agregada  Algunos alimentos bajos en sodio utilizan sales de potasio para añadir sabor  Demasiado potasio también puede causar problemas de [place]  Bethea médico le dirá qué cantidad de sodio y potasio es pride para el consumo en un día  Él puede recomendarle que limite el sodio a 2,300 mg al día  · Siga el plan de comidas recomendado por bethea médico   Un dietista o médico puede darle más información sobre planes de bajo contenido de sodio o el plan de alimentación DASH (enfoques dietéticos para detener la hipertensión)  El plan DASH es bajo en sodio, grasas saturadas y grasa total  Es alto en potasio, calcio y [place]  · Ejercítese para mantener un peso saludable  Realice actividad física por lo menos 30 minutos al día, la mayoría de los días de la [place]   [place] ayudará a bajar bethea presión arterial  Pida más información acerca de un plan de ejercicio adecuado para usted      · Disminuya el estrés  [place] podría ayudarlo a bajar galvan presión arterial  Aprenda sobre formas de relajarse, brenda respiración profunda o escuchar música  · Limite el consumo de alcohol  Las mujeres deberían limitar el consumo de alcohol a 1 bebida por día  Los hombres deberían limitar el consumo de alcohol a 2 tragos al día  Un trago equivale a 12 onzas de cerveza, 5 onzas de vino o 1 onza y ½ de licor  · No fume  La nicotina y otros químicos en los cigarrillos y cigarros pueden aumentar galvan presión arterial y también pueden provocar daño al pulmón  Pida información a galvan médico si usted actualmente fuma y necesita ayuda para dejar de fumar  Los cigarrillos electrónicos o tabaco sin humo todavía contienen nicotina  Consulte con galvan médico antes de [employer]  © 2017 2600 [facility] Information is for End User's use only and may not be sold, redistributed or otherwise used for commercial purposes  All illustrations and images included in CareNotes® are the copyrighted property of A D A M , Inc  or Yoan Posada  Esta información es sólo para uso en educación  Galvan intención no es darle un consejo médico sobre enfermedades o tratamientos  Colsulte con galvan Zelpha Roch farmacéutico antes de seguir cualquier régimen médico para saber si es seguro y efectivo para usted  Hipertensión en adultos mayores   LO QUE NECESITA SABER:   La hipertensión es la presión arterial jeannette  La presión arterial es la fuerza que ejerce la jose m contra las marin de las arterias  La presión arterial normal debería estar a menos de 120/80  La pre-hipertensión estaría entre 120/80 y 139/ 80  La presión arterial jeannette estaría a 140/90 o más jeannette  La hipertensión hace que galvan corazón trabaje mucho más de lo normal  [place] puede dañar galvan corazón  Galvan presión arterial aumentará con la edad  Sin embargo, la hipertensión no es dianna parte normal del envejecimiento   Puede controlar la hipertensión con un estilo de tato saludable o con medicamentos  La presión [place] a proteger sara órganos brenda bethea corazón, pulmones, cerebro, y riñones  INSTRUCCIONES SOBRE EL [facility]:   Llame al 911 o pídale a alguien más que llame en cualquiera de los siguientes casos:   · Usted tiene malestar en el pecho que se siente brenda estrujamiento, presión, Giuliana Silvius o dolor  · Usted se siente confundido o tiene dificultad para hablar  · Repentinamente se siente aturdido o con dificultad para respirar  · Usted tiene dolor o [employer] espalda, [place], [place], abdomen o Sundeep Rafter  · Usted se desmaya o pierde el conocimiento  Busque atención médica de inmediato si:   · Usted tiene un clemente dolor de manuel o pérdida de la visión  · Usted tiene debilidad en un brazo o en dianna pierna  · Se marea y   Pregúntele a bethea [place] vitaminas y minerales son adecuados para usted  · Usted se siente mareado, confundido, somnoliento o brenda si se fuera a desmayar  · Usted se ha tomado bethea medicamento para la presión arterial caleb bethea presión arterial todavía está más [place] de lo que le indicó bethea médico     · Bethea presión arterial es más baja de lo que bethea médico dijo que debería ser  · Usted tiene preguntas o inquietudes acerca de bethea condición o cuidado  Medicamentos:  Es posible que usted necesite alguno de los siguientes:  · Medicamento  podría usarse para ayudar a disminuir la presión arterial  Es posible que necesite más de un tipo de [place]  · Diuréticos  ayudan a eliminar el exceso de líquido que se acumula en el organismo  [place] contribuirá a bajar bethea presión arterial  Es posible que orine más seguido mientras red neli medicamento  · [place] sara medicamentos brenda se le haya indicado  Consulte con bethea médico si usted dustin que bethea medicamento no le está ayudando o si presenta efectos secundarios  Infórmele si es alérgico a cualquier medicamento   Mantenga dianna lista actualizada de los medicamentos, las vitaminas y los productos herbales que red  Incluya los siguientes datos de los medicamentos: cantidad, frecuencia y motivo de administración  Traiga con usted la lista o los envases de la píldoras a sara citas de seguimiento  Lleve la lista de los medicamentos con usted en madhav de dianna emergencia  Lo que necesita saber sobre los medicamentos para la presión arterial:   · Tómese el medicamento a la misma hora todos los días  · No deje de reddy los medicamentos si la presión arterial es la deseada  Que la presión arterial sea la deseada significa que los medicamentos actúan correctamente  · Conozca el nombre y la dosis de bethea medicamento  · Vuélvase a surtir de sara medicamentos antes de que se agoten  · Los medicamentos para la presión arterial pueden hacer que se sienta mareado  Póngase de pie lentamente después de davidson estado acostado o sentado  [place] ayudará a evitar el mareo o el riesgo de caídas  · Pregúntele a bethea médico si debe reddy bethea medicamento para la presión arterial el día de [facility] o un procedimiento  Acuda a sara consultas de control con bethea médico según le indicaron  Tendrá que regresar para que le tomen la presión arterial  Anote sara preguntas para que se acuerde de hacerlas dylan sara visitas  Maneje bethea hipertensión:   · Tómese la presión arterial en bethea hogar 2 veces al día o según lo que le indicaron  Tómese la presión arterial a la [employer] días, por Nikko Bowman vez a la mañana y otra vez a la noche  Tómese la presión 2 veces en cada oportunidad  [place] más instrucciones a bethea Charmel Blow un registro de las lecturas de bethea presión arterial y llévelo consigo a sara consultas  Pregúntele a bethea médico cuál debe ser bethea presión arterial            · Limite el sodio brenda se le indique    Consumir demasiado sodio puede afectar el equilibrio de líquidos y hacer que sea difícil controlar bethea presión arterial  Revise las etiquetas para buscar alimentos bajos en sodio o sin sal agregada  Algunos alimentos bajos en sodio utilizan sales de potasio para añadir sabor  Demasiado potasio también puede causar problemas de [place]  Bethea médico le dirá qué cantidad de sodio y potasio es pride para el consumo en un día  Él puede recomendarle que limite el sodio a 2,300 mg al día  · Siga el plan de comidas recomendado por bethea médico   Un dietista o médico puede darle más información sobre planes de bajo contenido de sodio o el plan de alimentación DASH (enfoques dietéticos para detener la hipertensión)  El plan DASH es bajo en sodio, grasas saturadas y grasa total  Es alto en potasio, calcio y [place]  · Ejercítese para mantener un peso saludable  El ejercicio también ayudará a bajar bethea presión arterial  Realice dianna actividad física dylan 30 minutos al día, la mayoría de los días de la [place]  Comience ejercitándose 10 minutos cada día  Kari Caulk de actividades físicas son caminatas rápidas o andar en [place]  Consulte con bethea médico antes de empezar un plan de ejercicios  Él puede garantizar que el plan de ejercicio sea seguro para usted  · [address] [place] [address] estrés  [place] puede contribuir a bajar bethea presión arterial  Aprenda sobre formas de [place], brenda respiración profunda o escuchar música  Duerma lo suficiente cada la noche  La falta de sueño puede aumentar bethea nivel de estrés  · Limite o no consuma bebidas alcohólicas  Pregunte a bethea médico si usted puede reddy alcohol  Pregunte cuanto es la cantidad pride para usted reddy  · No fume  La nicotina y otros químicos en los cigarrillos y cigarros pueden aumentar bethea presión arterial y también pueden provocar daño al pulmón  Pida información a bethea médico si usted actualmente fuma y necesita ayuda para dejar de fumar  Los cigarrillos electrónicos o tabaco sin humo todavía contienen nicotina  Consulte con bethea médico antes de [employer]       · Controle cualquier [facility] [place] condición médica que usted tenga  Algunas condiciones médicas brenda la diabetes pueden aumentar galvan riesgo de hipertensión  [address] galvan médico y tómese sara medicamentos según dichas instrucciones  © 2017 2600 Jermaine Singh Information is for End User's use only and may not be sold, redistributed or otherwise used for commercial purposes  All illustrations and images included in CareNotes® are the copyrighted property of A D A M , Inc  or Yoan Posada  Esta información es sólo para uso en educación  Galvan intención no es darle un consejo médico sobre enfermedades o tratamientos  Colsulte con galvan Zelpha Roch farmacéutico antes de seguir cualquier régimen médico para saber si es seguro y efectivo para usted

## 2019-07-24 NOTE — ASSESSMENT & PLAN NOTE
- Patient had chest pain initially at home and at rest which resolved prior to being seen in the ED  - EKGs unchanged over course of admission  - Troponin trend: <0 02 -> 0 03 -> <0 02--> <0 02   - No further chest pain episodes, ACS workup negative  - Was stable for discharge 7/23/19 from chest pain perspective; kept overnight to follow BP as noted below  - Has follow up with Cardiology (Dr Charles Reid) 8/6/19

## 2019-07-24 NOTE — UTILIZATION REVIEW
Continued Stay Review    Date: 2019                         Current Patient Class: OBSERVATION  Current Level of Care: Med/Surg    HPI:84 y o  female initially admitted on 2019 @ 1721     Assessment/Plan: DC order entered    Pertinent Labs/Diagnostic Results:   2019 @ 0535  EC,  NSR, Septal infarct , age undetermined    Results from last 7 days   Lab Units 19  0540 19  0615 19  2151 19  1505   WBC Thousand/uL 5 44 5 49  --  6 43   HEMOGLOBIN g/dL 10 6* 10 1*  --  11 7   HEMATOCRIT % 34 1* 32 4*  --  38 1   PLATELETS Thousands/uL 218 228 254 258   NEUTROS ABS Thousands/µL 2 32  --   --  2 99     Results from last 7 days   Lab Units 19  0540 19  0615 19  1505 19  1329   SODIUM mmol/L 137 142 140 138   POTASSIUM mmol/L 4 2 3 3* 3 8 3 9   CHLORIDE mmol/L 107 109* 104 105   CO2 mmol/L 27 27 27 30   ANION GAP mmol/L 3* 6 9 3*   BUN mg/dL 32* 29* 32* 38*   CREATININE mg/dL 1 03 1 04 1 36* 1 43*   EGFR ml/min/1 73sq m 50 49 36 34   CALCIUM mg/dL 9 1 8 8 9 7 9 1   MAGNESIUM mg/dL 1 4* 1 3*  --   --    PHOSPHORUS mg/dL 3 5  --   --   --      Results from last 7 days   Lab Units 19  1505   AST U/L 18   ALT U/L 15   ALK PHOS U/L 93   TOTAL PROTEIN g/dL 7 8   ALBUMIN g/dL 3 6   TOTAL BILIRUBIN mg/dL 0 36     Results from last 7 days   Lab Units 19  0531 19  2122 19  1637 19  1056 19  0607 19  2134   POC GLUCOSE mg/dl 134 143* 110 82 117 162*     Results from last 7 days   Lab Units 19  0540 19  0615 19  1505 19  1329   GLUCOSE RANDOM mg/dL 124 110 159* 168*     Results from last 7 days   Lab Units 19  0058 19  2151 19  1721 19  1505   TROPONIN I ng/mL <0 02 <0 02 0 03 <0 02     Results from last 7 days   Lab Units 19  1505   NT-PRO BNP pg/mL 138     Results from last 7 days   Lab Units 19  1505   LIPASE u/L 246     Vital Signs:   Date/Time  Temp  Pulse  Resp BP  SpO2  O2 Device  Patient Position - Orthostatic VS   07/24/19 07:11:30  97 7 °F (36 5 °C)  71  18  158/57  97 %       07/24/19 05:32:43    82    163/58  97 %       07/23/19 23:41:23  98 3 °F (36 8 °C)  76  20  155/55  98 %           Medications:   Scheduled Meds:   Current Facility-Administered Medications:  aspirin 81 mg Oral Daily   dextran 70-hypromellose 1 drop Both Eyes Q12H   gabapentin 100 mg Oral HS   heparin (porcine) 5,000 Units Subcutaneous Q8H [facility]   insulin lispro 1-5 Units Subcutaneous TID AC   losartan 50 mg Oral Daily   magnesium sulfate 2 g Intravenous Once   NIFEdipine 60 mg Oral Daily   pantoprazole 40 mg Oral Daily   senna 2 tablet Oral Daily       Discharge Plan: Home    Network Utilization Review Department  Phone: [phone]; Fax [phone]  [email or web address]  ATTENTION: Please call with any questions or concerns to [phone]  and carefully listen to the prompts so that you are directed to the right person  Send all requests for admission clinical reviews, approved or denied determinations and any other requests to fax [phone]   All voicemails are confidential

## 2019-07-24 NOTE — ASSESSMENT & PLAN NOTE
Lab Results   Component Value Date    CREATININE 1 03 07/24/2019   - Creatinine: 1 36 on admission, baseline: 1 1-1 4, GFR: 36, at baseline  - Improve BP control, goal <140/90

## 2019-07-25 ENCOUNTER — PATIENT OUTREACH (OUTPATIENT)
Dept: FAMILY MEDICINE CLINIC | Facility: CLINIC | Age: 84
End: 2019-07-25

## 2019-07-25 ENCOUNTER — TRANSITIONAL CARE MANAGEMENT (OUTPATIENT)
Dept: FAMILY MEDICINE CLINIC | Facility: CLINIC | Age: 84
End: 2019-07-25

## 2019-07-25 PROBLEM — R00.2 PALPITATIONS: Status: ACTIVE | Noted: 2019-07-25

## 2019-07-25 PROBLEM — R19.7 DIARRHEA: Status: ACTIVE | Noted: 2019-07-25

## 2019-07-25 LAB
ANION GAP SERPL CALCULATED.3IONS-SCNC: 5 MMOL/L (ref 4–13)
ATRIAL RATE: 82 BPM
BASOPHILS # BLD AUTO: 0.02 THOUSANDS/ΜL (ref 0–0.1)
BASOPHILS NFR BLD AUTO: 0 % (ref 0–1)
BUN SERPL-MCNC: 34 MG/DL (ref 5–25)
CALCIUM SERPL-MCNC: 8.9 MG/DL (ref 8.3–10.1)
CHLORIDE SERPL-SCNC: 109 MMOL/L (ref 100–108)
CO2 SERPL-SCNC: 28 MMOL/L (ref 21–32)
CREAT SERPL-MCNC: 1.05 MG/DL (ref 0.6–1.3)
EOSINOPHIL # BLD AUTO: 0.07 THOUSAND/ΜL (ref 0–0.61)
EOSINOPHIL NFR BLD AUTO: 1 % (ref 0–6)
ERYTHROCYTE [DISTWIDTH] IN BLOOD BY AUTOMATED COUNT: 13 % (ref 11.6–15.1)
GFR SERPL CREATININE-BSD FRML MDRD: 49 ML/MIN/1.73SQ M
GLUCOSE SERPL-MCNC: 114 MG/DL (ref 65–140)
GLUCOSE SERPL-MCNC: 121 MG/DL (ref 65–140)
GLUCOSE SERPL-MCNC: 130 MG/DL (ref 65–140)
GLUCOSE SERPL-MCNC: 140 MG/DL (ref 65–140)
HCT VFR BLD AUTO: 32.9 % (ref 34.8–46.1)
HGB BLD-MCNC: 10.1 G/DL (ref 11.5–15.4)
IMM GRANULOCYTES # BLD AUTO: 0.01 THOUSAND/UL (ref 0–0.2)
IMM GRANULOCYTES NFR BLD AUTO: 0 % (ref 0–2)
LYMPHOCYTES # BLD AUTO: 2.68 THOUSANDS/ΜL (ref 0.6–4.47)
LYMPHOCYTES NFR BLD AUTO: 42 % (ref 14–44)
MAGNESIUM SERPL-MCNC: 1.9 MG/DL (ref 1.6–2.6)
MCH RBC QN AUTO: 28.1 PG (ref 26.8–34.3)
MCHC RBC AUTO-ENTMCNC: 30.7 G/DL (ref 31.4–37.4)
MCV RBC AUTO: 92 FL (ref 82–98)
MONOCYTES # BLD AUTO: 0.52 THOUSAND/ΜL (ref 0.17–1.22)
MONOCYTES NFR BLD AUTO: 8 % (ref 4–12)
NEUTROPHILS # BLD AUTO: 3.12 THOUSANDS/ΜL (ref 1.85–7.62)
NEUTS SEG NFR BLD AUTO: 49 % (ref 43–75)
NRBC BLD AUTO-RTO: 0 /100 WBCS
P AXIS: 77 DEGREES
PLATELET # BLD AUTO: 228 THOUSANDS/UL (ref 149–390)
PMV BLD AUTO: 11.8 FL (ref 8.9–12.7)
POTASSIUM SERPL-SCNC: 4 MMOL/L (ref 3.5–5.3)
PR INTERVAL: 152 MS
QRS AXIS: 1 DEGREES
QRSD INTERVAL: 84 MS
QT INTERVAL: 354 MS
QTC INTERVAL: 413 MS
RBC # BLD AUTO: 3.59 MILLION/UL (ref 3.81–5.12)
SODIUM SERPL-SCNC: 142 MMOL/L (ref 136–145)
T WAVE AXIS: 90 DEGREES
VENTRICULAR RATE: 82 BPM
WBC # BLD AUTO: 6.42 THOUSAND/UL (ref 4.31–10.16)

## 2019-07-25 PROCEDURE — 85025 COMPLETE CBC W/AUTO DIFF WBC: CPT | Performed by: FAMILY MEDICINE

## 2019-07-25 PROCEDURE — 99219 PR INITIAL OBSERVATION CARE/DAY 50 MINUTES: CPT | Performed by: FAMILY MEDICINE

## 2019-07-25 PROCEDURE — 93010 ELECTROCARDIOGRAM REPORT: CPT | Performed by: INTERNAL MEDICINE

## 2019-07-25 PROCEDURE — 93005 ELECTROCARDIOGRAM TRACING: CPT

## 2019-07-25 PROCEDURE — 83735 ASSAY OF MAGNESIUM: CPT | Performed by: FAMILY MEDICINE

## 2019-07-25 PROCEDURE — 82948 REAGENT STRIP/BLOOD GLUCOSE: CPT

## 2019-07-25 PROCEDURE — 84484 ASSAY OF TROPONIN QUANT: CPT | Performed by: FAMILY MEDICINE

## 2019-07-25 PROCEDURE — 80048 BASIC METABOLIC PNL TOTAL CA: CPT | Performed by: FAMILY MEDICINE

## 2019-07-25 RX ORDER — FLUTICASONE PROPIONATE 50 MCG
1 SPRAY, SUSPENSION (ML) NASAL DAILY
Status: DISCONTINUED | OUTPATIENT
Start: 2019-07-25 | End: 2019-07-26 | Stop reason: HOSPADM

## 2019-07-25 RX ORDER — HYDRALAZINE HYDROCHLORIDE 20 MG/ML
5 INJECTION INTRAMUSCULAR; INTRAVENOUS ONCE
Status: COMPLETED | OUTPATIENT
Start: 2019-07-25 | End: 2019-07-25

## 2019-07-25 RX ORDER — LOSARTAN POTASSIUM 50 MG/1
50 TABLET ORAL DAILY
Status: DISCONTINUED | OUTPATIENT
Start: 2019-07-25 | End: 2019-07-25

## 2019-07-25 RX ORDER — NIFEDIPINE 60 MG/1
60 TABLET, EXTENDED RELEASE ORAL DAILY
Status: DISCONTINUED | OUTPATIENT
Start: 2019-07-25 | End: 2019-07-26 | Stop reason: HOSPADM

## 2019-07-25 RX ORDER — GABAPENTIN 100 MG/1
100 CAPSULE ORAL
Status: DISCONTINUED | OUTPATIENT
Start: 2019-07-25 | End: 2019-07-26 | Stop reason: HOSPADM

## 2019-07-25 RX ORDER — HEPARIN SODIUM 5000 [USP'U]/ML
5000 INJECTION, SOLUTION INTRAVENOUS; SUBCUTANEOUS EVERY 8 HOURS SCHEDULED
Status: DISCONTINUED | OUTPATIENT
Start: 2019-07-25 | End: 2019-07-26 | Stop reason: HOSPADM

## 2019-07-25 RX ORDER — CALCITRIOL 0.25 UG/1
0.25 CAPSULE, LIQUID FILLED ORAL 2 TIMES WEEKLY
Status: DISCONTINUED | OUTPATIENT
Start: 2019-07-26 | End: 2019-07-26 | Stop reason: HOSPADM

## 2019-07-25 RX ORDER — LOSARTAN POTASSIUM 50 MG/1
100 TABLET ORAL DAILY
Status: DISCONTINUED | OUTPATIENT
Start: 2019-07-26 | End: 2019-07-26 | Stop reason: HOSPADM

## 2019-07-25 RX ORDER — PANTOPRAZOLE SODIUM 40 MG/1
40 TABLET, DELAYED RELEASE ORAL DAILY
Status: DISCONTINUED | OUTPATIENT
Start: 2019-07-25 | End: 2019-07-26 | Stop reason: HOSPADM

## 2019-07-25 RX ORDER — ASPIRIN 81 MG/1
81 TABLET ORAL DAILY
Status: DISCONTINUED | OUTPATIENT
Start: 2019-07-25 | End: 2019-07-26 | Stop reason: HOSPADM

## 2019-07-25 RX ORDER — FUROSEMIDE 20 MG/1
20 TABLET ORAL DAILY
Status: DISCONTINUED | OUTPATIENT
Start: 2019-07-25 | End: 2019-07-25

## 2019-07-25 RX ORDER — SODIUM CHLORIDE 9 MG/ML
75 INJECTION, SOLUTION INTRAVENOUS CONTINUOUS
Status: DISCONTINUED | OUTPATIENT
Start: 2019-07-25 | End: 2019-07-25

## 2019-07-25 RX ORDER — CALCIUM CARBONATE 200(500)MG
500 TABLET,CHEWABLE ORAL DAILY PRN
Status: DISCONTINUED | OUTPATIENT
Start: 2019-07-25 | End: 2019-07-26 | Stop reason: HOSPADM

## 2019-07-25 RX ORDER — GABAPENTIN 100 MG/1
100 CAPSULE ORAL 3 TIMES DAILY
Status: DISCONTINUED | OUTPATIENT
Start: 2019-07-25 | End: 2019-07-25

## 2019-07-25 RX ORDER — B-COMPLEX WITH VITAMIN C
1 TABLET ORAL
Status: DISCONTINUED | OUTPATIENT
Start: 2019-07-25 | End: 2019-07-26 | Stop reason: HOSPADM

## 2019-07-25 RX ORDER — LOSARTAN POTASSIUM 50 MG/1
50 TABLET ORAL ONCE
Status: COMPLETED | OUTPATIENT
Start: 2019-07-25 | End: 2019-07-25

## 2019-07-25 RX ORDER — ACETAMINOPHEN 325 MG/1
325 TABLET ORAL EVERY 6 HOURS PRN
Status: DISCONTINUED | OUTPATIENT
Start: 2019-07-25 | End: 2019-07-26 | Stop reason: HOSPADM

## 2019-07-25 RX ADMIN — SODIUM CHLORIDE 75 ML/HR: 0.9 INJECTION, SOLUTION INTRAVENOUS at 02:00

## 2019-07-25 RX ADMIN — FLUTICASONE PROPIONATE 1 SPRAY: 50 SPRAY, METERED NASAL at 10:38

## 2019-07-25 RX ADMIN — CALCIUM CARBONATE (ANTACID) CHEW TAB 500 MG 500 MG: 500 CHEW TAB at 23:03

## 2019-07-25 RX ADMIN — HEPARIN SODIUM 5000 UNITS: 5000 INJECTION INTRAVENOUS; SUBCUTANEOUS at 15:02

## 2019-07-25 RX ADMIN — GABAPENTIN 100 MG: 100 CAPSULE ORAL at 21:17

## 2019-07-25 RX ADMIN — LOSARTAN POTASSIUM 50 MG: 50 TABLET ORAL at 10:36

## 2019-07-25 RX ADMIN — CALCIUM CARBONATE-VITAMIN D TAB 500 MG-200 UNIT 1 TABLET: 500-200 TAB at 10:39

## 2019-07-25 RX ADMIN — HYDRALAZINE HYDROCHLORIDE 5 MG: 20 INJECTION INTRAMUSCULAR; INTRAVENOUS at 21:17

## 2019-07-25 RX ADMIN — FUROSEMIDE 20 MG: 20 TABLET ORAL at 10:37

## 2019-07-25 RX ADMIN — ASPIRIN 81 MG: 81 TABLET, COATED ORAL at 10:37

## 2019-07-25 RX ADMIN — PANTOPRAZOLE SODIUM 40 MG: 40 TABLET, DELAYED RELEASE ORAL at 10:36

## 2019-07-25 RX ADMIN — LOSARTAN POTASSIUM 50 MG: 50 TABLET ORAL at 13:10

## 2019-07-25 RX ADMIN — BISMUTH SUBSALICYLATE 524 MG: 262 LIQUID ORAL at 23:52

## 2019-07-25 RX ADMIN — NIFEDIPINE 60 MG: 60 TABLET, FILM COATED, EXTENDED RELEASE ORAL at 10:36

## 2019-07-25 NOTE — PLAN OF CARE
Problem: INFECTION - ADULT  Goal: Absence or prevention of progression during hospitalization  Description  INTERVENTIONS:  - Assess and monitor for signs and symptoms of infection  - Monitor lab/diagnostic results  - Monitor all insertion sites, i e  indwelling lines, tubes, and drains  - Monitor endotracheal (as able) and nasal secretions for changes in amount and color  - [place] appropriate cooling/warming therapies per order  - Administer medications as ordered  - Instruct and encourage patient and family to use good hand hygiene technique  - Identify and instruct in appropriate isolation precautions for identified infection/condition  Outcome: Progressing  Goal: Absence of fever/infection during neutropenic period  Description  INTERVENTIONS:  - Monitor WBC  - Implement neutropenic guidelines  Outcome: Progressing     Problem: SAFETY ADULT  Goal: Patient will remain free of falls  Description  INTERVENTIONS:  - Assess patient frequently for physical needs  -  Identify cognitive and physical deficits and behaviors that affect risk of falls    -  [place] fall precautions as indicated by assessment   - Educate patient/family on patient safety including physical limitations  - Instruct patient to call for assistance with activity based on assessment  - Modify environment to reduce risk of injury  - Consider OT/PT consult to assist with strengthening/mobility  Outcome: Progressing  Goal: Maintain or return to baseline ADL function  Description  INTERVENTIONS:  -  Assess patient's ability to carry out ADLs; assess patient's baseline for ADL function and identify physical deficits which impact ability to perform ADLs (bathing, care of mouth/teeth, toileting, grooming, dressing, etc )  - Assess/evaluate cause of self-care deficits   - Assess range of motion  - Assess patient's mobility; develop plan if impaired  - Assess patient's need for assistive devices and provide as appropriate  - Encourage maximum independence but intervene and supervise when necessary  ¯ Involve family in performance of ADLs  ¯ Assess for home care needs following discharge   ¯ Request OT consult to assist with ADL evaluation and planning for discharge  ¯ Provide patient education as appropriate  Outcome: Progressing  Goal: Maintain or return mobility status to optimal level  Description  INTERVENTIONS:  - Assess patient's baseline mobility status (ambulation, transfers, stairs, etc )    - Identify cognitive and physical deficits and behaviors that affect mobility  - Identify mobility aids required to assist with transfers and/or ambulation (gait belt, sit-to-stand, lift, walker, cane, etc )  - [place] fall precautions as indicated by assessment  - Record patient progress and toleration of activity level on Mobility SBAR; progress patient to next Phase/Stage  - Instruct patient to call for assistance with activity based on assessment  - Request Rehabilitation consult to assist with strengthening/weightbearing, etc   Outcome: Progressing     Problem: DISCHARGE PLANNING  Goal: Discharge to home or other facility with appropriate resources  Description  INTERVENTIONS:  - Identify barriers to discharge w/patient and caregiver  - Arrange for needed discharge resources and transportation as appropriate  - Identify discharge learning needs (meds, wound care, etc )  - Arrange for interpretive services to assist at discharge as needed  - Refer to Case Management Department for coordinating discharge planning if the patient needs post-hospital services based on physician/advanced practitioner order or complex needs related to functional status, cognitive ability, or social support system  Outcome: Progressing     Problem: Knowledge Deficit  Goal: Patient/family/caregiver demonstrates understanding of disease process, treatment plan, medications, and discharge instructions  Description  Complete learning assessment and assess knowledge base   Interventions:  - Provide teaching at level of understanding  - Provide teaching via preferred learning methods  Outcome: Progressing     Problem: GASTROINTESTINAL - ADULT  Goal: Minimal or absence of nausea and/or vomiting  Description  INTERVENTIONS:  - Administer IV fluids as ordered to ensure adequate hydration  - Maintain NPO status until nausea and vomiting are resolved  - Nasogastric tube as ordered  - Administer ordered antiemetic medications as needed  - Provide nonpharmacologic comfort measures as appropriate  - Advance diet as tolerated, if ordered  - Nutrition services referral to assist patient with adequate nutrition and appropriate food choices  Outcome: Progressing  Goal: Maintains or returns to baseline bowel function  Description  INTERVENTIONS:  - Assess bowel function  - Encourage oral fluids to ensure adequate hydration  - Administer IV fluids as ordered to ensure adequate hydration  - Administer ordered medications as needed  - Encourage mobilization and activity  - Nutrition services referral to assist patient with appropriate food choices  Outcome: Progressing  Goal: Maintains adequate nutritional intake  Description  INTERVENTIONS:  - Monitor percentage of each meal consumed  - Identify factors contributing to decreased intake, treat as appropriate  - Assist with meals as needed  - Monitor I&O, WT and lab values  - Obtain nutrition services referral as needed  Outcome: Progressing

## 2019-07-25 NOTE — ASSESSMENT & PLAN NOTE
-Likely secondary to dehydration vs anxiety   Less likely cardiac causes given negative ACS workup on last admission   -Patient has not been tachycardic during hospital stay, no events on telemetry  -EKG on ED showed NSR and no changes from previous on 7/24/19  -HR down to her baseline of SBP in 150s   -No further episodes of palpitations

## 2019-07-25 NOTE — UTILIZATION REVIEW
Notification of Inpatient Admission/Inpatient Authorization Request  This is a Notification of Inpatient Admission/Request for Inpatient Authorization for our facility [address] [place] [address]  Be advised that this patient was admitted to our facility under Inpatient Status  Please contact the Utilization Review Department where the patient is receiving care services for additional admission information  Place of Service Code: 24   Place of Service Name: Inpatient Hospital  Presentation Date & Time: 7/24/2019  9:35 PM  Inpatient Admission Date & Time: N/A N/A  Discharge Date & Time: No discharge date for patient encounter  Discharge Disposition (if discharged): Home/Self Care  Attending Physician: Liban Gomez [[id number]]  Admission Orders (From admission, onward)     Ordered        07/24/19 2340  Place in Observation  Once                   Facility: [address] [place] [address])  Network Utilization Review Department  Phone: [phone]; Fax [phone]  [email or web address]  ATTENTION: Please call with any questions or concerns to [phone]  and carefully listen to the prompts so that you are directed to the right person  Send all requests for admission clinical reviews, approved or denied determinations and any other requests to fax [phone]   All voicemails are confidential

## 2019-07-25 NOTE — SOCIAL WORK
CM met with pt to discuss CM role in D/C planning  Pt's significant other, Maddy Gutierrez ([phone]) at pt's bedside  Pt reported the following:    Emergency Contact: Significant Other, Maddy Gutierrez   POA/LW:None   Level of assist with ADL's PTA: IND  House or Apt: One floor Apt  ЕЛЕНА to enter: 4 ЕЛЕНА  BATH on 1st floor: Yes  DME: RW  VNA: None  SNF/Rehab: None    Transportation: Pt has 's license however significant other drives her  Help at home: Significant Other    Pharmacy/Rx Coverage: [facility] on [address]  Name of PCP: Dr Dennis Schwarz tx for MH/SA: None    Anticipated D/C Plan: Return home with significant other  CM discussed OP OBS Notice with pt and gave pt a copy  Placed signed copy in medical records bin  CM reviewed d/c planning process including the following: identifying help at home, patient preference for d/c planning needs, Discharge Lounge, [facility] Meds to Bed program, availability of treatment team to discuss questions or concerns patient and/or family may have regarding understanding medications and recognizing signs and symptoms once discharged  CM also encouraged patient to follow up with all recommended appointments after discharge  Patient advised of importance for patient and family to participate in managing patients medical well being

## 2019-07-25 NOTE — ASSESSMENT & PLAN NOTE
Lab Results   Component Value Date    HGBA1C 6 4 (H) 06/17/2019       Recent Labs     07/25/19  0653 07/25/19  1025 07/25/19  1544 07/26/19  0607   POCGLU 130 121 140 121       Blood Sugar Average: Last 72 hrs:  (P) 128     -Diet controlled  -Continue home diabetic diet  -Continue PTA gabapentin 100mg at night for neuropathy

## 2019-07-25 NOTE — ASSESSMENT & PLAN NOTE
-BP on arrival 186/79  -procardia XL increased from 30 mg to 60 on 7/23  -Losartan increased from 50mg to 100mg on 7/25  -Patient received dose of hydralazine 5mg IV last night which brought SBP to 150s    -BP has been stable today in 150s  -Resume home Lasix regimen on discharge  -Follow up outpatient with Cardiology and Nephrology

## 2019-07-25 NOTE — ED ATTENDING ATTESTATION
Jeanna Griffith MD, saw and evaluated the patient  I have discussed the patient with the resident/non-physician practitioner and agree with the resident's/non-physician practitioner's findings, Plan of Care, and MDM as documented in the resident's/non-physician practitioner's note, except where noted  All available labs and Radiology studies were reviewed  I was present for key portions of any procedure(s) performed by the resident/non-physician practitioner and I was immediately available to provide assistance  At this point I agree with the current assessment done in the Emergency Department    I have conducted an independent evaluation of this patient a history and physical is as follows:   Pt recently admitted for palpitations and chest pain Today had palpitations which have resolved no chest pain PE: alert nad heart reg lungs clear abd soft nontender MDM; will do cardiac fernandez    Critical Care Time  Procedures

## 2019-07-25 NOTE — ASSESSMENT & PLAN NOTE
-One episode of epigastric pain overnight after eating   Responded to Tums and Pepto-Bismol    -Continue Protonix 40 mg daily

## 2019-07-25 NOTE — H&P
H&P Exam - Harpreet Carlie 80 y o  female MRN: [id number]  Unit/Bed#: CW2 218-01 Encounter: [id number]        Assessment:     Principal Problem:    Hypertension  Active Problems:    Diarrhea    CKD (chronic kidney disease), stage III (HCC)    DMII (diabetes mellitus, type 2) ([facility])    GERD (gastroesophageal reflux disease)    Allergic rhinitis    Palpitations      Plan:   Summary: Patient is a 80 y o  female being admitted for observation under [facility] with hypertension and diarrhea    1  Palpitations  Likely secondary to dehydration vs anxiety  Less likely cardiac causes given negative ACS workup on last admission  - on pulse ox at home  -EKG on ED showed NSR and no changes from previous on 7/24/19  -HR stable since admission  -Continue telemetry monitoring     2  Hypertension  BP on arrival 186/79  -Cardiology increased procardia XL from 30 mg to 60 yesterday  -Continue PTA Losartan 50 mg and Lasix 20 mg daily  Lasix held on previous admission due to [facility]  May be contributing to uncontrolled BP    3  Diarrhea  New    -Patient afebrile  Watery, foul-smelling   -C difficile ordered  Will follow up results  -Gentle IVF to 75 ml/hr to avoid dehydration  -Monitor I/Os    4  CKD stage 3  Cr on arrival 1 37 up from 1 04 at discharge  Patient's baseline 1 1-1 4   -Will continue to monitor with daily BMP    5  DM  Diet controlled  -Diabetic diet ordered  -Fingerstick Glucose per routine    6  GERD  Controlled  -Continue Protonix 40 mg daily    Prophylaxis  -GI: PPI qd  -VTE Pharmacologic Prophylaxis: Heparin  -VTE Mechanical Prophylaxis: sequential compression device    PMH, stable, chronic  - Continuing home medications or alternatives as follows or as documented in the H & P  Disposition  - Code: Full  - Diet: Heart healthy  - PCP: Dr Nasima Hendrix, [facility]  - Ambulation: No dysfunction   - Discharge: Home  Plan D/W Dr Marisol Dickerson and [facility] Team  We appreciate the input from the consulting teams and case management        CC: History of Present Illness      Matt Weaver is a 80 y o  female with PMH of hypertension, CKD stage III, type II DM, atherosclerosis, PAD,  who presents with palpitations  Patient was discharged yesterday from the hospital, admitted originally for chest pain  Patient reported feeling palpitations after discharge  She took pulse ox at home that showed HR of 107  BP was elevated and patient was brought back to the hospital  She also reports 5-6 episodes of watery, foul-smelling diarrhea since yesterday  Patient originally attributed diarrhea to medications for constipation  On arrival to ED patient had BP of 186/79  She was placed on telemetry monitoring but no medications were given since recent Procardia dose increase by cardiology  Patient currently denies headaches, chest pain, SOB, palpitations, n/v, abdominal pain  ED Management: EKG    Review of Systems   Constitutional: Negative for appetite change, diaphoresis, fatigue and fever  Eyes: Negative for visual disturbance  Respiratory: Negative for cough, chest tightness and shortness of breath  Cardiovascular: Positive for palpitations  Negative for chest pain and leg swelling  Gastrointestinal: Positive for diarrhea  Negative for abdominal distention, abdominal pain, blood in stool, constipation, nausea and vomiting  Genitourinary: Negative for difficulty urinating, frequency and urgency  Musculoskeletal: Negative for back pain and neck pain  Neurological: Negative for weakness, light-headedness, numbness and headaches  Psychiatric/Behavioral: Negative for agitation and behavioral problems  The patient is nervous/anxious          Historical Information   Past Medical History:   Diagnosis Date    Arthritis     Back pain     Benign essential hypertension     LAST ASSESSED: [id number]    Carotid artery stenosis     CKD (chronic kidney disease) stage 2, GFR 60-89 ml/min     Constipation, chronic     "drinks prune juice"    Diabetes mellitus ([place] [address] )     DMII (di[facility] mellitus, type 2) ([facility])     Epiglottitis     RESOLVED: [id number]    Gastritis     GERD (gastroesophageal reflux disease)     Gout     H/O blood clots     left lower leg,,,11 yrs ago    History of stenosis of renal artery     RESOLVED: [id number]    Hypercholesteremia     Hypercholesterolemia     Hyperlipidemia     Hyperparathyroidism ([facility] )     Hypertension     Iliac artery stenosis, bilateral (HCC)     Kidney stone     Mucinous carcinoma of breast, 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    Presence of pessary     Renal artery stenosis ([facility])     Renal disorder     Renovascular hypertension     RESOLVED: [id number]    Segmental and somatic dysfunction of cervical region     RESOLVED: [id number]    Segmental and somatic dysfunction of lumbar region     RESOLVED: [id number]    Segmental and somatic dysfunction of pelvic region     RESOLVED: [id number]    Segmental and somatic dysfunction of thoracic region     RESOLVED: [id number]    Somatic dysfunction of abdominal region     RESOLVED: [id number]    Somatic dysfunction of head region     RESOLVED: [id number]    Somatic dysfunction of lower extremities     RESOLVED: [id number]    Somatic dysfunction of rib region     RESOLVED: [id number]    Somatic dysfunction of thoracic region     RESOLVED: [id number]    Somatic dysfunction of upper extremities     RESOLVED: [id number]    Teeth missing      Past Surgical History:   Procedure Laterality Date    ANGIOPLASTY / STENTING ILIAC      BREAST LUMPECTOMY Left 09/06/2017    LAST ASSESSED: [id number]    BREAST SURGERY Left     biopsy    CATARACT EXTRACTION Bilateral     CHOLECYSTECTOMY      COLONOSCOPY      EYE SURGERY      ILIAC VEIN ANGIOPLASTY / STENTING Right     INITIAL STENOSIS: ONSET: [id number]    KNEE ARTHROSCOPY Right     KNEE SURGERY Right     ONSET: [id number]    MAMMO NEEDLE LOCALIZATION LEFT (ALL INC) Left 9/11/2018    MAMMO NEEDLE LOCALIZATION LEFT (ALL INC) Left 9/6/2017  MD ESOPHAGOGASTRODUODENOSCOPY TRANSORAL DIAGNOSTIC N/A 7/26/2018    Procedure: ESOPHAGOGASTRODUODENOSCOPY (EGD); Surgeon: Dawson Cornejo DO;  Location: [facility]; Service: Gastroenterology    MD LARYNGOSCOPY,DIRCT,OP [facility] N/A 6/10/2016    Procedure: MICRO DIRECT LARYNGOSCOPY WITH VOCAL FOLD MASS EXCISION ;  Surgeon: Bridgette Carter MD;  [facility];  Service: ENT    MD MASTECTOMY, PARTIAL Left 9/6/2017    Procedure: LUMPECTOMY BREAST NEEDLE LOCALIZED WITH FAXITRON NEEDLE @ 0830;  Surgeon: Kanchan Medina MD;  [facility];  Service: General    MD PERQ DEVICE PLACEMT BREAST LOC 1ST LES W Carola Carmen Left 9/11/2018    Procedure: BREAST LUMPECTOMY; BREAST NEEDLE LOCALIZATION (NEEDLE LOC AT 1330);   Surgeon: Nguyen Miles MD;  [facility];  Service: Surgical Oncology    THROAT SURGERY      lump removed    TUBAL LIGATION      UPPER GASTROINTESTINAL ENDOSCOPY      US GUIDANCE BREAST BIOPSY LEFT EACH ADDITIONAL Left 8/7/2017    US GUIDED BREAST BIOPSY LEFT COMPLETE Left 8/10/2018    US GUIDED BREAST BIOPSY LEFT COMPLETE Left 8/7/2017    VASCULAR SURGERY       Social History   Social History     Substance and Sexual Activity   Alcohol Use Not Currently    Frequency: Never     Social History     Substance and Sexual Activity   Drug Use No     Social History     Tobacco Use   Smoking Status Former Smoker    Packs/day: 1 00    Years: 64 00    Pack years: 56 65    Last attempt to quit: 2004    Years since quitting: 15 5   Smokeless Tobacco Never Used   Tobacco Comment    quit in 2003 1 ppd x 50 years     Family History:   non-contributory    Meds/Allergies   all medications and allergies reviewed  Allergies   Allergen Reactions    Clonidine Derivatives Swelling    Diflucan [Fluconazole] Rash    Flagyl [Metronidazole] Anaphylaxis    Carvedilol Hives     And legs swelled    Latex Rash    Lipitor [Atorvastatin]      Legs swelled    Other Palpitations     IV DYE Objective   Vitals: Blood pressure (!) 175/62, pulse 76, temperature 97 8 °F (36 6 °C), temperature source Oral, resp  rate 18, weight 67 9 kg (149 lb 9 6 oz), SpO2 99 %, not currently breastfeeding  Vitals:    07/24/19 2145 07/24/19 2300 07/25/19 0130 07/25/19 0522   BP: (!) 186/79 (!) 178/74 (!) 175/62    BP Location:   Right arm    Pulse: 92 76 76    Resp: 18 18 18    Temp:   97 8 °F (36 6 °C)    TempSrc:   Oral    SpO2: 98% 98% 99%    Weight:    67 9 kg (149 lb 9 6 oz)       No intake or output data in the 24 hours ending 07/25/19 0539    Invasive Devices     Peripheral Intravenous Line            Peripheral IV 07/24/19 Right Forearm less than 1 day                Physical Exam   Constitutional: She is oriented to person, place, and time  She appears well-developed and well-nourished  No distress  HENT:   Head: Normocephalic and atraumatic  Neck: Normal range of motion  Neck supple  No thyromegaly present  Cardiovascular: Normal rate, regular rhythm and intact distal pulses  Exam reveals no gallop and no friction rub  No murmur heard  Pulmonary/Chest: Effort normal and breath sounds normal  No respiratory distress  She has no wheezes  She has no rales  She exhibits no tenderness  Abdominal: Soft  She exhibits no distension and no mass  Bowel sounds are increased  There is no tenderness  There is no rebound and no guarding  Musculoskeletal: Normal range of motion  She exhibits no edema, tenderness or deformity  Lymphadenopathy:     She has no cervical adenopathy  Neurological: She is alert and oriented to person, place, and time  Skin: Skin is warm and dry  No rash noted  She is not diaphoretic  No erythema  Psychiatric: She has a normal mood and affect  Her behavior is normal  Judgment and thought content normal    Vitals reviewed  Lab Results:   I have personally reviewed pertinent reports        Recent Results (from the past 24 hour(s))   Basic metabolic panel    Collection Time: 07/24/19  5:40 AM   Result Value Ref Range    Sodium 137 136 - 145 mmol/L    Potassium 4 2 3 5 - 5 3 mmol/L    Chloride 107 100 - 108 mmol/L    CO2 27 21 - 32 mmol/L    ANION GAP 3 (L) 4 - 13 mmol/L    BUN 32 (H) 5 - 25 mg/dL    Creatinine 1 03 0 60 - 1 30 mg/dL    Glucose 124 65 - 140 mg/dL    Calcium 9 1 8 3 - 10 1 mg/dL    eGFR 50 ml/min/1 73sq m   CBC and differential    Collection Time: 07/24/19  5:40 AM   Result Value Ref Range    WBC 5 44 4 31 - 10 16 Thousand/uL    RBC 3 71 (L) 3 81 - 5 12 Million/uL    Hemoglobin 10 6 (L) 11 5 - 15 4 g/dL    Hematocrit 34 1 (L) 34 8 - 46 1 %    MCV 92 82 - 98 fL    MCH 28 6 26 8 - 34 3 pg    MCHC 31 1 (L) 31 4 - 37 4 g/dL    RDW 13 2 11 6 - 15 1 %    MPV 11 5 8 9 - 12 7 fL    Platelets 260 094 - 154 Thousands/uL    nRBC 0 /100 WBCs    Neutrophils Relative 43 43 - 75 %    Immat GRANS % 0 0 - 2 %    Lymphocytes Relative 46 (H) 14 - 44 %    Monocytes Relative 9 4 - 12 %    Eosinophils Relative 2 0 - 6 %    Basophils Relative 0 0 - 1 %    Neutrophils Absolute 2 32 1 85 - 7 62 Thousands/µL    Immature Grans Absolute 0 01 0 00 - 0 20 Thousand/uL    Lymphocytes Absolute 2 53 0 60 - 4 47 Thousands/µL    Monocytes Absolute 0 49 0 17 - 1 22 Thousand/µL    Eosinophils Absolute 0 08 0 00 - 0 61 Thousand/µL    Basophils Absolute 0 01 0 00 - 0 10 Thousands/µL   Magnesium    Collection Time: 07/24/19  5:40 AM   Result Value Ref Range    Magnesium 1 4 (L) 1 6 - 2 6 mg/dL   Phosphorus    Collection Time: 07/24/19  5:40 AM   Result Value Ref Range    Phosphorus 3 5 2 3 - 4 1 mg/dL   Fingerstick Glucose (POCT)    Collection Time: 07/24/19 10:26 AM   Result Value Ref Range    POC Glucose 90 65 - 140 mg/dl   CBC and differential    Collection Time: 07/24/19  9:53 PM   Result Value Ref Range    WBC 8 65 4 31 - 10 16 Thousand/uL    RBC 4 10 3 81 - 5 12 Million/uL    Hemoglobin 11 7 11 5 - 15 4 g/dL    Hematocrit 37 4 34 8 - 46 1 %    MCV 91 82 - 98 fL    MCH 28 5 26 8 - 34 3 pg    MCHC 31 3 (L) 31 4 - 37 4 g/dL    RDW 13 0 11 6 - 15 1 %    MPV 11 6 8 9 - 12 7 fL    Platelets 851 708 - 291 Thousands/uL    nRBC 0 /100 WBCs    Neutrophils Relative 51 43 - 75 %    Immat GRANS % 0 0 - 2 %    Lymphocytes Relative 43 14 - 44 %    Monocytes Relative 5 4 - 12 %    Eosinophils Relative 1 0 - 6 %    Basophils Relative 0 0 - 1 %    Neutrophils Absolute 4 32 1 85 - 7 62 Thousands/µL    Immature Grans Absolute 0 02 0 00 - 0 20 Thousand/uL    Lymphocytes Absolute 3 74 0 60 - 4 47 Thousands/µL    Monocytes Absolute 0 46 0 17 - 1 22 Thousand/µL    Eosinophils Absolute 0 08 0 00 - 0 61 Thousand/µL    Basophils Absolute 0 03 0 00 - 0 10 Thousands/µL   Comprehensive metabolic panel    Collection Time: 07/24/19  9:53 PM   Result Value Ref Range    Sodium 140 136 - 145 mmol/L    Potassium 3 8 3 5 - 5 3 mmol/L    Chloride 106 100 - 108 mmol/L    CO2 27 21 - 32 mmol/L    ANION GAP 7 4 - 13 mmol/L    BUN 36 (H) 5 - 25 mg/dL    Creatinine 1 37 (H) 0 60 - 1 30 mg/dL    Glucose 217 (H) 65 - 140 mg/dL    Calcium 9 4 8 3 - 10 1 mg/dL    AST 19 5 - 45 U/L    ALT 20 12 - 78 U/L    Alkaline Phosphatase 89 46 - 116 U/L    Total Protein 8 3 (H) 6 4 - 8 2 g/dL    Albumin 3 7 3 5 - 5 0 g/dL    Total Bilirubin 0 26 0 20 - 1 00 mg/dL    eGFR 35 ml/min/1 73sq m   Troponin I    Collection Time: 07/24/19  9:53 PM   Result Value Ref Range    Troponin I <0 02 <=0 04 ng/mL     Blood Culture: No results found for: BLOODCX,   Urinalysis:   Lab Results   Component Value Date    COLORU Yellow 06/05/2019    COLORU YELLOW 03/01/2017    CLARITYU Cloudy 06/05/2019    CLARITYU Clear 01/14/2015    SPECGRAV 1 010 06/05/2019    SPECGRAV 1 016 03/01/2017    PHUR 6 0 06/05/2019    PHUR 6 0 02/04/2019    PHUR 5 5 03/01/2017    LEUKOCYTESUR Moderate (A) 06/05/2019    LEUKOCYTESUR 2+ (A) 03/01/2017    NITRITE Negative 06/05/2019    NITRITE NEGATIVE 03/01/2017    PROTEINUA 1+ (A) 03/01/2017    GLUCOSEU Negative 06/05/2019    GLUCOSEU Negative 01/14/2015 KETONESU Negative 06/05/2019    KETONESU NEGATIVE 03/01/2017    BILIRUBINUR Negative 06/05/2019    BILIRUBINUR NEGATIVE 03/01/2017    BLOODU Trace (A) 06/05/2019    BLOODU Negative 01/14/2015   ,   Urine Culture: No results found for: URINECX,   Wound Culure: No results found for: WOUNDCULT    Imaging:   EKG, Pathology, and Other Studies:   I have personally reviewed pertinent reports  Counseling / Coordination of Care  Total floor / unit time spent today 30 minutes  Greater than 50% of total time was spent with the patient and / or family counseling and / or coordination of care          Martinez Shannon MD PGY-2   Family Medicine

## 2019-07-25 NOTE — ED PROVIDER NOTES
History  Chief Complaint   Patient presents with    Palpitations     Pt just discharged from hospital and c/o diarrhea, palpitations, and high BP  Pt denies CP/dizziness/SOB  HPI    80 y o  F w/ hx of dm htn hld presents to ED for evaluation of palpations  Patient had a heart rate of 107 at home on pulse ox per significant other  She also had an elevated BP  She also had diarrhea that began yesterday  She has had about 5 loose stool non bloody  She was admitted to hospital two days ago for chest pain, and elevated BP, nifedipine  Increased from 30 to 60  She also was given stool softeners for constipation during her admission  Palpitations occurred at 9:20 pm     Prior to Admission Medications   Prescriptions Last Dose Informant Patient Reported? Taking?    Acetaminophen (TYLENOL) 325 MG CAPS 2019 at Unknown time Self Yes Yes   Sig: Take by mouth every 8 (eight) hours   Lancets (ONETOUCH ULTRASOFT) lancets 2019 at Unknown time Self No Yes   Sig: USE TWICE DAILY   NIFEdipine (ADALAT CC) 60 MG 24 hr tablet 2019 at Unknown time  No Yes   Sig: Take 1 tablet (60 mg total) by mouth daily for 90 days   ONE TOUCH ULTRA TEST test strip 2019 at Unknown time Self No Yes   Si each by Other route 2 (two) times a day Use as instructed   aspirin (ADULT ASPIRIN EC LOW STRENGTH) 81 mg EC tablet 2019 at Unknown time Self Yes Yes   Sig: Take 1 tablet by mouth daily   calcitriol (ROCALTROL) 0 25 mcg capsule 2019 at Unknown time Self No Yes   Sig: Take 1 capsule (0 25 mcg total) by mouth 2 (two) times a week Please take every Tuesday and friday   calcium carbonate-vitamin D (OSCAL-D) 500 mg-200 units per tablet 2019 at Unknown time Self Yes Yes   Sig: Take 1 tablet by mouth daily with breakfast   fluticasone (FLONASE) 50 mcg/act nasal spray 2019 at Unknown time  No Yes   Sig: instill 1 spray into each nostril once daily   furosemide (LASIX) 20 mg tablet 2019 at Unknown time Self No Yes   Sig: Take 1 tablet (20 mg total) by mouth daily Daily with swelling, every other day once swelling resolves   gabapentin (NEURONTIN) 100 mg capsule  Self No No   Sig: Take 1 capsule (100 mg total) by mouth daily at bedtime for 10 days   losartan (COZAAR) 50 mg tablet 7/24/2019 at Unknown time Self No Yes   Sig: Take 1 tablet (50 mg total) by mouth daily   pantoprazole (PROTONIX) 40 mg tablet 7/24/2019 at Unknown time Self No Yes   Sig: Take 1 tablet (40 mg total) by mouth daily   pravastatin (PRAVACHOL) 40 mg tablet 7/24/2019 at Unknown time  No Yes   Sig: take 1 tablet by mouth daily      Facility-Administered Medications: None       Past Medical History:   Diagnosis Date    Arthritis     Back pain     Benign essential hypertension     LAST ASSESSED: [id number]    Carotid artery stenosis     CKD (chronic kidney disease) stage 2, GFR 60-89 ml/min     Constipation, chronic     "drinks prune juice"    Diabetes mellitus ([facility] [address] )     DMII (diabetes mellitus, type 2) ([facility])     Epiglottitis     RESOLVED: [id number]    Gastritis     GERD (gastroesophageal reflux disease)     Gout     H/O blood clots     left lower leg,,,11 yrs ago    History of stenosis of renal artery     RESOLVED: [id number]    Hypercholesteremia     Hypercholesterolemia     Hyperlipidemia     Hyperparathyroidism ([facility] [address] )     Hypertension     [facility], 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    Presence of pessary     Renal artery stenosis (HCC)     Renal disorder     Renovascular hypertension     RESOLVED: [id number]    Segmental and somatic dysfunction of cervical region     RESOLVED: [id number]    Segmental and somatic dysfunction of lumbar region     RESOLVED: [id number]    Segmental and somatic dysfunction of pelvic region     RESOLVED: [id number]    Segmental and somatic dysfunction of thoracic region     RESOLVED: [id number]    Somatic dysfunction of abdominal region     RESOLVED: 57RUA5446    Somatic dysfunction of head region     RESOLVED: [id number]    Somatic dysfunction of lower extremities     RESOLVED: 28CQO6644    Somatic dysfunction of rib region     RESOLVED: [id number]    Somatic dysfunction of thoracic region     RESOLVED: 82GFB2722    Somatic dysfunction of upper extremities     RESOLVED: 83QAE0965    Teeth missing        Past Surgical History:   Procedure Laterality Date    ANGIOPLASTY / STENTING ILIAC      BREAST LUMPECTOMY Left 09/06/2017    LAST ASSESSED: [id number]    BREAST SURGERY Left     biopsy    CATARACT EXTRACTION Bilateral     CHOLECYSTECTOMY      COLONOSCOPY      EYE SURGERY      ILIAC VEIN ANGIOPLASTY / STENTING Right     INITIAL STENOSIS: ONSET: [id number]    KNEE ARTHROSCOPY Right     KNEE SURGERY Right     ONSET: [id number]    MAMMO NEEDLE LOCALIZATION LEFT (ALL INC) Left 9/11/2018    MAMMO NEEDLE LOCALIZATION LEFT (ALL INC) Left 9/6/2017    MD ESOPHAGOGASTRODUODENOSCOPY TRANSORAL DIAGNOSTIC N/A 7/26/2018    Procedure: ESOPHAGOGASTRODUODENOSCOPY (EGD); Surgeon: Maegan Jones DO;  [facility]: [facility]; Service: Gastroenterology    MD LARYNGOSCOPY,DIRCT,[facility] N/A 6/10/2016    Procedure: MICRO DIRECT LARYNGOSCOPY WITH VOCAL FOLD MASS EXCISION ;  Surgeon: Esteban Wahl MD;  [facility];  Service: ENT    MD MASTECTOMY, PARTIAL Left 9/6/2017    Procedure: LUMPECTOMY BREAST NEEDLE LOCALIZED WITH FAXITRON NEEDLE @ 0830;  Surgeon: Tarik Page MD;  [facility];  Service: General    MD PERQ DEVICE PLACEMT BREAST LOC 1ST LES W Priti [place] Left 9/11/2018    Procedure: BREAST LUMPECTOMY; BREAST NEEDLE LOCALIZATION (NEEDLE LOC AT 1330);   Surgeon: Ky Ba MD;  [facility];  Service: Surgical Oncology    THROAT SURGERY      lump removed    TUBAL LIGATION      UPPER GASTROINTESTINAL ENDOSCOPY      US GUIDANCE BREAST BIOPSY LEFT EACH ADDITIONAL Left 8/7/2017    US GUIDED BREAST BIOPSY LEFT COMPLETE Left 8/10/2018    US GUIDED BREAST BIOPSY LEFT COMPLETE Left 8/7/2017    VASCULAR SURGERY         Family History   Problem Relation Age of Onset    Heart disease Brother     Heart disease Maternal Grandmother     Diabetes Other         of other type with arthropathy, without long term current use of insulin    Hypertension Other     No Known Problems Mother     Gout Family     Rheum arthritis Family     Lupus Family         systematic erythematosus    Heart disease Family     Stroke Family         syndrome    Stroke Maternal Uncle         syndrome    Stroke Maternal Aunt         syndrome    No Known Problems Father      I have reviewed and agree with the history as documented  Social History     Tobacco Use    Smoking status: Former Smoker     Packs/day: 1 00     Years: 64 00     Pack years: 64 00     Last attempt to quit: 2004     Years since quitting: 15 5    Smokeless tobacco: Never Used    Tobacco comment: quit in 2003 1 ppd x 50 years   Substance Use Topics    Alcohol use: Not Currently     Frequency: Never    Drug use: No        Review of Systems   Constitutional: Negative for chills, fatigue and fever  HENT: Negative for sore throat  Eyes: Negative for redness and visual disturbance  Respiratory: Negative for cough and shortness of breath  Cardiovascular: Positive for palpitations  Negative for chest pain  Gastrointestinal: Positive for diarrhea  Negative for abdominal pain and nausea  Genitourinary: Negative for difficulty urinating, dysuria and pelvic pain  Musculoskeletal: Negative for back pain  Skin: Negative for rash  Neurological: Negative for syncope, weakness and headaches  All other systems reviewed and are negative        Physical Exam  ED Triage Vitals   Temperature Pulse Respirations Blood Pressure SpO2   07/24/19 2143 07/24/19 2145 07/24/19 2145 07/24/19 2145 07/24/19 2145   98 °F (36 7 °C) 92 18 (!) 186/79 98 %      Temp Source Heart Rate Source Patient Position - Orthostatic VS BP Location FiO2 (%)   07/25/19 0130 -- 07/25/19 0130 07/25/19 0130 --   Oral  Sitting Right arm       Pain Score       07/25/19 0130       No Pain             Orthostatic Vital Signs  Vitals:    07/24/19 2145 07/24/19 2300 07/25/19 0130   BP: (!) 186/79 (!) 178/74 (!) 175/62   Pulse: 92 76 76   Patient Position - Orthostatic VS:   Sitting       Physical Exam   Constitutional: She is oriented to person, place, and time  She appears well-developed and well-nourished  No distress  HENT:   Head: Normocephalic and atraumatic  Eyes: Conjunctivae are normal    Neck: Normal range of motion  Cardiovascular: Normal rate, regular rhythm and normal heart sounds  No murmur heard  Pulmonary/Chest: Effort normal and breath sounds normal  No respiratory distress  Abdominal: Soft  Bowel sounds are normal  There is no tenderness  Musculoskeletal: Normal range of motion  Neurological: She is alert and oriented to person, place, and time  No cranial nerve deficit or sensory deficit  She exhibits normal muscle tone  Coordination normal    Skin: Skin is warm and dry  No rash noted  Psychiatric: She has a normal mood and affect  Nursing note and vitals reviewed        ED Medications  Medications   acetaminophen (TYLENOL) tablet 325 mg (has no administration in time range)   aspirin (ECOTRIN LOW STRENGTH) EC tablet 81 mg (has no administration in time range)   calcitriol (ROCALTROL) capsule 0 25 mcg (has no administration in time range)   calcium carbonate-vitamin D (OSCAL-D) 500 mg-200 units per tablet 1 tablet (has no administration in time range)   fluticasone (FLONASE) 50 mcg/act nasal spray 1 spray (has no administration in time range)   furosemide (LASIX) tablet 20 mg (has no administration in time range)   losartan (COZAAR) tablet 50 mg (has no administration in time range)   NIFEdipine (PROCARDIA XL) 24 hr tablet 60 mg (has no administration in time range)   pantoprazole (PROTONIX) EC tablet 40 mg (has no administration in time range)   heparin (porcine) subcutaneous injection 5,000 Units (has no administration in time range)   sodium chloride 0 9 % infusion (75 mL/hr Intravenous Started During Downtime 7/25/19 0200)       Diagnostic Studies  Results Reviewed     Procedure Component Value Units Date/Time    Comprehensive metabolic panel [[id number]]  (Abnormal) Collected:  07/24/19 2153    Lab Status:  Final result Specimen:  Blood from Arm, Right Updated:  07/24/19 2241     Sodium 140 mmol/L      Potassium 3 8 mmol/L      Chloride 106 mmol/L      CO2 27 mmol/L      ANION GAP 7 mmol/L      BUN 36 mg/dL      Creatinine 1 37 mg/dL      Glucose 217 mg/dL      Calcium 9 4 mg/dL      AST 19 U/L      ALT 20 U/L      Alkaline Phosphatase 89 U/L      Total Protein 8 3 g/dL      Albumin 3 7 g/dL      Total Bilirubin 0 26 mg/dL      eGFR 35 ml/min/1 73sq m     Narrative:       [place] guidelines for Chronic Kidney Disease (CKD):     Stage 1 with normal or high GFR (GFR > 90 mL/min/1 73 square meters)    Stage 2 Mild CKD (GFR = 60-89 mL/min/1 73 square meters)    Stage 3A Moderate CKD (GFR = 45-59 mL/min/1 73 square meters)    Stage 3B Moderate CKD (GFR = 30-44 mL/min/1 73 square meters)    Stage 4 Severe CKD (GFR = 15-29 mL/min/1 73 square meters)    Stage 5 End Stage CKD (GFR <15 mL/min/1 73 square meters)  Note: GFR calculation is accurate only with a steady state creatinine    Troponin I [[id number]]  (Normal) Collected:  07/24/19 2153    Lab Status:  Final result Specimen:  Blood from Arm, Right Updated:  07/24/19 2241     Troponin I <0 02 ng/mL     CBC and differential [[id number]]  (Abnormal) Collected:  07/24/19 2153    Lab Status:  Final result Specimen:  Blood from Arm, Right Updated:  07/24/19 2204     WBC 8 65 Thousand/uL      RBC 4 10 Million/uL      Hemoglobin 11 7 g/dL      Hematocrit 37 4 %      MCV 91 fL      MCH 28 5 pg      MCHC 31 3 g/dL RDW 13 0 %      MPV 11 6 fL      Platelets 233 Thousands/uL      nRBC 0 /100 WBCs      Neutrophils Relative 51 %      Immat GRANS % 0 %      Lymphocytes Relative 43 %      Monocytes Relative 5 %      Eosinophils Relative 1 %      Basophils Relative 0 %      Neutrophils Absolute 4 32 Thousands/µL      Immature Grans Absolute 0 02 Thousand/uL      Lymphocytes Absolute 3 74 Thousands/µL      Monocytes Absolute 0 46 Thousand/µL      Eosinophils Absolute 0 08 Thousand/µL      Basophils Absolute 0 03 Thousands/µL                  No orders to display         Procedures  Procedures        ED Course         MDM    80 y o  F presents with palpitations and hypertension  Asymptomatic now  Labs unremarkable, EKG NSR  Given recent admission, patient will be read[facility] to [facility] for managing BP and monitoring for palpitations and episodic tachycardia  Disposition  Final diagnoses:   Palpitations   Hypertensive urgency   Diarrhea     Time reflects when diagnosis was documented in both MDM as applicable and the Disposition within this note     Time User Action Codes Description Comment    7/24/2019 11:39 PM Ramonia Burnett Add [R00 2] Palpitations     7/24/2019 11:39 PM Ramonia Burnett Add [I16 0] Hypertensive urgency     7/24/2019 11:39 PM Rameddie Burnett Add [R19 7] Diarrhea       ED Disposition     ED Disposition Condition Date/Time Comment    Admit Stable Wed Jul 24, 2019 11:39 PM Case was discussed with FM and the patient's admission status was agreed to be Admission Status: observation status to the service of Dr Zenobia Crigler          Follow-up Information    None         Current Discharge Medication List      CONTINUE these medications which have NOT CHANGED    Details   Acetaminophen (TYLENOL) 325 MG CAPS Take by mouth every 8 (eight) hours      aspirin (ADULT ASPIRIN EC LOW STRENGTH) 81 mg EC tablet Take 1 tablet by mouth daily      calcitriol (ROCALTROL) 0 25 mcg capsule Take 1 capsule (0 25 mcg total) by mouth 2 (two) times a week Please take every Tuesday and friday  Qty: 10 capsule, Refills: 6    Comments: Note change in dose  Associated Diagnoses: Secondary hyperparathyroidism ([address] )      calcium carbonate-vitamin D (OSCAL-D) 500 mg-200 units per tablet Take 1 tablet by mouth daily with breakfast      fluticasone (FLONASE) 50 mcg/act nasal spray instill 1 spray into each nostril once daily  Qty: 16 g, Refills: 2    Associated Diagnoses:  Allergic rhinitis, unspecified seasonality, unspecified trigger      furosemide (LASIX) 20 mg tablet Take 1 tablet (20 mg total) by mouth daily Daily with swelling, every other day once swelling resolves  Qty: 30 tablet, Refills: 3    Associated Diagnoses: Lower extremity edema      Lancets (ONETOUCH ULTRASOFT) lancets USE TWICE DAILY  Qty: 200 each, Refills: 9    Associated Diagnoses: Type 2 diabetes mellitus without complication (HCC)      losartan (COZAAR) 50 mg tablet Take 1 tablet (50 mg total) by mouth daily  Qty: 90 tablet, Refills: 2    Associated Diagnoses: Hypertension      NIFEdipine (ADALAT CC) 60 MG 24 hr tablet Take 1 tablet (60 mg total) by mouth daily for 90 days  Qty: 90 tablet, Refills: 0    Associated Diagnoses: Essential hypertension      ONE TOUCH ULTRA TEST test strip 1 each by Other route 2 (two) times a day Use as instructed  Qty: 100 each, Refills: 5    Associated Diagnoses: Type 2 diabetes mellitus without complication, with long-term current use of insulin ([facility])      pantoprazole (PROTONIX) 40 mg tablet Take 1 tablet (40 mg total) by mouth daily  Qty: 30 tablet, Refills: 5    Associated Diagnoses: Gastroesophageal reflux disease without esophagitis      pravastatin (PRAVACHOL) 40 mg tablet take 1 tablet by mouth daily  Qty: 90 tablet, Refills: 0    Associated Diagnoses: Hyperlipidemia, unspecified hyperlipidemia type      gabapentin (NEURONTIN) 100 mg capsule Take 1 capsule (100 mg total) by mouth daily at bedtime for 10 days  Qty: 90 capsule, Refills: 1    Associated Diagnoses: Chronic thoracic back pain, unspecified back pain laterality           No discharge procedures on file  ED Provider  Attending physically available and evaluated Harpreettracy Sexton  [facility] managed the patient along with the ED Attending      Electronically Signed by         Lakisha Kulkarni MD  07/25/19 4944

## 2019-07-25 NOTE — UTILIZATION REVIEW
Initial Clinical Review    Admission: Date/Time/Statement: 07/24/2019 @ 2340  Orders Placed This Encounter   Procedures    Place in Observation     Standing Status:   Standing     Number of Occurrences:   1     Order Specific Question:   Admitting Physician     Answer:   Bell Downing [[id number]]     Order Specific Question:   Level of Care     Answer:   Med Surg [[id number]]     ED Arrival Information     Expected Arrival Acuity Means of Arrival Escorted By Service Admission Type    - 7/24/2019 21:29 Urgent Walk-In Self General Medicine Urgent    Arrival Complaint    High BP        Chief Complaint   Patient presents with    Palpitations     Pt just discharged from hospital and c/o diarrhea, palpitations, and high BP  Pt denies CP/dizziness/SOB  Assessment/Plan: 80year old female, presented to the ED from home via car  Admitted as OBSERVATION due to  hypertension  Was discharged yesterday from the hospital, admitted originally for chest pain  On arrival to ED patient had BP of 186/79  She was placed on telemetry monitoring but no medications were given since recent Procardia dose increase by cardiology  She also reports 5-6 episodes of watery, foul-smelling diarrhea since yesterday  Palpitations:  Likely secondary to dehydration vs anxiety  Less likely cardiac causes given negative ACS workup on last admission  - on pulse ox at home  EKG on ED showed NSR and no changes from previous on 7/24/19  HR stable since admission  Continue telemetry monitoring  Hypertension:  BP on arrival 186/79  Cardiology increased procardia XL from 30 mg to 60 yesterday  Continue PTA Losartan 50 mg and Lasix 20 mg daily  Lasix held on previous admission due to NORTH  May be contributing to uncontrolled BP      ED Triage Vitals   Temperature Pulse Respirations Blood Pressure SpO2   07/24/19 2143 07/24/19 2145 07/24/19 2145 07/24/19 2145 07/24/19 2145   98 °F (36 7 °C) 92 18 (!) 186/79 98 %      Temp Source Heart Rate Source Patient Position - Orthostatic VS BP Location FiO2 (%)   07/25/19 0130 -- 07/25/19 0130 07/25/19 0130 --   Oral  Sitting Right arm       Pain Score       07/25/19 0130       No Pain        Wt Readings from Last 1 Encounters:   07/25/19 67 9 kg (149 lb 9 6 oz)     Additional Vital Signs:   Date/Time  Temp  Pulse  Resp  BP  SpO2  Patient Position - Orthostatic VS   07/25/19 0130  97 8 °F (36 6 °C)  76  18  175/62Abnormal   99 %  Sitting   07/24/19 2300    76  18  178/74Abnormal   98 %       Pertinent Labs/Diagnostic Test Results:   Results from last 7 days   Lab Units 07/24/19 2153 07/24/19  0540 07/23/19  0615  07/22/19  1505   WBC Thousand/uL 8 65 5 44 5 49  --  6 43   HEMOGLOBIN g/dL 11 7 10 6* 10 1*  --  11 7   HEMATOCRIT % 37 4 34 1* 32 4*  --  38 1   PLATELETS Thousands/uL 268 218 228   < > 258   NEUTROS ABS Thousands/µL 4 32 2 32  --   --  2 99    < > = values in this interval not displayed       Results from last 7 days   Lab Units 07/25/19  0506 07/24/19 2153 07/24/19  0540 07/23/19  0615 07/22/19  1505   SODIUM mmol/L 142 140 137 142 140   POTASSIUM mmol/L 4 0 3 8 4 2 3 3* 3 8   CHLORIDE mmol/L 109* 106 107 109* 104   CO2 mmol/L 28 27 27 27 27   ANION GAP mmol/L 5 7 3* 6 9   BUN mg/dL 34* 36* 32* 29* 32*   CREATININE mg/dL 1 05 1 37* 1 03 1 04 1 36*   EGFR ml/min/1 73sq m 49 35 50 49 36   CALCIUM mg/dL 8 9 9 4 9 1 8 8 9 7   MAGNESIUM mg/dL  --   --  1 4* 1 3*  --    PHOSPHORUS mg/dL  --   --  3 5  --   --      Results from last 7 days   Lab Units 07/24/19 2153 07/22/19  1505   AST U/L 19 18   ALT U/L 20 15   ALK PHOS U/L 89 93   TOTAL PROTEIN g/dL 8 3* 7 8   ALBUMIN g/dL 3 7 3 6   TOTAL BILIRUBIN mg/dL 0 26 0 36     Results from last 7 days   Lab Units 07/25/19  0653 07/24/19  1026 07/24/19  0531 07/23/19  2122 07/23/19  1637 07/23/19  1056 07/23/19  0607 07/22/19  2134   POC GLUCOSE mg/dl 130 90 134 143* 110 82 117 162*     Results from last 7 days   Lab Units 07/25/19  0506 07/24/19  2153 07/24/19  0540 07/23/19  0615 07/22/19  1505   GLUCOSE RANDOM mg/dL 114 217* 124 110 159*     Results from last 7 days   Lab Units 07/24/19  2153 07/23/19  0058 07/22/19  2151 07/22/19  1721 07/22/19  1505   TROPONIN I ng/mL <0 02 <0 02 <0 02 0 03 <0 02     Results from last 7 days   Lab Units 07/22/19  1505   NT-PRO BNP pg/mL 138     Results from last 7 days   Lab Units 07/22/19  1505   LIPASE u/L 246     07/22/2019 @ 1637  Chest X:  No acute cardiopulmonary disease  07/18/2019 @ [address]:  Subcentimeter right renal cysts  Status post cholecystectomy      Status:  Final result   ECG   Ref Range & Units 7/24/19 0535 7/23/19 1141 7/22/19 1720 7/22/19 1446   Ventricular Rate BPM 73  77  78  101    Atrial Rate BPM 73  77  78  101    MA Interval ms 158  164  152  150    QRSD Interval ms 88  86  86  84    QT Interval ms 402  374  378  328    QTC Interval ms 442  423  430  425    P Axis degrees 68  65  72  75    QRS Axis degrees -3  4  10  12    T Wave Axis degrees 61  64  82  118       Narrative   07/24/2019 @ 0535  Normal sinus rhythm  Septal infarct , age undetermined             ED Treatment:   Medication Administration from 07/24/2019 2129 to 07/25/2019 0330       Date/Time Order Dose Route Action     07/25/2019 0200 sodium chloride 0 9 % infusion 75 mL/hr Intravenous Started During Downtime        Past Medical History:   Diagnosis Date    Arthritis     Back pain     Benign essential hypertension     LAST ASSESSED: [id number]    Carotid artery stenosis     CKD (chronic kidney disease) stage 2, GFR 60-89 ml/min     Constipation, chronic     "drinks prune juice"    Diabetes mellitus (HCC)     DMII (diabetes mellitus, type 2) (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    Presence of pessary     Renal artery stenosis ([facility])     Renal disorder     Renovascular hypertension     RESOLVED: 42EZG3841    Segmental and somatic dysfunction of cervical region     RESOLVED: 24VUS7388    Segmental and somatic dysfunction of lumbar region     RESOLVED: 37LEZ0512    Segmental and somatic dysfunction of pelvic region     RESOLVED: 58WIU0320    Segmental and somatic dysfunction of thoracic region     RESOLVED: 14ASN2527    Somatic dysfunction of abdominal region     RESOLVED: 95ZEU4224    Somatic dysfunction of head region     RESOLVED: 91XKJ9396    Somatic dysfunction of lower extremities     RESOLVED: 44RXY9006    Somatic dysfunction of rib region     RESOLVED: 28SMY6506    Somatic dysfunction of thoracic region     RESOLVED: 52QEL5350    Somatic dysfunction of upper extremities     RESOLVED: 97DMX5719    Teeth missing      Present on Admission:   Hypertension   CKD (chronic kidney disease), stage III ([place] [address] )   DMII (diabetes mellitus, type 2) ([facility])   GERD (gastroesophageal reflux disease)   Allergic rhinitis    Admitting Diagnosis: Palpitations [R00 2]  Diarrhea [R19 7]  Hypertensive urgency [I16 0]  Age/Sex: 80 y o  female  Admission Orders:  Current Facility-Administered Medications:  acetaminophen 325 mg Oral Q6H PRN   aspirin 81 mg Oral Daily   [START ON 7/26/2019] calcitriol 0 25 mcg Oral Once per day on Tue Fri   calcium carbonate-vitamin D 1 tablet Oral Daily With Breakfast   fluticasone 1 spray Each Nare Daily   furosemide 20 mg Oral Daily   heparin (porcine) 5,000 Units Subcutaneous Q8H [address]   losartan 50 mg Oral Daily   NIFEdipine ER 60 mg Oral Daily   pantoprazole 40 mg Oral Daily   sodium chloride 75 mL/hr Intravenous Continuous   Level 2 diet  Up with assistance  I/O  Glucose finger stick QID  TELM  Chaim SCDs    Network Utilization Review Department  Phone: [phone]; Fax [phone]  [email or web address]  ATTENTION: Please call with any questions or concerns to [phone]  and carefully listen to the prompts so that you are directed to the right person  Send all requests for admission clinical reviews, approved or denied determinations and any other requests to fax [phone]   All voicemails are confidential

## 2019-07-26 VITALS
DIASTOLIC BLOOD PRESSURE: 48 MMHG | SYSTOLIC BLOOD PRESSURE: 150 MMHG | BODY MASS INDEX: 27.36 KG/M2 | TEMPERATURE: 97.8 F | OXYGEN SATURATION: 98 % | RESPIRATION RATE: 18 BRPM | HEART RATE: 65 BPM | WEIGHT: 149.6 LBS

## 2019-07-26 PROBLEM — R19.7 DIARRHEA: Status: RESOLVED | Noted: 2019-07-25 | Resolved: 2019-07-26

## 2019-07-26 LAB
ANION GAP SERPL CALCULATED.3IONS-SCNC: 7 MMOL/L (ref 4–13)
ATRIAL RATE: 96 BPM
BASOPHILS # BLD AUTO: 0.02 THOUSANDS/ΜL (ref 0–0.1)
BASOPHILS NFR BLD AUTO: 0 % (ref 0–1)
BUN SERPL-MCNC: 31 MG/DL (ref 5–25)
CALCIUM SERPL-MCNC: 9.2 MG/DL (ref 8.3–10.1)
CHLORIDE SERPL-SCNC: 106 MMOL/L (ref 100–108)
CO2 SERPL-SCNC: 27 MMOL/L (ref 21–32)
CREAT SERPL-MCNC: 1.26 MG/DL (ref 0.6–1.3)
EOSINOPHIL # BLD AUTO: 0.08 THOUSAND/ΜL (ref 0–0.61)
EOSINOPHIL NFR BLD AUTO: 1 % (ref 0–6)
ERYTHROCYTE [DISTWIDTH] IN BLOOD BY AUTOMATED COUNT: 13.2 % (ref 11.6–15.1)
GFR SERPL CREATININE-BSD FRML MDRD: 39 ML/MIN/1.73SQ M
GLUCOSE SERPL-MCNC: 106 MG/DL (ref 65–140)
GLUCOSE SERPL-MCNC: 108 MG/DL (ref 65–140)
GLUCOSE SERPL-MCNC: 121 MG/DL (ref 65–140)
GLUCOSE SERPL-MCNC: 147 MG/DL (ref 65–140)
HCT VFR BLD AUTO: 34.2 % (ref 34.8–46.1)
HGB BLD-MCNC: 10.6 G/DL (ref 11.5–15.4)
IMM GRANULOCYTES # BLD AUTO: 0.01 THOUSAND/UL (ref 0–0.2)
IMM GRANULOCYTES NFR BLD AUTO: 0 % (ref 0–2)
LYMPHOCYTES # BLD AUTO: 2.22 THOUSANDS/ΜL (ref 0.6–4.47)
LYMPHOCYTES NFR BLD AUTO: 40 % (ref 14–44)
MAGNESIUM SERPL-MCNC: 1.9 MG/DL (ref 1.6–2.6)
MCH RBC QN AUTO: 28.5 PG (ref 26.8–34.3)
MCHC RBC AUTO-ENTMCNC: 31 G/DL (ref 31.4–37.4)
MCV RBC AUTO: 92 FL (ref 82–98)
MONOCYTES # BLD AUTO: 0.44 THOUSAND/ΜL (ref 0.17–1.22)
MONOCYTES NFR BLD AUTO: 8 % (ref 4–12)
NEUTROPHILS # BLD AUTO: 2.78 THOUSANDS/ΜL (ref 1.85–7.62)
NEUTS SEG NFR BLD AUTO: 51 % (ref 43–75)
NRBC BLD AUTO-RTO: 0 /100 WBCS
P AXIS: 77 DEGREES
PLATELET # BLD AUTO: 235 THOUSANDS/UL (ref 149–390)
PMV BLD AUTO: 12.2 FL (ref 8.9–12.7)
POTASSIUM SERPL-SCNC: 4.1 MMOL/L (ref 3.5–5.3)
PR INTERVAL: 198 MS
QRS AXIS: -7 DEGREES
QRSD INTERVAL: 84 MS
QT INTERVAL: 362 MS
QTC INTERVAL: 457 MS
RBC # BLD AUTO: 3.72 MILLION/UL (ref 3.81–5.12)
SODIUM SERPL-SCNC: 140 MMOL/L (ref 136–145)
T WAVE AXIS: 107 DEGREES
TROPONIN I SERPL-MCNC: <0.02 NG/ML
VENTRICULAR RATE: 96 BPM
WBC # BLD AUTO: 5.55 THOUSAND/UL (ref 4.31–10.16)

## 2019-07-26 PROCEDURE — 85025 COMPLETE CBC W/AUTO DIFF WBC: CPT | Performed by: FAMILY MEDICINE

## 2019-07-26 PROCEDURE — 80048 BASIC METABOLIC PNL TOTAL CA: CPT | Performed by: FAMILY MEDICINE

## 2019-07-26 PROCEDURE — G8978 MOBILITY CURRENT STATUS: HCPCS

## 2019-07-26 PROCEDURE — G8980 MOBILITY D/C STATUS: HCPCS

## 2019-07-26 PROCEDURE — 93010 ELECTROCARDIOGRAM REPORT: CPT | Performed by: INTERNAL MEDICINE

## 2019-07-26 PROCEDURE — G8979 MOBILITY GOAL STATUS: HCPCS

## 2019-07-26 PROCEDURE — 82948 REAGENT STRIP/BLOOD GLUCOSE: CPT

## 2019-07-26 PROCEDURE — 83735 ASSAY OF MAGNESIUM: CPT | Performed by: FAMILY MEDICINE

## 2019-07-26 PROCEDURE — 97161 PT EVAL LOW COMPLEX 20 MIN: CPT

## 2019-07-26 RX ORDER — NIFEDIPINE 60 MG/1
60 TABLET, FILM COATED, EXTENDED RELEASE ORAL DAILY
Qty: 90 TABLET | Refills: 0 | Status: SHIPPED | OUTPATIENT
Start: 2019-07-26 | End: 2020-01-15

## 2019-07-26 RX ORDER — LOSARTAN POTASSIUM 100 MG/1
100 TABLET ORAL DAILY
Qty: 90 TABLET | Refills: 0 | Status: SHIPPED | OUTPATIENT
Start: 2019-07-27 | End: 2019-10-24 | Stop reason: SDUPTHER

## 2019-07-26 RX ADMIN — CALCITRIOL CAPSULES 0.25 MCG 0.25 MCG: 0.25 CAPSULE ORAL at 08:57

## 2019-07-26 RX ADMIN — NIFEDIPINE 60 MG: 60 TABLET, FILM COATED, EXTENDED RELEASE ORAL at 08:57

## 2019-07-26 RX ADMIN — ASPIRIN 81 MG: 81 TABLET, COATED ORAL at 08:57

## 2019-07-26 RX ADMIN — FLUTICASONE PROPIONATE 1 SPRAY: 50 SPRAY, METERED NASAL at 08:56

## 2019-07-26 RX ADMIN — CALCIUM CARBONATE-VITAMIN D TAB 500 MG-200 UNIT 1 TABLET: 500-200 TAB at 08:57

## 2019-07-26 RX ADMIN — PANTOPRAZOLE SODIUM 40 MG: 40 TABLET, DELAYED RELEASE ORAL at 08:57

## 2019-07-26 RX ADMIN — LOSARTAN POTASSIUM 100 MG: 50 TABLET ORAL at 08:57

## 2019-07-26 NOTE — QUICK NOTE
Nurse called to report a manual BP of 170/80, HR 80  Patient was comfortable and with family  Losartan was increased today form 50--> 100 mg yet BP still remains elevated  Will order 1 time dose of Hydralazine  BB avoided due to allergy to carvedilol  Will recheck in 15 minutes

## 2019-07-26 NOTE — OCCUPATIONAL THERAPY NOTE
OCCUPATIONAL THERAPY SCREEN    OT orders received  Chart reviewed  Pt was evaluated earlier by PT who reports pt is functioning at an indepedent level  Pt has good family support at home and has a bathroom on the 1st floor if needed  Rec pt cont participation in self care and mobility w/ hospital staff while in the hospital  No further acute OT needs identified at this time   D/C OT          Vance Hurtado, OTR/L

## 2019-07-26 NOTE — ASSESSMENT & PLAN NOTE
-Likely 2/2 dehydration vs anxiety  -No PVCs or arrhythmia noted on telemetry  -Patient has not been tachycardic during hospital stay  -EKG on ED showed NSR and no changes from previous on 7/24/19  -HR remained <100 bpm prior to discharge

## 2019-07-26 NOTE — PHYSICAL THERAPY NOTE
Physical Therapy Evaluation    Patient Name: King Blackburn    DSFFI'K Date: 7/26/2019     Problem List  Patient Active Problem List   Diagnosis    Vocal cord mass    Iliac artery stenosis, bilateral (HCC)    Hyperparathyroidism (HCC)    GERD (gastroesophageal reflux disease)    Arthritis    Hypertension    EKG abnormalities    Anemia    Neck pain    Chronic thoracic back pain    Fall from other slipping, tripping, or stumbling    Abnormal EKG    Abnormal mammogram    Accelerated essential hypertension    Allergic rhinitis    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)    Back pain    Bilateral carotid artery stenosis    Carotid stenosis, asymptomatic    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    Chronic myofascial pain    Chronic pain of lower extremity    CKD [facility]), [facility] III ([facility])    Constipation    Diabetic retinopathy [facility] )    DMII (diabetes mellitus, type 2) ([facility])    Dysphagia    Epigastric pain    Fever of unknown origin    Gastritis    Gout    Hypomagnesemia    Atherosclerosis of other specified arteries    Lymphadenopathy    Microalbuminuria    Nephrolithiasis    Neuropathic pain, leg, right    Osteoporosis    PAD (peripheral artery disease) ([facility] )    Primary generalized (osteo)arthritis    Persistent proteinuria    Renal cyst    Secondary hyperparathyroidism, renal (HCC)    Vaginal prolapse    Vitamin D deficiency    Acquired complex cyst of kidney    Gastroesophageal reflux disease without esophagitis    Mucinous carcinoma of breast, left (HCC)    Type 2 diabetes mellitus with diabetic peripheral angiopathy without gangrene (HCC)    Stricture of artery (HCC)    Musculoskeletal pain, chronic    Hemorrhoids    Tinea unguium    Diabetic neuropathy ([facility])    Bradycardia    Hyperkalemia    Pain in tooth    Dysphonia  Glottic insufficiency    Reflux laryngitis    Paresis of left vocal fold    Muscle tension dysphonia    Laryngocele    Gastroesophageal reflux disease    Anterior glottic web    Chest pain    Palpitations    Diarrhea        Past Medical History  Past Medical History:   Diagnosis Date    Arthritis     Back pain     Benign essential hypertension     LAST ASSESSED: 30IEE1163    Carotid artery stenosis     CKD (chronic kidney disease) stage 2, GFR 60-89 ml/min     Constipation, chronic     "drinks prune juice"    Diabetes mellitus (HCC)     DMII [facility] 2) [facility])     Epiglottitis     RESOLVED: 56VOV1800    Gastritis     GERD (gastroesophageal reflux disease)     Gout     H/O blood clots     left lower leg,,,11 yrs ago    History of stenosis of renal artery     RESOLVED: 46EWA4626    Hypercholesteremia     Hypercholesterolemia     Hyperlipidemia     Hyperparathyroidism ([facility] )     Hypertension     Iliac artery stenosis, bilateral ([facility])     Kidney stone     Mucinous carcinoma of breast, left ([facility] [address] ) 8/15/2018    Presence of pessary     Renal artery stenosis ([facility])     Renal disorder     Renovascular hypertension     RESOLVED: 46RDI1061    Segmental and somatic dysfunction of cervical region     RESOLVED: 70CAM8246    Segmental and somatic dysfunction of lumbar region     RESOLVED: 37BMH4700    Segmental and somatic dysfunction of pelvic region     RESOLVED: 03CER3353    Segmental and somatic dysfunction of thoracic region     RESOLVED: 01SUM4010    Somatic dysfunction of abdominal region     RESOLVED: 92GOH6218    Somatic dysfunction of head region     RESOLVED: 71GCL3439    Somatic dysfunction of lower extremities     RESOLVED: 86OTS0719    Somatic dysfunction of rib region     RESOLVED: 06GTS5552    Somatic dysfunction of thoracic region     RESOLVED: 47BTZ1086    Somatic dysfunction of upper extremities     RESOLVED: 61ALI0450    Teeth missing         Past Surgical History  Past Surgical History:   Procedure Laterality Date    ANGIOPLASTY / STENTING ILIAC      BREAST LUMPECTOMY Left 09/06/2017    LAST ASSESSED: [id number]    BREAST SURGERY Left     biopsy    CATARACT EXTRACTION Bilateral     CHOLECYSTECTOMY      COLONOSCOPY      EYE SURGERY      ILIAC VEIN ANGIOPLASTY / STENTING Right     INITIAL STENOSIS: ONSET: [id number]    KNEE ARTHROSCOPY Right     KNEE SURGERY Right     ONSET: 21CVL1112    MAMMO NEEDLE LOCALIZATION LEFT (ALL INC) Left 9/11/2018    MAMMO NEEDLE LOCALIZATION LEFT (ALL INC) Left 9/6/2017    VA ESOPHAGOGASTRODUODENOSCOPY TRANSORAL DIAGNOSTIC N/A 7/26/2018    Procedure: ESOPHAGOGASTRODUODENOSCOPY (EGD); Surgeon: Gilberto Amin, DO;  [facility]: [facility]; Service: Gastroenterology    VA LARYNGOSCOPY,DIRCT,[facility] N/A 6/10/2016    Procedure: MICRO DIRECT LARYNGOSCOPY WITH VOCAL FOLD MASS EXCISION ;  Surgeon: Sharda Barraza MD;  [facility];  Service: ENT    VA MASTECTOMY, PARTIAL Left 9/6/2017    Procedure: LUMPECTOMY BREAST NEEDLE LOCALIZED WITH FAXITRON NEEDLE @ 0830;  Surgeon: Gen Castellanos MD;  [facility];  Service: General    VA PERQ DEVICE PLACEMT BREAST LOC 1ST [place] Left 9/11/2018    Procedure: BREAST LUMPECTOMY; BREAST NEEDLE LOCALIZATION (NEEDLE LOC AT 1330);   Surgeon: Sly Young MD;  [facility];  Service: Surgical Oncology    THROAT SURGERY      lump removed    TUBAL LIGATION      UPPER GASTROINTESTINAL ENDOSCOPY      US GUIDANCE BREAST BIOPSY LEFT EACH ADDITIONAL Left 8/7/2017    US GUIDED BREAST BIOPSY LEFT COMPLETE Left 8/10/2018    US GUIDED BREAST BIOPSY LEFT COMPLETE Left 8/7/2017    VASCULAR SURGERY             07/26/19 0912   Note Type   Note type Eval/Treat   Pain Assessment   Pain Assessment No/denies pain   Pain Score No Pain   Home Living   Type of Home Apartment   Home Layout Two level   Additional Comments Pt lives at home with  who is able to assist if needed  Pt states that she has one step inside the home and no ЕЛЕНА  Pt reports that she does not work currently  Pt's chart stated that's he has 4 ЕЛЕНА, I for PLOF and ambulated without an AD  Bathroom on 1st floor and owns a RW  Prior Function   Level of [place] Independent with ADLs and functional mobility   Lives With Spouse   Receives Help From Family   ADL Assistance Independent   Restrictions/Precautions   Weight Bearing Precautions Per Order No   General   Family/Caregiver Present No   Cognition   Overall Cognitive Status Impaired   Arousal/Participation Responsive   Orientation Level Oriented X4  (with assistance)   Memory Within functional limits   Comments Pt required increased time to complete   RLE Assessment   RLE Assessment WFL   LLE Assessment   LLE Assessment WFL   Coordination   Movements are Fluid and Coordinated 1   Sensation WFL   Bed Mobility   Supine to Sit 7  Independent   Transfers   Sit to Stand 7  Independent   Stand to Sit 7  Independent   Ambulation/Elevation   Gait pattern WNL   Gait Assistance 7  Independent   Assistive Device None   Distance >200ft   Stair Management Assistance 7  Independent   Stair Management Technique One rail R   Number of Stairs 6   Endurance Deficit   Endurance Deficit No   Activity Tolerance   Activity Tolerance Patient tolerated treatment well   Nurse Made Aware Nurse approved therapy session   Assessment   Prognosis Good   Problem List Decreased cognition   Assessment Pt is a 79 yo female admitted to [place] [address] on 7/24/2019  Pt was recently d/c [facility] on 7/[facility] for chest pain and readmitted for heart palpitations and diarrhea  Two patient identifiers were used to confirm  Upon entering the floor the patient was walking in the hallways  Pt was agreeable to do a PT evaluation  Pt's nurse approved therapy session  Pt's impairments include decreased mobility  It is possible that the patient is currently at her baseline for function    Pt was able to ambulate increased distances without an AD, negotiated 7 steps with U HR and perform transfers at a I level  Pt denied having chest pain during session  Recommend discharge to home  At the end of the session the patient was left in seated EOB position with call bell and phone within reach      Barriers to Discharge None   Recommendation   Recommendation Home with family support   PT - OK to Discharge Yes   Modified [place] Scale   Modified [place] Scale 1   Barthel Index   Feeding 10   Bathing 5   Grooming Score 5   Dressing Score 10   Bladder Score 10   Bowels Score 10   Toilet Use Score 10   Transfers (Bed/Chair) Score 15   Mobility (Level Surface) Score 15   Stairs Score 10   Barthel Index Score 100   Barbra Summers, Pt, DPT

## 2019-07-26 NOTE — DISCHARGE INSTRUCTIONS
Hipertensión en adultos mayores   LO QUE NECESITA SABER:   La hipertensión es la presión arterial trenton  La presión arterial es la fuerza que ejerce la jose m contra las marin de las arterias  La presión arterial normal debería estar a menos de 120/80  La pre-hipertensión estaría entre 120/80 y 139/ 80  La presión arterial trenton estaría a 140/90 o más trenton  La hipertensión hace que bethea corazón trabaje mucho más de lo normal  [place] puede dañar bethea corazón  Bethea presión arterial aumentará con la edad  Sin embargo, la hipertensión no es dianna parte normal del envejecimiento  Puede controlar la hipertensión con un estilo de tato saludable o con medicamentos  La presión [place] a proteger sara órganos brenda bethea corazón, pulmones, cerebro, y riñones  INSTRUCCIONES SOBRE [facility]:   Llame al 911 o pídale a alguien más que llame en cualquiera de los siguientes casos:   · Usted tiene malestar en el pecho que se siente brenda estrujamiento, presión, Llana Nail o dolor  · Usted se siente confundido o tiene dificultad para hablar  · Repentinamente se siente aturdido o con dificultad para respirar  · Usted tiene dolor o [employer] espalda, [place], [place], abdomen o [place]  · Usted se desmaya o pierde el conocimiento  Busque atención médica de inmediato si:   · Usted tiene un clemente dolor de manuel o pérdida de la visión  · Usted tiene debilidad en un brazo o en dianna pierna  · Se marea y   Pregúntele a bethea Jerrica Paddy vitaminas y minerales son adecuados para usted  · Usted se siente mareado, confundido, somnoliento o brenda si se fuera a desmayar  · Usted se ha tomado bethea medicamento para la presión arterial caleb bethea presión arterial todavía está más trenton de lo que le indicó bethea médico     · Bethea presión arterial es más baja de lo que bethea médico dijo que debería ser  · Usted tiene preguntas o inquietudes acerca de bethea condición o cuidado    Medicamentos:  Es posible que usted necesite alguno de los siguientes:  · Medicamento  podría usarse para ayudar a disminuir la presión arterial  Es posible que necesite más de un tipo de [place]  · Diuréticos  ayudan a eliminar el exceso de líquido que se acumula en el organismo  [place] contribuirá a bajar bethea presión arterial  Es posible que orine más seguido mientras red neli medicamento  · [place] sara medicamentos brenda se le haya indicado  Consulte con bethea médico si usted dustin que bethea medicamento no le está ayudando o si presenta efectos secundarios  Infórmele si es alérgico a cualquier medicamento  Mantenga dianna lista actualizada de los [place], las vitaminas y los productos herbales que red  Incluya los siguientes datos de los medicamentos: cantidad, frecuencia y motivo de administración  Traiga con usted la lista o los envases de la píldoras a sara citas de seguimiento  Lleve la lista de los medicamentos con usted en madhav de dianna emergencia  Lo que necesita saber sobre los medicamentos para la presión arterial:   · Tómese el medicamento a la misma hora todos los días  · No deje de reddy los medicamentos si la presión arterial es la deseada  Que la presión arterial sea la deseada significa que los medicamentos actúan correctamente  · Conozca el nombre y la dosis de bethea medicamento  · Vuélvase a surtir de sara medicamentos antes de que se agoten  · Los medicamentos para la presión arterial pueden hacer que se sienta mareado  Póngase de pie lentamente después de davidson estado acostado o sentado  T[place]ora ayudará a evitar el mareo o el riesgo de caídas  · Pregúntele a bethea médico si debe reddy bethea medicamento para la presión arterial el día de [place] o un procedimiento  Acuda a sara consultas de control con bethea médico según le indicaron  Tendrá que regresar para que le tomen la presión arterial  Anote sara preguntas para que se acuerde de hacerlas dylan sara visitas     Maneje bethea hipertensión:   · Tómese la presión arterial en bethea hogar 2 veces al día o según lo que le indicaron  Tómese la presión arterial a la [employer] días, por [place], [place] vez a la mañana y otra vez a la noche  Tómese la presión 2 veces en cada oportunidad  Pídale más instrucciones a galvan Mortimer Valencia un registro de las lecturas de galvan presión arterial y llévelo consigo a sara consultas  Pregúntele a galvan médico cuál debe ser galvan presión arterial            · Limite el sodio brenda se le indique  Consumir demasiado sodio puede afectar el equilibrio de líquidos y hacer que sea difícil controlar galvan presión arterial  Revise las etiquetas para buscar alimentos bajos en sodio o sin sal agregada  Algunos alimentos bajos en sodio utilizan sales de potasio para añadir sabor  Demasiado potasio también puede causar problemas de [place]  Galvan médico le dirá qué cantidad de sodio y potasio es pride para el consumo en un día  Él puede recomendarle que limite el sodio a 2,300 mg al día  · Siga el plan de comidas recomendado por galvna médico   Un dietista o médico puede darle más información sobre planes de bajo contenido de sodio o el plan de alimentación DASH (enfoques dietéticos para detener la hipertensión)  El plan DASH es bajo en sodio, grasas saturadas y grasa total  Es alto en potasio, calcio y Dbei  · Ejercítese para mantener un peso saludable  El ejercicio también ayudará a bajar galvan presión arterial  Realice dianna actividad física dylan 30 minutos al día, la mayoría de los días de la [place]  Comience ejercitándose 10 minutos cada día  [facility] Bellis de actividades físicas son caminatas rápidas o andar en Taz Enrique  Consulte con galvan médico antes de empezar un plan de ejercicios  Él puede garantizar que el plan de ejercicio sea seguro para usted  · [facility] estrés  [place] puede contribuir a bajar galvan presión arterial  Aprenda sobre formas de [place], brenda respiración profunda o escuchar música  Duerma lo suficiente cada la noche   [address] puede aumentar galvan nivel de estrés  · Limite o no consuma bebidas alcohólicas  Pregunte a galvan médico si usted puede reddy alcohol  Pregunte cuanto es la cantidad pride para usted reddy  · No fume  La nicotina y otros químicos en los cigarrillos y cigarros pueden aumentar galvan presión arterial y también pueden provocar daño al pulmón  Pida información a galvan médico si usted actualmente fuma y necesita ayuda para dejar de fumar  Los cigarrillos electrónicos o tabaco sin humo todavía contienen nicotina  Consulte con galvan médico antes de [employer]  · Controle cualquier otra condición médica que usted tenga  Algunas condiciones médicas brenda la diabetes pueden aumentar galvan riesgo de hipertensión  [address] galvan médico y tómese sara medicamentos según dichas instrucciones  © 2017 [facility] Information is for End User's use only and may not be sold, redistributed or otherwise used for commercial purposes  All illustrations and images included in CareNotes® are the copyrighted property of A D A M , Inc  or Yoan Posada  Esta información es sólo para uso en educación  Galvan intención no es darle un consejo médico sobre enfermedades o tratamientos  Colsulte con galvan Jonathan So farmacéutico antes de seguir cualquier régimen médico para saber si es seguro y efectivo para usted  Plan de alimentación con "enfoque dietético para detener la hipertensión (DASH, por sara siglas en inglés)   LO QUE NECESITA SABER:   El plan de alimentación DASH está diseñado para ayudar a prevenir o disminuir la hipertensión  También puede ayudar a bajar el colesterol lake (colesterol LDL) y disminuír galvan riesgo de enfermedad cardíaca  El plan es bajo en sodio, azúcar, grasas dañinas, y grasas en galvan totalidad  Es alto en potasio, calcio, magnesio y Debi  Estos nutrientes se agregan al consumir más frutas, vegetales y granos enteros     [address]: Bethea límite de sodio cada día: Bethea dietista le indicará la cantidad de sodio que usted debe consumir a diario  La gente que tiene la presión arterial jeannette debe consumir de 1,500 a 2,300 mg de sodio al día brenda lourdes  Dianna cucharadita (cdta) de sal tiene 2,300 mg de sodio  [place] puede parecer brenda dianna meta difícil, caleb pequeños cambios en los alimentos que usted consume pueden hacer dianna gran diferencia  Bethea médico o dietista puede ayudarlo a crear un plan alimenticio que cumpla bethea límite de sodio  Formas de limitar el sodio:   · Patience las etiquetas de los alimentos  Las etiquetas pueden ayudarle a escoger alimentos bajos en sodio  La cantidad de sodio está incluida en miligramos (mg)  La columna del porcentaje de valor diario indica la cantidad de necesidades diarias satisfechas con 1 porción del alimento para cada nutriente en la lista  Escoja alimentos que tengan menos de 5% del porcentaje diario de [place]  Estos alimentos se consideran bajos en sodio  Los alimentos que tienen 20% o más del porcentaje diario de sodio se consideran alimentos altos en sodio  Evite alimentos que tengan más de 300 mg de sodio por porción  Escoja alimentos Cathyann  diga que son bajos en sodio, con sodio reducido, o sin sal agregada  · Evite la sal   No le agregue sal a la comida cuando se siente a la andrews a comer, y agregue muy poca sal a los alimentos dylan la cocción  Use hierbas y condimentos, brenda cebollas, ajo y especias sin sal para agregar sabor a los alimentos  Use jugo de lima, ced o vinagre para darle a los alimentos un sabor ácido  Use chiles picantes o dianna cantidad pequeña de salsa picante para agregar un sabor picante a los alimentos  · Pregunte sobre los sustitutos para la sal   Pregúntele a bethea médico si es posible usar sustitutos de la sal  Algunos sustitutos de la sal vienen con ingredientes que pueden ser dañinos si usted tiene ciertos padecimientos médicos      · Escoja alimentos cuidadosamente en restaurantes  Las comidas de los [place], sobre todo restaurantes de comida rápida, crystal siempre son altas en sodio  Algunos restaurantes ofrecen información nutricional que indica la cantidad de sodio en sara alimentos  Pida que preparen sara comidas con menos sal o sin sal   Lo que necesita saber acerca de las grasas:   · Incluya grasas saludables  Las grasas insaturadas y los ácidos grasos omega-3 son ejemplos de grasas saludables  Las grasas insaturadas se encuentran en los aceites de soja, canola, [place] o [place] y la margarina [facility] y [facility]  Los ácidos grasos [place] 3 se encuentran en el pescado con grasa, brenda el salmón, el atún, la caballa y las catherine  También se le puede encontrar en el aceita de linaza y en la linaza molida  · Evite las grasas insalubres  No consuma grasas dañinas, brenda grasas saturadas y grasas trans  Las grasas saturadas se encuentran en alimentos que contienen grasa animal  Twylla Carry son Amrilyn Rosin grasosas, la [place], la [place], la crema y otros productos lácteos  Además se pueden encontrar en la [place], la margarina en shukri, el aceite de edwards y el aceite de juliet  Las grasas trans se encuentran en comidas fritas, galletas estilo soda, tortillitas tostadas, y alimentos horneados hechos con Berdie Juan C  Alimentos que puede incluir:  Con el plan de alimentación DASH, usted necesita consumir un número específico de porciones de cada areli de alimentos  [place] le ayudará a consumir las cantidades suficientes de ciertos nutrientes y limitar otros  La cantidad de porciones que usted debe comer depende de la cantidad de calorías que usted necesita  Galvan dietista le informará sobre cuántas calorías necesita usted  El número de porciones que viene en la lista al lado de los grupos alimenticios a continuación es para las personas que necesitan aproximadamente 2,000 calorías al día    · Granos:  6 a 8 porciones (3 de estas porciones deben ser de alimentos de granos enteros o integrales)    ¨ 1 tajada de pan integral     ¨ 1 onza de cereal seco    ¨ ½ taza de cereal cocinado, pasta, o arroz integral    · Verduras y frutas:  4 a 5 porciones de frutas y 4 a 5 porciones de vegetales    ¨ 1 fruta mediana    ¨ ½ taza de vegetales congelados, enlatados (sin sal adicional), o vegetales frescos y picados     ¨ ½ taza de fruta fresca, congelada, seca o enlatada (enlatada en sirope liviano o jugo de fruta)    ¨ ½ taza de jugo de verduras o frutas    · Productos lácteos:  2 a 3 porciones    ¨ 1 taza de [employer] o leche 1%    ¨ 1½ onzas de queso descremado o bajo en grasas y bajo en sodio    ¨ 6 onzas de yogurt descremado o bajo en grasas    · Marly milligan, marly alyse y pescado magros:  6 onzas o menos    ¨ Marly alyse (ninoska, pavo) sin piel    ¨ Pescado (sobre todo pescado con grasa, brenda salmón, atún fresco o STOLL)    ¨ Carne de res o de cerdo magra (lomo, carne molida extra [place])    ¨ Claras de huevo y sustitutos del huevo    · [place] du sac, semillas y legumbres:  4 a 5 porciones por semana    ¨ ½ taza de frijoles y arbejas cocinadas    ¨ 1½ onzas de nueces sin sal    ¨ 2 cucharadas de mantequilla de maní o semillas    · [employer] y azúcares adicionales:  5 o menos por semana    ¨ 1 cucharada de azúcar, jalea o mermelada    ¨ ½ taza de sorbeto o gelatina    ¨ 1 taza de limonada    · Grasas:  2 a 3 porciones por semana    ¨ 1 cucharadita de margarina suave o aceite vegetal    ¨ 1 cucharada de [place]    ¨ 2 cucharadas de aderezo para [employer] se deben evitar:   · Granos:      ¨ Postres horneados o fritos brenda donas, pastelitos, galletas, y quequitos (altos en grasas y azúcar)    ¨ Mezclas para hacer pan de maíz y pasteles, alimentos empacados, brenda rellenos para pavo, mezclas para hacer arroz y pasta, macarrones con Karri-barre, y cereales instantáneos (altos en sodio)    · Texie Eaves y verduras:      ¨ Vegetales regulares enlatados (altos en sodio)    ¨ Repollo preparado en vinagre, vegetales en vinagre, y otros alimentos preparados en escabeche (altos en sodio)    ¨ Vegetales fritos o vegetales en mantequilla o salsas altas en grasas    ¨ Frutas en crema o salsa de mantequilla (altas en grasas)    · Productos lácteos:      ¨ Leche entera, leche semi descremada (2%), y crema (jeannette en grasas)    ¨ Queso regular y queso procesado (alto en grasa y sodio)    · Marly y alimentos con proteínas:      ¨ Marly ahumadas o curadas, brenda carne de yoly en conserva, tocino, jamón, perros calientes, y salchicha (jeannette en grasas y sodio)    ¨ Frijoles enlatados y marly enlatadas o marly en pasta, brenda marly en conserva, catherine, anchoas y [address] de imitación (altos en sodio)    ¨ Marly para emparedados, brenda [place], [place], [place], y carne de res en rebanada (altos en sodio)    ¨ Marly altas en grasas (bistec estilo T-bone, carne molida para hamburguesas, costillas)    ¨ Huevos enteros y yemas de huevo (altos en grasas)    · Otros:      ¨ Condimentos hechos con sal, brenda sal de ajo, sal de apio, sal de cebolla, sal condimentada, suavizantes para marly, y glutamato de monosodio (MSG, por sara siglas en inglés)    ¨ Sopa Miso y mezclas para sopa enlatadas o secas (altas en sodio)    ¨ Salsa de soya regular, salsa de [place], salsa [place], salsa para bistec, [employer], y la mayoría de las vinagres con sabor (altas en sodio)    ¨ Condimentos regulares, brenda mostaza, salsa de tomate y aderezos para ensalada (altos en sodio)    ¨ Salsa para marly y salsas en general, brenda salsa [place] o de queso (altas en sodio y [place])    ¨ Bebidas altas en azúcar, brenda bebidas gaseosas o jugos de frutas    ¨ Alimentos para merendar, brenda regulo tostadas, palomitas de [place], pretzels, piel de cerdo, galletas de soda [place], y nueces saladas    ¨ Alimentos congelados, brenda cenas preparadas, platos principales, vegetales con salsas, y marly cubiertas en pan (altas en sodio)  Otras pautas que debe seguir:   · Mantenga un peso saludable  Galvan riesgo de enfermedad cardíaca es aún más alto si usted tiene sobrepeso  Galvan médico podría sugerirle que adelgace si tiene sobrepeso  Usted puede perder peso si se propone consumir menos calorías y alimentos que tengan azúcar y grasas agregadas  El plan de alimentación DASH puede ayudarle a lograrlo  Johanny buena forma de disminuir el consumo de calorías es consumiendo porciones más pequeñas en cada comida y menos meriendas entre comidas  Consulte a galvan médico para obtener más información sobre cómo adelgazar  · Realice actividad física con regularidad  El ejercicio regular puede ayudarle a alcanzar o mantener un peso saludable  El ejercicio regular también puede ayudarle a disminuir galvan presión arterial y mejorar sara niveles de colesterol  Kleber ejercicios moderados por 30 minutos o más todos los días de la [place]  Para bajar peso, asegúrese de ejercitarse por lo menos 60 minutos  Consulte con galvan médico sobre un programa de ejercicio adecuado para usted  · Limite el consumo de alcohol  Las mujeres deberían limitar el consumo de alcohol a 1 bebida por día  Los hombres deberían limitar el consumo de alcohol a 2 tragos al día  Un trago equivale a 12 onzas de cerveza, 5 onzas de vino o 1 onza y ½ de licor  ¿Dónde puedo obtener más información? · National [facility] and [address]  [address]  Baldo [id number]  [place] [address]  Phone: [phone]  Web Address: [facility] no  © 2017 2600 Jermaine Singh Information is for End User's use only and may not be sold, redistributed or otherwise used for commercial purposes  All illustrations and images included in CareNotes® are the copyrighted property of A D A M , Inc  or Yoan Posada  Esta información es sólo para uso en educación  Galvan intención no es darle un consejo médico sobre enfermedades o tratamientos  Colsulte con bethea Dorna Bhavin farmacéutico antes de seguir cualquier régimen médico para saber si es seguro y efectivo para usted  Enfermedad por reflujo gastroesofágico   LO QUE NECESITA SABER:   La enfermedad por reflujo gastroesofágico ([place]) ocurre cuando el ácido y los alimentos en el estómago regresan al esófago  La enfermedad por reflujo gastroesofágico es el reflujo que se produce más de [address] a la semana dylan varias semanas  Generalmente causa acidez y otros síntomas  La ERGE puede causar otros problemas de rosalba con el tiempo si no es tratada  INSTRUCCIONES SOBRE [facility]:   Busque atención médica de inmediato si:   · Usted siente Irma Dart y no puede eructar o vomitar  · Usted siente dolor clemente en el pecho y dificultad repentina para respirar  · Sara evacuaciones intestinales son de color jonathon, con [place], o de apariencia alquitranada  · Bethea vómito parece brenda café molido o contiene jose m  Pregúntele a bethea Jannifer Barrette vitaminas y minerales son adecuados para usted  · Vomita grandes cantidades, o vomita con frecuencia  · Tiene dificultad para respirar después de vomitar  · Tiene dificultad para tragar o dolor al tragar  · Usted pierde peso sin proponérselo  · Sara síntomas empeoran o no mejoran con el tratamiento  · Usted tiene preguntas o inquietudes acerca de bethea condición o cuidado  Medicamentos:   · Medicamentos,  se utilizan para disminuir el ácido [place] medicamentos también podrían usarse para ayudar a que bethea esfínter esofágico inferior y bethea estómago se contraigan (estrechen) más  · [place] sara medicamentos brenda se le haya indicado  Consulte con bethea médico si usted dustin que bethea medicamento no le está ayudando o si presenta efectos secundarios  Infórmele si es alérgico a cualquier medicamento  Mantenga dianna lista actualizada de los [place], las vitaminas y los productos herbales que red   Incluya los [employer] medicamentos: cantidad, frecuencia y motivo de administración  Traiga con usted la lista o los envases de la píldoras a sara citas de seguimiento  Lleve la lista de los medicamentos con usted en madhav de dianna emergencia  Control de la ERGE:   · No consuma alimentos o bebidas que puedan aumentar la [place]  Estos incluyen chocolate, menta, comidas fritas o grasosas, bebidas que contienen cafeína o bebidas gaseosas  Otros alimentos incluyen comidas picantes, cebollas, tomates y alimentos a base de tomate  No consuma alimentos y bebidas que puedan irritar bethea esófago, brenda las frutas cítricas, los jugos y las bebidas alcohólicas  · No ingiera comidas abundantes  Cuando usted come [employer] a la vez, bethea estómago necesita más ácido para digerirla  Consuma 6 comidas pequeñas al día en vez de 3 comidas grandes y coma lentamente  No consuma alimentos entre 2 y 3 horas antes de [id number]  · Eleve la cabecera de bethea cama  Coloque bloques de 6 pulgadas debajo de la cabecera de la estructura de bethea cama  También podría usar más dianna almohada para apoyar bethea manuel y hombros mientras duerme  · Mantenga un peso saludable  Si usted tiene sobrepeso, la pérdida de peso podría ayudar a aliviar los síntomas de la [id number]-[id number]  · No fume  Fumar debilita el esfínter esofágico inferior y [place] el riesgo de [id number]-leèm-Zznrdl  Pida información a bethea médico si usted actualmente fuma y necesita ayuda para dejar de fumar  Los cigarrillos electrónicos o tabaco sin humo todavía contienen nicotina  Consulte con bethea médico antes de [employer]  · No use ropa que [place] alrededor de la cintura  La ropa apretada puede ejercer presión [employer] y causar o empeorar los síntomas de la [place]  Acuda a sara consultas de control con bethea médico según le indicaron  Anote sara preguntas para que se acuerde de hacerlas dylan sara visitas    © 2017 2600 Jermaine Singh Information is for End User's use only and may not be sold, redistributed or otherwise used for commercial purposes  All illustrations and images included in CareNotes® are the copyrighted property of A D A M , Inc  or Yoan Posada  Esta información es sólo para uso en educación  Bethea intención no es darle un consejo médico sobre enfermedades o tratamientos  Colsulte con bethea Shagufta Cabot farmacéutico antes de seguir cualquier régimen médico para saber si es seguro y efectivo para usted

## 2019-07-26 NOTE — ASSESSMENT & PLAN NOTE
-BP on arrival 186/79  -Procardia XL increased from 30 mg to 60 on 7/23  -Losartan increased from 50mg to 100mg on 7/25  -Patient received dose of hydralazine 5mg IV last night which brought SBP to 150s    -BP has been stable today in 150s s/p increase of Losartan  -Resume home Lasix regimen on discharge  -Follow up outpatient with Cardiology and Nephrology

## 2019-07-26 NOTE — ASSESSMENT & PLAN NOTE
Lab Results   Component Value Date    HGBA1C 6 4 (H) 06/17/2019       Recent Labs     07/25/19  1025 07/25/19  1544 07/26/19  0607 07/26/19  1049   POCGLU 121 140 121 108       Blood Sugar Average: Last 72 hrs:  (P) 124   -Diet controlled  -Continue home diabetic diet  -Continue PTA gabapentin 100mg at night for neuropathy

## 2019-07-26 NOTE — PROGRESS NOTES
Progress Note - Delia Hutson 1934, 80 y o  female MRN: [id number]    Unit/Bed#: CW2 218-01 Encounter: [id number]    Primary Care Provider: Omar Marrero MD   Date and time admitted to hospital: 7/24/2019  9:35 PM    Assessment/Plan:  * Hypertension  Assessment & Plan  -BP on arrival 186/79  -procardia XL increased from 30 mg to 60 on 7/23  -Losartan increased from 50mg to 100mg on 7/25  -Patient received dose of hydralazine 5mg IV last night which brought SBP to 150s  -BP has been stable today in 150s  -Resume home Lasix regimen on discharge  -Follow up outpatient with Cardiology and Nephrology      CKD [facility]chronic [facility]), [facility] ([facility] )  Assessment & Plan  -Creatinine at baseline  -Follow up outpatient with nephrology    Diarrhea  Assessment & Plan  -Resolved  -Likely due to oral magnesium and Senna given on 7/24    Palpitations  Assessment & Plan  -Likely secondary to dehydration vs anxiety  Less likely cardiac causes given negative ACS workup on last admission   -Patient has not been tachycardic during hospital stay, no events on telemetry  -EKG on ED showed NSR and no changes from previous on 7/24/19  -HR down to her baseline of SBP in 150s   -No further episodes of palpitations    DMII (diabetes mellitus, type 2) [facility])  Assessment & Plan  Lab Results   Component Value Date    HGBA1C 6 4 (H) 06/17/2019       Recent Labs     07/25/19  0653 07/25/19  1025 07/25/19  1544 07/26/19  0607   POCGLU 130 121 140 121       Blood Sugar Average: Last 72 hrs:  (P) 128     -Diet controlled  -Continue home diabetic diet  -Continue PTA gabapentin 100mg at night for neuropathy      Allergic rhinitis  Assessment & Plan  -Continue PTA Flonase on discharge    GERD (gastroesophageal reflux disease)  Assessment & Plan  -One episode of epigastric pain overnight after eating   Responded to Tums and Pepto-Bismol    -Continue Protonix 40 mg daily      Diet: Diabetic Diet  VTE Pharmacologic Prophylaxis: Heparin  VTE Mechanical Prophylaxis: reason for no mechanical VTE prophylaxis patient denied, signed consent   Code status: Level 1 - Full Code  Disposition: Ready for discharge once BP remains below 160s     Plan discussed with the attending physician, Dr Amy Neri, on the [facility] Team     Subjective:   Patient was seen and examined at bedside  She had an episode of midepigrastric pain last night after eating some pudding  It felt like a burning pain  She had no palpitations, trouble breathing, or chest pain  She reports that she had no other problems and was able to ambulate to the bathroom, eat breakfast without any reflux symptoms, and has no further epigastric pain after she took Tums and Pepto-Bismol last night  She denies any headache, changes in vision, nausea or vomiting, or weakness  She said the numbness in her feet is the same, possible slightly improved  Overnight events: She had an episode of epigastric/ chest pain  An EKG was performed and was normal  Troponins were negative  The pain responded to Pepto-Bismol  She had elevated blood pressures to the 170s and received a one time dose of Hydralazine 5mg and herS BP responded and remained in the 150s overnight         Current Facility-Administered Medications   Medication Dose Route Frequency    acetaminophen (TYLENOL) tablet 325 mg  325 mg Oral Q6H PRN    aspirin (ECOTRIN LOW STRENGTH) EC tablet 81 mg  81 mg Oral Daily    bismuth subsalicylate (PEPTO BISMOL) 524 mg/30 mL oral suspension 524 mg  524 mg Oral Q6H PRN    calcitriol (ROCALTROL) capsule 0 25 mcg  0 25 mcg Oral Once per day on Tue Fri    calcium carbonate (TUMS) chewable tablet 500 mg  500 mg Oral Daily PRN    calcium carbonate-vitamin D (OSCAL-D) 500 mg-200 units per tablet 1 tablet  1 tablet Oral Daily With Breakfast    fluticasone (FLONASE) 50 mcg/act nasal spray 1 spray  1 spray Each Nare Daily    gabapentin (NEURONTIN) capsule 100 mg  100 mg Oral HS    heparin (porcine) subcutaneous injection 5,000 Units  5,000 Units Subcutaneous Q8H [address]    losartan (COZAAR) tablet 100 mg  100 mg Oral Daily    NIFEdipine (PROCARDIA XL) 24 hr tablet 60 mg  60 mg Oral Daily    pantoprazole (PROTONIX) EC tablet 40 mg  40 mg Oral Daily     Allergies   Allergen Reactions    Clonidine Derivatives Swelling    Diflucan [Fluconazole] Rash    Flagyl [Metronidazole] Anaphylaxis    Carvedilol Hives     And legs swelled    Latex Rash    Lipitor [Atorvastatin]      Legs swelled    Other Palpitations     IV DYE       Objective:   Vitals:  Vitals:    07/26/19 0253 07/26/19 0725 07/26/19 1008 07/26/19 1119   BP: 150/68 165/60 160/59 157/58   Pulse: 80 77 74 70   Resp:  18  18   Temp:  97 8 °F (36 6 °C)  97 8 °F (36 6 °C)   TempSrc:       SpO2: 96% 97% 98% 98%   Weight:             Intake/Output Summary (Last 24 hours) at 7/26/2019 1522  Last data filed at 7/26/2019 0700  Gross per 24 hour   Intake    Output 1000 ml   Net -1000 ml       Physical Exam:   Physical Exam   Constitutional: She is oriented to person, place, and time  She appears well-developed and well-nourished  HENT:   Head: Normocephalic and atraumatic  Cardiovascular: Normal rate, regular rhythm, normal heart sounds and intact distal pulses  Exam reveals no gallop and no friction rub  No murmur heard  Pulses:       Dorsalis pedis pulses are 2+ on the right side, and 2+ on the left side  Posterior tibial pulses are 2+ on the right side, and 2+ on the left side  Pulmonary/Chest: Effort normal and breath sounds normal  No stridor  No respiratory distress  She has no wheezes  She has no rales  She exhibits no tenderness  Abdominal: Soft  Bowel sounds are normal  She exhibits no distension  There is no tenderness  There is no guarding  Musculoskeletal: She exhibits no edema  Neurological: She is alert and oriented to person, place, and time   A sensory deficit (Reduced sensation to light touch in feet bilaterally) is present  Skin: Skin is warm and dry  Capillary refill takes less than 2 seconds  She is not diaphoretic  No erythema  No pallor  Psychiatric: She has a normal mood and affect   Her behavior is normal  Thought content normal        Invasive Devices     Peripheral Intravenous Line            Peripheral IV 07/24/19 Right Forearm 1 day                Labs:   Recent Results (from the past 48 hour(s))   CBC and differential    Collection Time: 07/24/19  9:53 PM   Result Value Ref Range    WBC 8 65 4 31 - 10 16 Thousand/uL    RBC 4 10 3 81 - 5 12 Million/uL    Hemoglobin 11 7 11 5 - 15 4 g/dL    Hematocrit 37 4 34 8 - 46 1 %    MCV 91 82 - 98 fL    MCH 28 5 26 8 - 34 3 pg    MCHC 31 3 (L) 31 4 - 37 4 g/dL    RDW 13 0 11 6 - 15 1 %    MPV 11 6 8 9 - 12 7 fL    Platelets 267 009 - 545 Thousands/uL    nRBC 0 /100 WBCs    Neutrophils Relative 51 43 - 75 %    Immat GRANS % 0 0 - 2 %    Lymphocytes Relative 43 14 - 44 %    Monocytes Relative 5 4 - 12 %    Eosinophils Relative 1 0 - 6 %    Basophils Relative 0 0 - 1 %    Neutrophils Absolute 4 32 1 85 - 7 62 Thousands/µL    Immature Grans Absolute 0 02 0 00 - 0 20 Thousand/uL    Lymphocytes Absolute 3 74 0 60 - 4 47 Thousands/µL    Monocytes Absolute 0 46 0 17 - 1 22 Thousand/µL    Eosinophils Absolute 0 08 0 00 - 0 61 Thousand/µL    Basophils Absolute 0 03 0 00 - 0 10 Thousands/µL   Comprehensive metabolic panel    Collection Time: 07/24/19  9:53 PM   Result Value Ref Range    Sodium 140 136 - 145 mmol/L    Potassium 3 8 3 5 - 5 3 mmol/L    Chloride 106 100 - 108 mmol/L    CO2 27 21 - 32 mmol/L    ANION GAP 7 4 - 13 mmol/L    BUN 36 (H) 5 - 25 mg/dL    Creatinine 1 37 (H) 0 60 - 1 30 mg/dL    Glucose 217 (H) 65 - 140 mg/dL    Calcium 9 4 8 3 - 10 1 mg/dL    AST 19 5 - 45 U/L    ALT 20 12 - 78 U/L    Alkaline Phosphatase 89 46 - 116 U/L    Total Protein 8 3 (H) 6 4 - 8 2 g/dL    Albumin 3 7 3 5 - 5 0 g/dL    Total Bilirubin 0 26 0 20 - 1 00 mg/dL    eGFR 35 ml/min/1 73sq m Troponin I    Collection Time: 07/24/19  9:53 PM   Result Value Ref Range    Troponin I <0 02 <=0 04 ng/mL   ECG 12 lead    Collection Time: 07/24/19  9:55 PM   Result Value Ref Range    Ventricular Rate 82 BPM    Atrial Rate 82 BPM    KS Interval 152 ms    QRSD Interval 84 ms    QT Interval 354 ms    QTC Interval 413 ms    P Axis 77 degrees    QRS Axis 1 degrees    T Wave Axis 90 degrees   Basic metabolic panel    Collection Time: 07/25/19  5:06 AM   Result Value Ref Range    Sodium 142 136 - 145 mmol/L    Potassium 4 0 3 5 - 5 3 mmol/L    Chloride 109 (H) 100 - 108 mmol/L    CO2 28 21 - 32 mmol/L    ANION GAP 5 4 - 13 mmol/L    BUN 34 (H) 5 - 25 mg/dL    Creatinine 1 05 0 60 - 1 30 mg/dL    Glucose 114 65 - 140 mg/dL    Calcium 8 9 8 3 - 10 1 mg/dL    eGFR 49 ml/min/1 73sq m   CBC and differential    Collection Time: 07/25/19  5:06 AM   Result Value Ref Range    WBC 6 42 4 31 - 10 16 Thousand/uL    RBC 3 59 (L) 3 81 - 5 12 Million/uL    Hemoglobin 10 1 (L) 11 5 - 15 4 g/dL    Hematocrit 32 9 (L) 34 8 - 46 1 %    MCV 92 82 - 98 fL    MCH 28 1 26 8 - 34 3 pg    MCHC 30 7 (L) 31 4 - 37 4 g/dL    RDW 13 0 11 6 - 15 1 %    MPV 11 8 8 9 - 12 7 fL    Platelets 247 424 - 938 Thousands/uL    nRBC 0 /100 WBCs    Neutrophils Relative 49 43 - 75 %    Immat GRANS % 0 0 - 2 %    Lymphocytes Relative 42 14 - 44 %    Monocytes Relative 8 4 - 12 %    Eosinophils Relative 1 0 - 6 %    Basophils Relative 0 0 - 1 %    Neutrophils Absolute 3 12 1 85 - 7 62 Thousands/µL    Immature Grans Absolute 0 01 0 00 - 0 20 Thousand/uL    Lymphocytes Absolute 2 68 0 60 - 4 47 Thousands/µL    Monocytes Absolute 0 52 0 17 - 1 22 Thousand/µL    Eosinophils Absolute 0 07 0 00 - 0 61 Thousand/µL    Basophils Absolute 0 02 0 00 - 0 10 Thousands/µL   Magnesium    Collection Time: 07/25/19  5:06 AM   Result Value Ref Range    Magnesium 1 9 1 6 - 2 6 mg/dL   Fingerstick Glucose (POCT)    Collection Time: 07/25/19  6:53 AM   Result Value Ref Range    POC Glucose 130 65 - 140 mg/dl   Fingerstick Glucose (POCT)    Collection Time: 07/25/19 10:25 AM   Result Value Ref Range    POC Glucose 121 65 - 140 mg/dl   Fingerstick Glucose (POCT)    Collection Time: 07/25/19  3:44 PM   Result Value Ref Range    POC Glucose 140 65 - 140 mg/dl   ECG 12 lead    Collection Time: 07/25/19 10:39 PM   Result Value Ref Range    Ventricular Rate 96 BPM    Atrial Rate 96 BPM    WI Interval 198 ms    QRSD Interval 84 ms    QT Interval 362 ms    QTC Interval 457 ms    P Axis 77 degrees    QRS Axis -7 degrees    T Wave Axis 107 degrees   Troponin I    Collection Time: 07/25/19 11:48 PM   Result Value Ref Range    Troponin I <0 02 <=0 04 ng/mL   CBC and differential    Collection Time: 07/26/19  4:58 AM   Result Value Ref Range    WBC 5 55 4 31 - 10 16 Thousand/uL    RBC 3 72 (L) 3 81 - 5 12 Million/uL    Hemoglobin 10 6 (L) 11 5 - 15 4 g/dL    Hematocrit 34 2 (L) 34 8 - 46 1 %    MCV 92 82 - 98 fL    MCH 28 5 26 8 - 34 3 pg    MCHC 31 0 (L) 31 4 - 37 4 g/dL    RDW 13 2 11 6 - 15 1 %    MPV 12 2 8 9 - 12 7 fL    Platelets 322 243 - 728 Thousands/uL    nRBC 0 /100 WBCs    Neutrophils Relative 51 43 - 75 %    Immat GRANS % 0 0 - 2 %    Lymphocytes Relative 40 14 - 44 %    Monocytes Relative 8 4 - 12 %    Eosinophils Relative 1 0 - 6 %    Basophils Relative 0 0 - 1 %    Neutrophils Absolute 2 78 1 85 - 7 62 Thousands/µL    Immature Grans Absolute 0 01 0 00 - 0 20 Thousand/uL    Lymphocytes Absolute 2 22 0 60 - 4 47 Thousands/µL    Monocytes Absolute 0 44 0 17 - 1 22 Thousand/µL    Eosinophils Absolute 0 08 0 00 - 0 61 Thousand/µL    Basophils Absolute 0 02 0 00 - 0 10 Thousands/µL   Basic metabolic panel    Collection Time: 07/26/19  5:30 AM   Result Value Ref Range    Sodium 140 136 - 145 mmol/L    Potassium 4 1 3 5 - 5 3 mmol/L    Chloride 106 100 - 108 mmol/L    CO2 27 21 - 32 mmol/L    ANION GAP 7 4 - 13 mmol/L    BUN 31 (H) 5 - 25 mg/dL    Creatinine 1 26 0 60 - 1 30 mg/dL    Glucose 147 (H) 65 - 140 mg/dL    Calcium 9 2 8 3 - 10 1 mg/dL    eGFR 39 ml/min/1 73sq m   Magnesium    Collection Time: 07/26/19  5:30 AM   Result Value Ref Range    Magnesium 1 9 1 6 - 2 6 mg/dL   Fingerstick Glucose (POCT)    Collection Time: 07/26/19  6:07 AM   Result Value Ref Range    POC Glucose 121 65 - 140 mg/dl   Fingerstick Glucose (POCT)    Collection Time: 07/26/19 10:49 AM   Result Value Ref Range    POC Glucose 108 65 - 140 mg/dl       Imaging/Other:    No orders to display       Bebeto Gibson MD PGY-3   Family Medicine

## 2019-07-26 NOTE — ASSESSMENT & PLAN NOTE
-Loose BM PTA, resolved without recurrence  -Likely due to oral magnesium load and Senna given on 7/24

## 2019-07-26 NOTE — ASSESSMENT & PLAN NOTE
-One episode of epigastric pain overnight after eating, likely dyspepsia  -Responded to Tums and Pepto-Bismol  -Continue Protonix 40 mg daily on discharge  -Discussed anti-GERD measures prior to discharge

## 2019-07-26 NOTE — PLAN OF CARE
Problem: PHYSICAL THERAPY ADULT  Goal: Performs mobility at highest level of function for planned discharge setting  See evaluation for individualized goals  Description    Note:   Prognosis: Good  Problem List: Decreased cognition  Assessment: Pt is a 81 yo female admitted to [place] [address] on 7/24/2019  Pt was recently d/c from [facility] on 7/24/2019 for chest pain and readmitted for heart palpitations and diarrhea  Two patient identifiers were used to confirm  Upon entering the floor the patient was walking in the hallways  Pt was agreeable to do a PT evaluation  Pt's nurse approved therapy session  Pt's impairments include decreased mobility  It is possible that the patient is currently at her baseline for function  Pt was able to ambulate increased distances without an AD, negotiated 7 steps with U HR and perform transfers at a I level  Pt denied having chest pain during session  Recommend discharge to home  At the end of the session the patient was left in seated EOB position with call bell and phone within reach  Barriers to Discharge: None     Recommendation: Home with family support     PT - OK to Discharge: Yes    See flowsheet documentation for full assessment

## 2019-07-26 NOTE — DISCHARGE SUMMARY
Discharge- Sisi Rolling 1934, 80 y o  female MRN: [id number]    Unit/Bed#: CW2 218-01 Encounter: [id number]    Primary Care Provider: aMrtinez Shannon MD   Date and time admitted to hospital: 7/24/2019  9:35 PM    Admission Date: 7/24/2019  Discharge Date: 7/26/2019     Admission Diagnosis: Palpitations [R00 2]  Diarrhea [R19 7]  Hypertensive urgency [I16 0]      * Hypertension  Assessment & Plan  -BP on arrival 186/79  -Procardia XL increased from 30 mg to 60 on 7/23  -Losartan increased from 50mg to 100mg on 7/25  -Patient received dose of hydralazine 5mg IV last night which brought SBP to 150s    -BP has been stable today in 150s s/p increase of Losartan  -Resume home Lasix regimen on discharge  -Follow up outpatient with Cardiology and Nephrology      CKD (chronic kidney disease), stage III ([facility] )  Assessment & Plan  -Creatinine at baseline  -Follow up outpatient with Nephrology (Dr Mirtha Castillo)    Diarrhea  Assessment & Plan  -Loose BM PTA, resolved without recurrence  -Likely due to oral magnesium load and Senna given on 7/24    Palpitations  Assessment & Plan  -Likely 2/2 dehydration vs anxiety  -No PVCs or arrhythmia noted on telemetry  -Patient has not been tachycardic during hospital stay  -EKG on ED showed NSR and no changes from previous on 7/24/19  -HR remained <100 bpm prior to discharge    DMII (diabetes mellitus, type 2) [facility])  Assessment & Plan  Lab Results   Component Value Date    HGBA1C 6 4 (H) 06/17/2019       Recent Labs     07/25/19  1025 07/25/19  1544 07/26/19  0607 07/26/19  1049   POCGLU 121 140 121 108       Blood Sugar Average: Last 72 hrs:  (P) 124   -Diet controlled  -Continue home diabetic diet  -Continue PTA gabapentin 100mg at night for neuropathy      Allergic rhinitis  Assessment & Plan  -Continue PTA Flonase on discharge    GERD (gastroesophageal reflux disease)  Assessment & Plan  -One episode of epigastric pain overnight after eating, likely dyspepsia  -Responded to Tums and Pepto-Bismol  -Continue Protonix 40 mg daily on discharge  -Discussed anti-GERD measures prior to discharge      HPI: Alexandra Marrero is a 80 y o  female with PMH of hypertension, CKD stage III, type II DM, atherosclerosis, PAD,  who presented with palpitations on 7/24  Patient was discharged prior that morning from the hospital, admitted originally for chest pain and ACS rule out  Patient reported feeling palpitations after discharge while resting at home  She took pulse ox at home that showed HR of 107  BP was elevated to 180s/70s and patient was brought back to the hospital  She also reported watery, foul-smelling diarrhea since that morning after receiving Senna and 2 grams of oral magnesium the day prior  On arrival to the ED she had BP of 186/79  She was placed on telemetry monitoring but no medications were given since Procardia dose was increased to 60mg daily the day prior  Patient denied other symptoms at that time  Hospital Course: In the ED the patient had troponins drawn which were normal and a repeat EKG which was unchanged from earlier that morning  Her diarrhea resolved and she had no bowel movements while in the hospital  Her BP remained elevated in the 180s initially, so on 7/25 her Losartan dose was increased to 100mg daily  Later that evening her BP remained elevated so she received a one time dose of 5mg Hydralazine  She eventually responded appropriately to the losartan increase and for the rest of the admission her BP remained in the 150s and low 160s prior to discharge  She had one episode of midepigastric pain after eating on 7/25/19, which responded to pepto-bismol and Tums  No events were recorded on telemetry throughout admission  She remained hemodynamically stable and was deemed appropriate for discharge to home on 7/26/19      Procedures Performed: None    Significant Findings, Care, Treatment and Services Provided:   Results Reviewed     Procedure Component Value Units Date/Time    Comprehensive metabolic panel [[id number]]  (Abnormal) Collected:  07/24/19 2153    Lab Status:  Final result Specimen:  Blood from Arm, Right Updated:  07/24/19 2241     Sodium 140 mmol/L      Potassium 3 8 mmol/L      Chloride 106 mmol/L      CO2 27 mmol/L      ANION GAP 7 mmol/L      BUN 36 mg/dL      Creatinine 1 37 mg/dL      Glucose 217 mg/dL      Calcium 9 4 mg/dL      AST 19 U/L      ALT 20 U/L      Alkaline Phosphatase 89 U/L      Total Protein 8 3 g/dL      Albumin 3 7 g/dL      Total Bilirubin 0 26 mg/dL      eGFR 35 ml/min/1 73sq m     Narrative:       National Kidney Disease Foundation guidelines for Chronic Kidney Disease (CKD):     Stage 1 with normal or high GFR (GFR > 90 mL/min/1 73 square meters)    Stage 2 Mild CKD (GFR = 60-89 mL/min/1 73 square meters)    Stage 3A Moderate CKD (GFR = 45-59 mL/min/1 73 square meters)    Stage 3B Moderate CKD (GFR = 30-44 mL/min/1 73 square meters)    Stage 4 Severe CKD (GFR = 15-29 mL/min/1 73 square meters)    Stage 5 End Stage CKD (GFR <15 mL/min/1 73 square meters)  Note: GFR calculation is accurate only with a steady state creatinine    Troponin I [[id number]]  (Normal) Collected:  07/24/19 2153    Lab Status:  Final result Specimen:  Blood from Arm, Right Updated:  07/24/19 2241     Troponin I <0 02 ng/mL     CBC and differential [[id number]]  (Abnormal) Collected:  07/24/19 2153    Lab Status:  Final result Specimen:  Blood from Arm, Right Updated:  07/24/19 2204     WBC 8 65 Thousand/uL      RBC 4 10 Million/uL      Hemoglobin 11 7 g/dL      Hematocrit 37 4 %      MCV 91 fL      MCH 28 5 pg      MCHC 31 3 g/dL      RDW 13 0 %      MPV 11 6 fL      Platelets 577 Thousands/uL      nRBC 0 /100 WBCs      Neutrophils Relative 51 %      Immat GRANS % 0 %      Lymphocytes Relative 43 %      Monocytes Relative 5 %      Eosinophils Relative 1 %      Basophils Relative 0 %      Neutrophils Absolute 4 32 Thousands/µL      Immature Grans Absolute 0 02 Thousand/uL      Lymphocytes Absolute 3 74 Thousands/µL      Monocytes Absolute 0 46 Thousand/µL      Eosinophils Absolute 0 08 Thousand/µL      Basophils Absolute 0 03 Thousands/µL         Complications: None apparent    Important Physician Related Follow Up:   · Outpatient Cardiology Follow Up:  · Appointment 8/6 at 3pm with Dr Tom Moss  · Follow up with PCP Dr Benji Chang   · [address] visit in 1-2 weeks   · Follow up with Nephrologist, Dr Kieran Sexton, in 2-4 weeks    Discharge Diagnosis:   Principal Problem:    Hypertension  Active Problems:    CKD (chronic kidney disease), stage III ([facility])    GERD (gastroesophageal reflux disease)    Allergic rhinitis    DMII (diabetes mellitus, type 2) ([facility])    Palpitations    Diarrhea      Exam on Day of Discharge: Please see progress note from 7/26/2019 for physical exam  BP (!) 150/48   Pulse 65   Temp 97 8 °F (36 6 °C)   Resp 18   Wt 67 9 kg (149 lb 9 6 oz)   LMP  (LMP Unknown)   SpO2 98%   BMI 27 36 kg/m²     Condition at Discharge: Stable    Discharge instructions/Information to patient and family:   See after visit summary for information provided to patient and family  Provisions for Follow-Up Care:  See after visit summary for information related to follow-up care and any pertinent home health orders  Disposition: Home    Planned Readmission: No    Discharge Statement   I spent 30 minutes discharging the patient  This time was spent on the day of discharge  I had direct contact with the patient on the day of discharge  Additional documentation is required if more than 30 minutes were spent on discharge  Discharge Medications:  See after visit summary for reconciled discharge medications provided to patient and family   Of note significant medication changes made this admission:  · Continue all PTA medications with the following exceptions:  · Stop losartan 50 mg QD  · Start losartan 100 mg QD    MD Nisa Barnes 26 PGY-3  7/26/2019  3:36 PM

## 2019-07-26 NOTE — QUICK NOTE
Nurse called to inform me that patient was experiencing 6/10 substernal pain that started suddenly  EKG was obtained and vital signs were stable  I have immediately reported to to the floor  Nurse had already obtained an EKG which showed ST abnormalities in the lateral leads that were unchanged from previous EKG's  Went and spoke with patient   happen to be present in the room  Stated that the pain started after patient ate pudding  Per chart review, patient has a history of acid reflux for which she takes PPI  Patient's describes the pain as sharp and located in the mid epigastric area  Pain is similar to what she feels when GERD is uncontrolled  Denies n/v/c/lightgheadeness/ fevers/ or dizziness  Did experience palpitations that has since resolved  /60 and HR 90 s/p hydralazine 5 mg earlier this evening  Given her age and history, we will order 1 set of troponins if negative no need to repeat  Will also add Pepto-Bismol and Tums to her GERD regimen  Nurse aware of the plan

## 2019-07-27 NOTE — UTILIZATION REVIEW
Notification of Discharge  This is a Notification of Discharge from our facility [address]  Please be advised that this patient has been discharge from our facility  Below you will find the admission and discharge date and time including the patients disposition  PRESENTATION DATE: 7/24/2019  9:35 PM  IP ADMISSION DATE: N/A N/A   DISCHARGE DATE: 7/26/2019  6:21 PM  DISPOSITION: Home/Self Care Home/Self Care   Admission Orders listed below:  Admission Orders (From admission, onward)     Ordered        07/24/19 2340  Place in Observation  Once                   Please contact the UR Department if additional information is required to close this patient's authorization/case  [address]  Utilization Review Department  Phone: [phone]; Fax [phone]  [email or web address]  ATTENTION: Please call with any questions or concerns to [phone]  and carefully listen to the prompts so that you are directed to the right person  Send all requests for admission clinical reviews, approved or denied determinations and any other requests to fax [phone]   All voicemails are confidential

## 2019-07-29 ENCOUNTER — TRANSITIONAL CARE MANAGEMENT (OUTPATIENT)
Dept: FAMILY MEDICINE CLINIC | Facility: CLINIC | Age: 84
End: 2019-07-29

## 2019-07-31 ENCOUNTER — PATIENT OUTREACH (OUTPATIENT)
Dept: FAMILY MEDICINE CLINIC | Facility: CLINIC | Age: 84
End: 2019-07-31

## 2019-07-31 NOTE — PROGRESS NOTES
Outpatient Care Management Note: Message left for patient to please return call  Contact information left on message  Outpatient Care Management Note:  Chart reviewed

## 2019-08-01 ENCOUNTER — OFFICE VISIT (OUTPATIENT)
Dept: SURGICAL ONCOLOGY | Facility: CLINIC | Age: 84
End: 2019-08-01
Payer: COMMERCIAL

## 2019-08-01 VITALS
SYSTOLIC BLOOD PRESSURE: 124 MMHG | RESPIRATION RATE: 16 BRPM | HEART RATE: 70 BPM | BODY MASS INDEX: 24.11 KG/M2 | WEIGHT: 131 LBS | TEMPERATURE: 98.3 F | HEIGHT: 62 IN | DIASTOLIC BLOOD PRESSURE: 52 MMHG

## 2019-08-01 DIAGNOSIS — Z08 ENCOUNTER FOR FOLLOW-UP EXAMINATION AFTER COMPLETED TREATMENT FOR MALIGNANT NEOPLASM: Primary | ICD-10-CM

## 2019-08-01 DIAGNOSIS — Z85.3 HISTORY OF LEFT BREAST CANCER: ICD-10-CM

## 2019-08-01 PROCEDURE — 99213 OFFICE O/P EST LOW 20 MIN: CPT | Performed by: NURSE PRACTITIONER

## 2019-08-01 NOTE — PROGRESS NOTES
Surgical Oncology Follow Up       [address] [facility]  [facility] [place]  [address] [facility] [address]    Rocio Carvalho  1934  [id number]  [address] [facility] [facility]  [facility] [place]  [address] [facility] [address]    Chief Complaint   Patient presents with    Follow-up     6 month follow up  Assessment/Plan:  1  Encounter for follow-up examination after completed treatment for malignant neoplasm    2  History of left breast cancer  - Mammo diagnostic bilateral w 3d & cad; Future  - 6 mo f/u visit    Discussion/Summary:  Patient is an 51-year-old female who presents today for a six-month follow-up visit for 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  Her pathology revealed mucinous carcinoma, ER/%, her 2-  She underwent a left lumpectomy by Dr Seb Alcantar  She did not receive adjuvant therapy  She is due for a bilateral mammogram at this time  Our office will coordinate this for her  She has no new complaints today and there are no concerns on today's exam   We will plan to see the patient back in 6 months or sooner if the need arises  She was instructed to call with any new concerns or symptoms prior to that time  All of her questions were answered  History of Present Illness:        History of left breast cancer    8/10/2018 Biopsy     Left breast biopsy  Invasive mucinous  Grade 1      Her 2 1+      9/11/2018 Surgery     Left lumpectomy  Invasive mucinous carcinoma  Grade 1  1 1 cm in size  Margins negative      Her 2 1+  Stage 1A      10/4/2018 -  Hormone Therapy     Dr Hiwot Cleveland  No benefit of AI          -Interval History:  Patient presents today for a six-month follow-up visit for 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  She notices no changes on self-breast exam   She does report an occasional tenderness of the right breast   She is due for a mammogram at this time  She was recently hospitalized for hypertension  Review of Systems:  Review of Systems   Constitutional: Negative for activity change, appetite change, chills, fatigue, fever and unexpected weight change  HENT: Negative for trouble swallowing  Eyes: Negative for pain, redness and visual disturbance  Respiratory: Negative for cough, shortness of breath and wheezing  Cardiovascular: Negative for chest pain, palpitations and leg swelling  Gastrointestinal: Negative for abdominal pain, constipation, diarrhea, nausea and vomiting  Endocrine: Negative for cold intolerance and heat intolerance  Musculoskeletal: Positive for arthralgias (chronic bilateral shoulder arthralgias)  Negative for back pain, gait problem and myalgias  Skin: Negative for color change and rash  Neurological: Negative for dizziness, syncope, light-headedness, numbness and headaches  Hematological: Negative for adenopathy  Psychiatric/Behavioral: Negative for agitation and confusion  All other systems reviewed and are negative        Patient Active Problem List   Diagnosis    Vocal cord mass    Iliac artery stenosis, bilateral (HCC)    Hyperparathyroidism (HCC)    GERD (gastroesophageal reflux disease)    Arthritis    Hypertension    EKG abnormalities    Anemia    Neck pain    Chronic thoracic back pain    Fall from other slipping, tripping, or stumbling    Abnormal EKG    Abnormal mammogram    Accelerated essential hypertension    Allergic rhinitis    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)    Back pain    Bilateral carotid artery stenosis    Carotid stenosis, asymptomatic    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    Chronic myofascial pain    Chronic pain of lower extremity    CKD (chronic kidney disease), stage III ([facility])    Constipation    Diabetic retinopathy ([facility] [address] )    DMII (diabetes mellitus, type 2) ([facility])    Dysphagia    Epigastric pain    Fever of unknown origin    Gastritis    Gout    Hypomagnesemia    Atherosclerosis of other specified arteries    Lymphadenopathy    Microalbuminuria    Nephrolithiasis    Neuropathic pain, leg, right    Osteoporosis    PAD (peripheral artery disease) ([facility])    Primary generalized (osteo)arthritis    Persistent proteinuria    Renal cyst    Secondary hyperparathyroidism, renal ([facility] [address] )    Vaginal prolapse    Vitamin D deficiency    Acquired complex cyst of kidney    Gastroesophageal reflux disease without esophagitis    History of left breast cancer    Type 2 diabetes mellitus with diabetic peripheral angiopathy without gangrene ([facility])    Stricture of artery ([facility])    Musculoskeletal pain, chronic    Hemorrhoids    Tinea unguium    Diabetic neuropathy ([facility])    Bradycardia    Hyperkalemia    Pain in tooth    Dysphonia    Glottic insufficiency    Reflux laryngitis    Paresis of left vocal fold    Muscle tension dysphonia    Laryngocele    Gastroesophageal reflux disease    Anterior glottic web    Chest pain    Palpitations    Encounter for follow-up examination after completed treatment for malignant neoplasm     Past Medical History:   Diagnosis Date    Arthritis     Back pain     Benign essential hypertension     LAST ASSESSED: [id number]    Carotid artery stenosis     CKD (chronic kidney disease) stage 2, GFR 60-89 ml/min     Constipation, chronic     "drinks prune juice"    Diabetes mellitus ([facility] [address] )     DMII (diabetes mellitus, type 2) ([facility])     Epiglottitis     RESOLVED: [id number]    Gastritis     GERD (gastroesophageal reflux disease)     Gout     H/O blood clots     left lower leg,,,11 yrs ago    History of stenosis of renal artery     RESOLVED: [id number]    Hypercholesteremia     Hypercholesterolemia     Hyperlipidemia     Hyperparathyroidism ([address] )     Hypertension     Iliac artery stenosis, bilateral ([address] )     Kidney stone     Mucinous carcinoma of breast, left ([facility] [address] ) 8/15/2018    Presence of pessary     Renal artery stenosis (HCC)     Renal disorder     Renovascular hypertension     RESOLVED: [id number]    Segmental and somatic dysfunction of cervical region     RESOLVED: [id number]    Segmental and somatic dysfunction of lumbar region     RESOLVED: [id number]    Segmental and somatic dysfunction of pelvic region     RESOLVED: [id number]    Segmental and somatic dysfunction of thoracic region     RESOLVED: [id number]    Somatic dysfunction of abdominal region     RESOLVED: [id number]    Somatic dysfunction of head region     RESOLVED: 10GPF2094    Somatic dysfunction of lower extremities     RESOLVED: 80CDM3289    Somatic dysfunction of rib region     RESOLVED: 58AJH0003    Somatic dysfunction of thoracic region     RESOLVED: [id number]    Somatic dysfunction of upper extremities     RESOLVED: 35HYS0074    Teeth missing      Past Surgical History:   Procedure Laterality Date    ANGIOPLASTY / STENTING ILIAC      BREAST LUMPECTOMY Left 09/06/2017    LAST ASSESSED: [id number]    BREAST SURGERY Left     biopsy    CATARACT EXTRACTION Bilateral     CHOLECYSTECTOMY      COLONOSCOPY      EYE SURGERY      ILIAC VEIN ANGIOPLASTY / STENTING Right     INITIAL STENOSIS: ONSET: [id number]    KNEE ARTHROSCOPY Right     KNEE SURGERY Right     ONSET: [id number]    MAMMO NEEDLE LOCALIZATION LEFT (ALL INC) Left 9/11/2018    MAMMO NEEDLE LOCALIZATION LEFT (ALL INC) Left 9/6/2017    SD ESOPHAGOGASTRODUODENOSCOPY TRANSORAL DIAGNOSTIC N/A 7/26/2018    Procedure: ESOPHAGOGASTRODUODENOSCOPY (EGD); Surgeon: Josephine Pabon DO;  [facility]: [facility];   Service: Gastroenterology    SD LARYNGOSCOPY,DIRCT,[facility] N/A 6/10/2016    Procedure: MICRO DIRECT LARYNGOSCOPY WITH VOCAL FOLD MASS EXCISION ;  Surgeon: Severiano Spain, MD;  [facility];  Service: ENT    SD MASTECTOMY, PARTIAL Left 9/6/2017    Procedure: LUMPECTOMY BREAST NEEDLE LOCALIZED WITH FAXITRON NEEDLE @ 0830;  Surgeon: Andrea Ogden MD;  [facility];  Service: General    LA PERQ DEVICE PLACEMT BREAST LOC 1ST LES MARY Barron Left 9/11/2018    Procedure: BREAST LUMPECTOMY; BREAST NEEDLE LOCALIZATION (NEEDLE LOC AT 1330); Surgeon: Smiley Garcia MD;  [facility];  Service: Surgical Oncology    THROAT SURGERY      lump removed    TUBAL LIGATION      UPPER GASTROINTESTINAL ENDOSCOPY      US GUIDANCE BREAST BIOPSY LEFT EACH ADDITIONAL Left 8/7/2017    US GUIDED BREAST BIOPSY LEFT COMPLETE Left 8/10/2018    US GUIDED BREAST BIOPSY LEFT COMPLETE Left 8/7/2017    VASCULAR SURGERY       Family History   Problem Relation Age of Onset    Heart disease Brother     Heart disease Maternal Grandmother     Diabetes Other         of other type with arthropathy, without long term current use of insulin    Hypertension Other     No Known Problems Mother     Gout Family     Rheum arthritis Family     Lupus Family         systematic erythematosus    Heart disease Family     Stroke Family         syndrome    Stroke Maternal Uncle         syndrome    Stroke Maternal Aunt         syndrome    No Known Problems Father      Social History     Socioeconomic History    Marital status:       Spouse name: Not on file    Number of children: Not on file    Years of education: Not on file    Highest education level: Not on file   Occupational History    Occupation: retired   Social Needs    Financial resource strain: Not hard at all   [email or web address] insecurity:     Worry: Never true     Inability: Never true   "[employer] needs:     Medical: No     Non-medical: No   Tobacco Use    Smoking status: Former Smoker     Packs/day: 1 00     Years: 64 00     Pack years: 64 00     Last attempt to quit: 2004     Years since quitting: 15 5    Smokeless tobacco: Never Used    Tobacco comment: quit in 2003 1 ppd x 50 years   Substance and Sexual Activity    Alcohol use: Not Currently     Frequency: Never    Drug use: No    Sexual activity: Not on file   Lifestyle    Physical activity:     Days per week: 0 days     Minutes per session: 0 min    Stress:  Only a little   Relationships    Social connections:     Talks on phone: More than three times a week     Gets together: More than three times a week     Attends [employer] service: Not on file     Active member of club or organization: Not on file     Attends meetings of clubs or organizations: Not on file     Relationship status: Not on file    Intimate partner violence:     Fear of current or ex partner: Not on file     Emotionally abused: Not on file     Physically abused: Not on file     Forced sexual activity: Not on file   Other Topics Concern    Not on file   Social History Narrative    Daily caffeine consumption, 1 serving a day        Current Outpatient Medications:     Acetaminophen (TYLENOL) 325 MG CAPS, Take by mouth every 8 (eight) hours, Disp: , Rfl:     aspirin (ADULT ASPIRIN EC LOW STRENGTH) 81 mg EC tablet, Take 1 tablet by mouth daily, Disp: , Rfl:     calcitriol (ROCALTROL) 0 25 mcg capsule, Take 1 capsule (0 25 mcg total) by mouth 2 (two) times a week Please take every Tuesday and friday, Disp: 10 capsule, Rfl: 6    calcium carbonate-vitamin D (OSCAL-D) 500 mg-200 units per tablet, Take 1 tablet by mouth daily with breakfast, Disp: , Rfl:     fluticasone (FLONASE) 50 mcg/act nasal spray, instill 1 spray into each nostril once daily, Disp: 16 g, Rfl: 2    furosemide (LASIX) 20 mg tablet, Take 1 tablet (20 mg total) by mouth daily Daily with swelling, every other day once swelling resolves, Disp: 30 tablet, Rfl: 3    Lancets (ONETOUCH ULTRASOFT) lancets, USE TWICE DAILY, Disp: 200 each, Rfl: 9    losartan (COZAAR) 100 MG tablet, Take 1 tablet (100 mg total) by mouth daily, Disp: 90 tablet, Rfl: 0    NIFEdipine ER (ADALAT CC) 60 MG 24 hr tablet, Take 1 tablet (60 mg total) by mouth daily for 90 days, Disp: 90 tablet, Rfl: 0    ONE TOUCH ULTRA TEST test strip, 1 each by Other route 2 (two) times a day Use as instructed, Disp: 100 each, Rfl: 5    pantoprazole (PROTONIX) 40 mg tablet, Take 1 tablet (40 mg total) by mouth daily, Disp: 30 tablet, Rfl: 5    pravastatin (PRAVACHOL) 40 mg tablet, take 1 tablet by mouth daily, Disp: 90 tablet, Rfl: 0    gabapentin (NEURONTIN) 100 mg capsule, Take 1 capsule (100 mg total) by mouth daily at bedtime for 10 days, Disp: 90 capsule, Rfl: 1  Allergies   Allergen Reactions    Clonidine Derivatives Swelling    Diflucan [Fluconazole] Rash    Flagyl [Metronidazole] Anaphylaxis    Carvedilol Hives     And legs swelled    Latex Rash    Lipitor [Atorvastatin]      Legs swelled    Other Palpitations     IV DYE     Vitals:    08/01/19 1502   BP: 124/52   Pulse: 70   Resp: 16   Temp: 98 3 °F (36 8 °C)       Physical Exam   Constitutional: She is oriented to person, place, and time  Vital signs are normal  She appears well-developed and well-nourished  No distress  HENT:   Head: Normocephalic and atraumatic  Neck: Normal range of motion  Cardiovascular: Normal rate, regular rhythm and normal heart sounds  Pulmonary/Chest: Effort normal and breath sounds normal    Bilateral breasts were examined in the sitting and supine position  Left breast surgical scars x 2  There are no domianant masses, skin nodules, nipple changes or nipple discharge  There is no bilateral supraclavicular or axillary lymphadenopathy noted  Abdominal: Soft  Normal appearance  She exhibits no mass  There is no hepatosplenomegaly  There is no tenderness  Musculoskeletal: Normal range of motion  Lymphadenopathy:     She has no axillary adenopathy  Right: No supraclavicular adenopathy present  Left: No supraclavicular adenopathy present  Neurological: She is alert and oriented to person, place, and time  Skin: Skin is warm, dry and intact  No rash noted  She is not diaphoretic  Psychiatric: She has a normal mood and affect  Her speech is normal    Vitals reviewed  Advance Care Planning/Advance Directives:  Discussed disease status, cancer treatment plans and/or cancer treatment goals with the patient

## 2019-08-02 ENCOUNTER — OFFICE VISIT (OUTPATIENT)
Dept: FAMILY MEDICINE CLINIC | Facility: CLINIC | Age: 84
End: 2019-08-02

## 2019-08-02 VITALS
HEIGHT: 62 IN | HEART RATE: 78 BPM | TEMPERATURE: 98.8 F | BODY MASS INDEX: 24.18 KG/M2 | SYSTOLIC BLOOD PRESSURE: 142 MMHG | WEIGHT: 131.4 LBS | DIASTOLIC BLOOD PRESSURE: 58 MMHG

## 2019-08-02 DIAGNOSIS — K59.04 CHRONIC IDIOPATHIC CONSTIPATION: ICD-10-CM

## 2019-08-02 DIAGNOSIS — I10 ESSENTIAL HYPERTENSION: Primary | ICD-10-CM

## 2019-08-02 DIAGNOSIS — R07.9 CHEST PAIN, UNSPECIFIED TYPE: ICD-10-CM

## 2019-08-02 DIAGNOSIS — E11.9 TYPE 2 DIABETES MELLITUS WITHOUT COMPLICATION, WITHOUT LONG-TERM CURRENT USE OF INSULIN (HCC): ICD-10-CM

## 2019-08-02 PROCEDURE — 99213 OFFICE O/P EST LOW 20 MIN: CPT | Performed by: FAMILY MEDICINE

## 2019-08-02 RX ORDER — SENNOSIDES 8.6 MG
1 TABLET ORAL
Qty: 120 EACH | Refills: 0 | Status: SHIPPED | OUTPATIENT
Start: 2019-08-02 | End: 2020-11-04 | Stop reason: SDUPTHER

## 2019-08-02 NOTE — PROGRESS NOTES
Martin Miranda 1934 female MRN: [id number]    Family Medicine Transition of Care Visit      SUBJECTIVE    CC: RONN FRYE    Transitional Care Management Review:  Martin Miranda is a 80 y o  female here for TCM follow up  During the TCM phone call patient stated:    TCM Call (since 7/2/2019)     Date and time call was made  7/29/2019  1:06 PM    Hospital care reviewed  Records reviewed    Patient was hospitialized at  [facility]    Date of Admission  07/24/19    Date of discharge  07/26/19    Diagnosis  Palpitations, Diarrhea, Hypertensive Urgency    Disposition  Home    Were the patients medications reviewed and updated  Yes    Current Symptoms  None      TCM Call (since 7/2/2019)     Post hospital issues  Reduced activity    Should patient be enrolled in anticoag monitoring? No    Scheduled for follow up? Yes    Not clinically warranted  Patient readmitted to hospital    Did you obtain your prescribed medications  Yes    Do you need help managing your prescriptions or medications  No    Is transportation to your appointment needed  No    I have advised the patient to call PCP with any new or worsening symptoms  Haley Mar LPN    Living Arrangements  Family members    Support System  Family    Do you have social support  Yes, as much as I need    Are you recieving any outpatient services  No    Are you recieving home care services  No    Are you using any community resources  No    Current waiver services  No    Have you fallen in the last 12 months  No    Interperter language line needed  No    Counseling  Family    Counseling topics  Importance of RX compliance; instructions for management; patient and family education; Activities of daily living          During today's visit:    Patient     They are complying with their medication changes and discharge instructions       They have the following questions: none    Review of Systems   Constitutional: Negative for appetite change, diaphoresis, fatigue and fever  Eyes: Negative for visual disturbance  Respiratory: Negative for cough, chest tightness and shortness of breath  Cardiovascular: Negative for chest pain, palpitations and leg swelling  Gastrointestinal: Positive for constipation  Negative for abdominal distention, abdominal pain, diarrhea, nausea and vomiting  Genitourinary: Negative for difficulty urinating, frequency and urgency  Musculoskeletal: Negative for back pain and neck pain  Neurological: Negative for weakness, light-headedness, numbness and headaches  Psychiatric/Behavioral: Negative for agitation and behavioral problems  The patient is not nervous/anxious          Historical Information     The patient history was reviewed as follows:    Past Medical History:   Diagnosis Date    Arthritis     Back pain     Benign essential hypertension     LAST ASSESSED: [id number]    Carotid artery stenosis     CKD (chronic kidney disease) stage 2, GFR 60-89 ml/min     Constipation, chronic     "drinks prune juice"    Diabetes mellitus ([facility] )     DMII [facility]diabetes mellitus, type 2) [facility]     Epiglottitis     RESOLVED: [id number]    Gastritis     GERD (gastroesophageal reflux disease)     Gout     H/O blood clots     left lower leg,,,11 yrs ago    History of stenosis of renal artery     RESOLVED: [id number]    Hypercholesteremia     Hypercholesterolemia     Hyperlipidemia     Hyperparathyroidism ([facility] )     Hypertension     Iliac artery stenosis, bilateral ([facility])     Kidney stone     [facility] 8/15/2018    Presence of pessary     Renal artery stenosis ([facility] )     Renal disorder     Renovascular hypertension     RESOLVED: [id number]    Segmental and somatic dysfunction of cervical region     RESOLVED: [id number]    Segmental and somatic dysfunction of lumbar region     RESOLVED: [id number]    Segmental and somatic dysfunction of pelvic region     RESOLVED: [id number]    Segmental and somatic dysfunction of thoracic region     RESOLVED: [id number]    Somatic dysfunction of abdominal region     RESOLVED: [id number]    Somatic dysfunction of head region     RESOLVED: [id number]    Somatic dysfunction of lower extremities     RESOLVED: [id number]    Somatic dysfunction of rib region     RESOLVED: 30LJI6382    Somatic dysfunction of thoracic region     RESOLVED: [id number]    Somatic dysfunction of upper extremities     RESOLVED: 71YYI6403    Teeth missing      Past Surgical History:   Procedure Laterality Date    ANGIOPLASTY / STENTING ILIAC      BREAST LUMPECTOMY Left 09/06/2017    LAST ASSESSED: [id number]    BREAST SURGERY Left     biopsy    CATARACT EXTRACTION Bilateral     CHOLECYSTECTOMY      COLONOSCOPY      EYE SURGERY      ILIAC VEIN ANGIOPLASTY / STENTING Right     INITIAL STENOSIS: ONSET: [id number]    KNEE ARTHROSCOPY Right     KNEE SURGERY Right     ONSET: [id number]    MAMMO NEEDLE LOCALIZATION LEFT (ALL INC) Left 9/11/2018    MAMMO NEEDLE LOCALIZATION LEFT (ALL INC) Left 9/6/2017    ND ESOPHAGOGASTRODUODENOSCOPY TRANSORAL DIAGNOSTIC N/A 7/26/2018    Procedure: ESOPHAGOGASTRODUODENOSCOPY (EGD); Surgeon: Radha Taylor DO;  Location: [facility]; Service: Gastroenterology    ND L[facility]NGOSCOPY,DIRCT,[facility] N/A 6/10/2016    Procedure: MICRO DIRECT LARYNGOSCOPY WITH VOCAL FOLD MASS EXCISION ;  Surgeon: Concepcion Greer MD;  Location[facility];  Service: ENT    ND MASTECTOMY, PARTIAL Left 9/6/2017    Procedure: LUMPECTOMY BREAST NEEDLE LOCALIZED WITH FAXITRON NEEDLE @ 0830;  Surgeon: Izabel Du MD;  [facility];  Service: General    ND PERQ DEVICE PLACEMT BREAST LOC 1ST LES W Noelle Heritage Left 9/11/2018    Procedure: BREAST LUMPECTOMY; BREAST NEEDLE LOCALIZATION (NEEDLE LOC AT 1330);   Surgeon: Lakisha Inman MD;  Location[facility];  Service: Surgical Oncology    THROAT SURGERY      lump removed   [facility] ENDOSCOPY       GUIDANCE BREAST BIOPSY LEFT EACH ADDITIONAL Left 8/7/2017    US GUIDED BREAST BIOPSY LEFT COMPLETE Left 8/10/2018    US GUIDED BREAST BIOPSY LEFT COMPLETE Left 8/7/2017    VASCULAR SURGERY       Family History   Problem Relation Age of Onset    Heart disease Brother     Heart disease Maternal Grandmother     Diabetes Other         of other type with arthropathy, without long term current use of insulin    Hypertension Other     No Known Problems Mother     Gout Family     Rheum arthritis Family     Lupus Family         systematic erythematosus    Heart disease Family     Stroke Family         syndrome    Stroke Maternal Uncle         syndrome    Stroke Maternal Aunt         syndrome    No Known Problems Father       Social History   Social History     Substance and Sexual Activity   Alcohol Use Not Currently    Frequency: Never     Social History     Substance and Sexual Activity   Drug Use No     Social History     Tobacco Use   Smoking Status Former Smoker    Packs/day: 1 00    Years: 64 00    Pack years: 56 65    Last attempt to quit: 2004    Years since quitting: 15 5   Smokeless Tobacco Never Used   Tobacco Comment    quit in 2003 1 ppd x 50 years       Medications:   Meds/Allergies     Current Outpatient Medications:     Acetaminophen (TYLENOL) 325 MG CAPS, Take by mouth every 8 (eight) hours, Disp: , Rfl:     aspirin (ADULT ASPIRIN EC LOW STRENGTH) 81 mg EC tablet, Take 1 tablet by mouth daily, Disp: , Rfl:     calcitriol (ROCALTROL) 0 25 mcg capsule, Take 1 capsule (0 25 mcg total) by mouth 2 (two) times a week Please take every Tuesday and friday, Disp: 10 capsule, Rfl: 6    calcium carbonate-vitamin D (OSCAL-D) 500 mg-200 units per tablet, Take 1 tablet by mouth daily with breakfast, Disp: , Rfl:     fluticasone (FLONASE) 50 mcg/act nasal spray, instill 1 spray into each nostril once daily, Disp: 16 g, Rfl: 2    furosemide (LASIX) 20 mg tablet, Take 1 tablet (20 mg total) by mouth daily Daily with swelling, every other day once swelling resolves, Disp: 30 tablet, Rfl: 3    Lancets (ONETOUCH ULTRASOFT) lancets, USE TWICE DAILY, Disp: 200 each, Rfl: 9    losartan (COZAAR) 100 MG tablet, Take 1 tablet (100 mg total) by mouth daily, Disp: 90 tablet, Rfl: 0    NIFEdipine ER (ADALAT CC) 60 MG 24 hr tablet, Take 1 tablet (60 mg total) by mouth daily for 90 days, Disp: 90 tablet, Rfl: 0    ONE TOUCH ULTRA TEST test strip, 1 each by Other route 2 (two) times a day Use as instructed, Disp: 100 each, Rfl: 5    pantoprazole (PROTONIX) 40 mg tablet, Take 1 tablet (40 mg total) by mouth daily, Disp: 30 tablet, Rfl: 5    pravastatin (PRAVACHOL) 40 mg tablet, take 1 tablet by mouth daily, Disp: 90 tablet, Rfl: 0    gabapentin (NEURONTIN) 100 mg capsule, Take 1 capsule (100 mg total) by mouth daily at bedtime for 10 days, Disp: 90 capsule, Rfl: 1  Allergies   Allergen Reactions    Clonidine Derivatives Swelling    Diflucan [Fluconazole] Rash    Flagyl [Metronidazole] Anaphylaxis    Carvedilol Hives     And legs swelled    Latex Rash    Lipitor [Atorvastatin]      Legs swelled    Other Palpitations     IV DYE       OBJECTIVE    Vitals:   Vitals:    08/02/19 1636   BP: 142/58   BP Location: Left arm   Patient Position: Sitting   Cuff Size: Standard   Pulse: 78   Temp: 98 8 °F (37 1 °C)   TempSrc: Tympanic   Weight: 59 6 kg (131 lb 6 4 oz)   Height: 5' 2" (1 575 m)       Body mass index is 24 03 kg/m²  Physical Exam:    Physical Exam   Constitutional: She is oriented to person, place, and time  She appears well-developed and well-nourished  No distress  HENT:   Head: Normocephalic and atraumatic  Neck: Normal range of motion  Neck supple  No thyromegaly present  Cardiovascular: Normal rate, regular rhythm, normal heart sounds and intact distal pulses  Exam reveals no gallop and no friction rub  No murmur heard    Pulmonary/Chest: Effort normal and breath sounds normal  No respiratory distress  She has no wheezes  She has no rales  She exhibits no tenderness  Abdominal: Soft  Bowel sounds are normal  She exhibits no distension and no mass  There is no tenderness  There is no rebound and no guarding  Musculoskeletal: Normal range of motion  She exhibits no edema, tenderness or deformity  Lymphadenopathy:     She has no cervical adenopathy  Neurological: She is alert and oriented to person, place, and time  Skin: Skin is warm and dry  No rash noted  She is not diaphoretic  No erythema  Psychiatric: She has a normal mood and affect  Her behavior is normal  Judgment and thought content normal    Vitals reviewed  Labs: I have personally reviewed all pertinent results       Admission on 07/24/2019, Discharged on 07/26/2019   Component Date Value Ref Range Status    WBC 07/24/2019 8 65  4 31 - 10 16 Thousand/uL Final    RBC 07/24/2019 4 10  3 81 - 5 12 Million/uL Final    Hemoglobin 07/24/2019 11 7  11 5 - 15 4 g/dL Final    Hematocrit 07/24/2019 37 4  34 8 - 46 1 % Final    MCV 07/24/2019 91  82 - 98 fL Final    MCH 07/24/2019 28 5  26 8 - 34 3 pg Final    MCHC 07/24/2019 31 3* 31 4 - 37 4 g/dL Final    RDW 07/24/2019 13 0  11 6 - 15 1 % Final    MPV 07/24/2019 11 6  8 9 - 12 7 fL Final    Platelets 40/14/3353 268  149 - 390 Thousands/uL Final    nRBC 07/24/2019 0  /100 WBCs Final    Neutrophils Relative 07/24/2019 51  43 - 75 % Final    Immat GRANS % 07/24/2019 0  0 - 2 % Final    Lymphocytes Relative 07/24/2019 43  14 - 44 % Final    Monocytes Relative 07/24/2019 5  4 - 12 % Final    Eosinophils Relative 07/24/2019 1  0 - 6 % Final    Basophils Relative 07/24/2019 0  0 - 1 % Final    Neutrophils Absolute 07/24/2019 4 32  1 85 - 7 62 Thousands/µL Final    Immature Grans Absolute 07/24/2019 0 02  0 00 - 0 20 Thousand/uL Final    Lymphocytes Absolute 07/24/2019 3 74  0 60 - 4 47 Thousands/µL Final    Monocytes Absolute 07/24/2019 0 46  0 17 - 1 22 Thousand/µL Final    Eosinophils Absolute 07/24/2019 0 08  0 00 - 0 61 Thousand/µL Final    Basophils Absolute 07/24/2019 0 03  0 00 - 0 10 Thousands/µL Final    Sodium 07/24/2019 140  136 - 145 mmol/L Final    Potassium 07/24/2019 3 8  3 5 - 5 3 mmol/L Final    Chloride 07/24/2019 106  100 - 108 mmol/L Final    CO2 07/24/2019 27  21 - 32 mmol/L Final    ANION GAP 07/24/2019 7  4 - 13 mmol/L Final    BUN 07/24/2019 36* 5 - 25 mg/dL Final    Creatinine 07/24/2019 1 37* 0 60 - 1 30 mg/dL Final    Standardized to IDMS reference method    Glucose 07/24/2019 217* 65 - 140 mg/dL Final      If the patient is fasting, the ADA then defines impaired fasting glucose as > 100 mg/dL and diabetes as > or equal to 123 mg/dL  Specimen collection should occur prior to Sulfasalazine administration due to the potential for falsely depressed results  Specimen collection should occur prior to Sulfapyridine administration due to the potential for falsely elevated results   Calcium 07/24/2019 9 4  8 3 - 10 1 mg/dL Final    AST 07/24/2019 19  5 - 45 U/L Final      Specimen collection should occur prior to Sulfasalazine administration due to the potential for falsely depressed results   ALT 07/24/2019 20  12 - 78 U/L Final      Specimen collection should occur prior to Sulfasalazine and/or Sulfapyridine administration due to the potential for falsely depressed results       Alkaline Phosphatase 07/24/2019 89  46 - 116 U/L Final    Total Protein 07/24/2019 8 3* 6 4 - 8 2 g/dL Final    Albumin 07/24/2019 3 7  3 5 - 5 0 g/dL Final    Total Bilirubin 07/24/2019 0 26  0 20 - 1 00 mg/dL Final    eGFR 07/24/2019 35  ml/min/1 73sq m Final    Troponin I 07/24/2019 <0 02  <=0 04 ng/mL Final      [employer] Chemistry analyzer 99% cutoff is > 0 04 ng/mL in network labs     o cTnI 99% cutoff is useful only when applied to patients in the clinical setting of myocardial ischemia   o cTnI 99% cutoff should be interpreted in the context of clinical history, ECG findings and possibly cardiac imaging to establish correct diagnosis  o cTnI 99% cutoff may be suggestive but clearly not indicative of a coronary event without the clinical setting of myocardial ischemia   Sodium 07/25/2019 142  136 - 145 mmol/L Final    Potassium 07/25/2019 4 0  3 5 - 5 3 mmol/L Final    Chloride 07/25/2019 109* 100 - 108 mmol/L Final    CO2 07/25/2019 28  21 - 32 mmol/L Final    ANION GAP 07/25/2019 5  4 - 13 mmol/L Final    BUN 07/25/2019 34* 5 - 25 mg/dL Final    Creatinine 07/25/2019 1 05  0 60 - 1 30 mg/dL Final    Standardized to IDMS reference method    Glucose 07/25/2019 114  65 - 140 mg/dL Final      If the patient is fasting, the ADA then defines impaired fasting glucose as > 100 mg/dL and diabetes as > or equal to 123 mg/dL  Specimen collection should occur prior to Sulfasalazine administration due to the potential for falsely depressed results  Specimen collection should occur prior to Sulfapyridine administration due to the potential for falsely elevated results      Calcium 07/25/2019 8 9  8 3 - 10 1 mg/dL Final    eGFR 07/25/2019 49  ml/min/1 73sq m Final    WBC 07/25/2019 6 42  4 31 - 10 16 Thousand/uL Final    RBC 07/25/2019 3 59* 3 81 - 5 12 Million/uL Final    Hemoglobin 07/25/2019 10 1* 11 5 - 15 4 g/dL Final    Hematocrit 07/25/2019 32 9* 34 8 - 46 1 % Final    MCV 07/25/2019 92  82 - 98 fL Final    MCH 07/25/2019 28 1  26 8 - 34 3 pg Final    MCHC 07/25/2019 30 7* 31 4 - 37 4 g/dL Final    RDW 07/25/2019 13 0  11 6 - 15 1 % Final    MPV 07/25/2019 11 8  8 9 - 12 7 fL Final    Platelets 61/43/8640 228  149 - 390 Thousands/uL Final    nRBC 07/25/2019 0  /100 WBCs Final    Neutrophils Relative 07/25/2019 49  43 - 75 % Final    Immat GRANS % 07/25/2019 0  0 - 2 % Final    Lymphocytes Relative 07/25/2019 42  14 - 44 % Final    Monocytes Relative 07/25/2019 8  4 - 12 % Final    Eosinophils Relative 07/25/2019 1  0 - 6 % Final  Basophils Relative 07/25/2019 0  0 - 1 % Final    Neutrophils Absolute 07/25/2019 3 12  1 85 - 7 62 Thousands/µL Final    Immature Grans Absolute 07/25/2019 0 01  0 00 - 0 20 Thousand/uL Final    Lymphocytes Absolute 07/25/2019 2 68  0 60 - 4 47 Thousands/µL Final    Monocytes Absolute 07/25/2019 0 52  0 17 - 1 22 Thousand/µL Final    Eosinophils Absolute 07/25/2019 0 07  0 00 - 0 61 Thousand/µL Final    Basophils Absolute 07/25/2019 0 02  0 00 - 0 10 Thousands/µL Final    POC Glucose 07/25/2019 130  65 - 140 mg/dl Final    POC Glucose 07/25/2019 121  65 - 140 mg/dl Final    Magnesium 07/25/2019 1 9  1 6 - 2 6 mg/dL Final    POC Glucose 07/25/2019 140  65 - 140 mg/dl Final    Ventricular Rate 07/24/2019 82  BPM Final    Atrial Rate 07/24/2019 82  BPM Final    MS Interval 07/24/2019 152  ms Final    QRSD Interval 07/24/2019 84  ms Final    QT Interval 07/24/2019 354  ms Final    QTC Interval 07/24/2019 413  ms Final    P Axis 07/24/2019 77  degrees Final    QRS Axis 07/24/2019 1  degrees Final    T Wave [place] 07/24/2019 90  degrees Final    Troponin I 07/25/2019 <0 02  <=0 04 ng/mL Final      [employer] Chemistry analyzer 99% cutoff is > 0 04 ng/mL in network labs     o cTnI 99% cutoff is useful only when applied to patients in the clinical setting of myocardial ischemia   o cTnI 99% cutoff should be interpreted in the context of clinical history, ECG findings and possibly cardiac imaging to establish correct diagnosis  o cTnI 99% cutoff may be suggestive but clearly not indicative of a coronary event without the clinical setting of myocardial ischemia        Sodium 07/26/2019 140  136 - 145 mmol/L Final    Potassium 07/26/2019 4 1  3 5 - 5 3 mmol/L Final    Chloride 07/26/2019 106  100 - 108 mmol/L Final    CO2 07/26/2019 27  21 - 32 mmol/L Final    ANION GAP 07/26/2019 7  4 - 13 mmol/L Final    BUN 07/26/2019 31* 5 - 25 mg/dL Final    Creatinine 07/26/2019 1 26  0 60 - 1 30 mg/dL Final Standardized to IDMS reference method    Glucose 07/26/2019 147* 65 - 140 mg/dL Final      If the patient is fasting, the ADA then defines impaired fasting glucose as > 100 mg/dL and diabetes as > or equal to 123 mg/dL  Specimen collection should occur prior to Sulfasalazine administration due to the potential for falsely depressed results  Specimen collection should occur prior to Sulfapyridine administration due to the potential for falsely elevated results      Calcium 07/26/2019 9 2  8 3 - 10 1 mg/dL Final    eGFR 07/26/2019 39  ml/min/1 73sq m Final    Magnesium 07/26/2019 1 9  1 6 - 2 6 mg/dL Final    WBC 07/26/2019 5 55  4 31 - 10 16 Thousand/uL Final    RBC 07/26/2019 3 72* 3 81 - 5 12 Million/uL Final    Hemoglobin 07/26/2019 10 6* 11 5 - 15 4 g/dL Final    Hematocrit 07/26/2019 34 2* 34 8 - 46 1 % Final    MCV 07/26/2019 92  82 - 98 fL Final    MCH 07/26/2019 28 5  26 8 - 34 3 pg Final    MCHC 07/26/2019 31 0* 31 4 - 37 4 g/dL Final    RDW 07/26/2019 13 2  11 6 - 15 1 % Final    MPV 07/26/2019 12 2  8 9 - 12 7 fL Final    Platelets 59/51/3481 235  149 - 390 Thousands/uL Final    nRBC 07/26/2019 0  /100 WBCs Final    Neutrophils Relative 07/26/2019 51  43 - 75 % Final    Immat GRANS % 07/26/2019 0  0 - 2 % Final    Lymphocytes Relative 07/26/2019 40  14 - 44 % Final    Monocytes Relative 07/26/2019 8  4 - 12 % Final    Eosinophils Relative 07/26/2019 1  0 - 6 % Final    Basophils Relative 07/26/2019 0  0 - 1 % Final    Neutrophils Absolute 07/26/2019 2 78  1 85 - 7 62 Thousands/µL Final    Immature Grans Absolute 07/26/2019 0 01  0 00 - 0 20 Thousand/uL Final    Lymphocytes Absolute 07/26/2019 2 22  0 60 - 4 47 Thousands/µL Final    Monocytes Absolute 07/26/2019 0 44  0 17 - 1 22 Thousand/µL Final    Eosinophils Absolute 07/26/2019 0 08  0 00 - 0 61 Thousand/µL Final    Basophils Absolute 07/26/2019 0 02  0 00 - 0 10 Thousands/µL Final    POC Glucose 07/26/2019 121  65 - 639 mg/dl Final    Ventricular Rate 07/25/2019 96  BPM Final    Atrial Rate 07/25/2019 96  BPM Final    OH Interval 07/25/2019 198  ms Final    QRSD Interval 07/25/2019 84  ms Final    QT Interval 07/25/2019 362  ms Final    QTC Interval 07/25/2019 457  ms Final    P Axis 07/25/2019 77  degrees Final    QRS Axis 07/25/2019 -7  degrees Final    T Wave [place] 07/25/2019 107  degrees Final    POC Glucose 07/26/2019 108  65 - 140 mg/dl Final    POC Glucose 07/26/2019 106  65 - 140 mg/dl Final       Imaging:  I have personally reviewed all pertinent results  Assessment/Plan    No problem-specific Assessment & Plan notes found for this encounter  There are no diagnoses linked to this encounter     - PCP: Little Molina MD  - Follow-up appointments:     Future Appointments   Date Time Provider Valdemar Crocker   8/5/2019  3:00 PM BE MAMMO RBC 3 BE RBC Mammo BE RBC   8/6/2019  3:00 PM Patty Barksdale MD [facility]   8/9/2019 11:00 AM BE HV VASCULAR 2 BE HV Car [address]   8/19/2019  2:30 PM Toay Claire, DO NEPH [facility]   8/19/2019  3:30 PM Little Molina MD  FP BE [facility]   9/11/2019  3:30 PM Horacio Bro DPM Pod [facility]   9/20/2019  1:00 PM JARRED Vaughan VAS CTR [facility]   9/26/2019  3:00 PM Valeri Poe PA-C GASTRO [facility]   12/16/2019  3:30 PM Flores Arzate MD [address] [place] [address] [address]   2/4/2020  2:00 PM JARRED Li SURG ONC [facility]        _____________________________________________________________________   The attending physician, Dr Vanita Pittman, agreed with the plan      Little Molina MD, PGY-2  [facility]   8/2/2019

## 2019-08-02 NOTE — PROGRESS NOTES
Efren Davis 1934 female MRN: [id number]    Family Medicine Transition of Care Visit      SUBJECTIVE    CC: RONN FRYE    Transitional Care Management Review:  Efren Davis is a 80 y o  female here for TCM follow up  During the TCM phone call patient stated:    TCM Call (since 7/2/2019)     Date and time call was made  7/29/2019  1:06 PM    Hospital care reviewed  Records reviewed    Patient was hospitialized at  [facility]    Date of Admission  07/24/19    Date of discharge  07/26/19    Diagnosis  Palpitations, Diarrhea, Hypertensive Urgency    Disposition  Home    Were the patients medications reviewed and updated  Yes    Current Symptoms  None      TCM Call (since 7/2/2019)     Post hospital issues  Reduced activity    Should patient be enrolled in anticoag monitoring? No    Scheduled for follow up? Yes    Not clinically warranted  Patient readmitted to hospital    Did you obtain your prescribed medications  Yes    Do you need help managing your prescriptions or medications  No    Is transportation to your appointment needed  No    I have advised the patient to call PCP with any new or worsening symptoms  Matt Jaramillo LPN    Living Arrangements  Family members    Support System  Family    Do you have social support  Yes, as much as I need    Are you recieving any outpatient services  No    Are you recieving home care services  No    Are you using any community resources  No    Current waiver services  No    Have you fallen in the last 12 months  No    Interperter language line needed  No    Counseling  Family    Counseling topics  Importance of RX compliance; instructions for management; patient and family education; Activities of daily living          During today's visit:    Patient reports she has been compliant with medication for BP  She has a BP cuff and at home she reports -170/50-70    During hospital stay blood pressure regimen change, Procardia XL was increased from 30 mg to 60 on 07/23, losartan increased from 50 mg to 100 mg on 07/25  Lasix resumed after discharge  Patient will follow up closely with Dr Tony Crane, cardiology on 08/06  She complains of constipation, but this is a chronic problem  They are complying with their medication changes and discharge instructions  They have the following questions: none    Review of Systems   Constitutional: Negative for appetite change, diaphoresis, fatigue and fever  Eyes: Negative for visual disturbance  Respiratory: Negative for cough, chest tightness and shortness of breath  Cardiovascular: Negative for chest pain, palpitations and leg swelling  Gastrointestinal: Positive for constipation  Negative for abdominal distention, abdominal pain, diarrhea, nausea and vomiting  Genitourinary: Negative for difficulty urinating, frequency and urgency  Musculoskeletal: Negative for back pain and neck pain  Skin: Negative for rash  Neurological: Negative for weakness, light-headedness, numbness and headaches  Psychiatric/Behavioral: Negative for agitation and behavioral problems  The patient is not nervous/anxious          Historical Information     The patient history was reviewed as follows:    Past Medical History:   Diagnosis Date    Arthritis     Back pain     Benign essential hypertension     LAST ASSESSED: [id number]    Carotid artery stenosis     CKD (chronic kidney disease) stage 2, GFR 60-89 ml/min     Constipation, chronic     "drinks prune juice"    Diabetes mellitus ([address] )     DMII [facility]di[facility] 2[facility]     Epiglottitis     RESOLVED: [id number]    Gastritis     GERD (gastroesophageal reflux disease)     Gout     H/O blood clots     left lower leg,,,11 yrs ago    History of stenosis of renal artery     RESOLVED: [id number]    Hypercholesteremia     Hypercholesterolemia     Hyperlipidemia     Hyperparathyroidism ([address] )     Hypertension     Iliac artery stenosis, bilateral ([address] )     Kidney stone     Mucinous carcinoma of breast, 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    Presence of pessary     Renal artery stenosis (HCC)     Renal disorder     Renovascular hypertension     RESOLVED: [id number]    Segmental and somatic dysfunction of cervical region     RESOLVED: [id number]    Segmental and somatic dysfunction of lumbar region     RESOLVED: [id number]    Segmental and somatic dysfunction of pelvic region     RESOLVED: [id number]    Segmental and somatic dysfunction of thoracic region     RESOLVED: [id number]    Somatic dysfunction of abdominal region     RESOLVED: 33MYH6885    Somatic dysfunction of head region     RESOLVED: 50WJU2993    Somatic dysfunction of lower extremities     RESOLVED: 37YDR9695    Somatic dysfunction of rib region     RESOLVED: 67QMV7885    Somatic dysfunction of thoracic region     RESOLVED: 95SAX3943    Somatic dysfunction of upper extremities     RESOLVED: 23AGN1166    Teeth missing      Past Surgical History:   Procedure Laterality Date    ANGIOPLASTY / STENTING ILIAC      BREAST LUMPECTOMY Left 09/06/2017    LAST ASSESSED: [id number]    BREAST SURGERY Left     biopsy    CATARACT EXTRACTION Bilateral     CHOLECYSTECTOMY      COLONOSCOPY      EYE SURGERY      ILIAC VEIN ANGIOPLASTY / STENTING Right     INITIAL STENOSIS: ONSET: [id number]    KNEE ARTHROSCOPY Right     KNEE SURGERY Right     ONSET: [id number]    MAMMO NEEDLE LOCALIZATION LEFT (ALL INC) Left 9/11/2018    MAMMO NEEDLE LOCALIZATION LEFT (ALL INC) Left 9/6/2017    CT ESOPHAGOGASTRODUODENOSCOPY TRANSORAL DIAGNOSTIC N/A 7/26/2018    Procedure: ESOPHAGOGASTRODUODENOSCOPY (EGD); Surgeon: Jia Burton DO;  [facility]: [facility];   Service: Gastroenterology    CT LARYNGOSCOPY,DIRCT,[facility] N/A 6/10/2016    Procedure: MICRO DIRECT LARYNGOSCOPY WITH VOCAL FOLD MASS EXCISION ;  Surgeon: Giovanna Lugo MD;  [facility];  Service: ENT    CT MASTECTOMY, PARTIAL Left 9/6/2017    Procedure: LUMPECTOMY BREAST NEEDLE LOCALIZED WITH FAXITRON NEEDLE @ 0806;  Surgeon: Rochelle Bryant MD;  [facility];  Service: General    MA PERQ DEVICE PLACEMT BREAST LOC 1ST KIKE Amanda Left 9/11/2018    Procedure: BREAST LUMPECTOMY; BREAST NEEDLE LOCALIZATION (NEEDLE LOC AT 1330);   Surgeon: Jaymie Sullivan MD;  [facility];  Service: Surgical Oncology    THROAT SURGERY      lump removed    TUBAL LIGATION      UPPER GASTROINTESTINAL ENDOSCOPY      US GUIDANCE BREAST BIOPSY LEFT EACH ADDITIONAL Left 8/7/2017    US GUIDED BREAST BIOPSY LEFT COMPLETE Left 8/10/2018    US GUIDED BREAST BIOPSY LEFT COMPLETE Left 8/7/2017    VASCULAR SURGERY       Family History   Problem Relation Age of Onset    Heart disease Brother     Heart disease Maternal Grandmother     Diabetes Other         of other type with arthropathy, without long term current use of insulin    Hypertension Other     No Known Problems Mother     Gout Family     Rheum arthritis Family     Lupus Family         systematic erythematosus    Heart disease Family     Stroke Family         syndrome    Stroke Maternal Uncle         syndrome    Stroke Maternal Aunt         syndrome    No Known Problems Father       Social History   Social History     Substance and Sexual Activity   Alcohol Use Not Currently    Frequency: Never     Social History     Substance and Sexual Activity   Drug Use No     Social History     Tobacco Use   Smoking Status Former Smoker    Packs/day: 1 00    Years: 64 00    Pack years: 56 65    Last attempt to quit: 2004    Years since quitting: 15 5   Smokeless Tobacco Never Used   Tobacco Comment    quit in 2003 1 ppd x 50 years       Medications:   Meds/Allergies     Current Outpatient Medications:     Acetaminophen (TYLENOL) 325 MG CAPS, Take by mouth every 8 (eight) hours, Disp: , Rfl:     aspirin (ADULT ASPIRIN EC LOW STRENGTH) 81 mg EC tablet, Take 1 tablet by mouth daily, Disp: , Rfl:     calcitriol (ROCALTROL) 0 25 mcg capsule, Take 1 capsule (0 25 mcg total) by mouth 2 (two) times a week Please take every Tuesday and friday, Disp: 10 capsule, Rfl: 6    calcium carbonate-vitamin D (OSCAL-D) 500 mg-200 units per tablet, Take 1 tablet by mouth daily with breakfast, Disp: , Rfl:     fluticasone (FLONASE) 50 mcg/act nasal spray, instill 1 spray into each nostril once daily, Disp: 16 g, Rfl: 2    furosemide (LASIX) 20 mg tablet, Take 1 tablet (20 mg total) by mouth daily Daily with swelling, every other day once swelling resolves, Disp: 30 tablet, Rfl: 3    losartan (COZAAR) 100 MG tablet, Take 1 tablet (100 mg total) by mouth daily, Disp: 90 tablet, Rfl: 0    NIFEdipine ER (ADALAT CC) 60 MG 24 hr tablet, Take 1 tablet (60 mg total) by mouth daily for 90 days, Disp: 90 tablet, Rfl: 0    ONE TOUCH ULTRA TEST test strip, 1 each by Other route 2 (two) times a day Use as instructed, Disp: 100 each, Rfl: 5    pantoprazole (PROTONIX) 40 mg tablet, Take 1 tablet (40 mg total) by mouth daily, Disp: 30 tablet, Rfl: 5    pravastatin (PRAVACHOL) 40 mg tablet, take 1 tablet by mouth daily, Disp: 90 tablet, Rfl: 0    gabapentin (NEURONTIN) 100 mg capsule, Take 1 capsule (100 mg total) by mouth daily at bedtime for 10 days, Disp: 90 capsule, Rfl: 1    senna (SENOKOT) 8 6 mg, Take 1 tablet (8 6 mg total) by mouth daily at bedtime as needed for constipation, Disp: 120 each, Rfl: 0  Allergies   Allergen Reactions    Clonidine Derivatives Swelling    Diflucan [Fluconazole] Rash    Flagyl [Metronidazole] Anaphylaxis    Carvedilol Hives     And legs swelled    Latex Rash    Lipitor [Atorvastatin]      Legs swelled    Other Palpitations     IV DYE       OBJECTIVE    Vitals:   Vitals:    08/02/19 1636   BP: 142/58   BP Location: Left arm   Patient Position: Sitting   Cuff Size: Standard   Pulse: 78   Temp: 98 8 °F (37 1 °C)   TempSrc: Tympanic   Weight: 59 6 kg (131 lb 6 4 oz)   Height: 5' 2" (1 575 m)       Body mass index is 24 03 kg/m²  Physical Exam:    Physical Exam   Constitutional: She is oriented to person, place, and time  She appears well-developed and well-nourished  No distress  HENT:   Head: Normocephalic and atraumatic  Eyes: Pupils are equal, round, and reactive to light  EOM are normal    Neck: Normal range of motion  Neck supple  No thyromegaly present  Cardiovascular: Normal rate, regular rhythm, normal heart sounds and intact distal pulses  Exam reveals no gallop and no friction rub  No murmur heard  Pulmonary/Chest: Effort normal and breath sounds normal  No respiratory distress  She has no wheezes  She has no rales  She exhibits no tenderness  Abdominal: Soft  Bowel sounds are normal  She exhibits no distension and no mass  There is no tenderness  There is no rebound and no guarding  Musculoskeletal: Normal range of motion  She exhibits no edema, tenderness or deformity  Lymphadenopathy:     She has no cervical adenopathy  Neurological: She is alert and oriented to person, place, and time  Skin: Skin is warm and dry  No rash noted  She is not diaphoretic  No erythema  Psychiatric: She has a normal mood and affect  Her behavior is normal  Judgment and thought content normal    Vitals reviewed  Labs: I have personally reviewed all pertinent results       Admission on 07/24/2019, Discharged on 07/26/2019   Component Date Value Ref Range Status    WBC 07/24/2019 8 65  4 31 - 10 16 Thousand/uL Final    RBC 07/24/2019 4 10  3 81 - 5 12 Million/uL Final    Hemoglobin 07/24/2019 11 7  11 5 - 15 4 g/dL Final    Hematocrit 07/24/2019 37 4  34 8 - 46 1 % Final    MCV 07/24/2019 91  82 - 98 fL Final    MCH 07/24/2019 28 5  26 8 - 34 3 pg Final    MCHC 07/24/2019 31 3* 31 4 - 37 4 g/dL Final    RDW 07/24/2019 13 0  11 6 - 15 1 % Final    MPV 07/24/2019 11 6  8 9 - 12 7 fL Final    Platelets 09/14/6519 268  149 - 390 Thousands/uL Final    nRBC 07/24/2019 0  /100 WBCs Final    Neutrophils Relative 07/24/2019 51  43 - 75 % Final    Immat GRANS % 07/24/2019 0  0 - 2 % Final    Lymphocytes Relative 07/24/2019 43  14 - 44 % Final    Monocytes Relative 07/24/2019 5  4 - 12 % Final    Eosinophils Relative 07/24/2019 1  0 - 6 % Final    Basophils Relative 07/24/2019 0  0 - 1 % Final    Neutrophils Absolute 07/24/2019 4 32  1 85 - 7 62 Thousands/µL Final    Immature Grans Absolute 07/24/2019 0 02  0 00 - 0 20 Thousand/uL Final    Lymphocytes Absolute 07/24/2019 3 74  0 60 - 4 47 Thousands/µL Final    Monocytes Absolute 07/24/2019 0 46  0 17 - 1 22 Thousand/µL Final    Eosinophils Absolute 07/24/2019 0 08  0 00 - 0 61 Thousand/µL Final    Basophils Absolute 07/24/2019 0 03  0 00 - 0 10 Thousands/µL Final    Sodium 07/24/2019 140  136 - 145 mmol/L Final    Potassium 07/24/2019 3 8  3 5 - 5 3 mmol/L Final    Chloride 07/24/2019 106  100 - 108 mmol/L Final    CO2 07/24/2019 27  21 - 32 mmol/L Final    ANION GAP 07/24/2019 7  4 - 13 mmol/L Final    BUN 07/24/2019 36* 5 - 25 mg/dL Final    Creatinine 07/24/2019 1 37* 0 60 - 1 30 mg/dL Final    Standardized to IDMS reference method    Glucose 07/24/2019 217* 65 - 140 mg/dL Final      If the patient is fasting, the ADA then defines impaired fasting glucose as > 100 mg/dL and diabetes as > or equal to 123 mg/dL  Specimen collection should occur prior to Sulfasalazine administration due to the potential for falsely depressed results  Specimen collection should occur prior to Sulfapyridine administration due to the potential for falsely elevated results   Calcium 07/24/2019 9 4  8 3 - 10 1 mg/dL Final    AST 07/24/2019 19  5 - 45 U/L Final      Specimen collection should occur prior to Sulfasalazine administration due to the potential for falsely depressed results       ALT 07/24/2019 20  12 - 78 U/L Final      Specimen collection should occur prior to Sulfasalazine and/or Sulfapyridine administration due to the potential for falsely depressed results   Alkaline Phosphatase 07/24/2019 89  46 - 116 U/L Final    Total Protein 07/24/2019 8 3* 6 4 - 8 2 g/dL Final    Albumin 07/24/2019 3 7  3 5 - 5 0 g/dL Final    Total Bilirubin 07/24/2019 0 26  0 20 - 1 00 mg/dL Final    eGFR 07/24/2019 35  ml/min/1 73sq m Final    Troponin I 07/24/2019 <0 02  <=0 04 ng/mL Final      [employer] Chemistry analyzer 99% cutoff is > 0 04 ng/mL in network labs     o cTnI 99% cutoff is useful only when applied to patients in the clinical setting of myocardial ischemia   o cTnI 99% cutoff should be interpreted in the context of clinical history, ECG findings and possibly cardiac imaging to establish correct diagnosis  o cTnI 99% cutoff may be suggestive but clearly not indicative of a coronary event without the clinical setting of myocardial ischemia   Sodium 07/25/2019 142  136 - 145 mmol/L Final    Potassium 07/25/2019 4 0  3 5 - 5 3 mmol/L Final    Chloride 07/25/2019 109* 100 - 108 mmol/L Final    CO2 07/25/2019 28  21 - 32 mmol/L Final    ANION GAP 07/25/2019 5  4 - 13 mmol/L Final    BUN 07/25/2019 34* 5 - 25 mg/dL Final    Creatinine 07/25/2019 1 05  0 60 - 1 30 mg/dL Final    Standardized to IDMS reference method    Glucose 07/25/2019 114  65 - 140 mg/dL Final      If the patient is fasting, the ADA then defines impaired fasting glucose as > 100 mg/dL and diabetes as > or equal to 123 mg/dL  Specimen collection should occur prior to Sulfasalazine administration due to the potential for falsely depressed results  Specimen collection should occur prior to Sulfapyridine administration due to the potential for falsely elevated results      Calcium 07/25/2019 8 9  8 3 - 10 1 mg/dL Final    eGFR 07/25/2019 49  ml/min/1 73sq m Final    WBC 07/25/2019 6 42  4 31 - 10 16 Thousand/uL Final    RBC 07/25/2019 3 59* 3 81 - 5 12 Million/uL Final    Hemoglobin 07/25/2019 10 1* 11 5 - 15 4 g/dL Final    Hematocrit 07/25/2019 32 9* 34 8 - 46 1 % Final    MCV 07/25/2019 92  82 - 98 fL Final    MCH 07/25/2019 28 1  26 8 - 34 3 pg Final    MCHC 07/25/2019 30 7* 31 4 - 37 4 g/dL Final    RDW 07/25/2019 13 0  11 6 - 15 1 % Final    MPV 07/25/2019 11 8  8 9 - 12 7 fL Final    Platelets 63/72/2411 228  149 - 390 Thousands/uL Final    nRBC 07/25/2019 0  /100 WBCs Final    Neutrophils Relative 07/25/2019 49  43 - 75 % Final    Immat GRANS % 07/25/2019 0  0 - 2 % Final    Lymphocytes Relative 07/25/2019 42  14 - 44 % Final    Monocytes Relative 07/25/2019 8  4 - 12 % Final    Eosinophils Relative 07/25/2019 1  0 - 6 % Final    Basophils Relative 07/25/2019 0  0 - 1 % Final    Neutrophils Absolute 07/25/2019 3 12  1 85 - 7 62 Thousands/µL Final    Immature Grans Absolute 07/25/2019 0 01  0 00 - 0 20 Thousand/uL Final    Lymphocytes Absolute 07/25/2019 2 68  0 60 - 4 47 Thousands/µL Final    Monocytes Absolute 07/25/2019 0 52  0 17 - 1 22 Thousand/µL Final    Eosinophils Absolute 07/25/2019 0 07  0 00 - 0 61 Thousand/µL Final    Basophils Absolute 07/25/2019 0 02  0 00 - 0 10 Thousands/µL Final    POC Glucose 07/25/2019 130  65 - 140 mg/dl Final    POC Glucose 07/25/2019 121  65 - 140 mg/dl Final    Magnesium 07/25/2019 1 9  1 6 - 2 6 mg/dL Final    POC Glucose 07/25/2019 140  65 - 140 mg/dl Final    Ventricular Rate 07/24/2019 82  BPM Final    Atrial Rate 07/24/2019 82  BPM Final    AZ Interval 07/24/2019 152  ms Final    QRSD Interval 07/24/2019 84  ms Final    QT Interval 07/24/2019 354  ms Final    QTC Interval 07/24/2019 413  ms Final    P Axis 07/24/2019 77  degrees Final    QRS Axis 07/24/2019 1  degrees Final    T Wave [place] 07/24/2019 90  degrees Final    Troponin I 07/25/2019 <0 02  <=0 04 ng/mL Final      [employer] Chemistry analyzer 99% cutoff is > 0 04 ng/mL in network labs     o cTnI 99% cutoff is useful only when applied to patients in the clinical setting of myocardial ischemia   o cTnI 99% cutoff should be interpreted in the context of clinical history, ECG findings and possibly cardiac imaging to establish correct diagnosis  o cTnI 99% cutoff may be suggestive but clearly not indicative of a coronary event without the clinical setting of myocardial ischemia   Sodium 07/26/2019 140  136 - 145 mmol/L Final    Potassium 07/26/2019 4 1  3 5 - 5 3 mmol/L Final    Chloride 07/26/2019 106  100 - 108 mmol/L Final    CO2 07/26/2019 27  21 - 32 mmol/L Final    ANION GAP 07/26/2019 7  4 - 13 mmol/L Final    BUN 07/26/2019 31* 5 - 25 mg/dL Final    Creatinine 07/26/2019 1 26  0 60 - 1 30 mg/dL Final    Standardized to IDMS reference method    Glucose 07/26/2019 147* 65 - 140 mg/dL Final      If the patient is fasting, the ADA then defines impaired fasting glucose as > 100 mg/dL and diabetes as > or equal to 123 mg/dL  Specimen collection should occur prior to Sulfasalazine administration due to the potential for falsely depressed results  Specimen collection should occur prior to Sulfapyridine administration due to the potential for falsely elevated results      Calcium 07/26/2019 9 2  8 3 - 10 1 mg/dL Final    eGFR 07/26/2019 39  ml/min/1 73sq m Final    Magnesium 07/26/2019 1 9  1 6 - 2 6 mg/dL Final    WBC 07/26/2019 5 55  4 31 - 10 16 Thousand/uL Final    RBC 07/26/2019 3 72* 3 81 - 5 12 Million/uL Final    Hemoglobin 07/26/2019 10 6* 11 5 - 15 4 g/dL Final    Hematocrit 07/26/2019 34 2* 34 8 - 46 1 % Final    MCV 07/26/2019 92  82 - 98 fL Final    MCH 07/26/2019 28 5  26 8 - 34 3 pg Final    MCHC 07/26/2019 31 0* 31 4 - 37 4 g/dL Final    RDW 07/26/2019 13 2  11 6 - 15 1 % Final    MPV 07/26/2019 12 2  8 9 - 12 7 fL Final    Platelets 46/20/4252 235  149 - 390 Thousands/uL Final    nRBC 07/26/2019 0  /100 WBCs Final    Neutrophils Relative 07/26/2019 51  43 - 75 % Final    Immat GRANS % 07/26/2019 0  0 - 2 % Final    Lymphocytes Relative 07/26/2019 40  14 - 44 % Final    Monocytes Relative 07/26/2019 8  4 - 12 % Final    Eosinophils Relative 07/26/2019 1  0 - 6 % Final    Basophils Relative 07/26/2019 0  0 - 1 % Final    Neutrophils Absolute 07/26/2019 2 78  1 85 - 7 62 Thousands/µL Final    Immature Grans Absolute 07/26/2019 0 01  0 00 - 0 20 Thousand/uL Final    Lymphocytes Absolute 07/26/2019 2 22  0 60 - 4 47 Thousands/µL Final    Monocytes Absolute 07/26/2019 0 44  0 17 - 1 22 Thousand/µL Final    Eosinophils Absolute 07/26/2019 0 08  0 00 - 0 61 Thousand/µL Final    Basophils Absolute 07/26/2019 0 02  0 00 - 0 10 Thousands/µL Final    POC Glucose 07/26/2019 121  65 - 140 mg/dl Final    Ventricular Rate 07/25/2019 96  BPM Final    Atrial Rate 07/25/2019 96  BPM Final    NC Interval 07/25/2019 198  ms Final    QRSD Interval 07/25/2019 84  ms Final    QT Interval 07/25/2019 362  ms Final    QTC Interval 07/25/2019 457  ms Final    P Axis 07/25/2019 77  degrees Final    QRS Axis 07/25/2019 -7  degrees Final    T Wave [place] 07/25/2019 107  degrees Final    POC Glucose 07/26/2019 108  65 - 140 mg/dl Final    POC Glucose 07/26/2019 106  65 - 140 mg/dl Final       Imaging:  I have personally reviewed all pertinent results  Assessment/Plan    Constipation  Chronic   -Patient reports Colace and MiraLax don't help with symptoms  -Senna 8 6 milligrams daily at bedtime p r n  prescribed at this visit    Hypertension  Blood Pressure: 142/58     Improving   -Continue Cozaar 100 mg daily, Procardia 60 mg daily, Lasix 20 mg daily  -patient encouraged to bring blood pressure diary at next visit  -Will follow up in 3 months    Chest pain  Resolved  -ACS workup negative during previous hospital admission   -Follow-up cardiology appointment with Dr Arlene Sanz on 08/06   -Will continue to monitor symptoms      Elliott Wu was seen today for follow-up      Diagnoses and all orders for this visit:    Essential hypertension    Chronic idiopathic constipation  -     senna (SENOKOT) 8 6 mg; Take 1 tablet (8 6 mg total) by mouth daily at bedtime as needed for constipation    Type 2 diabetes mellitus without complication, without long-term current use of insulin (HCC)  -     Glucometer test strips    Chest pain, unspecified type        - PCP: Felicia Jefferson MD  - Follow-up appointments:     Future Appointments   Date Time Provider Valdemar Crocker   8/5/2019  3:00 PM BE MAMMO RBC 3 BE RBC Mammo BE RBC   8/6/2019  3:00 PM Juventino Salvador MD CARD BE [facility]   8/9/2019 11:00 AM BE HV VASCULAR 2 BE HV Car [address]   8/19/2019  2:30 PM Genevieve Sanz DO NEPH [facility]   8/19/2019  3:30 PM Felicia Jefferson MD  FP BE [facility]   9/11/2019  3:30 PM TAMMY SchwartzM Pod [facility]   9/20/2019  1:00 PM JARRED Ritchie VAS CTR [facility]   9/26/2019  3:00 PM Irvin Love PA-C GASTRO [facility]   11/4/2019  2:20 PM Felicia Jefferson MD  FP BE [facility]   12/16/2019  3:30 PM Nick Castro MD [address] [place] [address]  [address]   2/4/2020  2:00 PM JARRED Butts SURG ONC [facility]        _____________________________________________________________________   The attending physician, Dr Courtney Lozano, agreed with the plan      Felicia Jefferson MD, PGY-2  [facility]   8/2/2019

## 2019-08-02 NOTE — ASSESSMENT & PLAN NOTE
Chronic   -Patient reports Colace and MiraLax don't help with symptoms  -Senna 8 6 milligrams daily at bedtime p r n  prescribed at this visit

## 2019-08-02 NOTE — ASSESSMENT & PLAN NOTE
Resolved    -ACS workup negative during previous hospital admission   -Follow-up cardiology appointment with Dr Johnson Hence on 08/06   -Will continue to monitor symptoms

## 2019-08-02 NOTE — ASSESSMENT & PLAN NOTE
Blood Pressure: 142/58     Improving   -Continue Cozaar 100 mg daily, Procardia 60 mg daily, Lasix 20 mg daily  -patient encouraged to bring blood pressure diary at next visit    -Will follow up in 3 months

## 2019-08-05 ENCOUNTER — HOSPITAL ENCOUNTER (OUTPATIENT)
Dept: MAMMOGRAPHY | Facility: CLINIC | Age: 84
Discharge: HOME | End: 2019-08-05
Payer: COMMERCIAL

## 2019-08-05 ENCOUNTER — HOSPITAL ENCOUNTER (OUTPATIENT)
Dept: ULTRASOUND IMAGING | Facility: CLINIC | Age: 84
Discharge: HOME | End: 2019-08-05
Payer: COMMERCIAL

## 2019-08-05 VITALS — HEIGHT: 62 IN | BODY MASS INDEX: 24.11 KG/M2 | WEIGHT: 131 LBS

## 2019-08-05 DIAGNOSIS — G89.29 CHRONIC THORACIC BACK PAIN, UNSPECIFIED BACK PAIN LATERALITY: ICD-10-CM

## 2019-08-05 DIAGNOSIS — M54.6 CHRONIC THORACIC BACK PAIN, UNSPECIFIED BACK PAIN LATERALITY: ICD-10-CM

## 2019-08-05 DIAGNOSIS — Z85.3 HISTORY OF LEFT BREAST CANCER: ICD-10-CM

## 2019-08-05 DIAGNOSIS — N64.4 BREAST PAIN: ICD-10-CM

## 2019-08-05 PROCEDURE — 76642 ULTRASOUND BREAST LIMITED: CPT

## 2019-08-05 PROCEDURE — G0279 TOMOSYNTHESIS, MAMMO: HCPCS

## 2019-08-05 PROCEDURE — 77066 DX MAMMO INCL CAD BI: CPT

## 2019-08-05 RX ORDER — GABAPENTIN 100 MG/1
CAPSULE ORAL
Qty: 90 CAPSULE | Refills: 1 | Status: SHIPPED | OUTPATIENT
Start: 2019-08-05 | End: 2020-02-07

## 2019-08-06 ENCOUNTER — OFFICE VISIT (OUTPATIENT)
Dept: CARDIOLOGY CLINIC | Facility: CLINIC | Age: 84
End: 2019-08-06
Payer: COMMERCIAL

## 2019-08-06 VITALS
BODY MASS INDEX: 24.2 KG/M2 | HEIGHT: 62 IN | WEIGHT: 131.5 LBS | HEART RATE: 73 BPM | DIASTOLIC BLOOD PRESSURE: 50 MMHG | SYSTOLIC BLOOD PRESSURE: 122 MMHG

## 2019-08-06 DIAGNOSIS — I10 ACCELERATED ESSENTIAL HYPERTENSION: ICD-10-CM

## 2019-08-06 DIAGNOSIS — R00.1 BRADYCARDIA: ICD-10-CM

## 2019-08-06 DIAGNOSIS — I65.23 ASYMPTOMATIC BILATERAL CAROTID ARTERY STENOSIS: Primary | ICD-10-CM

## 2019-08-06 PROCEDURE — 99214 OFFICE O/P EST MOD 30 MIN: CPT | Performed by: INTERNAL MEDICINE

## 2019-08-06 PROCEDURE — 3074F SYST BP LT 130 MM HG: CPT | Performed by: INTERNAL MEDICINE

## 2019-08-06 NOTE — PROGRESS NOTES
Cardiology Follow Up    Harpreet Sexton  1934  [place]  [phone]  [phone]    1  Asymptomatic bilateral carotid artery stenosis     2  Accelerated essential hypertension     3  Bradycardia  Ambulatory referral to Cardiology       Interval History:   Cardiology follow-up  Patient was recently seen hospital with hypertensive crisis  She did have palpitations but no associated arrhythmias noted on telemetry  Cardiac enzymes were normal, she did experience some chest discomfort  Since discharge, she has been feeling better, denies any chest pain or dyspnea, she ambulates at home  Denies any claudication like symptoms denies any focal neurological deficits or any amaurosis fugax  No orthopnea no PND  States been compliant with low-sodium diet, blood pressure is much better control now compliant with low-cholesterol diet, I do not have a recent lipid profile, she is on medium intensity statin therapy      Patient Active Problem List   Diagnosis    Vocal cord mass    Iliac artery stenosis, bilateral (HCC)    Hyperparathyroidism (HCC)    GERD (gastroesophageal reflux disease)    Arthritis    Hypertension    EKG abnormalities    Anemia    Neck pain    Chronic thoracic back pain    Fall from other slipping, tripping, or stumbling    Abnormal EKG    Abnormal mammogram    Accelerated essential hypertension    Allergic rhinitis    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)    Back pain    Bilateral carotid artery stenosis    Carotid stenosis, asymptomatic    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    Chronic myofascial pain    Chronic pain of lower extremity    CKD (chronic kidney disease), stage III (HCC)    Constipation    Di[facility] retinopathy [facility]    DMII (diabetes mellitus, type 2) ([facility]    Dysphagia    Epigastric pain    Fever of unknown origin    Gastritis    Gout    Hypomagnesemia    Atherosclerosis of other specified arteries    Lymphadenopathy    Microalbuminuria    Nephrolithiasis    Neuropathic pain, leg, right    Osteoporosis    [facility]    Primary generalized (osteo)arthritis    Persistent proteinuria    Renal cyst    Secondary hyperparathyroidism, renal ([facility] )    Vaginal prolapse    Vitamin D deficiency    Acquired complex cyst of kidney    Gastroesophageal reflux disease without esophagitis    History of left breast cancer    Type 2 diabetes mellitus with diabetic peripheral angiopathy without gangrene ([facility]    Stricture of artery ([facility])    Musculoskeletal pain, chronic    Hemorrhoids    Tinea unguium    Diabetic neuropathy ([facility])    Bradycardia    Hyperkalemia    Pain in tooth    Dysphonia    Glottic insufficiency    Reflux laryngitis    Paresis of left vocal fold    Muscle tension dysphonia    Laryngocele    Gastroesophageal reflux disease    Anterior glottic web    Chest pain    Palpitations    Encounter for follow-up examination after completed treatment for malignant neoplasm     Past Medical History:   Diagnosis Date    Arthritis     Back pain     Benign essential hypertension     LAST ASSESSED: [id number]    Carotid artery stenosis     CKD (chronic kidney disease) stage 2, GFR 60-89 ml/min     Constipation, chronic     "drinks prune juice"    Diabetes mellitus ([facility]     DMII (diabetes mellitus, type 2) ([facility]     Epiglottitis     RESOLVED: [id number]    Gastritis     GERD (gastroesophageal reflux disease)     Gout     H/O blood clots     left lower leg,,,11 yrs ago    History of stenosis of renal artery     RESOLVED: [id number]    Hypercholesteremia     Hypercholesterolemia     Hyperlipidemia     Hyperparathyroidism ([facility] )     Hypertension     [facility] stenosis, [facility]     Kidney stone     Lobular carcinoma ([facility] [facility] ) 09/06/2017    Left    Mucinous carcinoma of breast, 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    Left    Presence of pessary     Renal artery stenosis (HCC)     Renal disorder     Renovascular hypertension     RESOLVED: [id number]    Segmental and somatic dysfunction of cervical region     RESOLVED: [id number]    Segmental and somatic dysfunction of lumbar region     RESOLVED: [id number]    Segmental and somatic dysfunction of pelvic region     RESOLVED: [id number]    Segmental and somatic dysfunction of thoracic region     RESOLVED: [id number]    Somatic dysfunction of abdominal region     RESOLVED: [id number]    Somatic dysfunction of head region     RESOLVED: [id number]    Somatic dysfunction of lower extremities     RESOLVED: [id number]    Somatic dysfunction of rib region     RESOLVED: [id number]    Somatic dysfunction of thoracic region     RESOLVED: [id number]    Somatic dysfunction of upper extremities     RESOLVED: [id number]    Teeth missing      Social History     Socioeconomic History    Marital status:      Spouse name: Not on file    Number of children: Not on file    Years of education: Not on file    Highest education level: Not on file   Occupational History    Occupation: retired   Social Needs    Financial resource strain: Not hard at all   [place] insecurity:     Worry: Never true     Inability: Never true   [place] needs:     Medical: No     Non-medical: No   Tobacco Use    Smoking status: Former Smoker     Packs/day: 1 00     Years: 64 00     Pack years: 64 00     Last attempt to quit: 2004     Years since quitting: 15 6    Smokeless tobacco: Never Used    Tobacco comment: quit in 2003 1 ppd x 50 years   Substance and Sexual Activity    Alcohol use: Not Currently     Frequency: Never    Drug use: No    Sexual activity: Not on file   Lifestyle    Physical activity:     Days per week: 0 days     Minutes per session: 0 min    Stress:  Only a little Relationships    Social connections:     Talks on phone: More than three times a week     Gets together: More than three times a week     Attends Nondenominational service: Not on file     Active member of club or organization: Not on file     Attends meetings of clubs or organizations: Not on file     Relationship status: Not on file    Intimate partner violence:     Fear of current or ex partner: Not on file     Emotionally abused: Not on file     Physically abused: Not on file     Forced sexual activity: Not on file   Other Topics Concern    Not on file   Social History Narrative    Daily caffeine consumption, 1 serving a day       Family History   Problem Relation Age of Onset    Heart disease Brother     Heart disease Maternal Grandmother     Diabetes Other         of other type with arthropathy, without long term current use of insulin    Hypertension Other     No Known Problems Mother     Gout Family     Rheum arthritis Family     Lupus Family         systematic erythematosus    Heart disease Family     Stroke Family         syndrome    Stroke Maternal Uncle         syndrome    Stroke Maternal Aunt         syndrome    No Known Problems Father     No Known Problems Daughter     No Known Problems Maternal Grandfather     No Known Problems Paternal Grandmother     No Known Problems Paternal Grandfather     No Known Problems Daughter     No Known Problems Daughter     No Known Problems Maternal Aunt     No Known Problems Maternal Aunt     No Known Problems Maternal Aunt      Past Surgical History:   Procedure Laterality Date    ANGIOPLASTY / STENTING ILIAC      BREAST BIOPSY Left 08/10/2018    U/S BX    BREAST BIOPSY Left 08/07/2017    U/S BX    BREAST LUMPECTOMY Left 09/06/2017    LAST ASSESSED: [id number]    BREAST LUMPECTOMY Left 09/11/2018    BREAST SURGERY Left     biopsy    CATARACT EXTRACTION Bilateral     CHOLECYSTECTOMY      COLONOSCOPY      EYE SURGERY      ILIAC VEIN ANGIOPLASTY / STENTING Right     INITIAL STENOSIS: ONSET: [id number]    KNEE ARTHROSCOPY Right     KNEE SURGERY Right     ONSET: [id number]    MAMMO NEEDLE LOCALIZATION LEFT (ALL INC) Left 9/11/2018    MAMMO NEEDLE LOCALIZATION LEFT (ALL INC) Left 9/6/2017    KY ESOPHAGOGASTRODUODENOSCOPY TRANSORAL DIAGNOSTIC N/A 7/26/2018    Procedure: ESOPHAGOGASTRODUODENOSCOPY (EGD); Surgeon: Maegan Jones DO;  [facility]: [facility]; Service: Gastroenterology    [place] LARYNGOSCOPY,DIRCT,OP [facility] TUMR N/A 6/10/2016    Procedure: MICRO DIRECT LARYNGOSCOPY WITH VOCAL FOLD MASS EXCISION ;  Surgeon: Esteban Wahl MD;  [facility];  Service: ENT    KY MASTECTOMY, PARTIAL Left 9/6/2017    Procedure: LUMPECTOMY BREAST NEEDLE LOCALIZED WITH FAXITRON NEEDLE @ 0830;  Surgeon: Tarik Page MD;  [facility];  Service: General    KY PERQ DEVICE PLACEMT BREAST LOC 1ST LES W [place]i [place] Left 9/11/2018    Procedure: BREAST LUMPECTOMY; BREAST NEEDLE LOCALIZATION (NEEDLE LOC AT 1330);   Surgeon: Ky Ba MD;  [facility];  Service: Surgical Oncology    THROAT SURGERY      lump removed    TUBAL LIGATION      UPPER GASTROINTESTINAL ENDOSCOPY      US GUIDANCE BREAST BIOPSY LEFT EACH ADDITIONAL Left 8/7/2017    US GUIDED BREAST BIOPSY LEFT COMPLETE Left 8/10/2018    US GUIDED BREAST BIOPSY LEFT COMPLETE Left 8/7/2017    VASCULAR SURGERY         Current Outpatient Medications:     Acetaminophen (TYLENOL) 325 MG CAPS, Take by mouth every 8 (eight) hours, Disp: , Rfl:     aspirin (ADULT ASPIRIN EC LOW STRENGTH) 81 mg EC tablet, Take 1 tablet by mouth daily, Disp: , Rfl:     calcitriol (ROCALTROL) 0 25 mcg capsule, Take 1 capsule (0 25 mcg total) by mouth 2 (two) times a week Please take every Tuesday and friday, Disp: 10 capsule, Rfl: 6    calcium carbonate-vitamin D (OSCAL-D) 500 mg-200 units per tablet, Take 1 tablet by mouth daily with breakfast, Disp: , Rfl:     fluticasone (FLONASE) 50 mcg/act nasal spray, instill 1 spray into each nostril once daily, Disp: 16 g, Rfl: 2    gabapentin (NEURONTIN) 100 mg capsule, take 1 capsule by mouth at bedtime for 10 days, Disp: 90 capsule, Rfl: 1    losartan (COZAAR) 100 MG tablet, Take 1 tablet (100 mg total) by mouth daily, Disp: 90 tablet, Rfl: 0    NIFEdipine ER (ADALAT CC) 60 MG 24 hr tablet, Take 1 tablet (60 mg total) by mouth daily for 90 days, Disp: 90 tablet, Rfl: 0    pantoprazole (PROTONIX) 40 mg tablet, Take 1 tablet (40 mg total) by mouth daily, Disp: 30 tablet, Rfl: 5    pravastatin (PRAVACHOL) 40 mg tablet, take 1 tablet by mouth daily, Disp: 90 tablet, Rfl: 0    senna (SENOKOT) 8 6 mg, Take 1 tablet (8 6 mg total) by mouth daily at bedtime as needed for constipation, Disp: 120 each, Rfl: 0    furosemide (LASIX) 20 mg tablet, Take 1 tablet (20 mg total) by mouth daily Daily with swelling, every other day once swelling resolves (Patient not taking: Reported on 8/6/2019), Disp: 30 tablet, Rfl: 3    ONE TOUCH ULTRA TEST test strip, 1 each by Other route 2 (two) times a day Use as instructed, Disp: 100 each, Rfl: 5  Allergies   Allergen Reactions    Clonidine Derivatives Swelling    Diflucan [Fluconazole] Rash    Flagyl [Metronidazole] Anaphylaxis    Carvedilol Hives     And legs swelled    Latex Rash    Lipitor [Atorvastatin]      Legs swelled    Other Palpitations     IV DYE       Labs:  Admission on 07/24/2019, Discharged on 07/26/2019   Component Date Value    WBC 07/24/2019 8 65     RBC 07/24/2019 4 10     Hemoglobin 07/24/2019 11 7     Hematocrit 07/24/2019 37 4     MCV 07/24/2019 91     MCH 07/24/2019 28 5     MCHC 07/24/2019 31 3*    RDW 07/24/2019 13 0     MPV 07/24/2019 11 6     Platelets 20/06/5040 268     nRBC 07/24/2019 0     Neutrophils Relative 07/24/2019 51     Immat GRANS % 07/24/2019 0     Lymphocytes Relative 07/24/2019 43     Monocytes Relative 07/24/2019 5     Eosinophils Relative 07/24/2019 1     Basophils Relative 07/24/2019 0     Neutrophils Absolute 07/24/2019 4 32     Immature Grans Absolute 07/24/2019 0 02     Lymphocytes Absolute 07/24/2019 3 74     Monocytes Absolute 07/24/2019 0 46     Eosinophils Absolute 07/24/2019 0 08     Basophils Absolute 07/24/2019 0 03     Sodium 07/24/2019 140     Potassium 07/24/2019 3 8     Chloride 07/24/2019 106     CO2 07/24/2019 27     ANION GAP 07/24/2019 7     BUN 07/24/2019 36*    Creatinine 07/24/2019 1 37*    Glucose 07/24/2019 217*    Calcium 07/24/2019 9 4     AST 07/24/2019 19     ALT 07/24/2019 20     Alkaline Phosphatase 07/24/2019 89     Total Protein 07/24/2019 8 3*    Albumin 07/24/2019 3 7     Total Bilirubin 07/24/2019 0 26     eGFR 07/24/2019 35     Troponin I 07/24/2019 <0 02     Sodium 07/25/2019 142     Potassium 07/25/2019 4 0     Chloride 07/25/2019 109*    CO2 07/25/2019 28     ANION GAP 07/25/2019 5     BUN 07/25/2019 34*    Creatinine 07/25/2019 1 05     Glucose 07/25/2019 114     Calcium 07/25/2019 8 9     eGFR 07/25/2019 49     WBC 07/25/2019 6 42     RBC 07/25/2019 3 59*    Hemoglobin 07/25/2019 10 1*    Hematocrit 07/25/2019 32 9*    MCV 07/25/2019 92     MCH 07/25/2019 28 1     MCHC 07/25/2019 30 7*    RDW 07/25/2019 13 0     MPV 07/25/2019 11 8     Platelets 49/15/1729 228     nRBC 07/25/2019 0     Neutrophils Relative 07/25/2019 49     Immat GRANS % 07/25/2019 0     Lymphocytes Relative 07/25/2019 42     Monocytes Relative 07/25/2019 8     Eosinophils Relative 07/25/2019 1     Basophils Relative 07/25/2019 0     Neutrophils Absolute 07/25/2019 3 12     Immature Grans Absolute 07/25/2019 0 01     Lymphocytes Absolute 07/25/2019 2 68     Monocytes Absolute 07/25/2019 0 52     Eosinophils Absolute 07/25/2019 0 07     Basophils Absolute 07/25/2019 0 02     POC Glucose 07/25/2019 130     POC Glucose 07/25/2019 121     Magnesium 07/25/2019 1 9     POC Glucose 07/25/2019 140     Ventricular Rate 07/24/2019 82     Atrial Rate 07/24/2019 82     ND Interval 07/24/2019 152     QRSD Interval 07/24/2019 84     QT Interval 07/24/2019 354     QTC Interval 07/24/2019 413     P Axis 07/24/2019 77     QRS Axis 07/24/2019 1     T Wave Axis 07/24/2019 90     Troponin I 07/25/2019 <0 02     Sodium 07/26/2019 140     Potassium 07/26/2019 4 1     Chloride 07/26/2019 106     CO2 07/26/2019 27     ANION GAP 07/26/2019 7     BUN 07/26/2019 31*    Creatinine 07/26/2019 1 26     Glucose 07/26/2019 147*    Calcium 07/26/2019 9 2     eGFR 07/26/2019 39     Magnesium 07/26/2019 1 9     WBC 07/26/2019 5 55     RBC 07/26/2019 3 72*    Hemoglobin 07/26/2019 10 6*    Hematocrit 07/26/2019 34 2*    MCV 07/26/2019 92     MCH 07/26/2019 28 5     MCHC 07/26/2019 31 0*    RDW 07/26/2019 13 2     MPV 07/26/2019 12 2     Platelets 07/18/7111 235     nRBC 07/26/2019 0     Neutrophils Relative 07/26/2019 51     Immat GRANS % 07/26/2019 0     Lymphocytes Relative 07/26/2019 40     Monocytes Relative 07/26/2019 8     Eosinophils Relative 07/26/2019 1     Basophils Relative 07/26/2019 0     Neutrophils Absolute 07/26/2019 2 78     Immature Grans Absolute 07/26/2019 0 01     Lymphocytes Absolute 07/26/2019 2 22     Monocytes Absolute 07/26/2019 0 44     Eosinophils Absolute 07/26/2019 0 08     Basophils Absolute 07/26/2019 0 02     POC Glucose 07/26/2019 121     Ventricular Rate 07/25/2019 96     Atrial Rate 07/25/2019 96     ND Interval 07/25/2019 198     QRSD Interval 07/25/2019 84     QT Interval 07/25/2019 362     QTC Interval 07/25/2019 457     P Axis 07/25/2019 77     QRS Axis 07/25/2019 -7     T Wave [place] 07/25/2019 107     POC Glucose 07/26/2019 108     POC Glucose 07/26/2019 106    Admission on 07/22/2019, Discharged on 07/24/2019   Component Date Value    WBC 07/22/2019 6 43     RBC 07/22/2019 4 18     Hemoglobin 07/22/2019 11 7     Hematocrit 07/22/2019 38 1     MCV 07/22/2019 [address] 07/22/2019 28 0     MCHC 07/22/2019 30 7*    RDW 07/22/2019 12 9     MPV 07/22/2019 11 9     Platelets 41/44/3828 258     nRBC 07/22/2019 0     Neutrophils Relative 07/22/2019 46     Immat GRANS % 07/22/2019 0     Lymphocytes Relative 07/22/2019 45*    Monocytes Relative 07/22/2019 8     Eosinophils Relative 07/22/2019 1     Basophils Relative 07/22/2019 0     Neutrophils Absolute 07/22/2019 2 99     Immature Grans Absolute 07/22/2019 0 02     Lymphocytes Absolute 07/22/2019 2 86     Monocytes Absolute 07/22/2019 0 48     Eosinophils Absolute 07/22/2019 0 06     Basophils Absolute 07/22/2019 0 02     Sodium 07/22/2019 140     Potassium 07/22/2019 3 8     Chloride 07/22/2019 104     CO2 07/22/2019 27     ANION GAP 07/22/2019 9     BUN 07/22/2019 32*    Creatinine 07/22/2019 1 36*    Glucose 07/22/2019 159*    Calcium 07/22/2019 9 7     AST 07/22/2019 18     ALT 07/22/2019 15     Alkaline Phosphatase 07/22/2019 93     Total Protein 07/22/2019 7 8     Albumin 07/22/2019 3 6     Total Bilirubin 07/22/2019 0 36     eGFR 07/22/2019 36     Lipase 07/22/2019 246     Troponin I 07/22/2019 <0 02     NT-proBNP 07/22/2019 138     Ventricular Rate 07/22/2019 101     Atrial Rate 07/22/2019 101     OH Interval 07/22/2019 150     QRSD Interval 07/22/2019 84     QT Interval 07/22/2019 328     QTC Interval 07/22/2019 425     P Axis 07/22/2019 75     QRS Axis 07/22/2019 12     T Wave Axis 07/22/2019 118     Troponin I 07/22/2019 0 03     Platelets 67/42/0175 254     MPV 07/22/2019 11 5     Troponin I 07/22/2019 <0 02     POC Glucose 07/22/2019 162*    Troponin I 07/23/2019 <0 02     WBC 07/23/2019 5 49     RBC 07/23/2019 3 52*    Hemoglobin 07/23/2019 10 1*    Hematocrit 07/23/2019 32 4*    MCV 07/23/2019 92     MCH 07/23/2019 28 7     MCHC 07/23/2019 31 2*    RDW 07/23/2019 13 1     Platelets 68/76/1968 228     MPV 07/23/2019 12 0     Sodium 07/23/2019 142     Potassium 07/23/2019 3 3*    Chloride 07/23/2019 109*    CO2 07/23/2019 27     ANION GAP 07/23/2019 6     BUN 07/23/2019 29*    Creatinine 07/23/2019 1 04     Glucose 07/23/2019 110     Calcium 07/23/2019 8 8     eGFR 07/23/2019 49     POC Glucose 07/23/2019 117     Magnesium 07/23/2019 1 3*    POC Glucose 07/23/2019 82     Ventricular Rate 07/22/2019 78     Atrial Rate 07/22/2019 78     NE Interval 07/22/2019 152     QRSD Interval 07/22/2019 86     QT Interval 07/22/2019 378     QTC Interval 07/22/2019 430     P Axis 07/22/2019 72     QRS Axis 07/22/2019 10     T Wave Axis 07/22/2019 82     Ventricular Rate 07/23/2019 77     Atrial Rate 07/23/2019 77     NE Interval 07/23/2019 164     QRSD Interval 07/23/2019 86     QT Interval 07/23/2019 374     QTC Interval 07/23/2019 423     P Axis 07/23/2019 65     QRS Axis 07/23/2019 4     T Wave Axis 07/23/2019 64     POC Glucose 07/23/2019 110     POC Glucose 07/23/2019 143*    Sodium 07/24/2019 137     Potassium 07/24/2019 4 2     Chloride 07/24/2019 107     CO2 07/24/2019 27     ANION GAP 07/24/2019 3*    BUN 07/24/2019 32*    Creatinine 07/24/2019 1 03     Glucose 07/24/2019 124     Calcium 07/24/2019 9 1     eGFR 07/24/2019 50     WBC 07/24/2019 5 44     RBC 07/24/2019 3 71*    Hemoglobin 07/24/2019 10 6*    Hematocrit 07/24/2019 34 1*    MCV 07/24/2019 92     MCH 07/24/2019 28 6     MCHC 07/24/2019 31 1*    RDW 07/24/2019 13 2     MPV 07/24/2019 11 5     Platelets 38/28/2711 218     nRBC 07/24/2019 0     Neutrophils Relative 07/24/2019 43     Immat GRANS % 07/24/2019 0     Lymphocytes Relative 07/24/2019 46*    Monocytes Relative 07/24/2019 9     Eosinophils Relative 07/24/2019 2     Basophils Relative 07/24/2019 0     Neutrophils Absolute 07/24/2019 2 32     Immature Grans Absolute 07/24/2019 0 01     Lymphocytes Absolute 07/24/2019 2 53     Monocytes Absolute 07/24/2019 0 49     Eosinophils Absolute 07/24/2019 0 08     Basophils Absolute 07/24/2019 0 01     Magnesium 07/24/2019 1 4*    Phosphorus 07/24/2019 3 5     POC Glucose 07/24/2019 134     Ventricular Rate 07/24/2019 73     Atrial Rate 07/24/2019 73     [place] Interval 07/24/2019 158     QRSD Interval 07/24/2019 88     QT Interval 07/24/2019 402     QTC Interval 07/24/2019 442     P Axis 07/24/2019 68     QRS Axis 07/24/2019 -3     T Wave Axis 07/24/2019 61     POC Glucose 07/24/2019 90    Appointment on 07/17/2019   Component Date Value    TISSUE TRANSGLUTAMINASE * 07/17/2019 <2     IGA 07/17/2019 547 0*    Sodium 07/17/2019 138     Potassium 07/17/2019 3 9     Chloride 07/17/2019 105     CO2 07/17/2019 30     ANION GAP 07/17/2019 3*    BUN 07/17/2019 38*    Creatinine 07/17/2019 1 43*    Glucose 07/17/2019 168*    Calcium 07/17/2019 9 1     eGFR 07/17/2019 34    Appointment on 07/10/2019   Component Date Value    Sodium 07/10/2019 139     Potassium 07/10/2019 3 6     Chloride 07/10/2019 105     CO2 07/10/2019 29     ANION GAP 07/10/2019 5     BUN 07/10/2019 37*    Creatinine 07/10/2019 1 58*    Glucose 07/10/2019 152*    Calcium 07/10/2019 8 9     eGFR 07/10/2019 30    Admission on 06/16/2019, Discharged on 06/18/2019   Component Date Value    WBC 06/16/2019 8 73     RBC 06/16/2019 4 24     Hemoglobin 06/16/2019 12 1     Hematocrit 06/16/2019 38 4     MCV 06/16/2019 91     MCH 06/16/2019 28 5     MCHC 06/16/2019 31 5     RDW 06/16/2019 13 7     MPV 06/16/2019 11 6     Platelets 23/54/1462 293     nRBC 06/16/2019 0     Neutrophils Relative 06/16/2019 64     Immat GRANS % 06/16/2019 0     Lymphocytes Relative 06/16/2019 28     Monocytes Relative 06/16/2019 7     Eosinophils Relative 06/16/2019 1     Basophils Relative 06/16/2019 0     Neutrophils Absolute 06/16/2019 5 68     Immature Grans Absolute 06/16/2019 0 02     Lymphocytes Absolute 06/16/2019 2 40     Monocytes Absolute 06/16/2019 0 57     Eosinophils Absolute 06/16/2019 0 04     Basophils Absolute 06/16/2019 0 02     Sodium 06/16/2019 135*    Potassium 06/16/2019 5 9*    Chloride 06/16/2019 105     CO2 06/16/2019 25     ANION GAP 06/16/2019 5     BUN 06/16/2019 45*    Creatinine 06/16/2019 1 40*    Glucose 06/16/2019 126     Calcium 06/16/2019 9 2     AST 06/16/2019 42     ALT 06/16/2019 41     Alkaline Phosphatase 06/16/2019 101     Total Protein 06/16/2019 8 0     Albumin 06/16/2019 3 7     Total Bilirubin 06/16/2019 0 18*    eGFR 06/16/2019 35     Troponin I 06/16/2019 <0 02     POC Glucose 06/16/2019 125     Sodium 06/17/2019 141     Potassium 06/17/2019 4 9     Chloride 06/17/2019 110*    CO2 06/17/2019 24     ANION GAP 06/17/2019 7     BUN 06/17/2019 38*    Creatinine 06/17/2019 1 11     Glucose 06/17/2019 97     Calcium 06/17/2019 8 8     eGFR 06/17/2019 46     Magnesium 06/17/2019 1 7     Phosphorus 06/17/2019 3 1     WBC 06/17/2019 6 96     RBC 06/17/2019 3 74*    Hemoglobin 06/17/2019 10 5*    Hematocrit 06/17/2019 33 8*    MCV 06/17/2019 90     MCH 06/17/2019 28 1     MCHC 06/17/2019 31 1*    RDW 06/17/2019 13 6     MPV 06/17/2019 11 4     Platelets 60/16/8337 262     nRBC 06/17/2019 0     Neutrophils Relative 06/17/2019 49     Immat GRANS % 06/17/2019 0     Lymphocytes Relative 06/17/2019 44     Monocytes Relative 06/17/2019 6     Eosinophils Relative 06/17/2019 1     Basophils Relative 06/17/2019 0     Neutrophils Absolute 06/17/2019 3 34     Immature Grans Absolute 06/17/2019 0 01     Lymphocytes Absolute 06/17/2019 3 09     Monocytes Absolute 06/17/2019 0 44     Eosinophils Absolute 06/17/2019 0 06     Basophils Absolute 06/17/2019 0 02     Hemoglobin A1C 06/17/2019 6 4*    EAG 06/17/2019 137     TSH 3RD GENERATON 06/17/2019 2 540     POC Glucose 06/17/2019 103     Ventricular Rate 06/16/2019 50     Atrial Rate 06/16/2019 50     NV Interval 06/16/2019 154     QRSD Interval 06/16/2019 84     QT Interval 06/16/2019 428     QTC Interval 06/16/2019 390     P Axis 06/16/2019 78     QRS Axis 06/16/2019 -8     T Wave Axis 06/16/2019 63     POC Glucose 06/17/2019 122     POC Glucose 06/17/2019 201*    Ventricular Rate 06/17/2019 62     Atrial Rate 06/17/2019 62     AR Interval 06/17/2019 154     QRSD Interval 06/17/2019 74     QT Interval 06/17/2019 402     QTC Interval 06/17/2019 408     P Axis 06/17/2019 30     QRS Axis 06/17/2019 70     T Wave Axis 06/17/2019 27     POC Glucose 06/17/2019 111     WBC 06/18/2019 5 82     RBC 06/18/2019 4 06     Hemoglobin 06/18/2019 11 3*    Hematocrit 06/18/2019 36 8     MCV 06/18/2019 91     MCH 06/18/2019 27 8     MCHC 06/18/2019 30 7*    RDW 06/18/2019 13 9     MPV 06/18/2019 12 1     Platelets 41/23/2443 286     nRBC 06/18/2019 0     Neutrophils Relative 06/18/2019 46     Immat GRANS % 06/18/2019 0     Lymphocytes Relative 06/18/2019 45*    Monocytes Relative 06/18/2019 8     Eosinophils Relative 06/18/2019 1     Basophils Relative 06/18/2019 0     Neutrophils Absolute 06/18/2019 2 68     Immature Grans Absolute 06/18/2019 0 01     Lymphocytes Absolute 06/18/2019 2 61     Monocytes Absolute 06/18/2019 0 44     Eosinophils Absolute 06/18/2019 0 06     Basophils Absolute 06/18/2019 0 02     Sodium 06/18/2019 136     Potassium 06/18/2019 4 5     Chloride 06/18/2019 106     CO2 06/18/2019 25     ANION GAP 06/18/2019 5     BUN 06/18/2019 36*    Creatinine 06/18/2019 1 19     Glucose 06/18/2019 104     Calcium 06/18/2019 9 1     eGFR 06/18/2019 42     Magnesium 06/18/2019 1 8     Phosphorus 06/18/2019 3 2     POC Glucose 06/18/2019 111     POC Glucose 06/18/2019 102    Appointment on 06/05/2019   Component Date Value    Sodium 06/05/2019 139     Potassium 06/05/2019 4 4     Chloride 06/05/2019 104     CO2 06/05/2019 28     ANION GAP 06/05/2019 7     BUN 06/05/2019 31*    Creatinine 06/05/2019 1 29  Glucose, Fasting 06/05/2019 122*    Calcium 06/05/2019 8 9     AST 06/05/2019 12     ALT 06/05/2019 14     Alkaline Phosphatase 06/05/2019 87     Total Protein 06/05/2019 7 3     Albumin 06/05/2019 3 5     Total Bilirubin 06/05/2019 0 34     eGFR 06/05/2019 38     WBC 06/05/2019 5 95     RBC 06/05/2019 3 93     Hemoglobin 06/05/2019 11 2*    Hematocrit 06/05/2019 36 4     MCV 06/05/2019 93     MCH 06/05/2019 28 5     MCHC 06/05/2019 30 8*    RDW 06/05/2019 13 2     Platelets 17/20/4936 171     MPV 06/05/2019 13 6*    Clarity, UA 06/05/2019 Cloudy     Color, UA 06/05/2019 Yellow     Specific Gravity, UA 06/05/2019 1 010     pH, UA 06/05/2019 6 0     Glucose, UA 06/05/2019 Negative     Ketones, UA 06/05/2019 Negative     Blood, UA 06/05/2019 Trace*    Protein, UA 06/05/2019 30 (1+)*    Nitrite, UA 06/05/2019 Negative     Bilirubin, UA 06/05/2019 Negative     Urobilinogen, UA 06/05/2019 0 2     Leukocytes, UA 06/05/2019 Moderate*    WBC, UA 06/05/2019 10-20*    RBC, UA 06/05/2019 None Seen     Hyaline Casts, UA 06/05/2019 None Seen     Bacteria, UA 06/05/2019 Occasional     Epithelial Cells 06/05/2019 Moderate*    Creatinine, Ur 06/05/2019 80 9     Protein Urine Random 06/05/2019 51     Prot/Creat Ratio, Ur 06/05/2019 0 63*    Phosphorus 06/05/2019 3 3     PTH 06/05/2019 84 2*   Appointment on 04/30/2019   Component Date Value    Total CK 04/30/2019 42    Admission on 04/02/2019, Discharged on 04/02/2019   Component Date Value    WBC 04/02/2019 7 20     RBC 04/02/2019 4 79     Hemoglobin 04/02/2019 13 4     Hematocrit 04/02/2019 43 5     MCV 04/02/2019 91     MCH 04/02/2019 28 0     MCHC 04/02/2019 30 8*    RDW 04/02/2019 13 4     MPV 04/02/2019 12 7     Platelets 17/72/0946 176     nRBC 04/02/2019 0     Neutrophils Relative 04/02/2019 77*    Immat GRANS % 04/02/2019 0     Lymphocytes Relative 04/02/2019 22     Monocytes Relative 04/02/2019 1*    Eosinophils Relative 04/02/2019 0     Basophils Relative 04/02/2019 0     Neutrophils Absolute 04/02/2019 5 49     Immature Grans Absolute 04/02/2019 0 03     Lymphocytes Absolute 04/02/2019 1 55     Monocytes Absolute 04/02/2019 0 09*    Eosinophils Absolute 04/02/2019 0 03     Basophils Absolute 04/02/2019 0 01     Sodium 04/02/2019 133*    Potassium 04/02/2019 5 1     Chloride 04/02/2019 102     CO2 04/02/2019 23     ANION GAP 04/02/2019 8     BUN 04/02/2019 42*    Creatinine 04/02/2019 1 42*    Glucose 04/02/2019 331*    Calcium 04/02/2019 9 4     AST 04/02/2019 20     ALT 04/02/2019 21     Alkaline Phosphatase 04/02/2019 86     Total Protein 04/02/2019 8 7*    Albumin 04/02/2019 3 9     Total Bilirubin 04/02/2019 0 24     eGFR 04/02/2019 34     Troponin I 04/02/2019 <0 02     POC Glucose 04/02/2019 314*    POC Glucose 04/02/2019 169*    Ventricular Rate 04/02/2019 79     Atrial Rate 04/02/2019 79     IN Interval 04/02/2019 144     QRSD Interval 04/02/2019 74     QT Interval 04/02/2019 348     QTC Interval 04/02/2019 399     P Axis 04/02/2019 67     QRS Axis 04/02/2019 -12     T Wave [place] 04/02/2019 88    Appointment on 03/13/2019   Component Date Value    Sed Rate 03/13/2019 32*    Uric Acid 03/13/2019 7 6*    CRP 03/13/2019 4 1*    WBC 03/13/2019 5 92     RBC 03/13/2019 4 05     Hemoglobin 03/13/2019 11 4*    Hematocrit 03/13/2019 37 6     MCV 03/13/2019 93     MCH 03/13/2019 28 1     MCHC 03/13/2019 30 3*    RDW 03/13/2019 13 2     MPV 03/13/2019 11 7     Platelets 50/39/2200 263     nRBC 03/13/2019 0     Neutrophils Relative 03/13/2019 57     Immat GRANS % 03/13/2019 0     Lymphocytes Relative 03/13/2019 35     Monocytes Relative 03/13/2019 7     Eosinophils Relative 03/13/2019 1     Basophils Relative 03/13/2019 0     Neutrophils Absolute 03/13/2019 3 40     Immature Grans Absolute 03/13/2019 0 02     Lymphocytes Absolute 03/13/2019 2 05     Monocytes Absolute 03/13/2019 0 39     Eosinophils Absolute 03/13/2019 0 04     Basophils Absolute 03/13/2019 0 02      Imaging: Xr Chest 2 Views    Result Date: 7/22/2019  Narrative: CHEST INDICATION:   CP  COMPARISON:  X-ray 6/16/19 EXAM PERFORMED/VIEWS:  XR CHEST PA & LATERAL  The frontal view was performed utilizing dual energy radiographic technique  FINDINGS: Cardiomediastinal silhouette appears unremarkable  The lungs are clear  No pneumothorax or pleural effusion  Osseous structures appear within normal limits for patient age  Impression: No acute cardiopulmonary disease  Workstation performed: [id number]     Us Abdomen Limited    Result Date: 7/18/2019  Narrative: RIGHT UPPER QUADRANT ULTRASOUND INDICATION:    R10 13: Epigastric pain  COMPARISON:  None  TECHNIQUE:   Real-time ultrasound of the right upper quadrant was performed with a curvilinear transducer with both volumetric sweeps and still imaging techniques  FINDINGS: PANCREAS:  Visualized portions of the pancreas are within normal limits  AORTA AND IVC:  Visualized portions are normal for patient age  LIVER: Size:  Within normal range  The liver measures 13 5 cm in the midclavicular line  Contour:  Surface contour is smooth  Parenchyma:  Echogenicity and echotexture are within normal limits  No evidence of suspicious mass  Limited imaging of the main portal vein shows it to be patent and hepatopetal   BILIARY: Patient has undergone prior cholecystectomy  No intrahepatic biliary dilatation  CBD measures 7 mm  No choledocholithiasis  KIDNEY: Right kidney measures 9 cm  Right renal 5 mm cortical simple cyst  Right renal midpole 9 mm cortical simple cyst  ASCITES:   None  Impression: Subcentimeter right renal cysts  Status post cholecystectomy   Workstation performed: [id number]     Mammo Diagnostic Bilateral W 3d & Cad, Us Breast Right Limited (diagnostic)    Result Date: 8/5/2019  Narrative: DIAGNOSIS: History of left breast cancer RELEVANT HISTORY: Family Breast Cancer History: No known family history of breast cancer  Family Medical History: No known relevant family medical history  Personal History: No known relevant hormone history  Surgical history includes breast biopsy and lumpectomy  Medical history includes breast cancer  COMPARISONS: Prior mammograms dated: 09/11/2018, 08/10/2018, 08/02/2018, 07/23/2018, 07/21/2017, 07/17/2017, 07/11/2016, 06/12/2015, and 06/06/2014 INDICATION: Cm Dove is a 80 y o  female presenting for annual  TECHNIQUE: The current study was evaluated with Computer Aided Detection  TISSUE DENSITY: The breasts are heterogeneously dense, which may obscure small masses  FINDINGS: Bilateral There are no suspicious masses, grouped microcalcifications or areas of architectural distortion  The skin and nipple areolar complex are unremarkable  No ultrasound evidence of hypoechoic mass or architectural distortion in the upper outer right breast  Postoperative changes in the left breast      Impression: No evidence of malignancy  ASSESSMENT/BI-RADS CATEGORY: Left: 2 - Benign Right: 2 - Benign Overall: 2 - Benign RECOMMENDATION:      - Diagnostic mammogram in 1 year for both breasts  Workstation ID: [id number]       Review of Systems:  Review of Systems   Constitutional: Negative for activity change, fatigue and unexpected weight change  HENT: Negative for nosebleeds  Eyes: Negative for visual disturbance  Respiratory: Negative for apnea, shortness of breath, wheezing and stridor  Cardiovascular: Negative for chest pain, palpitations and leg swelling  Gastrointestinal: Negative for blood in stool  Endocrine: Negative for cold intolerance  Genitourinary: Negative for difficulty urinating, hematuria and urgency  Musculoskeletal: Positive for gait problem  Negative for arthralgias and myalgias  Skin: Negative for pallor and rash  Allergic/Immunologic: Negative for immunocompromised state     Neurological: Negative for dizziness, syncope, facial asymmetry, speech difficulty and weakness  Hematological: Does not bruise/bleed easily  Psychiatric/Behavioral: Negative for sleep disturbance  The patient is not nervous/anxious  Physical Exam:  Physical Exam   Constitutional: She appears well-developed  No distress  Neck: No JVD present  Cardiovascular: Normal rate, regular rhythm, normal heart sounds and intact distal pulses  Exam reveals no gallop and no friction rub  No murmur heard  Bilateral carotid bruits   Pulmonary/Chest: Effort normal and breath sounds normal  No stridor  No respiratory distress  She has no wheezes  She has no rales  Musculoskeletal: She exhibits no edema  Neurological: She is alert  Skin: Skin is warm and dry  Capillary refill takes less than 2 seconds  She is not diaphoretic  Psychiatric: She has a normal mood and affect  Discussion/Summary:  Hypertension, recent hypertensive crisis, improving a patient with known extensive peripheral vascular disease  Abnormal EKG with questionable septal infarct  Alexandr Castro doing a pharmacological stress that she will not be able to walk on a treadmill  Extensive vascular disease status post right external iliac and common iliac stenting as well as left external iliac stenting  Duplex last revealed patent size, she does have SFA disease bilaterally  Also concomitant carotid disease 50-69% bilaterally  Unchanged from before will check lipid profile  Continue aspirin therapy and blood pressure control  This note was completed in part utilizing i[employer] direct voice recognition software  Grammatical errors, random word insertion, spelling mistakes, and incomplete sentences may be an occasional consequence of the system secondary to software limitations, ambient noise and hardware issues  At the time of dictation, efforts were made to edit, clarify and /or correct errors    Please read the chart carefully and recognize, using context, where substitutions have occurred  If you have any questions or concerns about the context, text or information contained within the body of this dictation, please contact myself, the provider, for further clarification

## 2019-08-07 ENCOUNTER — PATIENT OUTREACH (OUTPATIENT)
Dept: FAMILY MEDICINE CLINIC | Facility: CLINIC | Age: 84
End: 2019-08-07

## 2019-08-09 ENCOUNTER — HOSPITAL ENCOUNTER (OUTPATIENT)
Dept: NON INVASIVE DIAGNOSTICS | Facility: CLINIC | Age: 84
Discharge: HOME | End: 2019-08-09
Payer: COMMERCIAL

## 2019-08-09 ENCOUNTER — PATIENT OUTREACH (OUTPATIENT)
Dept: FAMILY MEDICINE CLINIC | Facility: CLINIC | Age: 84
End: 2019-08-09

## 2019-08-09 DIAGNOSIS — R10.13 EPIGASTRIC PAIN: ICD-10-CM

## 2019-08-09 PROCEDURE — 93975 VASCULAR STUDY: CPT

## 2019-08-09 PROCEDURE — 93975 VASCULAR STUDY: CPT | Performed by: SURGERY

## 2019-08-13 ENCOUNTER — TELEPHONE (OUTPATIENT)
Dept: NEPHROLOGY | Facility: CLINIC | Age: 84
End: 2019-08-13

## 2019-08-13 ENCOUNTER — PATIENT OUTREACH (OUTPATIENT)
Dept: FAMILY MEDICINE CLINIC | Facility: CLINIC | Age: 84
End: 2019-08-13

## 2019-08-13 ENCOUNTER — TELEPHONE (OUTPATIENT)
Dept: OTHER | Facility: HOSPITAL | Age: 84
End: 2019-08-13

## 2019-08-13 DIAGNOSIS — I16.0 HYPERTENSIVE URGENCY: Primary | ICD-10-CM

## 2019-08-13 RX ORDER — NIFEDIPINE 30 MG/1
30 TABLET, FILM COATED, EXTENDED RELEASE ORAL DAILY
Qty: 30 TABLET | Refills: 5 | Status: SHIPPED | OUTPATIENT
Start: 2019-08-13 | End: 2019-10-25 | Stop reason: SDUPTHER

## 2019-08-13 NOTE — TELEPHONE ENCOUNTER
Pt  call  Has noticed last few days her BP have been elevated, mostly at night  180/85, 176/80 and last night 193/84 with associated headache and chest tightness  This AM was 140/54, now 153/75  He is concerned because of her rising numbers and her symptoms at times  She is feeling ok however today    [phone]; cell [phone]

## 2019-08-13 NOTE — PROGRESS NOTES
CM called patient, left message with brief introduction and contact information on voice mail, with request for return phone call

## 2019-08-14 ENCOUNTER — TELEPHONE (OUTPATIENT)
Dept: GASTROENTEROLOGY | Facility: AMBULARY SURGERY CENTER | Age: 84
End: 2019-08-14

## 2019-08-14 ENCOUNTER — TELEPHONE (OUTPATIENT)
Dept: FAMILY MEDICINE CLINIC | Facility: CLINIC | Age: 84
End: 2019-08-14

## 2019-08-14 DIAGNOSIS — Z79.4 TYPE 2 DIABETES MELLITUS WITHOUT COMPLICATION, WITH LONG-TERM CURRENT USE OF INSULIN (HCC): ICD-10-CM

## 2019-08-14 DIAGNOSIS — E11.9 TYPE 2 DIABETES MELLITUS WITHOUT COMPLICATION, WITH LONG-TERM CURRENT USE OF INSULIN (HCC): ICD-10-CM

## 2019-08-14 NOTE — TELEPHONE ENCOUNTER
I spoke with patient's  earlier this afternoon; she has had bp 130-160 range had been 130-150 this am, patient had taken am meds  I asked her to take nifedipine 30 mg additional at night   Will see how bp does and adjust  If persistently stays elevated may need to repeat angiogtam

## 2019-08-15 ENCOUNTER — HOSPITAL ENCOUNTER (OUTPATIENT)
Dept: NON INVASIVE DIAGNOSTICS | Facility: CLINIC | Age: 84
Discharge: HOME | End: 2019-08-15
Payer: COMMERCIAL

## 2019-08-15 DIAGNOSIS — E11.9 TYPE 2 DIABETES MELLITUS WITHOUT COMPLICATION, WITH LONG-TERM CURRENT USE OF INSULIN (HCC): ICD-10-CM

## 2019-08-15 DIAGNOSIS — I65.23 ASYMPTOMATIC BILATERAL CAROTID ARTERY STENOSIS: ICD-10-CM

## 2019-08-15 DIAGNOSIS — I10 ACCELERATED ESSENTIAL HYPERTENSION: ICD-10-CM

## 2019-08-15 DIAGNOSIS — Z79.4 TYPE 2 DIABETES MELLITUS WITHOUT COMPLICATION, WITH LONG-TERM CURRENT USE OF INSULIN (HCC): ICD-10-CM

## 2019-08-15 PROCEDURE — 93016 CV STRESS TEST SUPVJ ONLY: CPT | Performed by: INTERNAL MEDICINE

## 2019-08-15 PROCEDURE — 93018 CV STRESS TEST I&R ONLY: CPT | Performed by: INTERNAL MEDICINE

## 2019-08-15 PROCEDURE — 93017 CV STRESS TEST TRACING ONLY: CPT

## 2019-08-15 PROCEDURE — 78452 HT MUSCLE IMAGE SPECT MULT: CPT

## 2019-08-15 PROCEDURE — 78452 HT MUSCLE IMAGE SPECT MULT: CPT | Performed by: INTERNAL MEDICINE

## 2019-08-15 PROCEDURE — A9502 TC99M TETROFOSMIN: HCPCS

## 2019-08-15 RX ADMIN — REGADENOSON 0.4 MG: 0.08 INJECTION, SOLUTION INTRAVENOUS at 14:02

## 2019-08-16 LAB
CHEST PAIN STATEMENT: NORMAL
MAX DIASTOLIC BP: 50 MMHG
MAX HEART RATE: 96 BPM
MAX PREDICTED HEART RATE: 136 BPM
MAX. SYSTOLIC BP: 160 MMHG
PROTOCOL NAME: NORMAL
REASON FOR TERMINATION: NORMAL
TARGET HR FORMULA: NORMAL
TEST INDICATION: NORMAL
TIME IN EXERCISE PHASE: NORMAL

## 2019-08-18 NOTE — ASSESSMENT & PLAN NOTE
I had spoken with patients  on the phone about 1 week ago and had added nighttime dose of nifedipine  Note that the patient has had renal artery dopplers which have identified right renal artery narrowing more than 60% since 2016, but angiogram done has not shown any stenosis in 2016 whatsoever  May need to consider repeat angiogram if we have issues with blood pressure stabilization  Right now given the pattern of her blood pressures she seems to run anywhere from 150-170 in the morning and in the afternoon she runs lower at 535-226 systolic  I talked with the patient and her  and other family members present about spacing out the blood pressure medication IE if she is taking the nifedipine in the morning around 9-10 a m  To take the losartan at 2:00 p m  And then continue taking the evening dose of nifedipine before she goes to sleep  We can see over the next couple of weeks out her blood pressure does, consideration for further modification of either the losartan dose or the nifedipine dose depending our blood pressure does  In the past with the increasing nifedipine dose at increased risk of lower extremity edema especially in the summer time we can follow and see how she does with this  The patient's  takes the blood pressure on a daily basis and monitors how she is doing

## 2019-08-18 NOTE — ASSESSMENT & PLAN NOTE
The patient 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 on ultrasound in 2017; she had seen Urology at that time  The plan was for 2 year followup in 2019  She was to have a repeat renal u/s done prior when we spoke;   Will order for this time

## 2019-08-19 ENCOUNTER — OFFICE VISIT (OUTPATIENT)
Dept: NEPHROLOGY | Facility: CLINIC | Age: 84
End: 2019-08-19
Payer: COMMERCIAL

## 2019-08-19 VITALS — HEIGHT: 62 IN | WEIGHT: 131 LBS | BODY MASS INDEX: 24.11 KG/M2

## 2019-08-19 DIAGNOSIS — N28.1 ACQUIRED COMPLEX CYST OF KIDNEY: ICD-10-CM

## 2019-08-19 DIAGNOSIS — N18.30 CKD (CHRONIC KIDNEY DISEASE), STAGE III (HCC): Primary | ICD-10-CM

## 2019-08-19 DIAGNOSIS — N25.81 SECONDARY HYPERPARATHYROIDISM, RENAL (HCC): ICD-10-CM

## 2019-08-19 DIAGNOSIS — I10 ESSENTIAL HYPERTENSION: ICD-10-CM

## 2019-08-19 PROCEDURE — 99214 OFFICE O/P EST MOD 30 MIN: CPT | Performed by: INTERNAL MEDICINE

## 2019-08-19 NOTE — PROGRESS NOTES
Assessment/Plan:    CKD (chronic kidney disease), stage III ([facility])  Baseline Cr is in the mid 1 0 range; from July 26 Cr is 1 2  Continue to monitor    Hypertension  I had spoken with patients  on the phone about 1 week ago and had added nighttime dose of nifedipine  Note that the patient has had renal artery dopplers which have identified right renal artery narrowing more than 60% since 2016, but angiogram done has not shown any stenosis in 2016 whatsoever  May need to consider repeat angiogram if we have issues with blood pressure stabilization  Right now given the pattern of her blood pressures she seems to run anywhere from 150-170 in the morning and in the afternoon she runs lower at 573-888 systolic  I talked with the patient and her  and other family members present about spacing out the blood pressure medication IE if she is taking the nifedipine in the morning around 9-10 a m  To take the losartan at 2:00 p m  And then continue taking the evening dose of nifedipine before she goes to sleep  We can see over the next couple of weeks out her blood pressure does, consideration for further modification of either the losartan dose or the nifedipine dose depending our blood pressure does  In the past with the increasing nifedipine dose at increased risk of lower extremity edema especially in the summer time we can follow and see how she does with this  The patient's  takes the blood pressure on a daily basis and monitors how she is doing  Acquired complex cyst of kidney  The patient 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 on ultrasound in 2017; she had seen Urology at that time  The plan was for 2 year followup in 2019  She was to have a repeat renal u/s done prior when we spoke; Will order for this time    Secondary hyperparathyroidism, renal ([facility] [address] )  In June PTH was 84, phos wnl   Continue with current medication regimen      Her kidney function has improved from a hospitalization it is down to 1 2 continue to monitor  Medications reviewed with the patient and family    Subjective:      Patient ID: Iris Mac is a 80 y o  female  HPI    Patient is here in follow-up for chronic kidney disease and blood pressure management  She is here with her  and other family members  She recently had 2 hospitalizations in late July  I had the opportunity reviewed the results of those hospitalizations  She was initially admitted secondary to palpitations and was noted that she had bradycardia and labile hypertension  Her blood pressure medications were modified she then returned to the hospital secondary to chest pressure  She denies any dizziness or lightheadedness  Since her discharge she has not had any further palpitations her blood pressure has been for the most part in the 140-150 range however any afternoon sometimes her blood pressure gets to be about 120 and she feels lightheaded and dizzy with this blood pressure  She denies any falls trauma or loss of consciousness  Her current medications are reviewed in the timing of when she takes the current medications are reviewed  She is not taking Lasix  She takes the losartan with nifedipine in the morning and she takes in the evening dose of nifedipine which we added approximately a week or so ago when the patient's  initially spoke to me and told me how high her blood pressure was doing in the morning as it had been as high as 180  With the evening dose of nifedipine this seemed to have helped this a little bit  Chart notes were reviewed patient recently seen by Cardiology and had a stress test which did not show any ischemic changes ejection fraction was found to be 70%  Review of Systems  she denies any palpitations no chest pressure or shortness of breath, sometimes dizziness and lightheadedness in the afternoon no leg edema no urgency frequency hematuria no frothy or bubbly urine      Objective:      Ht 5' 2" (1 575 m) Wt 59 4 kg (131 lb)   LMP  (LMP Unknown)   BMI 23 96 kg/m²     Blood pressure is 126/50 in the right arm when the patient stands she is 118/48 in the right arm     Physical Exam   Constitutional: She appears well-nourished  Eyes: No scleral icterus  Neck: Neck supple  Cardiovascular: Normal rate and regular rhythm  Pulmonary/Chest: Effort normal and breath sounds normal    Abdominal: Soft  Bowel sounds are normal    Musculoskeletal: She exhibits no edema  Lymphadenopathy:     She has no cervical adenopathy  Neurological:   Nonfocal   Skin: Skin is warm and dry

## 2019-08-19 NOTE — PATIENT INSTRUCTIONS
1  Please change the time of your blood pressure medication: please take the nifedipine in the morning take the losartan in the afternoon around 2 pm and take the evening dose of nifedipine in the evening      2  Thank you for coming in to see me today; it was very nice visiting with you

## 2019-08-23 ENCOUNTER — PATIENT OUTREACH (OUTPATIENT)
Dept: FAMILY MEDICINE CLINIC | Facility: CLINIC | Age: 84
End: 2019-08-23

## 2019-08-27 DIAGNOSIS — I10 HYPERTENSION, UNSPECIFIED TYPE: ICD-10-CM

## 2019-08-28 ENCOUNTER — HOSPITAL ENCOUNTER (OUTPATIENT)
Dept: RADIOLOGY | Facility: HOSPITAL | Age: 84
Discharge: HOME | End: 2019-08-28
Attending: INTERNAL MEDICINE
Payer: COMMERCIAL

## 2019-08-28 ENCOUNTER — TRANSCRIBE ORDERS (OUTPATIENT)
Dept: RADIOLOGY | Facility: HOSPITAL | Age: 84
End: 2019-08-28

## 2019-08-28 DIAGNOSIS — N28.1 ACQUIRED COMPLEX CYST OF KIDNEY: ICD-10-CM

## 2019-08-28 PROCEDURE — 76770 US EXAM ABDO BACK WALL COMP: CPT

## 2019-08-28 RX ORDER — METOPROLOL TARTRATE 50 MG/1
TABLET, FILM COATED ORAL
Qty: 180 TABLET | Refills: 1 | OUTPATIENT
Start: 2019-08-28

## 2019-08-30 ENCOUNTER — PATIENT OUTREACH (OUTPATIENT)
Dept: FAMILY MEDICINE CLINIC | Facility: CLINIC | Age: 84
End: 2019-08-30

## 2019-08-30 NOTE — PROGRESS NOTES
CM called patient, S JANENE Martinezlizzy Diana answered phone, stated patient is asleep, and is doing well,at this time does not need anything  CM gave Richard Zepeda CM's contact information to give to patient, encouraged patient to call CM with any questions or needs

## 2019-09-11 ENCOUNTER — OFFICE VISIT (OUTPATIENT)
Dept: PODIATRY | Facility: CLINIC | Age: 84
End: 2019-09-11
Payer: COMMERCIAL

## 2019-09-11 ENCOUNTER — APPOINTMENT (OUTPATIENT)
Dept: LAB | Facility: HOSPITAL | Age: 84
End: 2019-09-11
Attending: INTERNAL MEDICINE
Payer: COMMERCIAL

## 2019-09-11 VITALS — WEIGHT: 131.6 LBS | BODY MASS INDEX: 24.22 KG/M2 | HEIGHT: 62 IN

## 2019-09-11 DIAGNOSIS — E21.3 HYPERPARATHYROIDISM (HCC): ICD-10-CM

## 2019-09-11 DIAGNOSIS — B35.1 TINEA UNGUIUM: ICD-10-CM

## 2019-09-11 DIAGNOSIS — E11.51 TYPE 2 DIABETES MELLITUS WITH DIABETIC PERIPHERAL ANGIOPATHY WITHOUT GANGRENE, WITHOUT LONG-TERM CURRENT USE OF INSULIN (HCC): Primary | ICD-10-CM

## 2019-09-11 DIAGNOSIS — E11.42 DIABETIC POLYNEUROPATHY ASSOCIATED WITH TYPE 2 DIABETES MELLITUS (HCC): ICD-10-CM

## 2019-09-11 DIAGNOSIS — N18.30 CKD (CHRONIC KIDNEY DISEASE), STAGE III (HCC): ICD-10-CM

## 2019-09-11 DIAGNOSIS — R59.1 LYMPHADENOPATHY: ICD-10-CM

## 2019-09-11 LAB
ALBUMIN SERPL BCP-MCNC: 3.5 G/DL (ref 3.5–5)
ALP SERPL-CCNC: 82 U/L (ref 46–116)
ALT SERPL W P-5'-P-CCNC: 15 U/L (ref 12–78)
ANION GAP SERPL CALCULATED.3IONS-SCNC: 6 MMOL/L (ref 4–13)
AST SERPL W P-5'-P-CCNC: 11 U/L (ref 5–45)
BACTERIA UR QL AUTO: ABNORMAL /HPF
BILIRUB SERPL-MCNC: 0.33 MG/DL (ref 0.2–1)
BILIRUB UR QL STRIP: NEGATIVE
BUN SERPL-MCNC: 40 MG/DL (ref 5–25)
CALCIUM SERPL-MCNC: 9.1 MG/DL (ref 8.3–10.1)
CHLORIDE SERPL-SCNC: 103 MMOL/L (ref 100–108)
CLARITY UR: ABNORMAL
CO2 SERPL-SCNC: 28 MMOL/L (ref 21–32)
COLOR UR: YELLOW
CREAT SERPL-MCNC: 1.34 MG/DL (ref 0.6–1.3)
CREAT UR-MCNC: 132 MG/DL
GFR SERPL CREATININE-BSD FRML MDRD: 36 ML/MIN/1.73SQ M
GLUCOSE SERPL-MCNC: 172 MG/DL (ref 65–140)
GLUCOSE UR STRIP-MCNC: NEGATIVE MG/DL
HGB UR QL STRIP.AUTO: NEGATIVE
HYALINE CASTS #/AREA URNS LPF: ABNORMAL /LPF
KETONES UR STRIP-MCNC: NEGATIVE MG/DL
LEUKOCYTE ESTERASE UR QL STRIP: ABNORMAL
NITRITE UR QL STRIP: NEGATIVE
NON-SQ EPI CELLS URNS QL MICRO: ABNORMAL /HPF
PH UR STRIP.AUTO: 5.5 [PH]
POTASSIUM SERPL-SCNC: 3.7 MMOL/L (ref 3.5–5.3)
PROT SERPL-MCNC: 7.9 G/DL (ref 6.4–8.2)
PROT UR STRIP-MCNC: ABNORMAL MG/DL
PROT UR-MCNC: 53 MG/DL
PROT/CREAT UR: 0.4 MG/G{CREAT} (ref 0–0.1)
PTH-INTACT SERPL-MCNC: 99.5 PG/ML (ref 18.4–80.1)
RBC #/AREA URNS AUTO: ABNORMAL /HPF
SODIUM SERPL-SCNC: 137 MMOL/L (ref 136–145)
SP GR UR STRIP.AUTO: 1.01 (ref 1–1.03)
UROBILINOGEN UR QL STRIP.AUTO: 1 E.U./DL
WBC #/AREA URNS AUTO: ABNORMAL /HPF

## 2019-09-11 PROCEDURE — 36415 COLL VENOUS BLD VENIPUNCTURE: CPT

## 2019-09-11 PROCEDURE — 83970 ASSAY OF PARATHORMONE: CPT

## 2019-09-11 PROCEDURE — 81001 URINALYSIS AUTO W/SCOPE: CPT

## 2019-09-11 PROCEDURE — 11721 DEBRIDE NAIL 6 OR MORE: CPT | Performed by: PODIATRIST

## 2019-09-11 PROCEDURE — 84156 ASSAY OF PROTEIN URINE: CPT

## 2019-09-11 PROCEDURE — 82570 ASSAY OF URINE CREATININE: CPT

## 2019-09-11 PROCEDURE — 80053 COMPREHEN METABOLIC PANEL: CPT

## 2019-09-11 RX ORDER — DULOXETIN HYDROCHLORIDE 30 MG/1
30 CAPSULE, DELAYED RELEASE ORAL DAILY
Refills: 0 | COMMUNITY
Start: 2019-09-08

## 2019-09-12 NOTE — PATIENT INSTRUCTIONS
Foot Care for People with Diabetes   WHAT YOU NEED TO KNOW:   · Foot care helps protect your feet and prevent foot ulcers or sores  Long-term high blood sugar levels can damage the blood vessels and nerves in your legs and feet  This damage makes it hard to feel pressure, pain, temperature, and touch  You may not be able to feel a cut or sore, or shoes that are too tight  Foot care is needed to prevent serious problems, such as an infection or amputation  · Diabetes may cause your toes to become crooked or curved under  These changes may affect the way you walk and can lead to increased pressure on your foot  The pressure can decrease blood flow to your feet  Lack of blood flow increases your risk for a foot ulcer  Do not ignore small problems, such as dry skin or small wounds  These can become life-threatening over time without proper care  DISCHARGE INSTRUCTIONS:   Contact your healthcare provider if:   · Your feet become numb, weak, or hard to move  · You have pus draining from a sore on your foot  · You have a wound on your foot that gets bigger, deeper, or does not heal      · You see blisters, cuts, scratches, calluses, or sores on your foot  · You have a fever, and your feet become red, warm, and swollen  · Your toenails become thick, curled, or yellow  · You find it hard to check your feet because your vision is poor  · You have questions or concerns about your condition or care  Foot care:   · Check your feet each day  Look at your whole foot, including the bottom, and between and under your toes  Check for wounds, corns, and calluses  Use a mirror to see the bottom of your feet  The skin on your feet may be shiny, tight, or darker than normal  Your feet may also be cold and pale  Feel your feet by running your hands along the tops, bottoms, sides, and between your toes  Redness, swelling, and warmth are signs of blood flow problems that can lead to a foot ulcer   Do not try to remove corns or calluses yourself  · Wash your feet each day with soap and warm water  Do not use hot water, because this can injure your foot  Dry your feet gently with a towel after you wash them  Dry between and under your toes  · Apply lotion or a moisturizer on your dry feet  Ask your healthcare provider what lotions are best to use  Do not put lotion or moisturizer between your toes  · Cut your toenails correctly  File or cut your toenails straight across  Use a soft brush to clean around your toenails  If your toenails are very thick, you may need to have a healthcare provider or specialist cut them  · Protect your feet  Do not walk barefoot or wear your shoes without socks  Check your shoes for rocks or other objects that can hurt your feet  Wear cotton socks to help keep your feet dry  Wear socks without toe seams, or wear them with the seams inside out  Change your socks each day  Do not wear socks that are dirty or damp  · Wear shoes that fit well  Wear shoes that do not rub against any area of your feet  Your shoes should be ½ to ¾ inch (1 to 2 centimeters) longer than your feet  Your shoes should also have extra space around the widest part of your feet  Walking or athletic shoes with laces or straps that adjust are best  Ask your healthcare provider for help to choose shoes that fit you best  Ask him if you need to wear an insert, orthotic, or bandage on your feet  Follow up with your healthcare provider or foot specialist as directed: You will need to have your feet checked at least once a year  You may need a foot exam more often if you have nerve damage, foot deformities, or ulcers  Write down your questions so you remember to ask them during your visits  © 2017 [employer] Information is for End User's use only and may not be sold, redistributed or otherwise used for commercial purposes   All illustrations and images included in CareNotes® are the copyrighted property of [employer]  or [employer]  The above information is an  only  It is not intended as medical advice for individual conditions or treatments  Talk to your doctor, nurse or pharmacist before following any medical regimen to see if it is safe and effective for you  Foot Care for People with Diabetes   WHAT YOU NEED TO KNOW:   · Foot care helps protect your feet and prevent foot ulcers or sores  Long-term high blood sugar levels can damage the blood vessels and nerves in your legs and feet  This damage makes it hard to feel pressure, pain, temperature, and touch  You may not be able to feel a cut or sore, or shoes that are too tight  Foot care is needed to prevent serious problems, such as an infection or amputation  · Diabetes may cause your toes to become crooked or curved under  These changes may affect the way you walk and can lead to increased pressure on your foot  The pressure can decrease blood flow to your feet  Lack of blood flow increases your risk for a foot ulcer  Do not ignore small problems, such as dry skin or small wounds  These can become life-threatening over time without proper care  DISCHARGE INSTRUCTIONS:   Contact your healthcare provider if:   · Your feet become numb, weak, or hard to move  · You have pus draining from a sore on your foot  · You have a wound on your foot that gets bigger, deeper, or does not heal      · You see blisters, cuts, scratches, calluses, or sores on your foot  · You have a fever, and your feet become red, warm, and swollen  · Your toenails become thick, curled, or yellow  · You find it hard to check your feet because your vision is poor  · You have questions or concerns about your condition or care  Foot care:   · Check your feet each day  Look at your whole foot, including the bottom, and between and under your toes  Check for wounds, corns, and calluses   Use a mirror to see the bottom of your feet  The skin on your feet may be shiny, tight, or darker than normal  Your feet may also be cold and pale  Feel your feet by running your hands along the tops, bottoms, sides, and between your toes  Redness, swelling, and warmth are signs of blood flow problems that can lead to a foot ulcer  Do not try to remove corns or calluses yourself  · Wash your feet each day with soap and warm water  Do not use hot water, because this can injure your foot  Dry your feet gently with a towel after you wash them  Dry between and under your toes  · Apply lotion or a moisturizer on your dry feet  Ask your healthcare provider what lotions are best to use  Do not put lotion or moisturizer between your toes  · Cut your toenails correctly  File or cut your toenails straight across  Use a soft brush to clean around your toenails  If your toenails are very thick, you may need to have a healthcare provider or specialist cut them  · Protect your feet  Do not walk barefoot or wear your shoes without socks  Check your shoes for rocks or other objects that can hurt your feet  Wear cotton socks to help keep your feet dry  Wear socks without toe seams, or wear them with the seams inside out  Change your socks each day  Do not wear socks that are dirty or damp  · Wear shoes that fit well  Wear shoes that do not rub against any area of your feet  Your shoes should be ½ to ¾ inch (1 to 2 centimeters) longer than your feet  Your shoes should also have extra space around the widest part of your feet  Walking or athletic shoes with laces or straps that adjust are best  Ask your healthcare provider for help to choose shoes that fit you best  Ask him if you need to wear an insert, orthotic, or bandage on your feet  Follow up with your healthcare provider or foot specialist as directed: You will need to have your feet checked at least once a year   You may need a foot exam more often if you have nerve damage, foot deformities, or ulcers  Write down your questions so you remember to ask them during your visits  © 2017 [employer] Information is for End User's use only and may not be sold, redistributed or otherwise used for commercial purposes  All illustrations and images included in CareNotes® are the copyrighted property of [employer]  or [employer]  The above information is an  only  It is not intended as medical advice for individual conditions or treatments  Talk to your doctor, nurse or pharmacist before following any medical regimen to see if it is safe and effective for you

## 2019-09-12 NOTE — PROGRESS NOTES
This patient was seen on 9/11/19  Assessment:    Problem List Items Addressed This Visit        Endocrine    Type 2 diabetes mellitus with diabetic peripheral angiopathy without gangrene ([facility] [address] ) - Primary    Diabetic neuropathy ([facility] [address] )       Musculoskeletal and Integument    Tinea unguium       Immune and Lymphatic    Lymphadenopathy          Plan:  1  High risk  foot precautions reviewed with patient including the need to wear protective shoegear at all times when walking including in the home, the need to check feet all surfaces daily with a mirror and report and skin breaks to podiatry, the need to apply an emollient to skin of feet daily  2  Nails x 10 were debrided with double-action nail forceps of their thickness, length and width  Diagnoses and all orders for this visit:    Type 2 diabetes mellitus with diabetic peripheral angiopathy without gangrene, without long-term current use of insulin (HCC)    Diabetic polyneuropathy associated with type 2 diabetes mellitus (HCC)    Lymphadenopathy    Tinea unguium    Other orders  -     DULoxetine (CYMBALTA) 30 mg delayed release capsule; Take 30 mg by mouth daily          Subjective:      Patient ID: Sanjuanita Solares is a 80 y o  female  Rosemary Man presents today for high risk DM footcare  She is wearing inappropriate open sandal-type shoegear today  The following portions of the patient's history were reviewed and updated as appropriate: allergies, current medications, past family history, past medical history, past social history, past surgical history and problem list     Review of Systems   Constitutional: Negative  Neurological:        Patient notes tingling and numbness in feet, with electrical sensations and shooting parethesias         Objective:      Ht 5' 2" (1 575 m) Comment: verbal  Wt 59 7 kg (131 lb 9 6 oz)   LMP  (LMP Unknown)   BMI 24 07 kg/m²          Physical Exam   Constitutional: She appears well-developed and well-nourished   No distress  Cardiovascular:   Pulses:       Dorsalis pedis pulses are 2+ on the right side, and 2+ on the left side  Posterior tibial pulses are 2+ on the right side, and 2+ on the left side  Musculoskeletal:        Right foot: There is normal range of motion and no deformity  Left foot: There is normal range of motion and no deformity  Feet:    Feet:   Right Foot:   Protective Sensation: 10 sites tested  Skin Integrity: Negative for ulcer, blister, skin breakdown, erythema, warmth, callus or dry skin  Left Foot:   Protective Sensation: 10 sites tested  4 sites sensed  Skin Integrity: Negative for ulcer, blister, skin breakdown, erythema, warmth, callus or dry skin  Neurological: A sensory deficit is present  Skin: She is not diaphoretic

## 2019-09-16 ENCOUNTER — HOSPITAL ENCOUNTER (EMERGENCY)
Facility: HOSPITAL | Age: 84
Discharge: HOME | End: 2019-09-17
Attending: EMERGENCY MEDICINE | Admitting: EMERGENCY MEDICINE
Payer: COMMERCIAL

## 2019-09-16 DIAGNOSIS — R51.9 HEADACHE: Primary | ICD-10-CM

## 2019-09-16 DIAGNOSIS — I10 ASYMPTOMATIC HYPERTENSION: ICD-10-CM

## 2019-09-16 LAB
ANION GAP BLD CALC-SCNC: 14 MMOL/L (ref 4–13)
BUN BLD-MCNC: 34 MG/DL (ref 5–25)
CA-I BLD-SCNC: 1.03 MMOL/L (ref 1.12–1.32)
CHLORIDE BLD-SCNC: 105 MMOL/L (ref 100–108)
CREAT BLD-MCNC: 1 MG/DL (ref 0.6–1.3)
GFR SERPL CREATININE-BSD FRML MDRD: 51 ML/MIN/1.73SQ M
GLUCOSE SERPL-MCNC: 182 MG/DL (ref 65–140)
HCT VFR BLD CALC: 35 % (ref 34.8–46.1)
HGB BLDA-MCNC: 11.9 G/DL (ref 11.5–15.4)
PCO2 BLD: 25 MMOL/L (ref 21–32)
POTASSIUM BLD-SCNC: 4 MMOL/L (ref 3.5–5.3)
SODIUM BLD-SCNC: 139 MMOL/L (ref 136–145)
SPECIMEN SOURCE: ABNORMAL

## 2019-09-16 PROCEDURE — 80047 BASIC METABLC PNL IONIZED CA: CPT

## 2019-09-16 PROCEDURE — 99284 EMERGENCY DEPT VISIT MOD MDM: CPT | Performed by: EMERGENCY MEDICINE

## 2019-09-16 PROCEDURE — 99283 EMERGENCY DEPT VISIT LOW MDM: CPT

## 2019-09-16 PROCEDURE — 85014 HEMATOCRIT: CPT

## 2019-09-16 RX ORDER — ACETAMINOPHEN 325 MG/1
650 TABLET ORAL ONCE
Status: COMPLETED | OUTPATIENT
Start: 2019-09-16 | End: 2019-09-16

## 2019-09-16 RX ORDER — NIFEDIPINE 30 MG/1
30 TABLET, EXTENDED RELEASE ORAL ONCE
Status: COMPLETED | OUTPATIENT
Start: 2019-09-16 | End: 2019-09-17

## 2019-09-16 RX ADMIN — ACETAMINOPHEN 650 MG: 325 TABLET ORAL at 22:52

## 2019-09-17 VITALS
HEIGHT: 62 IN | HEART RATE: 80 BPM | SYSTOLIC BLOOD PRESSURE: 207 MMHG | TEMPERATURE: 97.5 F | BODY MASS INDEX: 24.11 KG/M2 | WEIGHT: 131 LBS | OXYGEN SATURATION: 98 % | DIASTOLIC BLOOD PRESSURE: 84 MMHG | RESPIRATION RATE: 18 BRPM

## 2019-09-17 RX ADMIN — NIFEDIPINE 30 MG: 30 TABLET, FILM COATED, EXTENDED RELEASE ORAL at 00:32

## 2019-09-17 NOTE — ED ATTENDING ATTESTATION
Sundeep Cabrales MD, saw and evaluated the patient  I have discussed the patient with the resident/non-physician practitioner and agree with the resident's/non-physician practitioner's findings, Plan of Care, and MDM as documented in the resident's/non-physician practitioner's note, except where noted  All available labs and Radiology studies were reviewed  I was present for key portions of any procedure(s) performed by the resident/non-physician practitioner and I was immediately available to provide assistance  At this point I agree with the current assessment done in the Emergency Department  I have conducted an independent evaluation of this patient a history and physical is as follows: This is an 72-year-old woman who comes to the emergency depart with hypertension  Patient was out with her family today when she noted that her blood pressure was high  She subsequently developed a headache  Her headache is getting better now, but her blood pressure is still high  Patient was due to take her antihypertensives at 6:00 p m , but was unable to do so  No nausea vomiting  No chest pain or shortness of breath  Review of systems negative in 12 systems reviewed  On exam Vital signs were reviewed  Patient is awake, alert, interactive  The patient's pupils are equally round reactive to light  Oropharynx is clear with moist mucous membranes  Neck is supple and nontender with no adenopathy or JVD  Heart is regular with no murmurs, rubs, or gallops  Lungs are clear and equal with no wheezes, rales, or rhonchi  Abdomen is soft and nontender with no masses, rebound, or guarding  There is no CVA tenderness  The patient was completely exposed  There is no skin breakdown  There are no rashes or skin changes  Extremities are warm and well perfused with good pulses  The patient has normal strength, sensation, and cranial nerves  Impression:  Primary headache disorder, hypertension, asymptomatic  Plan to give Tylenol for headache and discharge  Critical Care Time  Procedures

## 2019-09-17 NOTE — ED PROVIDER NOTES
History  Chief Complaint   Patient presents with    High Blood Pressure     Pt reports high blood pressure while up with the [facility] with her family 192/98  In triage blood pressure 210/92     HPI     81yo female presents for evaluation of high blood pressure  Patient says her family and herself went to [facility] and patient checked her blood pressure while up there and was 198/94  She notes developing a mild headache soon after which has been dull, constant, 3/10  Patient notes she has not taken her 6pm dose of nifedipine  Patient says she checks her blood pressure on a daily basis 3x  Patient denies visual changes, lightheadedness, chest pain, shortness of breath, nausea, vomiting, abdominal pain, diarrhea, or dysuria  Prior to Admission Medications   Prescriptions Last Dose Informant Patient Reported? Taking?    Acetaminophen (TYLENOL) 325 MG CAPS  Self Yes Yes   Sig: Take by mouth every 8 (eight) hours   DULoxetine (CYMBALTA) 30 mg delayed release capsule   Yes Yes   Sig: Take 30 mg by mouth daily   NIFEdipine ER (ADALAT CC) 30 MG 24 hr tablet  Self No Yes   Sig: Take 1 tablet (30 mg total) by mouth daily Please take at night in addition to the 60 mg in am   NIFEdipine ER (ADALAT CC) 60 MG 24 hr tablet  Self No Yes   Sig: Take 1 tablet (60 mg total) by mouth daily for 90 days   ONE TOUCH ULTRA TEST test strip  Self No Yes   Si each by Other route 2 (two) times a day Use as instructed   aspirin (ADULT ASPIRIN EC LOW STRENGTH) 81 mg EC tablet  Self Yes Yes   Sig: Take 1 tablet by mouth daily   calcitriol (ROCALTROL) 0 25 mcg capsule  Self No Yes   Sig: Take 1 capsule (0 25 mcg total) by mouth 2 (two) times a week Please take every Tuesday and friday   calcium carbonate-vitamin D (OSCAL-D) 500 mg-200 units per tablet  Self Yes Yes   Sig: Take 1 tablet by mouth daily with breakfast   fluticasone (FLONASE) 50 mcg/act nasal spray  Self No Yes   Sig: instill 1 spray into each nostril once daily furosemide (LASIX) 20 mg tablet  Self No Yes   Sig: Take 1 tablet (20 mg total) by mouth daily Daily with swelling, every other day once swelling resolves   gabapentin (NEURONTIN) 100 mg capsule  Self No Yes   Sig: take 1 capsule by mouth at bedtime for 10 days   losartan (COZAAR) 100 MG tablet  Self No Yes   Sig: Take 1 tablet (100 mg total) by mouth daily   pantoprazole (PROTONIX) 40 mg tablet  Self No Yes   Sig: Take 1 tablet (40 mg total) by mouth daily   pravastatin (PRAVACHOL) 40 mg tablet  Self No Yes   Sig: take 1 tablet by mouth daily   senna (SENOKOT) 8 6 mg  Self No Yes   Sig: Take 1 tablet (8 6 mg total) by mouth daily at bedtime as needed for constipation      Facility-Administered Medications: None       Past Medical History:   Diagnosis Date    Arthritis     Back pain     Benign essential hypertension     LAST ASSESSED: [id number]    Carotid artery stenosis     CKD (chronic kidney disease) stage 2, GFR 60-89 ml/min     Constipation, chronic     "drinks prune juice"    Diabetes mellitus (HCC)     DMII [facility]diabetes mellitus, type 2) ([facility])     Epiglottitis     RESOLVED: [id number]    Gastritis     GERD (gastroesophageal reflux disease)     Gout     H/O blood clots     left lower leg,,,11 yrs ago    History of stenosis of renal artery     RESOLVED: [id number]    Hypercholesteremia     Hypercholesterolemia     Hyperlipidemia     Hyperparathyroidism ([facility] )     Hypertension     Iliac artery stenosis, 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[facility])     Kidney stone     [facility] 09/06/2017    Left    Mucinous carcinoma of breast, 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    Left    Presence of pessary     Renal artery stenosis ([facility])     Renal disorder     Renovascular hypertension     RESOLVED: [id number]    Segmental and somatic dysfunction of cervical region     RESOLVED: [id number]    Segmental and somatic dysfunction of lumbar region     RESOLVED: 55VQI5068    Segmental and somatic dysfunction of pelvic region RESOLVED: 06VNK6020    Segmental and somatic dysfunction of thoracic region     RESOLVED: 79GHT9369    Somatic dysfunction of abdominal region     RESOLVED: 78BZB3885    Somatic dysfunction of head region     RESOLVED: [id number]    Somatic dysfunction of lower extremities     RESOLVED: 87UDP5747    Somatic dysfunction of rib region     RESOLVED: 06RKL6160    Somatic dysfunction of thoracic region     RESOLVED: 79RWB8358    Somatic dysfunction of upper extremities     RESOLVED: 48JGC5856    Teeth missing        Past Surgical History:   Procedure Laterality Date    ANGIOPLASTY / STENTING ILIAC      BREAST BIOPSY Left 08/10/2018    U/S BX    BREAST BIOPSY Left 08/07/2017    U/S BX    BREAST LUMPECTOMY Left 09/06/2017    LAST ASSESSED: [id number]    BREAST LUMPECTOMY Left 09/11/2018    BREAST SURGERY Left     biopsy    CATARACT EXTRACTION Bilateral     CHOLECYSTECTOMY      COLONOSCOPY      EYE SURGERY      ILIAC VEIN ANGIOPLASTY / STENTING Right     INITIAL STENOSIS: ONSET: [id number]    KNEE ARTHROSCOPY Right     KNEE SURGERY Right     ONSET: [id number]    MAMMO NEEDLE LOCALIZATION LEFT (ALL INC) Left 9/11/2018    MAMMO NEEDLE LOCALIZATION LEFT (ALL INC) Left 9/6/2017    LA ESOPHAGOGASTRODUODENOSCOPY TRANSORAL DIAGNOSTIC N/A 7/26/2018    Procedure: ESOPHAGOGASTRODUODENOSCOPY (EGD); Surgeon: Zulma Shannon DO;  [facility]; Service: Gastroenterology    LA LARYNGOSCOPY,DIRCT,Count includes the [facility] N/A 6/10/2016    Procedure: MICRO DIRECT LARYNGOSCOPY WITH VOCAL FOLD MASS EXCISION ;  Surgeon: Lindsey Marks MD;  [facility];  Service: ENT    LA MASTECTOMY, PARTIAL Left 9/6/2017    Procedure: LUMPECTOMY BREAST NEEDLE LOCALIZED WITH FAXITRON NEEDLE @ 0830;  Surgeon: Jocy Bridges MD;  [facility];  Service: General    LA PERQ DEVICE PLACEMT BREAST LOC 1ST LES [place] Left 9/11/2018    Procedure: BREAST LUMPECTOMY; BREAST NEEDLE LOCALIZATION (NEEDLE LOC AT 1330);   Surgeon: Smiley Garcia MD;  [facility];  Service: Surgical Oncology    THROAT SURGERY      lump removed    TUBAL LIGATION      UPPER GASTROINTESTINAL ENDOSCOPY      US GUIDANCE BREAST BIOPSY LEFT EACH ADDITIONAL Left 8/7/2017    US GUIDED BREAST BIOPSY LEFT COMPLETE Left 8/10/2018    US GUIDED BREAST BIOPSY LEFT COMPLETE Left 8/7/2017    VASCULAR SURGERY         Family History   Problem Relation Age of Onset    Heart disease Brother     Heart disease Maternal Grandmother     Diabetes Other         of other type with arthropathy, without long term current use of insulin    Hypertension Other     No Known Problems Mother     Gout Family     Rheum arthritis Family     Lupus Family         systematic erythematosus    Heart disease Family     Stroke Family         syndrome    Stroke Maternal Uncle         syndrome    Stroke Maternal Aunt         syndrome    No Known Problems Father     No Known Problems Daughter     No Known Problems Maternal Grandfather     No Known Problems Paternal Grandmother     No Known Problems Paternal Grandfather     No Known Problems Daughter     No Known Problems Daughter     No Known Problems Maternal Aunt     No Known Problems Maternal Aunt     No Known Problems Maternal Aunt      I have reviewed and agree with the history as documented  Social History     Tobacco Use    Smoking status: Former Smoker     Packs/day: 1 00     Years: 64 00     Pack years: 64 00     Last attempt to quit: 2004     Years since quitting: 15 7    Smokeless tobacco: Never Used    Tobacco comment: quit in 2003 1 ppd x 50 years   Substance Use Topics    Alcohol use: Not Currently     Frequency: Never    Drug use: No        Review of Systems   Constitutional: Negative for chills, diaphoresis, fatigue and fever  HENT: Negative for congestion, rhinorrhea and sore throat  Eyes: Negative for photophobia and visual disturbance     Respiratory: Negative for cough, chest tightness and shortness of breath  Cardiovascular: Negative for chest pain and palpitations  Gastrointestinal: Negative for abdominal pain, blood in stool, constipation, diarrhea, nausea and vomiting  Genitourinary: Negative for dysuria, frequency and hematuria  Musculoskeletal: Negative for back pain, gait problem, myalgias, neck pain and neck stiffness  Skin: Negative for pallor and rash  Neurological: Positive for headaches  Negative for weakness, light-headedness and numbness  Hematological: Negative for adenopathy  Does not bruise/bleed easily  All other systems reviewed and are negative  Physical Exam  ED Triage Vitals [09/16/19 2106]   Temperature Pulse Respirations Blood Pressure SpO2   97 5 °F (36 4 °C) 81 16 (!) 210/100 98 %      Temp Source Heart Rate Source Patient Position - Orthostatic VS BP Location FiO2 (%)   Tympanic Monitor Sitting Left arm --      Pain Score       No Pain             Orthostatic Vital Signs  Vitals:    09/16/19 2340 09/16/19 2355 09/17/19 0034 09/17/19 0045   BP: (!) 207/82 (!) 207/84 (!) 217/86 (!) 207/84   Pulse: 78  80    Patient Position - Orthostatic VS: Sitting  Sitting        Physical Exam   Constitutional: She is oriented to person, place, and time  She appears well-developed and well-nourished  No distress  Patient is alert and oriented, appears well and nontoxic, in no acute distress   HENT:   Head: Normocephalic and atraumatic  Mouth/Throat: Oropharynx is clear and moist  No oropharyngeal exudate  Eyes: Pupils are equal, round, and reactive to light  Conjunctivae and EOM are normal    Neck: Normal range of motion  Neck supple  Cardiovascular: Normal rate, regular rhythm, normal heart sounds and intact distal pulses  Pulmonary/Chest: Effort normal and breath sounds normal  No respiratory distress  Abdominal: Soft  Bowel sounds are normal  She exhibits no distension  There is no tenderness  Musculoskeletal: Normal range of motion   She exhibits no edema    Lymphadenopathy:     She has no cervical adenopathy  Neurological: She is alert and oriented to person, place, and time  No facial asymmetry noted, CN 2-12 intact, full ROM of upper and lower extremities, muscle strength 5/5 throughout, sensation intact throughout, negative finger to nose, no gait disturbance noted   Skin: Skin is warm and dry  Capillary refill takes less than 2 seconds  No rash noted  She is not diaphoretic  No erythema  No pallor  Psychiatric: She has a normal mood and affect  Her behavior is normal  Judgment and thought content normal    Nursing note and vitals reviewed        ED Medications  Medications   acetaminophen (TYLENOL) tablet 650 mg (650 mg Oral Given 9/16/19 2252)   NIFEdipine (PROCARDIA XL) 24 hr tablet 30 mg (30 mg Oral Given 9/17/19 0032)       Diagnostic Studies  Results Reviewed     Procedure Component Value Units Date/Time    POCT Chem 8+ [[id number]]  (Abnormal) Collected:  09/16/19 2303    Lab Status:  Final result Specimen:  Venous Updated:  09/16/19 2308     SODIUM, I-STAT 139 mmol/l      Potassium, i-STAT 4 0 mmol/L      Chloride, istat 105 mmol/L      CO2, i-STAT 25 mmol/L      Anion Gap, i-STAT 14 mmol/L      Calcium, Ionized i-STAT 1 03 mmol/L      BUN, I-STAT 34 mg/dl      Creatinine, i-STAT 1 0 mg/dl      eGFR 51 ml/min/1 73sq m      Glucose, i-STAT 182 mg/dl      Hct, i-STAT 35 %      Hgb, i-STAT 11 9 g/dl      Specimen Type VENOUS    Narrative:       National Kidney Disease Foundation guidelines for Chronic Kidney Disease (CKD):     Stage 1 with normal or high GFR (GFR > 90 mL/min/1 73 square meters)    Stage 2 Mild CKD (GFR = 60-89 mL/min/1 73 square meters)    Stage 3A Moderate CKD (GFR = 45-59 mL/min/1 73 square meters)    Stage 3B Moderate CKD (GFR = 30-44 mL/min/1 73 square meters)    Stage 4 Severe CKD (GFR = 15-29 mL/min/1 73 square meters)    Stage 5 End Stage CKD (GFR <15 mL/min/1 73 square meters)  Note: GFR calculation is accurate only with a steady state creatinine                 No orders to display         Procedures  Procedures        ED Course  ED Course as of Sep 17 0524   Tue Sep 17, 2019   0052 Patient noted feeling better with resolution of headache  Will discharge  Instructed patient to follow up with pcp tomorrow  Return precautions thoroughly discussed with patient and family               Identification of Seniors at Risk      Most Recent Value   (ISAR) Identification of Seniors at Risk   Before the illness or injury that brought you to the Emergency, did you need someone to help you on a regular basis? 1 Filed at: 09/16/2019 2109   In the last 24 hours, have you needed more help than usual?  0 Filed at: 09/16/2019 2109   Have you been hospitalized for one or more nights during the past 6 months? 1 Filed at: 09/16/2019 2109   In general, do you see well?  0 Filed at: 09/16/2019 2109   In general, do you have serious problems with your memory? 0 Filed at: 09/16/2019 2109   Do you take more than three different medications every day? 1 Filed at: 09/16/2019 2109   ISAR Score  3 Filed at: 09/16/2019 2109                          MDM  Number of Diagnoses or Management Options  Asymptomatic hypertension:   Headache:   Diagnosis management comments: 71-year-old female presents for evaluation of hypertension  Patient is hypertensive but otherwise appears well and hemodynamically stable  Chem 8 obtained to check creatinine and GFR which were normal  Patient's headache improved throughout her ED visit and patient given her nightly home dose of nifedipine  Patient was discharged and instructed to follow up with PCP tomorrow   Return precautions discussed      Disposition  Final diagnoses:   Headache   Asymptomatic hypertension     Time reflects when diagnosis was documented in both MDM as applicable and the Disposition within this note     Time User Action Codes Description Comment    9/17/2019 12:53 AM Madelyn Tompkins Hypertension     9/17/2019 12:53 AM Odalis Oppenheim Add [R51] Headache     9/17/2019 12:53 AM Odalis Oppenheim Modify [R51] Headache     9/17/2019 12:53 AM Odalis Oppenheim Remove [I10] Hypertension     9/17/2019 12:53 AM Odalis Oppenheim Remove [R51] Headache     9/17/2019 12:53 AM Odalis Oppenheim Add [R51] Headache     9/17/2019 12:53 AM Odalis Oppenheim Add [I10] Asymptomatic hypertension       ED Disposition     ED Disposition Condition Date/Time Comment    Discharge Fair Tue Sep 17, 2019 12:53 AM Modena Councilman discharge to home/self care  Follow-up Information     Follow up With Specialties Details Why Contact Info    PCP  Call today As needed, If symptoms worsen Please follow up to primary care physician today in regards to your high blood pressure            Discharge Medication List as of 9/17/2019 12:54 AM      CONTINUE these medications which have NOT CHANGED    Details   Acetaminophen (TYLENOL) 325 MG CAPS Take by mouth every 8 (eight) hours, Starting Tue 8/15/2017, Historical Med      aspirin (ADULT ASPIRIN EC LOW STRENGTH) 81 mg EC tablet Take 1 tablet by mouth daily, Historical Med      calcitriol (ROCALTROL) 0 25 mcg capsule Take 1 capsule (0 25 mcg total) by mouth 2 (two) times a week Please take every Tuesday and friday, Starting Mon 2/25/2019, Normal      calcium carbonate-vitamin D (OSCAL-D) 500 mg-200 units per tablet Take 1 tablet by mouth daily with breakfast, Historical Med      DULoxetine (CYMBALTA) 30 mg delayed release capsule Take 30 mg by mouth daily, Starting Sun 9/8/2019, Historical Med      fluticasone (FLONASE) 50 mcg/act nasal spray instill 1 spray into each nostril once daily, Normal      furosemide (LASIX) 20 mg tablet Take 1 tablet (20 mg total) by mouth daily Daily with swelling, every other day once swelling resolves, Starting Wed 7/3/2019, Normal      gabapentin (NEURONTIN) 100 mg capsule take 1 capsule by mouth at bedtime for 10 days, Normal      losartan (COZAAR) 100 MG tablet Take 1 tablet (100 mg total) by mouth daily, Starting Sat 7/27/2019, Normal      !! NIFEdipine ER (ADALAT CC) 30 MG 24 hr tablet Take 1 tablet (30 mg total) by mouth daily Please take at night in addition to the 60 mg in am, Starting Tue 8/13/2019, Normal      !! NIFEdipine ER (ADALAT CC) 60 MG 24 hr tablet Take 1 tablet (60 mg total) by mouth daily for 90 days, Starting Fri 7/26/2019, Until Thu 10/24/2019, Normal      ONE TOUCH ULTRA TEST test strip 1 each by Other route 2 (two) times a day Use as instructed, Starting Thu 8/15/2019, Normal      pantoprazole (PROTONIX) 40 mg tablet Take 1 tablet (40 mg total) by mouth daily, Starting Tue 6/25/2019, Normal      pravastatin (PRAVACHOL) 40 mg tablet take 1 tablet by mouth daily, Normal      senna (SENOKOT) 8 6 mg Take 1 tablet (8 6 mg total) by mouth daily at bedtime as needed for constipation, Starting Fri 8/2/2019, Normal       !! - Potential duplicate medications found  Please discuss with provider  No discharge procedures on file  ED Provider  Attending physically available and evaluated Markos Lew I managed the patient along with the ED Attending      Electronically Signed by         Felice Lopez MD  09/17/19 7560

## 2019-09-24 ENCOUNTER — OFFICE VISIT (OUTPATIENT)
Dept: VASCULAR SURGERY | Facility: CLINIC | Age: 84
End: 2019-09-24
Payer: COMMERCIAL

## 2019-09-24 VITALS
HEART RATE: 68 BPM | HEIGHT: 62 IN | WEIGHT: 135 LBS | SYSTOLIC BLOOD PRESSURE: 128 MMHG | TEMPERATURE: 97.7 F | BODY MASS INDEX: 24.84 KG/M2 | DIASTOLIC BLOOD PRESSURE: 50 MMHG

## 2019-09-24 DIAGNOSIS — I77.1 ILIAC ARTERY STENOSIS, BILATERAL (HCC): ICD-10-CM

## 2019-09-24 DIAGNOSIS — I73.9 PAD (PERIPHERAL ARTERY DISEASE) (HCC): ICD-10-CM

## 2019-09-24 DIAGNOSIS — I65.23 ASYMPTOMATIC BILATERAL CAROTID ARTERY STENOSIS: Primary | ICD-10-CM

## 2019-09-24 PROCEDURE — 99213 OFFICE O/P EST LOW 20 MIN: CPT | Performed by: NURSE PRACTITIONER

## 2019-09-24 NOTE — PROGRESS NOTES
Assessment/Plan:  80year old female with HTN, HLD ,DM w/ peripheral neuropathy, CKDIII, Asymptomatic bilateral carotid stenosis, Aortoiliac occlusive disease with hx of R EIA/VIDA and L EIA PTA/stent 1/7/2010 and left breast adenocarcinoma who returns to the office for yearly follow up  1  PAD/AIOD  -Bilateral calf claudication symptoms at approx 1 block; no change in symptoms since last visit; not exercising  -Due for surveillance LEAD/AOIL, scheduled  -Counseled on proper foot care and increasing exercise   -Continue ASA and pravastatin   -Plan for yearly office visit unless change in symtpoms, difficulty with walking or non healing wound  Patient and  verbalize understanding  2  Bilateral carotid stenosis   -Asymptomatic from a carotid standpoint   -Last CV duplex 10/2018 w/ Bilateral ICA 50-69% stenosis    -Continue surveillance duplex, scheduled  -BP management   -Continue medical management with ASA and pravastatin   -Reviewed s/s of TIA/CVA and that she go immediately to the ED should she experience symptoms   -Follow up in 1 year for risk factor modification        Problem List Items Addressed This Visit        Cardiovascular and Mediastinum    Iliac artery stenosis, bilateral (HCC)    Relevant Orders    VAS abdominal aorta/iliacs; complete study    VAS lower limb arterial duplex, complete bilateral    Carotid stenosis, asymptomatic - Primary    Relevant Orders    VAS carotid complete study    PAD (peripheral artery disease) (HCC)    Relevant Orders    VAS abdominal aorta/iliacs; complete study    VAS lower limb arterial duplex, complete bilateral            Subjective:      Patient ID: Rocio Carvalho is a 80 y o  female  Patient presents today for results review of multiple studies- AOIL, LEOBARDO and carotid from 10/2018  Patient reports BLE calf claudication with walking x 1 block   Resolves with rest      HPI  80year old female with HTN, HLD ,DM w/ peripheral neuropathy, CKDIII, Asymptomatic bilateral carotid stenosis, Aortoiliac occlusive disease with hx of R EIA/VIDA and L EIA PTA/stent 1/7/2010 and left breast adenocarcinoma who returns to the office for yearly follow up  She is accompanied by her   She has continued bilateral calf claudication symptoms at approximately a block though not walking regularly for exercise  She follows podiatry for foot care  No ischemic wound or ischemic rest pain  She does not LLE swelling for the past 5-6 months and bilateral should pain, L>R  She denies any focal neurological events  She has been in the ED and hospitalized for HTN crisis, most recently 9/19  Today BP is great  She did have evidence of renal stenosis on duplex and previously has renal angiogram in 2016 without any evidence of renal artery stenosis  She is maintained on ASA and pravastatin  She's not had any recent doppler studies  Last carotid duplex showed bilateral 50-69% ICA stenosis; lower extremity arterial duplex showed bilateral common femoral artery and profundus tremors stenosis, R MICHEAL 0 68/124/68 and L MICHEAL 0 79/111/67  The following portions of the patient's history were reviewed and updated as appropriate: allergies, current medications, past family history, past medical history, past social history, past surgical history and problem list   Review of systems reviewed     Review of Systems   Constitutional: Negative  HENT: Negative  Eyes: Negative  Respiratory: Negative  Cardiovascular: Negative  Gastrointestinal: Negative  Endocrine: Negative  Genitourinary: Negative  Musculoskeletal:        Leg pain  Leg cramping with walking   Skin: Negative  Allergic/Immunologic: Negative  Neurological: Negative  Hematological: Negative  Psychiatric/Behavioral: Negative  Objective:    I have reviewed and made appropriate changes to the review of systems input by the medical assistant      Vitals:    09/24/19 1551 09/24/19 1553   BP: 124/50 128/50   BP Location: Right arm Left arm   Patient Position: Sitting Sitting   Pulse: 68    Temp: 97 7 °F (36 5 °C)    TempSrc: Tympanic    Weight: 61 2 kg (135 lb)    Height: 5' 2" (1 575 m)        Patient Active Problem List   Diagnosis    Vocal cord mass    Iliac artery stenosis, bilateral (HCC)    Hyperparathyroidism (HCC)    GERD (gastroesophageal reflux disease)    Arthritis    Hypertension    EKG abnormalities    Anemia    Neck pain    Chronic thoracic back pain    Fall from other slipping, tripping, or stumbling    Abnormal EKG    Abnormal mammogram    Accelerated essential hypertension    Allergic rhinitis    Atherosclerosis of native artery of extremity with intermittent claudication ([facility])    Back pain    Bilateral carotid artery stenosis    Carotid stenosis, asymptomatic    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    Chronic myofascial pain    Chronic pain of lower extremity    CKD (chronic kidney disease), stage III ([facility])    Constipation    Diabetic retinopathy ([facility] [address] [facility]    DMII (diabetes mellitus, type 2) ([facility])    Dysphagia    Epigastric pain    Fever of unknown origin    Gastritis    Gout    Hypomagnesemia    Atherosclerosis of other specified arteries    Lymphadenopathy    Microalbuminuria    Nephrolithiasis    Neuropathic pain, leg, right    Osteoporosis    PAD (peripheral artery disease) ([address]    Primary generalized (osteo)arthritis    Persistent proteinuria    Renal cyst    Secondary hyperparathyroidism, renal ([address] )    Vaginal prolapse    Vitamin D deficiency    Acquired complex cyst of kidney    Gastroesophageal reflux disease without esophagitis    History of left breast cancer    Type 2 diabetes mellitus with diabetic peripheral angiopathy without gangrene ([facility])    Stricture of artery ([facility])    Musculoskeletal pain, chronic    Hemorrhoids    Tinea unguium    Diabetic neuropathy ([facility])    Bradycardia    Hyperkalemia    Pain in tooth  Dysphonia    Glottic insufficiency    Reflux laryngitis    Paresis of left vocal fold    Muscle tension dysphonia    Laryngocele    Gastroesophageal reflux disease    Anterior glottic web    Chest pain    Palpitations    Encounter for follow-up examination after completed treatment for malignant neoplasm       Past Surgical History:   Procedure Laterality Date    ANGIOPLASTY / STENTING ILIAC      BREAST BIOPSY Left 08/10/2018    U/S BX    BREAST BIOPSY Left 08/07/2017    U/S BX    BREAST LUMPECTOMY Left 09/06/2017    LAST ASSESSED: [id number]    BREAST LUMPECTOMY Left 09/11/2018    BREAST SURGERY Left     biopsy    CATARACT EXTRACTION Bilateral     CHOLECYSTECTOMY      COLONOSCOPY      EYE SURGERY      ILIAC VEIN ANGIOPLASTY / STENTING Right     INITIAL STENOSIS: ONSET: [id number]    KNEE ARTHROSCOPY Right     KNEE SURGERY Right     ONSET: [id number]    MAMMO NEEDLE LOCALIZATION LEFT (ALL INC) Left 9/11/2018    MAMMO NEEDLE LOCALIZATION LEFT (ALL INC) Left 9/6/2017    [place] ESOPHAGOGASTRODUODENOSCOPY TRANSORAL DIAGNOSTIC N/A 7/26/2018    Procedure: ESOPHAGOGASTRODUODENOSCOPY (EGD); Surgeon: Gonsalo Oliveros DO;  [facility]: [facility]; Service: Gastroenterology    [place] LARYNGOSCOPY,DIRCT,[facility] N/A 6/10/2016    Procedure: MICRO DIRECT LARYNGOSCOPY WITH VOCAL FOLD MASS EXCISION ;  Surgeon: Rylie Perez MD;  [facility];  Service: ENT    [place] MASTECTOMY, PARTIAL Left 9/6/2017    Procedure: LUMPECTOMY BREAST NEEDLE LOCALIZED WITH FAXITRON NEEDLE @ 0830;  Surgeon: Ese Collins MD;  [facility];  Service: General    [place] PERQ DEVICE PLACEMT BREAST LOC 1ST LES W Carmenza Hensley Left 9/11/2018    Procedure: BREAST LUMPECTOMY; BREAST NEEDLE LOCALIZATION (NEEDLE LOC AT 1330);   Surgeon: Kerri Day MD;  [facility];  Service: Surgical Oncology    THROAT SURGERY      lump removed    TUBAL LIGATION      UPPER GASTROINTESTINAL ENDOSCOPY      US GUIDANCE BREAST BIOPSY LEFT EACH ADDITIONAL Left 8/7/2017    US GUIDED BREAST BIOPSY LEFT COMPLETE Left 8/10/2018    US GUIDED BREAST BIOPSY LEFT COMPLETE Left 8/7/2017    VASCULAR SURGERY         Family History   Problem Relation Age of Onset    Heart disease Brother     Heart disease Maternal Grandmother     Diabetes Other         of other type with arthropathy, without long term current use of insulin    Hypertension Other     No Known Problems Mother     Gout Family     Rheum arthritis Family     Lupus Family         systematic erythematosus    Heart disease Family     Stroke Family         syndrome    Stroke Maternal Uncle         syndrome    Stroke Maternal Aunt         syndrome    No Known Problems Father     No Known Problems Daughter     No Known Problems Maternal Grandfather     No Known Problems Paternal Grandmother     No Known Problems Paternal Grandfather     No Known Problems Daughter     No Known Problems Daughter     No Known Problems Maternal Aunt     No Known Problems Maternal Aunt     No Known Problems Maternal Aunt        Social History     Socioeconomic History    Marital status:       Spouse name: Not on file    Number of children: Not on file    Years of education: Not on file    Highest education level: Not on file   Occupational History    Occupation: retired   Social Needs    Financial resource strain: Not hard at all   Edufii insecurity:     Worry: Never true     Inability: Never true   M86 Security needs:     Medical: No     Non-medical: No   Tobacco Use    Smoking status: Former Smoker     Packs/day: 1 00     Years: 64 00     Pack years: 64 00     Last attempt to quit: 2004     Years since quitting: 15 7    Smokeless tobacco: Never Used    Tobacco comment: quit in 2003 1 ppd x 50 years   Substance and Sexual Activity    Alcohol use: Not Currently     Frequency: Never    Drug use: No    Sexual activity: Not on file   Lifestyle    Physical activity:     Days per week: 0 days     Minutes per session: 0 min    Stress:  Only a little   Relationships    Social connections:     Talks on phone: More than three times a week     Gets together: More than three times a week     Attends Hindu service: Not on file     Active member of club or organization: Not on file     Attends meetings of clubs or organizations: Not on file     Relationship status: Not on file    Intimate partner violence:     Fear of current or ex partner: Not on file     Emotionally abused: Not on file     Physically abused: Not on file     Forced sexual activity: Not on file   Other Topics Concern    Not on file   Social History Narrative    Daily caffeine consumption, 1 serving a day        Allergies   Allergen Reactions    Clonidine Derivatives Swelling    Diflucan [Fluconazole] Rash    Flagyl [Metronidazole] Anaphylaxis    Carvedilol Hives     And legs swelled    Latex Rash    Lipitor [Atorvastatin]      Legs swelled    Other Palpitations     IV DYE         Current Outpatient Medications:     Acetaminophen (TYLENOL) 325 MG CAPS, Take by mouth every 8 (eight) hours, Disp: , Rfl:     aspirin (ADULT ASPIRIN EC LOW STRENGTH) 81 mg EC tablet, Take 1 tablet by mouth daily, Disp: , Rfl:     calcitriol (ROCALTROL) 0 25 mcg capsule, Take 1 capsule (0 25 mcg total) by mouth 2 (two) times a week Please take every Tuesday and friday, Disp: 10 capsule, Rfl: 6    calcium carbonate-vitamin D (OSCAL-D) 500 mg-200 units per tablet, Take 1 tablet by mouth daily with breakfast, Disp: , Rfl:     DULoxetine (CYMBALTA) 30 mg delayed release capsule, Take 30 mg by mouth daily, Disp: , Rfl: 0    fluticasone (FLONASE) 50 mcg/act nasal spray, instill 1 spray into each nostril once daily, Disp: 16 g, Rfl: 2    furosemide (LASIX) 20 mg tablet, Take 1 tablet (20 mg total) by mouth daily Daily with swelling, every other day once swelling resolves, Disp: 30 tablet, Rfl: 3    gabapentin (NEURONTIN) 100 mg capsule, take 1 capsule by mouth at bedtime for 10 days, Disp: 90 capsule, Rfl: 1    losartan (COZAAR) 100 MG tablet, Take 1 tablet (100 mg total) by mouth daily, Disp: 90 tablet, Rfl: 0    NIFEdipine ER (ADALAT CC) 30 MG 24 hr tablet, Take 1 tablet (30 mg total) by mouth daily Please take at night in addition to the 60 mg in am, Disp: 30 tablet, Rfl: 5    NIFEdipine ER (ADALAT CC) 60 MG 24 hr tablet, Take 1 tablet (60 mg total) by mouth daily for 90 days, Disp: 90 tablet, Rfl: 0    ONE TOUCH ULTRA TEST test strip, 1 each by Other route 2 (two) times a day Use as instructed, Disp: 100 each, Rfl: 5    pantoprazole (PROTONIX) 40 mg tablet, Take 1 tablet (40 mg total) by mouth daily, Disp: 30 tablet, Rfl: 5    pravastatin (PRAVACHOL) 40 mg tablet, take 1 tablet by mouth daily, Disp: 90 tablet, Rfl: 0    senna (SENOKOT) 8 6 mg, Take 1 tablet (8 6 mg total) by mouth daily at bedtime as needed for constipation, Disp: 120 each, Rfl: 0    LMP  (LMP Unknown)          Physical Exam   Constitutional: She is oriented to person, place, and time  She appears well-developed and well-nourished  HENT:   Head: Normocephalic and atraumatic  Eyes: EOM are normal    Neck: Neck supple  Cardiovascular: Normal heart sounds  Pulses:       Carotid pulses are on the left side with bruit  Dorsalis pedis pulses are 0 on the right side, and 0 on the left side  Posterior tibial pulses are 0 on the right side, and 0 on the left side  Pulmonary/Chest: Effort normal and breath sounds normal    Abdominal: Soft  Bowel sounds are normal    Musculoskeletal: Normal range of motion  She exhibits edema  Trace LLE edema    Neurological: She is alert and oriented to person, place, and time  Skin: Skin is warm  Psychiatric: Her behavior is normal  Thought content normal    Nursing note and vitals reviewed

## 2019-09-24 NOTE — PATIENT INSTRUCTIONS
Peripheral Artery Disease   WHAT YOU NEED TO KNOW:   What is peripheral artery disease? Peripheral artery disease (PAD) is narrow, weak, or blocked arteries  It may affect any arteries outside of your heart and brain  PAD is usually the result of a buildup of fat and cholesterol, also called plaque, along your artery walls  Inflammation, a blood clot, or abnormal cell growth could also block your arteries  PAD prevents normal blood flow to your legs and arms  You are at risk of an amputation if poor blood flow keeps wounds from healing or causes gangrene (tissue death)  Without treatment, PAD can also cause a heart attack or stroke  What increases my risk for PAD? · Smoking cigarettes     · Diabetes     · High blood pressure     · High cholesterol     · Age older than 40 years    · Heart disease or a family history of heart disease  What are the signs and symptoms of PAD? Mild PAD usually does not cause symptoms  As the disease worsens over time, you may have the following:  · Pain or cramps in your leg or hip while you walk     · A numb, weak, or heavy feeling in your legs     · Dry, scaly, red, or pale skin on your legs     · Thick or brittle nails, or hair loss on your arms and legs     · Foot sores that will not heal     · Burning or aching in your feet and toes while resting (this may be worse when you lie down)  How is PAD diagnosed? · Angiography  is a test that shows pictures of the arteries in your arms and legs  You will be given contrast liquid to help the arteries show up better on the pictures  The pictures will be taken with an MRI or CT scan  Tell the healthcare provider if you have ever had an allergic reaction to contrast liquid  Do not enter the MRI room with anything metal  Metal can cause serious injury  Tell a healthcare provider if you have any metal in or on your body      · Doppler ankle brachial index (MICHEAL)  is a test that compares blood pressure in your ankles to blood pressure in your arms  This tells your healthcare provider how well blood is flowing through the arteries in your legs  How is PAD treated? Treatment can help reduce your risk of a heart attack, stroke, or amputation  You may need more than one of the following:  · Medicines  may be given  Your healthcare provider may give you any of the following:     ¨ Antiplatelet medicine,  such as aspirin, helps prevent blood clots and reduces the risk of a heart attack or stroke  ¨ Statin medicine  helps lower your cholesterol and prevents your PAD from getting worse  · A supervised exercise program  helps you stay active in normal daily activities and may prevent disability  Healthcare providers will help you safely walk or do strength training exercises 3 times a week for 30 to 60 minutes  You will do this for several months, then transition to walking on your own  · Angioplasty  is a procedure to open your artery so blood can flow through normally  A thin tube called a catheter is used to insert a small balloon into your artery  The balloon is inflated to open your blocked artery, and then removed  A tube called a stent may be placed in your artery to hold it open  · Bypass surgery  is used to make a new connection to your artery with a vein from another part of your body, or an artificial graft  The vein or graft is attached to your artery above and below your blockage  This allows blood to flow around the blocked portion of your artery  How can I manage PAD? · Do not smoke  Nicotine and other chemicals in cigarettes and cigars can worsen PAD  They can also increase your risk for a heart attack or stroke  Ask your healthcare provider for information if you currently smoke and need help to quit  E-cigarettes or smokeless tobacco still contain nicotine  Talk to your healthcare provider before you use these products  · Manage other health conditions  Take your medicines as directed   Follow your healthcare provider's instructions if you have high blood pressure or high cholesterol  Perform foot care and check your blood sugar levels as directed if you have diabetes  · Eat heart healthy foods  Eat whole grains, fruits, and vegetables every day  Limit salt and high-fat foods  Ask your healthcare provider for more information on a heart healthy diet  Ask if you need to lose weight  Your healthcare provider can help you create a healthy weight-loss plan  Call 911 for the following:   · You have any of the following signs of a heart attack:      ¨ Squeezing, pressure, or pain in your chest that lasts longer than 5 minutes or returns    ¨ Discomfort or pain in your back, neck, jaw, stomach, or arm     ¨ Trouble breathing    ¨ Nausea or vomiting    ¨ Lightheadedness or a sudden cold sweat, especially with chest pain or trouble breathing    · You have any of the following signs of a stroke:      ¨ Numbness or drooping on one side of your face     ¨ Weakness in an arm or leg    ¨ Confusion or difficulty speaking    ¨ Dizziness, a severe headache, or vision loss  When should I seek immediate care? · You have sores or wounds that will not heal      · You notice black or discolored skin on your arm or leg  · Your skin is cool to the touch  When should I contact my healthcare provider? · You have leg pain when you walk 1/8 mile (200 meters) or less, even with treatment  · Your legs are red, dry, or pale, even with treatment  · You have questions or concerns about your condition or care  CARE AGREEMENT:   You have the right to help plan your care  Learn about your health condition and how it may be treated  Discuss treatment options with your caregivers to decide what care you want to receive  You always have the right to refuse treatment  The above information is an  only  It is not intended as medical advice for individual conditions or treatments   Talk to your doctor, nurse or pharmacist before following any medical regimen to see if it is safe and effective for you  © 2017 [address] [employer] Information is for End User's use only and may not be sold, redistributed or otherwise used for commercial purposes  All illustrations and images included in CareNotes® are the copyrighted property of A D A M [employer] Inc  or Yoan Posada  Carotid Artery Disease   AMBULATORY CARE:   Carotid artery disease  is a condition that causes narrow or blocked carotid arteries  Your carotid arteries are the blood vessels that supply your brain with most of the blood it needs to work  You have 2 carotid arteries, one on each side of your neck  Call 911 for any of the following:   · You have any of the following signs of a stroke:      ¨ Numbness or drooping on one side of your face     ¨ Weakness in an arm or leg    ¨ Confusion or difficulty speaking    ¨ Dizziness, a severe headache, or vision loss    · You have any of the following signs of a heart attack:      ¨ Squeezing, pressure, or pain in your chest that lasts longer than 5 minutes or returns    ¨ Discomfort or pain in your back, neck, jaw, stomach, or arm     ¨ Trouble breathing    ¨ Nausea or vomiting    ¨ Lightheadedness or a sudden cold sweat, especially with chest pain or trouble breathing  Contact your healthcare provider if:   · You have questions or concerns about your condition or care  Signs and symptoms of carotid artery disease: You may have no signs or symptoms  Most commonly, carotid artery disease causes transient ischemic attacks (TIAs), or mini-strokes  You may have numbness, weakness, lack of movement, or vision or speech problems  A TIA goes away quickly and does not cause permanent damage  A TIA may be a warning sign that you are about to have a stroke  If you have any symptoms of a TIA or stroke, seek care immediately  Warning signs of a stroke:   The word F A S T  can help you remember and recognize warning signs of a stroke  · F = Face:  One side of the face droops  · A = Arms:  One arm starts to drop when both arms are raised  · S = Speech:  Speech is slurred or sounds different than usual     · T = Time:  A person who is having a stroke needs to be seen immediately  A stroke is a medical emergency that needs immediate treatment  Some medicines and treatments work best if given within a few hours of a stroke  Treatment  for carotid artery disease depends on how narrow your arteries have become, your symptoms, and your general health  The goal of treatment is to lower your risk for a stroke  You may need any of the following:  · Take aspirin if directed  Your healthcare provider may suggest that you take an aspirin a day to prevent blood clots from forming in the carotid arteries  If your healthcare provider wants you to take aspirin daily, do not take acetaminophen or ibuprofen instead  · Control risk factors  High blood pressure, high cholesterol, heart disease, diabetes, and being overweight increase your risk for atherosclerosis  · Procedures can help open blocked arteries  A carotid endarterectomy is used to cut plaque out of the artery  An angioplasty is used to push the plaque against the artery wall with a balloon device  Sometimes a stent is placed during an angioplasty  A stent is a metal mesh tube that is placed in the artery to keep it open  Manage carotid artery disease:   · Eat a variety of healthy foods  Healthy foods include fruit, vegetables, whole-grain breads, low-fat dairy products, lean meat, and fish  Choose fish that are high in omega-3 fatty acids, such as salmon and fresh tuna  Ask your healthcare provider for more information on a heart healthy diet and the DASH eating plan  · Limit sodium (salt)  Sodium may increase your blood pressure  Add less table salt to your foods  Read food labels and choose foods that are low in sodium   Your healthcare provider may suggest you follow a low sodium diet  · Reach or maintain a healthy weight  Extra weight makes your heart work harder  Ask your healthcare provider how much you should weight  He can help you create a safe weight loss plan  Even a weight loss of 10% of your body weight can help your heart function better  · Exercise as directed  Exercise helps improve heart function and can help you manage your weight  Exercise can also help lower your cholesterol and blood sugar levels  Try to get at least 30 minutes of exercise at least 5 times each week  Try to be active every day  Your healthcare provider can help you create an exercise plan that works best for you  · Limit alcohol  Alcohol can increase your blood pressure and triglyceride levels  Men should limit alcohol to 2 drinks per day  Women should limit alcohol to 1 drink per day  A drink of alcohol is 12 ounces of beer, 5 ounces of wine, or 1½ ounces of liquor  · Do not smoke  Nicotine and other chemicals in cigarettes and cigars can cause heart and lung damage  Ask your healthcare provider for information if you currently smoke and need help to quit  E-cigarettes or smokeless tobacco still contain nicotine  Talk to your healthcare provider before you use these products  Follow up with your healthcare provider as directed:  Write down your questions so you remember to ask them during your visits  © 2017 [facility] [facility] Information is for End User's use only and may not be sold, redistributed or otherwise used for commercial purposes  All illustrations and images included in CareNotes® are the copyrighted property of [employer]  or Yoan Posada  The above information is an  only  It is not intended as medical advice for individual conditions or treatments  Talk to your doctor, nurse or pharmacist before following any medical regimen to see if it is safe and effective for you

## 2019-09-26 ENCOUNTER — OFFICE VISIT (OUTPATIENT)
Dept: GASTROENTEROLOGY | Facility: CLINIC | Age: 84
End: 2019-09-26
Payer: COMMERCIAL

## 2019-09-26 VITALS
TEMPERATURE: 98.2 F | HEART RATE: 72 BPM | BODY MASS INDEX: 24.29 KG/M2 | WEIGHT: 132 LBS | SYSTOLIC BLOOD PRESSURE: 142 MMHG | HEIGHT: 62 IN | DIASTOLIC BLOOD PRESSURE: 62 MMHG

## 2019-09-26 DIAGNOSIS — R10.13 EPIGASTRIC PAIN: Primary | ICD-10-CM

## 2019-09-26 DIAGNOSIS — K59.04 CHRONIC IDIOPATHIC CONSTIPATION: ICD-10-CM

## 2019-09-26 DIAGNOSIS — K21.9 GASTROESOPHAGEAL REFLUX DISEASE WITHOUT ESOPHAGITIS: ICD-10-CM

## 2019-09-26 PROCEDURE — 99214 OFFICE O/P EST MOD 30 MIN: CPT | Performed by: PHYSICIAN ASSISTANT

## 2019-09-26 NOTE — PATIENT INSTRUCTIONS
Enfermedad por reflujo gastroesofágico   CUIDADO AMBULATORIO:   La enfermedad por reflujo gastroesofágico  (Ludwin Rist) ocurre cuando el ácido y los alimentos en el estómago regresan al esófago  La enfermedad por reflujo gastroesofágico es el reflujo que se produce más de [address] a la semana dylan varias semanas  Generalmente causa acidez y otros síntomas  La ERGE puede causar otros problemas de rosalba con el tiempo si no es tratada  Los síntomas comunes incluyen:  La acidez es el síntoma más común de la Ludwin Rist  Usted podría sentir dolor quemante en el pecho o debajo del esternón  [place] ocurre generalmente después de las comidas y se extiende al jes, a la mandíbula o al hombro  El dolor se charleen cuando cambia de posición  Usted también podría presentar alguno de los siguientes:  · Sabor amargo o ácido en bethea boca    · Tos seca    · Dificultad para tragar o dolor al tragar    · Ronquera o dolor de garganta    · Eructar o tener hipo con frecuencia    · Sensación de llenura pronto después de empezar a comer  Busque atención médica de inmediato si:  · Usted siente Tomie Hammers y no puede eructar o vomitar  · Usted siente dolor clemente en el pecho y dificultad repentina para respirar  · Jihan evacuaciones intestinales son de color jonathon, con [place], o de apariencia alquitranada  · Bethea vómito parece brenda café molido o contiene jose m  Pregúntele a bethea [place] vitaminas y minerales son adecuados para usted  · Vomita grandes cantidades, o vomita con frecuencia  · Tiene dificultad para respirar después de vomitar  · Tiene dificultad para tragar o dolor al tragar  · Usted pierde peso sin proponérselo  · Jihan síntomas empeoran o no mejoran con el tratamiento  · Usted tiene preguntas o inquietudes acerca de bethea condición o cuidado    El tratamiento para la ERGE:  Es probable que bethea médico le recete medicamentos para disminuir el ácido estomacal  También podría recetarle medicamentos que [place] a bethea esófago y estómago a  los alimentos y líquidos a sara intestinos  Se podría requerir de [place] en madhav de que otros tratamientos no hayan funcionado  Usted también podría necesitar de cirugía para envolver la parte superior del estómago alrededor del esfínter esofágico  [place] fortalecerá el esfínter y prevendrá el reflujo  Control de la ERGE:   · No consuma alimentos o bebidas que puedan aumentar la [place]  Estos incluyen chocolate, menta, comidas fritas o grasosas, bebidas que contienen cafeína o bebidas gaseosas  Otros alimentos incluyen comidas picantes, cebollas, tomates y alimentos a base de tomate  No consuma alimentos y bebidas que puedan irritar bethea esófago, brenda las frutas cítricas, los jugos y las bebidas alcohólicas  · No ingiera comidas abundantes  Cuando usted come [employer] a la vez, bethea estómago necesita más ácido para digerirla  Consuma 6 comidas pequeñas al día en vez de 3 comidas grandes y coma lentamente  No consuma alimentos entre 2 y 3 horas antes de [id number]  · Eleve la cabecera de bethea cama  Coloque bloques de 6 pulgadas debajo de la cabecera de la estructura de bethea cama  También podría usar más dianna almohada para apoyar bethea manuel y hombros mientras duerme  · Mantenga un peso saludable  Si usted tiene sobrepeso, la pérdida de peso podría ayudar a aliviar los síntomas de la [id number]  · No fume  Fumar debilita el esfínter esofágico inferior y [place] el riesgo de [id number]  Pida información a bethea médico si usted actualmente fuma y necesita ayuda para dejar de fumar  Los cigarrillos electrónicos o tabaco sin humo todavía contienen nicotina  Consulte con bethea médico antes de [employer]  · No use ropa que [place] alrededor de la cintura  La ropa apretada puede ejercer presión [employer] y causar o empeorar los síntomas de la [id number]  Acuda a sara consultas de control con bethea médico según le indicaron    Anote sara preguntas para que se acuerde de hacerlas dylan sara visitas  © 2017 [address] [employer]  Information is for End User's use only and may not be sold, redistributed or otherwise used for commercial purposes  All illustrations and images included in CareNotes® are the copyrighted property of A [employer] M [employer] Inc  or Yoan Posada  Esta información es sólo para uso en educación  Bethea intención no es darle un consejo médico sobre enfermedades o tratamientos  Colsulte con bethea [place] farmacéutico antes de seguir cualquier régimen médico para saber si es seguro y efectivo para usted

## 2019-09-26 NOTE — LETTER
September 26, 2019     MD [place] [address] [address]    Patient: Kam Kelsey   YOB: 1934   Date of Visit: 9/26/2019       Dear Dr Maria A Asher: Thank you for referring Kam Kelsey to me for evaluation  Below are my notes for this consultation  If you have questions, please do not hesitate to call me  I look forward to following your patient along with you  Sincerely,        Marylouise Lombard, PA-C        CC: No Recipients  Marylouise Lombard, PA-C  9/26/2019  5:11 PM  Sign at close encounter  [facility] - Outpatient Follow-up Note  Kam Kelsey 80 y o  female MRN: [id number]  Encounter: [id number]          ASSESSMENT AND PLAN:      1  Epigastric pain  Her epigastric pain was likely related to GERD as pain resolved with PPI therapy  Previous EGD negative for PUD and gastric biopsies negative for H pylori  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 but the SMA and YAHAIRA were patent  Continue Protonix 40 mg daily  She is no longer taking Carafate  2  Gastroesophageal reflux disease without esophagitis  Well controlled with Protonix 40 mg daily  EGD from 2018 showed no evidence of esophagitis or Banuelos's esophagus  Continue dietary and lifestyle modifications to prevent reflux  3  Chronic idiopathic constipation  Well controlled with prune juice  Follow-up in 1 year per patient preference  ______________________________________________________________________    SUBJECTIVE:  60-year-old with multiple medical issues presenting for follow-up of GERD, epigastric pain, and constipation  She is Sami speaking but her  speaks [place] and provides translation  Her heartburn is well controlled with Protonix 40 mg daily  She does admit to drinking 2 cups of coffee daily  But otherwise she denies spicy food, tomatoes, soda, chocolate  She denies nausea and vomiting   She denies epigastric pain and states this has improved with Protonix  She still struggles with constipation, but her bowels are regular when she drinks prune juice  She denies blood in the stool and abnormal weight loss  REVIEW OF SYSTEMS IS OTHERWISE NEGATIVE        Historical Information   Past Medical History:   Diagnosis Date    Arthritis     Back pain     Benign essential hypertension     LAST ASSESSED: [id number]    Carotid artery stenosis     CKD (chronic kidney disease) stage 2, GFR 60-89 ml/min     Constipation, chronic     "drinks prune juice"    Diabetes mellitus ([facility] )     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[facility] mellitus, type 2) ([facility]     Epiglottitis     RESOLVED: [id number]    Gastritis     GERD (gastroesophageal reflux disease)     Gout     H/O blood clots     left lower leg,,,11 yrs ago    History of stenosis of renal artery     RESOLVED: [id number]    Hypercholesteremia     Hypercholesterolemia     Hyperlipidemia     Hyperparathyroidism ([facility] )     Hypertension     Iliac artery stenosis, bilateral ([facility] )     Kidney stone     Lobular carcinoma ([place] [address] ) 09/06/2017    Left    Mucinous carcinoma of breast, 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    Left    Presence of pessary     Renal artery stenosis ([facility])     Renal disorder     Renovascular hypertension     RESOLVED: [id number]    Segmental and somatic dysfunction of cervical region     RESOLVED: 12PBU1983    Segmental and somatic dysfunction of lumbar region     RESOLVED: 05IWP4083    Segmental and somatic dysfunction of pelvic region     RESOLVED: 01BAQ8679    Segmental and somatic dysfunction of thoracic region     RESOLVED: 77GTB6471    Somatic dysfunction of abdominal region     RESOLVED: 20GGV9483    Somatic dysfunction of head region     RESOLVED: 35OMM2272    Somatic dysfunction of lower extremities     RESOLVED: 19KDO6257    Somatic dysfunction of rib region     RESOLVED: 08OZO8305    Somatic dysfunction of thoracic region     RESOLVED: 20JUD9646    Somatic dysfunction of upper extremities     RESOLVED: 80BDG8264    Teeth missing      Past Surgical History:   Procedure Laterality Date    ANGIOPLASTY / STENTING ILIAC      BREAST BIOPSY Left 08/10/2018    U/S BX    BREAST BIOPSY Left 08/07/2017    U/S BX    BREAST LUMPECTOMY Left 09/06/2017    LAST ASSESSED: [id number]    BREAST LUMPECTOMY Left 09/11/2018    BREAST SURGERY Left     biopsy    CATARACT EXTRACTION Bilateral     CHOLECYSTECTOMY      COLONOSCOPY      EYE SURGERY      ILIAC VEIN ANGIOPLASTY / STENTING Right     INITIAL STENOSIS: ONSET: [id number]    KNEE ARTHROSCOPY Right     KNEE SURGERY Right     ONSET: 92NPY5378    MAMMO NEEDLE LOCALIZATION LEFT (ALL INC) Left 9/11/2018    MAMMO NEEDLE LOCALIZATION LEFT (ALL INC) Left 9/6/2017    VT ESOPHAGOGASTRODUODENOSCOPY TRANSORAL DIAGNOSTIC N/A 7/26/2018    Procedure: ESOPHAGOGASTRODUODENOSCOPY (EGD); Surgeon: Zulma Shannon DO;  [facility]; Service: Gastroenterology    VT LARYNGOSCOPY,DIRCT,[facility] N/A 6/10/2016    Procedure: MICRO DIRECT LARYNGOSCOPY WITH VOCAL FOLD MASS EXCISION ;  Surgeon: Lindsey Marks MD;  [facility];  Service: ENT    VT MASTECTOMY, PARTIAL Left 9/6/2017    Procedure: LUMPECTOMY BREAST NEEDLE LOCALIZED WITH FAXITRON NEEDLE @ 0830;  Surgeon: Jocy Bridges MD;  [facility];  Service: General    VT PERQ DEVICE PLACEMT BREAST LOC 1ST LES MARY Smart Left 9/11/2018    Procedure: BREAST LUMPECTOMY; BREAST NEEDLE LOCALIZATION (NEEDLE LOC AT 1330);   Surgeon: Renée Virgen MD;  [facility];  Service: Surgical Oncology    THROAT SURGERY      lump removed    TUBAL LIGATION      UPPER GASTROINTESTINAL ENDOSCOPY      US GUIDANCE BREAST BIOPSY LEFT EACH ADDITIONAL Left 8/7/2017    US GUIDED BREAST BIOPSY LEFT COMPLETE Left 8/10/2018    US GUIDED BREAST BIOPSY LEFT COMPLETE Left 8/7/2017    VASCULAR SURGERY       Social History   Social History     Substance and Sexual Activity   Alcohol Use Not Currently  Frequency: Never     Social History     Substance and Sexual Activity   Drug Use No     Social History     Tobacco Use   Smoking Status Former Smoker    Packs/day: 1 00    Years: 64 00    Pack years: 56 65    Last attempt to quit: 2004    Years since quitting: 15 7   Smokeless Tobacco Never Used   Tobacco Comment    quit in 2003 1 ppd x 50 years     Family History   Problem Relation Age of Onset    Heart disease Brother     Heart disease Maternal Grandmother     Diabetes Other         of other type with arthropathy, without long term current use of insulin    Hypertension Other     No Known Problems Mother     Gout Family     Rheum arthritis Family     Lupus Family         systematic erythematosus    Heart disease Family     Stroke Family         syndrome    Stroke Maternal Uncle         syndrome    Stroke Maternal Aunt         syndrome    No Known Problems Father     No Known Problems Daughter     No Known Problems Maternal Grandfather     No Known Problems Paternal Grandmother     No Known Problems Paternal Grandfather     No Known Problems Daughter     No Known Problems Daughter     No Known Problems Maternal Aunt     No Known Problems Maternal Aunt     No Known Problems Maternal Aunt        Meds/Allergies       Current Outpatient Medications:     Acetaminophen (TYLENOL) 325 MG CAPS    aspirin (ADULT ASPIRIN EC LOW STRENGTH) 81 mg EC tablet    calcitriol (ROCALTROL) 0 25 mcg capsule    calcium carbonate-vitamin D (OSCAL-D) 500 mg-200 units per tablet    DULoxetine (CYMBALTA) 30 mg delayed release capsule    fluticasone (FLONASE) 50 mcg/act nasal spray    furosemide (LASIX) 20 mg tablet    gabapentin (NEURONTIN) 100 mg capsule    losartan (COZAAR) 100 MG tablet    NIFEdipine ER (ADALAT CC) 30 MG 24 hr tablet    NIFEdipine ER (ADALAT CC) 60 MG 24 hr tablet    ONE TOUCH ULTRA TEST test strip    pantoprazole (PROTONIX) 40 mg tablet    pravastatin (PRAVACHOL) 40 mg tablet    senna (SENOKOT) 8 6 mg    Allergies   Allergen Reactions    Clonidine Derivatives Swelling    Diflucan [Fluconazole] Rash    Flagyl [Metronidazole] Anaphylaxis    Carvedilol Hives     And legs swelled    Latex Rash    Lipitor [Atorvastatin]      Legs swelled    Other Palpitations     IV DYE           Objective     Blood pressure 142/62, pulse 72, temperature 98 2 °F (36 8 °C), temperature source Tympanic, height 5' 2" (1 575 m), weight 59 9 kg (132 lb), not currently breastfeeding  Body mass index is 24 14 kg/m²  PHYSICAL EXAM:      General Appearance:   Alert, cooperative, no distress   HEENT:   Normocephalic, atraumatic, anicteric      Neck:  Supple, symmetrical, trachea midline   Lungs:   Clear to auscultation bilaterally; no rales, rhonchi or wheezing; respirations unlabored    Heart[de-identified]   Regular rate and rhythm; no murmur, rub, or gallop  Abdomen:   Soft, non-tender, non-distended; normal bowel sounds; no masses, no organomegaly    Genitalia:   Deferred    Rectal:   Deferred    Extremities:  No cyanosis, clubbing or edema    Pulses:  2+ and symmetric    Skin:  No jaundice, rashes, or lesions    Lymph nodes:  No palpable cervical lymphadenopathy        Lab Results:   No visits with results within 1 Day(s) from this visit     Latest known visit with results is:   Admission on 09/16/2019, Discharged on 09/17/2019   Component Date Value    SODIUM, I-STAT 09/16/2019 139     Potassium, i-STAT 09/16/2019 4 0     Chloride, istat 09/16/2019 105     CO2, i-STAT 09/16/2019 25     Anion Gap, i-STAT 09/16/2019 14*    Calcium, Ionized i-STAT 09/16/2019 1 03*    BUN, I-STAT 09/16/2019 34*    Creatinine, i-STAT 09/16/2019 1 0     eGFR 09/16/2019 51     Glucose, i-STAT 09/16/2019 182*    Hct, i-STAT 09/16/2019 35     Hgb, i-STAT 09/16/2019 11 9     Specimen Type 09/16/2019 VENOUS          Radiology Results:   [facility]    Result Date: 9/3/2019  Narrative: RENAL ULTRASOUND INDICATION:   N28 1: Cyst of kidney, acquired  COMPARISON: July 16, 2019 TECHNIQUE:   Ultrasound of the retroperitoneum was performed with a curvilinear transducer utilizing volumetric sweeps and still imaging techniques  FINDINGS: KIDNEYS: Symmetric and normal size  Right kidney:  9 3 x 3 8 cm  Left kidney:  9 8 x 4 3 cm  Right kidney Normal echogenicity and contour  No suspicious masses detected  No hydronephrosis  Small echogenic shadowing focus in the right upper pole, unclear whether this represents a true small nonobstructing 3 mm calculus or more likely artifact from a atherosclerotic vessel  No perinephric fluid collections  Left kidney Normal echogenicity and contour  No suspicious masses detected  Lobulated mildly complicated cyst with incomplete marginal septation's without discrete solid nodules or wall thickening  This currently measures 2 5 x 2 2 x 2 3 cm (2 9 x 2 1 x 2 3 cm in 2016)  No hydronephrosis  No shadowing calculi  No perinephric fluid collections  URETERS: Nonvisualized  BLADDER: Normally distended  No focal thickening or mass lesions  Bilateral ureteral jets detected       Impression: Stable minimally complex left lower pole renal cyst   Workstation performed: [id number]

## 2019-09-26 NOTE — PROGRESS NOTES
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 but the SMA and YAHAIRA were patent  Continue Protonix 40 mg daily  She is no longer taking Carafate  2  Gastroesophageal reflux disease without esophagitis  Well controlled with Protonix 40 mg daily  EGD from 2018 showed no evidence of esophagitis or Banuelos's esophagus  Continue dietary and lifestyle modifications to prevent reflux  3  Chronic idiopathic constipation  Well controlled with prune juice  Follow-up in 1 year per patient preference  ______________________________________________________________________    SUBJECTIVE:  49-year-old with multiple medical issues presenting for follow-up of GERD, epigastric pain, and constipation  She is [place] speaking but her  speaks [place] and provides translation  Her heartburn is well controlled with Protonix 40 mg daily  She does admit to drinking 2 cups of coffee daily  But otherwise she denies spicy food, tomatoes, soda, chocolate  She denies nausea and vomiting  She denies epigastric pain and states this has improved with Protonix  She still struggles with constipation, but her bowels are regular when she drinks prune juice  She denies blood in the stool and abnormal weight loss  REVIEW OF SYSTEMS IS OTHERWISE NEGATIVE        Historical Information   Past Medical History:   Diagnosis Date    Arthritis     Back pain     Benign essential hypertension     LAST ASSESSED: [id number]    Carotid artery stenosis     CKD (chronic kidney disease) stage 2, GFR 60-89 ml/min     Constipation, chronic     "drinks prune juice"    Diabetes mellitus ([address] )  DMII (diabetes mellitus, type 2) (HCC)     Epiglottitis     RESOLVED: [id number]    Gastritis     GERD (gastroesophageal reflux disease)     Gout     H/O blood clots     left lower leg,,,11 yrs ago    History of stenosis of renal artery     RESOLVED: [id number]    Hypercholesteremia     Hypercholesterolemia     Hyperlipidemia     [facility]par[facility]roidism ([facility] )     Hypertension     Iliac artery stenosis, bilateral (HCC)     Kidney stone     [facility] ) 09/06/2017    Left    Mucinous carcinoma of breast, left ([facility] ) 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    Left    Presence of pessary     [facility]     Renal disorder     Renovascular hypertension     RESOLVED: [id number]    Segmental and somatic dysfunction of cervical region     RESOLVED: 15GFA4504    Segmental and somatic dysfunction of lumbar region     RESOLVED: 74SPN9682    Segmental and somatic dysfunction of pelvic region     RESOLVED: 60WCZ3166    Segmental and somatic dysfunction of thoracic region     RESOLVED: 66GLN0398    Somatic dysfunction of abdominal region     RESOLVED: 85GGB1890    Somatic dysfunction of head region     RESOLVED: 52MQQ5152    Somatic dysfunction of lower extremities     RESOLVED: 44UTJ2002    Somatic dysfunction of rib region     RESOLVED: 29INJ5570    Somatic dysfunction of thoracic region     RESOLVED: 26PRB9506    Somatic dysfunction of upper extremities     RESOLVED: 83NHU9279    Teeth missing      Past Surgical History:   Procedure Laterality Date    ANGIOPLASTY / STENTING ILIAC      BREAST BIOPSY Left 08/10/2018    U/S BX    BREAST BIOPSY Left 08/07/2017    U/S BX    BREAST LUMPECTOMY Left 09/06/2017    LAST ASSESSED: [id number]    BREAST LUMPECTOMY Left 09/11/2018    BREAST SURGERY Left     biopsy    CATARACT EXTRACTION Bilateral     CHOLECYSTECTOMY      COLONOSCOPY      EYE SURGERY      ILIAC VEIN ANGIOPLASTY / STENTING Right     INITIAL STENOSIS: ONSET: 63UTN3879    KNEE ARTHROSCOPY Right     KNEE SURGERY Right     ONSET: 02RSY4212    MAMMO NEEDLE LOCALIZATION LEFT (ALL INC) Left 9/11/2018    MAMMO NEEDLE LOCALIZATION LEFT (ALL INC) Left 9/6/2017    RI ESOPHAGOGASTRODUODENOSCOPY TRANSORAL DIAGNOSTIC N/A 7/26/2018    Procedure: ESOPHAGOGASTRODUODENOSCOPY (EGD); Surgeon: Magdaleno Bates DO;  [facility]; Service: Gastroenterology    RI LARYNGOSCOPY,DIRCT,[facility] N/A 6/10/2016    Procedure: MICRO DIRECT LARYNGOSCOPY WITH VOCAL FOLD MASS EXCISION ;  Surgeon: Leslie Belle MD;  [facility];  Service: ENT    RI MASTECTOMY, PARTIAL Left 9/6/2017    Procedure: LUMPECTOMY BREAST NEEDLE LOCALIZED WITH FAXITRON NEEDLE @ 0830;  Surgeon: Ayse Walters MD;  [facility];  Service: General    RI PERQ DEVICE PLACEMT BREAST LOC 1ST LES [address]ri [address] Left 9/11/2018    Procedure: BREAST LUMPECTOMY; BREAST NEEDLE LOCALIZATION (NEEDLE LOC AT 1330);   Surgeon: Barbra Riley MD;  [facility];  Service: Surgical Oncology    THROAT SURGERY      lump removed    TUBAL LIGATION      UPPER GASTROINTESTINAL ENDOSCOPY      US GUIDANCE BREAST BIOPSY LEFT EACH ADDITIONAL Left 8/7/2017    US GUIDED BREAST BIOPSY LEFT COMPLETE Left 8/10/2018    US GUIDED BREAST BIOPSY LEFT COMPLETE Left 8/7/2017    VASCULAR SURGERY       Social History   Social History     Substance and Sexual Activity   Alcohol Use Not Currently    Frequency: Never     Social History     Substance and Sexual Activity   Drug Use No     Social History     Tobacco Use   Smoking Status Former Smoker    Packs/day: 1 00    Years: 64 00    Pack years: 56 65    Last attempt to quit: 2004    Years since quitting: 15 7   Smokeless Tobacco Never Used   Tobacco Comment    quit in 2003 1 ppd x 50 years     Family History   Problem Relation Age of Onset    Heart disease Brother     Heart disease Maternal Grandmother     Diabetes Other         of other type with arthropathy, without long term current use of insulin    Hypertension Other     No Known Problems Mother     Gout Family     Rheum arthritis Family     Lupus Family         systematic erythematosus    Heart disease Family     Stroke Family         syndrome    Stroke Maternal Uncle         syndrome    Stroke Maternal Aunt         syndrome    No Known Problems Father     No Known Problems Daughter     No Known Problems Maternal Grandfather     No Known Problems Paternal Grandmother     No Known Problems Paternal Grandfather     No Known Problems Daughter     No Known Problems Daughter     No Known Problems Maternal Aunt     No Known Problems Maternal Aunt     No Known Problems Maternal Aunt        Meds/Allergies       Current Outpatient Medications:     Acetaminophen (TYLENOL) 325 MG CAPS    aspirin (ADULT ASPIRIN EC LOW STRENGTH) 81 mg EC tablet    calcitriol (ROCALTROL) 0 25 mcg capsule    calcium carbonate-vitamin D (OSCAL-D) 500 mg-200 units per tablet    DULoxetine (CYMBALTA) 30 mg delayed release capsule    fluticasone (FLONASE) 50 mcg/act nasal spray    furosemide (LASIX) 20 mg tablet    gabapentin (NEURONTIN) 100 mg capsule    losartan (COZAAR) 100 MG tablet    NIFEdipine ER (ADALAT CC) 30 MG 24 hr tablet    NIFEdipine ER (ADALAT CC) 60 MG 24 hr tablet    ONE TOUCH ULTRA TEST test strip    pantoprazole (PROTONIX) 40 mg tablet    pravastatin (PRAVACHOL) 40 mg tablet    senna (SENOKOT) 8 6 mg    Allergies   Allergen Reactions    Clonidine Derivatives Swelling    Diflucan [Fluconazole] Rash    Flagyl [Metronidazole] Anaphylaxis    Carvedilol Hives     And legs swelled    Latex Rash    Lipitor [Atorvastatin]      Legs swelled    Other Palpitations     IV DYE           Objective     Blood pressure 142/62, pulse 72, temperature 98 2 °F (36 8 °C), temperature source Tympanic, height 5' 2" (1 575 m), weight 59 9 kg (132 lb), not currently breastfeeding  Body mass index is 24 14 kg/m²        PHYSICAL EXAM:      General Appearance:   Alert, cooperative, no distress   HEENT:   Normocephalic, atraumatic, anicteric      Neck:  Supple, symmetrical, trachea midline   Lungs:   Clear to auscultation bilaterally; no rales, rhonchi or wheezing; respirations unlabored    Heart[de-identified]   Regular rate and rhythm; no murmur, rub, or gallop  Abdomen:   Soft, non-tender, non-distended; normal bowel sounds; no masses, no organomegaly    Genitalia:   Deferred    Rectal:   Deferred    Extremities:  No cyanosis, clubbing or edema    Pulses:  2+ and symmetric    Skin:  No jaundice, rashes, or lesions    Lymph nodes:  No palpable cervical lymphadenopathy        Lab Results:   No visits with results within 1 Day(s) from this visit  Latest known visit with results is:   Admission on 09/16/2019, Discharged on 09/17/2019   Component Date Value    SODIUM, I-STAT 09/16/2019 139     Potassium, i-STAT 09/16/2019 4 0     Chloride, istat 09/16/2019 105     CO2, i-STAT 09/16/2019 25     Anion Gap, i-STAT 09/16/2019 14*    Calcium, Ionized i-STAT 09/16/2019 1 03*    BUN, I-STAT 09/16/2019 34*    Creatinine, i-STAT 09/16/2019 1 0     eGFR 09/16/2019 51     Glucose, i-STAT 09/16/2019 182*    Hct, i-STAT 09/16/2019 35     Hgb, i-STAT 09/16/2019 11 9     Specimen Type 09/16/2019 VENOUS          Radiology Results:   [facility]    Result Date: 9/3/2019  Narrative: RENAL ULTRASOUND INDICATION:   N28 1: Cyst of kidney, acquired  COMPARISON: July 16, 2019 TECHNIQUE:   Ultrasound of the retroperitoneum was performed with a curvilinear transducer utilizing volumetric sweeps and still imaging techniques  FINDINGS: KIDNEYS: Symmetric and normal size  Right kidney:  9 3 x 3 8 cm  Left kidney:  9 8 x 4 3 cm  Right kidney Normal echogenicity and contour  No suspicious masses detected  No hydronephrosis   Small echogenic shadowing focus in the right upper pole, unclear whether this represents a true small nonobstructing 3 mm calculus or more likely artifact from a atherosclerotic vessel  No perinephric fluid collections  Left kidney Normal echogenicity and contour  No suspicious masses detected  Lobulated mildly complicated cyst with incomplete marginal septation's without discrete solid nodules or wall thickening  This currently measures 2 5 x 2 2 x 2 3 cm (2 9 x 2 1 x 2 3 cm in 2016)  No hydronephrosis  No shadowing calculi  No perinephric fluid collections  URETERS: Nonvisualized  BLADDER: Normally distended  No focal thickening or mass lesions  Bilateral ureteral jets detected       Impression: Stable minimally complex left lower pole renal cyst   Workstation performed: [id number]

## 2019-10-17 DIAGNOSIS — E78.5 HYPERLIPIDEMIA, UNSPECIFIED HYPERLIPIDEMIA TYPE: ICD-10-CM

## 2019-10-18 ENCOUNTER — OFFICE VISIT (OUTPATIENT)
Dept: FAMILY MEDICINE CLINIC | Facility: CLINIC | Age: 84
End: 2019-10-18

## 2019-10-18 ENCOUNTER — HOSPITAL ENCOUNTER (OUTPATIENT)
Dept: RADIOLOGY | Facility: HOSPITAL | Age: 84
Discharge: HOME | End: 2019-10-18
Payer: COMMERCIAL

## 2019-10-18 ENCOUNTER — TRANSCRIBE ORDERS (OUTPATIENT)
Dept: RADIOLOGY | Facility: HOSPITAL | Age: 84
End: 2019-10-18

## 2019-10-18 VITALS
HEIGHT: 62 IN | BODY MASS INDEX: 24.62 KG/M2 | TEMPERATURE: 97.4 F | WEIGHT: 133.8 LBS | RESPIRATION RATE: 16 BRPM | SYSTOLIC BLOOD PRESSURE: 140 MMHG | DIASTOLIC BLOOD PRESSURE: 60 MMHG | HEART RATE: 79 BPM

## 2019-10-18 DIAGNOSIS — H10.13 ALLERGIC CONJUNCTIVITIS OF BOTH EYES: ICD-10-CM

## 2019-10-18 DIAGNOSIS — R09.89 RESPIRATORY CRACKLES: Primary | ICD-10-CM

## 2019-10-18 DIAGNOSIS — R09.89 RESPIRATORY CRACKLES: ICD-10-CM

## 2019-10-18 PROBLEM — J06.9 VIRAL UPPER RESPIRATORY TRACT INFECTION: Status: ACTIVE | Noted: 2019-10-18

## 2019-10-18 PROCEDURE — 71046 X-RAY EXAM CHEST 2 VIEWS: CPT

## 2019-10-18 PROCEDURE — 99213 OFFICE O/P EST LOW 20 MIN: CPT | Performed by: FAMILY MEDICINE

## 2019-10-18 RX ORDER — OLOPATADINE HYDROCHLORIDE 1 MG/ML
1 SOLUTION/ DROPS OPHTHALMIC 2 TIMES DAILY
Qty: 5 ML | Refills: 0 | Status: SHIPPED | OUTPATIENT
Start: 2019-10-18

## 2019-10-18 RX ORDER — PRAVASTATIN SODIUM 40 MG
TABLET ORAL
Qty: 90 TABLET | Refills: 1 | Status: SHIPPED | OUTPATIENT
Start: 2019-10-18 | End: 2020-04-07

## 2019-10-18 RX ORDER — LOSARTAN POTASSIUM 50 MG/1
TABLET ORAL
Refills: 0 | COMMUNITY
Start: 2019-09-12 | End: 2020-03-10

## 2019-10-18 NOTE — PROGRESS NOTES
Assessment/Plan:    Respiratory crackles  Suspected pneumonia  -Mild crackles in LLL noted on physical exam  Pulse Ox 98%  -Ordered CXR PA and lateral to guide treatment    Addendum 10/19: Follow up CXR pending official read  Compared to CXR on 07/22 looks relatively unchanged  Called patient to give her report and evaluate symptoms and she reports the cough has improved and she is feeling overall better    -Will continue supportive management  Patient agreed   -She complained of pain in her eyes yesterday that improved after a few minutes  Gave her ED precautions but she reported she will call for appointment with ophthalmology on Monday  -She will return to the office in 1 week for follow up    Viral upper respiratory tract infection  Viral URI  1  Advised supportive care at this time  2  Drink 6-8 glasses of water to maintain hydration  3  Take tylenol every 6 hours for fever and body aches  4  Frequent hand washing to prevent spread of infection  5  Honey with hot water or tea for cough  6  Return to office in 1 week and ER precautions discussed        Allergic conjunctivitis of both eyes  Itchy eyes for the las 3 days  -Along with URI symptoms  -Prescribed Patanol ophthalmic solution  There are no diagnoses linked to this encounter  Subjective:      Patient ID: Theodore Frnacisco is a 80 y o  female  Patient is a 79 yo F with pMH of GERD, hyperparathyroidism, type 2 diabetes, GERD, hypertension, CAD, PVD, osteoporosis, CKD who presents for an acute visit secondary to cold like symptoms  Patient reports symptoms started last week with eye itching, chest congestion, dry cough, headaches  She took OTC paracetamol and robitussin for cough without much improvement  She states her eyes are very itchy and has clear discharge          The following portions of the patient's history were reviewed and updated as appropriate: She  has a past medical history of Arthritis, Back pain, Benign essential hypertension, Carotid artery stenosis, CKD (chronic kidney disease) stage 2, GFR 60-89 ml/min, Constipation, chronic, Diabetes mellitus ([facility] ), DMII (diabetes mellitus, type 2) ([place] [address] ), Epiglottitis, Gastritis, GERD (gastroesophageal reflux disease), Gout, H/O blood clots, History of stenosis of renal artery, Hypercholesteremia, Hypercholesterolemia, Hyperlipidemia, Hyperparathyroidism ([facility] ), Hypertension, Iliac artery stenosis, bilateral ([facility] ), Kidney stone, Lobular carcinoma ([facility] ) (09/06/2017), Mucinous carcinoma of breast, 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), Presence of pessary, Renal artery stenosis ([facility]), Renal disorder, Renovascular hypertension, Segmental and somatic dysfunction of cervical region, Segmental and somatic dysfunction of lumbar region, Segmental and somatic dysfunction of pelvic region, Segmental and somatic dysfunction of thoracic region, Somatic dysfunction of abdominal region, Somatic dysfunction of head region, Somatic dysfunction of lower extremities, Somatic dysfunction of rib region, Somatic dysfunction of thoracic region, Somatic dysfunction of upper extremities, and Teeth missing    Current Outpatient Medications   Medication Sig Dispense Refill    Acetaminophen (TYLENOL) 325 MG CAPS Take by mouth every 8 (eight) hours      aspirin (ADULT ASPIRIN EC LOW STRENGTH) 81 mg EC tablet Take 1 tablet by mouth daily      calcitriol (ROCALTROL) 0 25 mcg capsule Take 1 capsule (0 25 mcg total) by mouth 2 (two) times a week Please take every Tuesday and friday 10 capsule 6    calcium carbonate-vitamin D (OSCAL-D) 500 mg-200 units per tablet Take 1 tablet by mouth daily with breakfast      DULoxetine (CYMBALTA) 30 mg delayed release capsule Take 30 mg by mouth daily  0    fluticasone (FLONASE) 50 mcg/act nasal spray instill 1 spray into each nostril once daily 16 g 2    furosemide (LASIX) 20 mg tablet Take 1 tablet (20 mg total) by mouth daily Daily with swelling, every other day once swelling resolves 30 tablet 3    gabapentin (NEURONTIN) 100 mg capsule take 1 capsule by mouth at bedtime for 10 days 90 capsule 1    losartan (COZAAR) 100 MG tablet Take 1 tablet (100 mg total) by mouth daily 90 tablet 0    losartan (COZAAR) 50 mg tablet   0    NIFEdipine ER (ADALAT CC) 30 MG 24 hr tablet Take 1 tablet (30 mg total) by mouth daily Please take at night in addition to the 60 mg in am 30 tablet 5    NIFEdipine ER (ADALAT CC) 60 MG 24 hr tablet Take 1 tablet (60 mg total) by mouth daily for 90 days 90 tablet 0    ONE TOUCH ULTRA TEST test strip 1 each by Other route 2 (two) times a day Use as instructed 100 each 5    pantoprazole (PROTONIX) 40 mg tablet Take 1 tablet (40 mg total) by mouth daily 30 tablet 5    pravastatin (PRAVACHOL) 40 mg tablet TAKE 1 TABLET DAILY 90 tablet 1    senna (SENOKOT) 8 6 mg Take 1 tablet (8 6 mg total) by mouth daily at bedtime as needed for constipation 120 each 0    olopatadine (PATANOL) 0 1 % ophthalmic solution Administer 1 drop to both eyes 2 (two) times a day 5 mL 0     No current facility-administered medications for this visit  She is allergic to clonidine derivatives; diflucan [fluconazole]; flagyl [metronidazole]; carvedilol; latex; lipitor [atorvastatin]; and other       Review of Systems   Constitutional: Negative for appetite change, diaphoresis, fatigue and fever  HENT: Positive for ear pain (Left)  Negative for congestion, rhinorrhea and sinus pain  Eyes: Positive for discharge (clear) and itching  Respiratory: Positive for cough (dry)  Negative for chest tightness and shortness of breath  Cardiovascular: Negative for chest pain, palpitations and leg swelling  Gastrointestinal: Negative for abdominal distention, abdominal pain, constipation, diarrhea, nausea and vomiting  Genitourinary: Negative for difficulty urinating, frequency and urgency  Musculoskeletal: Negative for back pain and neck pain  Skin: Negative for rash     Neurological: Positive for headaches  Negative for weakness and light-headedness  Psychiatric/Behavioral: Negative for agitation and behavioral problems  The patient is not nervous/anxious  Objective:      /60 (BP Location: Left arm, Patient Position: Sitting, Cuff Size: Large)   Pulse 79   Temp (!) 97 4 °F (36 3 °C) (Tympanic)   Resp 16   Ht 5' 2" (1 575 m)   Wt 60 7 kg (133 lb 12 8 oz)   LMP  (LMP Unknown)   BMI 24 47 kg/m²          Physical Exam   Constitutional: She is oriented to person, place, and time  She appears well-developed and well-nourished  No distress  HENT:   Head: Normocephalic and atraumatic  Eyes: Right eye exhibits discharge (Clear)  Right eye exhibits no exudate  Left eye exhibits discharge (clear)  Left eye exhibits no exudate  Right conjunctiva is not injected  Left conjunctiva is not injected  Neck: Normal range of motion  Neck supple  No thyromegaly present  Cardiovascular: Normal rate, regular rhythm, normal heart sounds and intact distal pulses  Exam reveals no gallop and no friction rub  No murmur heard  Pulmonary/Chest: Effort normal  No respiratory distress  SpO2 98% on spot oximeter  Mild crackles more prominent in the LLL   Abdominal: Soft  Bowel sounds are normal  She exhibits no distension and no mass  There is no tenderness  There is no rebound and no guarding  Musculoskeletal: Normal range of motion  She exhibits no edema, tenderness or deformity  Lymphadenopathy:     She has cervical adenopathy  Neurological: She is alert and oriented to person, place, and time  Skin: Skin is warm and dry  No rash noted  She is not diaphoretic  No erythema  Psychiatric: She has a normal mood and affect  Her behavior is normal  Judgment and thought content normal    Vitals reviewed

## 2019-10-18 NOTE — ASSESSMENT & PLAN NOTE
Viral URI  1  Advised supportive care at this time  2  Drink 6-8 glasses of water to maintain hydration  3  Take tylenol every 6 hours for fever and body aches  4  Frequent hand washing to prevent spread of infection  5  Honey with hot water or tea for cough    6  Return to office in 1 week and ER precautions discussed

## 2019-10-18 NOTE — ASSESSMENT & PLAN NOTE
Suspected pneumonia  -Mild crackles in LLL noted on physical exam  Pulse Ox 98%  -Ordered CXR PA and lateral to guide treatment    Addendum 10/19: Follow up CXR pending official read  Compared to CXR on 07/22 looks relatively unchanged  Called patient to give her report and evaluate symptoms and she reports the cough has improved and she is feeling overall better    -Will continue supportive management  Patient agreed   -She complained of pain in her eyes yesterday that improved after a few minutes   Gave her ED precautions but she reported she will call for appointment with ophthalmology on Monday  -She will return to the office in 1 week for follow up

## 2019-10-24 DIAGNOSIS — I10 ESSENTIAL HYPERTENSION: ICD-10-CM

## 2019-10-25 ENCOUNTER — OFFICE VISIT (OUTPATIENT)
Dept: FAMILY MEDICINE CLINIC | Facility: CLINIC | Age: 84
End: 2019-10-25

## 2019-10-25 VITALS
RESPIRATION RATE: 16 BRPM | HEIGHT: 62 IN | TEMPERATURE: 96.8 F | WEIGHT: 130.8 LBS | BODY MASS INDEX: 24.07 KG/M2 | SYSTOLIC BLOOD PRESSURE: 138 MMHG | DIASTOLIC BLOOD PRESSURE: 60 MMHG | HEART RATE: 72 BPM

## 2019-10-25 DIAGNOSIS — I16.0 HYPERTENSIVE URGENCY: ICD-10-CM

## 2019-10-25 DIAGNOSIS — I10 ESSENTIAL HYPERTENSION: ICD-10-CM

## 2019-10-25 DIAGNOSIS — E11.9 TYPE 2 DIABETES MELLITUS WITHOUT COMPLICATION, WITHOUT LONG-TERM CURRENT USE OF INSULIN (HCC): Primary | ICD-10-CM

## 2019-10-25 PROCEDURE — 99213 OFFICE O/P EST LOW 20 MIN: CPT | Performed by: FAMILY MEDICINE

## 2019-10-25 PROCEDURE — 3075F SYST BP GE 130 - 139MM HG: CPT | Performed by: FAMILY MEDICINE

## 2019-10-25 PROCEDURE — 3078F DIAST BP <80 MM HG: CPT | Performed by: FAMILY MEDICINE

## 2019-10-25 RX ORDER — LOSARTAN POTASSIUM 100 MG/1
TABLET ORAL
Qty: 90 TABLET | Refills: 0 | Status: SHIPPED | OUTPATIENT
Start: 2019-10-25 | End: 2020-01-23 | Stop reason: SDUPTHER

## 2019-10-25 RX ORDER — NIFEDIPINE 30 MG/1
30 TABLET, FILM COATED, EXTENDED RELEASE ORAL DAILY
Qty: 30 TABLET | Refills: 5 | Status: SHIPPED | OUTPATIENT
Start: 2019-10-25 | End: 2020-08-18

## 2019-10-25 NOTE — PROGRESS NOTES
Assessment/Plan:    Hypertension  Blood Pressure: 138/60   Stable  At goal  -Continue current regimen  -RTC in 3 months    Viral upper respiratory tract infection  Resolved  -Patient reports she's feeling better  No further concerns at this visit      Allergic conjunctivitis of both eyes  Improving  -Clear eye discharge improving, although still experiencing itchiness  Continue Patanol ophthalmic drops           Problem List Items Addressed This Visit     None            Subjective:      Patient ID: Cindy Gordon is a 80 y o  female  Patient is a 17-year-old female with past medical history of GERD, type 2 diabetes mellitus, secondary hyperparathyroidism, hypertension, who presents to the office for follow-up after acute viral illness  She reports feeling better since last visit, she went to the ophthalmologist for eye discomfort and reported everything was ok and prescribed same eye drops prescribed in the office last week  She reports eye discharge improved, but she still has some itching         The following portions of the patient's history were reviewed and updated as appropriate: She  has a past medical history of Arthritis, Back pain, Benign essential hypertension, Carotid artery stenosis, CKD (chronic kidney disease) stage 2, GFR 60-89 ml/min, Constipation, chronic, Diabetes mellitus ([address] ), DMII (diabetes mellitus, type 2) ([address] ), Epiglottitis, Gastritis, GERD (gastroesophageal reflux disease), Gout, H/O blood clots, History of stenosis of renal artery, Hypercholesteremia, Hypercholesterolemia, Hyperlipidemia, Hyperparathyroidism ([address] ), Hypertension, Iliac artery stenosis, bilateral ([address] ), Kidney stone, Lobular carcinoma ([address] ) (09/06/2017), Mucinous carcinoma of breast, 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), Presence of pessary, Renal artery stenosis (HCC), Renal disorder, Renovascular hypertension, Segmental and somatic dysfunction of cervical region, Segmental and somatic dysfunction of lumbar region, Segmental and somatic dysfunction of pelvic region, Segmental and somatic dysfunction of thoracic region, Somatic dysfunction of abdominal region, Somatic dysfunction of head region, Somatic dysfunction of lower extremities, Somatic dysfunction of rib region, Somatic dysfunction of thoracic region, Somatic dysfunction of upper extremities, and Teeth missing    Current Outpatient Medications   Medication Sig Dispense Refill    Acetaminophen (TYLENOL) 325 MG CAPS Take by mouth every 8 (eight) hours      aspirin (ADULT ASPIRIN EC LOW STRENGTH) 81 mg EC tablet Take 1 tablet by mouth daily      calcitriol (ROCALTROL) 0 25 mcg capsule Take 1 capsule (0 25 mcg total) by mouth 2 (two) times a week Please take every Tuesday and friday 10 capsule 6    calcium carbonate-vitamin D (OSCAL-D) 500 mg-200 units per tablet Take 1 tablet by mouth daily with breakfast      DULoxetine (CYMBALTA) 30 mg delayed release capsule Take 30 mg by mouth daily  0    fluticasone (FLONASE) 50 mcg/act nasal spray instill 1 spray into each nostril once daily 16 g 2    furosemide (LASIX) 20 mg tablet Take 1 tablet (20 mg total) by mouth daily Daily with swelling, every other day once swelling resolves 30 tablet 3    gabapentin (NEURONTIN) 100 mg capsule take 1 capsule by mouth at bedtime for 10 days 90 capsule 1    losartan (COZAAR) 100 MG tablet take 1 tablet by mouth once daily 90 tablet 0    losartan (COZAAR) 50 mg tablet   0    NIFEdipine ER (ADALAT CC) 30 MG 24 hr tablet Take 1 tablet (30 mg total) by mouth daily Please take at night in addition to the 60 mg in am 30 tablet 5    olopatadine (PATANOL) 0 1 % ophthalmic solution Administer 1 drop to both eyes 2 (two) times a day 5 mL 0    ONE TOUCH ULTRA TEST test strip 1 each by Other route 2 (two) times a day Use as instructed 100 each 5    pantoprazole (PROTONIX) 40 mg tablet Take 1 tablet (40 mg total) by mouth daily 30 tablet 5    pravastatin (PRAVACHOL) 40 mg tablet TAKE 1 TABLET DAILY 90 tablet 1    senna (SENOKOT) 8 6 mg Take 1 tablet (8 6 mg total) by mouth daily at bedtime as needed for constipation 120 each 0    NIFEdipine ER (ADALAT CC) 60 MG 24 hr tablet Take 1 tablet (60 mg total) by mouth daily for 90 days 90 tablet 0     No current facility-administered medications for this visit  She is allergic to clonidine derivatives; diflucan [fluconazole]; flagyl [metronidazole]; carvedilol; latex; lipitor [atorvastatin]; and other       Review of Systems   Constitutional: Negative for appetite change, diaphoresis, fatigue and fever  HENT: Negative for congestion, rhinorrhea and sinus pain  Eyes: Positive for itching  Negative for discharge  Respiratory: Negative for cough, chest tightness and shortness of breath  Cardiovascular: Negative for chest pain, palpitations and leg swelling  Gastrointestinal: Negative for abdominal distention, abdominal pain, constipation, diarrhea, nausea and vomiting  Genitourinary: Negative for difficulty urinating, frequency and urgency  Musculoskeletal: Negative for back pain and neck pain  Neurological: Negative for weakness, light-headedness, numbness and headaches  Psychiatric/Behavioral: Negative for agitation and behavioral problems  The patient is not nervous/anxious  Objective:      /60 (BP Location: Left arm, Patient Position: Sitting, Cuff Size: Large)   Pulse 72   Temp (!) 96 8 °F (36 °C) (Tympanic)   Resp 16   Ht 5' 2" (1 575 m)   Wt 59 3 kg (130 lb 12 8 oz)   LMP  (LMP Unknown)   BMI 23 92 kg/m²          Physical Exam   Constitutional: She is oriented to person, place, and time  She appears well-developed and well-nourished  No distress  HENT:   Head: Normocephalic and atraumatic  Mouth/Throat: No oropharyngeal exudate  Eyes: Pupils are equal, round, and reactive to light  Conjunctivae are normal  Right eye exhibits no discharge  Left eye exhibits no discharge  Right conjunctiva is not injected   Left conjunctiva is not injected  Neck: Normal range of motion  Neck supple  No thyromegaly present  Cardiovascular: Normal rate, regular rhythm, normal heart sounds and intact distal pulses  Exam reveals no gallop and no friction rub  No murmur heard  Pulmonary/Chest: Effort normal and breath sounds normal  No respiratory distress  Abdominal: Soft  Bowel sounds are normal  She exhibits no distension and no mass  There is no tenderness  There is no rebound and no guarding  Musculoskeletal: Normal range of motion  She exhibits no edema, tenderness or deformity  Lymphadenopathy:     She has no cervical adenopathy  Neurological: She is alert and oriented to person, place, and time  Skin: Skin is warm and dry  No rash noted  She is not diaphoretic  No erythema  Psychiatric: She has a normal mood and affect  Her behavior is normal  Judgment and thought content normal    Vitals reviewed

## 2019-10-25 NOTE — ASSESSMENT & PLAN NOTE
Improving  -Clear eye discharge improving, although still experiencing itchiness    Continue Patanol ophthalmic drops

## 2019-10-26 DIAGNOSIS — R60.0 LOWER EXTREMITY EDEMA: ICD-10-CM

## 2019-10-26 RX ORDER — FUROSEMIDE 20 MG/1
TABLET ORAL
Qty: 30 TABLET | Refills: 3 | Status: SHIPPED | OUTPATIENT
Start: 2019-10-26 | End: 2020-08-13 | Stop reason: ALTCHOICE

## 2019-10-28 ENCOUNTER — HOSPITAL ENCOUNTER (OUTPATIENT)
Dept: NON INVASIVE DIAGNOSTICS | Facility: CLINIC | Age: 84
Discharge: HOME | End: 2019-10-28
Payer: COMMERCIAL

## 2019-10-28 DIAGNOSIS — I65.23 ASYMPTOMATIC BILATERAL CAROTID ARTERY STENOSIS: ICD-10-CM

## 2019-10-28 DIAGNOSIS — I77.1 ILIAC ARTERY STENOSIS, BILATERAL (HCC): ICD-10-CM

## 2019-10-28 DIAGNOSIS — I73.9 PAD (PERIPHERAL ARTERY DISEASE) (HCC): ICD-10-CM

## 2019-10-28 PROCEDURE — 93880 EXTRACRANIAL BILAT STUDY: CPT | Performed by: SURGERY

## 2019-10-28 PROCEDURE — 93923 UPR/LXTR ART STDY 3+ LVLS: CPT

## 2019-10-28 PROCEDURE — 93880 EXTRACRANIAL BILAT STUDY: CPT

## 2019-10-28 PROCEDURE — 93925 LOWER EXTREMITY STUDY: CPT

## 2019-10-28 PROCEDURE — 93922 UPR/L XTREMITY ART 2 LEVELS: CPT | Performed by: SURGERY

## 2019-10-28 PROCEDURE — 93925 LOWER EXTREMITY STUDY: CPT | Performed by: SURGERY

## 2019-10-29 ENCOUNTER — TRANSCRIBE ORDERS (OUTPATIENT)
Dept: VASCULAR SURGERY | Facility: CLINIC | Age: 84
End: 2019-10-29

## 2019-10-29 ENCOUNTER — OFFICE VISIT (OUTPATIENT)
Dept: FAMILY MEDICINE CLINIC | Facility: CLINIC | Age: 84
End: 2019-10-29

## 2019-10-29 VITALS — BODY MASS INDEX: 24.55 KG/M2 | HEIGHT: 62 IN | WEIGHT: 133.4 LBS

## 2019-10-29 DIAGNOSIS — M25.512 CHRONIC LEFT SHOULDER PAIN: Primary | ICD-10-CM

## 2019-10-29 DIAGNOSIS — I65.29 OCCLUSION AND STENOSIS OF UNSPECIFIED CAROTID ARTERY: Primary | ICD-10-CM

## 2019-10-29 DIAGNOSIS — G89.29 CHRONIC LEFT SHOULDER PAIN: Primary | ICD-10-CM

## 2019-10-29 DIAGNOSIS — M99.01 SOMATIC DYSFUNCTION OF CERVICAL REGION: ICD-10-CM

## 2019-10-29 DIAGNOSIS — M99.07 SOMATIC DYSFUNCTION OF UPPER EXTREMITY: ICD-10-CM

## 2019-10-29 DIAGNOSIS — M99.02 SOMATIC DYSFUNCTION OF THORACIC REGION: ICD-10-CM

## 2019-10-30 PROBLEM — M25.512 CHRONIC LEFT SHOULDER PAIN: Status: ACTIVE | Noted: 2018-04-04

## 2019-10-30 NOTE — PROGRESS NOTES
The Assessment/Plan        Problem List Items Addressed This Visit        Other    Chronic left shoulder pain - Primary     - Restricted range of motion with 5/5 muscle strength rotator cuff on left, likely adhesive capsulitis secondary to arthritic pain in shoulder   - OMT tolerated well today  - Discussed XR left shoulder may be indicated to guide treatment, could consider possibility of corticosteroid injection/physical therapy as well in the future- has follow-up appointment with PCP next week            Other Visit Diagnoses     Somatic dysfunction of thoracic region        Somatic dysfunction of cervical region        Somatic dysfunction of upper extremity               This is a 80 y o  female who presents for an initial OMT visit  1  Patient tolerated OMT well for the above problems, advised stretching and heat application for continued relief  2  OMT Follow up in 2 weeks  Subjective     Audra Banks is a 80 y o  female and is here for an initial OMT visit  Her  is here translating for her today  The patient reports she has had left shoulder pain and stiffness for the past 6 months, denies any initial injury or trauma, occurred out of the blue and has gotten progressively worse  She states the pain radiates up into her neck and down into her upper back  She admits to occasional weakness of the arm, no numbness or tingling  She has a history of chronic back pain  She denies any headaches  She reports no recent change in these symptoms  Is the patient taking Pain medication? no  Has the patient completed physical therapy for this condition? unknown  Did Patient symptoms improve from last OMT appointment?  N/A    The following portions of the patient's history were reviewed and updated as appropriate: allergies, current medications, past family history, past medical history, past social history, past surgical history and problem list     Review of Systems  Review of Systems Musculoskeletal: Positive for back pain, neck pain and neck stiffness  Negative for gait problem  Left shoulder pain and stiffness   Neurological: Negative for headaches  Objective     OMT Exam   Cervical:   Somatic Dysfunction:  Tissue Texture Changes, Asymmetry , Restriction and Tenderness  Severity :  2  Osteopathic Findings: Paraspinal hypertonicity bilaterally with myofascial restriction, decreased range of motion side bending and rotation bilaterally  Treatment Method: ST, MFR and OA release   Response: improved  Thoracic T1-4:   Somatic Dysfunction:  Tissue Texture Changes, Asymmetry , Restriction and Tenderness  Severity :  2  Osteopathic Findings: Trapezius hypertonicity bilaterally L>R, myofascial restriction, T3-5 NRLSR   Treatment Method: ME, ST and MFR  Response: improved  Thoracic T5-9:   Somatic Dysfunction:  Tissue Texture Changes, Asymmetry , Restriction and Tenderness  Severity :  2  Osteopathic Findings: Paraspinal hypertonicity with myofascial restriction  Treatment Method: ST and MFR  Response: improved  Left Upper Extremity:  Somatic Dysfunction:  Tissue Texture Changes, Asymmetry , Restriction and Tenderness  Severity :  2  Osteopathic Findings: 5/5 muscle strength shoulder flexion, external and internal rotation, abduction and adduction   Decreased active and passive range of motion in all planes with pain at barriers  Treatment Method: Modified [place] technique   Response: improved    Kendra Espinoza DO PGY-3  Nisa Zaman

## 2019-10-30 NOTE — ASSESSMENT & PLAN NOTE
- Restricted range of motion with 5/5 muscle strength rotator cuff on left, likely adhesive capsulitis secondary to arthritic pain in shoulder   - OMT tolerated well today  - Discussed XR left shoulder may be indicated to guide treatment, could consider possibility of corticosteroid injection/physical therapy as well in the future- has follow-up appointment with PCP next week

## 2019-10-31 DIAGNOSIS — N25.81 SECONDARY HYPERPARATHYROIDISM (HCC): ICD-10-CM

## 2019-10-31 RX ORDER — CALCITRIOL 0.25 UG/1
CAPSULE, LIQUID FILLED ORAL
Qty: 10 CAPSULE | Refills: 6 | Status: SHIPPED | OUTPATIENT
Start: 2019-10-31 | End: 2020-06-28

## 2019-11-04 ENCOUNTER — HOSPITAL ENCOUNTER (OUTPATIENT)
Dept: NON INVASIVE DIAGNOSTICS | Facility: CLINIC | Age: 84
Discharge: HOME | End: 2019-11-04
Payer: COMMERCIAL

## 2019-11-04 DIAGNOSIS — I77.1 ILIAC ARTERY STENOSIS, BILATERAL (HCC): ICD-10-CM

## 2019-11-04 DIAGNOSIS — I73.9 PAD (PERIPHERAL ARTERY DISEASE) (HCC): ICD-10-CM

## 2019-11-04 PROCEDURE — 93978 VASCULAR STUDY: CPT

## 2019-11-04 PROCEDURE — 93978 VASCULAR STUDY: CPT | Performed by: SURGERY

## 2019-11-12 ENCOUNTER — OFFICE VISIT (OUTPATIENT)
Dept: VASCULAR SURGERY | Facility: CLINIC | Age: 84
End: 2019-11-12
Payer: COMMERCIAL

## 2019-11-12 VITALS
DIASTOLIC BLOOD PRESSURE: 62 MMHG | HEIGHT: 62 IN | SYSTOLIC BLOOD PRESSURE: 134 MMHG | BODY MASS INDEX: 24.84 KG/M2 | RESPIRATION RATE: 16 BRPM | WEIGHT: 135 LBS | TEMPERATURE: 98 F | HEART RATE: 86 BPM

## 2019-11-12 DIAGNOSIS — K55.1 MESENTERIC ARTERY STENOSIS (HCC): Primary | ICD-10-CM

## 2019-11-12 DIAGNOSIS — I70.213 ATHEROSCLEROSIS OF NATIVE ARTERY OF BOTH LOWER EXTREMITIES WITH INTERMITTENT CLAUDICATION (HCC): ICD-10-CM

## 2019-11-12 DIAGNOSIS — I70.1 RENAL ARTERY STENOSIS (HCC): ICD-10-CM

## 2019-11-12 PROBLEM — I73.9 CLAUDICATION IN PERIPHERAL VASCULAR DISEASE (HCC): Status: ACTIVE | Noted: 2019-05-17

## 2019-11-12 PROCEDURE — 99214 OFFICE O/P EST MOD 30 MIN: CPT | Performed by: SURGERY

## 2019-11-12 NOTE — PROGRESS NOTES
Assessment/Plan:    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HCC)  Incidental finding of elevated velocity in the celiac on abdominal duplex  Peak systolic velocity is 033 centimeters/second with an end-diastolic velocity of 42 centimeters/second  Patient is asymptomatic  She denies significant weight loss, or intestinal angina  I would not pursue further vascular interrogation at this time  Renal artery stenosis (HCC)  Incidental finding of right renal artery stenosis with velocities consistent with greater than 60% stenosis  Peak systolic velocity is 396 centimeters/second with an end-diastolic velocity of 29 centimeters/second  Patient's blood pressure is well to fully well controlled therefore would not pursue further vascular interrogation  Atherosclerosis of native artery of extremity with intermittent claudication [facility])  Patient with approximately 1 block claudication  After discussion with patient in the presence of her son who acted as an , patient is not disabled by this claudication  I have encouraged her to participate in the daily walking exercise program   She exhibits no tissue loss and denies rest pain or history of tissue loss  Patient has bilateral iliac stents  She has common femoral and superficial femoral arterial occlusive disease by duplex  However this time I have recommended she initiate a walking exercise program   She will continue to undergo surveillance noninvasive vascular interrogation  Next LEAD scheduled for 6 months  Diagnoses and all orders for this visit:    Mesenteric artery stenosis [facility])    Renal artery stenosis (HCC)    Atherosclerosis of native artery of both lower extremities with intermittent claudication (HCC)  -     VAS lower limb arterial duplex, complete bilateral; Future  -     VAS abdominal aorta/iliacs; limited study; Future          Subjective:      Patient ID: Frida Helms is a 80 y o  female       Pt is here to review the results of her LEOBARDO on 10/28/19 and her AOIL on 11/4/19  Pt was last seen on 9/24/19 for bilateral carotid artery stenosis  Pt is currently taking ASA and Pravastatin   :     The following portions of the patient's history were reviewed and updated as appropriate: allergies, current medications, past family history, past medical history, past social history, past surgical history and problem list     Review of Systems      Objective:      /62 (BP Location: Left arm, Patient Position: Sitting, Cuff Size: Adult)   Pulse 86   Temp 98 °F (36 7 °C) (Tympanic)   Resp 16   Ht 5' 2" (1 575 m)   Wt 61 2 kg (135 lb)   LMP  (LMP Unknown)   BMI 24 69 kg/m²          Physical Exam   Constitutional: She is oriented to person, place, and time  She appears well-developed and well-nourished  HENT:   Head: Normocephalic and atraumatic  Mouth/Throat: Oropharynx is clear and moist    Eyes: Pupils are equal, round, and reactive to light  Conjunctivae and EOM are normal    Neck: Normal range of motion  Neck supple  No JVD present  Cardiovascular: Normal rate, regular rhythm and normal heart sounds  Doppler signals noted at the DP/PT/P bilaterally  Pulmonary/Chest: Effort normal and breath sounds normal  No stridor  No respiratory distress  She has no wheezes  She has no rales  She exhibits no tenderness  Abdominal: Soft  She exhibits no distension and no mass  There is no tenderness  There is no rebound and no guarding  Musculoskeletal: Normal range of motion  She exhibits no tenderness or deformity  Neurological: She is alert and oriented to person, place, and time  Skin: Skin is warm and dry  No rash noted  No erythema  Feet are warm and well perfused there is no tissue loss   Psychiatric: She has a normal mood and affect  Her behavior is normal  Thought content normal    Nursing note and vitals reviewed

## 2019-11-12 NOTE — LETTER
November 12, 2019     Paz Gandhi MD  [facility] [id number] [address]    Patient: Paula Collins   YOB: 1934   Date of Visit: 11/12/2019       Dear Dr Madelynn Lesch: Thank you for referring Paula Collins to me for evaluation  Below are the relevant portions of my assessment and plan of care  Assessment/Plan:    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HCC)  Incidental finding of elevated velocity in the celiac on abdominal duplex  Peak systolic velocity is 394 centimeters/second with an end-diastolic velocity of 42 centimeters/second  Patient is asymptomatic  She denies significant weight loss, or intestinal angina  I would not pursue further vascular interrogation at this time  Renal artery stenosis (HCC)  Incidental finding of right renal artery stenosis with velocities consistent with greater than 60% stenosis  Peak systolic velocity is 389 centimeters/second with an end-diastolic velocity of 29 centimeters/second  Patient's blood pressure is well to fully well controlled therefore would not pursue further vascular interrogation  Atherosclerosis of native artery of extremity with intermittent claudication [facility])  Patient with approximately 1 block claudication  After discussion with patient in the presence of her son who acted as an , patient is not disabled by this claudication  I have encouraged her to participate in the daily walking exercise program   She exhibits no tissue loss and denies rest pain or history of tissue loss  Patient has bilateral iliac stents  She has common femoral and superficial femoral arterial occlusive disease by duplex  However this time I have recommended she initiate a walking exercise program   She will continue to undergo surveillance noninvasive vascular interrogation  Next LEAD scheduled for 6 months  If you have questions, please do not hesitate to call me  I look forward to following Ahsan Dozier along with you  Sincerely,  Valdez Beavers MD        CC: Georgia Stinson, KAVEH Villegas, MD Martin Keita MD Felipa Krebs, DO Samuel Maxwell, MD Alyssa Bowles, MD Melvin Fuller, RN

## 2019-11-12 NOTE — ASSESSMENT & PLAN NOTE
Patient with approximately 1 block claudication  After discussion with patient in the presence of her son who acted as an , patient is not disabled by this claudication  I have encouraged her to participate in the daily walking exercise program   She exhibits no tissue loss and denies rest pain or history of tissue loss  Patient has bilateral iliac stents  She has common femoral and superficial femoral arterial occlusive disease by duplex  However this time I have recommended she initiate a walking exercise program   She will continue to undergo surveillance noninvasive vascular interrogation  Next LEAD scheduled for 6 months

## 2019-11-12 NOTE — ASSESSMENT & PLAN NOTE
Incidental finding of right renal artery stenosis with velocities consistent with greater than 60% stenosis  Peak systolic velocity is 557 centimeters/second with an end-diastolic velocity of 29 centimeters/second  Patient's blood pressure is well to fully well controlled therefore would not pursue further vascular interrogation

## 2019-11-12 NOTE — ASSESSMENT & PLAN NOTE
Incidental finding of elevated velocity in the celiac on abdominal duplex  Peak systolic velocity is 790 centimeters/second with an end-diastolic velocity of 42 centimeters/second  Patient is asymptomatic  She denies significant weight loss, or intestinal angina  I would not pursue further vascular interrogation at this time

## 2019-11-20 ENCOUNTER — OFFICE VISIT (OUTPATIENT)
Dept: FAMILY MEDICINE CLINIC | Facility: CLINIC | Age: 84
End: 2019-11-20

## 2019-11-20 VITALS — WEIGHT: 132 LBS | BODY MASS INDEX: 24.14 KG/M2

## 2019-11-20 DIAGNOSIS — M75.42 IMPINGEMENT SYNDROME OF LEFT SHOULDER: ICD-10-CM

## 2019-11-20 DIAGNOSIS — M25.512 CHRONIC LEFT SHOULDER PAIN: Primary | ICD-10-CM

## 2019-11-20 DIAGNOSIS — M99.07 SOMATIC DYSFUNCTION OF UPPER EXTREMITY: ICD-10-CM

## 2019-11-20 DIAGNOSIS — M75.02 ADHESIVE CAPSULITIS OF LEFT SHOULDER: ICD-10-CM

## 2019-11-20 DIAGNOSIS — G89.29 CHRONIC LEFT SHOULDER PAIN: Primary | ICD-10-CM

## 2019-11-20 PROCEDURE — 99213 OFFICE O/P EST LOW 20 MIN: CPT | Performed by: FAMILY MEDICINE

## 2019-11-20 PROCEDURE — 98925 OSTEOPATH MANJ 1-2 REGIONS: CPT | Performed by: FAMILY MEDICINE

## 2019-11-20 PROCEDURE — 20610 DRAIN/INJ JOINT/BURSA W/O US: CPT | Performed by: FAMILY MEDICINE

## 2019-11-20 RX ORDER — TRIAMCINOLONE ACETONIDE 40 MG/ML
40 INJECTION, SUSPENSION INTRA-ARTICULAR; INTRAMUSCULAR
Status: COMPLETED | OUTPATIENT
Start: 2019-11-20 | End: 2019-11-20

## 2019-11-20 RX ADMIN — TRIAMCINOLONE ACETONIDE 40 MG: 40 INJECTION, SUSPENSION INTRA-ARTICULAR; INTRAMUSCULAR at 17:03

## 2019-11-20 NOTE — PATIENT INSTRUCTIONS
Capsulitis adhesiva   LO QUE NECESITA SABER:   La capsulitis adhesiva ocurre cuando los tejidos del hombro se contraen y se inflaman  La condición se llama a menudo hombro congelado porque los tejidos inflamados provocan dolor y disminuyen el movimiento del hombro  INSTRUCCIONES SOBRE [facility]:   Medicamentos:   · Analgésicos:  Usted podría recibir medicamento para quitarle o reducir el dolor  No espere a que el dolor sea muy intenso para reddy el medicamento  · AINEs (Analgésicos antiinflamatorios no esteroides)  pueden disminuir la inflamación y el dolor o la fiebre  Leeann medicamento esta disponible con o sin dianna receta médica  Los AINEs pueden causar sangrado estomacal o problemas renales en ciertas personas  Si usted red un medicamento anticoagulante, siempre pregúntele a bethea médico si los KEL son seguros para usted  Siempre geraldine la etiqueta de leeann medicamento y [place] instrucciones  · [place] sara medicamentos brenda se le haya indicado  Consulte con bethea médico si usted dustin que bethea medicamento no le está ayudando o si presenta efectos secundarios  Infórmele si es alérgico a algún medicamento  Mantenga dianna lista actualizada de los [place], las vitaminas y los productos herbales que red  Incluya los siguientes datos de los medicamentos: cantidad, frecuencia y motivo de administración  Traiga con usted la lista o los envases de la píldoras a sara citas de seguimiento  Lleve la lista de los medicamentos con usted en madhav de dianna emergencia  Fisioterapia:  Un fisioterapeuta le puede enseñar ejercicios para ayudarle a mejorar el movimiento y la fuerza, y para disminuir el dolor  Estiramientos para hacer en casa: Bethea médico podría sugerirle estiramientos para ayudar a mejorar los síntomas  Pregúntele a bethea médico o fisioterapeuta cuáles son los mas apropiados y la manera de [place]   Los siguientes estiramientos podrían ser sugeridos:  · Estiramiento en la chelita:  Claudean Bean junto a Earvin Heather con el brazo dolorido doblado del codo  Coloque la [employer] kendal de la chelita y gire galvan cuerpo lejos de la chelita  Mantenga esta posición por 30 segundos  Relájese y repita  · Estiramiento hacia al frente:  Acuéstese boca arriba con las piernas estiradas  Use el brazo alli para empujar el brazo dolorido por encima de la manuel hasta que sienta un estiramiento [place]  Mantenga esta posición por 15 segundos  Baje el brazo lentamente y vuelva a colocarlo en la posición inicial  Relájese y repita  · Estiramiento zamzam:  Use el brazo alli para tirar suavemente el brazo dolorido sobre el pecho jie debajo de la [place]  Jale hasta que sienta un estiramiento suave  Mantenga esta posición por 30 segundos  Relájese y repita  Hielo o calor:  El hielo o el calor en el hombro podrían ayudar a disminuir el dolor y mejorar el movimiento del hombro  Aplique hielo para aliviar el dolor después de estirarse  Ponga calor en el hombro para ayudar a relajar los músculos  Aplique hielo o calor según las indicaciones  Acuda a sara consultas de control con galvan médico según le indicaron  Anote sara preguntas para que se acuerde de hacerlas dylan sara visitas  Pregúntele a galvan [place]rely[place] vitaminas y minerales son adecuados para usted  · Tiene mayor dolor y rigidez en el hombro  · Tiene preguntas e inquietudes acerca de galvan condición  Regrese a la landry de emergencias si:   · Usted tiene dificultad reciente o mayor para  el Rolando Sruthi  © 2017 [address] [employer] Information is for End User's use only and may not be sold, redistributed or otherwise used for commercial purposes  All illustrations and images included in CareNotes® are the copyrighted property of A D A M , Inc  or Yoan Posada  Esta información es sólo para uso en educación  Galvan intención no es darle un consejo médico sobre enfermedades o tratamientos   Colsulte con galvan Ronalee Mon farmacéutico antes de seguir cualquier régimen [employer] para saber si es seguro y efectivo para usted

## 2019-11-25 DIAGNOSIS — K21.9 GASTROESOPHAGEAL REFLUX DISEASE WITHOUT ESOPHAGITIS: ICD-10-CM

## 2019-11-26 ENCOUNTER — OFFICE VISIT (OUTPATIENT)
Dept: FAMILY MEDICINE CLINIC | Facility: CLINIC | Age: 84
End: 2019-11-26

## 2019-11-26 VITALS — HEIGHT: 62 IN | WEIGHT: 131 LBS | BODY MASS INDEX: 24.11 KG/M2

## 2019-11-26 DIAGNOSIS — M99.01 SOMATIC DYSFUNCTION OF CERVICAL REGION: Primary | ICD-10-CM

## 2019-11-26 DIAGNOSIS — K21.9 GASTROESOPHAGEAL REFLUX DISEASE WITHOUT ESOPHAGITIS: ICD-10-CM

## 2019-11-26 DIAGNOSIS — M99.02 SOMATIC DYSFUNCTION OF THORACIC REGION: ICD-10-CM

## 2019-11-26 DIAGNOSIS — M99.07 SOMATIC DYSFUNCTION OF UPPER EXTREMITY: ICD-10-CM

## 2019-11-26 RX ORDER — PANTOPRAZOLE SODIUM 40 MG/1
TABLET, DELAYED RELEASE ORAL
Qty: 30 TABLET | Refills: 5 | OUTPATIENT
Start: 2019-11-26

## 2019-11-26 RX ORDER — PANTOPRAZOLE SODIUM 40 MG/1
40 TABLET, DELAYED RELEASE ORAL DAILY
Qty: 30 TABLET | Refills: 5 | Status: SHIPPED | OUTPATIENT
Start: 2019-11-26 | End: 2020-04-06 | Stop reason: SDUPTHER

## 2019-11-26 NOTE — PROGRESS NOTES
The Assessment/Plan        Problem List Items Addressed This Visit     None      Visit Diagnoses     Somatic dysfunction of cervical region    -  Primary    Somatic dysfunction of thoracic region        Somatic dysfunction of upper extremity               This is a 80 y o  female who presents for OMT follow-up  1  Patient tolerated OMT well for the above problems, advised stretching and heat application for continued relief  2  OMT Follow up in 4 weeks  Subjective     Isabella Kalyan is a 80 y o  female and is here for a OMT follow up  The patient reports significant improvement in left shoulder pain following steroid injection recently  She can now comb her hair and raise her hand above her head, which is a significant improvement  No new complaints or concerns  Is the patient taking Pain medication? no  Has the patient completed physical therapy for this condition? no  Did Patient symptoms improve from last OMT appointment? yes    The following portions of the patient's history were reviewed and updated as appropriate: allergies, current medications, past family history, past medical history, past social history, past surgical history and problem list     Review of Systems  Review of Systems   Musculoskeletal: Positive for neck pain          Left shoulder pain         Objective     OMT Exam   Cervical:   Somatic Dysfunction:  Tissue Texture Changes, Asymmetry , Restriction and Tenderness  Severity :  2  Osteopathic Findings: Paraspinal hypertonicity L>R, myofascial restriction   Treatment Method: ST and MFR  Response: improved  Thoracic T1-4:   Somatic Dysfunction:  Tissue Texture Changes, Asymmetry , Restriction and Tenderness  Severity :  2  Osteopathic Findings: Trapezius hypertonicity L>R, myofascial restriction  Treatment Method: ST and MFR  Response: improved  Thoracic T5-9:   Somatic Dysfunction:  Tissue Texture Changes, Asymmetry , Restriction and Tenderness  Severity :  2  Osteopathic Findings: Paraspinal hypertonicity L>R, myofascial restriction  Treatment Method: ST and MFR  Response: improved  Left Upper Extremity:  Somatic Dysfunction:  Tissue Texture Changes, Asymmetry , Restriction and Tenderness  Severity :  2  Osteopathic Findings: Decreased range of motion in all planes, 90 degrees of flexion and abduction  Treatment Method: Modified Tacos technique   Response: improved    Rubén Stewart DO PGY-3  Nisa Zaman

## 2019-12-04 ENCOUNTER — APPOINTMENT (OUTPATIENT)
Dept: LAB | Facility: HOSPITAL | Age: 84
End: 2019-12-04
Attending: INTERNAL MEDICINE
Payer: COMMERCIAL

## 2019-12-04 ENCOUNTER — TRANSCRIBE ORDERS (OUTPATIENT)
Dept: LAB | Facility: HOSPITAL | Age: 84
End: 2019-12-04

## 2019-12-04 DIAGNOSIS — R06.81 GERD WITH APNEA: Primary | ICD-10-CM

## 2019-12-04 DIAGNOSIS — K21.9 GERD WITH APNEA: Primary | ICD-10-CM

## 2019-12-04 DIAGNOSIS — R06.81 GERD WITH APNEA: ICD-10-CM

## 2019-12-04 DIAGNOSIS — I10 ESSENTIAL HYPERTENSION: ICD-10-CM

## 2019-12-04 DIAGNOSIS — N25.81 SECONDARY HYPERPARATHYROIDISM, RENAL (HCC): ICD-10-CM

## 2019-12-04 DIAGNOSIS — K21.9 GERD WITH APNEA: ICD-10-CM

## 2019-12-04 DIAGNOSIS — N18.30 CKD (CHRONIC KIDNEY DISEASE), STAGE III (HCC): ICD-10-CM

## 2019-12-04 LAB
ALBUMIN SERPL BCP-MCNC: 4 G/DL (ref 3.5–5)
ALP SERPL-CCNC: 78 U/L (ref 46–116)
ALT SERPL W P-5'-P-CCNC: 17 U/L (ref 12–78)
ANION GAP SERPL CALCULATED.3IONS-SCNC: 4 MMOL/L (ref 4–13)
AST SERPL W P-5'-P-CCNC: 10 U/L (ref 5–45)
BACTERIA UR QL AUTO: ABNORMAL /HPF
BILIRUB SERPL-MCNC: 0.39 MG/DL (ref 0.2–1)
BILIRUB UR QL STRIP: ABNORMAL
BUN SERPL-MCNC: 39 MG/DL (ref 5–25)
CALCIUM SERPL-MCNC: 9.5 MG/DL (ref 8.3–10.1)
CHLORIDE SERPL-SCNC: 107 MMOL/L (ref 100–108)
CLARITY UR: ABNORMAL
CO2 SERPL-SCNC: 29 MMOL/L (ref 21–32)
COLOR UR: ABNORMAL
CREAT SERPL-MCNC: 1.38 MG/DL (ref 0.6–1.3)
CREAT UR-MCNC: 167 MG/DL
GFR SERPL CREATININE-BSD FRML MDRD: 35 ML/MIN/1.73SQ M
GLUCOSE SERPL-MCNC: 174 MG/DL (ref 65–140)
GLUCOSE UR STRIP-MCNC: NEGATIVE MG/DL
HGB UR QL STRIP.AUTO: NEGATIVE
KETONES UR STRIP-MCNC: NEGATIVE MG/DL
LEUKOCYTE ESTERASE UR QL STRIP: ABNORMAL
MAGNESIUM SERPL-MCNC: 1.7 MG/DL (ref 1.6–2.6)
NITRITE UR QL STRIP: NEGATIVE
NON-SQ EPI CELLS URNS QL MICRO: ABNORMAL /HPF
PH UR STRIP.AUTO: 5 [PH]
PHOSPHATE SERPL-MCNC: 3.5 MG/DL (ref 2.3–4.1)
POTASSIUM SERPL-SCNC: 4.7 MMOL/L (ref 3.5–5.3)
PROT SERPL-MCNC: 8.2 G/DL (ref 6.4–8.2)
PROT UR STRIP-MCNC: ABNORMAL MG/DL
PROT UR-MCNC: 96 MG/DL
PROT/CREAT UR: 0.57 MG/G{CREAT} (ref 0–0.1)
PTH-INTACT SERPL-MCNC: 107.5 PG/ML (ref 18.4–80.1)
RBC #/AREA URNS AUTO: ABNORMAL /HPF
SODIUM SERPL-SCNC: 140 MMOL/L (ref 136–145)
SP GR UR STRIP.AUTO: 1.02 (ref 1–1.03)
UROBILINOGEN UR QL STRIP.AUTO: 1 E.U./DL
WBC #/AREA URNS AUTO: ABNORMAL /HPF

## 2019-12-04 PROCEDURE — 83735 ASSAY OF MAGNESIUM: CPT

## 2019-12-04 PROCEDURE — 83970 ASSAY OF PARATHORMONE: CPT

## 2019-12-04 PROCEDURE — 80053 COMPREHEN METABOLIC PANEL: CPT

## 2019-12-04 PROCEDURE — 36415 COLL VENOUS BLD VENIPUNCTURE: CPT

## 2019-12-04 PROCEDURE — 82570 ASSAY OF URINE CREATININE: CPT

## 2019-12-04 PROCEDURE — 84100 ASSAY OF PHOSPHORUS: CPT

## 2019-12-04 PROCEDURE — 83516 IMMUNOASSAY NONANTIBODY: CPT

## 2019-12-04 PROCEDURE — 81001 URINALYSIS AUTO W/SCOPE: CPT

## 2019-12-04 PROCEDURE — 84156 ASSAY OF PROTEIN URINE: CPT

## 2019-12-09 LAB — MISCELLANEOUS LAB TEST RESULT: NORMAL

## 2019-12-11 ENCOUNTER — OFFICE VISIT (OUTPATIENT)
Dept: PODIATRY | Facility: CLINIC | Age: 84
End: 2019-12-11
Payer: COMMERCIAL

## 2019-12-11 VITALS
SYSTOLIC BLOOD PRESSURE: 104 MMHG | WEIGHT: 127.8 LBS | DIASTOLIC BLOOD PRESSURE: 60 MMHG | BODY MASS INDEX: 23.52 KG/M2 | HEIGHT: 62 IN | HEART RATE: 80 BPM

## 2019-12-11 DIAGNOSIS — E11.51 TYPE 2 DIABETES MELLITUS WITH DIABETIC PERIPHERAL ANGIOPATHY WITHOUT GANGRENE, WITHOUT LONG-TERM CURRENT USE OF INSULIN (HCC): Primary | ICD-10-CM

## 2019-12-11 DIAGNOSIS — B35.1 TINEA UNGUIUM: ICD-10-CM

## 2019-12-11 PROBLEM — H92.02 LEFT EAR PAIN: Status: ACTIVE | Noted: 2019-12-11

## 2019-12-11 PROBLEM — L29.9 ITCHING OF EAR: Status: ACTIVE | Noted: 2019-12-11

## 2019-12-11 PROCEDURE — 11721 DEBRIDE NAIL 6 OR MORE: CPT | Performed by: PODIATRIST

## 2019-12-12 DIAGNOSIS — G89.29 MUSCULOSKELETAL PAIN, CHRONIC: ICD-10-CM

## 2019-12-12 DIAGNOSIS — M79.18 MUSCULOSKELETAL PAIN, CHRONIC: ICD-10-CM

## 2019-12-12 NOTE — PROGRESS NOTES
This patient was seen on 12/11/19  Assessment:    Problem List Items Addressed This Visit        Endocrine    Type 2 diabetes mellitus with diabetic peripheral angiopathy without gangrene ([facility] [address] ) - Primary       Musculoskeletal and Integument    Tinea unguium          Plan:  1  High risk  foot precautions reviewed with patient including the need to wear protective shoegear at all times when walking including in the home, the need to check feet all surfaces daily with a mirror and report and skin breaks to podiatry, the need to apply an emollient to skin of feet daily  2  Nails x 10 were debrided with double-action nail forceps of their thickness, length and width  Diagnoses and all orders for this visit:    Type 2 diabetes mellitus with diabetic peripheral angiopathy without gangrene, without long-term current use of insulin (HCC)    Tinea unguium          Subjective:      Patient ID: Elizabeth Tran is a 80 y o  female  Christy Lester presents today for high risk DM footcare  The following portions of the patient's history were reviewed and updated as appropriate: allergies, current medications, past family history, past medical history, past social history, past surgical history and problem list     Review of Systems   Constitutional: Negative  Neurological:        Patient notes tingling and numbness in feet, with electrical sensations and shooting parethesias         Objective:      /60   Pulse 80   Ht 5' 2" (1 575 m) Comment: verbal  Wt 58 kg (127 lb 12 8 oz)   LMP  (LMP Unknown)   BMI 23 37 kg/m²          Physical Exam   Constitutional: She appears well-developed and well-nourished  No distress  Cardiovascular:   Pulses:       Dorsalis pedis pulses are 2+ on the right side, and 2+ on the left side  Posterior tibial pulses are 2+ on the right side, and 2+ on the left side  Musculoskeletal:        Right foot: There is normal range of motion and no deformity          Left foot: There is normal range of motion and no deformity  Feet:    Feet:   Right Foot:   Protective Sensation: 10 sites tested  Skin Integrity: Negative for ulcer, blister, skin breakdown, erythema, warmth, callus or dry skin  Left Foot:   Protective Sensation: 10 sites tested  4 sites sensed  Skin Integrity: Negative for ulcer, blister, skin breakdown, erythema, warmth, callus or dry skin  Neurological: A sensory deficit is present  Skin: She is not diaphoretic

## 2019-12-12 NOTE — PATIENT INSTRUCTIONS
Foot Care for People with Diabetes   WHAT YOU NEED TO KNOW:   · Foot care helps protect your feet and prevent foot ulcers or sores  Long-term high blood sugar levels can damage the blood vessels and nerves in your legs and feet  This damage makes it hard to feel pressure, pain, temperature, and touch  You may not be able to feel a cut or sore, or shoes that are too tight  Foot care is needed to prevent serious problems, such as an infection or amputation  · Diabetes may cause your toes to become crooked or curved under  These changes may affect the way you walk and can lead to increased pressure on your foot  The pressure can decrease blood flow to your feet  Lack of blood flow increases your risk for a foot ulcer  Do not ignore small problems, such as dry skin or small wounds  These can become life-threatening over time without proper care  DISCHARGE INSTRUCTIONS:   Contact your healthcare provider if:   · Your feet become numb, weak, or hard to move  · You have pus draining from a sore on your foot  · You have a wound on your foot that gets bigger, deeper, or does not heal      · You see blisters, cuts, scratches, calluses, or sores on your foot  · You have a fever, and your feet become red, warm, and swollen  · Your toenails become thick, curled, or yellow  · You find it hard to check your feet because your vision is poor  · You have questions or concerns about your condition or care  Foot care:   · Check your feet each day  Look at your whole foot, including the bottom, and between and under your toes  Check for wounds, corns, and calluses  Use a mirror to see the bottom of your feet  The skin on your feet may be shiny, tight, or darker than normal  Your feet may also be cold and pale  Feel your feet by running your hands along the tops, bottoms, sides, and between your toes  Redness, swelling, and warmth are signs of blood flow problems that can lead to a foot ulcer   Do not try to remove corns or calluses yourself  · Wash your feet each day with soap and warm water  Do not use hot water, because this can injure your foot  Dry your feet gently with a towel after you wash them  Dry between and under your toes  · Apply lotion or a moisturizer on your dry feet  Ask your healthcare provider what lotions are best to use  Do not put lotion or moisturizer between your toes  · Cut your toenails correctly  File or cut your toenails straight across  Use a soft brush to clean around your toenails  If your toenails are very thick, you may need to have a healthcare provider or specialist cut them  · Protect your feet  Do not walk barefoot or wear your shoes without socks  Check your shoes for rocks or other objects that can hurt your feet  Wear cotton socks to help keep your feet dry  Wear socks without toe seams, or wear them with the seams inside out  Change your socks each day  Do not wear socks that are dirty or damp  · Wear shoes that fit well  Wear shoes that do not rub against any area of your feet  Your shoes should be ½ to ¾ inch (1 to 2 centimeters) longer than your feet  Your shoes should also have extra space around the widest part of your feet  Walking or athletic shoes with laces or straps that adjust are best  Ask your healthcare provider for help to choose shoes that fit you best  Ask him if you need to wear an insert, orthotic, or bandage on your feet  Follow up with your healthcare provider or foot specialist as directed: You will need to have your feet checked at least once a year  You may need a foot exam more often if you have nerve damage, foot deformities, or ulcers  Write down your questions so you remember to ask them during your visits  © 2017 [address] [employer] Information is for End User's use only and may not be sold, redistributed or otherwise used for commercial purposes   All illustrations and images included in CareNotes® are the copyrighted property of [employer]  or Yoan Posada  The above information is an  only  It is not intended as medical advice for individual conditions or treatments  Talk to your doctor, nurse or pharmacist before following any medical regimen to see if it is safe and effective for you

## 2019-12-14 DIAGNOSIS — N20.0 CALCULUS OF KIDNEY: ICD-10-CM

## 2019-12-17 RX ORDER — DULOXETIN HYDROCHLORIDE 30 MG/1
CAPSULE, DELAYED RELEASE ORAL
Qty: 90 CAPSULE | Refills: 0 | OUTPATIENT
Start: 2019-12-17

## 2019-12-17 NOTE — TELEPHONE ENCOUNTER
Called patient, she is not taking Cymbalta  Will discontinue on patient's chart  Reviewed recent lab work with her  Follow up scheduled appointment with Dr Adalberto Mata recommended

## 2019-12-26 ENCOUNTER — OFFICE VISIT (OUTPATIENT)
Dept: FAMILY MEDICINE CLINIC | Facility: CLINIC | Age: 84
End: 2019-12-26

## 2019-12-26 VITALS — WEIGHT: 129.8 LBS | HEIGHT: 62 IN | BODY MASS INDEX: 23.89 KG/M2

## 2019-12-26 DIAGNOSIS — M99.07 SOMATIC DYSFUNCTION OF UPPER EXTREMITY: ICD-10-CM

## 2019-12-26 DIAGNOSIS — G89.29 CHRONIC LEFT SHOULDER PAIN: Primary | ICD-10-CM

## 2019-12-26 DIAGNOSIS — M99.02 SOMATIC DYSFUNCTION OF THORACIC REGION: ICD-10-CM

## 2019-12-26 DIAGNOSIS — M25.512 CHRONIC LEFT SHOULDER PAIN: Primary | ICD-10-CM

## 2019-12-26 DIAGNOSIS — M99.01 SOMATIC DYSFUNCTION OF CERVICAL REGION: ICD-10-CM

## 2019-12-26 NOTE — PROGRESS NOTES
The Assessment/Plan        Problem List Items Addressed This Visit        Other    Chronic left shoulder pain - Primary     - Referral to physical therapy given improvement in symptoms with OMT treatments however due to schedule limitations we have too long of an interval between appointments         Relevant Orders    Ambulatory referral to Physical Therapy      Other Visit Diagnoses     Somatic dysfunction of thoracic region        Somatic dysfunction of cervical region        Somatic dysfunction of upper extremity               This is a 80 y o  female who presents for OMT follow-up  1  Patient tolerated OMT well for the above problems, advised stretching and heat application for continued relief  2  OMT Follow up next avaliable appointment OMT  Subjective     Kyaw Brown is a 80 y o  female and is here for an OMT follow up  The patient reports improvement in her symptoms following last OMT treatment, however symptoms return before she can receive next OMT treatment  She continues to have pain of the neck, more left sided neck and shoulder, with pain radiating up into her head but denies any headaches  No new complaints today  Is the patient taking Pain medication? no  Has the patient completed physical therapy for this condition? no  Did Patient symptoms improve from last OMT appointment? yes    The following portions of the patient's history were reviewed and updated as appropriate: allergies, current medications, past family history, past medical history, past social history, past surgical history and problem list     Review of Systems  Review of Systems   Musculoskeletal: Positive for neck pain and neck stiffness          Left shoulder pain and stiffness         Objective     OMT Exam   Cervical:   Somatic Dysfunction:  Tissue Texture Changes, Asymmetry , Restriction and Tenderness  Severity :  2  Osteopathic Findings: Paraspinal hypertonicity L>R, myofascial restriction, OA myofascial restriction  Treatment Method: ST and MFR  Response: improved  Thoracic T1-4:   Somatic Dysfunction:  Tissue Texture Changes, Asymmetry , Restriction and Tenderness  Severity :  3  Osteopathic Findings: Trapezius hypertonicity bilaterally L>R, myofascial restriction  Treatment Method: ST and MFR  Response: improved  Thoracic T5-9:   Somatic Dysfunction:  Tissue Texture Changes, Asymmetry , Restriction and Tenderness  Severity :  2  Osteopathic Findings: Paraspinal hypertonicity L>R, myofascial restriction, ropy musculature L periscapular region  Treatment Method: ST and MFR  Response: improved  Left Upper Extremity:  Somatic Dysfunction:  Tissue Texture Changes, Asymmetry , Restriction and Tenderness  Severity :  2  Osteopathic Findings: Restricted range of motion all planes, myofascial restriction  Treatment Method: ST, MFR and Modified Tacos technique  Response: improved    Osbaldo Benton DO PGY-3  Nisa Zaman Alert and oriented. normal mood and affect. no apparent risk to self or others.

## 2019-12-26 NOTE — ASSESSMENT & PLAN NOTE
- Referral to physical therapy given improvement in symptoms with OMT treatments however due to schedule limitations we have too long of an interval between appointments

## 2019-12-31 ENCOUNTER — OFFICE VISIT (OUTPATIENT)
Dept: OBGYN CLINIC | Facility: CLINIC | Age: 84
End: 2019-12-31
Payer: COMMERCIAL

## 2019-12-31 VITALS — BODY MASS INDEX: 23.85 KG/M2 | SYSTOLIC BLOOD PRESSURE: 142 MMHG | WEIGHT: 130.4 LBS | DIASTOLIC BLOOD PRESSURE: 60 MMHG

## 2019-12-31 DIAGNOSIS — R30.0 BURNING WITH URINATION: ICD-10-CM

## 2019-12-31 DIAGNOSIS — B37.3 VAGINAL YEAST INFECTION: Primary | ICD-10-CM

## 2019-12-31 DIAGNOSIS — Z12.31 ENCOUNTER FOR SCREENING MAMMOGRAM FOR MALIGNANT NEOPLASM OF BREAST: ICD-10-CM

## 2019-12-31 LAB — SL AMB POCT WET MOUNT: ABNORMAL

## 2019-12-31 PROCEDURE — 99214 OFFICE O/P EST MOD 30 MIN: CPT | Performed by: STUDENT IN AN ORGANIZED HEALTH CARE EDUCATION/TRAINING PROGRAM

## 2019-12-31 PROCEDURE — 87210 SMEAR WET MOUNT SALINE/INK: CPT | Performed by: STUDENT IN AN ORGANIZED HEALTH CARE EDUCATION/TRAINING PROGRAM

## 2019-12-31 PROCEDURE — 57160 INSERT PESSARY/OTHER DEVICE: CPT | Performed by: STUDENT IN AN ORGANIZED HEALTH CARE EDUCATION/TRAINING PROGRAM

## 2019-12-31 NOTE — PROGRESS NOTES
Problem visit established - Gynecology   Fabian Smoker 80 y o  female MRN: [id number] [facility]  Encounter: [id number]    Chief Complaint   Patient presents with   [facility] Problem     external and internal vaginal burning for about 1 month       History of Present Illness     Fabian Redding is a 80 y o  female [id number] who presents for vaginal itching over the last month, similar to previous symptoms last year for which she saw Dr Chasity Ramirez and was prescribed Terazol 7, subsequently resolved  Currently with pessary in place, managed by PCP, doing well, last seen 1 month ago, generally [id number]  REVIEW OF SYSTEMS  12 point review of systems negative aside from HPI      Past Medical History:   Diagnosis Date    Arthritis     Back pain     Benign essential hypertension     LAST ASSESSED: [id number]    Carotid artery stenosis     CKD (chronic kidney disease) stage 2, GFR 60-89 ml/min     Constipation, chronic     "drinks prune juice"    Diabetes mellitus ([address] )     DMII (diabetes mellitus, type 2) [facility])     Epiglottitis     RESOLVED: [id number]    Gastritis     GERD (gastroesophageal reflux disease)     Gout     H/O blood clots     left lower leg,,,11 yrs ago    History of stenosis of renal artery     RESOLVED: [id number]    Hypercholesteremia     Hypercholesterolemia     Hyperlipidemia     Hyperparathyroidism ([address] )     Hypertension     Iliac artery stenosis, bilateral [facility]     Kidney stone     Lobular carcinoma ([facility] ) 09/06/2017    Left    Mucinous carcinoma of breast, 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    Left    Presence of pessary     Renal artery stenosis (HCC)     Renal disorder     Renovascular hypertension     RESOLVED: [id number]    Segmental and somatic dysfunction of cervical region     RESOLVED: [id number]    Segmental and somatic dysfunction of lumbar region     RESOLVED: [id number]    Segmental and somatic dysfunction of pelvic region RESOLVED: [id number]    Segmental and somatic dysfunction of thoracic region     RESOLVED: [id number]    Somatic dysfunction of abdominal region     RESOLVED: 02AUG2017    Somatic dysfunction of head region     RESOLVED: [id number]    Somatic dysfunction of lower extremities     RESOLVED: 02AUG2017    Somatic dysfunction of rib region     RESOLVED: 02AUG2017    Somatic dysfunction of thoracic region     RESOLVED: 02AUG2017    Somatic dysfunction of upper extremities     RESOLVED: [id number]    Teeth missing      Patient Active Problem List   Diagnosis    Vocal cord mass    [facility] stenosis, bilateral ([facility])    Hyperparathyroidism ([facility])    GERD (gastroesophageal reflux disease)    Arthritis    Hypertension    EKG abnormalities    Anemia    Neck pain    Chronic left shoulder pain    Fall from other slipping, tripping, or stumbling    Abnormal EKG    Abnormal mammogram    Accelerated essential hypertension    Allergic rhinitis    Atherosclerosis of native artery of extremity with intermittent claudication ([facility])    Back pain    Bilateral carotid artery stenosis    Carotid stenosis, asymptomatic    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    Chronic myofascial pain    Chronic pain of lower extremity    CKD (chronic kidney disease), stage III ([facility])    Constipation    Diabetic retinopathy ([facility]    DMII (diabetes mellitus, type 2) ([facility])    Dysphagia    Epigastric pain    Fever of unknown origin    Gastritis    Gout    Hypomagnesemia    Atherosclerosis of other specified arteries    Lymphadenopathy    Microalbuminuria    Nephrolithiasis    Neuropathic pain, leg, right    Osteoporosis    PAD (peripheral artery disease) ([facility] )    Primary generalized (osteo)arthritis    Persistent proteinuria    Renal cyst    Secondary hyperparathyroidism, renal (HCC)    Vaginal prolapse    Vitamin D deficiency    Acquired complex cyst of kidney    Gastroesophageal reflux disease without esophagitis    History of left breast cancer    Type 2 diabetes mellitus with diabetic peripheral angiopathy without gangrene (HCC)    Stricture of artery (HCC)    Claudication in peripheral vascular disease (HCC)    Hemorrhoids    Tinea unguium    Diabetic neuropathy (HCC)    Bradycardia    Hyperkalemia    Pain in tooth    Dysphonia    Glottic insufficiency    Reflux laryngitis    Paresis of left vocal fold    Muscle tension dysphonia    Laryngocele    Anterior glottic web    Chest pain    Palpitations    Encounter for follow-up examination after completed treatment for malignant neoplasm    Respiratory crackles    Allergic conjunctivitis of both eyes    Viral upper respiratory tract infection    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HCC)    Renal artery stenosis (HCC)    Left ear pain    Itching of ear       Past Surgical History:   Procedure Laterality Date    ANGIOPLASTY / STENTING ILIAC      BREAST BIOPSY Left 08/10/2018    U/S BX    BREAST BIOPSY Left 08/07/2017    U/S BX    BREAST LUMPECTOMY Left 09/06/2017    LAST ASSESSED: [id number]    BREAST LUMPECTOMY Left 09/11/2018    BREAST SURGERY Left     biopsy    CATARACT EXTRACTION Bilateral     CHOLECYSTECTOMY      COLONOSCOPY      EYE SURGERY      ILIAC VEIN ANGIOPLASTY / STENTING Right     INITIAL STENOSIS: ONSET: [id number]    KNEE ARTHROSCOPY Right     KNEE SURGERY Right     ONSET: [id number]    MAMMO NEEDLE LOCALIZATION LEFT (ALL INC) Left 9/11/2018    MAMMO NEEDLE LOCALIZATION LEFT (ALL INC) Left 9/6/2017    NH ESOPHAGOGASTRODUODENOSCOPY TRANSORAL DIAGNOSTIC N/A 7/26/2018    Procedure: ESOPHAGOGASTRODUODENOSCOPY (EGD); Surgeon: Chyna Whitfield DO;  [facility];   Service: Gastroenterology    NH LARYNGOSCOPY,DIRCT,[facility] N/A 6/10/2016    Procedure: MICRO DIRECT LARYNGOSCOPY WITH VOCAL FOLD MASS EXCISION ;  Surgeon: Marnie Berger MD;  [facility];  Service: ENT    NH MASTECTOMY, PARTIAL Left 2017    Procedure: LUMPECTOMY BREAST NEEDLE LOCALIZED WITH FAXITRON NEEDLE @ 0830;  Surgeon: Freddie Tapia MD;  [facility];  Service: General    AR PERQ DEVICE PLACEMT BREAST LOC 1ST LES MARY Reyez Left 2018    Procedure: BREAST LUMPECTOMY; BREAST NEEDLE LOCALIZATION (NEEDLE LOC AT 1330); Surgeon: Marlee aClvin MD;  [facility];  Service: Surgical Oncology    THROAT SURGERY      lump removed    TUBAL LIGATION      UPPER GASTROINTESTINAL ENDOSCOPY      US GUIDANCE BREAST BIOPSY LEFT EACH ADDITIONAL Left 2017    US GUIDED BREAST BIOPSY LEFT COMPLETE Left 8/10/2018    US GUIDED BREAST BIOPSY LEFT COMPLETE Left 2017    VASCULAR SURGERY         OB History        3    Para   3    Term   3            AB        Living           SAB        TAB        Ectopic        Multiple        Live Births   3           Obstetric Comments   Menarche @ 15  First child @ 23                 Social History   Social History     Substance and Sexual Activity   Alcohol Use Not Currently    Frequency: Never     Social History     Substance and Sexual Activity   Drug Use No     Social History     Tobacco Use   Smoking Status Former Smoker    Packs/day: 1 00    Years: 64 00    Pack years: 56 65    Last attempt to quit:     Years since quittin 0   Smokeless Tobacco Never Used   Tobacco Comment    quit in  1 ppd x 50 years         Current Outpatient Medications:     Acetaminophen (TYLENOL) 325 MG CAPS, Take by mouth every 8 (eight) hours, Disp: , Rfl:     aspirin (ADULT ASPIRIN EC LOW STRENGTH) 81 mg EC tablet, Take 1 tablet by mouth daily, Disp: , Rfl:     calcitriol (ROCALTROL) 0 25 mcg capsule, TAKE 1 CAPSULE BY MOUTH 2 TIMES A WEEK   EVERY TUESDAY AND FRIDAY, Disp: 10 capsule, Rfl: 6    calcium carbonate-vitamin D (OSCAL-D) 500 mg-200 units per tablet, Take 1 tablet by mouth daily with breakfast, Disp: , Rfl:     DULoxetine (CYMBALTA) 30 mg delayed release capsule, Take 30 mg by mouth daily, Disp: , Rfl: 0    fluticasone (FLONASE) 50 mcg/act nasal spray, instill 1 spray into each nostril once daily, Disp: 16 g, Rfl: 2    furosemide (LASIX) 20 mg tablet, TAKE 1 TABLET  BY MOUTH DAILY WITH SWELLING, EVERY OTHER DAY ONCE SWELLING RESOLVES, Disp: 30 tablet, Rfl: 3    gabapentin (NEURONTIN) 100 mg capsule, take 1 capsule by mouth at bedtime for 10 days, Disp: 90 capsule, Rfl: 1    losartan (COZAAR) 100 MG tablet, take 1 tablet by mouth once daily, Disp: 90 tablet, Rfl: 0    losartan (COZAAR) 50 mg tablet, , Disp: , Rfl: 0    NIFEdipine ER (ADALAT CC) 30 MG 24 hr tablet, Take 1 tablet (30 mg total) by mouth daily Please take at night in addition to the 60 mg in am, Disp: 30 tablet, Rfl: 5    olopatadine (PATANOL) 0 1 % ophthalmic solution, Administer 1 drop to both eyes 2 (two) times a day, Disp: 5 mL, Rfl: 0    ONE TOUCH ULTRA TEST test strip, 1 each by Other route 2 (two) times a day Use as instructed, Disp: 100 each, Rfl: 5    pantoprazole (PROTONIX) 40 mg tablet, Take 1 tablet (40 mg total) by mouth daily, Disp: 30 tablet, Rfl: 5    pravastatin (PRAVACHOL) 40 mg tablet, TAKE 1 TABLET DAILY, Disp: 90 tablet, Rfl: 1    senna (SENOKOT) 8 6 mg, Take 1 tablet (8 6 mg total) by mouth daily at bedtime as needed for constipation, Disp: 120 each, Rfl: 0    NIFEdipine ER (ADALAT CC) 60 MG 24 hr tablet, Take 1 tablet (60 mg total) by mouth daily for 90 days, Disp: 90 tablet, Rfl: 0    terconazole (TERAZOL 7) 0 4 % vaginal cream, Insert 1 applicator into the vagina daily at bedtime, Disp: 45 g, Rfl: 0    Allergies   Allergen Reactions    Clonidine Derivatives Swelling    Diflucan [Fluconazole] Rash    Flagyl [Metronidazole] Anaphylaxis    Carvedilol Hives     And legs swelled    Latex Rash    Lipitor [Atorvastatin]      Legs swelled    Other Palpitations     IV DYE       Objective   Vitals: Blood pressure 142/60, weight 59 1 kg (130 lb 6 4 oz), not currently breastfeeding  Body mass index is 23 85 kg/m²  General: NAD, AAOx3  Heart: non-tachycardic  Lungs: non-labored breathing, symmetric chest rise    Speculum exam: Normal appearing external genitalia, normal hair distribution  Urethra well-suspended, no clitoromegaly noted  Ring pessary removed, cleaned  Vaginal inspected, no lesions appreciated, no bleeding, no abrasion  No abnormal discharge  Vagina cleansed with soapy swabs x3, dried, pessary replaced  On valsalva, no descent  Patient tolerated removal and replacement well  Wet Prep / KOH prep: pH 5 0  No clue cells, Whiff test neg  + yeast buds on KOH prep        Lab Results:   No visits with results within 1 Day(s) from this visit     Latest known visit with results is:   Appointment on 12/04/2019   Component Date Value    Sodium 12/04/2019 140     Potassium 12/04/2019 4 7     Chloride 12/04/2019 107     CO2 12/04/2019 29     ANION GAP 12/04/2019 4     BUN 12/04/2019 39*    Creatinine 12/04/2019 1 38*    Glucose 12/04/2019 174*    Calcium 12/04/2019 9 5     AST 12/04/2019 10     ALT 12/04/2019 17     Alkaline Phosphatase 12/04/2019 78     Total Protein 12/04/2019 8 2     Albumin 12/04/2019 4 0     Total Bilirubin 12/04/2019 0 39     eGFR 12/04/2019 35     Magnesium 12/04/2019 1 7     Phosphorus 12/04/2019 3 5     PTH 12/04/2019 107 5*    Clarity, UA 12/04/2019 Turbid     Color, UA 12/04/2019 Dk Yellow     Specific Gravity, UA 12/04/2019 1 018     pH, UA 12/04/2019 5 0     Glucose, UA 12/04/2019 Negative     Ketones, UA 12/04/2019 Negative     Blood, UA 12/04/2019 Negative     Protein, UA 12/04/2019 100 (2+)*    Nitrite, UA 12/04/2019 Negative     Bilirubin, UA 12/04/2019 Interference- unable to analyze*    Urobilinogen, UA 12/04/2019 1 0     Leukocytes, UA 12/04/2019 Large*    WBC, UA 12/04/2019 Innumerable*    RBC, UA 12/04/2019 None Seen     Bacteria, UA 12/04/2019 Moderate*    Epithelial Cells 2019 Moderate*    Creatinine, Ur 2019 167 0     Protein Urine Random 2019 96     Prot/Creat Ratio, Ur 2019 0 57*    Miscellaneous Lab Test R* 2019 SEE WRITTEN REPORT         Assessment/Plan     Assessment:  80 y o   with pessary, yeast infection  Diagnoses and all orders for this visit:    Vaginal yeast infection  -     terconazole (TERAZOL 7) 0 4 % vaginal cream; Insert 1 applicator into the vagina daily at bedtime    Doing well with pessary, Rx sent for terazol 7 which worked well with last episode of yeast vaginitis  Return for annual or PRN

## 2020-01-06 ENCOUNTER — OFFICE VISIT (OUTPATIENT)
Dept: NEPHROLOGY | Facility: CLINIC | Age: 85
End: 2020-01-06
Payer: COMMERCIAL

## 2020-01-06 VITALS — BODY MASS INDEX: 23.55 KG/M2 | WEIGHT: 128 LBS | HEIGHT: 62 IN

## 2020-01-06 DIAGNOSIS — N18.30 CKD (CHRONIC KIDNEY DISEASE), STAGE III (HCC): Primary | ICD-10-CM

## 2020-01-06 DIAGNOSIS — N25.81 SECONDARY HYPERPARATHYROIDISM, RENAL (HCC): ICD-10-CM

## 2020-01-06 DIAGNOSIS — R30.0 DYSURIA: ICD-10-CM

## 2020-01-06 DIAGNOSIS — N28.1 ACQUIRED COMPLEX CYST OF KIDNEY: ICD-10-CM

## 2020-01-06 DIAGNOSIS — I10 BENIGN ESSENTIAL HYPERTENSION: ICD-10-CM

## 2020-01-06 PROCEDURE — 99214 OFFICE O/P EST MOD 30 MIN: CPT | Performed by: INTERNAL MEDICINE

## 2020-01-06 NOTE — ASSESSMENT & PLAN NOTE
Note that the patient has had renal artery dopplers which have identified right renal artery narrowing more than 60% since 2016, but angiogram done has not shown any stenosis in 2016 whatsoever   May need to consider repeat angiogram if we have issues with blood pressure stabilization

## 2020-01-06 NOTE — PROGRESS NOTES
Assessment/Plan:    CKD (chronic kidney disease), stage III ([facility])  Baseline Cr is in the mid 1 0 range; from July 26 Cr is 1 2  Continue to monitor    Benign essential hypertension  Note that the patient has had renal artery dopplers which have identified right renal artery narrowing more than 60% since 2016, but angiogram done has not shown any stenosis in 2016 whatsoever  May need to consider repeat angiogram if we have issues with blood pressure stabilization    Secondary hyperparathyroidism, renal ([address] )  In June PTH was 84, phos wnl  Continue with current medication regimen    Acquired complex cyst of kidney  Stable minimally complex left lower pole renal cyst from August      Dysuria  The patient for the past month has had intermittent dysuria  She was recently seen by gynecology and treated for a yeast infection  I was asked about prescribing an antibiotic however I am not sure that there is a urinary tract infection if you were to look at prior urine study she had pyuria that was significant but no urinary symptoms these are urinalyses from September October November  I asked her to 1st start cranberry extract, she is going to finish her treatment for the yeast infection and I am going to check a urinalysis and culture  Subjective:      Patient ID: Eulogio Nguyen is a 80 y o  female  HPI    Patient here in follow-up for stage 3 chronic kidney disease and blood pressure  Her blood pressure at home has been running approximately 140-150 range  Sometimes goes down into the 130 range she seems to be tolerating it well  She is tolerating her blood pressure medications  Chart notes reviewed  Patient recently had been seen by gynecology is being treated for a yeast infection    She admits to intermittent dysuria    Review of Systems  intermittent dysuria over the past 4-5 weeks, no hematuria no back pain, questionable subjective fevers, no chills no rigors no diarrhea positive constipation no hematuria no blood in her stool no cough no dizziness or lightheadedness standing up  Objective:      Ht 5' 2" (1 575 m)   Wt 58 1 kg (128 lb)   LMP  (LMP Unknown)   BMI 23 41 kg/m²     Blood pressure is 136/58 in the right arm     Physical Exam   Constitutional: She appears well-nourished  Eyes: No scleral icterus  Neck: Neck supple  Cardiovascular: Normal rate and regular rhythm  Pulmonary/Chest: Effort normal and breath sounds normal    Abdominal: Soft  Bowel sounds are normal    There is no suprapubic tenderness no costovertebral tenderness   Lymphadenopathy:     She has no cervical adenopathy  Neurological:   Nonfocal   Skin: Skin is warm and dry

## 2020-01-06 NOTE — ASSESSMENT & PLAN NOTE
The patient for the past month has had intermittent dysuria  She was recently seen by gynecology and treated for a yeast infection  I was asked about prescribing an antibiotic however I am not sure that there is a urinary tract infection if you were to look at prior urine study she had pyuria that was significant but no urinary symptoms these are urinalyses from September October November  I asked her to 1st start cranberry extract, she is going to finish her treatment for the yeast infection and I am going to check a urinalysis and culture

## 2020-01-06 NOTE — PATIENT INSTRUCTIONS
1  Please obtain urine studies on Wednesday      2  Please start cranberry extract once daily; this is a tablet you can get at a health food store to try and help the urinary symptoms      3   Thank you both for coming in to see me today and Happy New Year!!

## 2020-01-07 ENCOUNTER — EVALUATION (OUTPATIENT)
Dept: PHYSICAL THERAPY | Facility: REHABILITATION | Age: 85
End: 2020-01-07
Payer: COMMERCIAL

## 2020-01-07 DIAGNOSIS — M48.9 CERVICAL SPINE DISEASE: ICD-10-CM

## 2020-01-07 DIAGNOSIS — M25.512 CHRONIC LEFT SHOULDER PAIN: Primary | ICD-10-CM

## 2020-01-07 DIAGNOSIS — G44.86 CERVICOGENIC HEADACHE: ICD-10-CM

## 2020-01-07 DIAGNOSIS — G89.29 CHRONIC LEFT SHOULDER PAIN: Primary | ICD-10-CM

## 2020-01-07 PROCEDURE — 97161 PT EVAL LOW COMPLEX 20 MIN: CPT | Performed by: PHYSICAL THERAPIST

## 2020-01-07 PROCEDURE — 97110 THERAPEUTIC EXERCISES: CPT | Performed by: PHYSICAL THERAPIST

## 2020-01-07 NOTE — PROGRESS NOTES
PT Evaluation     Today's date: 2020  Patient name: Dae Rodríguez  : 1934  MRN: [id number]  Referring provider: Arlene Villa DO  Dx:   Encounter Diagnosis     ICD-10-CM    1  Chronic left shoulder pain M25 [id number] Ambulatory referral to Physical Therapy    G89 [id number]                   Assessment  Assessment details: Dae Rodríguez is a pleasant 80 y o  female who presents with chronic left shoulder pain  The patient's greatest concerns are the pain she is experiencing, worry over not knowing what's wrong, fear of not being able to keep active and future ill health (and wanting to prevent it)  No further referral appears necessary at this time based upon examination results  Primary movement impairment diagnosis of right shoulder mobility deficit into flexion, resulting in pathoanatomical symptoms of intra-articular joint changes such as arthritis and bone formation, which is limiting her ability to reach overhead, reach behind her back, clean at home, cook, dress herself, and carry out other activities of daily living independently  Upon evaluation, the patient had limited shoulder AROM secondary to pain in all directions, mostly flexion and internal rotation, as well as limited passive mobility of the joint with noticeable crepitus throughout passive mobility testing, suggesting arthritic changes in the joint  She did have some weakness of the shoulder girdle as well; however, she was able to perform sidelying external rotation, and control her arm from a 90 degree abduction position as well  She did not present with any red flags upon her evaluation today  The patient would benefit from skilled physical therapy to address her current deficits, improve her quality of life, and restore her PLOF       Primary Impairments:  1) pain limiting left shoulder AROM, mostly into flexion and internal rotation   2) decreased strength of the left upper extremity     Etiologic factors include none recalled by the patient     Impairments: abnormal coordination, abnormal muscle firing, abnormal muscle tone, abnormal or restricted ROM, abnormal movement, activity intolerance, difficulty understanding, impaired physical strength, lacks appropriate home exercise program, pain with function, poor posture  and poor body mechanics    Symptom irritability: moderateUnderstanding of Dx/Px/POC: good   Prognosis: good  Prognosis details: Positive prognostic indicators include positive attitude toward recovery  Negative prognostic indicators include chronicity of symptoms, history of diabetes, history of GERD, and history of osteoporosis     Goals  Impairment Based Goals: (6 weeks)   Patient will improve left shoulder AROM to [facility]   Patient will improve FOTO score greater than predicted increase   Patient will have decrease in pain score of at least 2 points or more   Functional Based Goals: (8 weeks)   Patient will be independent with home exercise program    Patient will be able to manage symptoms independently  Patient will be able to reach into cabinet without limitations from left shoulder pain   Patient will be able to comb her hair without limitations from left shoulder pain   Patient will be able to reach behind her back when dressing herself without limitations from left shoulder pain     Plan  Plan details: Prognosis above is given PT services 2x/week tapering to 1x/week over the next 2 months and home program adherence    Patient would benefit from: skilled physical therapy  Planned modality interventions: thermotherapy: hydrocollator packs  Planned therapy interventions: activity modification, joint mobilization, manual therapy, motor coordination training, neuromuscular re-education, patient education, self care, therapeutic activities, therapeutic exercise, graded activity, home exercise program, behavior modification, graded motor, graded exercise, functional ROM exercises, flexibility, stretching and strengthening  Plan of Care beginning date: 2020  Plan of Care expiration date: 3/3/2020  Treatment plan discussed with: patient        Subjective Evaluation    History of Present Illness  Mechanism of injury: Patient reports that she has been experiencing left shoulder pain for the past 2 months  She reports that she had an injection about one month ago and that did not help at all  Denies any trauma or previous injuries  States that lifting it above her head or behind her back it makes her feel worse, and generally rest makes it feel better  She states that she has some general soreness in her right arm as well but she is able to move it     Pain  Current pain ratin  At best pain ratin  At worst pain ratin  Quality: dull ache, tight and discomfort  Relieving factors: rest, relaxation and medications  Aggravating factors: lifting (reaching arm overhead, reaching arm behind back, trying to carry heavy objects )  Progression: no change    Treatments  Previous treatment: injection treatment and medication  Current treatment: medication and physical therapy  Patient Goals  Patient goals for therapy: decreased pain, increased motion, increased strength, independence with ADLs/IADLs and return to sport/leisure activities  Patient goal: Patient would like to be able to reach overhead better and be able to comb her hair         Objective     General Comments:      Shoulder Comments   Functional Limitations: (IADL's, sleep, work, painful activities, recreation): reaching overhead, combing hear, reaching behind back, dressing and undressing herself, lifting heavy objects     Glenohumeral rhythm standing: compensated left shoulder flexion with compensation of upper trapezius and shoulder shrug in attempts to elevate arm    Palpation: diffuse tenderness to palpation of left glenohumeral joint as well as left AC joint     Clinical Exam Tests     NEER's=  Positive left  Empty can = positive left  ER lag= negative left   Drop arm = negative left    Infraspinatus Test = negative left     Joint Play: Crepitus with passive mobility of the glenohumeral joint with audible clicking throughout motion as well as pain reported by patient     Strength:   Shoulder Flexion: R 4/5  L 4-/5 with pain   Shoulder Scaption: R 4-/5  L 3+/5 with pain   Shoulder ER: R 4+/5   L 4-/5   Shoulder IR: R 4/5   L unable to assess due to lack of functional IR range of motion     Shoulder Range of Motion:   WNL in all planes for right upper extremity   L shoulder Flexion: active 90 degrees with compensation/ passive 100 degrees with pain   L shoulder Abduction: active 90 degrees with pain/passive 90 degrees with pain   L shoulder functional ER active: unable to reach arm behind head   L shoulder functional IR active: to left greater trochanter with pain   L shoulder passive ER: 40 degrees with pain   L shoulder passive IR: WNL with empty end feel        RED flags: Unremarkable                Precautions: history of diabetes, history of osteoperosis, mild fall risk     Manual              L shoulder PROM              L GHJ grade I-II AP and inf  glides             IR MWM L GHJ                                            Exercise Diary  1 7            AAROM shoulder wand F 10x5"            Table Slides F only  10x5"            Pendulums  cw/ccw 1' each            Wall slides             Finger ladder             Scapular retractions             Shoulder ext                                                                                                                                                                                             Modalities

## 2020-01-11 DIAGNOSIS — I10 ESSENTIAL HYPERTENSION: ICD-10-CM

## 2020-01-13 ENCOUNTER — OFFICE VISIT (OUTPATIENT)
Dept: PHYSICAL THERAPY | Facility: REHABILITATION | Age: 85
End: 2020-01-13
Payer: COMMERCIAL

## 2020-01-13 DIAGNOSIS — M25.512 CHRONIC LEFT SHOULDER PAIN: Primary | ICD-10-CM

## 2020-01-13 DIAGNOSIS — G89.29 CHRONIC LEFT SHOULDER PAIN: Primary | ICD-10-CM

## 2020-01-13 PROCEDURE — 97140 MANUAL THERAPY 1/> REGIONS: CPT | Performed by: PHYSICAL THERAPIST

## 2020-01-13 PROCEDURE — 97112 NEUROMUSCULAR REEDUCATION: CPT | Performed by: PHYSICAL THERAPIST

## 2020-01-13 PROCEDURE — 97110 THERAPEUTIC EXERCISES: CPT | Performed by: PHYSICAL THERAPIST

## 2020-01-13 NOTE — PROGRESS NOTES
Daily Note     Today's date: 2020  Patient name: Naveed Ward  : 1934  MRN: [id number]  Referring provider: Prateek Ames DO  Dx:   Encounter Diagnosis     ICD-10-CM    1  Chronic left shoulder pain M25 512     G89 29                   Subjective: Patient reports that her shoulder has been bothering her  Objective: See treatment diary below      Assessment: Patient was able to tolerate session, yet continued to have pain throughout the session  She had moderate restrictions in the glenohumeral joint with passive mobilizations as well as with AROM and PROM  She did do well with new strengthening exercises  Required mod cueing tactile and verbal throughout to engage correct musculature  Would continue to benefit from skilled physical therapy to address current deficits and improve QOL as well as restore PLOF>     Plan: Continue per plan of care  Precautions: history of diabetes, history of osteoperosis, mild fall risk     Manual  1 13            L shoulder PROM  SE 8'            L GHJ grade I-II AP and inf  glides SE 5'            IR MWM L GHJ                                            Exercise Diary  1 7 1 13           AAROM shoulder wand F 10x5" 10x5" F           Table Slides F only  10x5" 2x10           Pendulums  cw/ccw 1' each            Wall slides             Finger ladder  1x5 5"           Scapular retractions  2x10 light otb           Shoulder ext                pulleys  5'           sidelying shoulder ER  2x10           Serratus punch   2x10                                                                                                                                                  Modalities

## 2020-01-15 ENCOUNTER — TRANSCRIBE ORDERS (OUTPATIENT)
Dept: LAB | Facility: HOSPITAL | Age: 85
End: 2020-01-15

## 2020-01-15 ENCOUNTER — APPOINTMENT (OUTPATIENT)
Dept: LAB | Facility: HOSPITAL | Age: 85
End: 2020-01-15
Attending: OTOLARYNGOLOGY
Payer: COMMERCIAL

## 2020-01-15 DIAGNOSIS — K90.0 CELIAC DISEASE: ICD-10-CM

## 2020-01-15 DIAGNOSIS — R30.0 DYSURIA: ICD-10-CM

## 2020-01-15 DIAGNOSIS — K14.6 GLOSSALGIA: Primary | ICD-10-CM

## 2020-01-15 DIAGNOSIS — K14.6 GLOSSALGIA: ICD-10-CM

## 2020-01-15 LAB
BACTERIA UR QL AUTO: ABNORMAL /HPF
BILIRUB UR QL STRIP: NEGATIVE
CLARITY UR: ABNORMAL
COLOR UR: YELLOW
ERYTHROCYTE [DISTWIDTH] IN BLOOD BY AUTOMATED COUNT: 13.5 % (ref 11.6–15.1)
FERRITIN SERPL-MCNC: 30 NG/ML (ref 8–388)
FOLATE SERPL-MCNC: 10.1 NG/ML (ref 3.1–17.5)
FSH SERPL-ACNC: 106 MIU/ML
GLUCOSE UR STRIP-MCNC: NEGATIVE MG/DL
HCT VFR BLD AUTO: 38.1 % (ref 34.8–46.1)
HGB BLD-MCNC: 11.5 G/DL (ref 11.5–15.4)
HGB UR QL STRIP.AUTO: NEGATIVE
HYALINE CASTS #/AREA URNS LPF: ABNORMAL /LPF
IRON SATN MFR SERPL: 37 %
IRON SERPL-MCNC: 106 UG/DL (ref 50–170)
KETONES UR STRIP-MCNC: NEGATIVE MG/DL
LEUKOCYTE ESTERASE UR QL STRIP: ABNORMAL
LH SERPL-ACNC: 54.4 MIU/ML
MCH RBC QN AUTO: 28 PG (ref 26.8–34.3)
MCHC RBC AUTO-ENTMCNC: 30.2 G/DL (ref 31.4–37.4)
MCV RBC AUTO: 93 FL (ref 82–98)
NITRITE UR QL STRIP: NEGATIVE
NON-SQ EPI CELLS URNS QL MICRO: ABNORMAL /HPF
PH UR STRIP.AUTO: 5 [PH]
PLATELET # BLD AUTO: 260 THOUSANDS/UL (ref 149–390)
PMV BLD AUTO: 11.5 FL (ref 8.9–12.7)
PROT UR STRIP-MCNC: ABNORMAL MG/DL
RBC # BLD AUTO: 4.11 MILLION/UL (ref 3.81–5.12)
RBC #/AREA URNS AUTO: ABNORMAL /HPF
SP GR UR STRIP.AUTO: 1.02 (ref 1–1.03)
T4 FREE SERPL-MCNC: 1.13 NG/DL (ref 0.76–1.46)
TIBC SERPL-MCNC: 290 UG/DL (ref 250–450)
UROBILINOGEN UR QL STRIP.AUTO: 1 E.U./DL
VIT B12 SERPL-MCNC: 879 PG/ML (ref 100–900)
WBC # BLD AUTO: 5.79 THOUSAND/UL (ref 4.31–10.16)
WBC #/AREA URNS AUTO: ABNORMAL /HPF

## 2020-01-15 PROCEDURE — 84207 ASSAY OF VITAMIN B-6: CPT

## 2020-01-15 PROCEDURE — 82746 ASSAY OF FOLIC ACID SERUM: CPT

## 2020-01-15 PROCEDURE — 85027 COMPLETE CBC AUTOMATED: CPT

## 2020-01-15 PROCEDURE — 83002 ASSAY OF GONADOTROPIN (LH): CPT

## 2020-01-15 PROCEDURE — 83540 ASSAY OF IRON: CPT

## 2020-01-15 PROCEDURE — 84425 ASSAY OF VITAMIN B-1: CPT

## 2020-01-15 PROCEDURE — 86376 MICROSOMAL ANTIBODY EACH: CPT

## 2020-01-15 PROCEDURE — 87086 URINE CULTURE/COLONY COUNT: CPT

## 2020-01-15 PROCEDURE — 84439 ASSAY OF FREE THYROXINE: CPT

## 2020-01-15 PROCEDURE — 82607 VITAMIN B-12: CPT

## 2020-01-15 PROCEDURE — 86430 RHEUMATOID FACTOR TEST QUAL: CPT

## 2020-01-15 PROCEDURE — 86038 ANTINUCLEAR ANTIBODIES: CPT

## 2020-01-15 PROCEDURE — 86618 LYME DISEASE ANTIBODY: CPT

## 2020-01-15 PROCEDURE — 86800 THYROGLOBULIN ANTIBODY: CPT

## 2020-01-15 PROCEDURE — 83001 ASSAY OF GONADOTROPIN (FSH): CPT

## 2020-01-15 PROCEDURE — 82728 ASSAY OF FERRITIN: CPT

## 2020-01-15 PROCEDURE — 36415 COLL VENOUS BLD VENIPUNCTURE: CPT

## 2020-01-15 PROCEDURE — 81001 URINALYSIS AUTO W/SCOPE: CPT | Performed by: INTERNAL MEDICINE

## 2020-01-15 PROCEDURE — 83550 IRON BINDING TEST: CPT

## 2020-01-15 PROCEDURE — 84630 ASSAY OF ZINC: CPT

## 2020-01-15 RX ORDER — NIFEDIPINE 60 MG/1
TABLET, FILM COATED, EXTENDED RELEASE ORAL
Qty: 90 TABLET | Refills: 0 | Status: SHIPPED | OUTPATIENT
Start: 2020-01-15 | End: 2020-04-10

## 2020-01-16 ENCOUNTER — OFFICE VISIT (OUTPATIENT)
Dept: PHYSICAL THERAPY | Facility: REHABILITATION | Age: 85
End: 2020-01-16
Payer: COMMERCIAL

## 2020-01-16 DIAGNOSIS — G89.29 CHRONIC LEFT SHOULDER PAIN: Primary | ICD-10-CM

## 2020-01-16 DIAGNOSIS — M25.512 CHRONIC LEFT SHOULDER PAIN: Primary | ICD-10-CM

## 2020-01-16 PROBLEM — M48.9 CERVICAL SPINE DISEASE: Status: ACTIVE | Noted: 2020-01-16

## 2020-01-16 PROBLEM — G44.86 CERVICOGENIC HEADACHE: Status: ACTIVE | Noted: 2020-01-16

## 2020-01-16 LAB
B BURGDOR IGG+IGM SER-ACNC: <0.91 ISR (ref 0–0.9)
RHEUMATOID FACT SER QL LA: NEGATIVE
THYROGLOB AB SERPL-ACNC: <1 IU/ML (ref 0–0.9)
THYROPEROXIDASE AB SERPL-ACNC: 11 IU/ML (ref 0–34)

## 2020-01-16 PROCEDURE — 97110 THERAPEUTIC EXERCISES: CPT | Performed by: PHYSICAL THERAPIST

## 2020-01-16 PROCEDURE — 97112 NEUROMUSCULAR REEDUCATION: CPT | Performed by: PHYSICAL THERAPIST

## 2020-01-16 PROCEDURE — 97140 MANUAL THERAPY 1/> REGIONS: CPT | Performed by: PHYSICAL THERAPIST

## 2020-01-16 NOTE — PROGRESS NOTES
Daily Note     Today's date: 2020  Patient name: Darryle Becker  : 1934  MRN: [id number]  Referring provider: Sharath Campbell DO  Dx:   Encounter Diagnosis     ICD-10-CM    1  Chronic left shoulder pain M25 512     G89 29                   Subjective: Patient reports she is having some stiffness in her shoulder today  She reports that yesterday and the day before it was feeling better  Objective: See treatment diary below      Assessment: Patient had improvement with AROM of the shoulder at end of session after going through treatment regimine  She did have some pain with mobility today but was able to work through her symptoms without further discomfort from baseline  She did better with motor control and strengthening interventions today as well with less cueing needed by PT  She would continue to benefit from skilled PT to address her current deficits, improve her QOL, and restore her PLOF  Plan: Continue per plan of care  Precautions: history of diabetes, history of osteoperosis, mild fall risk     Manual  1 13 1 16           L shoulder PROM  SE 8' SE 8'           L GHJ grade I-II AP and inf  glides SE 5' SE 5'           IR MWM L GHJ                                            Exercise Diary  1 7 1 13 1 16          AAROM shoulder wand F 10x5" 10x5" F 10x5" F          Table Slides F only  10x5" 2x10 2x10          Pendulums  cw/ccw 1' each  cw/ccw 1' ea          Wall slides             Finger ladder  1x5 5" 1x5 5"          Scapular retractions  2x10 light otb 2x10 light otb          Shoulder ext                pulleys  5' 5'          sidelying shoulder ER  2x10 2x10          Serratus punch   2x10  2x10          Thoracic extension seated w/ pillow    10x10"          Shoulder AROm flexion    10x                                                                                                                      Modalities

## 2020-01-17 LAB
BACTERIA UR CULT: NORMAL
RYE IGE QN: NEGATIVE
ZINC SERPL-MCNC: 81 UG/DL (ref 56–134)

## 2020-01-18 DIAGNOSIS — I10 ESSENTIAL HYPERTENSION: ICD-10-CM

## 2020-01-18 LAB — VIT B6 SERPL-MCNC: 5.1 UG/L (ref 2–32.8)

## 2020-01-20 ENCOUNTER — OFFICE VISIT (OUTPATIENT)
Dept: PHYSICAL THERAPY | Facility: REHABILITATION | Age: 85
End: 2020-01-20
Payer: COMMERCIAL

## 2020-01-20 DIAGNOSIS — M25.512 CHRONIC LEFT SHOULDER PAIN: Primary | ICD-10-CM

## 2020-01-20 DIAGNOSIS — G89.29 CHRONIC LEFT SHOULDER PAIN: Primary | ICD-10-CM

## 2020-01-20 LAB — VIT B1 BLD-SCNC: 68.4 NMOL/L (ref 66.5–200)

## 2020-01-20 PROCEDURE — 97110 THERAPEUTIC EXERCISES: CPT | Performed by: PHYSICAL THERAPIST

## 2020-01-20 PROCEDURE — 97112 NEUROMUSCULAR REEDUCATION: CPT | Performed by: PHYSICAL THERAPIST

## 2020-01-20 PROCEDURE — 97140 MANUAL THERAPY 1/> REGIONS: CPT | Performed by: PHYSICAL THERAPIST

## 2020-01-20 NOTE — PROGRESS NOTES
Daily Note     Today's date: 2020  Patient name: Doyne Hodgkins  : 1934  MRN: [id number]  Referring provider: Codey Peoples DO  Dx:   Encounter Diagnosis     ICD-10-CM    1  Chronic left shoulder pain M25 512     G89 29                   Subjective: Patient reports that she is doing ok today  States her shoulder is still pretty sore  Objective: See treatment diary below       Assessment: The patient continued to progress well with strengthening and ROM exercises  She does continue to have mild pain with ROM activities mostly into flexion and rotational movements yet she is able to move further  Would continue to benefit from skilled PT to address current deficits as well as improve QOL  Plan: Continue per plan of care  Precautions: history of diabetes, history of osteoperosis, mild fall risk     Manual  1 13 1 16 1 20          L shoulder PROM  SE 8' SE 8' SE 5'           L GHJ grade I-II AP and inf  glides SE 5' SE 5' SE 5'           IR MWM L GHJ                                            Exercise Diary  1 7 1 13 1 16 1 20         AAROM shoulder wand F 10x5" 10x5" F 10x5" F 10x5" F         Table Slides F only  10x5" 2x10 2x10 2x10         Pendulums  cw/ccw 1' each  cw/ccw 1' ea cw/ccw 1' ea         Wall slides             Finger ladder  1x5 5" 1x5 5" 10x5"         Scapular retractions  2x10 light otb 2x10 light otb 2x10 light otb         Shoulder ext  pulleys  5' 5' 5'         sidelying shoulder ER  2x10 2x10 2x10 1#         Serratus punch   2x10  2x10 2x10 MREs         Thoracic extension seated w/ pillow    10x10" 10x10"         Shoulder AROm flexion    10x 10x         scap ret   With B shoulder ER     2x12 otb                                                                                                        Modalities

## 2020-01-23 ENCOUNTER — OFFICE VISIT (OUTPATIENT)
Dept: PHYSICAL THERAPY | Facility: REHABILITATION | Age: 85
End: 2020-01-23
Payer: COMMERCIAL

## 2020-01-23 DIAGNOSIS — G89.29 CHRONIC LEFT SHOULDER PAIN: Primary | ICD-10-CM

## 2020-01-23 DIAGNOSIS — M25.512 CHRONIC LEFT SHOULDER PAIN: Primary | ICD-10-CM

## 2020-01-23 PROCEDURE — 97110 THERAPEUTIC EXERCISES: CPT | Performed by: PHYSICAL THERAPIST

## 2020-01-23 PROCEDURE — 97112 NEUROMUSCULAR REEDUCATION: CPT | Performed by: PHYSICAL THERAPIST

## 2020-01-23 RX ORDER — LOSARTAN POTASSIUM 100 MG/1
TABLET ORAL
Qty: 90 TABLET | Refills: 0 | Status: SHIPPED | OUTPATIENT
Start: 2020-01-23 | End: 2020-04-17

## 2020-01-23 NOTE — PROGRESS NOTES
Daily Note     Today's date: 2020  Patient name: Theodore Francisco  : 1934  MRN: [id number]  Referring provider: Tye Everett DO  Dx:   Encounter Diagnosis     ICD-10-CM    1  Chronic left shoulder pain M25 512     G89 29                   Subjective: Patient states he is doing well and feeling a bit better  Objective: See treatment diary below      Assessment: Continues to have difficulty with AROM as well as tolerating PROM into abduction mostly  She does have positive demeanor throughout therapy and has been compliant with treatment  Would continue to benefit from skilled PT to address current deficits and improve QOL  Plan: Continue per plan of care  Precautions: history of diabetes, history of osteoperosis, mild fall risk     Manual  1 13 1 16 1 20 1 23         L shoulder PROM  SE 8' SE 8' SE 5'  SE 5'         L GHJ grade I-II AP and inf  glides SE 5' SE 5' SE 5'  SE 5'          IR MWM L GHJ                                            Exercise Diary  1 7 1 13 1 16 1 20 1 23        AAROM shoulder wand F 10x5" 10x5" F 10x5" F 10x5" F 10x5" F        Table Slides F only  10x5" 2x10 2x10 2x10 2x10         Pendulums  cw/ccw 1' each  cw/ccw 1' ea cw/ccw 1' ea cw/ccw 1' ea        Wall slides             Finger ladder  1x5 5" 1x5 5" 10x5" 10x5"        Scapular retractions  2x10 light otb 2x10 light otb 2x10 light otb 2x10         Shoulder ext  pulleys  5' 5' 5' 5'        sidelying shoulder ER  2x10 2x10 2x10 1# 2x10 1#        Serratus punch   2x10  2x10 2x10 MREs 2x10 MREs        Thoracic extension seated w/ pillow    10x10" 10x10" 10x10"        Shoulder AROm flexion    10x 10x 10x        scap ret   With B shoulder ER     2x12 otb 2x12 otb                                                                                                       Modalities

## 2020-01-28 ENCOUNTER — OFFICE VISIT (OUTPATIENT)
Dept: PHYSICAL THERAPY | Facility: REHABILITATION | Age: 85
End: 2020-01-28
Payer: COMMERCIAL

## 2020-01-28 DIAGNOSIS — M25.512 CHRONIC LEFT SHOULDER PAIN: Primary | ICD-10-CM

## 2020-01-28 DIAGNOSIS — G89.29 CHRONIC LEFT SHOULDER PAIN: Primary | ICD-10-CM

## 2020-01-28 PROCEDURE — 97140 MANUAL THERAPY 1/> REGIONS: CPT | Performed by: PHYSICAL THERAPIST

## 2020-01-28 PROCEDURE — 97112 NEUROMUSCULAR REEDUCATION: CPT | Performed by: PHYSICAL THERAPIST

## 2020-01-28 PROCEDURE — 97110 THERAPEUTIC EXERCISES: CPT | Performed by: PHYSICAL THERAPIST

## 2020-01-28 NOTE — PROGRESS NOTES
Daily Note     Today's date: 2020  Patient name: Jamia Wing  : 1934  MRN: [id number]  Referring provider: Iram Noland DO  Dx:   Encounter Diagnosis     ICD-10-CM    1  Chronic left shoulder pain M25 512     G89 29                   Subjective: Patient reports that she has been doing well with no new complaints  Objective: See treatment diary below      Assessment: Tolerated treatment well  Continues to have some moderate limitations in range of motion actively into flexion as well as rotational movements  Passive range of motion has improved as well as ability to engage scapular and shoulder girdle musculature  Would continue to benefit from skilled physical therapy to address current deficits, improve QOL, and restore PLOF  Plan: Continue per plan of care  Precautions: history of diabetes, history of osteoperosis, mild fall risk     Manual  1 13 1 16 1 20 1 23 1 28        L shoulder PROM  SE 8' SE 8' SE 5'  SE 5' SE 5'         L GHJ grade I-II AP and inf  glides SE 5' SE 5' SE 5'  SE 5'  SE 5'        IR MWM L GHJ                                            Exercise Diary  1 7 1 13 1 16 1 20 1 23 1 28       AAROM shoulder wand F 10x5" 10x5" F 10x5" F 10x5" F 10x5" F 10x5" F       Table Slides F only  10x5" 2x10 2x10 2x10 2x10  2x10        Pendulums  cw/ccw 1' each  cw/ccw 1' ea cw/ccw 1' ea cw/ccw 1' ea cw/ccw 1' ea       Wall slides             Finger ladder  1x5 5" 1x5 5" 10x5" 10x5" 10x5"       Scapular retractions  2x10 light otb 2x10 light otb 2x10 light otb 2x10  2x10        Shoulder ext  pulleys  5' 5' 5' 5' 5'       sidelying shoulder ER  2x10 2x10 2x10 1# 2x10 1# 2x10 1#       Serratus punch   2x10  2x10 2x10 MREs 2x10 MREs 2x10 MREs       Thoracic extension seated w/ pillow    10x10" 10x10" 10x10" 10x10"       Shoulder AROm flexion    10x 10x 10x 10x       scap ret   With B shoulder ER     2x12 otb 2x12 otb 2x12 otb Modalities

## 2020-01-30 ENCOUNTER — OFFICE VISIT (OUTPATIENT)
Dept: PHYSICAL THERAPY | Facility: REHABILITATION | Age: 85
End: 2020-01-30
Payer: COMMERCIAL

## 2020-01-30 DIAGNOSIS — M25.512 CHRONIC LEFT SHOULDER PAIN: Primary | ICD-10-CM

## 2020-01-30 DIAGNOSIS — G89.29 CHRONIC LEFT SHOULDER PAIN: Primary | ICD-10-CM

## 2020-01-30 PROCEDURE — 97140 MANUAL THERAPY 1/> REGIONS: CPT | Performed by: PHYSICAL THERAPIST

## 2020-01-30 PROCEDURE — 97110 THERAPEUTIC EXERCISES: CPT | Performed by: PHYSICAL THERAPIST

## 2020-01-30 NOTE — PROGRESS NOTES
Daily Note     Today's date: 2020  Patient name: Lucille Snyder  : 1934  MRN: [id number]  Referring provider: Analy Buckner DO  Dx:   Encounter Diagnosis     ICD-10-CM    1  Chronic left shoulder pain M25 512     G89 29                   Subjective: Patient has no new complaints today  Objective: See treatment diary below      Assessment: Tolerated treatment well  Does continue to have some discomfort with certain AROM and PROM exercises  Cued to attempt to engage scapular retractors more with exercise to better position humeral head and increased subacromial space  Would benefit from continued PT to address current deficits, improve QOL, and restore PLOF  Plan: Continue per plan of care  Precautions: history of diabetes, history of osteoperosis, mild fall risk     Manual  1 13 1 16 1 20 1 23 1 28 1 30        L shoulder PROM  SE 8' SE 8' SE 5'  SE 5' SE 5'  SE 5'        L GHJ grade I-II AP and inf  glides SE 5' SE 5' SE 5'  SE 5'  SE 5' SE 5'        IR MWM L GHJ                                            Exercise Diary  1 7 1 13 1 16 1 20 1 23 1 28 1 30       AAROM shoulder wand F 10x5" 10x5" F 10x5" F 10x5" F 10x5" F 10x5" F 10x5" F      Table Slides F only  10x5" 2x10 2x10 2x10 2x10  2x10  2x10       Pendulums  cw/ccw 1' each  cw/ccw 1' ea cw/ccw 1' ea cw/ccw 1' ea cw/ccw 1' ea 1' ea      Wall slides             Finger ladder  1x5 5" 1x5 5" 10x5" 10x5" 10x5" 10x5"      Scapular retractions  2x10 light otb 2x10 light otb 2x10 light otb 2x10  2x10  2x10      Shoulder ext  2x12 gtb      pulleys  5' 5' 5' 5' 5' 5'      sidelying shoulder ER  2x10 2x10 2x10 1# 2x10 1# 2x10 1# 2x10 1#      Serratus punch   2x10  2x10 2x10 MREs 2x10 MREs 2x10 MREs 2x10 MREs      Thoracic extension seated w/ pillow    10x10" 10x10" 10x10" 10x10" 10x10"      Shoulder AROm flexion    10x 10x 10x 10x 10x      scap ret   With B shoulder ER     2x12 otb 2x12 otb 2x12 otb 2x12 otb Modalities

## 2020-01-31 ENCOUNTER — TELEPHONE (OUTPATIENT)
Dept: NEPHROLOGY | Facility: CLINIC | Age: 85
End: 2020-01-31

## 2020-02-03 ENCOUNTER — OFFICE VISIT (OUTPATIENT)
Dept: PHYSICAL THERAPY | Facility: REHABILITATION | Age: 85
End: 2020-02-03
Payer: COMMERCIAL

## 2020-02-03 DIAGNOSIS — M25.512 CHRONIC LEFT SHOULDER PAIN: Primary | ICD-10-CM

## 2020-02-03 DIAGNOSIS — G89.29 CHRONIC LEFT SHOULDER PAIN: Primary | ICD-10-CM

## 2020-02-03 PROCEDURE — 97110 THERAPEUTIC EXERCISES: CPT | Performed by: PHYSICAL THERAPIST

## 2020-02-03 PROCEDURE — 97140 MANUAL THERAPY 1/> REGIONS: CPT | Performed by: PHYSICAL THERAPIST

## 2020-02-03 NOTE — PROGRESS NOTES
Daily Note     Today's date: 2/3/2020  Patient name: Maria Teresa Pearson  : 1934  MRN: [id number]  Referring provider: Sd Law DO  Dx:   Encounter Diagnosis     ICD-10-CM    1  Chronic left shoulder pain M25 512     G89 29                   Subjective: Patient has no new complaints  Objective: See treatment diary below      Assessment: Tolerated treatment well  Discussed holding therapy and having patient seek further consultation as she has had difficulty making functional gains with physical therapy and conservative treatment  Plan: Continue per plan of care  Potentially hold treatment until she seeks further consultation with orthopedics after      Precautions: history of diabetes, history of osteoperosis, mild fall risk     Manual  1 13 1 16 1 20 1 23 1 28 1 30  2 3      L shoulder PROM  SE 8' SE 8' SE 5'  SE 5' SE 5'  SE 5'  SE 5'       L GHJ grade I-II AP and inf  glides SE 5' SE 5' SE 5'  SE 5'  SE 5' SE 5'  SE 5'       IR MWM L GHJ                                            Exercise Diary  1 7 1 13 1 16 1 20 1 23 1 28 1 30  2 3     AAROM shoulder wand F 10x5" 10x5" F 10x5" F 10x5" F 10x5" F 10x5" F 10x5" F 10x5" F     Table Slides F only  10x5" 2x10 2x10 2x10 2x10  2x10  2x10  2x10     Pendulums  cw/ccw 1' each  cw/ccw 1' ea cw/ccw 1' ea cw/ccw 1' ea cw/ccw 1' ea 1' ea 1' ea     Wall slides             Finger ladder  1x5 5" 1x5 5" 10x5" 10x5" 10x5" 10x5" 10x5"      Scapular retractions (rows)  2x10 light otb 2x10 light otb 2x10 light otb 2x10  2x10  2x10 2x10 gtb     Shoulder ext  2x12 gtb 2x12 gtb     pulleys  5' 5' 5' 5' 5' 5' 5'     sidelying shoulder ER  2x10 2x10 2x10 1# 2x10 1# 2x10 1# 2x10 1# 2x10 1#     Serratus punch   2x10  2x10 2x10 MREs 2x10 MREs 2x10 MREs 2x10 MREs 2x10 MREs     Thoracic extension seated w/ pillow    10x10" 10x10" 10x10" 10x10" 10x10" 10x10"     Shoulder AROm flexion    10x 10x 10x 10x 10x 10x     scap ret   With B shoulder ER     2x12 otb 2x12 otb 2x12 otb 2x12 otb 2x12 otb                                                                                                   Modalities

## 2020-02-04 ENCOUNTER — OFFICE VISIT (OUTPATIENT)
Dept: SURGICAL ONCOLOGY | Facility: CLINIC | Age: 85
End: 2020-02-04
Payer: COMMERCIAL

## 2020-02-04 VITALS
WEIGHT: 130 LBS | DIASTOLIC BLOOD PRESSURE: 54 MMHG | BODY MASS INDEX: 23.92 KG/M2 | RESPIRATION RATE: 14 BRPM | TEMPERATURE: 97.9 F | HEIGHT: 62 IN | SYSTOLIC BLOOD PRESSURE: 134 MMHG | HEART RATE: 60 BPM

## 2020-02-04 DIAGNOSIS — Z08 ENCOUNTER FOR FOLLOW-UP EXAMINATION AFTER COMPLETED TREATMENT FOR MALIGNANT NEOPLASM: Primary | ICD-10-CM

## 2020-02-04 DIAGNOSIS — Z85.3 HISTORY OF LEFT BREAST CANCER: ICD-10-CM

## 2020-02-04 PROCEDURE — 4040F PNEUMOC VAC/ADMIN/RCVD: CPT | Performed by: NURSE PRACTITIONER

## 2020-02-04 PROCEDURE — 3078F DIAST BP <80 MM HG: CPT | Performed by: NURSE PRACTITIONER

## 2020-02-04 PROCEDURE — 3008F BODY MASS INDEX DOCD: CPT | Performed by: NURSE PRACTITIONER

## 2020-02-04 PROCEDURE — 3066F NEPHROPATHY DOC TX: CPT | Performed by: NURSE PRACTITIONER

## 2020-02-04 PROCEDURE — 1036F TOBACCO NON-USER: CPT | Performed by: NURSE PRACTITIONER

## 2020-02-04 PROCEDURE — 99213 OFFICE O/P EST LOW 20 MIN: CPT | Performed by: NURSE PRACTITIONER

## 2020-02-04 PROCEDURE — 1160F RVW MEDS BY RX/DR IN RCRD: CPT | Performed by: NURSE PRACTITIONER

## 2020-02-04 PROCEDURE — 3075F SYST BP GE 130 - 139MM HG: CPT | Performed by: NURSE PRACTITIONER

## 2020-02-04 NOTE — PROGRESS NOTES
Surgical Oncology Follow Up       [address]  [facility] [place]  [address]  [place] [address]    Lucio Albino  1934  [id number]  [address]  [facility]  [address]  [place] [id number]    Chief Complaint   Patient presents with    Breast Cancer     Pt is here for a six month follow up       Assessment/Plan:  1  Encounter for follow-up examination after completed treatment for malignant neoplasm      2  History of left breast cancer  - Mammo diagnostic bilateral w 3d & cad; Future  - 6 mo f/u visit    Discussion/Summary: Patient is an 57-year-old female who presents today for a six-month follow-up visit for 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  Her pathology revealed mucinous carcinoma, ER/%, her 2-  She underwent a left lumpectomy by Dr Nikkie Larsen  She did not receive adjuvant therapy  She had a bilateral mammogram in August of 2019 which was BI-RADS 2, category 3 density  She has no new complaints today and there are no concerns on today's exam   I will order a bilateral 3D diagnostic mammogram for August and we will plan to see the patient back in 6 months or sooner if the need arises  She was instructed to call with any new concerns or symptoms prior to that time  All of her and her 's questions were answered today  History of Present Illness:        History of left breast cancer    8/10/2018 Biopsy     Left breast biopsy  Invasive mucinous  Grade 1      Her 2 1+      9/11/2018 Surgery     Left lumpectomy  Invasive mucinous carcinoma  Grade 1  1 1 cm in size  Margins negative      Her 2 1+  Stage 1A      10/4/2018 -  Hormone Therapy     Dr Estevan Allen  No benefit of AI          -Interval History:  Patient presents today for a six-month follow-up visit for left breast cancer diagnosed in 2018   She notices no changes on self-exam   Denies headaches, back pain, bone pain, cough, shortness of breath, abdominal pain  Review of Systems:  Review of Systems   Constitutional: Negative for activity change, appetite change, chills, fatigue, fever and unexpected weight change  HENT: Negative for trouble swallowing  Eyes: Negative for pain, redness and visual disturbance  Respiratory: Negative for cough, shortness of breath and wheezing  Cardiovascular: Negative for chest pain, palpitations and leg swelling  Gastrointestinal: Negative for abdominal pain, constipation, diarrhea, nausea and vomiting  Reports gluten allergy/occasional discomfort   Endocrine: Negative for cold intolerance and heat intolerance  Musculoskeletal: Negative for arthralgias, back pain, gait problem and myalgias  Skin: Negative for color change and rash  Neurological: Negative for dizziness, syncope, light-headedness, numbness and headaches  Hematological: Negative for adenopathy  Psychiatric/Behavioral: Negative for agitation and confusion  All other systems reviewed and are negative        Patient Active Problem List   Diagnosis    Vocal cord mass    Iliac artery stenosis, bilateral (HCC)    Hyperparathyroidism (HCC)    GERD (gastroesophageal reflux disease)    Arthritis    Hypertension    EKG abnormalities    Anemia    Neck pain    Dysuria    Chronic left shoulder pain    Fall from other slipping, tripping, or stumbling    Abnormal EKG    Abnormal mammogram    Accelerated essential hypertension    Allergic rhinitis    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)    Back pain    Bilateral carotid artery stenosis    Carotid stenosis, asymptomatic    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    Chronic myofascial pain    Chronic pain of lower extremity    CKD (chronic kidney disease), stage III (HCC)    Constipation    Diabetic retinopathy (HCC)    DMII (diabetes mellitus, type 2) ([facility] )    Dysphagia    Epigastric pain    Fever of unknown origin    Gastritis    Gout    Hypomagnesemia    Atherosclerosis of other specified arteries    Lymphadenopathy    Microalbuminuria    Nephrolithiasis    Neuropathic pain, leg, right    Osteoporosis    [facility]peripheral artery disease) [facility]    Primary generalized (osteo)arthritis    Persistent proteinuria    Renal cyst    Secondary hyperparathyroidism, renal ([facility]    Vaginal prolapse    Vitamin D deficiency    Acquired complex cyst of kidney    Gastroesophageal reflux disease without esophagitis    History of left breast cancer    Candidiasis of mouth    Type 2 diabetes mellitus with diabetic peripheral angiopathy without gangrene ([facility])    Stricture of artery ([facility])    Claudication in peripheral vascular disease ([facility])    Hemorrhoids    Tinea unguium    Diabetic neuropathy ([facility])    Bradycardia    Hyperkalemia    Pain in tooth    Dysphonia    Glottic insufficiency    Reflux laryngitis    Paresis of left vocal fold    Muscle tension dysphonia    Laryngocele    Anterior glottic web    Chest pain    Palpitations    Encounter for follow-up examination after completed treatment for malignant neoplasm    Respiratory crackles    Allergic conjunctivitis of both eyes    Viral upper respiratory tract infection    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HCC)    Renal artery stenosis ([facility])    Left ear pain    Itching of ear    Cervical spine disease    Cervicogenic headache     Past Medical History:   Diagnosis Date    Arthritis     Back pain     Benign essential hypertension     LAST ASSESSED: [id number]    Carotid artery stenosis     CKD (chronic kidney disease) stage 2, GFR 60-89 ml/min     Constipation, chronic     "drinks prune juice"    Diabetes mellitus ([facility] )     DMII (diabetes mellitus, type 2) ([facility])     Epiglottitis     RESOLVED: [id number]    Gastritis     GERD (gastroesophageal reflux disease)     Gout     H/O blood clots     left lower leg,,,11 yrs ago    History of stenosis of renal artery     RESOLVED: [id number]    Hypercholesteremia     Hypercholesterolemia     Hyperlipidemia     Hyperparathyroidism ([facility]     Hypertension     Iliac artery stenosis, bilateral (HCC)     Kidney stone     Lobular carcinoma ([facility] ) 09/06/2017    Left    Mucinous carcinoma of breast[facility] 09/11/2018    Left    Presence of pessary     Renal artery stenosis (HCC)     Renal disorder     Renovascular hypertension     RESOLVED: [id number]    Segmental and somatic dysfunction of cervical region     RESOLVED: [id number]    Segmental and somatic dysfunction of lumbar region     RESOLVED: [id number]    Segmental and somatic dysfunction of pelvic region     RESOLVED: [id number]    Segmental and somatic dysfunction of thoracic region     RESOLVED: 97VDE7412    Somatic dysfunction of abdominal region     RESOLVED: 82PMW7490    Somatic dysfunction of head region     RESOLVED: [id number]    Somatic dysfunction of lower extremities     RESOLVED: 14XOP2533    Somatic dysfunction of rib region     RESOLVED: [id number]    Somatic dysfunction of thoracic region     RESOLVED: [id number]    Somatic dysfunction of upper extremities     RESOLVED: 99LVW4587    Teeth missing      Past Surgical History:   Procedure Laterality Date    ANGIOPLASTY / STENTING ILIAC      BREAST BIOPSY Left 08/10/2018    U/S BX    BREAST BIOPSY Left 08/07/2017    U/S BX    BREAST LUMPECTOMY Left 09/06/2017    LAST ASSESSED: [id number]    BREAST LUMPECTOMY Left 09/11/2018    BREAST SURGERY Left     biopsy    CATARACT EXTRACTION Bilateral     CHOLECYSTECTOMY      COLONOSCOPY      EYE SURGERY      ILIAC VEIN ANGIOPLASTY / STENTING Right     INITIAL STENOSIS: ONSET: [id number]    KNEE ARTHROSCOPY Right     KNEE SURGERY Right     ONSET: [id number]    MAMMO NEEDLE LOCALIZATION LEFT (ALL INC) Left 9/11/2018    MAMMO NEEDLE LOCALIZATION LEFT (ALL INC) Left 9/6/2017    NE ESOPHAGOGASTRODUODENOSCOPY TRANSORAL DIAGNOSTIC N/A 7/26/2018    Procedure: ESOPHAGOGASTRODUODENOSCOPY (EGD); Surgeon: Dino Andino DO;  [facility]: [facility]; Service: Gastroenterology    NE LARYNGOSCOPY,DIRCT,OP [facility] TUMR N/A 6/10/2016    Procedure: MICRO DIRECT LARYNGOSCOPY WITH VOCAL FOLD MASS EXCISION ;  Surgeon: Renita Borrego MD;  [facility];  Service: ENT    NE MASTECTOMY, PARTIAL Left 9/6/2017    Procedure: LUMPECTOMY BREAST NEEDLE LOCALIZED WITH FAXITRON NEEDLE @ 0830;  Surgeon: Odalis Savage MD;  [facility];  Service: General    NE PERQ DEVICE PLACEMT BREAST LOC 1ST LES W Corin White Left 9/11/2018    Procedure: BREAST LUMPECTOMY; BREAST NEEDLE LOCALIZATION (NEEDLE LOC AT 1330);   Surgeon: Cara Redman MD;  [facility];  Service: Surgical Oncology    THROAT SURGERY      lump removed    TUBAL LIGATION      UPPER GASTROINTESTINAL ENDOSCOPY      US GUIDANCE BREAST BIOPSY LEFT EACH ADDITIONAL Left 8/7/2017    US GUIDED BREAST BIOPSY LEFT COMPLETE Left 8/10/2018    US GUIDED BREAST BIOPSY LEFT COMPLETE Left 8/7/2017    VASCULAR SURGERY       Family History   Problem Relation Age of Onset    Heart disease Brother     Heart disease Maternal Grandmother     Diabetes Other         of other type with arthropathy, without long term current use of insulin    Hypertension Other     No Known Problems Mother     Gout Family     Rheum arthritis Family     Lupus Family         systematic erythematosus    Heart disease Family     Stroke Family         syndrome    Stroke Maternal Uncle         syndrome    Stroke Maternal Aunt         syndrome    No Known Problems Father     No Known Problems Daughter     No Known Problems Maternal Grandfather     No Known Problems Paternal Grandmother     No Known Problems Paternal Grandfather     No Known Problems Daughter     No Known Problems Daughter     No Known Problems Maternal Aunt     No Known Problems Maternal Aunt     No Known Problems Maternal Aunt      Social History     Socioeconomic History    Marital status:      Spouse name: Not on file    Number of children: Not on file    Years of education: Not on file    Highest education level: Not on file   Occupational History    Occupation: retired   Social Needs    Financial resource strain: Not hard at all   [place] insecurity:     Worry: Never true     Inability: Never true   [place] needs:     Medical: No     Non-medical: No   Tobacco Use    Smoking status: Former Smoker     Packs/day: 1 00     Years: 64 00     Pack years: 64 00     Last attempt to quit:      Years since quittin 1    Smokeless tobacco: Never Used    Tobacco comment: quit in  1 ppd x 50 years   Substance and Sexual Activity    Alcohol use: Not Currently     Frequency: Never    Drug use: No    Sexual activity: Yes     Partners: Male   Lifestyle    Physical activity:     Days per week: 0 days     Minutes per session: 0 min    Stress:  Only a little   Relationships    Social connections:     Talks on phone: More than three times a week     Gets together: More than three times a week     Attends Jew service: Not on file     Active member of club or organization: Not on file     Attends meetings of clubs or organizations: Not on file     Relationship status: Not on file    Intimate partner violence:     Fear of current or ex partner: Not on file     Emotionally abused: Not on file     Physically abused: Not on file     Forced sexual activity: Not on file   Other Topics Concern    Not on file   Social History Narrative    Daily caffeine consumption, 1 serving a day        Current Outpatient Medications:     Acetaminophen (TYLENOL) 325 MG CAPS, Take by mouth every 8 (eight) hours, Disp: , Rfl:     aspirin (ADULT ASPIRIN EC LOW STRENGTH) 81 mg EC tablet, Take 1 tablet by mouth daily, Disp: , Rfl:     calcitriol (ROCALTROL) 0 25 mcg capsule, TAKE 1 CAPSULE BY MOUTH 2 TIMES A WEEK   EVERY TUESDAY AND FRIDAY, Disp: 10 capsule, Rfl: 6    calcium carbonate-vitamin D (OSCAL-D) 500 mg-200 units per tablet, Take 1 tablet by mouth daily with breakfast, Disp: , Rfl:     fluticasone (FLONASE) 50 mcg/act nasal spray, instill 1 spray into each nostril once daily, Disp: 16 g, Rfl: 2    furosemide (LASIX) 20 mg tablet, TAKE 1 TABLET  BY MOUTH DAILY WITH SWELLING, EVERY OTHER DAY ONCE SWELLING RESOLVES, Disp: 30 tablet, Rfl: 3    gabapentin (NEURONTIN) 100 mg capsule, take 1 capsule by mouth at bedtime for 10 days, Disp: 90 capsule, Rfl: 1    losartan (COZAAR) 100 MG tablet, take 1 tablet by mouth once daily, Disp: 90 tablet, Rfl: 0    losartan (COZAAR) 50 mg tablet, , Disp: , Rfl: 0    NIFEdipine ER (ADALAT CC) 30 MG 24 hr tablet, Take 1 tablet (30 mg total) by mouth daily Please take at night in addition to the 60 mg in am, Disp: 30 tablet, Rfl: 5    NIFEdipine ER (ADALAT CC) 60 MG 24 hr tablet, take 1 tablet by mouth daily, Disp: 90 tablet, Rfl: 0    olopatadine (PATANOL) 0 1 % ophthalmic solution, Administer 1 drop to both eyes 2 (two) times a day, Disp: 5 mL, Rfl: 0    ONE TOUCH ULTRA TEST test strip, 1 each by Other route 2 (two) times a day Use as instructed, Disp: 100 each, Rfl: 5    pantoprazole (PROTONIX) 40 mg tablet, Take 1 tablet (40 mg total) by mouth daily, Disp: 30 tablet, Rfl: 5    pravastatin (PRAVACHOL) 40 mg tablet, TAKE 1 TABLET DAILY, Disp: 90 tablet, Rfl: 1    DULoxetine (CYMBALTA) 30 mg delayed release capsule, Take 30 mg by mouth daily, Disp: , Rfl: 0    senna (SENOKOT) 8 6 mg, Take 1 tablet (8 6 mg total) by mouth daily at bedtime as needed for constipation (Patient not taking: Reported on 2/4/2020), Disp: 120 each, Rfl: 0    terconazole (TERAZOL 7) 0 4 % vaginal cream, Insert 1 applicator into the vagina daily at bedtime (Patient not taking: Reported on 2/4/2020), Disp: 45 g, Rfl: 0  Allergies   Allergen Reactions    Clonidine Derivatives Swelling    Diflucan [Fluconazole] Rash    Flagyl [Metronidazole] Anaphylaxis    Carvedilol Hives     And legs swelled    Latex Rash    Lipitor [Atorvastatin]      Legs swelled    Other Palpitations     IV DYE     Vitals:    02/04/20 1355   BP: 134/54   Pulse: 60   Resp: 14   Temp: 97 9 °F (36 6 °C)       Physical Exam   Constitutional: She is oriented to person, place, and time  Vital signs are normal  She appears well-developed and well-nourished  No distress  HENT:   Head: Normocephalic and atraumatic  Neck: Normal range of motion  Cardiovascular: Normal rate, regular rhythm and normal heart sounds  Pulmonary/Chest: Effort normal and breath sounds normal    Bilateral breasts were examined in the sitting and supine position  Left breast surgical scars x 2  There are no dominant masses, skin nodules, nipple changes or nipple discharge  There is no bilateral supraclavicular or axillary lymphadenopathy noted  Abdominal: Soft  Normal appearance  She exhibits no mass  There is no hepatosplenomegaly  There is no tenderness  Musculoskeletal: Normal range of motion  Lymphadenopathy:     She has no axillary adenopathy  Right: No supraclavicular adenopathy present  Left: No supraclavicular adenopathy present  Neurological: She is alert and oriented to person, place, and time  Skin: Skin is warm, dry and intact  No rash noted  She is not diaphoretic  Psychiatric: She has a normal mood and affect  Her speech is normal    Vitals reviewed  Advance Care Planning/Advance Directives:  Discussed disease status, cancer treatment plans and/or cancer treatment goals with the patient

## 2020-02-05 DIAGNOSIS — M54.6 CHRONIC THORACIC BACK PAIN, UNSPECIFIED BACK PAIN LATERALITY: ICD-10-CM

## 2020-02-05 DIAGNOSIS — G89.29 CHRONIC THORACIC BACK PAIN, UNSPECIFIED BACK PAIN LATERALITY: ICD-10-CM

## 2020-02-06 ENCOUNTER — OFFICE VISIT (OUTPATIENT)
Dept: PHYSICAL THERAPY | Facility: REHABILITATION | Age: 85
End: 2020-02-06
Payer: COMMERCIAL

## 2020-02-06 DIAGNOSIS — M25.512 CHRONIC LEFT SHOULDER PAIN: Primary | ICD-10-CM

## 2020-02-06 DIAGNOSIS — G89.29 CHRONIC LEFT SHOULDER PAIN: Primary | ICD-10-CM

## 2020-02-06 PROCEDURE — 97140 MANUAL THERAPY 1/> REGIONS: CPT | Performed by: PHYSICAL THERAPIST

## 2020-02-06 PROCEDURE — 97110 THERAPEUTIC EXERCISES: CPT | Performed by: PHYSICAL THERAPIST

## 2020-02-06 PROCEDURE — 97112 NEUROMUSCULAR REEDUCATION: CPT | Performed by: PHYSICAL THERAPIST

## 2020-02-06 NOTE — PROGRESS NOTES
PT Discharge     Today's date: 2020  Patient name: Dae Rodríguez  : 1934  MRN: [id number]  Referring provider: Arlene Villa DO  Dx:   Encounter Diagnosis     ICD-10-CM    1  Chronic left shoulder pain M25 512     G89 29             Patient has been discharged from physical therapy at this time to seek other treatment options  She will follow-up as needed if she would seek need for further treatment  Subjective: Patient reports that she still continues to have significant pain and limitations with her left shoulder  She states that she thinks she has made slight progress since starting physical therapy, but she still has difficulty with pushing motions, reaching overhead, and sleeping on her left side  Pain  Current pain ratin  At best pain ratin  At worst pain ratin    Objective: See treatment diary below and clinical examination    Functional Limitations: (IADL's, sleep, work, painful activities, recreation): reaching overhead, combing hear, reaching behind back, dressing and undressing herself, lifting heavy objects, sleeping on left side     Glenohumeral rhythm standing: compensated left shoulder flexion with compensation of upper trapezius and shoulder shrug in attempts to elevate arm    Palpation: diffuse tenderness to palpation of left glenohumeral joint as well as left AC joint   Patient points to her pain at the anterior aspect of her left shoulder near the deltoid tuberosity     Clinical Exam Tests     NEER's=  Positive left  Empty can = positive left  Drop arm = negative left    Infraspinatus Test = negative left     Joint Play: Crepitus with passive mobility of the glenohumeral joint with audible clicking throughout motion as well as pain reported by patient     Strength:   Shoulder Flexion: R 4/5  L3-/5 with pain   Shoulder Scaption: R 4-/5  L 3+/5 with pain   Shoulder ER: R 4+/5   L 3+/5   Shoulder IR: R 4/5   L unable to assess due to lack of functional IR range of motion     Shoulder Range of Motion:   WNL in all planes for right upper extremity   L shoulder Flexion: active 90 degrees with compensation of shoulder shrug/ passive 100 degrees with pain   L shoulder Abduction: active 90 degrees with pain/passive 90 degrees with pain   L shoulder functional ER active: unable to reach arm behind head   L shoulder functional IR active: to left greater trochanter with pain   L shoulder passive ER: 40 degrees with pain   L shoulder passive IR: WNL with empty end feel     Assessment: Since the patient has started physical therapy, she has failed to make impairment based nor functional gains  Upon reassessment today, she still has difficulty elevating her left arm actively, with clear compensations from her upper trapezius utilizing a shoulder shrug  She also has marked weakness of her shoulder, mostly in the flexion and scaption position, along with pain, especially when external resistance is applied  Since the patient has failed to make progress with physical therapy treatment, I believe she would benefit from further consultation at this time from orthopedic surgery to rule out any underlying pathanatomic diagnoses of the left shoulder  Impairment Based Goals: (6 weeks)   Patient will improve left shoulder AROM to [facility] (not met)  Patient will improve FOTO score greater than predicted increase (not met)   Patient will have decrease in pain score of at least 2 points or more (met)     Functional Based Goals: (8 weeks)   Patient will be independent with home exercise program  (met)  Patient will be able to manage symptoms independently  (partially met)  Patient will be able to reach into cabinet without limitations from left shoulder pain (not met)  Patient will be able to comb her hair without limitations from left shoulder pain (not met)  Patient will be able to reach behind her back when dressing herself without limitations from left shoulder pain (not met)     Plan:  The patient will be referred to orthopedic surgery for a further consultation regarding the left shoulder  Her POC in PT will remain open if she will need to continue with PT after her consultation  This plan was communicated and explained to both the patient and caregiver that were present  Precautions: history of diabetes, history of osteoperosis, mild fall risk     Manual  1 13 1 16 1 20 1 23 1 28 1 30  2 3 2 6     L shoulder PROM  SE 8' SE 8' SE 5'  SE 5' SE 5'  SE 5'  SE 5'  SE 5'     L GHJ grade I-II AP and inf  glides SE 5' SE 5' SE 5'  SE 5'  SE 5' SE 5'  SE 5'  SE 5'      IR MWM L GHJ                                            Exercise Diary  1 7 1 13 1 16 1 20 1 23 1 28 1 30  2 3 2 6    AAROM shoulder wand F 10x5" 10x5" F 10x5" F 10x5" F 10x5" F 10x5" F 10x5" F 10x5" F 10x5"     Table Slides F only  10x5" 2x10 2x10 2x10 2x10  2x10  2x10  2x10 2x10    Pendulums  cw/ccw 1' each  cw/ccw 1' ea cw/ccw 1' ea cw/ccw 1' ea cw/ccw 1' ea 1' ea 1' ea 1' ea    Wall slides             Finger ladder  1x5 5" 1x5 5" 10x5" 10x5" 10x5" 10x5" 10x5"  10x5"    Scapular retractions (rows)  2x10 light otb 2x10 light otb 2x10 light otb 2x10  2x10  2x10 2x10 gtb 2x10 gtb    Shoulder ext  2x12 gtb 2x12 gtb 2x12 gtb    pulleys  5' 5' 5' 5' 5' 5' 5' 5'    sidelying shoulder ER  2x10 2x10 2x10 1# 2x10 1# 2x10 1# 2x10 1# 2x10 1# 2x10 1#    Serratus punch   2x10  2x10 2x10 MREs 2x10 MREs 2x10 MREs 2x10 MREs 2x10 MREs 2x10 MREs    Thoracic extension seated w/ pillow    10x10" 10x10" 10x10" 10x10" 10x10" 10x10" 10x10"    Shoulder AROm flexion    10x 10x 10x 10x 10x 10x 10x    scap ret   With B shoulder ER     2x12 otb 2x12 otb 2x12 otb 2x12 otb 2x12 otb  2x12 otb                                                                                                 Modalities

## 2020-02-07 ENCOUNTER — TELEPHONE (OUTPATIENT)
Dept: FAMILY MEDICINE CLINIC | Facility: CLINIC | Age: 85
End: 2020-02-07

## 2020-02-07 RX ORDER — GABAPENTIN 100 MG/1
CAPSULE ORAL
Qty: 90 CAPSULE | Refills: 0 | Status: SHIPPED | OUTPATIENT
Start: 2020-02-07 | End: 2020-02-10

## 2020-02-07 NOTE — TELEPHONE ENCOUNTER
Patient called requesting a refill on medication Gabapentin 100 mg  Patient has an appointment on 2/10/2020 with Dr Pat Raymundo

## 2020-02-10 ENCOUNTER — OFFICE VISIT (OUTPATIENT)
Dept: FAMILY MEDICINE CLINIC | Facility: CLINIC | Age: 85
End: 2020-02-10

## 2020-02-10 VITALS
BODY MASS INDEX: 23.89 KG/M2 | DIASTOLIC BLOOD PRESSURE: 60 MMHG | SYSTOLIC BLOOD PRESSURE: 150 MMHG | HEIGHT: 62 IN | HEART RATE: 66 BPM | TEMPERATURE: 97.3 F | RESPIRATION RATE: 12 BRPM | WEIGHT: 129.8 LBS

## 2020-02-10 DIAGNOSIS — E11.9 TYPE 2 DIABETES MELLITUS WITHOUT COMPLICATION, WITHOUT LONG-TERM CURRENT USE OF INSULIN (HCC): ICD-10-CM

## 2020-02-10 DIAGNOSIS — M54.6 CHRONIC THORACIC BACK PAIN, UNSPECIFIED BACK PAIN LATERALITY: ICD-10-CM

## 2020-02-10 DIAGNOSIS — G89.29 CHRONIC THORACIC BACK PAIN, UNSPECIFIED BACK PAIN LATERALITY: ICD-10-CM

## 2020-02-10 DIAGNOSIS — J30.9 ALLERGIC RHINITIS, UNSPECIFIED SEASONALITY, UNSPECIFIED TRIGGER: ICD-10-CM

## 2020-02-10 DIAGNOSIS — K59.04 CHRONIC IDIOPATHIC CONSTIPATION: ICD-10-CM

## 2020-02-10 DIAGNOSIS — Z13.5 SCREENING FOR DIABETIC RETINOPATHY: Primary | ICD-10-CM

## 2020-02-10 LAB
LEFT EYE DIABETIC RETINOPATHY: ABNORMAL
LEFT EYE IMAGE QUALITY: ABNORMAL
LEFT EYE MACULAR EDEMA: ABNORMAL
LEFT EYE OTHER RETINOPATHY: ABNORMAL
RIGHT EYE DIABETIC RETINOPATHY: ABNORMAL
RIGHT EYE IMAGE QUALITY: ABNORMAL
RIGHT EYE MACULAR EDEMA: ABNORMAL
RIGHT EYE OTHER RETINOPATHY: ABNORMAL
SEVERITY (EYE EXAM): ABNORMAL
SL AMB POCT HEMOGLOBIN AIC: 6.2 (ref ?–6.5)

## 2020-02-10 PROCEDURE — 1160F RVW MEDS BY RX/DR IN RCRD: CPT | Performed by: FAMILY MEDICINE

## 2020-02-10 PROCEDURE — 3077F SYST BP >= 140 MM HG: CPT | Performed by: FAMILY MEDICINE

## 2020-02-10 PROCEDURE — 99213 OFFICE O/P EST LOW 20 MIN: CPT | Performed by: FAMILY MEDICINE

## 2020-02-10 PROCEDURE — 3066F NEPHROPATHY DOC TX: CPT | Performed by: FAMILY MEDICINE

## 2020-02-10 PROCEDURE — 4040F PNEUMOC VAC/ADMIN/RCVD: CPT | Performed by: FAMILY MEDICINE

## 2020-02-10 PROCEDURE — 83036 HEMOGLOBIN GLYCOSYLATED A1C: CPT | Performed by: FAMILY MEDICINE

## 2020-02-10 PROCEDURE — 3044F HG A1C LEVEL LT 7.0%: CPT | Performed by: FAMILY MEDICINE

## 2020-02-10 PROCEDURE — 3078F DIAST BP <80 MM HG: CPT | Performed by: FAMILY MEDICINE

## 2020-02-10 PROCEDURE — 2022F DILAT RTA XM EVC RTNOPTHY: CPT | Performed by: FAMILY MEDICINE

## 2020-02-10 PROCEDURE — 3008F BODY MASS INDEX DOCD: CPT | Performed by: FAMILY MEDICINE

## 2020-02-10 PROCEDURE — 1036F TOBACCO NON-USER: CPT | Performed by: FAMILY MEDICINE

## 2020-02-10 RX ORDER — GABAPENTIN 100 MG/1
200 CAPSULE ORAL
Qty: 90 CAPSULE | Refills: 0 | Status: SHIPPED | OUTPATIENT
Start: 2020-02-10 | End: 2020-03-20 | Stop reason: SDUPTHER

## 2020-02-10 RX ORDER — DOCUSATE SODIUM 100 MG/1
100 CAPSULE, LIQUID FILLED ORAL 2 TIMES DAILY
Qty: 10 CAPSULE | Refills: 0 | Status: SHIPPED | OUTPATIENT
Start: 2020-02-10

## 2020-02-10 RX ORDER — FLUTICASONE PROPIONATE 50 MCG
1 SPRAY, SUSPENSION (ML) NASAL DAILY
Qty: 16 G | Refills: 2 | Status: SHIPPED | OUTPATIENT
Start: 2020-02-10 | End: 2022-03-21

## 2020-02-10 NOTE — PROGRESS NOTES
Assessment/Plan:    DMII (diabetes mellitus, type 2) ([facility] )    Lab Results   Component Value Date    HGBA1C 6 2 02/10/2020   Diet controlled  -Patient reports compliance with diet  -Diabetic foot exam performed at this visit  Risk category I  Follow up with podiatry every year  -IRIS exam done in the office   -Increased tingling and burning sensation in LE and vaginal area could be secondary to diabetic neuropathy  Will increase dose of gabapentin 100 mg to 200 m HS  Follow up symptoms  -RTC in 2 months for MAW    Constipation  Chronic   -Patient only taking Prune juice and Senokot daily PRN  Will prescribe Colace 100 mg BID  -Encouraged increasing fiber and fluid intake  Problem List Items Addressed This Visit        Endocrine    DMII (diabetes mellitus, type 2) ([facility] )       Lab Results   Component Value Date    HGBA1C 6 2 02/10/2020   Diet controlled  -Patient reports compliance with diet  -Diabetic foot exam performed at this visit  Risk category I  Follow up with podiatry every year  -IRIS exam done in the office   -Increased tingling and burning sensation in LE and vaginal area could be secondary to diabetic neuropathy  Will increase dose of gabapentin 100 mg to 200 m HS  Follow up symptoms  -RTC in 2 months for MAW         Relevant Orders    POCT hemoglobin A1c (Completed)       Respiratory    Allergic rhinitis    Relevant Medications    fluticasone (FLONASE) 50 mcg/act nasal spray       Other    Back pain    Relevant Medications    gabapentin (NEURONTIN) 100 mg capsule    Constipation     Chronic   -Patient only taking Prune juice and Senokot daily PRN  Will prescribe Colace 100 mg BID  -Encouraged increasing fiber and fluid intake           Relevant Medications    docusate sodium (COLACE) 100 mg capsule      Other Visit Diagnoses     Screening for diabetic retinopathy    -  Primary    Relevant Orders    IRIS Diabetic eye exam (Completed)            Subjective:      Patient ID: Sheila Khan Kimberly Sexton is a 80 y o  female  Patient is a 79 yo F with PMH of GERD, type 2 DM, hyperparathyroidism, hypertension, GERD, PAD, CKD who presents to the office for routine follow up  Patient reports some increasing tingling and painful sensation in LE  She had diabetic foot exam today at the office which showed some decreased sensation  She follows with Dr Juliette Armstrong, podiatry every year  She also reports some tingling and burning sensation in the vaginal area that radiates to her thighs  She denies any rash, urinary or bowel incontinence  She has chronic urinary stress incontinence with pessary  She has been evaluated by OBGYN for this and she reports she was treated with Terazol for yeast infection in 12/31/19  She denies improvement of symptoms  She also reports chronic constipation and mentions dulcolax and prune juice are not helping anymore  She can go up to 2 days without BM         The following portions of the patient's history were reviewed and updated as appropriate:   She  has a past medical history of Arthritis, Back pain, Benign essential hypertension, Carotid artery stenosis, CKD (chronic kidney disease) stage 2, GFR 60-89 ml/min, Constipation, chronic, Diabetes mellitus ([address] ), DMII (diabetes mellitus, type 2) ([address] ), Epiglottitis, Gastritis, GERD (gastroesophageal reflux disease), Gout, H/O blood clots, History of stenosis of renal artery, Hypercholesteremia, Hypercholesterolemia, Hyperlipidemia, Hyperparathyroidism ([address] ), Hypertension, Iliac artery stenosis, bilateral ([address] ), Kidney stone, Lobular carcinoma ([facility] ) (09/06/2017), Mucinous carcinoma of breast, 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), Presence of pessary, Renal artery stenosis (HCC), Renal disorder, Renovascular hypertension, Segmental and somatic dysfunction of cervical region, Segmental and somatic dysfunction of lumbar region, Segmental and somatic dysfunction of pelvic region, Segmental and somatic dysfunction of thoracic region, Somatic dysfunction of abdominal region, Somatic dysfunction of head region, Somatic dysfunction of lower extremities, Somatic dysfunction of rib region, Somatic dysfunction of thoracic region, Somatic dysfunction of upper extremities, and Teeth missing  She  has a past surgical history that includes Angioplasty / stenting iliac; Cholecystectomy; Tubal ligation; Eye surgery; Vascular surgery; Throat surgery; pr laryngoscopy,dirct,op scop,exc tumr (N/A, 6/10/2016); Cataract extraction (Bilateral); Colonoscopy; Knee arthroscopy (Right); Breast surgery (Left); Knee surgery (Right); Iliac vein angioplasty / stenting (Right); pr esophagogastroduodenoscopy transoral diagnostic (N/A, 7/26/2018); US guided breast biopsy left complete (Left, 8/10/2018); Mammo needle localization left (all inc) (Left, 9/11/2018); pr perq device placemt breast loc 1st les w alece (Left, 9/11/2018); US guidance breast biopsy left each additional (Left, 8/7/2017); US guided breast biopsy left complete (Left, 8/7/2017); Mammo needle localization left (all inc) (Left, 9/6/2017); Upper gastrointestinal endoscopy; Breast biopsy (Left, 08/10/2018); Breast biopsy (Left, 08/07/2017); pr mastectomy, partial (Left, 9/6/2017); Breast lumpectomy (Left, 09/06/2017); and Breast lumpectomy (Left, 09/11/2018)  She  reports that she quit smoking about 16 years ago  She has a 64 00 pack-year smoking history  She has never used smokeless tobacco  She reports that she drank alcohol  She reports that she does not use drugs  Current Outpatient Medications   Medication Sig Dispense Refill    Acetaminophen (TYLENOL) 325 MG CAPS Take by mouth every 8 (eight) hours      aspirin (ADULT ASPIRIN EC LOW STRENGTH) 81 mg EC tablet Take 1 tablet by mouth daily      calcitriol (ROCALTROL) 0 25 mcg capsule TAKE 1 CAPSULE BY MOUTH 2 TIMES A WEEK   EVERY TUESDAY AND FRIDAY 10 capsule 6    calcium carbonate-vitamin D (OSCAL-D) 500 mg-200 units per tablet Take 1 tablet by mouth daily with breakfast      DULoxetine (CYMBALTA) 30 mg delayed release capsule Take 30 mg by mouth daily  0    fluticasone (FLONASE) 50 mcg/act nasal spray 1 spray into each nostril daily 16 g 2    furosemide (LASIX) 20 mg tablet TAKE 1 TABLET  BY MOUTH DAILY WITH SWELLING, EVERY OTHER DAY ONCE SWELLING RESOLVES 30 tablet 3    gabapentin (NEURONTIN) 100 mg capsule Take 2 capsules (200 mg total) by mouth daily at bedtime 90 capsule 0    losartan (COZAAR) 100 MG tablet take 1 tablet by mouth once daily 90 tablet 0    losartan (COZAAR) 50 mg tablet   0    NIFEdipine ER (ADALAT CC) 30 MG 24 hr tablet Take 1 tablet (30 mg total) by mouth daily Please take at night in addition to the 60 mg in am 30 tablet 5    NIFEdipine ER (ADALAT CC) 60 MG 24 hr tablet take 1 tablet by mouth daily 90 tablet 0    olopatadine (PATANOL) 0 1 % ophthalmic solution Administer 1 drop to both eyes 2 (two) times a day 5 mL 0    ONE TOUCH ULTRA TEST test strip 1 each by Other route 2 (two) times a day Use as instructed 100 each 5    pantoprazole (PROTONIX) 40 mg tablet Take 1 tablet (40 mg total) by mouth daily 30 tablet 5    pravastatin (PRAVACHOL) 40 mg tablet TAKE 1 TABLET DAILY 90 tablet 1    docusate sodium (COLACE) 100 mg capsule Take 1 capsule (100 mg total) by mouth 2 (two) times a day 10 capsule 0    senna (SENOKOT) 8 6 mg Take 1 tablet (8 6 mg total) by mouth daily at bedtime as needed for constipation (Patient not taking: Reported on 2/4/2020) 120 each 0     No current facility-administered medications for this visit  She is allergic to clonidine derivatives; diflucan [fluconazole]; flagyl [metronidazole]; carvedilol; latex; lipitor [atorvastatin]; and other       Review of Systems   Constitutional: Negative for appetite change, diaphoresis, fatigue and fever  HENT: Negative for congestion, rhinorrhea and sinus pain  Respiratory: Negative for cough, chest tightness and shortness of breath      Cardiovascular: Negative for chest pain, palpitations and leg swelling  Gastrointestinal: Positive for constipation  Negative for abdominal distention, abdominal pain, diarrhea, nausea and vomiting  Genitourinary: Positive for vaginal pain  Negative for difficulty urinating, frequency, pelvic pain, urgency and vaginal discharge  Musculoskeletal: Negative for back pain and neck pain  Skin:        Burning and tingling sensation in LE   Neurological: Negative for weakness, light-headedness, numbness and headaches  Psychiatric/Behavioral: Negative for agitation and behavioral problems  The patient is not nervous/anxious  Objective:      /60 (BP Location: Left arm, Patient Position: Sitting, Cuff Size: Standard)   Pulse 66   Temp (!) 97 3 °F (36 3 °C) (Tympanic)   Resp 12   Ht 5' 2" (1 575 m)   Wt 58 9 kg (129 lb 12 8 oz)   LMP  (LMP Unknown)   BMI 23 74 kg/m²          Physical Exam   Constitutional: She is oriented to person, place, and time  She appears well-developed and well-nourished  No distress  HENT:   Head: Normocephalic and atraumatic  Mouth/Throat: No oropharyngeal exudate  Eyes: Pupils are equal, round, and reactive to light  Neck: Normal range of motion  Neck supple  No thyromegaly present  Cardiovascular: Normal rate, regular rhythm, normal heart sounds and intact distal pulses  Exam reveals no gallop and no friction rub  Pulses are no weak pulses  No murmur heard  Pulses:       Dorsalis pedis pulses are 2+ on the right side, and 2+ on the left side  Posterior tibial pulses are 2+ on the right side, and 2+ on the left side  Pulmonary/Chest: Effort normal and breath sounds normal  No respiratory distress  She has no wheezes  She has no rales  She exhibits no tenderness  Abdominal: Soft  Bowel sounds are normal  She exhibits no distension and no mass  There is no tenderness  There is no rebound and no guarding  Musculoskeletal: Normal range of motion   She exhibits no edema, tenderness or deformity  Feet:    Feet:   Right Foot:   Skin Integrity: Negative for ulcer, skin breakdown, erythema, warmth, callus or dry skin  Left Foot:   Skin Integrity: Negative for ulcer, skin breakdown, erythema, warmth, callus or dry skin  Lymphadenopathy:     She has no cervical adenopathy  Neurological: She is alert and oriented to person, place, and time  Skin: Skin is warm and dry  Capillary refill takes less than 2 seconds  No rash noted  She is not diaphoretic  No erythema  Psychiatric: She has a normal mood and affect  Her behavior is normal  Judgment and thought content normal    Vitals reviewed  Patient's shoes and socks removed  Right Foot/Ankle   Right Foot Inspection  Skin Exam: skin normal skin not intact, no dry skin, no warmth, no callus, no erythema, no maceration, no abnormal color, no pre-ulcer, no ulcer and no callus                          Toe Exam: no swelling  Sensory   Vibration: intact  Proprioception: intact   Monofilament testing: intact  Vascular  Capillary refills: < 3 seconds  The right DP pulse is 2+  The right PT pulse is 2+  Left Foot/Ankle  Left Foot Inspection  Skin Exam: skin normalskin not intact, no dry skin, no warmth, no erythema, no maceration, normal color, no pre-ulcer, no ulcer and no callus                         Toe Exam: no swelling                   Sensory   Vibration: intact  Proprioception: intact  Monofilament: diminished  Vascular  Capillary refills: < 3 seconds  The left DP pulse is 2+  The left PT pulse is 2+  Assign Risk Category:  No deformity present; Loss of protective sensation;  No weak pulses       Risk: 1

## 2020-02-11 NOTE — ASSESSMENT & PLAN NOTE
Chronic   -Patient only taking Prune juice and Senokot daily PRN  Will prescribe Colace 100 mg BID  -Encouraged increasing fiber and fluid intake

## 2020-02-11 NOTE — ASSESSMENT & PLAN NOTE
Lab Results   Component Value Date    HGBA1C 6 2 02/10/2020   Diet controlled  -Patient reports compliance with diet  -Diabetic foot exam performed at this visit  Risk category I  Follow up with podiatry every year  -IRIS exam done in the office   -Increased tingling and burning sensation in LE and vaginal area could be secondary to diabetic neuropathy  Will increase dose of gabapentin 100 mg to 200 m HS   Follow up symptoms  -RTC in 2 months for PEG

## 2020-02-14 ENCOUNTER — TELEPHONE (OUTPATIENT)
Dept: FAMILY MEDICINE CLINIC | Facility: CLINIC | Age: 85
End: 2020-02-14

## 2020-02-14 DIAGNOSIS — E11.319 DIABETIC RETINOPATHY OF LEFT EYE ASSOCIATED WITH TYPE 2 DIABETES MELLITUS, MACULAR EDEMA PRESENCE UNSPECIFIED, UNSPECIFIED RETINOPATHY SEVERITY (HCC): Primary | ICD-10-CM

## 2020-02-17 ENCOUNTER — TELEPHONE (OUTPATIENT)
Dept: FAMILY MEDICINE CLINIC | Facility: CLINIC | Age: 85
End: 2020-02-17

## 2020-03-08 PROBLEM — K14.6 GLOSSODYNIA: Status: ACTIVE | Noted: 2020-03-08

## 2020-03-10 DIAGNOSIS — I10 ESSENTIAL HYPERTENSION: Primary | ICD-10-CM

## 2020-03-10 RX ORDER — LOSARTAN POTASSIUM 50 MG/1
TABLET ORAL
Qty: 90 TABLET | Refills: 1 | Status: SHIPPED | OUTPATIENT
Start: 2020-03-10 | End: 2020-04-06

## 2020-03-20 ENCOUNTER — TELEPHONE (OUTPATIENT)
Dept: FAMILY MEDICINE CLINIC | Facility: CLINIC | Age: 85
End: 2020-03-20

## 2020-03-20 DIAGNOSIS — G89.29 CHRONIC THORACIC BACK PAIN, UNSPECIFIED BACK PAIN LATERALITY: ICD-10-CM

## 2020-03-20 DIAGNOSIS — M54.6 CHRONIC THORACIC BACK PAIN, UNSPECIFIED BACK PAIN LATERALITY: ICD-10-CM

## 2020-03-20 RX ORDER — GABAPENTIN 100 MG/1
200 CAPSULE ORAL
Qty: 180 CAPSULE | Refills: 1 | Status: SHIPPED | OUTPATIENT
Start: 2020-03-20 | End: 2020-11-07 | Stop reason: SDUPTHER

## 2020-04-01 ENCOUNTER — TELEPHONE (OUTPATIENT)
Dept: VASCULAR SURGERY | Facility: CLINIC | Age: 85
End: 2020-04-01

## 2020-04-06 ENCOUNTER — TELEMEDICINE (OUTPATIENT)
Dept: FAMILY MEDICINE CLINIC | Facility: CLINIC | Age: 85
End: 2020-04-06

## 2020-04-06 VITALS — BODY MASS INDEX: 23.78 KG/M2 | WEIGHT: 130 LBS | TEMPERATURE: 97 F

## 2020-04-06 DIAGNOSIS — K21.9 GASTROESOPHAGEAL REFLUX DISEASE WITHOUT ESOPHAGITIS: ICD-10-CM

## 2020-04-06 DIAGNOSIS — K59.04 CHRONIC IDIOPATHIC CONSTIPATION: Primary | ICD-10-CM

## 2020-04-06 PROCEDURE — 99442 PR PHYS/QHP TELEPHONE EVALUATION 11-20 MIN: CPT | Performed by: FAMILY MEDICINE

## 2020-04-06 RX ORDER — POLYETHYLENE GLYCOL 3350 17 G/17G
17 POWDER, FOR SOLUTION ORAL DAILY
Qty: 225 G | Refills: 1 | Status: SHIPPED | OUTPATIENT
Start: 2020-04-06 | End: 2020-11-04 | Stop reason: SDUPTHER

## 2020-04-06 RX ORDER — PANTOPRAZOLE SODIUM 40 MG/1
40 TABLET, DELAYED RELEASE ORAL DAILY
Qty: 60 TABLET | Refills: 1 | Status: SHIPPED | OUTPATIENT
Start: 2020-04-06 | End: 2020-09-22

## 2020-04-07 DIAGNOSIS — E78.5 HYPERLIPIDEMIA, UNSPECIFIED HYPERLIPIDEMIA TYPE: ICD-10-CM

## 2020-04-07 RX ORDER — PRAVASTATIN SODIUM 40 MG
TABLET ORAL
Qty: 90 TABLET | Refills: 1 | Status: SHIPPED | OUTPATIENT
Start: 2020-04-07 | End: 2020-09-28

## 2020-04-10 DIAGNOSIS — I10 ESSENTIAL HYPERTENSION: ICD-10-CM

## 2020-04-10 RX ORDER — NIFEDIPINE 60 MG/1
TABLET, FILM COATED, EXTENDED RELEASE ORAL
Qty: 90 TABLET | Refills: 0 | Status: SHIPPED | OUTPATIENT
Start: 2020-04-10 | End: 2020-07-12

## 2020-04-17 DIAGNOSIS — I10 ESSENTIAL HYPERTENSION: ICD-10-CM

## 2020-04-17 RX ORDER — LOSARTAN POTASSIUM 100 MG/1
TABLET ORAL
Qty: 90 TABLET | Refills: 0 | Status: SHIPPED | OUTPATIENT
Start: 2020-04-17 | End: 2020-07-14

## 2020-05-11 ENCOUNTER — TELEPHONE (OUTPATIENT)
Dept: FAMILY MEDICINE CLINIC | Facility: CLINIC | Age: 85
End: 2020-05-11

## 2020-05-11 DIAGNOSIS — Z79.4 TYPE 2 DIABETES MELLITUS WITHOUT COMPLICATION, WITH LONG-TERM CURRENT USE OF INSULIN (HCC): ICD-10-CM

## 2020-05-11 DIAGNOSIS — E11.9 TYPE 2 DIABETES MELLITUS WITHOUT COMPLICATION, WITH LONG-TERM CURRENT USE OF INSULIN (HCC): ICD-10-CM

## 2020-05-12 ENCOUNTER — TELEPHONE (OUTPATIENT)
Dept: FAMILY MEDICINE CLINIC | Facility: CLINIC | Age: 85
End: 2020-05-12

## 2020-06-07 DIAGNOSIS — E11.9 TYPE 2 DIABETES MELLITUS WITHOUT COMPLICATION, WITHOUT LONG-TERM CURRENT USE OF INSULIN (HCC): Primary | ICD-10-CM

## 2020-06-08 RX ORDER — LANCETS 33 GAUGE
EACH MISCELLANEOUS
Qty: 200 EACH | Refills: 1 | Status: SHIPPED | OUTPATIENT
Start: 2020-06-08 | End: 2020-07-09 | Stop reason: SDUPTHER

## 2020-06-12 NOTE — TELEPHONE ENCOUNTER
Called Kel Clemente, there is another message in regards to this 
Patients family member calling about Diabetic Eye exam she had done here in the office 
negative

## 2020-06-18 ENCOUNTER — APPOINTMENT (OUTPATIENT)
Dept: LAB | Facility: HOSPITAL | Age: 85
End: 2020-06-18
Attending: INTERNAL MEDICINE
Payer: COMMERCIAL

## 2020-06-18 DIAGNOSIS — N18.30 CKD (CHRONIC KIDNEY DISEASE), STAGE III (HCC): ICD-10-CM

## 2020-06-18 DIAGNOSIS — I65.23 ASYMPTOMATIC BILATERAL CAROTID ARTERY STENOSIS: ICD-10-CM

## 2020-06-18 DIAGNOSIS — N25.81 SECONDARY HYPERPARATHYROIDISM, RENAL (HCC): ICD-10-CM

## 2020-06-18 LAB
ALBUMIN SERPL BCP-MCNC: 3.6 G/DL (ref 3.5–5)
ALP SERPL-CCNC: 72 U/L (ref 46–116)
ALT SERPL W P-5'-P-CCNC: 21 U/L (ref 12–78)
ANION GAP SERPL CALCULATED.3IONS-SCNC: 6 MMOL/L (ref 4–13)
AST SERPL W P-5'-P-CCNC: 16 U/L (ref 5–45)
BACTERIA UR QL AUTO: ABNORMAL /HPF
BASOPHILS # BLD AUTO: 0.03 THOUSANDS/ΜL (ref 0–0.1)
BASOPHILS NFR BLD AUTO: 1 % (ref 0–1)
BILIRUB SERPL-MCNC: 0.28 MG/DL (ref 0.2–1)
BILIRUB UR QL STRIP: NEGATIVE
BUN SERPL-MCNC: 32 MG/DL (ref 5–25)
CALCIUM SERPL-MCNC: 9.4 MG/DL (ref 8.3–10.1)
CHLORIDE SERPL-SCNC: 104 MMOL/L (ref 100–108)
CHOLEST SERPL-MCNC: 169 MG/DL (ref 50–200)
CLARITY UR: ABNORMAL
CO2 SERPL-SCNC: 27 MMOL/L (ref 21–32)
COLOR UR: YELLOW
CREAT SERPL-MCNC: 1.34 MG/DL (ref 0.6–1.3)
CREAT UR-MCNC: 89.5 MG/DL
EOSINOPHIL # BLD AUTO: 0.06 THOUSAND/ΜL (ref 0–0.61)
EOSINOPHIL NFR BLD AUTO: 1 % (ref 0–6)
ERYTHROCYTE [DISTWIDTH] IN BLOOD BY AUTOMATED COUNT: 13.7 % (ref 11.6–15.1)
GFR SERPL CREATININE-BSD FRML MDRD: 36 ML/MIN/1.73SQ M
GLUCOSE P FAST SERPL-MCNC: 174 MG/DL (ref 65–99)
GLUCOSE UR STRIP-MCNC: NEGATIVE MG/DL
HCT VFR BLD AUTO: 37.3 % (ref 34.8–46.1)
HDLC SERPL-MCNC: 74 MG/DL
HGB BLD-MCNC: 11.5 G/DL (ref 11.5–15.4)
HGB UR QL STRIP.AUTO: NEGATIVE
IMM GRANULOCYTES # BLD AUTO: 0.02 THOUSAND/UL (ref 0–0.2)
IMM GRANULOCYTES NFR BLD AUTO: 0 % (ref 0–2)
KETONES UR STRIP-MCNC: NEGATIVE MG/DL
LDLC SERPL CALC-MCNC: 70 MG/DL (ref 0–100)
LEUKOCYTE ESTERASE UR QL STRIP: ABNORMAL
LYMPHOCYTES # BLD AUTO: 2.82 THOUSANDS/ΜL (ref 0.6–4.47)
LYMPHOCYTES NFR BLD AUTO: 47 % (ref 14–44)
MAGNESIUM SERPL-MCNC: 1.9 MG/DL (ref 1.6–2.6)
MCH RBC QN AUTO: 27.9 PG (ref 26.8–34.3)
MCHC RBC AUTO-ENTMCNC: 30.8 G/DL (ref 31.4–37.4)
MCV RBC AUTO: 91 FL (ref 82–98)
MONOCYTES # BLD AUTO: 0.47 THOUSAND/ΜL (ref 0.17–1.22)
MONOCYTES NFR BLD AUTO: 8 % (ref 4–12)
MUCOUS THREADS UR QL AUTO: ABNORMAL
NEUTROPHILS # BLD AUTO: 2.58 THOUSANDS/ΜL (ref 1.85–7.62)
NEUTS SEG NFR BLD AUTO: 43 % (ref 43–75)
NITRITE UR QL STRIP: NEGATIVE
NON-SQ EPI CELLS URNS QL MICRO: ABNORMAL /HPF
NRBC BLD AUTO-RTO: 0 /100 WBCS
OTHER STN SPEC: ABNORMAL
PH UR STRIP.AUTO: 5.5 [PH]
PHOSPHATE SERPL-MCNC: 4.1 MG/DL (ref 2.3–4.1)
PLATELET # BLD AUTO: 275 THOUSANDS/UL (ref 149–390)
PMV BLD AUTO: 11 FL (ref 8.9–12.7)
POTASSIUM SERPL-SCNC: 3.8 MMOL/L (ref 3.5–5.3)
PROT SERPL-MCNC: 8.2 G/DL (ref 6.4–8.2)
PROT UR STRIP-MCNC: ABNORMAL MG/DL
PROT UR-MCNC: 81 MG/DL
PROT/CREAT UR: 0.91 MG/G{CREAT} (ref 0–0.1)
PTH-INTACT SERPL-MCNC: 69.1 PG/ML (ref 18.4–80.1)
RBC # BLD AUTO: 4.12 MILLION/UL (ref 3.81–5.12)
RBC #/AREA URNS AUTO: ABNORMAL /HPF
SODIUM SERPL-SCNC: 137 MMOL/L (ref 136–145)
SP GR UR STRIP.AUTO: 1.01 (ref 1–1.03)
TRIGL SERPL-MCNC: 126 MG/DL
UROBILINOGEN UR QL STRIP.AUTO: 0.2 E.U./DL
WBC # BLD AUTO: 5.98 THOUSAND/UL (ref 4.31–10.16)
WBC #/AREA URNS AUTO: ABNORMAL /HPF

## 2020-06-18 PROCEDURE — 84156 ASSAY OF PROTEIN URINE: CPT

## 2020-06-18 PROCEDURE — 83735 ASSAY OF MAGNESIUM: CPT

## 2020-06-18 PROCEDURE — 82570 ASSAY OF URINE CREATININE: CPT

## 2020-06-18 PROCEDURE — 80053 COMPREHEN METABOLIC PANEL: CPT

## 2020-06-18 PROCEDURE — 81001 URINALYSIS AUTO W/SCOPE: CPT

## 2020-06-18 PROCEDURE — 83970 ASSAY OF PARATHORMONE: CPT

## 2020-06-18 PROCEDURE — 84100 ASSAY OF PHOSPHORUS: CPT

## 2020-06-18 PROCEDURE — 36415 COLL VENOUS BLD VENIPUNCTURE: CPT

## 2020-06-18 PROCEDURE — 85025 COMPLETE CBC W/AUTO DIFF WBC: CPT

## 2020-06-18 PROCEDURE — 80061 LIPID PANEL: CPT

## 2020-06-24 ENCOUNTER — HOSPITAL ENCOUNTER (OUTPATIENT)
Dept: NON INVASIVE DIAGNOSTICS | Facility: CLINIC | Age: 85
Discharge: HOME | End: 2020-06-24
Payer: COMMERCIAL

## 2020-06-24 DIAGNOSIS — I70.213 ATHEROSCLEROSIS OF NATIVE ARTERY OF BOTH LOWER EXTREMITIES WITH INTERMITTENT CLAUDICATION (HCC): ICD-10-CM

## 2020-06-24 DIAGNOSIS — I65.29 OCCLUSION AND STENOSIS OF UNSPECIFIED CAROTID ARTERY: ICD-10-CM

## 2020-06-24 PROCEDURE — 93925 LOWER EXTREMITY STUDY: CPT | Performed by: SURGERY

## 2020-06-24 PROCEDURE — 93925 LOWER EXTREMITY STUDY: CPT

## 2020-06-24 PROCEDURE — 93880 EXTRACRANIAL BILAT STUDY: CPT

## 2020-06-24 PROCEDURE — 93922 UPR/L XTREMITY ART 2 LEVELS: CPT | Performed by: SURGERY

## 2020-06-24 PROCEDURE — 93923 UPR/LXTR ART STDY 3+ LVLS: CPT

## 2020-06-24 PROCEDURE — 93979 VASCULAR STUDY: CPT

## 2020-06-26 PROCEDURE — 93880 EXTRACRANIAL BILAT STUDY: CPT | Performed by: SURGERY

## 2020-06-26 PROCEDURE — 93978 VASCULAR STUDY: CPT | Performed by: SURGERY

## 2020-06-28 DIAGNOSIS — N25.81 SECONDARY HYPERPARATHYROIDISM (HCC): ICD-10-CM

## 2020-06-28 RX ORDER — CALCITRIOL 0.25 UG/1
CAPSULE, LIQUID FILLED ORAL
Qty: 10 CAPSULE | Refills: 6 | Status: SHIPPED | OUTPATIENT
Start: 2020-06-28 | End: 2021-02-26 | Stop reason: SDUPTHER

## 2020-06-29 ENCOUNTER — TELEMEDICINE (OUTPATIENT)
Dept: NEPHROLOGY | Facility: CLINIC | Age: 85
End: 2020-06-29
Payer: COMMERCIAL

## 2020-06-29 DIAGNOSIS — I10 ACCELERATED ESSENTIAL HYPERTENSION: ICD-10-CM

## 2020-06-29 DIAGNOSIS — N18.30 CKD (CHRONIC KIDNEY DISEASE) STAGE 3, GFR 30-59 ML/MIN (HCC): Primary | ICD-10-CM

## 2020-06-29 DIAGNOSIS — N25.81 SECONDARY HYPERPARATHYROIDISM, RENAL (HCC): ICD-10-CM

## 2020-06-29 DIAGNOSIS — N28.1 ACQUIRED COMPLEX CYST OF KIDNEY: ICD-10-CM

## 2020-06-29 PROCEDURE — 99213 OFFICE O/P EST LOW 20 MIN: CPT | Performed by: INTERNAL MEDICINE

## 2020-06-29 PROCEDURE — 1160F RVW MEDS BY RX/DR IN RCRD: CPT | Performed by: INTERNAL MEDICINE

## 2020-07-02 ENCOUNTER — TELEPHONE (OUTPATIENT)
Dept: VASCULAR SURGERY | Facility: CLINIC | Age: 85
End: 2020-07-02

## 2020-07-02 NOTE — TELEPHONE ENCOUNTER
----- Message from Sheeba Isaac sent at 6/29/2020  9:45 AM EDT -----  Call patient for OV with Sabiha Ace

## 2020-07-09 ENCOUNTER — TELEPHONE (OUTPATIENT)
Dept: FAMILY MEDICINE CLINIC | Facility: CLINIC | Age: 85
End: 2020-07-09

## 2020-07-09 DIAGNOSIS — E11.9 TYPE 2 DIABETES MELLITUS WITHOUT COMPLICATION, WITHOUT LONG-TERM CURRENT USE OF INSULIN (HCC): Primary | ICD-10-CM

## 2020-07-09 DIAGNOSIS — E11.9 TYPE 2 DIABETES MELLITUS WITHOUT COMPLICATION, WITHOUT LONG-TERM CURRENT USE OF INSULIN (HCC): ICD-10-CM

## 2020-07-09 RX ORDER — LANCETS 33 GAUGE
EACH MISCELLANEOUS 2 TIMES DAILY
Qty: 200 EACH | Refills: 0 | Status: SHIPPED | OUTPATIENT
Start: 2020-07-09

## 2020-07-10 ENCOUNTER — TELEPHONE (OUTPATIENT)
Dept: FAMILY MEDICINE CLINIC | Facility: CLINIC | Age: 85
End: 2020-07-10

## 2020-07-11 DIAGNOSIS — I10 ESSENTIAL HYPERTENSION: ICD-10-CM

## 2020-07-12 RX ORDER — NIFEDIPINE 60 MG/1
TABLET, FILM COATED, EXTENDED RELEASE ORAL
Qty: 90 TABLET | Refills: 0 | Status: SHIPPED | OUTPATIENT
Start: 2020-07-12 | End: 2020-10-09

## 2020-07-13 DIAGNOSIS — E11.9 TYPE 2 DIABETES MELLITUS WITHOUT COMPLICATION, WITHOUT LONG-TERM CURRENT USE OF INSULIN (HCC): Primary | ICD-10-CM

## 2020-07-13 RX ORDER — LANCETS
EACH MISCELLANEOUS 2 TIMES DAILY
Qty: 100 EACH | Refills: 5 | Status: SHIPPED | OUTPATIENT
Start: 2020-07-13

## 2020-07-13 RX ORDER — LANCETS 30 GAUGE
1 EACH MISCELLANEOUS 2 TIMES DAILY
Qty: 100 EACH | Refills: 5 | Status: SHIPPED | OUTPATIENT
Start: 2020-07-13

## 2020-07-13 NOTE — TELEPHONE ENCOUNTER
Patient called in for refill on One Touch Ultra Soft Lancets  Says the wrong one keep getting sent to pharmacy which is [facility] instead    Please resend with corrections requested by patients son Naresh Castillo

## 2020-07-14 DIAGNOSIS — I10 ESSENTIAL HYPERTENSION: ICD-10-CM

## 2020-07-14 RX ORDER — LOSARTAN POTASSIUM 100 MG/1
TABLET ORAL
Qty: 90 TABLET | Refills: 0 | Status: SHIPPED | OUTPATIENT
Start: 2020-07-14 | End: 2020-10-09

## 2020-07-27 NOTE — PROGRESS NOTES
Assessment and Plan:     Problem List Items Addressed This Visit        Musculoskeletal and Integument    Osteoporosis    Relevant Orders    DXA bone density spine hip and pelvis       Other    Medicare annual wellness visit, subsequent - Primary     Preventive screenings reviewed with patient:  -UTD: Diabetes, cardiovascular  Due: Mammogram scheduled for 9/03  Ordered DEXA scan  No previous imaging on file but patient has hx of osteoporosis only on vitamin D and Calcium supplements   -Diet, exercise, home safety, fall risk reviewed with patient   -Follow up in 3 months  Patient would like to have flu shot at the time  Also needs PCV 13 and Tdap                 Preventive health issues were discussed with patient, and age appropriate screening tests were ordered as noted in patient's After Visit Summary  Personalized health advice and appropriate referrals for health education or preventive services given if needed, as noted in patient's After Visit Summary       History of Present Illness:     Patient presents for Medicare Annual Wellness visit    Patient Care Team:  Landon Matt MD as PCP - General (Family Medicine)  Clark Woods MD as PCP - [address] [place] [address] ([facility])  KAVEH Cam MD Harry Roots, Reida Ferdinand, MD Margret Barre Doctor, MD Veria Circle, MD Madge Rusk, DO Tilford Gentleman, MD (Vascular Surgery)  Kalyani Macario MD (Surgical Oncology)     Problem List:     Patient Active Problem List   Diagnosis    Vocal cord mass    Iliac artery stenosis, bilateral ([facility]    Hyperparathyroidism ([facility] )    GERD (gastroesophageal reflux disease)    Arthritis    Hypertension    EKG abnormalities    Anemia    Neck pain    Dysuria    Chronic left shoulder pain    Fall from other slipping, tripping, or stumbling    Abnormal EKG    Abnormal mammogram    Accelerated essential hypertension    Allergic rhinitis    Atherosclerosis of native artery of extremity with intermittent claudication ([facility])    Back pain    Bilateral carotid artery stenosis    Carotid stenosis, asymptomatic    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    Chronic myofascial pain    Chronic pain of lower extremity    CKD (chronic kidney disease) stage 3, GFR 30-59 ml/min ([facility])    Constipation    Diabetic retinopathy ([facility]    DMII (diabetes mellitus, type 2) ([facility])    Dysphagia    Epigastric pain    Fever of unknown origin    Gastritis    Gout    Hypomagnesemia    Atherosclerosis of other specified arteries    Lymphadenopathy    Microalbuminuria    Nephrolithiasis    Neuropathic pain, leg, right    Osteoporosis    PAD (peripheral artery disease) ([address]    Primary generalized (osteo)arthritis    Persistent proteinuria    Renal cyst    Secondary hyperparathyroidism, renal ([facility] )    Vaginal prolapse    Vitamin D deficiency    Acquired complex cyst of kidney    Gastroesophageal reflux disease without esophagitis    History of left breast cancer    Type 2 diabetes mellitus with diabetic peripheral angiopathy without gangrene ([facility])    Stricture of artery ([facility]    Claudication in peripheral vascular disease ([facility])    Hemorrhoids    Tinea unguium    Diabetic neuropathy ([facility])    Bradycardia    Hyperkalemia    Pain in tooth    Dysphonia    Glottic insufficiency    Reflux laryngitis    Paresis of left vocal fold    Muscle tension dysphonia    Laryngocele    Anterior glottic web    Chest pain    Palpitations    Encounter for follow-up examination after completed treatment for malignant neoplasm    Respiratory crackles    Allergic conjunctivitis of both eyes    Viral upper respiratory tract infection    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[facility])    Renal artery stenosis ([facility])    Left ear pain    Itching of ear    Cervical spine disease    Cervicogenic headache    Glossodynia    Medicare annual wellness visit, subsequent      Past Medical and Surgical History:     Past Medical History:   Diagnosis Date    Arthritis     Back pain     Benign essential hypertension     LAST ASSESSED: [id number]    Carotid artery stenosis     CKD (chronic kidney disease) stage 2, GFR 60-89 ml/min     Constipation, chronic     "drinks prune juice"    Diabetes mellitus ([facility]     DMII (diabetes mellitus, type 2) ([facility])     Epiglottitis     RESOLVED: [id number]    Gastritis     GERD (gastroesophageal reflux disease)     Gout     H/O blood clots     left lower leg,,,11 yrs ago    History of stenosis of renal artery     RESOLVED: [id number]    Hypercholesteremia     Hypercholesterolemia     Hyperlipidemia     [facility]roidism [facility]     Hypertension     Iliac artery stenosis, bilateral (H[facility])     Kidney stone     Lobular carcinoma ([place] [address] ) 09/06/2017    Left    Mucinous carcinoma of breast, 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    Left    Presence of pessary     Renal artery stenosis ([facility])     Renal disorder     Renovascular hypertension     RESOLVED: [id number]    Segmental and somatic dysfunction of cervical region     RESOLVED: [id number]    Segmental and somatic dysfunction of lumbar region     RESOLVED: [id number]    Segmental and somatic dysfunction of pelvic region     RESOLVED: [id number]    Segmental and somatic dysfunction of thoracic region     RESOLVED: 71YII9065    Somatic dysfunction of abdominal region     RESOLVED: 10TUQ3044    Somatic dysfunction of head region     RESOLVED: [id number]    Somatic dysfunction of lower extremities     RESOLVED: 70WHT4108    Somatic dysfunction of rib region     RESOLVED: 45GAY7902    Somatic dysfunction of thoracic region     RESOLVED: 84VDO2491    Somatic dysfunction of upper extremities     RESOLVED: [id number]    Teeth missing      Past Surgical History:   Procedure Laterality Date    ANGIOPLASTY / STENTING ILIAC      BREAST BIOPSY Left 08/10/2018    U/S     BREAST BIOPSY Left 08/07/2017    U/S BX    BREAST LUMPECTOMY Left 09/06/2017    LAST ASSESSED: [id number]    BREAST LUMPECTOMY Left 09/11/2018    BREAST SURGERY Left     biopsy    CATARACT EXTRACTION Bilateral     CHOLECYSTECTOMY      COLONOSCOPY      EYE SURGERY      ILIAC VEIN ANGIOPLASTY / STENTING Right     INITIAL STENOSIS: ONSET: [id number]    KNEE ARTHROSCOPY Right     KNEE SURGERY Right     ONSET: [id number]    MAMMO NEEDLE LOCALIZATION LEFT (ALL INC) Left 9/11/2018    MAMMO NEEDLE LOCALIZATION LEFT (ALL [employer]) Left 9/6/2017    OR ESOPHAGOGASTRODUODENOSCOPY TRANSORAL DIAGNOSTIC N/A 7/26/2018    Procedure: ESOPHAGOGASTRODUODENOSCOPY (EGD); Surgeon: Edwin Westfall DO;  [facility]: [facility]; Service: Gastroenterology    OR LARYNGOSCOPY,DIRCT,[facility] N/A 6/10/2016    Procedure: MICRO DIRECT LARYNGOSCOPY WITH VOCAL FOLD MASS EXCISION ;  Surgeon: Lauro Orantes MD;  [facility];  Service: ENT    OR MASTECTOMY, PARTIAL Left 9/6/2017    Procedure: LUMPECTOMY BREAST NEEDLE LOCALIZED WITH FAXITRON NEEDLE @ 0830;  Surgeon: Lillie Romero MD;  [facility];  Service: General    OR PERQ DEVICE PLACEMT BREAST LOC 1ST LES W [place] Left 9/11/2018    Procedure: BREAST LUMPECTOMY; BREAST NEEDLE LOCALIZATION (NEEDLE LOC AT 1330);   Surgeon: Adalid Childress MD;  [facility];  Service: Surgical Oncology    THROAT SURGERY      lump removed    TUBAL LIGATION      UPPER GASTROINTESTINAL ENDOSCOPY      US GUIDANCE BREAST BIOPSY LEFT EACH ADDITIONAL Left 8/7/2017    US GUIDED BREAST BIOPSY LEFT COMPLETE Left 8/10/2018    US GUIDED BREAST BIOPSY LEFT COMPLETE Left 8/7/2017    VASCULAR SURGERY        Family History:     Family History   Problem Relation Age of Onset    Heart disease Brother     Heart disease Maternal Grandmother     Diabetes Other         of other type with arthropathy, without long term current use of insulin    Hypertension Other     No Known Problems Mother    Teofilo Niobrara Gout Family     Rheum arthritis Family     Lupus Family         systematic erythematosus    Heart disease Family     Stroke Family         syndrome    Stroke Maternal Uncle         syndrome    Stroke Maternal Aunt         syndrome    No Known Problems Father     No Known Problems Daughter     No Known Problems Maternal Grandfather     No Known Problems Paternal Grandmother     No Known Problems Paternal Grandfather     No Known Problems Daughter     No Known Problems Daughter     No Known Problems Maternal Aunt     No Known Problems Maternal Aunt     No Known Problems Maternal Aunt       Social History:     E-Cigarette/Vaping    E-Cigarette Use Never User      E-Cigarette/Vaping Substances    Nicotine No     THC No     CBD No     Flavoring No     Other No     Unknown No      Social History     Socioeconomic History    Marital status:      Spouse name: None    Number of children: None    Years of education: None    Highest education level: None   Occupational History    Occupation: retired   Social Needs    Financial resource strain: Not hard at all   [employer] insecurity:     Worry: Never true     Inability: Never true   "[employer] needs:     Medical: No     Non-medical: No   Tobacco Use    Smoking status: Former Smoker     Packs/day: 1 00     Years: 64 00     Pack years: 64 00     Last attempt to quit:      Years since quittin 5    Smokeless tobacco: Never Used    Tobacco comment: quit in  1 ppd x 50 years   Substance and Sexual Activity    Alcohol use: Not Currently     Frequency: Never    Drug use: No    Sexual activity: Yes     Partners: Male   Lifestyle    Physical activity:     Days per week: 0 days     Minutes per session: 0 min    Stress:  Only a little   Relationships    Social connections:     Talks on phone: More than three times a week     Gets together: More than three times a week     Attends Church service: None     Active member of club or organization: None     Attends meetings of clubs or organizations: None     Relationship status: None    Intimate partner violence:     Fear of current or ex partner: None     Emotionally abused: None     Physically abused: None     Forced sexual activity: None   Other Topics Concern    None   Social History Narrative    Daily caffeine consumption, 1 serving a day       Medications and Allergies:     Current Outpatient Medications   Medication Sig Dispense Refill    Acetaminophen (TYLENOL) 325 MG CAPS Take 500 mg by mouth every 8 (eight) hours       aspirin (ADULT ASPIRIN EC LOW STRENGTH) 81 mg EC tablet Take 1 tablet by mouth daily      calcitriol (ROCALTROL) 0 25 mcg capsule TAKE 1 CAPSULE TWICE A WEEK EVERY TUESDAY AND FRIDAY 10 capsule 6    calcium carbonate-vitamin D (OSCAL-D) 500 mg-200 units per tablet Take 1 tablet by mouth daily with breakfast      docusate sodium (COLACE) 100 mg capsule Take 1 capsule (100 mg total) by mouth 2 (two) times a day 10 capsule 0    DULoxetine (CYMBALTA) 30 mg delayed release capsule Take 30 mg by mouth daily  0    fluticasone (FLONASE) 50 mcg/act nasal spray 1 spray into each nostril daily 16 g 2    furosemide (LASIX) 20 mg tablet TAKE 1 TABLET  BY MOUTH DAILY WITH SWELLING, EVERY OTHER DAY ONCE SWELLING RESOLVES 30 tablet 3    gabapentin (NEURONTIN) 100 mg capsule Take 2 capsules (200 mg total) by mouth daily at bedtime 180 capsule 1    Lancets (ONETOUCH DELICA PLUS ERATIN63O) MISC 1 Device by Does not apply route 2 (two) times a day 100 each 5    Lancets (ONETOUCH ULTRASOFT) lancets by Other route 2 (two) times a day Use as instructed 100 each 5    losartan (COZAAR) 100 MG tablet take 1 tablet by mouth once daily 90 tablet 0    NIFEdipine ER (ADALAT CC) 30 MG 24 hr tablet Take 1 tablet (30 mg total) by mouth daily Please take at night in addition to the 60 mg in am 30 tablet 5    NIFEdipine ER (ADALAT CC) 60 MG 24 hr tablet take 1 tablet by mouth once daily 90 tablet 0    olopatadine (PATANOL) 0 1 % ophthalmic solution Administer 1 drop to both eyes 2 (two) times a day 5 mL 0    ONE TOUCH ULTRA TEST test strip 1 each by Other route 2 (two) times a day Use as instructed 100 each 5    OneTouch Delica Lancets 74Z MISC Inject as directed 2 (two) times a day Use to test 200 each 0    pantoprazole (PROTONIX) 40 mg tablet Take 1 tablet (40 mg total) by mouth daily 60 tablet 1    polyethylene glycol (GLYCOLAX) powder Take 17 g by mouth daily 225 g 1    pravastatin (PRAVACHOL) 40 mg tablet take 1 tablet by mouth once daily 90 tablet 1    senna (SENOKOT) 8 6 mg Take 1 tablet (8 6 mg total) by mouth daily at bedtime as needed for constipation 120 each 0     No current facility-administered medications for this visit  Allergies   Allergen Reactions    Clonidine Derivatives Swelling    Diflucan [Fluconazole] Rash    Flagyl [Metronidazole] Anaphylaxis    Carvedilol Hives     And legs swelled    Latex Rash    Lipitor [Atorvastatin]      Legs swelled    Other Palpitations     IV DYE      Immunizations:     Immunization History   Administered Date(s) Administered    Influenza Quadrivalent Preservative Free 3 years and older IM 11/20/2014    Influenza TIV (IM) 01/13/2014    Pneumococcal Polysaccharide PPV23 09/04/2007, 11/20/2014    Tdap 09/04/2007      Health Maintenance: There are no preventive care reminders to display for this patient  Topic Date Due    Pneumococcal Vaccine: 65+ Years (2 of 2 - PCV13) 11/20/2015    DTaP,Tdap,and Td Vaccines (2 - Td) 09/04/2017    Influenza Vaccine  07/01/2020      Medicare Health Risk Assessment:     /54 (BP Location: Left arm, Patient Position: Sitting, Cuff Size: Large)   Pulse 80   Temp 98 °F (36 7 °C) (Tympanic)   Resp 16   Wt 60 9 kg (134 lb 3 2 oz)   LMP  (LMP Unknown)   BMI 24 55 kg/m²          Health Risk Assessment:   Patient rates overall health as very good   Patient feels that their physical health rating is same  Eyesight was rated as same  Hearing was rated as same  Patient feels that their emotional and mental health rating is same  Pain experienced in the last 7 days has been some  Patient's pain rating has been 5/10  Patient states that she has experienced no weight loss or gain in last 6 months  Fall Risk Screening: In the past year, patient has experienced: no history of falling in past year      Urinary Incontinence Screening:   Patient has not leaked urine accidently in the last six months  Home Safety:  Patient does not have trouble with stairs inside or outside of their home  Patient has working smoke alarms and has working carbon monoxide detector  Home safety hazards include: none  Nutrition:   Current diet is Regular  Medications:   Patient is currently taking over-the-counter supplements  OTC medications include: see medication list  Patient is able to manage medications  Activities of Daily Living (ADLs)/Instrumental Activities of Daily Living (IADLs):   Walk and transfer into and out of bed and chair?: Yes  Dress and groom yourself?: Yes    Bathe or shower yourself?: Yes    Feed yourself?  Yes  Do your laundry/housekeeping?: Yes  Manage your money, pay your bills and track your expenses?: Yes  Make your own meals?: Yes    Do your own shopping?: Yes    Previous Hospitalizations:   Any hospitalizations or ED visits within the last 12 months?: No      Advance Care Planning:   Living will: No    Durable POA for healthcare: No    Advanced directive: No    Advanced directive counseling given: Yes    Five wishes given: Yes    End of Life Decisions reviewed with patient: Yes    Provider agrees with end of life decisions: Yes      PREVENTIVE SCREENINGS      Cardiovascular Screening:    General: Screening Not Indicated and History Lipid Disorder      Diabetes Screening:     General: Screening Not Indicated and History Diabetes      Colorectal Cancer Screening:     General: Screening Not Indicated      Breast Cancer Screening:     General: History Breast Cancer    Due for: Mammogram        Cervical Cancer Screening:    General: Screening Not Indicated      Osteoporosis Screening:    General: Screening Not Indicated and History Osteoporosis      Abdominal Aortic Aneurysm (AAA) Screening:        General: Screening Not Indicated      Lung Cancer Screening:     General: Screening Not Indicated    Other Counseling Topics:   Alcohol use counseling, car/seat belt/driving safety, skin self-exam, sunscreen and calcium and vitamin D intake and regular weightbearing exercise         Sherryl Bence, MD

## 2020-07-27 NOTE — PATIENT INSTRUCTIONS
Medicare Preventive Visit Patient Instructions  Thank you for completing your Welcome to Medicare Visit or Medicare Annual Wellness Visit today  Your next wellness visit will be due in one year (7/27/2021)  The screening/preventive services that you may require over the next 5-10 years are detailed below  Some tests may not apply to you based off risk factors and/or age  Screening tests ordered at today's visit but not completed yet may show as past due  Also, please note that scanned in results may not display below  Preventive Screenings:  Service Recommendations Previous Testing/Comments   Colorectal Cancer Screening  * Colonoscopy    * Fecal Occult Blood Test (FOBT)/Fecal Immunochemical Test (FIT)  * Fecal DNA/Cologuard Test  * Flexible Sigmoidoscopy Age: 54-65 years old   Colonoscopy: every 10 years (may be performed more frequently if at higher risk)  OR  FOBT/FIT: every 1 year  OR  Cologuard: every 3 years  OR  Sigmoidoscopy: every 5 years  Screening may be recommended earlier than age 48 if at higher risk for colorectal cancer  Also, an individualized decision between you and your healthcare provider will decide whether screening between the ages of 74-80 would be appropriate  Colonoscopy: Not on file  FOBT/FIT: Not on file  Cologuard: Not on file  Sigmoidoscopy: Not on file         Breast Cancer Screening Age: 36 years old  Frequency: every 1-2 years  Not required if history of left and right mastectomy Mammogram: 08/05/2019       Cervical Cancer Screening Between the ages of 21-29, pap smear recommended once every 3 years  Between the ages of 33-67, can perform pap smear with HPV co-testing every 5 years     Recommendations may differ for women with a history of total hysterectomy, cervical cancer, or abnormal pap smears in past  Pap Smear: Not on file       Hepatitis C Screening Once for adults born between [facility]  More frequently in patients at high risk for Hepatitis C Hep C Antibody: Not on file       Diabetes Screening 1-2 times per year if you're at risk for diabetes or have pre-diabetes Fasting glucose: 174 mg/dL   A1C: 6 2       Cholesterol Screening Once every 5 years if you don't have a lipid disorder  May order more often based on risk factors  Lipid panel: 06/18/2020         Other Preventive Screenings Covered by Medicare:  1  Abdominal Aortic Aneurysm (AAA) Screening: covered once if your at risk  You're considered to be at risk if you have a family history of AAA  2  Lung Cancer Screening: covers low dose CT scan once per year if you meet all of the following conditions: (1) Age 50-69; (2) No signs or symptoms of lung cancer; (3) Current smoker or have quit smoking within the last 15 years; (4) You have a tobacco smoking history of at least 30 pack years (packs per day multiplied by number of years you smoked); (5) You get a written order from a healthcare provider  3  Glaucoma Screening: covered annually if you're considered high risk: (1) You have diabetes OR (2) Family history of glaucoma OR (3)  aged 48 and older OR (3)  American aged 72 and older  3  Osteoporosis Screening: covered every 2 years if you meet one of the following conditions: (1) You're estrogen deficient and at risk for osteoporosis based off medical history and other findings; (2) Have a vertebral abnormality; (3) On glucocorticoid therapy for more than 3 months; (4) Have primary hyperparathyroidism; (5) On osteoporosis medications and need to assess response to drug therapy  · Last bone density test (DXA Scan): Not on file  5  HIV Screening: covered annually if you're between the age of 12-76  Also covered annually if you are younger than 13 and older than 72 with risk factors for HIV infection  For pregnant patients, it is covered up to 3 times per pregnancy      Immunizations:  Immunization Recommendations   Influenza Vaccine Annual influenza vaccination during flu season is recommended for all persons aged >= 6 months who do not have contraindications   Pneumococcal Vaccine (Prevnar and Pneumovax)  * Prevnar = PCV13  * Pneumovax = PPSV23   Adults 25-60 years old: 1-3 doses may be recommended based on certain risk factors  Adults 72 years old: Prevnar (PCV13) vaccine recommended followed by Pneumovax (PPSV23) vaccine  If already received PPSV23 since turning 65, then PCV13 recommended at least one year after PPSV23 dose  Hepatitis B Vaccine 3 dose series if at intermediate or high risk (ex: diabetes, end stage renal disease, liver disease)   Tetanus (Td) Vaccine - COST NOT COVERED BY MEDICARE PART B Following completion of primary series, a booster dose should be given every 10 years to maintain immunity against tetanus  Td may also be given as tetanus wound prophylaxis  Tdap Vaccine - COST NOT COVERED BY MEDICARE PART B Recommended at least once for all adults  For pregnant patients, recommended with each pregnancy  Shingles Vaccine (Shingrix) - COST NOT COVERED BY MEDICARE PART B  2 shot series recommended in those aged 48 and above     Health Maintenance Due:  There are no preventive care reminders to display for this patient  Immunizations Due:      Topic Date Due    Pneumococcal Vaccine: 65+ Years (2 of 2 - PCV13) 11/20/2015    DTaP,Tdap,and Td Vaccines (2 - Td) 09/04/2017    Influenza Vaccine  07/01/2020     Advance Directives   What are advance directives? Advance directives are legal documents that state your wishes and plans for medical care  These plans are made ahead of time in case you lose your ability to make decisions for yourself  Advance directives can apply to any medical decision, such as the treatments you want, and if you want to donate organs  What are the types of advance directives? There are many types of advance directives, and each state has rules about how to use them  You may choose a combination of any of the following:  · Living will:   This is a written record of the treatment you want  You can also choose which treatments you do not want, which to limit, and which to stop at a certain time  This includes surgery, medicine, IV fluid, and tube feedings  · Durable power of  for healthcare [place] [facility]): This is a written record that states who you want to make healthcare choices for you when you are unable to make them for yourself  This person, called a proxy, is usually a family member or a friend  You may choose more than 1 proxy  · Do not resuscitate (DNR) order:  A DNR order is used in case your heart stops beating or you stop breathing  It is a request not to have certain forms of treatment, such as CPR  A DNR order may be included in other types of advance directives  · Medical directive: This covers the care that you want if you are in a coma, near death, or unable to make decisions for yourself  You can list the treatments you want for each condition  Treatment may include pain medicine, surgery, blood transfusions, dialysis, IV or tube feedings, and a ventilator (breathing machine)  · Values history: This document has questions about your views, beliefs, and how you feel and think about life  This information can help others choose the care that you would choose  Why are advance directives important? An advance directive helps you control your care  Although spoken wishes may be used, it is better to have your wishes written down  Spoken wishes can be misunderstood, or not followed  Treatments may be given even if you do not want them  An advance directive may make it easier for your family to make difficult choices about your care  © Copyright [employer] 2018 Information is for End User's use only and may not be sold, redistributed or otherwise used for commercial purposes   All illustrations and images included in CareNotes® are the copyrighted property of [employer]  or [facility] [employer]

## 2020-07-28 ENCOUNTER — OFFICE VISIT (OUTPATIENT)
Dept: FAMILY MEDICINE CLINIC | Facility: CLINIC | Age: 85
End: 2020-07-28

## 2020-07-28 ENCOUNTER — TELEPHONE (OUTPATIENT)
Dept: ADMINISTRATIVE | Facility: HOSPITAL | Age: 85
End: 2020-07-28

## 2020-07-28 VITALS
BODY MASS INDEX: 24.55 KG/M2 | WEIGHT: 134.2 LBS | HEART RATE: 80 BPM | TEMPERATURE: 98 F | SYSTOLIC BLOOD PRESSURE: 152 MMHG | DIASTOLIC BLOOD PRESSURE: 54 MMHG | RESPIRATION RATE: 16 BRPM

## 2020-07-28 DIAGNOSIS — Z00.00 MEDICARE ANNUAL WELLNESS VISIT, SUBSEQUENT: Primary | ICD-10-CM

## 2020-07-28 DIAGNOSIS — M81.0 AGE-RELATED OSTEOPOROSIS WITHOUT CURRENT PATHOLOGICAL FRACTURE: ICD-10-CM

## 2020-07-28 PROCEDURE — 3078F DIAST BP <80 MM HG: CPT | Performed by: FAMILY MEDICINE

## 2020-07-28 PROCEDURE — 3066F NEPHROPATHY DOC TX: CPT | Performed by: FAMILY MEDICINE

## 2020-07-28 PROCEDURE — 3044F HG A1C LEVEL LT 7.0%: CPT | Performed by: FAMILY MEDICINE

## 2020-07-28 PROCEDURE — 1125F AMNT PAIN NOTED PAIN PRSNT: CPT | Performed by: FAMILY MEDICINE

## 2020-07-28 PROCEDURE — 1036F TOBACCO NON-USER: CPT | Performed by: FAMILY MEDICINE

## 2020-07-28 PROCEDURE — 1170F FXNL STATUS ASSESSED: CPT | Performed by: FAMILY MEDICINE

## 2020-07-28 PROCEDURE — 2022F DILAT RTA XM EVC RTNOPTHY: CPT | Performed by: FAMILY MEDICINE

## 2020-07-28 PROCEDURE — 1160F RVW MEDS BY RX/DR IN RCRD: CPT | Performed by: FAMILY MEDICINE

## 2020-07-28 PROCEDURE — 3077F SYST BP >= 140 MM HG: CPT | Performed by: FAMILY MEDICINE

## 2020-07-28 PROCEDURE — G0439 PPPS, SUBSEQ VISIT: HCPCS | Performed by: FAMILY MEDICINE

## 2020-07-28 PROCEDURE — 4040F PNEUMOC VAC/ADMIN/RCVD: CPT | Performed by: FAMILY MEDICINE

## 2020-07-28 NOTE — ASSESSMENT & PLAN NOTE
Preventive screenings reviewed with patient:  -UTD: Diabetes, cardiovascular  Due: Mammogram scheduled for 9/03  Ordered DEXA scan  No previous imaging on file but patient has hx of osteoporosis only on vitamin D and Calcium supplements   -Diet, exercise, home safety, fall risk reviewed with patient   -Follow up in 3 months  Patient would like to have flu shot at the time   Also needs PCV 13 and Tdap

## 2020-07-29 ENCOUNTER — TELEMEDICINE (OUTPATIENT)
Dept: VASCULAR SURGERY | Facility: CLINIC | Age: 85
End: 2020-07-29
Payer: COMMERCIAL

## 2020-07-29 DIAGNOSIS — I70.213 ATHEROSCLEROSIS OF NATIVE ARTERY OF BOTH LOWER EXTREMITIES WITH INTERMITTENT CLAUDICATION (HCC): ICD-10-CM

## 2020-07-29 DIAGNOSIS — I73.9 PAD (PERIPHERAL ARTERY DISEASE) (HCC): ICD-10-CM

## 2020-07-29 DIAGNOSIS — K21.9 REFLUX LARYNGITIS: ICD-10-CM

## 2020-07-29 DIAGNOSIS — E11.51 TYPE 2 DIABETES MELLITUS WITH DIABETIC PERIPHERAL ANGIOPATHY WITHOUT GANGRENE, WITHOUT LONG-TERM CURRENT USE OF INSULIN (HCC): ICD-10-CM

## 2020-07-29 DIAGNOSIS — Q31.3 LARYNGOCELE: ICD-10-CM

## 2020-07-29 DIAGNOSIS — J38.3 GLOTTIC INSUFFICIENCY: ICD-10-CM

## 2020-07-29 DIAGNOSIS — K55.1 MESENTERIC ARTERY STENOSIS (HCC): ICD-10-CM

## 2020-07-29 DIAGNOSIS — N18.30 CKD (CHRONIC KIDNEY DISEASE) STAGE 3, GFR 30-59 ML/MIN (HCC): ICD-10-CM

## 2020-07-29 DIAGNOSIS — K29.70 GASTRITIS, PRESENCE OF BLEEDING UNSPECIFIED, UNSPECIFIED CHRONICITY, UNSPECIFIED GASTRITIS TYPE: ICD-10-CM

## 2020-07-29 DIAGNOSIS — I10 ESSENTIAL HYPERTENSION: ICD-10-CM

## 2020-07-29 DIAGNOSIS — N25.81 SECONDARY HYPERPARATHYROIDISM, RENAL (HCC): ICD-10-CM

## 2020-07-29 DIAGNOSIS — E11.9 TYPE 2 DIABETES MELLITUS WITHOUT COMPLICATION, WITHOUT LONG-TERM CURRENT USE OF INSULIN (HCC): ICD-10-CM

## 2020-07-29 DIAGNOSIS — R13.10 DYSPHAGIA, UNSPECIFIED TYPE: Primary | ICD-10-CM

## 2020-07-29 DIAGNOSIS — I10 ACCELERATED ESSENTIAL HYPERTENSION: ICD-10-CM

## 2020-07-29 DIAGNOSIS — E11.42 DIABETIC POLYNEUROPATHY ASSOCIATED WITH TYPE 2 DIABETES MELLITUS (HCC): ICD-10-CM

## 2020-07-29 DIAGNOSIS — I65.23 BILATERAL CAROTID ARTERY STENOSIS: ICD-10-CM

## 2020-07-29 DIAGNOSIS — J04.0 REFLUX LARYNGITIS: ICD-10-CM

## 2020-07-29 DIAGNOSIS — J38.01 PARESIS OF LEFT VOCAL FOLD: ICD-10-CM

## 2020-07-29 DIAGNOSIS — E21.3 HYPERPARATHYROIDISM (HCC): ICD-10-CM

## 2020-07-29 PROCEDURE — 1125F AMNT PAIN NOTED PAIN PRSNT: CPT | Performed by: SURGERY

## 2020-07-29 PROCEDURE — 2022F DILAT RTA XM EVC RTNOPTHY: CPT | Performed by: SURGERY

## 2020-07-29 PROCEDURE — 1160F RVW MEDS BY RX/DR IN RCRD: CPT | Performed by: SURGERY

## 2020-07-29 PROCEDURE — 3078F DIAST BP <80 MM HG: CPT | Performed by: SURGERY

## 2020-07-29 PROCEDURE — 1170F FXNL STATUS ASSESSED: CPT | Performed by: SURGERY

## 2020-07-29 PROCEDURE — 4040F PNEUMOC VAC/ADMIN/RCVD: CPT | Performed by: SURGERY

## 2020-07-29 PROCEDURE — 99213 OFFICE O/P EST LOW 20 MIN: CPT | Performed by: SURGERY

## 2020-07-29 PROCEDURE — 3066F NEPHROPATHY DOC TX: CPT | Performed by: SURGERY

## 2020-07-29 PROCEDURE — 3077F SYST BP >= 140 MM HG: CPT | Performed by: SURGERY

## 2020-07-29 PROCEDURE — 3044F HG A1C LEVEL LT 7.0%: CPT | Performed by: SURGERY

## 2020-07-29 PROCEDURE — 1036F TOBACCO NON-USER: CPT | Performed by: SURGERY

## 2020-07-29 NOTE — ASSESSMENT & PLAN NOTE
Symptoms have remained stable in the bilateral lower extremities  Patient able to walk for 15 minutes before needing to stop  Pain is located the bilateral calves  Pain is relieved with rest   Patient denies rest pain or tissue loss  Will continue with surveillance LEAD studies  Asked patient to come to the office to discuss six-month interval test if her symptoms worsen

## 2020-07-29 NOTE — PROGRESS NOTES
Virtual Brief Visit    Assessment/Plan:    Problem List Items Addressed This Visit        Digestive    Dysphagia - Primary    Gastritis    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HCC)     No intestinal angina symptoms at this time  Endocrine    Hyperparathyroidism ([facility] )    DMII (diabetes mellitus, type 2) ([facility] )    Secondary hyperparathyroidism, renal ([facility] )    Type 2 diabetes mellitus with diabetic peripheral angiopathy without gangrene ([facility] )    Diabetic neuropathy (HCC)       Respiratory    Glottic insufficiency    Reflux laryngitis    Paresis of left vocal fold    Laryngocele       Cardiovascular and Mediastinum    Hypertension    Accelerated essential hypertension    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)     Symptoms have remained stable in the bilateral lower extremities  Patient able to walk for 15 minutes before needing to stop  Pain is located the bilateral calves  Pain is relieved with rest   Patient denies rest pain or tissue loss  Will continue with surveillance LEAD studies  Asked patient to come to the office to discuss six-month interval test if her symptoms worsen  Relevant Orders    VAS lower limb arterial duplex, complete bilateral    VAS abdominal aorta/iliacs; limited study    VAS carotid complete study    Bilateral carotid artery stenosis     Velocities in the bilateral carotid arteries are essentially unchanged  Velocities in the right ICA represent greater than 70% stenosis and 50-69% stenosis in left ICA  Patient remains asymptomatic  Continue surveillance  Patient on aspirin/statin           Relevant Orders    VAS lower limb arterial duplex, complete bilateral    VAS abdominal aorta/iliacs; limited study    VAS carotid complete study    PAD (peripheral artery disease) ([facility])    Relevant Orders    VAS lower limb arterial duplex, complete bilateral    VAS abdominal aorta/iliacs; limited study    VAS carotid complete study Genitourinary    CKD (chronic kidney disease) stage 3, GFR 30-59 ml/min (formerly [facility])                Reason for visit is   Chief Complaint   Patient presents with    Mesenteric Disease    Carotid Artery Disease    Virtual Brief Visit        Encounter provider Kerry Sanders MD    Provider located at [address] [address] [phone]    Recent Visits  Date Type Provider Dept   07/28/20 Office Visit Javan Stack MD  [place] recent visits within past 7 days and meeting all other requirements     Today's Visits  Date Type Provider Dept   07/29/20 Telemedicine Kerry Sanders  [facility] today's visits and meeting all other requirements     Future Appointments  No visits were found meeting these conditions  Showing future appointments within next 150 days and meeting all other requirements        After connecting through telephone, the patient was identified by name and date of birth  Lavelle Anton was informed that this is a telemedicine visit and that the visit is being conducted through telephone  My office door was closed  No one else was in the room  She acknowledged consent and understanding of privacy and security of the platform  The patient has agreed to participate and understands she can discontinue the visit at any time  Patient is aware this is a billable service  Subjective    Lavelle Anton is a 80 y o  female  with significant systemic peripheral arterial occlusive disease including the carotid arteries, mesenteric vessels and lower extremities  Patient denies postprandial pain or significant weight loss  Patient is known to have significant SMA and celiac occlusive disease  No intervention has been recommended in the absence of symptoms  No further interrogation will be attempted unless the patient were to become symptomatic    Surveillance carotid duplex performed on 06/24/2020 shows no change  The velocities on the right are 268/50 with a ICA/CCA ratio of 2 62  On the left velocities are 135/22 with an ICA/CCA ratio of 1 54  The lower extremities reveal an MICHEAL of 0 65 previously measured 0 7  Duplex portion of the study is suggestive of a right SFA stenosis of 75%  On the left the MICHEAL is 0 77 previously 0 8  Velocities are suggestive of common femoral artery stenosis of approximately 50%  The duplex of the bilateral common iliac arteries show them to be widely patent  Patient has previously undergone stenting  There is an elevated velocity noted in the right hypogastric artery of 418 centimeters/second  I had a discussion with the patient and her accompanying son  She states her ambulatory ability has remained relatively stable  She is able to ambulate for approximately 15 minutes  She does not consider this to be disabling and is able to carry out daily chores and activities  She reports no symptoms consistent with TIA or stroke  Continued surveillance monitoring will ensue  I asked the patient to return to this office for her 6 month surveillance if her symptoms worsen    2016-09-02 Renal angiogram without intervention  2010-01-07 Right PTA VIDA & EIA with stents  2010-01-07 Left PTA EIA with stent  2010-01-07 Left Transluminal placement of iliac stent, percutaneous  2010-01-07 Right Transluminal placement of iliac stent, percutaneous  2010-01-07 Right Transluminal placement of iliac stent, percutaneous    HPI     Past Medical History:   Diagnosis Date    Arthritis     Back pain     Benign essential hypertension     LAST ASSESSED: [id number]    Carotid artery stenosis     CKD (chronic kidney disease) stage 2, GFR 60-89 ml/min     Constipation, chronic     "drinks prune juice"    Diabetes mellitus ([address] )     DMII [facility]di[facility] type 2) ([facility])     Epiglottitis     RESOLVED: [id number]    Gastritis     GERD (gastroesophageal reflux disease)     Gout     H/O blood clots     left lower leg,,,11 yrs ago    History of stenosis of renal artery     RESOLVED: [id number]    Hypercholesteremia     Hypercholesterolemia     Hyperlipidemia     Hyperparathyroidism ([address] [facility] )     Hypertension     Iliac artery stenosis, bilateral (HCC)     Kidney stone     Lobular carcinoma ([facility] ) 09/06/2017    Left    Mucinous carcinoma of breast, 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    Left    Presence of pessary     Renal artery stenosis (HCC)     Renal disorder     Renovascular hypertension     RESOLVED: [id number]    Segmental and somatic dysfunction of cervical region     RESOLVED: [id number]    Segmental and somatic dysfunction of lumbar region     RESOLVED: [id number]    Segmental and somatic dysfunction of pelvic region     RESOLVED: [id number]    Segmental and somatic dysfunction of thoracic region     RESOLVED: 70BLX1503    Somatic dysfunction of abdominal region     RESOLVED: [id number]    Somatic dysfunction of head region     RESOLVED: [id number]    Somatic dysfunction of lower extremities     RESOLVED: [id number]    Somatic dysfunction of rib region     RESOLVED: [id number]    Somatic dysfunction of thoracic region     RESOLVED: 22KXG3984    Somatic dysfunction of upper extremities     RESOLVED: [id number]    Teeth missing        Past Surgical History:   Procedure Laterality Date    ANGIOPLASTY / STENTING ILIAC      BREAST BIOPSY Left 08/10/2018    U/S BX    BREAST BIOPSY Left 08/07/2017    U/S BX    BREAST LUMPECTOMY Left 09/06/2017    LAST ASSESSED: [id number]    BREAST LUMPECTOMY Left 09/11/2018    BREAST SURGERY Left     biopsy    CATARACT EXTRACTION Bilateral     CHOLECYSTECTOMY      COLONOSCOPY      EYE SURGERY      ILIAC VEIN ANGIOPLASTY / STENTING Right     INITIAL STENOSIS: ONSET: [id number]    KNEE ARTHROSCOPY Right     KNEE SURGERY Right     ONSET: [id number]    MAMMO NEEDLE LOCALIZATION LEFT (ALL INC) Left 9/11/2018    MAMMO NEEDLE LOCALIZATION LEFT (ALL INC) Left 9/6/2017    CT ESOPHAGOGASTRODUODENOSCOPY TRANSORAL DIAGNOSTIC N/A 7/26/2018    Procedure: ESOPHAGOGASTRODUODENOSCOPY (EGD); Surgeon: Madison Asencio DO;  [facility]: [facility]; Service: Gastroenterology    CT LARYNGOSCOPY,DIRCT,OP [facility] TUMR N/A 6/10/2016    Procedure: MICRO DIRECT LARYNGOSCOPY WITH VOCAL FOLD MASS EXCISION ;  Surgeon: Marvel Pate MD;  [facility];  Service: ENT    CT MASTECTOMY, PARTIAL Left 9/6/2017    Procedure: LUMPECTOMY BREAST NEEDLE LOCALIZED WITH FAXITRON NEEDLE @ 0830;  Surgeon: Birdie Alan MD;  Location[facility];  Service: General    CT PERQ DEVICE PLACEMT BREAST LOC 1ST LES W Brisa Saravia Left 9/11/2018    Procedure: BREAST LUMPECTOMY; BREAST NEEDLE LOCALIZATION (NEEDLE LOC AT 1330);   Surgeon: Mikal Huang MD;  [facility];  Service: Surgical Oncology    THROAT SURGERY      lump removed    TUBAL LIGATION      UPPER GASTROINTESTINAL ENDOSCOPY      US GUIDANCE BREAST BIOPSY LEFT EACH ADDITIONAL Left 8/7/2017    US GUIDED BREAST BIOPSY LEFT COMPLETE Left 8/10/2018    US GUIDED BREAST BIOPSY LEFT COMPLETE Left 8/7/2017    VASCULAR SURGERY         Current Outpatient Medications   Medication Sig Dispense Refill    Acetaminophen (TYLENOL) 325 MG CAPS Take 500 mg by mouth every 8 (eight) hours       aspirin (ADULT ASPIRIN EC LOW STRENGTH) 81 mg EC tablet Take 1 tablet by mouth daily      calcitriol (ROCALTROL) 0 25 mcg capsule TAKE 1 CAPSULE TWICE A WEEK EVERY TUESDAY AND FRIDAY 10 capsule 6    calcium carbonate-vitamin D (OSCAL-D) 500 mg-200 units per tablet Take 1 tablet by mouth daily with breakfast      docusate sodium (COLACE) 100 mg capsule Take 1 capsule (100 mg total) by mouth 2 (two) times a day 10 capsule 0    fluticasone (FLONASE) 50 mcg/act nasal spray 1 spray into each nostril daily 16 g 2    gabapentin (NEURONTIN) 100 mg capsule Take 2 capsules (200 mg total) by mouth daily at bedtime 180 capsule 1    losartan (COZAAR) 100 MG tablet take 1 tablet by mouth once daily 90 tablet 0    NIFEdipine ER (ADALAT CC) 30 MG 24 hr tablet Take 1 tablet (30 mg total) by mouth daily Please take at night in addition to the 60 mg in am 30 tablet 5    NIFEdipine ER (ADALAT CC) 60 MG 24 hr tablet take 1 tablet by mouth once daily 90 tablet 0    olopatadine (PATANOL) 0 1 % ophthalmic solution Administer 1 drop to both eyes 2 (two) times a day 5 mL 0    pantoprazole (PROTONIX) 40 mg tablet Take 1 tablet (40 mg total) by mouth daily 60 tablet 1    polyethylene glycol (GLYCOLAX) powder Take 17 g by mouth daily 225 g 1    pravastatin (PRAVACHOL) 40 mg tablet take 1 tablet by mouth once daily 90 tablet 1    DULoxetine (CYMBALTA) 30 mg delayed release capsule Take 30 mg by mouth daily  0    furosemide (LASIX) 20 mg tablet TAKE 1 TABLET  BY MOUTH DAILY WITH SWELLING, EVERY OTHER DAY ONCE SWELLING RESOLVES (Patient not taking: Reported on 7/29/2020) 30 tablet 3    Lancets (ONETOUCH DELICA PLUS UOBVIH61A) MISC 1 Device by Does not apply route 2 (two) times a day 100 each 5    Lancets (ONETOUCH ULTRASOFT) lancets by Other route 2 (two) times a day Use as instructed 100 each 5    ONE TOUCH ULTRA TEST test strip 1 each by Other route 2 (two) times a day Use as instructed 100 each 5    OneTouch Delica Lancets 25D MISC Inject as directed 2 (two) times a day Use to test 200 each 0    senna (SENOKOT) 8 6 mg Take 1 tablet (8 6 mg total) by mouth daily at bedtime as needed for constipation (Patient not taking: Reported on 7/29/2020) 120 each 0     No current facility-administered medications for this visit           Allergies   Allergen Reactions    Clonidine Derivatives Swelling    Diflucan [Fluconazole] Rash    Flagyl [Metronidazole] Anaphylaxis    Carvedilol Hives     And legs swelled    Latex Rash    Lipitor [Atorvastatin]      Legs swelled    Other Palpitations     IV DYE       Review of Systems    Vitals:         I spent 20 minutes directly with the patient during this visit    VIRTUAL VISIT DISCLAIMER    Lupe Ankur acknowledges that she has consented to an online visit or consultation  She understands that the online visit is based solely on information provided by her, and that, in the absence of a face-to-face physical evaluation by the physician, the diagnosis she receives is both limited and provisional in terms of accuracy and completeness  This is not intended to replace a full medical face-to-face evaluation by the physician  Lupe Pascual understands and accepts these terms  Review of Systems -   General ROS: negative  Psychological ROS: negative  Ophthalmic ROS: negative  ENT ROS: negative  Allergy and Immunology ROS: negative  Hematological and Lymphatic ROS: negative  Endocrine ROS: negative  Respiratory ROS: no cough, shortness of breath, or wheezing  Cardiovascular ROS: no chest pain or dyspnea on exertion  Gastrointestinal ROS: no abdominal pain, change in bowel habits, or black or bloody stools  Genito-Urinary ROS: no dysuria, trouble voiding, or hematuria  Musculoskeletal ROS: positive for - muscle pain at calves bilaterally after walking for approximately 15 minutes  Neurological ROS: no TIA or stroke symptoms  Dermatological ROS: negative        Physical Exam:    Vitals: not currently breastfeeding  , There is no height or weight on file to calculate BMI ,   Wt Readings from Last 3 Encounters:   07/28/20 60 9 kg (134 lb 3 2 oz)   04/06/20 59 kg (130 lb)   02/18/20 58 5 kg (129 lb)     LMP  (LMP Unknown)           Labs & Results:      Imaging review:  See HPI      Assessment/Plan:    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HCC)  No intestinal angina symptoms at this time  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)  Symptoms have remained stable in the bilateral lower extremities  Patient able to walk for 15 minutes before needing to stop    Pain is located the bilateral calves  Pain is relieved with rest   Patient denies rest pain or tissue loss  Will continue with surveillance LEAD studies  Asked patient to come to the office to discuss six-month interval test if her symptoms worsen  Bilateral carotid artery stenosis  Velocities in the bilateral carotid arteries are essentially unchanged  Velocities in the right ICA represent greater than 70% stenosis and 50-69% stenosis in left ICA  Patient remains asymptomatic  Continue surveillance  Patient on aspirin/statin  Thank you for the opportunity to participate in the care of this patient  It was my intent to perform this visit via video technology but the patient was not able to do a video connection so the visit was completed via audio telephone only

## 2020-07-29 NOTE — ASSESSMENT & PLAN NOTE
Velocities in the bilateral carotid arteries are essentially unchanged  Velocities in the right ICA represent greater than 70% stenosis and 50-69% stenosis in left ICA  Patient remains asymptomatic  Continue surveillance  Patient on aspirin/statin

## 2020-08-06 ENCOUNTER — HOSPITAL ENCOUNTER (OUTPATIENT)
Dept: MAMMOGRAPHY | Facility: CLINIC | Age: 85
Discharge: HOME | End: 2020-08-06
Payer: COMMERCIAL

## 2020-08-06 VITALS — TEMPERATURE: 96.7 F | WEIGHT: 134 LBS | HEIGHT: 62 IN | BODY MASS INDEX: 24.66 KG/M2

## 2020-08-06 DIAGNOSIS — Z85.3 HISTORY OF LEFT BREAST CANCER: ICD-10-CM

## 2020-08-06 PROCEDURE — G0279 TOMOSYNTHESIS, MAMMO: HCPCS

## 2020-08-06 PROCEDURE — 77066 DX MAMMO INCL CAD BI: CPT

## 2020-08-11 ENCOUNTER — TELEPHONE (OUTPATIENT)
Dept: SURGICAL ONCOLOGY | Facility: CLINIC | Age: 85
End: 2020-08-11

## 2020-08-11 NOTE — TELEPHONE ENCOUNTER
Patient is not  having any symptoms of  fever, cough, shortness of breath, chills, repeated shaking with chills, muscle pain, headache, sore throat, or new loss of taste/smell  Patient has not been tested for COVID -19  Patient has not been in contact with someone confirmed to have COVID-19  Reviewed masking policy with patient  Reviewed visitor policy with patient

## 2020-08-13 ENCOUNTER — OFFICE VISIT (OUTPATIENT)
Dept: SURGICAL ONCOLOGY | Facility: CLINIC | Age: 85
End: 2020-08-13
Payer: COMMERCIAL

## 2020-08-13 VITALS
SYSTOLIC BLOOD PRESSURE: 150 MMHG | HEIGHT: 62 IN | RESPIRATION RATE: 16 BRPM | WEIGHT: 133.5 LBS | TEMPERATURE: 97.5 F | HEART RATE: 78 BPM | BODY MASS INDEX: 24.56 KG/M2 | DIASTOLIC BLOOD PRESSURE: 60 MMHG

## 2020-08-13 DIAGNOSIS — Z08 ENCOUNTER FOR FOLLOW-UP EXAMINATION AFTER COMPLETED TREATMENT FOR MALIGNANT NEOPLASM: Primary | ICD-10-CM

## 2020-08-13 DIAGNOSIS — Z85.3 HISTORY OF LEFT BREAST CANCER: ICD-10-CM

## 2020-08-13 PROCEDURE — 3044F HG A1C LEVEL LT 7.0%: CPT | Performed by: NURSE PRACTITIONER

## 2020-08-13 PROCEDURE — 3066F NEPHROPATHY DOC TX: CPT | Performed by: NURSE PRACTITIONER

## 2020-08-13 PROCEDURE — 2022F DILAT RTA XM EVC RTNOPTHY: CPT | Performed by: NURSE PRACTITIONER

## 2020-08-13 PROCEDURE — 1160F RVW MEDS BY RX/DR IN RCRD: CPT | Performed by: NURSE PRACTITIONER

## 2020-08-13 PROCEDURE — 3078F DIAST BP <80 MM HG: CPT | Performed by: NURSE PRACTITIONER

## 2020-08-13 PROCEDURE — 1036F TOBACCO NON-USER: CPT | Performed by: NURSE PRACTITIONER

## 2020-08-13 PROCEDURE — 4040F PNEUMOC VAC/ADMIN/RCVD: CPT | Performed by: NURSE PRACTITIONER

## 2020-08-13 PROCEDURE — 99213 OFFICE O/P EST LOW 20 MIN: CPT | Performed by: NURSE PRACTITIONER

## 2020-08-13 PROCEDURE — 3008F BODY MASS INDEX DOCD: CPT | Performed by: NURSE PRACTITIONER

## 2020-08-13 PROCEDURE — 1170F FXNL STATUS ASSESSED: CPT | Performed by: NURSE PRACTITIONER

## 2020-08-13 PROCEDURE — 3077F SYST BP >= 140 MM HG: CPT | Performed by: NURSE PRACTITIONER

## 2020-08-13 NOTE — PROGRESS NOTES
Surgical Oncology Follow Up       42 [address] Braun Coleman  CANCER [facility] SURGICAL ONCOLOGY JO[place]  [address] [address]    Valerio Payor  1934  [id number]  [address] Le[address] Braun [facility] SURGICAL ONCOLOGY [place]  [address] [address]    Chief Complaint   Patient presents with    Breast Cancer     6 Month follow up       Assessment/Plan:  1  Encounter for follow-up examination after completed treatment for malignant neoplasm    2  History of left breast cancer  - 6 mo f/u visit    Discussion/Summary: Patient is an 72-year-old female who presents today for a six-month follow-up visit for 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  Her pathology revealed mucinous carcinoma, ER/%, her 2-   She underwent a left lumpectomy by Dr Alonzo Gilace did not receive adjuvant therapy  She had a bilateral mammogram August 6, 2020 which was BI-RADS 2, category 3 density  She has no new complaints today and there are no concerns on today's exam   I will plan to see her back in 6 months or sooner if the need arises  She was instructed to call with any new concerns or symptoms prior to that time  All of her questions were answered today  History of Present Illness:     Oncology History   History of left breast cancer   8/10/2018 Biopsy    Left breast biopsy  Invasive mucinous  Grade 1      Her 2 1+     9/11/2018 Surgery    Left lumpectomy  Invasive mucinous carcinoma  Grade 1  1 1 cm in size  Margins negative      Her 2 1+  Stage 1A     10/4/2018 -  Hormone Therapy    Dr Johan Dunne  No benefit of AI          -Interval History:  Patient presents today for a follow-up visit for left breast cancer diagnosed in 2018  She notices no changes on her self breast exam   Denies headaches, changes in arthritic pain, back pain, cough, shortness of breath, abdominal pain    Her mammogram was BI-RADS 2, category 3 density  Review of Systems:  Review of Systems   Constitutional: Negative for activity change, appetite change, chills, fatigue, fever and unexpected weight change  HENT: Negative for trouble swallowing  Eyes: Negative for pain, redness and visual disturbance  Respiratory: Negative for cough, shortness of breath and wheezing  Cardiovascular: Negative for chest pain, palpitations and leg swelling  Gastrointestinal: Negative for abdominal pain, constipation, diarrhea, nausea and vomiting  Endocrine: Negative for cold intolerance and heat intolerance  Musculoskeletal: Negative for arthralgias, back pain, gait problem and myalgias  Skin: Negative for color change and rash  Neurological: Negative for dizziness, syncope, light-headedness, numbness and headaches  Hematological: Negative for adenopathy  Psychiatric/Behavioral: Negative for agitation and confusion  All other systems reviewed and are negative        Patient Active Problem List   Diagnosis    Vocal cord mass    [facility]liac artery stenosis, bilateral ([facility])    Hyperparathyroidism ([facility])    GERD (gastroesophageal reflux disease)    Arthritis    Hypertension    EKG abnormalities    Anemia    Neck pain    Dysuria    Chronic left shoulder pain    Fall from other slipping, tripping, or stumbling    Abnormal EKG    Abnormal mammogram    Accelerated essential hypertension    Allergic rhinitis    Atherosclerosis of native artery of extremity with intermittent claudication ([facility])    Back pain    Bilateral carotid artery stenosis    Carotid stenosis, asymptomatic    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    Chronic myofascial pain    Chronic pain of lower extremity    CKD (chronic kidney disease) stage 3, GFR 30-59 ml/min ([facility])    Constipation    Diabetic retinopathy ([facility] [address] )    DMII (diabetes mellitus, type 2) ([facility])    Dysphagia    Epigastric pain    Fever of unknown origin    Gastritis    Gout  Hypomagnesemia    Atherosclerosis of other specified arteries    Lymphadenopathy    Microalbuminuria    Nephrolithiasis    Neuropathic pain, leg, right    Osteoporosis    PAD (peripheral artery disease) ([facility] [address] [facility]    Primary generalized (osteo)arthritis    Persistent proteinuria    Renal cyst    Secondary hyperparathyroidism, renal (HCC)    Vaginal prolapse    Vitamin D deficiency    Acquired complex cyst of kidney    Gastroesophageal reflux disease without esophagitis    History of left breast cancer    Type 2 diabetes mellitus with diabetic peripheral angiopathy without gangrene (HCC)    Stricture of artery (HCC)    Claudication in peripheral vascular disease (HCC)    Hemorrhoids    Tinea unguium    Diabetic neuropathy (HCC)    Bradycardia    Hyperkalemia    Pain in tooth    Dysphonia    Glottic insufficiency    Reflux laryngitis    Paresis of left vocal fold    Muscle tension dysphonia    Laryngocele    Anterior glottic web    Chest pain    Palpitations    Encounter for follow-up examination after completed treatment for malignant neoplasm    Respiratory crackles    Allergic conjunctivitis of both eyes    Viral upper respiratory tract infection    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HCC)    Renal artery stenosis (HCC)    Left ear pain    Itching of ear    Cervical spine disease    Cervicogenic headache    Glossodynia    Medicare annual wellness visit, subsequent     Past Medical History:   Diagnosis Date    Arthritis     Back pain     Benign essential hypertension     LAST ASSESSED: [id number]    Carotid artery stenosis     CKD (chronic kidney disease) stage 2, GFR 60-89 ml/min     Constipation, chronic     "drinks prune juice"    Diabetes mellitus ([facility] [address] )     DMII (diabetes mellitus, type 2) ([facility])     Epiglottitis     RESOLVED: [id number]    Gastritis     GERD (gastroesophageal reflux disease)     Gout     H/O blood clots     left lower leg,,,11 yrs ago    History of stenosis of renal artery     RESOLVED: [id number]    Hypercholesteremia     Hypercholesterolemia     Hyperlipidemia     Hyperparathyroidism ([facility] [address] [facility]     Hypertension     Iliac artery stenosis, bilateral (HCC)     Kidney stone     Lobular carcinoma ([facility] [address] ) 09/06/2017    Left    Mucinous carcinoma of breast, left ([facility] [address] ) 09/11/2018    Left    Presence of pessary     Renal artery stenosis (HCC)     Renal disorder     Renovascular hypertension     RESOLVED: [id number]    Segmental and somatic dysfunction of cervical region     RESOLVED: [id number]    Segmental and somatic dysfunction of lumbar region     RESOLVED: [id number]    Segmental and somatic dysfunction of pelvic region     RESOLVED: [id number]    Segmental and somatic dysfunction of thoracic region     RESOLVED: [id number]    Somatic dysfunction of abdominal region     RESOLVED: [id number]    Somatic dysfunction of head region     RESOLVED: [id number]    Somatic dysfunction of lower extremities     RESOLVED: [id number]    Somatic dysfunction of rib region     RESOLVED: [id number]    Somatic dysfunction of thoracic region     RESOLVED: [id number]    Somatic dysfunction of upper extremities     RESOLVED: [id number]    Teeth missing      Past Surgical History:   Procedure Laterality Date    ANGIOPLASTY / STENTING ILIAC      BREAST BIOPSY Left 08/10/2018    U/S BX    BREAST BIOPSY Left 08/07/2017    U/S BX    BREAST LUMPECTOMY Left 09/06/2017    LAST ASSESSED: [id number]    BREAST LUMPECTOMY Left 09/11/2018    BREAST SURGERY Left     biopsy    CATARACT EXTRACTION Bilateral     CHOLECYSTECTOMY      COLONOSCOPY      EYE SURGERY      ILIAC VEIN ANGIOPLASTY / STENTING Right     INITIAL STENOSIS: ONSET: [id number]    KNEE ARTHROSCOPY Right     KNEE SURGERY Right     ONSET: [id number]    MAMMO NEEDLE LOCALIZATION LEFT (ALL INC) Left 9/11/2018    MAMMO NEEDLE LOCALIZATION LEFT (ALL INC) Left 9/6/2017    [place] ESOPHAGOGASTRODUODENOSCOPY TRANSORAL DIAGNOSTIC N/A 7/26/2018    Procedure: ESOPHAGOGASTRODUODENOSCOPY (EGD); Surgeon: Marj Hickman DO;  [facility]: [facility]; Service: Gastroenterology    [facility]    Procedure: MICRO DIRECT LARYNGOSCOPY WITH VOCAL FOLD MASS EXCISION ;  Surgeon: Ray Saleem MD;  [facility];  Service: ENT    NM MASTECTOMY, PARTIAL Left 9/6/2017    Procedure: LUMPECTOMY BREAST NEEDLE LOCALIZED WITH FAXITRON NEEDLE @ 0830;  Surgeon: Ashutosh Díaz MD;  [facility];  Service: General    NM PERQ DEVICE PLACEMT BREAST LOC 1ST LES W [place] Left 9/11/2018    Procedure: BREAST LUMPECTOMY; BREAST NEEDLE LOCALIZATION (NEEDLE LOC AT 1330);   Surgeon: Abraham Valencia MD;  [facility];  Service: Surgical Oncology    THROAT SURGERY      lump removed    TUBAL LIGATION      UPPER GASTROINTESTINAL ENDOSCOPY      US GUIDANCE BREAST BIOPSY LEFT EACH ADDITIONAL Left 8/7/2017    US GUIDED BREAST BIOPSY LEFT COMPLETE Left 8/10/2018    US GUIDED BREAST BIOPSY LEFT COMPLETE Left 8/7/2017    VASCULAR SURGERY       Family History   Problem Relation Age of Onset    Heart disease Brother     Heart disease Maternal Grandmother     Diabetes Other         of other type with arthropathy, without long term current use of insulin    Hypertension Other     No Known Problems Mother     Gout Family     Rheum arthritis Family     Lupus Family         systematic erythematosus    Heart disease Family     Stroke Family         syndrome    Stroke Maternal Uncle         syndrome    Stroke Maternal Aunt         syndrome    No Known Problems Father     No Known Problems Daughter     No Known Problems Maternal Grandfather     No Known Problems Paternal Grandmother     No Known Problems Paternal Grandfather     No Known Problems Daughter     No Known Problems Daughter     No Known Problems Maternal Aunt     No Known Problems Maternal Aunt     No Known Problems Maternal Aunt Social History     Socioeconomic History    Marital status:      Spouse name: Not on file    Number of children: Not on file    Years of education: Not on file    Highest education level: Not on file   Occupational History    Occupation: retired   Social Needs    Financial resource strain: Not hard at all   Oziel-Nisreen insecurity     Worry: Never true     Inability: Never true   H[place]jar needs     Medical: No     Non-medical: No   Tobacco Use    Smoking status: Former Smoker     Packs/day: 1 00     Years: 64 00     Pack years: 64 00     Last attempt to quit:      Years since quittin 6    Smokeless tobacco: Never Used    Tobacco comment: quit in  1 ppd x 50 years   Substance and Sexual Activity    Alcohol use: Not Currently     Frequency: Never    Drug use: No    Sexual activity: Yes     Partners: Male   Lifestyle    Physical activity     Days per week: 0 days     Minutes per session: 0 min    Stress:  Only a little   Relationships    Social connections     Talks on phone: More than three times a week     Gets together: More than three times a week     Attends [employer] service: Not on file     Active member of club or organization: Not on file     Attends meetings of clubs or organizations: Not on file     Relationship status: Not on file    Intimate partner violence     Fear of current or ex partner: Not on file     Emotionally abused: Not on file     Physically abused: Not on file     Forced sexual activity: Not on file   Other Topics Concern    Not on file   Social History Narrative    Daily caffeine consumption, 1 serving a day        Current Outpatient Medications:     Acetaminophen (TYLENOL) 325 MG CAPS, Take 500 mg by mouth every 8 (eight) hours , Disp: , Rfl:     aspirin (ADULT ASPIRIN EC LOW STRENGTH) 81 mg EC tablet, Take 1 tablet by mouth daily, Disp: , Rfl:     calcitriol (ROCALTROL) 0 25 mcg capsule, TAKE 1 CAPSULE TWICE A WEEK EVERY TUESDAY AND FRIDAY, Disp: 10 capsule, Rfl: 6    calcium carbonate-vitamin D (OSCAL-D) 500 mg-200 units per tablet, Take 1 tablet by mouth daily with breakfast, Disp: , Rfl:     docusate sodium (COLACE) 100 mg capsule, Take 1 capsule (100 mg total) by mouth 2 (two) times a day, Disp: 10 capsule, Rfl: 0    DULoxetine (CYMBALTA) 30 mg delayed release capsule, Take 30 mg by mouth daily, Disp: , Rfl: 0    fluticasone (FLONASE) 50 mcg/act nasal spray, 1 spray into each nostril daily, Disp: 16 g, Rfl: 2    gabapentin (NEURONTIN) 100 mg capsule, Take 2 capsules (200 mg total) by mouth daily at bedtime, Disp: 180 capsule, Rfl: 1    Lancets (ONETOUCH DELICA PLUS [id number]) MISC, 1 Device by Does not apply route 2 (two) times a day, Disp: 100 each, Rfl: 5    Lancets (ONETOUCH ULTRASOFT) lancets, by Other route 2 (two) times a day Use as instructed, Disp: 100 each, Rfl: 5    losartan (COZAAR) 100 MG tablet, take 1 tablet by mouth once daily, Disp: 90 tablet, Rfl: 0    NIFEdipine ER (ADALAT CC) 30 MG 24 hr tablet, Take 1 tablet (30 mg total) by mouth daily Please take at night in addition to the 60 mg in am, Disp: 30 tablet, Rfl: 5    NIFEdipine ER (ADALAT CC) 60 MG 24 hr tablet, take 1 tablet by mouth once daily, Disp: 90 tablet, Rfl: 0    olopatadine (PATANOL) 0 1 % ophthalmic solution, Administer 1 drop to both eyes 2 (two) times a day, Disp: 5 mL, Rfl: 0    ONE TOUCH ULTRA TEST test strip, 1 each by Other route 2 (two) times a day Use as instructed, Disp: 100 each, Rfl: 5    OneTouch Delica Lancets 75J MISC, Inject as directed 2 (two) times a day Use to test, Disp: 200 each, Rfl: 0    pantoprazole (PROTONIX) 40 mg tablet, Take 1 tablet (40 mg total) by mouth daily, Disp: 60 tablet, Rfl: 1    polyethylene glycol (GLYCOLAX) powder, Take 17 g by mouth daily, Disp: 225 g, Rfl: 1    pravastatin (PRAVACHOL) 40 mg tablet, take 1 tablet by mouth once daily, Disp: 90 tablet, Rfl: 1    senna (SENOKOT) 8 6 mg, Take 1 tablet (8 6 mg total) by mouth daily at bedtime as needed for constipation, Disp: 120 each, Rfl: 0  Allergies   Allergen Reactions    Clonidine Derivatives Swelling    Diflucan [Fluconazole] Rash    Flagyl [Metronidazole] Anaphylaxis    Carvedilol Hives     And legs swelled    Latex Rash    Lipitor [Atorvastatin]      Legs swelled    Other Palpitations     IV DYE     Vitals:    08/13/20 1504   BP: 150/60   Pulse: 78   Resp: 16   Temp: 97 5 °F (36 4 °C)       Physical Exam  Vitals signs reviewed  Constitutional:       General: She is not in acute distress  Appearance: Normal appearance  She is well-developed  She is not diaphoretic  HENT:      Head: Normocephalic and atraumatic  Neck:      Musculoskeletal: Normal range of motion  Cardiovascular:      Rate and Rhythm: Normal rate and regular rhythm  Heart sounds: Normal heart sounds  Pulmonary:      Effort: Pulmonary effort is normal       Breath sounds: Normal breath sounds  Chest:      Breasts:         Right: No swelling, bleeding, inverted nipple, mass, nipple discharge, skin change or tenderness  Left: Skin change (surgical scars) present  No swelling, bleeding, inverted nipple, mass, nipple discharge or tenderness  Abdominal:      Palpations: Abdomen is soft  There is no mass  Tenderness: There is no abdominal tenderness  Musculoskeletal: Normal range of motion  Lymphadenopathy:      Upper Body:      Right upper body: No supraclavicular or axillary adenopathy  Left upper body: No supraclavicular or axillary adenopathy  Skin:     General: Skin is warm and dry  Findings: No rash  Neurological:      Mental Status: She is alert and oriented to person, place, and time  Psychiatric:         Speech: Speech normal          Results:    Imaging  Mammo Diagnostic Bilateral W 3d & Cad    Result Date: 8/6/2020  Narrative: DIAGNOSIS: History of left breast cancer TECHNIQUE: Digital screening mammography was performed   Computer Aided Detection (CAD) analyzed all applicable images  COMPARISONS: Prior breast imaging dated: 08/02/2018, 07/23/2018, 07/21/2017, 07/17/2017, 07/11/2016, 06/12/2015, and 06/06/2014, 05/31/2013  RELEVANT HISTORY: Family Breast Cancer History: No known family history of breast cancer  Family Medical History: No known relevant family medical history  Personal History: No known relevant hormone history  Surgical history includes breast biopsy and lumpectomy  Medical history includes breast cancer  RISK ASSESSMENT: 5 Year Tyrer-Cuzick: No Score 10 Year Tyrer-Cuzick: No Score Lifetime Tyrer-Cuzick: No Score TISSUE DENSITY: The breasts are heterogeneously dense, which may obscure small masses  INDICATION: Farideh Manrique is a 80 y o  female presenting for annual  FINDINGS: LEFT 1) POST-SURGICAL FINDING: There are post-surgical findings seen in the left breast  There has been no interval development of a suspicious mass, microcalcification, or unexplained architectural distortion  RIGHT There are no suspicious masses, grouped microcalcifications or areas of architectural distortion  The skin and nipple areolar complex are unremarkable  There are some vascular calcifications which are benign in appearance  Impression:  Findings are benign  No mammographic evidence of malignancy  ASSESSMENT/BI-RADS CATEGORY: Left: 2 - Benign Right: 2 - Benign Overall: 2 - Benign RECOMMENDATION:      - Diagnostic mammogram in 1 year for both breasts  Workstation ID: [id number]      I reviewed the above imaging data  Advance Care Planning/Advance Directives:  Discussed disease status, cancer treatment plans and/or cancer treatment goals with the patient

## 2020-08-16 DIAGNOSIS — I16.0 HYPERTENSIVE URGENCY: ICD-10-CM

## 2020-08-18 RX ORDER — NIFEDIPINE 30 MG/1
TABLET, FILM COATED, EXTENDED RELEASE ORAL
Qty: 30 TABLET | Refills: 5 | Status: SHIPPED | OUTPATIENT
Start: 2020-08-18 | End: 2021-02-04 | Stop reason: SDUPTHER

## 2020-09-22 DIAGNOSIS — K21.9 GASTROESOPHAGEAL REFLUX DISEASE WITHOUT ESOPHAGITIS: ICD-10-CM

## 2020-09-22 RX ORDER — PANTOPRAZOLE SODIUM 40 MG/1
TABLET, DELAYED RELEASE ORAL
Qty: 60 TABLET | Refills: 1 | Status: SHIPPED | OUTPATIENT
Start: 2020-09-22 | End: 2021-01-13

## 2020-09-28 DIAGNOSIS — E78.5 HYPERLIPIDEMIA, UNSPECIFIED HYPERLIPIDEMIA TYPE: ICD-10-CM

## 2020-09-28 RX ORDER — PRAVASTATIN SODIUM 40 MG
TABLET ORAL
Qty: 90 TABLET | Refills: 1 | Status: SHIPPED | OUTPATIENT
Start: 2020-09-28 | End: 2021-03-19 | Stop reason: SDUPTHER

## 2020-09-29 ENCOUNTER — OFFICE VISIT (OUTPATIENT)
Dept: CARDIOLOGY CLINIC | Facility: CLINIC | Age: 85
End: 2020-09-29
Payer: COMMERCIAL

## 2020-09-29 VITALS
HEART RATE: 76 BPM | SYSTOLIC BLOOD PRESSURE: 134 MMHG | WEIGHT: 135.7 LBS | TEMPERATURE: 97.8 F | DIASTOLIC BLOOD PRESSURE: 72 MMHG | BODY MASS INDEX: 24.97 KG/M2 | HEIGHT: 62 IN

## 2020-09-29 DIAGNOSIS — E78.00 HYPERCHOLESTEROLEMIA: ICD-10-CM

## 2020-09-29 DIAGNOSIS — I73.9 PAD (PERIPHERAL ARTERY DISEASE) (HCC): ICD-10-CM

## 2020-09-29 DIAGNOSIS — I10 ESSENTIAL HYPERTENSION: Primary | ICD-10-CM

## 2020-09-29 DIAGNOSIS — I65.23 ASYMPTOMATIC BILATERAL CAROTID ARTERY STENOSIS: ICD-10-CM

## 2020-09-29 DIAGNOSIS — R94.31 EKG ABNORMALITIES: ICD-10-CM

## 2020-09-29 PROCEDURE — 1036F TOBACCO NON-USER: CPT | Performed by: INTERNAL MEDICINE

## 2020-09-29 PROCEDURE — 1160F RVW MEDS BY RX/DR IN RCRD: CPT | Performed by: INTERNAL MEDICINE

## 2020-09-29 PROCEDURE — 99214 OFFICE O/P EST MOD 30 MIN: CPT | Performed by: INTERNAL MEDICINE

## 2020-09-29 NOTE — PROGRESS NOTES
Cardiology Follow Up    [place]  1934  Yuly Bucio  [phone]  [phone]    1  Essential hypertension     2  Asymptomatic bilateral carotid artery stenosis     3  PAD (peripheral artery disease) ([facility] [address] )     4  EKG abnormalities     5  Hypercholesterolemia         Interval History:  Cardiology follow-up  Patient doing well from a cardiac point of view, denies any chest pain or dyspnea, she states she has some numbness of both lower extremities, she does have diabetic neuropathy  sedentary lifestyle, and not certain she is having claudication  She has been followed by vascular surgery  Compliant low-cholesterol diet  Recent lipid profile total cholesterol 169, HDL 74 LDL 70, excellent control on medium intensity statin therapy  Compliant low-sodium diet, blood pressures been well control  No significant bleeding issues on chronic aspirin therapy  Denies any focal neurological deficits, denies any amaurosis fugax    Patient was interviewed in Kyrgyz     Patient Active Problem List   Diagnosis    Vocal cord mass    Iliac artery stenosis, bilateral ([facility] [address] )    Hyperparathyroidism (HCC)    GERD (gastroesophageal reflux disease)    Arthritis    Hypercholesterolemia    Hypertension    EKG abnormalities    Anemia    Neck pain    Dysuria    Chronic left shoulder pain    Fall from other slipping, tripping, or stumbling    Abnormal EKG    Abnormal mammogram    Accelerated essential hypertension    Allergic rhinitis    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)    Back pain    Bilateral carotid artery stenosis    Carotid stenosis, asymptomatic    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    Chronic myofascial pain    Chronic pain of lower extremity    CKD (chronic kidney disease) stage 3, GFR 30-59 ml/min (HCC)    Constipation    Diabetic retinopathy (HCC)    DMII [facility]diabetes mellitus, type 2) ([facility]    Dysphagia    Epigastric pain    Fever of unknown origin    Gastritis    Gout    Hypomagnesemia    Atherosclerosis of other specified arteries    Lymphadenopathy    Microalbuminuria    Nephrolithiasis    Neuropathic pain, leg, right    Osteoporosis    PAD (peripheral artery disease) ([facility] [address] )    Primary generalized (osteo)arthritis    Persistent proteinuria    Renal cyst    Secondary hyperparathyroidism, renal ([facility] [address] )    Vaginal prolapse    Vitamin D deficiency    Acquired complex cyst of kidney    Gastroesophageal reflux disease without esophagitis    History of left breast cancer    Type 2 diabetes mellitus with diabetic peripheral angiopathy without gangrene ([facility])    Stricture of artery ([facility])    Claudication in peripheral vascular disease ([facility])    Hemorrhoids    Tinea unguium    Diabetic neuropathy ([facility])    Bradycardia    Hyperkalemia    Pain in tooth    Dysphonia    Glottic insufficiency    Reflux laryngitis    Paresis of left vocal fold    Muscle tension dysphonia    Laryngocele    Anterior glottic web    Chest pain    Palpitations    Encounter for follow-up examination after completed treatment for malignant neoplasm    Respiratory crackles    Allergic conjunctivitis of both eyes    Viral upper respiratory tract infection    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HCC)    Renal artery stenosis (HCC)    Left ear pain    Itching of ear    Cervical spine disease    Cervicogenic headache    Glossodynia    Medicare annual wellness visit, subsequent     Past Medical History:   Diagnosis Date    Arthritis     Back pain     Benign essential hypertension     LAST ASSESSED: [id number]    Carotid artery stenosis     CKD (chronic kidney disease) stage 2, GFR 60-89 ml/min     Constipation, chronic     "drinks prune juice"    Diabetes mellitus (HCC)     DMII (diabetes mellitus, type 2) ([place] [address] )     Epiglottitis     RESOLVED: [id number]    Gastritis     GERD (gastroesophageal reflux disease)     Gout     H/O blood clots     left lower leg,,,11 yrs ago    History of stenosis of renal artery     RESOLVED: [id number]    Hypercholesteremia     Hypercholesterolemia     Hyperlipidemia     Hyperparathyroidism ([place] [address] )     Hypertension     Iliac artery stenosis, bilateral ([place] [address] )     Kidney stone     Lobular carcinoma ([place] [address] ) 09/06/2017    Left    Mucinous carcinoma of breast, 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    Left    Presence of pessary     Renal artery stenosis (HCC)     Renal disorder     Renovascular hypertension     RESOLVED: [id number]    Segmental and somatic dysfunction of cervical region     RESOLVED: [id number]    Segmental and somatic dysfunction of lumbar region     RESOLVED: [id number]    Segmental and somatic dysfunction of pelvic region     RESOLVED: [id number]    Segmental and somatic dysfunction of thoracic region     RESOLVED: [id number]    Somatic dysfunction of abdominal region     RESOLVED: [id number]    Somatic dysfunction of head region     RESOLVED: [id number]    Somatic dysfunction of lower extremities     RESOLVED: [id number]    Somatic dysfunction of rib region     RESOLVED: [id number]    Somatic dysfunction of thoracic region     RESOLVED: [id number]    Somatic dysfunction of upper extremities     RESOLVED: [id number]    Teeth missing      Social History     Socioeconomic History    Marital status:       Spouse name: Not on file    Number of children: Not on file    Years of education: Not on file    Highest education level: Not on file   Occupational History    Occupation: retired   Social Needs    Financial resource strain: Not hard at all   [place] insecurity     Worry: Never true     Inability: Never true   [employer] needs     Medical: No     Non-medical: No   Tobacco Use    Smoking status: Former Smoker     Packs/day: 1 00     Years: 64 00 Pack years: 64 00     Last attempt to quit: 2004     Years since quittin 7    Smokeless tobacco: Never Used    Tobacco comment: quit in  1 ppd x 50 years   Substance and Sexual Activity    Alcohol use: Not Currently     Frequency: Never    Drug use: No    Sexual activity: Yes     Partners: Male   Lifestyle    Physical activity     Days per week: 0 days     Minutes per session: 0 min    Stress:  Only a little   Relationships    Social connections     Talks on phone: More than three times a week     Gets together: More than three times a week     Attends Taoist service: Not on file     Active member of club or organization: Not on file     Attends meetings of clubs or organizations: Not on file     Relationship status: Not on file    Intimate partner violence     Fear of current or ex partner: Not on file     Emotionally abused: Not on file     Physically abused: Not on file     Forced sexual activity: Not on file   Other Topics Concern    Not on file   Social History Narrative    Daily caffeine consumption, 1 serving a day       Family History   Problem Relation Age of Onset    Heart disease Brother     Heart disease Maternal Grandmother     Diabetes Other         of other type with arthropathy, without long term current use of insulin    Hypertension Other     No Known Problems Mother     Gout Family     Rheum arthritis Family     Lupus Family         systematic erythematosus    Heart disease Family     Stroke Family         syndrome    Stroke Maternal Uncle         syndrome    Stroke Maternal Aunt         syndrome    No Known Problems Father     No Known Problems Daughter     No Known Problems Maternal Grandfather     No Known Problems Paternal Grandmother     No Known Problems Paternal Grandfather     No Known Problems Daughter     No Known Problems Daughter     No Known Problems Maternal Aunt     No Known Problems Maternal Aunt     No Known Problems Maternal Aunt Past Surgical History:   Procedure Laterality Date    ANGIOPLASTY / STENTING ILIAC      BREAST BIOPSY Left 08/10/2018    U/S BX    BREAST BIOPSY Left 08/07/2017    U/S BX    BREAST LUMPECTOMY Left 09/06/2017    LAST ASSESSED: [id number]    BREAST LUMPECTOMY Left 09/11/2018    BREAST SURGERY Left     biopsy    CATARACT EXTRACTION Bilateral     CHOLECYSTECTOMY      COLONOSCOPY      EYE SURGERY      ILIAC VEIN ANGIOPLASTY / STENTING Right     INITIAL STENOSIS: ONSET: [id number]    KNEE ARTHROSCOPY Right     KNEE SURGERY Right     ONSET: [id number]    MAMMO NEEDLE LOCALIZATION LEFT (ALL INC) Left 9/11/2018    MAMMO NEEDLE LOCALIZATION LEFT (ALL INC) Left 9/6/2017    UT ESOPHAGOGASTRODUODENOSCOPY TRANSORAL DIAGNOSTIC N/A 7/26/2018    Procedure: ESOPHAGOGASTRODUODENOSCOPY (EGD); Surgeon: Ayad Cuadra DO;  [facility]; Service: Gastroenterology    UT LARYNGOSCOPY,DIRCT,[facility] N/A 6/10/2016    Procedure: MICRO DIRECT LARYNGOSCOPY WITH VOCAL FOLD MASS EXCISION ;  Surgeon: Gonzalo Snow MD;  [facility];  Service: ENT    UT MASTECTOMY, PARTIAL Left 9/6/2017    Procedure: LUMPECTOMY BREAST NEEDLE LOCALIZED WITH FAXITRON NEEDLE @ 0830;  Surgeon: Shawna Chan MD;  [facility];  Service: General    UT PERQ DEVICE PLACEMT BREAST LOC 1ST LES W Enrigue Lighter Left 9/11/2018    Procedure: BREAST LUMPECTOMY; BREAST NEEDLE LOCALIZATION (NEEDLE LOC AT 1330);   Surgeon: Addi Ornelas MD;  [facility];  Service: Surgical Oncology    THROAT SURGERY      lump removed    TUBAL LIGATION      UPPER GASTROINTESTINAL ENDOSCOPY      US GUIDANCE BREAST BIOPSY LEFT EACH ADDITIONAL Left 8/7/2017    US GUIDED BREAST BIOPSY LEFT COMPLETE Left 8/10/2018    US GUIDED BREAST BIOPSY LEFT COMPLETE Left 8/7/2017    VASCULAR SURGERY         Current Outpatient Medications:     Acetaminophen (TYLENOL) 325 MG CAPS, Take 500 mg by mouth every 8 (eight) hours , Disp: , Rfl:     aspirin (ADULT ASPIRIN EC LOW STRENGTH) 81 mg EC tablet, Take 1 tablet by mouth daily, Disp: , Rfl:     calcitriol (ROCALTROL) 0 25 mcg capsule, TAKE 1 CAPSULE TWICE A WEEK EVERY TUESDAY AND FRIDAY, Disp: 10 capsule, Rfl: 6    calcium carbonate-vitamin D (OSCAL-D) 500 mg-200 units per tablet, Take 1 tablet by mouth daily with breakfast, Disp: , Rfl:     fluticasone (FLONASE) 50 mcg/act nasal spray, 1 spray into each nostril daily, Disp: 16 g, Rfl: 2    gabapentin (NEURONTIN) 100 mg capsule, Take 2 capsules (200 mg total) by mouth daily at bedtime, Disp: 180 capsule, Rfl: 1    Lancets (ONETOUCH DELICA PLUS [id number]) MISC, 1 Device by Does not apply route 2 (two) times a day, Disp: 100 each, Rfl: 5    Lancets (ONETOUCH ULTRASOFT) lancets, by Other route 2 (two) times a day Use as instructed, Disp: 100 each, Rfl: 5    losartan (COZAAR) 100 MG tablet, take 1 tablet by mouth once daily, Disp: 90 tablet, Rfl: 0    NIFEdipine ER (ADALAT CC) 30 MG 24 hr tablet, TAKE 1 TABLET ONCE DAILY AT NIGHT IN ADDITION TO 60MG IN THE MORNING, Disp: 30 tablet, Rfl: 5    NIFEdipine ER (ADALAT CC) 60 MG 24 hr tablet, take 1 tablet by mouth once daily, Disp: 90 tablet, Rfl: 0    olopatadine (PATANOL) 0 1 % ophthalmic solution, Administer 1 drop to both eyes 2 (two) times a day, Disp: 5 mL, Rfl: 0    ONE TOUCH ULTRA TEST test strip, 1 each by Other route 2 (two) times a day Use as instructed, Disp: 100 each, Rfl: 5    OneTouch Delica Lancets 74F MISC, Inject as directed 2 (two) times a day Use to test, Disp: 200 each, Rfl: 0    pantoprazole (PROTONIX) 40 mg tablet, take 1 tablet by mouth once daily, Disp: 60 tablet, Rfl: 1    pravastatin (PRAVACHOL) 40 mg tablet, take 1 tablet by mouth once daily, Disp: 90 tablet, Rfl: 1    docusate sodium (COLACE) 100 mg capsule, Take 1 capsule (100 mg total) by mouth 2 (two) times a day (Patient not taking: Reported on 9/29/2020), Disp: 10 capsule, Rfl: 0    DULoxetine (CYMBALTA) 30 mg delayed release capsule, Take 30 mg by mouth daily, Disp: , Rfl: 0    polyethylene glycol (GLYCOLAX) powder, Take 17 g by mouth daily (Patient not taking: Reported on 9/29/2020), Disp: 225 g, Rfl: 1    senna (SENOKOT) 8 6 mg, Take 1 tablet (8 6 mg total) by mouth daily at bedtime as needed for constipation (Patient not taking: Reported on 9/29/2020), Disp: 120 each, Rfl: 0  Allergies   Allergen Reactions    Clonidine Derivatives Swelling    Diflucan [Fluconazole] Rash    Flagyl [Metronidazole] Anaphylaxis    Carvedilol Hives     And legs swelled    Latex Rash    Lipitor [Atorvastatin]      Legs swelled    Other Palpitations     IV DYE       Labs:  Appointment on 06/18/2020   Component Date Value    Cholesterol 06/18/2020 169     Triglycerides 06/18/2020 126     HDL, Direct 06/18/2020 74     LDL Calculated 06/18/2020 70     WBC 06/18/2020 5 98     RBC 06/18/2020 4 12     Hemoglobin 06/18/2020 11 5     Hematocrit 06/18/2020 37 3     MCV 06/18/2020 91     MCH 06/18/2020 27 9     MCHC 06/18/2020 30 8*    RDW 06/18/2020 13 7     MPV 06/18/2020 11 0     Platelets 34/56/6280 275     nRBC 06/18/2020 0     Neutrophils Relative 06/18/2020 43     Immat GRANS % 06/18/2020 0     Lymphocytes Relative 06/18/2020 47*    Monocytes Relative 06/18/2020 8     Eosinophils Relative 06/18/2020 1     Basophils Relative 06/18/2020 1     Neutrophils Absolute 06/18/2020 2 58     Immature Grans Absolute 06/18/2020 0 02     Lymphocytes Absolute 06/18/2020 2 82     Monocytes Absolute 06/18/2020 0 47     Eosinophils Absolute 06/18/2020 0 06     Basophils Absolute 06/18/2020 0 03     Sodium 06/18/2020 137     Potassium 06/18/2020 3 8     Chloride 06/18/2020 104     CO2 06/18/2020 27     ANION GAP 06/18/2020 6     BUN 06/18/2020 32*    Creatinine 06/18/2020 1 34*    Glucose, Fasting 06/18/2020 174*    Calcium 06/18/2020 9 4     AST 06/18/2020 16     ALT 06/18/2020 21     Alkaline Phosphatase 06/18/2020 72  Total Protein 06/18/2020 8 2     Albumin 06/18/2020 3 6     Total Bilirubin 06/18/2020 0 28     eGFR 06/18/2020 36     Magnesium 06/18/2020 1 9     Phosphorus 06/18/2020 4 1     PTH 06/18/2020 69 1     Creatinine, Ur 06/18/2020 89 5     Protein Urine Random 06/18/2020 81     Prot/Creat Ratio, Ur 06/18/2020 0 91*    Clarity, UA 06/18/2020 Cloudy     Color, UA 06/18/2020 Yellow     Specific Gravity, UA 06/18/2020 1 014     pH, UA 06/18/2020 5 5     Glucose, UA 06/18/2020 Negative     Ketones, UA 06/18/2020 Negative     Blood, UA 06/18/2020 Negative     Protein, UA 06/18/2020 100 (2+)*    Nitrite, UA 06/18/2020 Negative     Bilirubin, UA 06/18/2020 Negative     Urobilinogen, UA 06/18/2020 0 2     Leukocytes, UA 06/18/2020 Large*    WBC, UA 06/18/2020 10-20*    RBC, UA 06/18/2020 None Seen     Bacteria, UA 06/18/2020 Moderate*    OTHER OBSERVATIONS 06/18/2020 Renal Epithelial Cells Present     Epithelial Cells 06/18/2020 Moderate*    MUCUS THREADS 06/18/2020 Occasional*     Imaging: No results found  Review of Systems:  Review of Systems   Constitutional: Negative for fatigue and unexpected weight change  HENT: Negative for nosebleeds, postnasal drip, sore throat and trouble swallowing  Eyes: Negative for visual disturbance  Respiratory: Negative for apnea, shortness of breath and stridor  Cardiovascular: Negative for chest pain, palpitations and leg swelling  Gastrointestinal: Negative for abdominal pain, anal bleeding and blood in stool  Endocrine: Positive for cold intolerance  Genitourinary: Negative for hematuria  Musculoskeletal: Positive for arthralgias and gait problem  Skin: Negative for pallor and rash  Allergic/Immunologic: Negative for immunocompromised state  Neurological: Positive for headaches  Negative for speech difficulty, weakness and light-headedness  Hematological: Does not bruise/bleed easily     Psychiatric/Behavioral: Negative for sleep disturbance  The patient is not nervous/anxious  Physical Exam:  Physical Exam  Vitals signs reviewed  Constitutional:       General: She is not in acute distress  Appearance: Normal appearance  She is normal weight  She is not ill-appearing, toxic-appearing or diaphoretic  Neck:      Vascular: Carotid bruit present  Cardiovascular:      Rate and Rhythm: Normal rate and regular rhythm  Pulses: Normal pulses  Heart sounds: Normal heart sounds  No murmur  No friction rub  No gallop  Pulmonary:      Effort: Pulmonary effort is normal  No respiratory distress  Breath sounds: Normal breath sounds  No stridor  No wheezing, rhonchi or rales  Skin:     General: Skin is warm and dry  Capillary Refill: Capillary refill takes less than 2 seconds  Neurological:      General: No focal deficit present  Mental Status: She is alert  Psychiatric:         Mood and Affect: Mood normal          Discussion/Summary:  Abnormal EKG, questionable septal infarct  Echocardiogram 2 years ago revealed hyperdynamic left systolic function with mild left hypertrophy and mild mitral insufficiency mild tricuspid insufficiency with estimated normal pulmonary pressures suggested by Doppler criteria  Pharmacological stress test last year suggested no ischemia ejection fraction 72%  He does have chronic kidney disease, creatinine 1 3 GFR in the 30s  Blood pressure appears to be well control, lipids are well control  Continue current medications  Peripheral vascular disease, status post revascularization of the iliac system on both sides with stenting no claudication  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, stable on duplex this year  Continue aspirin therapy and statin therapy  Continue regular exercise program     This note was completed in part utilizing [employer] fluency direct voice recognition software     Grammatical errors, random word insertion, spelling mistakes, and incomplete sentences may be an occasional consequence of the system secondary to software limitations, ambient noise and hardware issues  At the time of dictation, efforts were made to edit, clarify and /or correct errors  Please read the chart carefully and recognize, using context, where substitutions have occurred  If you have any questions or concerns about the context, text or information contained within the body of this dictation, please contact myself, the provider, for further clarification

## 2020-10-09 DIAGNOSIS — I10 ESSENTIAL HYPERTENSION: ICD-10-CM

## 2020-10-09 RX ORDER — LOSARTAN POTASSIUM 100 MG/1
TABLET ORAL
Qty: 90 TABLET | Refills: 0 | Status: SHIPPED | OUTPATIENT
Start: 2020-10-09 | End: 2021-01-06

## 2020-10-09 RX ORDER — NIFEDIPINE 60 MG/1
TABLET, FILM COATED, EXTENDED RELEASE ORAL
Qty: 90 TABLET | Refills: 0 | Status: SHIPPED | OUTPATIENT
Start: 2020-10-09 | End: 2021-01-06

## 2020-10-13 ENCOUNTER — OFFICE VISIT (OUTPATIENT)
Dept: FAMILY MEDICINE CLINIC | Facility: CLINIC | Age: 85
End: 2020-10-13

## 2020-10-13 VITALS
TEMPERATURE: 97.9 F | DIASTOLIC BLOOD PRESSURE: 54 MMHG | WEIGHT: 139.2 LBS | HEART RATE: 78 BPM | RESPIRATION RATE: 18 BRPM | SYSTOLIC BLOOD PRESSURE: 130 MMHG | HEIGHT: 62 IN | BODY MASS INDEX: 25.62 KG/M2 | OXYGEN SATURATION: 97 %

## 2020-10-13 DIAGNOSIS — R20.8 BURNING SENSATION: Primary | ICD-10-CM

## 2020-10-13 DIAGNOSIS — M48.9 CERVICAL SPINE DISEASE: ICD-10-CM

## 2020-10-13 PROCEDURE — 99213 OFFICE O/P EST LOW 20 MIN: CPT | Performed by: PHYSICIAN ASSISTANT

## 2020-10-14 ENCOUNTER — LAB (OUTPATIENT)
Dept: LAB | Facility: HOSPITAL | Age: 85
End: 2020-10-14
Payer: COMMERCIAL

## 2020-10-14 ENCOUNTER — TRANSCRIBE ORDERS (OUTPATIENT)
Dept: LAB | Facility: HOSPITAL | Age: 85
End: 2020-10-14

## 2020-10-14 DIAGNOSIS — R20.8 BURNING SENSATION: ICD-10-CM

## 2020-10-14 LAB
ALBUMIN SERPL BCP-MCNC: 3.6 G/DL (ref 3.5–5)
ALP SERPL-CCNC: 88 U/L (ref 46–116)
ALT SERPL W P-5'-P-CCNC: 13 U/L (ref 12–78)
ANION GAP SERPL CALCULATED.3IONS-SCNC: 5 MMOL/L (ref 4–13)
AST SERPL W P-5'-P-CCNC: 11 U/L (ref 5–45)
BASOPHILS # BLD AUTO: 0.02 THOUSANDS/ΜL (ref 0–0.1)
BASOPHILS NFR BLD AUTO: 0 % (ref 0–1)
BILIRUB SERPL-MCNC: 0.29 MG/DL (ref 0.2–1)
BUN SERPL-MCNC: 38 MG/DL (ref 5–25)
CALCIUM SERPL-MCNC: 9.3 MG/DL (ref 8.3–10.1)
CHLORIDE SERPL-SCNC: 106 MMOL/L (ref 100–108)
CO2 SERPL-SCNC: 28 MMOL/L (ref 21–32)
CREAT SERPL-MCNC: 1.32 MG/DL (ref 0.6–1.3)
EOSINOPHIL # BLD AUTO: 0.09 THOUSAND/ΜL (ref 0–0.61)
EOSINOPHIL NFR BLD AUTO: 1 % (ref 0–6)
ERYTHROCYTE [DISTWIDTH] IN BLOOD BY AUTOMATED COUNT: 13.2 % (ref 11.6–15.1)
GFR SERPL CREATININE-BSD FRML MDRD: 37 ML/MIN/1.73SQ M
GLUCOSE SERPL-MCNC: 154 MG/DL (ref 65–140)
HCT VFR BLD AUTO: 36.3 % (ref 34.8–46.1)
HGB BLD-MCNC: 11.3 G/DL (ref 11.5–15.4)
IMM GRANULOCYTES # BLD AUTO: 0.02 THOUSAND/UL (ref 0–0.2)
IMM GRANULOCYTES NFR BLD AUTO: 0 % (ref 0–2)
LYMPHOCYTES # BLD AUTO: 2.35 THOUSANDS/ΜL (ref 0.6–4.47)
LYMPHOCYTES NFR BLD AUTO: 36 % (ref 14–44)
MCH RBC QN AUTO: 28.1 PG (ref 26.8–34.3)
MCHC RBC AUTO-ENTMCNC: 31.1 G/DL (ref 31.4–37.4)
MCV RBC AUTO: 90 FL (ref 82–98)
MONOCYTES # BLD AUTO: 0.42 THOUSAND/ΜL (ref 0.17–1.22)
MONOCYTES NFR BLD AUTO: 7 % (ref 4–12)
NEUTROPHILS # BLD AUTO: 3.58 THOUSANDS/ΜL (ref 1.85–7.62)
NEUTS SEG NFR BLD AUTO: 56 % (ref 43–75)
NRBC BLD AUTO-RTO: 0 /100 WBCS
PLATELET # BLD AUTO: 262 THOUSANDS/UL (ref 149–390)
PMV BLD AUTO: 11.3 FL (ref 8.9–12.7)
POTASSIUM SERPL-SCNC: 4.1 MMOL/L (ref 3.5–5.3)
PROT SERPL-MCNC: 8.3 G/DL (ref 6.4–8.2)
RBC # BLD AUTO: 4.02 MILLION/UL (ref 3.81–5.12)
SODIUM SERPL-SCNC: 139 MMOL/L (ref 136–145)
T4 FREE SERPL-MCNC: 1.09 NG/DL (ref 0.76–1.46)
TSH SERPL DL<=0.05 MIU/L-ACNC: 3.79 UIU/ML (ref 0.36–3.74)
VIT B12 SERPL-MCNC: 939 PG/ML (ref 100–900)
WBC # BLD AUTO: 6.48 THOUSAND/UL (ref 4.31–10.16)

## 2020-10-14 PROCEDURE — 36415 COLL VENOUS BLD VENIPUNCTURE: CPT

## 2020-10-14 PROCEDURE — 82607 VITAMIN B-12: CPT

## 2020-10-14 PROCEDURE — 85025 COMPLETE CBC W/AUTO DIFF WBC: CPT

## 2020-10-14 PROCEDURE — 84443 ASSAY THYROID STIM HORMONE: CPT

## 2020-10-14 PROCEDURE — 80053 COMPREHEN METABOLIC PANEL: CPT

## 2020-10-14 PROCEDURE — 84439 ASSAY OF FREE THYROXINE: CPT

## 2020-10-21 ENCOUNTER — OFFICE VISIT (OUTPATIENT)
Dept: FAMILY MEDICINE CLINIC | Facility: CLINIC | Age: 85
End: 2020-10-21

## 2020-10-21 VITALS
BODY MASS INDEX: 25.21 KG/M2 | HEIGHT: 62 IN | DIASTOLIC BLOOD PRESSURE: 62 MMHG | WEIGHT: 137 LBS | OXYGEN SATURATION: 97 % | RESPIRATION RATE: 16 BRPM | TEMPERATURE: 97.4 F | HEART RATE: 77 BPM | SYSTOLIC BLOOD PRESSURE: 162 MMHG

## 2020-10-21 DIAGNOSIS — E11.9 TYPE 2 DIABETES MELLITUS WITHOUT COMPLICATION, WITHOUT LONG-TERM CURRENT USE OF INSULIN (HCC): Primary | ICD-10-CM

## 2020-10-21 DIAGNOSIS — M48.9 CERVICAL SPINE DISEASE: ICD-10-CM

## 2020-10-21 DIAGNOSIS — Z23 ENCOUNTER FOR IMMUNIZATION: ICD-10-CM

## 2020-10-21 DIAGNOSIS — R20.8 BURNING SENSATION: ICD-10-CM

## 2020-10-21 LAB — SL AMB POCT HEMOGLOBIN AIC: 6.3 (ref ?–6.5)

## 2020-10-21 PROCEDURE — 99213 OFFICE O/P EST LOW 20 MIN: CPT | Performed by: PHYSICIAN ASSISTANT

## 2020-10-21 PROCEDURE — 83036 HEMOGLOBIN GLYCOSYLATED A1C: CPT | Performed by: PHYSICIAN ASSISTANT

## 2020-10-21 PROCEDURE — 1036F TOBACCO NON-USER: CPT | Performed by: PHYSICIAN ASSISTANT

## 2020-10-21 PROCEDURE — 1160F RVW MEDS BY RX/DR IN RCRD: CPT | Performed by: PHYSICIAN ASSISTANT

## 2020-10-21 RX ORDER — BLOOD SUGAR DIAGNOSTIC
1 STRIP MISCELLANEOUS 3 TIMES DAILY
Qty: 300 EACH | Refills: 2 | Status: SHIPPED | OUTPATIENT
Start: 2020-10-21 | End: 2021-05-10

## 2020-10-21 RX ORDER — BLOOD-GLUCOSE METER
EACH MISCELLANEOUS 3 TIMES DAILY
Qty: 1 KIT | Refills: 0 | Status: SHIPPED | OUTPATIENT
Start: 2020-10-21

## 2020-11-02 ENCOUNTER — HOSPITAL ENCOUNTER (OUTPATIENT)
Dept: RADIOLOGY | Age: 85
Discharge: HOME | End: 2020-11-02
Payer: COMMERCIAL

## 2020-11-02 DIAGNOSIS — M81.0 AGE-RELATED OSTEOPOROSIS WITHOUT CURRENT PATHOLOGICAL FRACTURE: ICD-10-CM

## 2020-11-02 PROCEDURE — 77080 DXA BONE DENSITY AXIAL: CPT

## 2020-11-04 ENCOUNTER — OFFICE VISIT (OUTPATIENT)
Dept: FAMILY MEDICINE CLINIC | Facility: CLINIC | Age: 85
End: 2020-11-04

## 2020-11-04 VITALS
HEART RATE: 74 BPM | SYSTOLIC BLOOD PRESSURE: 156 MMHG | HEIGHT: 62 IN | WEIGHT: 137.6 LBS | DIASTOLIC BLOOD PRESSURE: 56 MMHG | RESPIRATION RATE: 16 BRPM | TEMPERATURE: 96.3 F | BODY MASS INDEX: 25.32 KG/M2

## 2020-11-04 DIAGNOSIS — Z13.228 SCREENING FOR ENDOCRINE, METABOLIC AND IMMUNITY DISORDER: ICD-10-CM

## 2020-11-04 DIAGNOSIS — M48.9 CERVICAL SPINE DISEASE: ICD-10-CM

## 2020-11-04 DIAGNOSIS — Z23 ENCOUNTER FOR IMMUNIZATION: ICD-10-CM

## 2020-11-04 DIAGNOSIS — M85.89 OSTEOPENIA OF MULTIPLE SITES: ICD-10-CM

## 2020-11-04 DIAGNOSIS — Z13.0 SCREENING FOR ENDOCRINE, METABOLIC AND IMMUNITY DISORDER: ICD-10-CM

## 2020-11-04 DIAGNOSIS — Z13.29 SCREENING FOR ENDOCRINE, METABOLIC AND IMMUNITY DISORDER: ICD-10-CM

## 2020-11-04 DIAGNOSIS — R20.8 BURNING SENSATION: Primary | ICD-10-CM

## 2020-11-04 DIAGNOSIS — K59.04 CHRONIC IDIOPATHIC CONSTIPATION: ICD-10-CM

## 2020-11-04 PROCEDURE — 90662 IIV NO PRSV INCREASED AG IM: CPT | Performed by: PHYSICIAN ASSISTANT

## 2020-11-04 PROCEDURE — G0008 ADMIN INFLUENZA VIRUS VAC: HCPCS | Performed by: PHYSICIAN ASSISTANT

## 2020-11-04 PROCEDURE — 1036F TOBACCO NON-USER: CPT | Performed by: PHYSICIAN ASSISTANT

## 2020-11-04 PROCEDURE — 99213 OFFICE O/P EST LOW 20 MIN: CPT | Performed by: PHYSICIAN ASSISTANT

## 2020-11-04 PROCEDURE — 3077F SYST BP >= 140 MM HG: CPT | Performed by: PHYSICIAN ASSISTANT

## 2020-11-04 PROCEDURE — 1160F RVW MEDS BY RX/DR IN RCRD: CPT | Performed by: PHYSICIAN ASSISTANT

## 2020-11-04 PROCEDURE — 3078F DIAST BP <80 MM HG: CPT | Performed by: PHYSICIAN ASSISTANT

## 2020-11-04 RX ORDER — POLYETHYLENE GLYCOL 3350 17 G/17G
17 POWDER, FOR SOLUTION ORAL DAILY
Qty: 225 G | Refills: 1 | Status: SHIPPED | OUTPATIENT
Start: 2020-11-04

## 2020-11-04 RX ORDER — SENNOSIDES 8.6 MG
8.6 TABLET ORAL
Qty: 120 EACH | Refills: 0 | Status: SHIPPED | OUTPATIENT
Start: 2020-11-04 | End: 2022-03-09

## 2020-11-06 DIAGNOSIS — M54.6 CHRONIC THORACIC BACK PAIN, UNSPECIFIED BACK PAIN LATERALITY: ICD-10-CM

## 2020-11-06 DIAGNOSIS — G89.29 CHRONIC THORACIC BACK PAIN, UNSPECIFIED BACK PAIN LATERALITY: ICD-10-CM

## 2020-11-07 DIAGNOSIS — G89.29 CHRONIC THORACIC BACK PAIN, UNSPECIFIED BACK PAIN LATERALITY: ICD-10-CM

## 2020-11-07 DIAGNOSIS — M54.6 CHRONIC THORACIC BACK PAIN, UNSPECIFIED BACK PAIN LATERALITY: ICD-10-CM

## 2020-11-07 RX ORDER — GABAPENTIN 100 MG/1
CAPSULE ORAL
Qty: 90 CAPSULE | Refills: 0 | Status: SHIPPED | OUTPATIENT
Start: 2020-11-07 | End: 2020-11-20

## 2020-11-09 ENCOUNTER — TELEPHONE (OUTPATIENT)
Dept: FAMILY MEDICINE CLINIC | Facility: CLINIC | Age: 85
End: 2020-11-09

## 2020-11-09 RX ORDER — GABAPENTIN 100 MG/1
CAPSULE ORAL
Qty: 90 CAPSULE | Refills: 0 | OUTPATIENT
Start: 2020-11-09

## 2020-11-10 ENCOUNTER — APPOINTMENT (OUTPATIENT)
Dept: LAB | Facility: HOSPITAL | Age: 85
End: 2020-11-10
Attending: INTERNAL MEDICINE
Payer: COMMERCIAL

## 2020-11-10 DIAGNOSIS — N18.30 CKD (CHRONIC KIDNEY DISEASE) STAGE 3, GFR 30-59 ML/MIN (HCC): ICD-10-CM

## 2020-11-10 LAB
ALBUMIN SERPL BCP-MCNC: 3.5 G/DL (ref 3.5–5)
ALP SERPL-CCNC: 89 U/L (ref 46–116)
ALT SERPL W P-5'-P-CCNC: 14 U/L (ref 12–78)
ANION GAP SERPL CALCULATED.3IONS-SCNC: 4 MMOL/L (ref 4–13)
AST SERPL W P-5'-P-CCNC: 12 U/L (ref 5–45)
BACTERIA UR QL AUTO: ABNORMAL /HPF
BASOPHILS # BLD AUTO: 0.03 THOUSANDS/ΜL (ref 0–0.1)
BASOPHILS NFR BLD AUTO: 1 % (ref 0–1)
BILIRUB SERPL-MCNC: 0.31 MG/DL (ref 0.2–1)
BILIRUB UR QL STRIP: NEGATIVE
BUN SERPL-MCNC: 36 MG/DL (ref 5–25)
CALCIUM SERPL-MCNC: 9.3 MG/DL (ref 8.3–10.1)
CHLORIDE SERPL-SCNC: 104 MMOL/L (ref 100–108)
CLARITY UR: ABNORMAL
CO2 SERPL-SCNC: 31 MMOL/L (ref 21–32)
COLOR UR: YELLOW
CREAT SERPL-MCNC: 1.47 MG/DL (ref 0.6–1.3)
CREAT UR-MCNC: 116 MG/DL
EOSINOPHIL # BLD AUTO: 0.07 THOUSAND/ΜL (ref 0–0.61)
EOSINOPHIL NFR BLD AUTO: 1 % (ref 0–6)
ERYTHROCYTE [DISTWIDTH] IN BLOOD BY AUTOMATED COUNT: 13.4 % (ref 11.6–15.1)
GFR SERPL CREATININE-BSD FRML MDRD: 32 ML/MIN/1.73SQ M
GLUCOSE SERPL-MCNC: 151 MG/DL (ref 65–140)
GLUCOSE UR STRIP-MCNC: NEGATIVE MG/DL
HCT VFR BLD AUTO: 37.2 % (ref 34.8–46.1)
HGB BLD-MCNC: 11.5 G/DL (ref 11.5–15.4)
HGB UR QL STRIP.AUTO: NEGATIVE
HYALINE CASTS #/AREA URNS LPF: ABNORMAL /LPF
IMM GRANULOCYTES # BLD AUTO: 0.01 THOUSAND/UL (ref 0–0.2)
IMM GRANULOCYTES NFR BLD AUTO: 0 % (ref 0–2)
KETONES UR STRIP-MCNC: NEGATIVE MG/DL
LEUKOCYTE ESTERASE UR QL STRIP: ABNORMAL
LYMPHOCYTES # BLD AUTO: 2.7 THOUSANDS/ΜL (ref 0.6–4.47)
LYMPHOCYTES NFR BLD AUTO: 44 % (ref 14–44)
MAGNESIUM SERPL-MCNC: 1.8 MG/DL (ref 1.6–2.6)
MCH RBC QN AUTO: 27.8 PG (ref 26.8–34.3)
MCHC RBC AUTO-ENTMCNC: 30.9 G/DL (ref 31.4–37.4)
MCV RBC AUTO: 90 FL (ref 82–98)
MONOCYTES # BLD AUTO: 0.4 THOUSAND/ΜL (ref 0.17–1.22)
MONOCYTES NFR BLD AUTO: 7 % (ref 4–12)
NEUTROPHILS # BLD AUTO: 2.93 THOUSANDS/ΜL (ref 1.85–7.62)
NEUTS SEG NFR BLD AUTO: 47 % (ref 43–75)
NITRITE UR QL STRIP: NEGATIVE
NON-SQ EPI CELLS URNS QL MICRO: ABNORMAL /HPF
NRBC BLD AUTO-RTO: 0 /100 WBCS
PH UR STRIP.AUTO: 5.5 [PH]
PHOSPHATE SERPL-MCNC: 3.6 MG/DL (ref 2.3–4.1)
PLATELET # BLD AUTO: 274 THOUSANDS/UL (ref 149–390)
PMV BLD AUTO: 11.4 FL (ref 8.9–12.7)
POTASSIUM SERPL-SCNC: 4.4 MMOL/L (ref 3.5–5.3)
PROT SERPL-MCNC: 8.1 G/DL (ref 6.4–8.2)
PROT UR STRIP-MCNC: ABNORMAL MG/DL
PROT UR-MCNC: 99 MG/DL
PROT/CREAT UR: 0.85 MG/G{CREAT} (ref 0–0.1)
PTH-INTACT SERPL-MCNC: 135.2 PG/ML (ref 18.4–80.1)
RBC # BLD AUTO: 4.14 MILLION/UL (ref 3.81–5.12)
RBC #/AREA URNS AUTO: ABNORMAL /HPF
SODIUM SERPL-SCNC: 139 MMOL/L (ref 136–145)
SP GR UR STRIP.AUTO: 1.02 (ref 1–1.03)
UROBILINOGEN UR QL STRIP.AUTO: 0.2 E.U./DL
WBC # BLD AUTO: 6.14 THOUSAND/UL (ref 4.31–10.16)
WBC #/AREA URNS AUTO: ABNORMAL /HPF

## 2020-11-10 PROCEDURE — 36415 COLL VENOUS BLD VENIPUNCTURE: CPT

## 2020-11-10 PROCEDURE — 81001 URINALYSIS AUTO W/SCOPE: CPT

## 2020-11-10 PROCEDURE — 84100 ASSAY OF PHOSPHORUS: CPT

## 2020-11-10 PROCEDURE — 84156 ASSAY OF PROTEIN URINE: CPT

## 2020-11-10 PROCEDURE — 83970 ASSAY OF PARATHORMONE: CPT

## 2020-11-10 PROCEDURE — 80053 COMPREHEN METABOLIC PANEL: CPT

## 2020-11-10 PROCEDURE — 82570 ASSAY OF URINE CREATININE: CPT

## 2020-11-10 PROCEDURE — 83735 ASSAY OF MAGNESIUM: CPT

## 2020-11-10 PROCEDURE — 85025 COMPLETE CBC W/AUTO DIFF WBC: CPT

## 2020-11-15 PROBLEM — N18.32 ANEMIA DUE TO STAGE 3B CHRONIC KIDNEY DISEASE (HCC): Status: ACTIVE | Noted: 2020-11-15

## 2020-11-15 PROBLEM — D63.1 ANEMIA DUE TO STAGE 3B CHRONIC KIDNEY DISEASE (HCC): Status: ACTIVE | Noted: 2020-11-15

## 2020-11-16 ENCOUNTER — OFFICE VISIT (OUTPATIENT)
Dept: NEPHROLOGY | Facility: CLINIC | Age: 85
End: 2020-11-16
Payer: COMMERCIAL

## 2020-11-16 VITALS — TEMPERATURE: 97.5 F | HEIGHT: 62 IN | BODY MASS INDEX: 25.21 KG/M2 | WEIGHT: 137 LBS

## 2020-11-16 DIAGNOSIS — E21.3 HYPERPARATHYROIDISM (HCC): Primary | ICD-10-CM

## 2020-11-16 DIAGNOSIS — R80.1 PERSISTENT PROTEINURIA: ICD-10-CM

## 2020-11-16 DIAGNOSIS — N18.32 STAGE 3B CHRONIC KIDNEY DISEASE (HCC): ICD-10-CM

## 2020-11-16 DIAGNOSIS — N18.32 ANEMIA DUE TO STAGE 3B CHRONIC KIDNEY DISEASE (HCC): ICD-10-CM

## 2020-11-16 DIAGNOSIS — D63.1 ANEMIA DUE TO STAGE 3B CHRONIC KIDNEY DISEASE (HCC): ICD-10-CM

## 2020-11-16 PROCEDURE — 1036F TOBACCO NON-USER: CPT | Performed by: INTERNAL MEDICINE

## 2020-11-16 PROCEDURE — 1160F RVW MEDS BY RX/DR IN RCRD: CPT | Performed by: INTERNAL MEDICINE

## 2020-11-16 PROCEDURE — 99214 OFFICE O/P EST MOD 30 MIN: CPT | Performed by: INTERNAL MEDICINE

## 2020-11-18 ENCOUNTER — TELEPHONE (OUTPATIENT)
Dept: VASCULAR SURGERY | Facility: CLINIC | Age: 85
End: 2020-11-18

## 2020-11-19 ENCOUNTER — TELEPHONE (OUTPATIENT)
Dept: FAMILY MEDICINE CLINIC | Facility: CLINIC | Age: 85
End: 2020-11-19

## 2020-11-20 DIAGNOSIS — G89.29 CHRONIC THORACIC BACK PAIN, UNSPECIFIED BACK PAIN LATERALITY: ICD-10-CM

## 2020-11-20 DIAGNOSIS — M54.6 CHRONIC THORACIC BACK PAIN, UNSPECIFIED BACK PAIN LATERALITY: ICD-10-CM

## 2020-11-20 RX ORDER — GABAPENTIN 100 MG/1
200 CAPSULE ORAL
Qty: 60 CAPSULE | Refills: 0 | Status: SHIPPED | OUTPATIENT
Start: 2020-11-20 | End: 2020-12-23 | Stop reason: SDUPTHER

## 2020-12-23 DIAGNOSIS — G89.29 CHRONIC THORACIC BACK PAIN, UNSPECIFIED BACK PAIN LATERALITY: ICD-10-CM

## 2020-12-23 DIAGNOSIS — M54.6 CHRONIC THORACIC BACK PAIN, UNSPECIFIED BACK PAIN LATERALITY: ICD-10-CM

## 2020-12-23 RX ORDER — GABAPENTIN 100 MG/1
CAPSULE ORAL
Qty: 60 CAPSULE | Refills: 0 | Status: SHIPPED | OUTPATIENT
Start: 2020-12-23 | End: 2021-01-23 | Stop reason: SDUPTHER

## 2020-12-29 ENCOUNTER — OFFICE VISIT (OUTPATIENT)
Dept: FAMILY MEDICINE CLINIC | Facility: CLINIC | Age: 85
End: 2020-12-29

## 2020-12-29 VITALS
HEART RATE: 76 BPM | TEMPERATURE: 97.4 F | BODY MASS INDEX: 25.43 KG/M2 | HEIGHT: 62 IN | WEIGHT: 138.2 LBS | RESPIRATION RATE: 14 BRPM | DIASTOLIC BLOOD PRESSURE: 72 MMHG | SYSTOLIC BLOOD PRESSURE: 142 MMHG

## 2020-12-29 DIAGNOSIS — G44.52 NEW DAILY PERSISTENT HEADACHE: Primary | ICD-10-CM

## 2020-12-29 PROCEDURE — 99213 OFFICE O/P EST LOW 20 MIN: CPT | Performed by: FAMILY MEDICINE

## 2020-12-29 PROCEDURE — 1036F TOBACCO NON-USER: CPT | Performed by: FAMILY MEDICINE

## 2020-12-29 PROCEDURE — 1160F RVW MEDS BY RX/DR IN RCRD: CPT | Performed by: FAMILY MEDICINE

## 2020-12-29 PROCEDURE — 3077F SYST BP >= 140 MM HG: CPT | Performed by: FAMILY MEDICINE

## 2020-12-29 PROCEDURE — 3078F DIAST BP <80 MM HG: CPT | Performed by: FAMILY MEDICINE

## 2021-01-06 DIAGNOSIS — I10 ESSENTIAL HYPERTENSION: ICD-10-CM

## 2021-01-06 RX ORDER — NIFEDIPINE 60 MG/1
TABLET, FILM COATED, EXTENDED RELEASE ORAL
Qty: 90 TABLET | Refills: 0 | Status: SHIPPED | OUTPATIENT
Start: 2021-01-06 | End: 2021-04-06

## 2021-01-06 RX ORDER — LOSARTAN POTASSIUM 100 MG/1
TABLET ORAL
Qty: 90 TABLET | Refills: 0 | Status: SHIPPED | OUTPATIENT
Start: 2021-01-06 | End: 2021-04-06

## 2021-01-07 ENCOUNTER — HOSPITAL ENCOUNTER (OUTPATIENT)
Dept: RADIOLOGY | Facility: HOSPITAL | Age: 86
Discharge: HOME | End: 2021-01-07
Payer: COMMERCIAL

## 2021-01-07 DIAGNOSIS — G44.52 NEW DAILY PERSISTENT HEADACHE: ICD-10-CM

## 2021-01-07 PROCEDURE — G1004 CDSM NDSC: HCPCS

## 2021-01-07 PROCEDURE — 70450 CT HEAD/BRAIN W/O DYE: CPT

## 2021-01-13 DIAGNOSIS — K21.9 GASTROESOPHAGEAL REFLUX DISEASE WITHOUT ESOPHAGITIS: ICD-10-CM

## 2021-01-13 RX ORDER — PANTOPRAZOLE SODIUM 40 MG/1
TABLET, DELAYED RELEASE ORAL
Qty: 60 TABLET | Refills: 1 | Status: SHIPPED | OUTPATIENT
Start: 2021-01-13 | End: 2021-05-25

## 2021-01-20 DIAGNOSIS — G89.29 CHRONIC THORACIC BACK PAIN, UNSPECIFIED BACK PAIN LATERALITY: ICD-10-CM

## 2021-01-20 DIAGNOSIS — M54.6 CHRONIC THORACIC BACK PAIN, UNSPECIFIED BACK PAIN LATERALITY: ICD-10-CM

## 2021-01-20 RX ORDER — GABAPENTIN 100 MG/1
CAPSULE ORAL
Qty: 180 CAPSULE | OUTPATIENT
Start: 2021-01-20

## 2021-01-23 ENCOUNTER — TELEPHONE (OUTPATIENT)
Dept: OTHER | Facility: HOSPITAL | Age: 86
End: 2021-01-23

## 2021-01-23 ENCOUNTER — TELEPHONE (OUTPATIENT)
Dept: OTHER | Facility: OTHER | Age: 86
End: 2021-01-23

## 2021-01-23 DIAGNOSIS — M54.6 CHRONIC THORACIC BACK PAIN, UNSPECIFIED BACK PAIN LATERALITY: ICD-10-CM

## 2021-01-23 DIAGNOSIS — G89.29 CHRONIC THORACIC BACK PAIN, UNSPECIFIED BACK PAIN LATERALITY: ICD-10-CM

## 2021-01-23 RX ORDER — GABAPENTIN 100 MG/1
200 CAPSULE ORAL
Qty: 60 CAPSULE | Refills: 0 | Status: SHIPPED | OUTPATIENT
Start: 2021-01-23 | End: 2021-02-18

## 2021-01-23 NOTE — TELEPHONE ENCOUNTER
Pt  [facility]/: 1934  Callback @[phone] Pt  is requesting a refill of medication: gabapentin (NEURONTIN) 100 mg capsule to pharmacy on file: Pharmacy: [facility], [address] [place] [address]  Phone: [phone]    Pt  is under the care of, Arlyn Brown MD/Family [address]our

## 2021-01-23 NOTE — TELEPHONE ENCOUNTER
[facility]ab[facility]entin 100 mg  Refill sent to pharm at [facility] 
Initial

## 2021-02-04 DIAGNOSIS — I16.0 HYPERTENSIVE URGENCY: ICD-10-CM

## 2021-02-05 RX ORDER — NIFEDIPINE 30 MG/1
TABLET, FILM COATED, EXTENDED RELEASE ORAL
Qty: 30 TABLET | Refills: 5 | Status: SHIPPED | OUTPATIENT
Start: 2021-02-05 | End: 2021-08-12

## 2021-02-12 DIAGNOSIS — Z23 ENCOUNTER FOR IMMUNIZATION: ICD-10-CM

## 2021-02-17 ENCOUNTER — HOSPITAL ENCOUNTER (OUTPATIENT)
Dept: NON INVASIVE DIAGNOSTICS | Facility: CLINIC | Age: 86
Discharge: HOME | End: 2021-02-17
Payer: COMMERCIAL

## 2021-02-17 DIAGNOSIS — I70.213 ATHEROSCLEROSIS OF NATIVE ARTERY OF BOTH LOWER EXTREMITIES WITH INTERMITTENT CLAUDICATION (HCC): ICD-10-CM

## 2021-02-17 DIAGNOSIS — I65.23 BILATERAL CAROTID ARTERY STENOSIS: ICD-10-CM

## 2021-02-17 DIAGNOSIS — I73.9 PAD (PERIPHERAL ARTERY DISEASE) (HCC): ICD-10-CM

## 2021-02-17 PROCEDURE — 93925 LOWER EXTREMITY STUDY: CPT | Performed by: SURGERY

## 2021-02-17 PROCEDURE — 93978 VASCULAR STUDY: CPT

## 2021-02-17 PROCEDURE — 93925 LOWER EXTREMITY STUDY: CPT

## 2021-02-17 PROCEDURE — 93880 EXTRACRANIAL BILAT STUDY: CPT

## 2021-02-17 PROCEDURE — 93923 UPR/LXTR ART STDY 3+ LVLS: CPT

## 2021-02-17 PROCEDURE — 93922 UPR/L XTREMITY ART 2 LEVELS: CPT | Performed by: SURGERY

## 2021-02-18 DIAGNOSIS — M54.6 CHRONIC THORACIC BACK PAIN, UNSPECIFIED BACK PAIN LATERALITY: ICD-10-CM

## 2021-02-18 DIAGNOSIS — G89.29 CHRONIC THORACIC BACK PAIN, UNSPECIFIED BACK PAIN LATERALITY: ICD-10-CM

## 2021-02-18 PROCEDURE — 93978 VASCULAR STUDY: CPT | Performed by: SURGERY

## 2021-02-18 PROCEDURE — 93880 EXTRACRANIAL BILAT STUDY: CPT | Performed by: SURGERY

## 2021-02-18 RX ORDER — GABAPENTIN 100 MG/1
CAPSULE ORAL
Qty: 60 CAPSULE | Refills: 0 | Status: SHIPPED | OUTPATIENT
Start: 2021-02-18 | End: 2021-03-22

## 2021-02-19 ENCOUNTER — TELEPHONE (OUTPATIENT)
Dept: VASCULAR SURGERY | Facility: CLINIC | Age: 86
End: 2021-02-19

## 2021-02-19 NOTE — TELEPHONE ENCOUNTER
----- Message from Mine Shearer sent at 2/19/2021  9:53 AM EST -----  Call patient for OV with Haseeb Andrews

## 2021-02-23 ENCOUNTER — TELEPHONE (OUTPATIENT)
Dept: SURGICAL ONCOLOGY | Facility: CLINIC | Age: 86
End: 2021-02-23

## 2021-02-24 ENCOUNTER — OFFICE VISIT (OUTPATIENT)
Dept: SURGICAL ONCOLOGY | Facility: CLINIC | Age: 86
End: 2021-02-24
Payer: COMMERCIAL

## 2021-02-24 VITALS
HEIGHT: 62 IN | WEIGHT: 141 LBS | BODY MASS INDEX: 25.95 KG/M2 | DIASTOLIC BLOOD PRESSURE: 72 MMHG | TEMPERATURE: 96.7 F | SYSTOLIC BLOOD PRESSURE: 150 MMHG | RESPIRATION RATE: 18 BRPM | HEART RATE: 71 BPM

## 2021-02-24 DIAGNOSIS — Z85.3 HISTORY OF LEFT BREAST CANCER: ICD-10-CM

## 2021-02-24 DIAGNOSIS — Z08 ENCOUNTER FOR FOLLOW-UP EXAMINATION AFTER COMPLETED TREATMENT FOR MALIGNANT NEOPLASM: Primary | ICD-10-CM

## 2021-02-24 PROCEDURE — 99213 OFFICE O/P EST LOW 20 MIN: CPT | Performed by: NURSE PRACTITIONER

## 2021-02-24 NOTE — PROGRESS NOTES
Surgical Oncology Follow Up       [address] [facility] SURGICAL ONCOLOGY [address] [place] [address]    Dorita Osorio  1934  [phone]  [address] [facility] SURGICAL ONCOLOGY [place] [id number]    Chief Complaint   Patient presents with    Breast Cancer     Pt is here for 6 month f/u       Assessment/Plan:  1  Encounter for follow-up examination after completed treatment for malignant neoplasm      2  History of left breast cancer  - 6 mo f/u visit  - Mammo diagnostic bilateral w 3d & cad; Future      Discussion/Summary: Patient is an 71-year-old female who presents today for a six-month follow-up visit for 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  Her pathology revealed mucinous carcinoma, ER/%, her 2-   She underwent a left lumpectomy by Dr Helena New [place]mer[place] did not receive adjuvant therapy   She had a bilateral mammogram August 6, 2020 which was BI-RADS 2, category 3 density  She offers no new complaints today and there are no concerns on today's exam   I will order a bilateral mammogram for August and plan to see the patient back again in 6 months or sooner should the need arise  She was instructed to call with any new concerns or symptoms prior to that time  All of her questions were answered today  History of Present Illness:     Oncology History   History of left breast cancer   8/10/2018 Biopsy    Left breast biopsy  Invasive mucinous  Grade 1      Her 2 1+     9/11/2018 Surgery    Left lumpectomy  Invasive mucinous carcinoma  Grade 1  1 1 cm in size  Margins negative      Her 2 1+  Stage 1A     10/4/2018 -  Hormone Therapy    Dr Walter Burgos  No benefit of AI          -Interval History:  Patient presents today for follow-up visit for left breast cancer diagnosed in 2018   She notices no changes on her self exam   She denies persistent headaches, back pain, cough, shortness of breath, abdominal pain  She does report chronic arthralgias  Review of Systems:  Review of Systems   Constitutional: Negative for activity change, appetite change, chills, fatigue, fever and unexpected weight change  Respiratory: Negative for cough and shortness of breath  Cardiovascular: Negative for chest pain  Gastrointestinal: Negative for abdominal pain, constipation, diarrhea, nausea and vomiting  Musculoskeletal: Positive for arthralgias  Negative for back pain, gait problem and myalgias  Skin: Negative for color change and rash  Neurological: Negative for dizziness and headaches  Hematological: Negative for adenopathy  Psychiatric/Behavioral: Negative for agitation and confusion  All other systems reviewed and are negative        Patient Active Problem List   Diagnosis    Vocal cord mass    Iliac artery stenosis, bilateral ([facility])    Hyperparathyroidism ([facility])    GERD (gastroesophageal reflux disease)    Arthritis    Hypercholesterolemia    Hypertension    EKG abnormalities    Anemia    Neck pain    Dysuria    Chronic left shoulder pain    Fall from other slipping, tripping, or stumbling    Abnormal EKG    Abnormal mammogram    Accelerated essential hypertension    Allergic rhinitis    Atherosclerosis of native artery of extremity with intermittent claudication ([facility])    Back pain    Bilateral carotid artery stenosis    Carotid stenosis, asymptomatic    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 )    DMII (diabetes mellitus, type 2) ([facility])    Dysphagia    Epigastric pain    Fever of unknown origin    Gastritis    Gout    Hypomagnesemia    Atherosclerosis of other specified arteries    Lymphadenopathy    Microalbuminuria    Nephrolithiasis  Neuropathic pain, leg, right    Osteoporosis    PAD (peripheral artery disease) ([facility] [address] )    Primary generalized (osteo)arthritis    Persistent proteinuria    Renal cyst    Secondary hyperparathyroidism, renal (HCC)    Vaginal prolapse    Vitamin D deficiency    Acquired complex cyst of kidney    Gastroesophageal reflux disease without esophagitis    History of left breast cancer    Type 2 diabetes mellitus with diabetic peripheral angiopathy without gangrene ([facility])    Stricture of artery ([facility])    Claudication in peripheral vascular disease ([facility])    Hemorrhoids    Tinea unguium    Diabetic neuropathy (HCC)    Bradycardia    Hyperkalemia    Pain in tooth    Dysphonia    Glottic insufficiency    Reflux laryngitis    Paresis of left vocal fold    Muscle tension dysphonia    Laryngocele    Anterior glottic web    Chest pain    Palpitations    Encounter for follow-up examination after completed treatment for malignant neoplasm    Respiratory crackles    Allergic conjunctivitis of both eyes    Viral upper respiratory tract infection    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HCC)    Renal artery stenosis ([facility])    Left ear pain    Itching of ear    Cervical spine disease    Cervicogenic headache    Glossodynia    Medicare annual wellness visit, subsequent    Burning sensation    Osteopenia of multiple sites    Anemia due to stage 3b chronic kidney disease    New daily persistent headache     Past Medical History:   Diagnosis Date    Arthritis     Back pain     Benign essential hypertension     LAST ASSESSED: [id number]    Carotid artery stenosis     CKD (chronic kidney disease) stage 2, GFR 60-89 ml/min     Constipation, chronic     "drinks prune juice"    Diabetes mellitus ([facility])     DMII (diabetes mellitus, type 2) ([facility])     Epiglottitis     RESOLVED: [id number]    Gastritis     GERD (gastroesophageal reflux disease)     Gout     H/O blood clots     left lower leg,,,11 yrs ago  History of stenosis of renal artery     RESOLVED: [id number]    Hypercholesteremia     Hypercholesterolemia     Hyperlipidemia     Hyperparathyroidism ([facility] [facility] )     Hypertension     Iliac artery stenosis, bilateral (HCC)     Kidney stone     Lobular carcinoma (HCC) 09/06/2017    Left    Mucinous carcinoma of breast, left ([facility] [address] ) 09/11/2018    Left    Presence of pessary     Renal artery stenosis (HCC)     Renal disorder     Renovascular hypertension     RESOLVED: [id number]    Segmental and somatic dysfunction of cervical region     RESOLVED: [id number]    Segmental and somatic dysfunction of lumbar region     RESOLVED: [id number]    Segmental and somatic dysfunction of pelvic region     RESOLVED: [id number]    Segmental and somatic dysfunction of thoracic region     RESOLVED: [id number]    Somatic dysfunction of abdominal region     RESOLVED: [id number]    Somatic dysfunction of head region     RESOLVED: [id number]    Somatic dysfunction of lower extremities     RESOLVED: 56AUU4007    Somatic dysfunction of rib region     RESOLVED: 08CLN2184    Somatic dysfunction of thoracic region     RESOLVED: 37GWX8862    Somatic dysfunction of upper extremities     RESOLVED: [id number]    Teeth missing      Past Surgical History:   Procedure Laterality Date    ANGIOPLASTY / STENTING ILIAC      BREAST BIOPSY Left 08/10/2018    U/S BX    BREAST BIOPSY Left 08/07/2017    U/S BX    BREAST LUMPECTOMY Left 09/06/2017    LAST ASSESSED: [id number]    BREAST LUMPECTOMY Left 09/11/2018    BREAST SURGERY Left     biopsy    CATARACT EXTRACTION Bilateral     CHOLECYSTECTOMY      COLONOSCOPY      EYE SURGERY      ILIAC VEIN ANGIOPLASTY / STENTING Right     INITIAL STENOSIS: ONSET: [id number]    KNEE ARTHROSCOPY Right     KNEE SURGERY Right     ONSET: [id number]    MAMMO NEEDLE LOCALIZATION LEFT (ALL INC) Left 9/11/2018    MAMMO NEEDLE LOCALIZATION LEFT (ALL INC) Left 9/6/2017    ME ESOPHAGOGASTRODUODENOSCOPY TRANSORAL DIAGNOSTIC N/A 7/26/2018    Procedure: ESOPHAGOGASTRODUODENOSCOPY (EGD); Surgeon: Trinidad Freire DO;  [facility]; Service: Gastroenterology    FL LARYNGOSCOPY,DIRCT,OP [facility] N/A 6/10/2016    Procedure: MICRO DIRECT LARYNGOSCOPY WITH VOCAL FOLD MASS EXCISION ;  Surgeon: Prashanth Collins MD;  [facility];  Service: ENT    FL MASTECTOMY, PARTIAL Left 9/6/2017    Procedure: LUMPECTOMY BREAST NEEDLE LOCALIZED WITH FAXITRON NEEDLE @ 0830;  Surgeon: Chza Villegas MD;  [facility];  Service: General    FL PERQ DEVICE PLACEMT BREAST LOC 1ST LES W [place] Left 9/11/2018    Procedure: BREAST LUMPECTOMY; BREAST NEEDLE LOCALIZATION (NEEDLE LOC AT 1330);   Surgeon: Jack Bravo MD;  [facility];  Service: Surgical Oncology    THROAT SURGERY      lump removed    TUBAL LIGATION      UPPER GASTROINTESTINAL ENDOSCOPY      US GUIDANCE BREAST BIOPSY LEFT EACH ADDITIONAL Left 8/7/2017    US GUIDED BREAST BIOPSY LEFT COMPLETE Left 8/10/2018    US GUIDED BREAST BIOPSY LEFT COMPLETE Left 8/7/2017    VASCULAR SURGERY       Family History   Problem Relation Age of Onset    Heart disease Brother     Heart disease Maternal Grandmother     Diabetes Other         of other type with arthropathy, without long term current use of insulin    Hypertension Other     No Known Problems Mother     Gout Family     Rheum arthritis Family     Lupus Family         systematic erythematosus    Heart disease Family     Stroke Family         syndrome    Stroke Maternal Uncle         syndrome    Stroke Maternal Aunt         syndrome    No Known Problems Father     No Known Problems Daughter     No Known Problems Maternal Grandfather     No Known Problems Paternal Grandmother     No Known Problems Paternal Grandfather     No Known Problems Daughter     No Known Problems Daughter     No Known Problems Maternal Aunt     No Known Problems Maternal Aunt     No Known Problems Maternal Aunt      Social History     Socioeconomic History    Marital status:      Spouse name: Not on file    Number of children: Not on file    Years of education: Not on file    Highest education level: Not on file   Occupational History    Occupation: retired   Social Needs    Financial resource strain: Not hard at all   [place] insecurity     Worry: Never true     Inability: Never true   [employer] needs     Medical: No     Non-medical: No   Tobacco Use    Smoking status: Former Smoker     Packs/day: 1 00     Years: 64 00     Pack years: 64 00     Quit date:      Years since quittin 1    Smokeless tobacco: Never Used    Tobacco comment: quit in  1 ppd x 50 years   Substance and Sexual Activity    Alcohol use: Not Currently     Frequency: Never    Drug use: No    Sexual activity: Yes     Partners: Male   Lifestyle    Physical activity     Days per week: 0 days     Minutes per session: 0 min    Stress:  Only a little   Relationships    Social connections     Talks on phone: More than three times a week     Gets together: More than three times a week     Attends Baptism service: Not on file     Active member of club or organization: Not on file     Attends meetings of clubs or organizations: Not on file     Relationship status: Not on file    Intimate partner violence     Fear of current or ex partner: Not on file     Emotionally abused: Not on file     Physically abused: Not on file     Forced sexual activity: Not on file   Other Topics Concern    Not on file   Social History Narrative    Daily caffeine consumption, 1 serving a day        Current Outpatient Medications:     Acetaminophen (TYLENOL) 325 MG CAPS, Take 500 mg by mouth every 8 (eight) hours , Disp: , Rfl:     aspirin (ADULT ASPIRIN EC LOW STRENGTH) 81 mg EC tablet, Take 1 tablet by mouth daily, Disp: , Rfl:     Blood Glucose Monitoring Suppl (FreeStyle Precision Jalen System) w/Device KIT, by Does not apply route 3 (three) times a day, Disp: 1 kit, Rfl: 0    calcitriol (ROCALTROL) 0 25 mcg capsule, TAKE 1 CAPSULE TWICE A WEEK EVERY TUESDAY AND FRIDAY, Disp: 10 capsule, Rfl: 6    calcium carbonate-vitamin D (OSCAL-D) 500 mg-200 units per tablet, Take 1 tablet by mouth daily with breakfast, Disp: , Rfl:     diclofenac sodium (VOLTAREN) 1 %, Apply 2 g topically 4 (four) times a day, Disp: 100 g, Rfl: 1    docusate sodium (COLACE) 100 mg capsule, Take 1 capsule (100 mg total) by mouth 2 (two) times a day, Disp: 10 capsule, Rfl: 0    DULoxetine (CYMBALTA) 30 mg delayed release capsule, Take 30 mg by mouth daily, Disp: , Rfl: 0    fluticasone (FLONASE) 50 mcg/act nasal spray, 1 spray into each nostril daily, Disp: 16 g, Rfl: 2    gabapentin (NEURONTIN) 100 mg capsule, TAKE 2 CAPSULES BY MOUTH DAILY AT BEDTIME, Disp: 60 capsule, Rfl: 0    glucose blood (FreeStyle Precision Jalen Test) test strip, 1 each by Other route 3 (three) times a day Use as instructed, Disp: 300 each, Rfl: 2    Lancets (ONETOUCH DELICA PLUS [id number]) MISC, 1 Device by Does not apply route 2 (two) times a day, Disp: 100 each, Rfl: 5    Lancets (ONETOUCH ULTRASOFT) lancets, by Other route 2 (two) times a day Use as instructed, Disp: 100 each, Rfl: 5    losartan (COZAAR) 100 MG tablet, take 1 tablet by mouth once daily, Disp: 90 tablet, Rfl: 0    NIFEdipine ER (ADALAT CC) 30 MG 24 hr tablet, Take one tablet once daily at night in addition to the 60 mg   In the morning, Disp: 30 tablet, Rfl: 5    NIFEdipine ER (ADALAT CC) 60 MG 24 hr tablet, take 1 tablet by mouth once daily, Disp: 90 tablet, Rfl: 0    olopatadine (PATANOL) 0 1 % ophthalmic solution, Administer 1 drop to both eyes 2 (two) times a day, Disp: 5 mL, Rfl: 0    ONE TOUCH ULTRA TEST test strip, 1 each by Other route 2 (two) times a day Use as instructed, Disp: 100 each, Rfl: 5    OneTouch Delica Lancets 25F MISC, Inject as directed 2 (two) times a day Use to test, Disp: 200 each, Rfl: 0    pantoprazole (PROTONIX) 40 mg tablet, take 1 tablet by mouth once daily, Disp: 60 tablet, Rfl: 1    polyethylene glycol (GLYCOLAX) 17 GM/SCOOP powder, Take 17 g by mouth daily, Disp: 225 g, Rfl: 1    pravastatin (PRAVACHOL) 40 mg tablet, take 1 tablet by mouth once daily, Disp: 90 tablet, Rfl: 1    senna (SENOKOT) 8 6 mg, Take 1 tablet (8 6 mg total) by mouth daily at bedtime as needed for constipation, Disp: 120 each, Rfl: 0  Allergies   Allergen Reactions    Clonidine Derivatives Swelling    Diflucan [Fluconazole] Rash    Flagyl [Metronidazole] Anaphylaxis    Carvedilol Hives     And legs swelled    Latex Rash    Lipitor [Atorvastatin]      Legs swelled    Other Palpitations     IV DYE     Vitals:    02/24/21 1446   BP: 150/72   Pulse: 71   Resp: 18   Temp: (!) 96 7 °F (35 9 °C)       Physical Exam  Vitals signs reviewed  Constitutional:       General: She is not in acute distress  Appearance: Normal appearance  She is well-developed  She is not diaphoretic  HENT:      Head: Normocephalic and atraumatic  Neck:      Musculoskeletal: Normal range of motion  Cardiovascular:      Rate and Rhythm: Normal rate and regular rhythm  Heart sounds: Normal heart sounds  Pulmonary:      Effort: Pulmonary effort is normal       Breath sounds: Normal breath sounds  Chest:      Breasts:         Right: No swelling, bleeding, inverted nipple, mass, nipple discharge, skin change or tenderness  Left: Skin change (surgical scars) present  No swelling, bleeding, inverted nipple, mass, nipple discharge or tenderness  Abdominal:      Palpations: Abdomen is soft  There is no mass  Tenderness: There is no abdominal tenderness  Musculoskeletal: Normal range of motion  Lymphadenopathy:      Upper Body:      Right upper body: No supraclavicular or axillary adenopathy  Left upper body: No supraclavicular or axillary adenopathy  Skin:     General: Skin is warm and dry  Findings: No rash  Neurological:      Mental Status: She is alert and oriented to person, place, and time  Psychiatric:         Speech: Speech normal            Advance Care Planning/Advance Directives:  Discussed disease status, cancer treatment plans and/or cancer treatment goals with the patient

## 2021-02-26 DIAGNOSIS — N25.81 SECONDARY HYPERPARATHYROIDISM (HCC): ICD-10-CM

## 2021-02-26 RX ORDER — CALCITRIOL 0.25 UG/1
CAPSULE, LIQUID FILLED ORAL
Qty: 10 CAPSULE | Refills: 6 | Status: SHIPPED | OUTPATIENT
Start: 2021-02-26 | End: 2022-05-02 | Stop reason: SDUPTHER

## 2021-03-03 ENCOUNTER — TELEPHONE (OUTPATIENT)
Dept: FAMILY MEDICINE CLINIC | Facility: CLINIC | Age: 86
End: 2021-03-03

## 2021-03-09 ENCOUNTER — IMMUNIZATIONS (OUTPATIENT)
Dept: FAMILY MEDICINE CLINIC | Facility: HOSPITAL | Age: 86
End: 2021-03-09

## 2021-03-09 DIAGNOSIS — Z23 ENCOUNTER FOR IMMUNIZATION: Primary | ICD-10-CM

## 2021-03-09 PROCEDURE — 0001A SARS-COV-2 / COVID-19 MRNA VACCINE (PFIZER-BIONTECH) 30 MCG: CPT

## 2021-03-09 PROCEDURE — 91300 SARS-COV-2 / COVID-19 MRNA VACCINE (PFIZER-BIONTECH) 30 MCG: CPT

## 2021-03-10 ENCOUNTER — OFFICE VISIT (OUTPATIENT)
Dept: VASCULAR SURGERY | Facility: CLINIC | Age: 86
End: 2021-03-10
Payer: COMMERCIAL

## 2021-03-10 VITALS
WEIGHT: 140 LBS | SYSTOLIC BLOOD PRESSURE: 124 MMHG | DIASTOLIC BLOOD PRESSURE: 60 MMHG | TEMPERATURE: 98.1 F | BODY MASS INDEX: 25.76 KG/M2 | HEART RATE: 70 BPM | HEIGHT: 62 IN

## 2021-03-10 DIAGNOSIS — I65.23 CAROTID STENOSIS, ASYMPTOMATIC, BILATERAL: ICD-10-CM

## 2021-03-10 DIAGNOSIS — K55.1 MESENTERIC ARTERY STENOSIS (HCC): Primary | ICD-10-CM

## 2021-03-10 DIAGNOSIS — I73.9 PERIPHERAL ARTERIAL DISEASE (HCC): ICD-10-CM

## 2021-03-10 PROCEDURE — 1036F TOBACCO NON-USER: CPT | Performed by: SURGERY

## 2021-03-10 PROCEDURE — 1160F RVW MEDS BY RX/DR IN RCRD: CPT | Performed by: SURGERY

## 2021-03-10 PROCEDURE — 99213 OFFICE O/P EST LOW 20 MIN: CPT | Performed by: SURGERY

## 2021-03-10 NOTE — PROGRESS NOTES
Assessment/Plan:    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HCC)  Duplex evidence of mesenteric stenosis (Celiac and SMA) however no symptoms of mesenteric ischemia  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)  Symptoms remain stable since last visit, can walk 10 minutes prior to experiencing calf pain  Recommend ongoing surveillance with medical management and walking regimen  Bilateral carotid artery stenosis  Stable asymptomatic carotid stenosis bilaterally  Recommend ongoing surveillance duplex  Diagnoses and all orders for this visit:    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HCC)    Carotid stenosis, asymptomatic, bilateral  -     VAS carotid complete study; Future    Peripheral arterial disease (HCC)  -     VAS abdominal aorta/iliacs; complete study; Future  -     VAS lower limb arterial duplex, complete bilateral; Future          Subjective:      Patient ID: Audra Banks is a 80 y o  female  Patient w/ prior h/o multiple vascular procedures presents today to review LEOBARDO, AOIL and carotid study done 317      80year old female presenting for follow up for surveillance duplex imaging of carotids, aorto-iliac and lower extremity arterial   She currently denies new symptoms  States that she experiences calf pain after walking for ten minutes  Denies symptoms of stroke/CVA  On review of recent duplex imaging on 2/17/21:    Carotid:  Impression  RIGHT:  50-69% stenosis in the internal carotid artery  Plaque is heterogenous and  irregular  There is mild to moderate stenosis in the ECA  Vertebral artery flow is antegrade  There is no significant subclavian artery  disease  LEFT:  There is 50-69% stenosis noted in the internal carotid artery  Plaque is  homogenous and irregular  There is moderate to severe stenosis in the ECA  Vertebral artery flow is antegrade  There is a 50-75 % stenosis in the proximal  subclavian artery      Aorto-Iliac:  Impression  The abdominal aorta is patent and normal in caliber  >70% stenosis identified in the Celiac, superior mesenteric arteries  Unable to identify the common and external iliac arteries due to overlaying  bowel gas and calcification in the external iliacs, bilaterally  Lower Extremity Arterial:  RIGHT LOWER LIMB:  >75% stenosis identified in the common femoral artery and profunda femoris  artery  Ankle/Brachial index:  0 72 (Moderate Range)  Prior 0 65  PVR/ PPG tracings are dampened  Metatarsal pressure of 90mmHg  Prior 64mmHg  Great toe pressure of 69mmHg, within the healing range  Prior 48mmHg  LEFT LOWER LIMB:  >75% stenosis identified in the common femoral artery  There is a 50-75%  stenosis identified in the mid superficial femoral artery  Ankle/Brachial index:   0 76 (Moderate Range)  Prior 0 77  PVR/ PPG tracings are dampened  Metatarsal pressure of 94mmHg  Prior 107mmHg  Great toe pressure of 117mmHg, within the healing range  Prior 66mmHg  Compared to previous study on 06/24/20, there is an increase in the metatarsal  and great toe pressure, bilaterally  The following portions of the patient's history were reviewed and updated as appropriate: allergies, current medications, past family history, past medical history, past social history, past surgical history and problem list     Review of Systems   Constitutional: Negative  HENT: Negative  Eyes: Negative  Respiratory: Negative  Cardiovascular: Negative  Gastrointestinal: Positive for constipation  Endocrine: Negative  Genitourinary: Negative  Musculoskeletal: Negative  Skin: Negative  Allergic/Immunologic: Positive for environmental allergies  Neurological: Negative  Hematological: Negative  Psychiatric/Behavioral: Negative            Objective:      /60 (BP Location: Left arm, Patient Position: Sitting)   Pulse 70   Temp 98 1 °F (36 7 °C) (Tympanic)   Ht 5' 2" (1 575 m)   Wt 63 5 kg (140 lb)   LMP (LMP Unknown)   BMI 25 61 kg/m²          Physical Exam  Constitutional:       General: She is not in acute distress  Appearance: Normal appearance  She is obese  She is not ill-appearing or toxic-appearing  HENT:      Nose: Nose normal    Neck:      Vascular: No carotid bruit  Cardiovascular:      Rate and Rhythm: Normal rate and regular rhythm  Pulses: Normal pulses  Comments: Palpable DP pulses bilaterally (1+)  Pulmonary:      Effort: Pulmonary effort is normal       Breath sounds: No wheezing or rhonchi  Abdominal:      Palpations: Abdomen is soft  Tenderness: There is no abdominal tenderness  Skin:     General: Skin is warm and dry  Capillary Refill: Capillary refill takes less than 2 seconds  Findings: No lesion  Neurological:      General: No focal deficit present  Mental Status: She is alert and oriented to person, place, and time

## 2021-03-10 NOTE — ASSESSMENT & PLAN NOTE
Symptoms remain stable since last visit, can walk 10 minutes prior to experiencing calf pain  Recommend ongoing surveillance with medical management and walking regimen

## 2021-03-18 DIAGNOSIS — E78.5 HYPERLIPIDEMIA, UNSPECIFIED HYPERLIPIDEMIA TYPE: ICD-10-CM

## 2021-03-19 RX ORDER — PRAVASTATIN SODIUM 40 MG
TABLET ORAL
Qty: 90 TABLET | Refills: 1 | Status: SHIPPED | OUTPATIENT
Start: 2021-03-19 | End: 2021-09-09

## 2021-03-21 DIAGNOSIS — M54.6 CHRONIC THORACIC BACK PAIN, UNSPECIFIED BACK PAIN LATERALITY: ICD-10-CM

## 2021-03-21 DIAGNOSIS — G89.29 CHRONIC THORACIC BACK PAIN, UNSPECIFIED BACK PAIN LATERALITY: ICD-10-CM

## 2021-03-22 RX ORDER — GABAPENTIN 100 MG/1
CAPSULE ORAL
Qty: 60 CAPSULE | Refills: 0 | Status: SHIPPED | OUTPATIENT
Start: 2021-03-22 | End: 2021-04-19 | Stop reason: SDUPTHER

## 2021-03-30 ENCOUNTER — IMMUNIZATIONS (OUTPATIENT)
Dept: FAMILY MEDICINE CLINIC | Facility: HOSPITAL | Age: 86
End: 2021-03-30

## 2021-03-30 DIAGNOSIS — Z23 ENCOUNTER FOR IMMUNIZATION: Primary | ICD-10-CM

## 2021-03-30 PROCEDURE — 91300 SARS-COV-2 / COVID-19 MRNA VACCINE (PFIZER-BIONTECH) 30 MCG: CPT

## 2021-03-30 PROCEDURE — 0002A SARS-COV-2 / COVID-19 MRNA VACCINE (PFIZER-BIONTECH) 30 MCG: CPT

## 2021-04-06 DIAGNOSIS — I10 ESSENTIAL HYPERTENSION: ICD-10-CM

## 2021-04-06 RX ORDER — LOSARTAN POTASSIUM 100 MG/1
TABLET ORAL
Qty: 90 TABLET | Refills: 0 | Status: SHIPPED | OUTPATIENT
Start: 2021-04-06 | End: 2021-07-06

## 2021-04-06 RX ORDER — NIFEDIPINE 60 MG/1
TABLET, FILM COATED, EXTENDED RELEASE ORAL
Qty: 90 TABLET | Refills: 0 | Status: SHIPPED | OUTPATIENT
Start: 2021-04-06 | End: 2021-04-14 | Stop reason: SDUPTHER

## 2021-04-07 ENCOUNTER — TELEPHONE (OUTPATIENT)
Dept: NEPHROLOGY | Facility: CLINIC | Age: 86
End: 2021-04-07

## 2021-04-07 NOTE — TELEPHONE ENCOUNTER
Called patient to set-up June F/U appt  , with Dr Sierra Shall  Left voicemail Advised to callback to schedule  He does not have a June/July schedule   Ok to schedule patient in August

## 2021-04-08 ENCOUNTER — APPOINTMENT (OUTPATIENT)
Dept: LAB | Facility: HOSPITAL | Age: 86
End: 2021-04-08
Attending: INTERNAL MEDICINE
Payer: COMMERCIAL

## 2021-04-08 DIAGNOSIS — D63.1 ANEMIA DUE TO STAGE 3B CHRONIC KIDNEY DISEASE (HCC): ICD-10-CM

## 2021-04-08 DIAGNOSIS — N18.32 STAGE 3B CHRONIC KIDNEY DISEASE (HCC): ICD-10-CM

## 2021-04-08 DIAGNOSIS — E21.3 HYPERPARATHYROIDISM (HCC): ICD-10-CM

## 2021-04-08 DIAGNOSIS — N18.32 ANEMIA DUE TO STAGE 3B CHRONIC KIDNEY DISEASE (HCC): ICD-10-CM

## 2021-04-08 LAB
ALBUMIN SERPL BCP-MCNC: 3.3 G/DL (ref 3.5–5)
ALP SERPL-CCNC: 80 U/L (ref 46–116)
ALT SERPL W P-5'-P-CCNC: 18 U/L (ref 12–78)
ANION GAP SERPL CALCULATED.3IONS-SCNC: 9 MMOL/L (ref 4–13)
AST SERPL W P-5'-P-CCNC: 13 U/L (ref 5–45)
BACTERIA UR QL AUTO: ABNORMAL /HPF
BASOPHILS # BLD AUTO: 0.03 THOUSANDS/ΜL (ref 0–0.1)
BASOPHILS NFR BLD AUTO: 1 % (ref 0–1)
BILIRUB SERPL-MCNC: 0.31 MG/DL (ref 0.2–1)
BILIRUB UR QL STRIP: NEGATIVE
BUN SERPL-MCNC: 37 MG/DL (ref 5–25)
CALCIUM ALBUM COR SERPL-MCNC: 9.7 MG/DL (ref 8.3–10.1)
CALCIUM SERPL-MCNC: 9.1 MG/DL (ref 8.3–10.1)
CHLORIDE SERPL-SCNC: 102 MMOL/L (ref 100–108)
CLARITY UR: ABNORMAL
CO2 SERPL-SCNC: 27 MMOL/L (ref 21–32)
COLOR UR: YELLOW
CREAT SERPL-MCNC: 1.52 MG/DL (ref 0.6–1.3)
CREAT UR-MCNC: 117 MG/DL
EOSINOPHIL # BLD AUTO: 0.08 THOUSAND/ΜL (ref 0–0.61)
EOSINOPHIL NFR BLD AUTO: 1 % (ref 0–6)
ERYTHROCYTE [DISTWIDTH] IN BLOOD BY AUTOMATED COUNT: 13.3 % (ref 11.6–15.1)
GFR SERPL CREATININE-BSD FRML MDRD: 31 ML/MIN/1.73SQ M
GLUCOSE P FAST SERPL-MCNC: 195 MG/DL (ref 65–99)
GLUCOSE UR STRIP-MCNC: NEGATIVE MG/DL
HCT VFR BLD AUTO: 36 % (ref 34.8–46.1)
HGB BLD-MCNC: 11.2 G/DL (ref 11.5–15.4)
HGB UR QL STRIP.AUTO: NEGATIVE
HYALINE CASTS #/AREA URNS LPF: ABNORMAL /LPF
IMM GRANULOCYTES # BLD AUTO: 0.01 THOUSAND/UL (ref 0–0.2)
IMM GRANULOCYTES NFR BLD AUTO: 0 % (ref 0–2)
KETONES UR STRIP-MCNC: NEGATIVE MG/DL
LEUKOCYTE ESTERASE UR QL STRIP: ABNORMAL
LYMPHOCYTES # BLD AUTO: 2.8 THOUSANDS/ΜL (ref 0.6–4.47)
LYMPHOCYTES NFR BLD AUTO: 45 % (ref 14–44)
MAGNESIUM SERPL-MCNC: 1.7 MG/DL (ref 1.6–2.6)
MCH RBC QN AUTO: 27.7 PG (ref 26.8–34.3)
MCHC RBC AUTO-ENTMCNC: 31.1 G/DL (ref 31.4–37.4)
MCV RBC AUTO: 89 FL (ref 82–98)
MONOCYTES # BLD AUTO: 0.37 THOUSAND/ΜL (ref 0.17–1.22)
MONOCYTES NFR BLD AUTO: 6 % (ref 4–12)
NEUTROPHILS # BLD AUTO: 2.88 THOUSANDS/ΜL (ref 1.85–7.62)
NEUTS SEG NFR BLD AUTO: 47 % (ref 43–75)
NITRITE UR QL STRIP: NEGATIVE
NON-SQ EPI CELLS URNS QL MICRO: ABNORMAL /HPF
NRBC BLD AUTO-RTO: 0 /100 WBCS
PH UR STRIP.AUTO: 5 [PH]
PHOSPHATE SERPL-MCNC: 4.2 MG/DL (ref 2.3–4.1)
PLATELET # BLD AUTO: 309 THOUSANDS/UL (ref 149–390)
PMV BLD AUTO: 10.9 FL (ref 8.9–12.7)
POTASSIUM SERPL-SCNC: 4.1 MMOL/L (ref 3.5–5.3)
PROT SERPL-MCNC: 8.1 G/DL (ref 6.4–8.2)
PROT UR STRIP-MCNC: ABNORMAL MG/DL
PROT UR-MCNC: 139 MG/DL
PROT/CREAT UR: 1.19 MG/G{CREAT} (ref 0–0.1)
PTH-INTACT SERPL-MCNC: 92.5 PG/ML (ref 18.4–80.1)
RBC # BLD AUTO: 4.04 MILLION/UL (ref 3.81–5.12)
RBC #/AREA URNS AUTO: ABNORMAL /HPF
SODIUM SERPL-SCNC: 138 MMOL/L (ref 136–145)
SP GR UR STRIP.AUTO: 1.02 (ref 1–1.03)
UROBILINOGEN UR QL STRIP.AUTO: 0.2 E.U./DL
WBC # BLD AUTO: 6.17 THOUSAND/UL (ref 4.31–10.16)
WBC #/AREA URNS AUTO: ABNORMAL /HPF

## 2021-04-08 PROCEDURE — 83735 ASSAY OF MAGNESIUM: CPT

## 2021-04-08 PROCEDURE — 82570 ASSAY OF URINE CREATININE: CPT

## 2021-04-08 PROCEDURE — 84156 ASSAY OF PROTEIN URINE: CPT

## 2021-04-08 PROCEDURE — 85025 COMPLETE CBC W/AUTO DIFF WBC: CPT

## 2021-04-08 PROCEDURE — 80053 COMPREHEN METABOLIC PANEL: CPT

## 2021-04-08 PROCEDURE — 36415 COLL VENOUS BLD VENIPUNCTURE: CPT

## 2021-04-08 PROCEDURE — 81001 URINALYSIS AUTO W/SCOPE: CPT

## 2021-04-08 PROCEDURE — 84100 ASSAY OF PHOSPHORUS: CPT

## 2021-04-08 PROCEDURE — 83970 ASSAY OF PARATHORMONE: CPT

## 2021-04-12 ENCOUNTER — TELEPHONE (OUTPATIENT)
Dept: NEPHROLOGY | Facility: CLINIC | Age: 86
End: 2021-04-12

## 2021-04-12 DIAGNOSIS — R82.81 PYURIA: Primary | ICD-10-CM

## 2021-04-12 NOTE — TELEPHONE ENCOUNTER
Talked with pt   She will go for UA culture tomorrow    Has no symptoms but notes foul smelling urine       ----- Message from Bre Spring DO sent at 4/12/2021  3:21 PM EDT -----  I am going to order a urine culture

## 2021-04-13 DIAGNOSIS — I10 ESSENTIAL HYPERTENSION: ICD-10-CM

## 2021-04-14 RX ORDER — NIFEDIPINE 60 MG/1
TABLET, FILM COATED, EXTENDED RELEASE ORAL
Qty: 90 TABLET | Refills: 0 | Status: SHIPPED | OUTPATIENT
Start: 2021-04-14 | End: 2021-09-07

## 2021-04-19 ENCOUNTER — APPOINTMENT (OUTPATIENT)
Dept: LAB | Facility: HOSPITAL | Age: 86
End: 2021-04-19
Attending: INTERNAL MEDICINE
Payer: COMMERCIAL

## 2021-04-19 DIAGNOSIS — M54.6 CHRONIC THORACIC BACK PAIN, UNSPECIFIED BACK PAIN LATERALITY: ICD-10-CM

## 2021-04-19 DIAGNOSIS — G89.29 CHRONIC THORACIC BACK PAIN, UNSPECIFIED BACK PAIN LATERALITY: ICD-10-CM

## 2021-04-19 DIAGNOSIS — R82.81 PYURIA: ICD-10-CM

## 2021-04-19 PROCEDURE — 87086 URINE CULTURE/COLONY COUNT: CPT

## 2021-04-19 RX ORDER — GABAPENTIN 100 MG/1
200 CAPSULE ORAL
Qty: 180 CAPSULE | Refills: 1 | Status: SHIPPED | OUTPATIENT
Start: 2021-04-19 | End: 2022-05-02 | Stop reason: SDUPTHER

## 2021-04-19 NOTE — TELEPHONE ENCOUNTER
Patient's son called  Requesting a refill on  Gabepentin  Patients son states patient is taking 200 mg a day and wants a 90 day supply so they don thave to refill every month   Any questions may call [phone]

## 2021-04-21 LAB — BACTERIA UR CULT: NORMAL

## 2021-04-22 ENCOUNTER — TELEPHONE (OUTPATIENT)
Dept: NEPHROLOGY | Facility: CLINIC | Age: 86
End: 2021-04-22

## 2021-04-22 NOTE — TELEPHONE ENCOUNTER
called for UA culture results  Pt notes urinary burning intermittently  Denies any other symptoms  Also asking about her Hgb

## 2021-05-07 ENCOUNTER — TELEPHONE (OUTPATIENT)
Dept: FAMILY MEDICINE CLINIC | Facility: CLINIC | Age: 86
End: 2021-05-07

## 2021-05-07 NOTE — TELEPHONE ENCOUNTER
Patient called for refill on test strips OneTouch Ultra  Please send to [facility] on [address]   Patient does not have any more test strips

## 2021-05-10 DIAGNOSIS — E11.9 TYPE 2 DIABETES MELLITUS WITHOUT COMPLICATION, WITHOUT LONG-TERM CURRENT USE OF INSULIN (HCC): Primary | ICD-10-CM

## 2021-05-10 NOTE — TELEPHONE ENCOUNTER
Patient's son called back, patient would like to have test strips to check blood sugars as needed  Are you able to order?

## 2021-05-10 NOTE — TELEPHONE ENCOUNTER
Test strips sent to pharmacy  I have discussed with patient not to check BG everyday since she is not on insulin and actually she is not taking any medications for diabetes at this time  Please encourage her to just check if she has any dizziness, diaphoresis, or other symptoms   Thank you

## 2021-05-25 DIAGNOSIS — K21.9 GASTROESOPHAGEAL REFLUX DISEASE WITHOUT ESOPHAGITIS: ICD-10-CM

## 2021-05-25 RX ORDER — PANTOPRAZOLE SODIUM 40 MG/1
TABLET, DELAYED RELEASE ORAL
Qty: 60 TABLET | Refills: 1 | Status: SHIPPED | OUTPATIENT
Start: 2021-05-25 | End: 2021-09-20

## 2021-07-06 DIAGNOSIS — I10 ESSENTIAL HYPERTENSION: ICD-10-CM

## 2021-07-06 RX ORDER — LOSARTAN POTASSIUM 100 MG/1
TABLET ORAL
Qty: 90 TABLET | Refills: 0 | Status: SHIPPED | OUTPATIENT
Start: 2021-07-06 | End: 2021-10-06

## 2021-07-22 ENCOUNTER — OFFICE VISIT (OUTPATIENT)
Dept: PODIATRY | Facility: CLINIC | Age: 86
End: 2021-07-22
Payer: COMMERCIAL

## 2021-07-22 VITALS
HEIGHT: 62 IN | BODY MASS INDEX: 26.31 KG/M2 | WEIGHT: 143 LBS | DIASTOLIC BLOOD PRESSURE: 66 MMHG | SYSTOLIC BLOOD PRESSURE: 184 MMHG | HEART RATE: 81 BPM

## 2021-07-22 DIAGNOSIS — E11.9 CONTROLLED TYPE 2 DIABETES MELLITUS WITHOUT COMPLICATION, WITHOUT LONG-TERM CURRENT USE OF INSULIN (HCC): ICD-10-CM

## 2021-07-22 DIAGNOSIS — M79.672 LEFT FOOT PAIN: ICD-10-CM

## 2021-07-22 DIAGNOSIS — M72.2 PLANTAR FASCIITIS: Primary | ICD-10-CM

## 2021-07-22 PROCEDURE — 20550 NJX 1 TENDON SHEATH/LIGAMENT: CPT | Performed by: PODIATRIST

## 2021-07-22 PROCEDURE — 1160F RVW MEDS BY RX/DR IN RCRD: CPT | Performed by: PODIATRIST

## 2021-07-22 PROCEDURE — 99213 OFFICE O/P EST LOW 20 MIN: CPT | Performed by: PODIATRIST

## 2021-07-22 RX ORDER — LIDOCAINE HYDROCHLORIDE 10 MG/ML
1 INJECTION, SOLUTION EPIDURAL; INFILTRATION; INTRACAUDAL; PERINEURAL ONCE
Status: COMPLETED | OUTPATIENT
Start: 2021-07-22 | End: 2021-07-22

## 2021-07-22 RX ORDER — TRIAMCINOLONE ACETONIDE 40 MG/ML
20 INJECTION, SUSPENSION INTRA-ARTICULAR; INTRAMUSCULAR ONCE
Status: COMPLETED | OUTPATIENT
Start: 2021-07-22 | End: 2021-07-22

## 2021-07-22 RX ORDER — BUPIVACAINE HYDROCHLORIDE 5 MG/ML
1 INJECTION, SOLUTION EPIDURAL; INTRACAUDAL ONCE
Status: COMPLETED | OUTPATIENT
Start: 2021-07-22 | End: 2021-07-22

## 2021-07-22 RX ADMIN — LIDOCAINE HYDROCHLORIDE 1 ML: 10 INJECTION, SOLUTION EPIDURAL; INFILTRATION; INTRACAUDAL; PERINEURAL at 16:07

## 2021-07-22 RX ADMIN — TRIAMCINOLONE ACETONIDE 20 MG: 40 INJECTION, SUSPENSION INTRA-ARTICULAR; INTRAMUSCULAR at 16:06

## 2021-07-22 RX ADMIN — BUPIVACAINE HYDROCHLORIDE 1 ML: 5 INJECTION, SOLUTION EPIDURAL; INTRACAUDAL at 16:06

## 2021-07-22 NOTE — PROGRESS NOTES
Assessment/Plan:      Explained the patient that her symptoms are that of plantar fasciitis of the left foot  Recommended stretching exercises and the wearing of supportive shoes  Injected left heel with 0 5 cc Kenalog 40 along with 1 cc 1% xylocaine and 1 cc 0 5% Marcaine  No problem-specific Assessment & Plan notes found for this encounter  Diagnoses and all orders for this visit:    Plantar fasciitis  -     lidocaine (PF) (XYLOCAINE-MPF) 1 % injection 1 mL  -     bupivacaine (PF) (MARCAINE) 0 5 % injection 1 mL  -     triamcinolone acetonide (KENALOG-40) 40 mg/mL injection 20 mg    Left foot pain    Controlled type 2 diabetes mellitus without complication, without long-term current use of insulin (HCC)          Subjective:      Patient ID: Alexandra Marrero is a 80 y o  female  HPI       Patient, an 51-year-old female who primarily speaks Sammarinese presents with an   Patient is diet controlled for diabetes  She relates left heel pain present for approximately 2 months  Pain present only when walking  Mild right heel pain also reported  At times left ankle is also painful  I personally reviewed A1c dated 10/21/2020  It was 6 3     I personally reviewed a comprehensive metabolic panel dated 80/97/3317  Blood sugar was 195  BUN and creatinine were both elevated at 37 and 1 52 respectively  The following portions of the patient's history were reviewed and updated as appropriate: allergies, current medications, past family history, past medical history, past social history, past surgical history and problem list     Review of Systems   Eyes:        Diabetic retinopathy   Gastrointestinal:        GERD; gastritis   Genitourinary:        Renal disease         Objective:      BP (!) 184/66   Pulse 81   Ht 5' 2" (1 575 m)   Wt 64 9 kg (143 lb)   LMP  (LMP Unknown)   BMI 26 16 kg/m²          Physical Exam  Constitutional:       Appearance: Normal appearance     Cardiovascular: Pulses: Normal pulses  Musculoskeletal:         General: Tenderness present  Comments: Pain with palpation medial and central aspect left heel at fascia insertion into the calcaneus  Left ankle is edematous  Minimal pain with palpation of the ankle however   Skin:     General: Skin is warm  Neurological:      General: No focal deficit present  Mental Status: She is oriented to person, place, and time  Foot injection     Date/Time 7/22/2021 4:11 PM     Performed by  Lynsey Ruffin DPM     Authorized by Lynsey Ruffin DPM      Universal Protocol   Consent: Verbal consent obtained  Consent given by: patient  Patient understanding: patient states understanding of the procedure being performed  Patient identity confirmed: verbally with patient        Local anesthesia used: yes     Anesthesia   Local anesthesia used: yes  Local Anesthetic: lidocaine 1% without epinephrine and bupivacaine 0 5% without epinephrine     Procedure Details   Procedure Notes:   Injected left heel with 0 5 cc Kenalog 40 along with 1 cc 1% xylocaine and 1 cc 0 5% Marcaine

## 2021-08-01 DIAGNOSIS — I16.0 HYPERTENSIVE URGENCY: ICD-10-CM

## 2021-08-03 ENCOUNTER — OFFICE VISIT (OUTPATIENT)
Dept: FAMILY MEDICINE CLINIC | Facility: CLINIC | Age: 86
End: 2021-08-03

## 2021-08-03 VITALS
BODY MASS INDEX: 25.76 KG/M2 | DIASTOLIC BLOOD PRESSURE: 80 MMHG | RESPIRATION RATE: 16 BRPM | HEART RATE: 72 BPM | WEIGHT: 140 LBS | SYSTOLIC BLOOD PRESSURE: 138 MMHG | HEIGHT: 62 IN | TEMPERATURE: 96.7 F

## 2021-08-03 DIAGNOSIS — G89.29 CHRONIC RIGHT-SIDED THORACIC BACK PAIN: Primary | ICD-10-CM

## 2021-08-03 DIAGNOSIS — M54.6 CHRONIC RIGHT-SIDED THORACIC BACK PAIN: Primary | ICD-10-CM

## 2021-08-03 PROCEDURE — 3079F DIAST BP 80-89 MM HG: CPT | Performed by: PHYSICIAN ASSISTANT

## 2021-08-03 PROCEDURE — 1160F RVW MEDS BY RX/DR IN RCRD: CPT | Performed by: PHYSICIAN ASSISTANT

## 2021-08-03 PROCEDURE — 1036F TOBACCO NON-USER: CPT | Performed by: PHYSICIAN ASSISTANT

## 2021-08-03 PROCEDURE — 3075F SYST BP GE 130 - 139MM HG: CPT | Performed by: PHYSICIAN ASSISTANT

## 2021-08-03 PROCEDURE — 99213 OFFICE O/P EST LOW 20 MIN: CPT | Performed by: PHYSICIAN ASSISTANT

## 2021-08-03 NOTE — PROGRESS NOTES
Assessment/Plan:    Back pain  Given h/o breast cancer will send for x-ray of the thoracic spine  Continue Tylenol prn  May use ice/heat as needed        Diagnoses and all orders for this visit:    Type 2 diabetes mellitus with diabetic peripheral angiopathy without gangrene, without long-term current use of insulin (HCC)  -     POCT hemoglobin A1c    Chronic right-sided thoracic back pain  -     XR spine thoracic 3 vw; Future        Subjective:      Patient ID: Gaby Rosario is a 80 y o  female presents today with  for complaint of right upper back pain for several years with worsening pain over the last few months  Back Pain  This is a chronic problem  The current episode started more than 1 year ago  The problem occurs constantly  The problem has been gradually worsening since onset  The pain is present in the thoracic spine  The quality of the pain is described as stabbing  The pain does not radiate  The pain is at a severity of 6/10  The pain is worse during the night  The symptoms are aggravated by lying down and position  Pertinent negatives include no abdominal pain, bladder incontinence, chest pain, dysuria, fever, numbness, paresthesias, tingling or weakness  Risk factors include history of cancer  Treatments tried: tylenol  The treatment provided mild relief  The following portions of the patient's history were reviewed and updated as appropriate: allergies, current medications, past family history, past medical history, past social history, past surgical history and problem list     Review of Systems   Constitutional: Negative for chills, fatigue and fever  Respiratory: Negative for chest tightness and shortness of breath  Cardiovascular: Negative for chest pain and palpitations  Gastrointestinal: Negative for abdominal pain, nausea and vomiting  Genitourinary: Negative for bladder incontinence and dysuria  Musculoskeletal: Positive for back pain     Neurological: Negative for tingling, weakness, numbness and paresthesias  Objective:      /80 (BP Location: Left arm, Patient Position: Sitting, Cuff Size: Large)   Pulse 72   Temp (!) 96 7 °F (35 9 °C) (Temporal)   Resp 16   Ht 5' 2" (1 575 m)   Wt 63 5 kg (140 lb)   LMP  (LMP Unknown)   BMI 25 61 kg/m²          Physical Exam  Constitutional:       Appearance: Normal appearance  She is not ill-appearing  Pulmonary:      Effort: Pulmonary effort is normal  No respiratory distress  Musculoskeletal:      Cervical back: Normal range of motion and neck supple  No swelling, rigidity or tenderness  Normal range of motion  Thoracic back: Tenderness (Right soft tissue) present  No swelling, edema, deformity or bony tenderness  Normal range of motion  Neurological:      Mental Status: She is alert  Psychiatric:         Mood and Affect: Mood normal          Behavior: Behavior normal          Thought Content:  Thought content normal          Judgment: Judgment normal

## 2021-08-03 NOTE — ASSESSMENT & PLAN NOTE
Given h/o breast cancer will send for x-ray of the thoracic spine  Continue Tylenol prn  May use ice/heat as needed

## 2021-08-05 ENCOUNTER — HOSPITAL ENCOUNTER (OUTPATIENT)
Dept: RADIOLOGY | Facility: HOSPITAL | Age: 86
Discharge: HOME | End: 2021-08-05
Payer: COMMERCIAL

## 2021-08-05 DIAGNOSIS — M54.6 CHRONIC RIGHT-SIDED THORACIC BACK PAIN: ICD-10-CM

## 2021-08-05 DIAGNOSIS — G89.29 CHRONIC RIGHT-SIDED THORACIC BACK PAIN: ICD-10-CM

## 2021-08-05 PROCEDURE — 72072 X-RAY EXAM THORAC SPINE 3VWS: CPT

## 2021-08-09 ENCOUNTER — HOSPITAL ENCOUNTER (OUTPATIENT)
Dept: MAMMOGRAPHY | Facility: CLINIC | Age: 86
Discharge: HOME | End: 2021-08-09
Payer: COMMERCIAL

## 2021-08-09 VITALS — HEIGHT: 62 IN | BODY MASS INDEX: 25.76 KG/M2 | WEIGHT: 140 LBS

## 2021-08-09 DIAGNOSIS — Z85.3 HISTORY OF LEFT BREAST CANCER: ICD-10-CM

## 2021-08-09 PROCEDURE — G0279 TOMOSYNTHESIS, MAMMO: HCPCS

## 2021-08-09 PROCEDURE — 77066 DX MAMMO INCL CAD BI: CPT

## 2021-08-12 ENCOUNTER — OFFICE VISIT (OUTPATIENT)
Dept: PODIATRY | Facility: CLINIC | Age: 86
End: 2021-08-12
Payer: COMMERCIAL

## 2021-08-12 VITALS
HEART RATE: 79 BPM | SYSTOLIC BLOOD PRESSURE: 172 MMHG | BODY MASS INDEX: 25.61 KG/M2 | DIASTOLIC BLOOD PRESSURE: 66 MMHG | HEIGHT: 62 IN

## 2021-08-12 DIAGNOSIS — M72.2 PLANTAR FASCIITIS: Primary | ICD-10-CM

## 2021-08-12 DIAGNOSIS — M79.672 LEFT FOOT PAIN: ICD-10-CM

## 2021-08-12 DIAGNOSIS — R60.0 LOCALIZED EDEMA: ICD-10-CM

## 2021-08-12 PROCEDURE — 20550 NJX 1 TENDON SHEATH/LIGAMENT: CPT | Performed by: PODIATRIST

## 2021-08-12 PROCEDURE — 99212 OFFICE O/P EST SF 10 MIN: CPT | Performed by: PODIATRIST

## 2021-08-12 RX ORDER — NIFEDIPINE 30 MG/1
TABLET, FILM COATED, EXTENDED RELEASE ORAL
Qty: 30 TABLET | Refills: 5 | Status: SHIPPED | OUTPATIENT
Start: 2021-08-12 | End: 2022-02-09 | Stop reason: SDUPTHER

## 2021-08-12 RX ORDER — BUPIVACAINE HYDROCHLORIDE 5 MG/ML
1 INJECTION, SOLUTION EPIDURAL; INTRACAUDAL ONCE
Status: COMPLETED | OUTPATIENT
Start: 2021-08-12 | End: 2021-08-12

## 2021-08-12 RX ORDER — LIDOCAINE HYDROCHLORIDE 10 MG/ML
1 INJECTION, SOLUTION EPIDURAL; INFILTRATION; INTRACAUDAL; PERINEURAL ONCE
Status: COMPLETED | OUTPATIENT
Start: 2021-08-12 | End: 2021-08-12

## 2021-08-12 RX ORDER — TRIAMCINOLONE ACETONIDE 40 MG/ML
20 INJECTION, SUSPENSION INTRA-ARTICULAR; INTRAMUSCULAR ONCE
Status: COMPLETED | OUTPATIENT
Start: 2021-08-12 | End: 2021-08-12

## 2021-08-12 RX ADMIN — LIDOCAINE HYDROCHLORIDE 1 ML: 10 INJECTION, SOLUTION EPIDURAL; INFILTRATION; INTRACAUDAL; PERINEURAL at 16:32

## 2021-08-12 RX ADMIN — TRIAMCINOLONE ACETONIDE 20 MG: 40 INJECTION, SUSPENSION INTRA-ARTICULAR; INTRAMUSCULAR at 16:32

## 2021-08-12 RX ADMIN — BUPIVACAINE HYDROCHLORIDE 1 ML: 5 INJECTION, SOLUTION EPIDURAL; INTRACAUDAL at 16:32

## 2021-08-12 NOTE — PROGRESS NOTES
Patient presents with an   She speaks only [address]  Patient was treated at her last visit 3 weeks ago for plantar fasciitis pain  A cortisone injection was given  She relates modest improvement but still has discomfort  Patient also notes that she is intermittent pain and swelling affecting her left ankle  This ankle was injured years back  She also relates that there is a numb sensation in the soles of both feet  On exam, pain with palpation medial aspect left heel at fascia insertion into the calcaneus  There is mild left ankle edema noted  Mild discomfort with palpation of the left ankle  No pain with range of motion of the ankle  Treatment:  Injected left heel with 0 5 cc Kenalog 40 along with 1 cc 1% xylocaine and 1 cc 0 5% Marcaine  Recommended Voltaren gel for the ankle  Explained that numb sensations in feet are typical of diabetic neuropathy  No treatment advised at this time  Foot injection     Date/Time 8/12/2021 4:36 PM     Performed by  Carolin Rider DPM     Authorized by Carolin Rider DPM      Universal Protocol   Consent: Verbal consent obtained  Risks and benefits: risks, benefits and alternatives were discussed  Consent given by: patient  Patient understanding: patient states understanding of the procedure being performed  Patient identity confirmed: verbally with patient        Local anesthesia used: yes     Anesthesia   Local anesthesia used: yes  Local Anesthetic: lidocaine 1% without epinephrine and bupivacaine 0 5% without epinephrine     Procedure Details   Procedure Notes:   Injected left heel with 0 5 cc Kenalog 40 along with 1 cc 1% xylocaine and 1 cc 0 5% Marcaine

## 2021-08-25 ENCOUNTER — OFFICE VISIT (OUTPATIENT)
Dept: FAMILY MEDICINE CLINIC | Facility: CLINIC | Age: 86
End: 2021-08-25
Payer: COMMERCIAL

## 2021-08-25 VITALS
TEMPERATURE: 97.9 F | SYSTOLIC BLOOD PRESSURE: 180 MMHG | OXYGEN SATURATION: 98 % | HEART RATE: 89 BPM | HEIGHT: 62 IN | RESPIRATION RATE: 16 BRPM | WEIGHT: 139.2 LBS | BODY MASS INDEX: 25.62 KG/M2 | DIASTOLIC BLOOD PRESSURE: 100 MMHG

## 2021-08-25 DIAGNOSIS — R93.89 ABNORMAL CXR: ICD-10-CM

## 2021-08-25 DIAGNOSIS — I10 ESSENTIAL HYPERTENSION: ICD-10-CM

## 2021-08-25 DIAGNOSIS — E78.00 HYPERCHOLESTEROLEMIA: ICD-10-CM

## 2021-08-25 DIAGNOSIS — Z23 NEED FOR SHINGLES VACCINE: ICD-10-CM

## 2021-08-25 DIAGNOSIS — E11.9 TYPE 2 DIABETES MELLITUS WITHOUT COMPLICATION, WITHOUT LONG-TERM CURRENT USE OF INSULIN (HCC): Primary | ICD-10-CM

## 2021-08-25 LAB — SL AMB POCT HEMOGLOBIN AIC: 6.6 (ref ?–6.5)

## 2021-08-25 PROCEDURE — 1036F TOBACCO NON-USER: CPT | Performed by: FAMILY MEDICINE

## 2021-08-25 PROCEDURE — 3725F SCREEN DEPRESSION PERFORMED: CPT | Performed by: FAMILY MEDICINE

## 2021-08-25 PROCEDURE — 1160F RVW MEDS BY RX/DR IN RCRD: CPT | Performed by: FAMILY MEDICINE

## 2021-08-25 PROCEDURE — 99214 OFFICE O/P EST MOD 30 MIN: CPT | Performed by: FAMILY MEDICINE

## 2021-08-25 PROCEDURE — 83036 HEMOGLOBIN GLYCOSYLATED A1C: CPT | Performed by: FAMILY MEDICINE

## 2021-08-25 RX ORDER — ZOSTER VACCINE RECOMBINANT, ADJUVANTED 50 MCG/0.5
0.5 KIT INTRAMUSCULAR ONCE
Qty: 1 EACH | Refills: 1 | Status: SHIPPED | OUTPATIENT
Start: 2021-08-25 | End: 2021-08-25

## 2021-08-25 NOTE — PROGRESS NOTES
Matt Weaver 1934 female MRN: [id number]    [address]      ASSESSMENT/PLAN  Matt Weaver is a 80 y o  female presents to the office for     Diagnoses and all orders for this visit:    Type 2 diabetes mellitus without complication, without long-term current use of insulin (HCC)  -     POCT hemoglobin A1c    Abnormal CXR  -     XR chest pa & lateral; Future    Essential hypertension    Hypercholesterolemia    Need for shingles vaccine  -     Zoster Vac Recomb Adjuvanted (Shingrix) 50 MCG/0 5ML SUSR; Inject 0 5 mL into a muscle once for 1 dose Repeat dose in 2 to 6 months       current A1c is 6 6%  She was advised not to check her blood sugars given that she has been so well controlled off medications for several years  Hyperlipidemia is taking pravastatin 40 mg daily  Is being monitored by her cardiologist     Patient seeing cardiology for her hypertension currently taking nifedipine 60 mg 1 tablet daily and 30 tablets at nighttime / losartan 100 mg daily  Abnormal chest x-ray will repeat chest x-ray to see if the abnormality continues to persist   Will order CT scan if it is necessary  Shingles vaccine prescription given today    Health Maintence:  BMI Counseling: Body mass index is 25 46 kg/m²  The BMI is above normal  Nutrition recommendations include decreasing portion sizes, consuming healthier snacks and limiting drinks that contain sugar               Disposition: Return to the office in 2 months  Future Appointments   Date Time Provider Valdemar Crocker   9/9/2021  4:00 PM Carolin Rider DPM [facility]   9/10/2021  9:00 AM [facility] 1 [facility] [address]   9/10/2021  1:00 PM [facility] 2 [facility] [address]   9/10/2021  2:30 PM [facility] 2 [facility] [address]   9/16/2021  3:00 PM Kavya Maldonado MD [facility]   9/28/2021  3:30 PM JARRED Thomas [facility]   10/4/2021 2:45 PM Selin Lovelace  [facility]          SUBJECTIVE  CC: Establish Care (patient here to establish care, for multi issues), Diabetes, Blood Pressure Check, and Arthritis      HPI:  Declan Milton is a 80 y o  female  Presenting to the office to establish care  Patient was a former primary patient of mine when I was working out in [facility]  Patient overall has been doing very well  Patient's blood pressure is elevated today given that she did not take her blood pressure pills today  Patient states her blood pressure usually runs normal at home  Patient suffers from chronic kidney disease stage 3/hypertension, secondary hyperparathyroidism, acquired complex cyst of the kidney of the left, recent breast cancer status post lumpectomy, mesenteric/renal artery stenosis, bilateral carotid artery stenosis, hypertension  Patient has a specialist of podiatrist Cardiology Nephrology vascular who she has close follow-up with  Patient interested in getting the shingles vaccine  Patient just recently had an abnormal chest x-ray was advised to repeat the chest x-ray and if abnormal to perform a CT scan  Review of Systems   Constitutional: Negative for activity change, appetite change, chills, fatigue and fever  HENT: Negative for congestion  Respiratory: Negative for cough, chest tightness and shortness of breath  Cardiovascular: Negative for chest pain and leg swelling  Gastrointestinal: Negative for abdominal distention, abdominal pain, constipation, diarrhea, nausea and vomiting  Musculoskeletal: Positive for back pain  All other systems reviewed and are negative        Historical Information   The patient history was reviewed as follows:    Past Medical History:   Diagnosis Date    Arthritis     Back pain     Benign essential hypertension     LAST ASSESSED: [id number]    Carotid artery stenosis     CKD (chronic kidney disease) stage 2, GFR 60-89 ml/min     Constipation, chronic     "drinks prune juice"    Diabetes mellitus ([facility] )     DMII (diabetes mellitus, type 2) (HCC)     Epiglottitis     RESOLVED: [id number]    Gastritis     GERD (gastroesophageal reflux disease)     Gout     H/O blood clots     left lower leg,,,11 yrs ago    History of stenosis of renal artery     RESOLVED: [id number]    Hypercholesteremia     Hypercholesterolemia     Hyperlipidemia     Hyperparathyroidism ([facility] )     Hypertension     Iliac artery stenosis, bilateral ([facility] )     Kidney stone     Lobular carcinoma ([place] [address] ) 09/06/2017    Left    Mucinous carcinoma of breast, 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    Left    Presence of pessary     Renal artery stenosis (HCC)     Renal disorder     Renovascular hypertension     RESOLVED: [id number]    Segmental and somatic dysfunction of cervical region     RESOLVED: 15EAQ1011    Segmental and somatic dysfunction of lumbar region     RESOLVED: [id number]    Segmental and somatic dysfunction of pelvic region     RESOLVED: [id number]    Segmental and somatic dysfunction of thoracic region     RESOLVED: 71ZYQ8924    Somatic dysfunction of abdominal region     RESOLVED: [id number]    Somatic dysfunction of head region     RESOLVED: 24PKM7027    Somatic dysfunction of lower extremities     RESOLVED: [id number]    Somatic dysfunction of rib region     RESOLVED: [id number]    Somatic dysfunction of thoracic region     RESOLVED: 51LRB7635    Somatic dysfunction of upper extremities     RESOLVED: [id number]    Teeth missing      Past Surgical History:   Procedure Laterality Date    ANGIOPLASTY / STENTING ILIAC      BREAST BIOPSY Left 08/10/2018    U/S BX- mucinous ca    BREAST BIOPSY Left 08/07/2017    U/S BX    BREAST LUMPECTOMY Left 09/06/2017    LAST ASSESSED: [id number]    BREAST LUMPECTOMY Left 09/11/2018    BREAST SURGERY Left     biopsy    CATARACT EXTRACTION Bilateral     CHOLECYSTECTOMY      COLONOSCOPY      EYE SURGERY      ILIAC VEIN ANGIOPLASTY / STENTING Right     INITIAL STENOSIS: ONSET: [id number]    KNEE ARTHROSCOPY Right     KNEE SURGERY Right     ONSET: [id number]    MAMMO NEEDLE LOCALIZATION LEFT (ALL INC) Left 9/11/2018    MAMMO NEEDLE LOCALIZATION LEFT (ALL INC) Left 9/6/2017    NE ESOPHAGOGASTRODUODENOSCOPY TRANSORAL DIAGNOSTIC N/A 7/26/2018    Procedure: ESOPHAGOGASTRODUODENOSCOPY (EGD); Surgeon: Berta Soria DO;  Location: [facility]; Service: Gastroenterology    NE LARYNGOSCOPY,DIRCT,OP [facility] TUMR N/A 6/10/2016    Procedure: MICRO DIRECT LARYNGOSCOPY WITH VOCAL FOLD MASS EXCISION ;  Surgeon: Paul Carmichael MD;  [facility];  Service: ENT    NE MASTECTOMY, PARTIAL Left 9/6/2017    Procedure: LUMPECTOMY BREAST NEEDLE LOCALIZED WITH FAXITRON NEEDLE @ 0830;  Surgeon: Joseph Hilton MD;  [facility];  Service: General    NE PERQ DEVICE PLACEMT BREAST LOC 1ST LES W Bryan Rosario Left 9/11/2018    Procedure: BREAST LUMPECTOMY; BREAST NEEDLE LOCALIZATION (NEEDLE LOC AT 1330);   Surgeon: Flip Dunne MD;  [facility];  Service: Surgical Oncology    THROAT SURGERY      lump removed    TUBAL LIGATION      UPPER GASTROINTESTINAL ENDOSCOPY      US GUIDANCE BREAST BIOPSY LEFT EACH ADDITIONAL Left 8/7/2017    US GUIDED BREAST BIOPSY LEFT COMPLETE Left 8/10/2018    US GUIDED BREAST BIOPSY LEFT COMPLETE Left 8/7/2017    VASCULAR SURGERY       Family History   Problem Relation Age of Onset    Heart disease Brother     Heart disease Maternal Grandmother     Diabetes Other         of other type with arthropathy, without long term current use of insulin    Hypertension Other     No Known Problems Mother     Gout Family     Rheum arthritis Family     Lupus Family         systematic erythematosus    Heart disease Family     Stroke Family         syndrome    Stroke Maternal Uncle         syndrome    Stroke Maternal Aunt         syndrome    No Known Problems Father     No Known Problems Daughter     No Known Problems Maternal Grandfather     No Known Problems Paternal Grandmother     No Known Problems Paternal Grandfather     No Known Problems Daughter     No Known Problems Daughter     No Known Problems Maternal Aunt     No Known Problems Maternal Aunt     No Known Problems Maternal Aunt       Social History   Social History     Substance and Sexual Activity   Alcohol Use Not Currently     Social History     Substance and Sexual Activity   Drug Use No     Social History     Tobacco Use   Smoking Status Former Smoker    Packs/day: 1 00    Years: 64 00    Pack years: 64 00    Quit date:     Years since quittin 6   Smokeless Tobacco Never Used   Tobacco Comment    quit in  1 ppd x 50 years       Medications:     Current Outpatient Medications:     Acetaminophen (TYLENOL) 325 MG CAPS, Take 500 mg by mouth every 8 (eight) hours , Disp: , Rfl:     aspirin (ADULT ASPIRIN EC LOW STRENGTH) 81 mg EC tablet, Take 1 tablet by mouth daily, Disp: , Rfl:     Blood Glucose Monitoring Suppl (FreeStyle Precision Jalen System) w/Device KIT, by Does not apply route 3 (three) times a day, Disp: 1 kit, Rfl: 0    calcitriol (ROCALTROL) 0 25 mcg capsule, Take one capsule every Tuesday and Friday, Disp: 10 capsule, Rfl: 6    calcium carbonate-vitamin D (OSCAL-D) 500 mg-200 units per tablet, Take 1 tablet by mouth daily with breakfast, Disp: , Rfl:     diclofenac sodium (VOLTAREN) 1 %, Apply 2 g topically 4 (four) times a day, Disp: 100 g, Rfl: 1    docusate sodium (COLACE) 100 mg capsule, Take 1 capsule (100 mg total) by mouth 2 (two) times a day, Disp: 10 capsule, Rfl: 0    DULoxetine (CYMBALTA) 30 mg delayed release capsule, Take 30 mg by mouth daily, Disp: , Rfl: 0    fluticasone (FLONASE) 50 mcg/act nasal spray, 1 spray into each nostril daily, Disp: 16 g, Rfl: 2    gabapentin (NEURONTIN) 100 mg capsule, Take 2 capsules (200 mg total) by mouth daily at bedtime, Disp: 180 capsule, Rfl: 1    Lancets Eddie Ocampo) MISC, 1 Device by Does not apply route 2 (two) times a day, Disp: 100 each, Rfl: 5    Lancets (ONETOUCH ULTRASOFT) lancets, by Other route 2 (two) times a day Use as instructed, Disp: 100 each, Rfl: 5    losartan (COZAAR) 100 MG tablet, take 1 tablet by mouth once daily, Disp: 90 tablet, Rfl: 0    NIFEdipine ER (ADALAT CC) 30 MG 24 hr tablet, TAKE 1 TABLET AT NIGHT IN ADDITION TO THE 60MG IN THE MORNING, Disp: 30 tablet, Rfl: 5    NIFEdipine ER (ADALAT CC) 60 MG 24 hr tablet, take 1 tablet by mouth once daily, Disp: 90 tablet, Rfl: 0    olopatadine (PATANOL) 0 1 % ophthalmic solution, Administer 1 drop to both eyes 2 (two) times a day, Disp: 5 mL, Rfl: 0    OneTouch Delica Lancets 22U MISC, Inject as directed 2 (two) times a day Use to test, Disp: 200 each, Rfl: 0    pantoprazole (PROTONIX) 40 mg tablet, take 1 tablet by mouth once daily, Disp: 60 tablet, Rfl: 1    pravastatin (PRAVACHOL) 40 mg tablet, take 1 tablet by mouth once daily, Disp: 90 tablet, Rfl: 1    polyethylene glycol (GLYCOLAX) 17 GM/SCOOP powder, Take 17 g by mouth daily (Patient not taking: Reported on 8/25/2021), Disp: 225 g, Rfl: 1    senna (SENOKOT) 8 6 mg, Take 1 tablet (8 6 mg total) by mouth daily at bedtime as needed for constipation (Patient not taking: Reported on 3/10/2021), Disp: 120 each, Rfl: 0  Allergies   Allergen Reactions    Clonidine Derivatives Swelling    Diflucan [Fluconazole] Rash    Flagyl [Metronidazole] Anaphylaxis    Carvedilol Hives     And legs swelled    Latex Rash    Lipitor [Atorvastatin]      Legs swelled    Other Palpitations     IV DYE       OBJECTIVE    Vitals:   Vitals:    08/25/21 1427   BP: (!) 180/100   BP Location: Left arm   Patient Position: Sitting   Cuff Size: Standard   Pulse: 89   Resp: 16   Temp: 97 9 °F (36 6 °C)   TempSrc: Temporal   SpO2: 98%   Weight: 63 1 kg (139 lb 3 2 oz)   Height: 5' 2" (1 575 m)           Physical Exam  Vitals reviewed  Constitutional:       Appearance: She is well-developed  HENT:      Head: Normocephalic and atraumatic  Eyes:      Conjunctiva/sclera: Conjunctivae normal       Pupils: Pupils are equal, round, and reactive to light  Cardiovascular:      Rate and Rhythm: Normal rate and regular rhythm  Pulses: no weak pulses          Dorsalis pedis pulses are 2+ on the right side and 2+ on the left side  Posterior tibial pulses are 2+ on the right side and 2+ on the left side  Heart sounds: Normal heart sounds  Pulmonary:      Effort: Pulmonary effort is normal  No respiratory distress  Breath sounds: Normal breath sounds  Musculoskeletal:         General: Normal range of motion  Cervical back: Normal range of motion and neck supple  Feet:      Right foot:      Skin integrity: No ulcer, skin breakdown, erythema, warmth, callus or dry skin  Left foot:      Skin integrity: No ulcer, skin breakdown, erythema, warmth, callus or dry skin  Skin:     General: Skin is warm  Capillary Refill: Capillary refill takes less than 2 seconds  Neurological:      Mental Status: She is alert and oriented to person, place, and time  Patient's shoes and socks removed  Right Foot/Ankle   Right Foot Inspection  Skin Exam: skin normal and skin intact no dry skin, no warmth, no callus, no erythema, no maceration, no abnormal color, no pre-ulcer, no ulcer and no callus                          Toe Exam: ROM and strength within normal limitsno swelling  Sensory   Vibration: intact  Proprioception: intact   Monofilament testing: intact  Vascular  Capillary refills: < 3 seconds  The right DP pulse is 2+  The right PT pulse is 2+       Left Foot/Ankle  Left Foot Inspection  Skin Exam: skin normal and skin intactno dry skin, no warmth, no erythema, no maceration, normal color, no pre-ulcer, no ulcer and no callus                         Toe Exam: ROM and strength within normal limitsno swelling Sensory   Vibration: intact  Proprioception: intact  Monofilament: intact  Vascular  Capillary refills: < 3 seconds  The left DP pulse is 2+  The left PT pulse is 2+  Assign Risk Category:  No deformity present; No loss of protective sensation;  No weak pulses       Risk: 0      Leela Gonzalez MD  [id number] [facility]

## 2021-09-07 DIAGNOSIS — I10 ESSENTIAL HYPERTENSION: ICD-10-CM

## 2021-09-07 RX ORDER — NIFEDIPINE 60 MG/1
TABLET, FILM COATED, EXTENDED RELEASE ORAL
Qty: 90 TABLET | Refills: 0 | Status: SHIPPED | OUTPATIENT
Start: 2021-09-07 | End: 2022-04-19

## 2021-09-07 RX ORDER — NIFEDIPINE 60 MG/1
60 TABLET, FILM COATED, EXTENDED RELEASE ORAL DAILY
Qty: 90 TABLET | Refills: 3 | Status: SHIPPED | OUTPATIENT
Start: 2021-09-07 | End: 2022-03-14 | Stop reason: SDUPTHER

## 2021-09-09 ENCOUNTER — OFFICE VISIT (OUTPATIENT)
Dept: PODIATRY | Facility: CLINIC | Age: 86
End: 2021-09-09
Payer: COMMERCIAL

## 2021-09-09 VITALS
HEART RATE: 90 BPM | BODY MASS INDEX: 25.45 KG/M2 | HEIGHT: 62 IN | WEIGHT: 138.3 LBS | SYSTOLIC BLOOD PRESSURE: 165 MMHG | DIASTOLIC BLOOD PRESSURE: 65 MMHG

## 2021-09-09 DIAGNOSIS — M72.2 PLANTAR FASCIITIS: Primary | ICD-10-CM

## 2021-09-09 DIAGNOSIS — M79.672 LEFT FOOT PAIN: ICD-10-CM

## 2021-09-09 PROCEDURE — 99212 OFFICE O/P EST SF 10 MIN: CPT | Performed by: PODIATRIST

## 2021-09-09 PROCEDURE — 1036F TOBACCO NON-USER: CPT | Performed by: PODIATRIST

## 2021-09-09 PROCEDURE — 1160F RVW MEDS BY RX/DR IN RCRD: CPT | Performed by: PODIATRIST

## 2021-09-09 NOTE — PROGRESS NOTES
Patient presents with an  as she speaks [place]  Patient relates only minimal pain in her left heel at this time  She responded well to 2 cortisone injections in the heel  No additional treatment is needed today  Fredy dahl

## 2021-09-10 ENCOUNTER — HOSPITAL ENCOUNTER (OUTPATIENT)
Dept: NON INVASIVE DIAGNOSTICS | Facility: CLINIC | Age: 86
Discharge: HOME | End: 2021-09-10
Payer: COMMERCIAL

## 2021-09-10 DIAGNOSIS — I73.9 PERIPHERAL ARTERIAL DISEASE (HCC): ICD-10-CM

## 2021-09-10 DIAGNOSIS — I65.23 CAROTID STENOSIS, ASYMPTOMATIC, BILATERAL: ICD-10-CM

## 2021-09-10 PROCEDURE — 93978 VASCULAR STUDY: CPT

## 2021-09-10 PROCEDURE — 93925 LOWER EXTREMITY STUDY: CPT

## 2021-09-10 PROCEDURE — 93880 EXTRACRANIAL BILAT STUDY: CPT

## 2021-09-10 PROCEDURE — 93923 UPR/LXTR ART STDY 3+ LVLS: CPT

## 2021-09-12 PROCEDURE — 93925 LOWER EXTREMITY STUDY: CPT | Performed by: SURGERY

## 2021-09-12 PROCEDURE — 93880 EXTRACRANIAL BILAT STUDY: CPT | Performed by: SURGERY

## 2021-09-12 PROCEDURE — 93978 VASCULAR STUDY: CPT | Performed by: SURGERY

## 2021-09-12 PROCEDURE — 93922 UPR/L XTREMITY ART 2 LEVELS: CPT | Performed by: SURGERY

## 2021-09-20 ENCOUNTER — HOSPITAL ENCOUNTER (OUTPATIENT)
Dept: RADIOLOGY | Facility: HOSPITAL | Age: 86
Discharge: HOME | End: 2021-09-20
Payer: COMMERCIAL

## 2021-09-20 DIAGNOSIS — R93.89 ABNORMAL CXR: ICD-10-CM

## 2021-09-20 DIAGNOSIS — K21.9 GASTROESOPHAGEAL REFLUX DISEASE WITHOUT ESOPHAGITIS: ICD-10-CM

## 2021-09-20 PROCEDURE — 71046 X-RAY EXAM CHEST 2 VIEWS: CPT

## 2021-09-20 RX ORDER — PANTOPRAZOLE SODIUM 40 MG/1
TABLET, DELAYED RELEASE ORAL
Qty: 60 TABLET | Refills: 1 | Status: SHIPPED | OUTPATIENT
Start: 2021-09-20 | End: 2022-01-19 | Stop reason: SDUPTHER

## 2021-09-28 ENCOUNTER — OFFICE VISIT (OUTPATIENT)
Dept: SURGICAL ONCOLOGY | Facility: CLINIC | Age: 86
End: 2021-09-28
Payer: COMMERCIAL

## 2021-09-28 ENCOUNTER — RA CDI HCC (OUTPATIENT)
Dept: OTHER | Facility: HOSPITAL | Age: 86
End: 2021-09-28

## 2021-09-28 VITALS
OXYGEN SATURATION: 97 % | DIASTOLIC BLOOD PRESSURE: 60 MMHG | TEMPERATURE: 97.7 F | HEART RATE: 76 BPM | SYSTOLIC BLOOD PRESSURE: 148 MMHG | BODY MASS INDEX: 25.86 KG/M2 | RESPIRATION RATE: 16 BRPM | WEIGHT: 140.5 LBS | HEIGHT: 62 IN

## 2021-09-28 DIAGNOSIS — Z85.3 HISTORY OF LEFT BREAST CANCER: ICD-10-CM

## 2021-09-28 DIAGNOSIS — Z08 ENCOUNTER FOR FOLLOW-UP EXAMINATION AFTER COMPLETED TREATMENT FOR MALIGNANT NEOPLASM: Primary | ICD-10-CM

## 2021-09-28 PROCEDURE — 99213 OFFICE O/P EST LOW 20 MIN: CPT | Performed by: NURSE PRACTITIONER

## 2021-09-28 NOTE — PROGRESS NOTES
Surgical Oncology Follow Up       [address] [facility],[address]  [facility]  [address] [facility] [address]  [facility] [address]    Yariel Seat  1934  [phone]  [address] [facility],[address] [facility]  [facility] [place]  [address] [address]    Chief Complaint   Patient presents with    Follow-up       Assessment/Plan:  1  Encounter for follow-up examination after completed treatment for malignant neoplasm    2  History of left breast cancer  - 6 mo f/u visit  - Continue work up for new lung mass    Discussion/Summary: Patient is an 70-year-old female who presents today for a six-month follow-up visit for left breast cancer diagnosed in 2018  Her pathology revealed mucinous carcinoma, ER/%, her 2-   She underwent a left lumpectomy by Dr Ariel Munguia David did not receive adjuvant therapy   She had a bilateral mammogram 2021 which was BI-RADS 2, category 3 density  She has been experiencing new right upper back pain and underwent x-rays which revealed a 2 8 cm suspicious lung mass  She is scheduled for a CT scan in a couple weeks  Patient reports a significant smoking history (65+ years)  I encouraged her to continue further workup as scheduled by her PCP  Will wait results of CT scan  There are no worrisome findings on her breast exam today and she offers no other complaints  I will plan to see her back in 6 months for another clinical exam   She knows to contact me with any changes or concerns in the interim  All of her questions were answered today      History of Present Illness:     Oncology History   History of left breast cancer   8/10/2018 Biopsy    Left breast biopsy  Invasive mucinous  Grade 1      Her 2 1+     2018 Surgery    Left lumpectomy  Invasive mucinous carcinoma  Grade 1  1 1 cm in size  Margins negative      Her 2 1+  Stage 1A     10/4/2018 -  Blanca Romero Consult  No benefit of AI          -Interval History:  Patient presents today for follow-up visit for left breast cancer  She was having right upper back pain and underwent x-rays of the thoracic spine which revealed a right lung opacity  She then had a chest x-ray which revealed a 2 8 cm right middle lobe nodule suspicious for malignancy  She is scheduled for a CT scan of her chest in about 2 weeks  Patient reports a 65+ year smoking history of half pack per day  She offers no other complaints today  She had a bilateral mammogram which was BI-RADS 2  She notices no changes on her self breast exam     Review of Systems:  Review of Systems   Constitutional: Negative for activity change, appetite change, chills, fatigue, fever and unexpected weight change  Respiratory: Negative for cough and shortness of breath  Cardiovascular: Negative for chest pain  Gastrointestinal: Negative for abdominal pain, constipation, diarrhea, nausea and vomiting  Musculoskeletal: Positive for back pain (right upper)  Negative for arthralgias, gait problem and myalgias  Skin: Negative for color change and rash  Neurological: Negative for dizziness and headaches  Hematological: Negative for adenopathy  Psychiatric/Behavioral: Negative for agitation and confusion  All other systems reviewed and are negative        Patient Active Problem List   Diagnosis    Vocal cord mass    Iliac artery stenosis, bilateral (HCC)    Hyperparathyroidism (HCC)    GERD (gastroesophageal reflux disease)    Arthritis    Hypercholesterolemia    Hypertension    EKG abnormalities    Anemia    Neck pain    Dysuria    Chronic left shoulder pain    Fall from other slipping, tripping, or stumbling    Abnormal EKG    Abnormal mammogram    Accelerated essential hypertension    Allergic rhinitis    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)    Back pain    Bilateral carotid artery stenosis  Carotid stenosis, asymptomatic    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    Chronic myofascial pain    Chronic pain of lower extremity    CKD (chronic kidney disease) stage 3, GFR 30-59 ml/min ([facility])    Constipation    [facility] retinopathy [facility]    DMII (diabetes mellitus, type 2) ([facility])    Dysphagia    Epigastric pain    Fever of unknown origin    Gastritis    Gout    Hypomagnesemia    Atherosclerosis of other specified arteries    Lymphadenopathy    Microalbuminuria    Nephrolithiasis    Neuropathic pain, leg, right    Osteoporosis    PAD (peripheral artery disease) ([facility] )    Primary generalized (osteo)arthritis    Persistent proteinuria    Renal cyst    Secondary hyperparathyroidism, renal ([facility] )    Vaginal prolapse    Vitamin D deficiency    Acquired complex cyst of kidney    Gastroesophageal reflux disease without esophagitis    History of left breast cancer    Type 2 diabetes mellitus with diabetic peripheral angiopathy without gangrene ([facility])    Stricture of artery ([facility])    Claudication in peripheral vascular disease ([facility])    Hemorrhoids    Tinea unguium    Diabetic neuropathy ([facility])    Bradycardia    Hyperkalemia    Pain in tooth    Dysphonia    Glottic insufficiency    Reflux laryngitis    Paresis of left vocal fold    Muscle tension dysphonia    Laryngocele    Anterior glottic web    Chest pain    Palpitations    Encounter for follow-up examination after completed treatment for malignant neoplasm    Respiratory crackles    Allergic conjunctivitis of both eyes    Viral upper respiratory tract infection    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HCC)    Renal artery stenosis ([facility])    Left ear pain    Itching of ear    Cervical spine disease    Cervicogenic headache    Glossodynia    Medicare annual wellness visit, subsequent    Burning sensation    Osteopenia of multiple sites    Anemia due to stage 3b chronic kidney disease ([facility] )  New daily persistent headache     Past Medical History:   Diagnosis Date    Arthritis     Back pain     Benign essential hypertension     LAST ASSESSED: [id number]    Carotid artery stenosis     CKD (chronic kidney disease) stage 2, GFR 60-89 ml/min     Constipation, chronic     "drinks prune juice"    Diabetes mellitus ([facility] )     DMII (diabetes mellitus, type 2) (HCC)     Epiglottitis     RESOLVED: [id number]    Gastritis     GERD (gastroesophageal reflux disease)     Gout     H/O blood clots     left lower leg,,,11 yrs ago    History of stenosis of renal artery     RESOLVED: [id number]    Hypercholesteremia     Hypercholesterolemia     Hyperlipidemia     Hyperparathyroidism ([facility] )     Hypertension     Iliac artery stenosis, bilateral (HCC)     Kidney stone     Lobular carcinoma ([place] [address] ) 09/06/2017    Left    Mucinous carcinoma of breast, left ([place] [address] ) 09/11/2018    Left    Presence of pessary     [facility] ([facility]     Renal disorder     Renovascular hypertension     RESOLVED: [id number]    Segmental and somatic dysfunction of cervical region     RESOLVED: [id number]    Segmental and somatic dysfunction of lumbar region     RESOLVED: [id number]    Segmental and somatic dysfunction of pelvic region     RESOLVED: [id number]    Segmental and somatic dysfunction of thoracic region     RESOLVED: [id number]    Somatic dysfunction of abdominal region     RESOLVED: [id number]    Somatic dysfunction of head region     RESOLVED: [id number]    Somatic dysfunction of lower extremities     RESOLVED: [id number]    Somatic dysfunction of rib region     RESOLVED: [id number]    Somatic dysfunction of thoracic region     RESOLVED: [id number]    Somatic dysfunction of upper extremities     RESOLVED: [id number]    Teeth missing      Past Surgical History:   Procedure Laterality Date    ANGIOPLASTY / STENTING ILIAC      BREAST BIOPSY Left 08/10/2018    U/S BX- mucinous ca    BREAST BIOPSY Left 08/07/2017    U/S BX    BREAST LUMPECTOMY Left 09/06/2017    LAST ASSESSED: [id number]    BREAST LUMPECTOMY Left 09/11/2018    BREAST SURGERY Left     biopsy    CATARACT EXTRACTION Bilateral     CHOLECYSTECTOMY      COLONOSCOPY      EYE SURGERY      ILIAC VEIN ANGIOPLASTY / STENTING Right     INITIAL STENOSIS: ONSET: [id number]    KNEE ARTHROSCOPY Right     KNEE SURGERY Right     ONSET: [id number]    MAMMO NEEDLE LOCALIZATION LEFT (ALL INC) Left 9/11/2018    MAMMO NEEDLE LOCALIZATION LEFT (ALL INC) Left 9/6/2017    UT ESOPHAGOGASTRODUODENOSCOPY TRANSORAL DIAGNOSTIC N/A 7/26/2018    Procedure: ESOPHAGOGASTRODUODENOSCOPY (EGD); Surgeon: Rogelia Prader, DO;  [facility]; Service: Gastroenterology    UT LARYNGOSCOPY,DIRCT,Critical access hospital N/A 6/10/2016    Procedure: MICRO DIRECT LARYNGOSCOPY WITH VOCAL FOLD MASS EXCISION ;  Surgeon: Jose Alberto Neri MD;  [facility];  Service: ENT    UT MASTECTOMY, PARTIAL Left 9/6/2017    Procedure: LUMPECTOMY BREAST NEEDLE LOCALIZED WITH FAXITRON NEEDLE @ 0830;  Surgeon: Aleksander Hollins MD;  [facility];  Service: General    UT PERQ DEVICE PLACEMT BREAST LOC 1ST LES W Ilda Noyola Left 9/11/2018    Procedure: BREAST LUMPECTOMY; BREAST NEEDLE LOCALIZATION (NEEDLE LOC AT 1330);   Surgeon: Seymour Mejias MD;  [facility];  Service: Surgical Oncology    THROAT SURGERY      lump removed    TUBAL LIGATION      UPPER GASTROINTESTINAL ENDOSCOPY      US GUIDANCE BREAST BIOPSY LEFT EACH ADDITIONAL Left 8/7/2017    US GUIDED BREAST BIOPSY LEFT COMPLETE Left 8/10/2018    US GUIDED BREAST BIOPSY LEFT COMPLETE Left 8/7/2017    VASCULAR SURGERY       Family History   Problem Relation Age of Onset    Heart disease Brother     Heart disease Maternal Grandmother     Diabetes Other         of other type with arthropathy, without long term current use of insulin    Hypertension Other     No Known Problems Mother     Gout Family     Rheum arthritis Family     Lupus Family         systematic erythematosus    Heart disease Family     Stroke Family         syndrome    Stroke Maternal Uncle         syndrome    Stroke Maternal Aunt         syndrome    No Known Problems Father     No Known Problems Daughter     No Known Problems Maternal Grandfather     No Known Problems Paternal Grandmother     No Known Problems Paternal Grandfather     No Known Problems Daughter     No Known Problems Daughter     No Known Problems Maternal Aunt     No Known Problems Maternal Aunt     No Known Problems Maternal Aunt      Social History     Socioeconomic History    Marital status:      Spouse name: Not on file    Number of children: Not on file    Years of education: Not on file    Highest education level: Not on file   Occupational History    Occupation: retired   Tobacco Use    Smoking status: Former Smoker     Packs/day: 1 00     Years: 64 00     Pack years: 64 00     Quit date:      Years since quittin 7    Smokeless tobacco: Never Used    Tobacco comment: quit in  1 ppd x 50 years   Vaping Use    Vaping Use: Never used   Substance and Sexual Activity    Alcohol use: Not Currently    Drug use: No    Sexual activity: Yes     Partners: Male   Other Topics Concern    Not on file   Social History Narrative    Daily caffeine consumption, 1 serving a day      Social Determinants of Health     Financial Resource Strain: Low Risk     Difficulty of Paying Living Expenses: Not hard at all   Food Insecurity: No Food Insecurity    Worried About [facility] in the Last Year: Never true    Lester of Food in the Last Year: Never true   Transportation Needs: No Transportation Needs    Lack of Transportation (Medical): No    Lack of Transportation (Non-Medical):  No   Physical Activity:     Days of Exercise per Week:     Minutes of Exercise per Session:    Stress:     Feeling of Stress :    Social Connections:     Frequency of Communication with Friends and Family:     Frequency of Social Gatherings with Friends and Family:     Attends Holiness Services:     Active Member of Clubs or Organizations:     Attends Club or Organization Meetings:     Marital Status:    Intimate Partner Violence:     Fear of Current or Ex-Partner:     Emotionally Abused:     Physically Abused:     Sexually Abused:        Current Outpatient Medications:     Acetaminophen (TYLENOL) 325 MG CAPS, Take 500 mg by mouth every 8 (eight) hours , Disp: , Rfl:     aspirin (ADULT ASPIRIN EC LOW STRENGTH) 81 mg EC tablet, Take 1 tablet by mouth daily, Disp: , Rfl:     Blood Glucose Monitoring Suppl (FreeStyle Precision Jalen System) w/Device KIT, by Does not apply route 3 (three) times a day, Disp: 1 kit, Rfl: 0    calcitriol (ROCALTROL) 0 25 mcg capsule, Take one capsule every Tuesday and Friday, Disp: 10 capsule, Rfl: 6    calcium carbonate-vitamin D (OSCAL-D) 500 mg-200 units per tablet, Take 1 tablet by mouth daily with breakfast, Disp: , Rfl:     diclofenac sodium (VOLTAREN) 1 %, Apply 2 g topically 4 (four) times a day, Disp: 100 g, Rfl: 1    docusate sodium (COLACE) 100 mg capsule, Take 1 capsule (100 mg total) by mouth 2 (two) times a day, Disp: 10 capsule, Rfl: 0    DULoxetine (CYMBALTA) 30 mg delayed release capsule, Take 30 mg by mouth daily, Disp: , Rfl: 0    fluticasone (FLONASE) 50 mcg/act nasal spray, 1 spray into each nostril daily, Disp: 16 g, Rfl: 2    gabapentin (NEURONTIN) 100 mg capsule, Take 2 capsules (200 mg total) by mouth daily at bedtime, Disp: 180 capsule, Rfl: 1    Lancets (ONETOUCH DELICA PLUS LYLMTG41L) MISC, 1 Device by Does not apply route 2 (two) times a day, Disp: 100 each, Rfl: 5    Lancets (ONETOUCH ULTRASOFT) lancets, by Other route 2 (two) times a day Use as instructed, Disp: 100 each, Rfl: 5    losartan (COZAAR) 100 MG tablet, take 1 tablet by mouth once daily, Disp: 90 tablet, Rfl: 0    NIFEdipine ER (ADALAT CC) 30 MG 24 hr tablet, TAKE 1 TABLET AT NIGHT IN ADDITION TO THE 60MG IN THE MORNING, Disp: 30 tablet, Rfl: 5    NIFEdipine ER (ADALAT CC) 60 MG 24 hr tablet, take 1 tablet by mouth once daily, Disp: 90 tablet, Rfl: 0    NIFEdipine ER (ADALAT CC) 60 MG 24 hr tablet, Take 1 tablet (60 mg total) by mouth daily, Disp: 90 tablet, Rfl: 3    olopatadine (PATANOL) 0 1 % ophthalmic solution, Administer 1 drop to both eyes 2 (two) times a day, Disp: 5 mL, Rfl: 0    OneTouch Delica Lancets 49K MISC, Inject as directed 2 (two) times a day Use to test, Disp: 200 each, Rfl: 0    pantoprazole (PROTONIX) 40 mg tablet, take 1 tablet by mouth once daily, Disp: 60 tablet, Rfl: 1    polyethylene glycol (GLYCOLAX) 17 GM/SCOOP powder, Take 17 g by mouth daily, Disp: 225 g, Rfl: 1    pravastatin (PRAVACHOL) 40 mg tablet, take 1 tablet by mouth once daily, Disp: 90 tablet, Rfl: 4    senna (SENOKOT) 8 6 mg, Take 1 tablet (8 6 mg total) by mouth daily at bedtime as needed for constipation, Disp: 120 each, Rfl: 0  Allergies   Allergen Reactions    Clonidine Derivatives Swelling    Diflucan [Fluconazole] Rash    Flagyl [Metronidazole] Anaphylaxis    Carvedilol Hives     And legs swelled    Latex Rash    Lipitor [Atorvastatin]      Legs swelled    Other Palpitations     IV DYE     Vitals:    09/28/21 1528   BP: 148/60   Pulse: 76   Resp: 16   Temp: 97 7 °F (36 5 °C)   SpO2: 97%       Physical Exam  Vitals reviewed  Constitutional:       General: She is not in acute distress  Appearance: Normal appearance  She is well-developed  She is not diaphoretic  HENT:      Head: Normocephalic and atraumatic  Cardiovascular:      Rate and Rhythm: Normal rate and regular rhythm  Heart sounds: Normal heart sounds  Pulmonary:      Effort: Pulmonary effort is normal       Breath sounds: Normal breath sounds     Chest:      Breasts:         Right: No swelling, bleeding, inverted nipple, mass, nipple discharge, skin change or tenderness  Left: Skin change (surgical scars) present  No swelling, bleeding, inverted nipple, mass, nipple discharge or tenderness  Abdominal:      Palpations: Abdomen is soft  There is no mass  Tenderness: There is no abdominal tenderness  Musculoskeletal:         General: Normal range of motion  Cervical back: Normal range of motion  Lymphadenopathy:      Upper Body:      Right upper body: No supraclavicular or axillary adenopathy  Left upper body: No supraclavicular or axillary adenopathy  Skin:     General: Skin is warm and dry  Findings: No rash  Neurological:      Mental Status: She is alert and oriented to person, place, and time  Psychiatric:         Speech: Speech normal            Results:    Imaging  XR chest pa & lateral    Result Date: 9/25/2021  Narrative: CHEST INDICATION:   R93 [id number]: Abnormal findings on diagnostic imaging of other specified body structures  Right base opacity on thoracic spine radiographs  COMPARISON:  Chest radiograph from 10/18/2019  Thoracic spine radiograph from 8/5/2021  EXAM PERFORMED/VIEWS:  XR CHEST PA & LATERAL  DUAL ENERGY SUBTRACTION  FINDINGS: Cardiomediastinal silhouette appears unremarkable  Abnormality on thoracic spine radiographs corresponds with a 2 8 cm right middle lobe nodule  No acute disease  No effusion or pneumothorax  Cholecystectomy  Osseous structures appear within normal limits for patient age  Impression: 2 8 cm right middle lobe nodule, suspicious for malignancy  Follow-up with a chest CT with no contrast is recommended  This study was marked for significant notification and follow-up  Workstation performed: [id number]         Advance Care Planning/Advance Directives:  Discussed disease status, cancer treatment plans and/or cancer treatment goals with the patient

## 2021-09-29 PROBLEM — E11.3293 TYPE 2 DIABETES MELLITUS WITH MILD NONPROLIFERATIVE DIABETIC RETINOPATHY WITHOUT MACULAR EDEMA, BILATERAL (HCC): Status: ACTIVE | Noted: 2021-09-29

## 2021-10-04 ENCOUNTER — OFFICE VISIT (OUTPATIENT)
Dept: FAMILY MEDICINE CLINIC | Facility: CLINIC | Age: 86
End: 2021-10-04
Payer: COMMERCIAL

## 2021-10-04 VITALS
BODY MASS INDEX: 25.95 KG/M2 | OXYGEN SATURATION: 98 % | TEMPERATURE: 97.8 F | SYSTOLIC BLOOD PRESSURE: 142 MMHG | HEART RATE: 87 BPM | DIASTOLIC BLOOD PRESSURE: 64 MMHG | WEIGHT: 141 LBS | RESPIRATION RATE: 16 BRPM | HEIGHT: 62 IN

## 2021-10-04 DIAGNOSIS — I10 ACCELERATED ESSENTIAL HYPERTENSION: ICD-10-CM

## 2021-10-04 DIAGNOSIS — Z00.00 MEDICARE ANNUAL WELLNESS VISIT, SUBSEQUENT: ICD-10-CM

## 2021-10-04 DIAGNOSIS — E11.9 TYPE 2 DIABETES MELLITUS WITHOUT COMPLICATION, WITHOUT LONG-TERM CURRENT USE OF INSULIN (HCC): ICD-10-CM

## 2021-10-04 DIAGNOSIS — R93.89 ABNORMAL CXR: Primary | ICD-10-CM

## 2021-10-04 PROCEDURE — G0439 PPPS, SUBSEQ VISIT: HCPCS | Performed by: FAMILY MEDICINE

## 2021-10-04 PROCEDURE — 3288F FALL RISK ASSESSMENT DOCD: CPT | Performed by: FAMILY MEDICINE

## 2021-10-04 PROCEDURE — 1125F AMNT PAIN NOTED PAIN PRSNT: CPT | Performed by: FAMILY MEDICINE

## 2021-10-04 PROCEDURE — 3725F SCREEN DEPRESSION PERFORMED: CPT | Performed by: FAMILY MEDICINE

## 2021-10-04 PROCEDURE — 1170F FXNL STATUS ASSESSED: CPT | Performed by: FAMILY MEDICINE

## 2021-10-06 ENCOUNTER — HOSPITAL ENCOUNTER (OUTPATIENT)
Dept: RADIOLOGY | Facility: HOSPITAL | Age: 86
Discharge: HOME | End: 2021-10-06
Payer: COMMERCIAL

## 2021-10-06 DIAGNOSIS — R93.89 ABNORMAL CXR: ICD-10-CM

## 2021-10-06 DIAGNOSIS — I10 ESSENTIAL HYPERTENSION: ICD-10-CM

## 2021-10-06 PROCEDURE — G1004 CDSM NDSC: HCPCS

## 2021-10-06 PROCEDURE — 71250 CT THORAX DX C-: CPT

## 2021-10-06 RX ORDER — LOSARTAN POTASSIUM 100 MG/1
TABLET ORAL
Qty: 90 TABLET | Refills: 0 | Status: SHIPPED | OUTPATIENT
Start: 2021-10-06 | End: 2022-01-19 | Stop reason: SDUPTHER

## 2021-10-11 ENCOUNTER — TELEPHONE (OUTPATIENT)
Dept: FAMILY MEDICINE CLINIC | Facility: CLINIC | Age: 86
End: 2021-10-11

## 2021-10-11 ENCOUNTER — TELEPHONE (OUTPATIENT)
Dept: HEMATOLOGY ONCOLOGY | Facility: CLINIC | Age: 86
End: 2021-10-11

## 2021-10-14 ENCOUNTER — OFFICE VISIT (OUTPATIENT)
Dept: CARDIAC SURGERY | Facility: CLINIC | Age: 86
End: 2021-10-14
Payer: COMMERCIAL

## 2021-10-14 VITALS
TEMPERATURE: 96.7 F | BODY MASS INDEX: 25.8 KG/M2 | RESPIRATION RATE: 16 BRPM | SYSTOLIC BLOOD PRESSURE: 171 MMHG | DIASTOLIC BLOOD PRESSURE: 75 MMHG | OXYGEN SATURATION: 98 % | HEART RATE: 83 BPM | HEIGHT: 62 IN | WEIGHT: 140.21 LBS

## 2021-10-14 DIAGNOSIS — R91.8 LUNG MASS: Primary | ICD-10-CM

## 2021-10-14 PROBLEM — Z00.00 MEDICARE ANNUAL WELLNESS VISIT, SUBSEQUENT: Status: RESOLVED | Noted: 2020-07-28 | Resolved: 2021-10-14

## 2021-10-14 PROBLEM — R49.0 DYSPHONIA: Status: RESOLVED | Noted: 2019-07-20 | Resolved: 2021-10-14

## 2021-10-14 PROBLEM — G44.86 CERVICOGENIC HEADACHE: Status: RESOLVED | Noted: 2020-01-16 | Resolved: 2021-10-14

## 2021-10-14 PROBLEM — Z08 ENCOUNTER FOR FOLLOW-UP EXAMINATION AFTER COMPLETED TREATMENT FOR MALIGNANT NEOPLASM: Status: RESOLVED | Noted: 2019-08-01 | Resolved: 2021-10-14

## 2021-10-14 PROBLEM — R00.2 PALPITATIONS: Status: RESOLVED | Noted: 2019-07-25 | Resolved: 2021-10-14

## 2021-10-14 PROBLEM — K08.89 PAIN IN TOOTH: Status: RESOLVED | Noted: 2019-06-25 | Resolved: 2021-10-14

## 2021-10-14 PROBLEM — L29.9 ITCHING OF EAR: Status: RESOLVED | Noted: 2019-12-11 | Resolved: 2021-10-14

## 2021-10-14 PROBLEM — R09.89 RESPIRATORY CRACKLES: Status: RESOLVED | Noted: 2019-10-18 | Resolved: 2021-10-14

## 2021-10-14 PROBLEM — E87.5 HYPERKALEMIA: Status: RESOLVED | Noted: 2019-06-16 | Resolved: 2021-10-14

## 2021-10-14 PROBLEM — R20.8 BURNING SENSATION: Status: RESOLVED | Noted: 2020-10-13 | Resolved: 2021-10-14

## 2021-10-14 PROBLEM — R50.9 FEVER OF UNKNOWN ORIGIN: Status: RESOLVED | Noted: 2017-08-15 | Resolved: 2021-10-14

## 2021-10-14 PROBLEM — R10.13 EPIGASTRIC PAIN: Status: RESOLVED | Noted: 2017-07-10 | Resolved: 2021-10-14

## 2021-10-14 PROBLEM — K21.9 GASTROESOPHAGEAL REFLUX DISEASE WITHOUT ESOPHAGITIS: Status: RESOLVED | Noted: 2018-05-21 | Resolved: 2021-10-14

## 2021-10-14 PROBLEM — M79.2 NEUROPATHIC PAIN, LEG, RIGHT: Status: RESOLVED | Noted: 2017-03-13 | Resolved: 2021-10-14

## 2021-10-14 PROBLEM — R07.9 CHEST PAIN: Status: RESOLVED | Noted: 2019-07-22 | Resolved: 2021-10-14

## 2021-10-14 PROBLEM — G44.52 NEW DAILY PERSISTENT HEADACHE: Status: RESOLVED | Noted: 2020-12-29 | Resolved: 2021-10-14

## 2021-10-14 PROBLEM — K14.6 GLOSSODYNIA: Status: RESOLVED | Noted: 2020-03-08 | Resolved: 2021-10-14

## 2021-10-14 PROBLEM — H10.13 ALLERGIC CONJUNCTIVITIS OF BOTH EYES: Status: RESOLVED | Noted: 2019-10-18 | Resolved: 2021-10-14

## 2021-10-14 PROCEDURE — 99205 OFFICE O/P NEW HI 60 MIN: CPT | Performed by: PHYSICIAN ASSISTANT

## 2021-10-14 PROCEDURE — 1036F TOBACCO NON-USER: CPT | Performed by: PHYSICIAN ASSISTANT

## 2021-10-14 PROCEDURE — 1160F RVW MEDS BY RX/DR IN RCRD: CPT | Performed by: PHYSICIAN ASSISTANT

## 2021-10-25 ENCOUNTER — HOSPITAL ENCOUNTER (OUTPATIENT)
Dept: RADIOLOGY | Age: 86
Discharge: HOME | End: 2021-10-25
Payer: COMMERCIAL

## 2021-10-25 DIAGNOSIS — D38.1 NEOPLASM OF UNCERTAIN BEHAVIOR OF RIGHT MIDDLE LOBE OF LUNG: ICD-10-CM

## 2021-10-25 LAB — GLUCOSE SERPL-MCNC: 159 MG/DL (ref 65–140)

## 2021-10-25 PROCEDURE — A9552 F18 FDG: HCPCS

## 2021-10-25 PROCEDURE — 82948 REAGENT STRIP/BLOOD GLUCOSE: CPT

## 2021-10-25 PROCEDURE — 78815 PET IMAGE W/CT SKULL-THIGH: CPT

## 2021-10-25 PROCEDURE — G1004 CDSM NDSC: HCPCS

## 2021-10-26 ENCOUNTER — TELEPHONE (OUTPATIENT)
Dept: PODIATRY | Facility: CLINIC | Age: 86
End: 2021-10-26

## 2021-10-26 ENCOUNTER — PREP FOR PROCEDURE (OUTPATIENT)
Dept: INTERVENTIONAL RADIOLOGY/VASCULAR | Facility: CLINIC | Age: 86
End: 2021-10-26

## 2021-10-26 ENCOUNTER — TELEPHONE (OUTPATIENT)
Dept: CARDIAC SURGERY | Facility: CLINIC | Age: 86
End: 2021-10-26

## 2021-10-26 DIAGNOSIS — R91.8 LUNG MASS: Primary | ICD-10-CM

## 2021-10-27 DIAGNOSIS — E11.51 TYPE 2 DIABETES MELLITUS WITH DIABETIC PERIPHERAL ANGIOPATHY WITHOUT GANGRENE, WITHOUT LONG-TERM CURRENT USE OF INSULIN (HCC): Primary | ICD-10-CM

## 2021-10-27 RX ORDER — GABAPENTIN 100 MG/1
100 CAPSULE ORAL 2 TIMES DAILY
Qty: 180 CAPSULE | Refills: 1 | Status: SHIPPED | OUTPATIENT
Start: 2021-10-27 | End: 2022-05-18

## 2021-10-28 ENCOUNTER — OFFICE VISIT (OUTPATIENT)
Dept: PULMONOLOGY | Facility: HOSPITAL | Age: 86
End: 2021-10-28
Payer: COMMERCIAL

## 2021-10-28 VITALS
WEIGHT: 134 LBS | BODY MASS INDEX: 24.66 KG/M2 | TEMPERATURE: 97 F | OXYGEN SATURATION: 98 % | HEIGHT: 62 IN | HEART RATE: 86 BPM | SYSTOLIC BLOOD PRESSURE: 170 MMHG | DIASTOLIC BLOOD PRESSURE: 60 MMHG

## 2021-10-28 DIAGNOSIS — R91.8 LUNG MASS: Primary | ICD-10-CM

## 2021-10-28 DIAGNOSIS — R93.89 ABNORMAL CXR: ICD-10-CM

## 2021-10-28 DIAGNOSIS — Z85.3 HISTORY OF LEFT BREAST CANCER: ICD-10-CM

## 2021-10-28 PROCEDURE — 99204 OFFICE O/P NEW MOD 45 MIN: CPT | Performed by: INTERNAL MEDICINE

## 2021-11-03 ENCOUNTER — OFFICE VISIT (OUTPATIENT)
Dept: NEPHROLOGY | Facility: CLINIC | Age: 86
End: 2021-11-03
Payer: COMMERCIAL

## 2021-11-03 VITALS
HEIGHT: 62 IN | DIASTOLIC BLOOD PRESSURE: 62 MMHG | SYSTOLIC BLOOD PRESSURE: 148 MMHG | BODY MASS INDEX: 24.51 KG/M2 | RESPIRATION RATE: 16 BRPM | HEART RATE: 70 BPM

## 2021-11-03 DIAGNOSIS — R80.9 PROTEINURIA, UNSPECIFIED TYPE: Primary | ICD-10-CM

## 2021-11-03 DIAGNOSIS — I10 PRIMARY HYPERTENSION: ICD-10-CM

## 2021-11-03 DIAGNOSIS — R80.1 PERSISTENT PROTEINURIA: ICD-10-CM

## 2021-11-03 DIAGNOSIS — N18.9 CHRONIC KIDNEY DISEASE, UNSPECIFIED CKD STAGE: ICD-10-CM

## 2021-11-03 PROBLEM — N18.32 ANEMIA DUE TO STAGE 3B CHRONIC KIDNEY DISEASE (HCC): Status: RESOLVED | Noted: 2020-11-15 | Resolved: 2021-11-03

## 2021-11-03 PROBLEM — D63.1 ANEMIA DUE TO STAGE 3B CHRONIC KIDNEY DISEASE (HCC): Status: RESOLVED | Noted: 2020-11-15 | Resolved: 2021-11-03

## 2021-11-03 PROCEDURE — 99214 OFFICE O/P EST MOD 30 MIN: CPT | Performed by: INTERNAL MEDICINE

## 2021-11-03 PROCEDURE — 1160F RVW MEDS BY RX/DR IN RCRD: CPT | Performed by: INTERNAL MEDICINE

## 2021-11-03 PROCEDURE — 1036F TOBACCO NON-USER: CPT | Performed by: INTERNAL MEDICINE

## 2021-11-10 ENCOUNTER — HOSPITAL ENCOUNTER (OUTPATIENT)
Dept: PULMONOLOGY | Facility: HOSPITAL | Age: 86
Discharge: HOME | End: 2021-11-10
Attending: INTERNAL MEDICINE
Payer: COMMERCIAL

## 2021-11-10 DIAGNOSIS — R93.89 ABNORMAL CXR: ICD-10-CM

## 2021-11-10 PROCEDURE — 94729 DIFFUSING CAPACITY: CPT | Performed by: INTERNAL MEDICINE

## 2021-11-10 PROCEDURE — 94729 DIFFUSING CAPACITY: CPT

## 2021-11-10 PROCEDURE — 94760 N-INVAS EAR/PLS OXIMETRY 1: CPT

## 2021-11-10 PROCEDURE — 94010 BREATHING CAPACITY TEST: CPT

## 2021-11-10 PROCEDURE — 94010 BREATHING CAPACITY TEST: CPT | Performed by: INTERNAL MEDICINE

## 2021-11-10 PROCEDURE — 94726 PLETHYSMOGRAPHY LUNG VOLUMES: CPT

## 2021-11-10 PROCEDURE — 94726 PLETHYSMOGRAPHY LUNG VOLUMES: CPT | Performed by: INTERNAL MEDICINE

## 2021-11-12 ENCOUNTER — APPOINTMENT (OUTPATIENT)
Dept: LAB | Facility: HOSPITAL | Age: 86
End: 2021-11-12
Payer: COMMERCIAL

## 2021-11-12 DIAGNOSIS — E11.9 TYPE 2 DIABETES MELLITUS WITHOUT COMPLICATION, WITHOUT LONG-TERM CURRENT USE OF INSULIN (HCC): ICD-10-CM

## 2021-11-12 DIAGNOSIS — N18.9 CHRONIC KIDNEY DISEASE, UNSPECIFIED CKD STAGE: ICD-10-CM

## 2021-11-12 LAB
ALBUMIN SERPL BCP-MCNC: 3.4 G/DL (ref 3.5–5)
ALP SERPL-CCNC: 95 U/L (ref 46–116)
ALT SERPL W P-5'-P-CCNC: 17 U/L (ref 12–78)
ANION GAP SERPL CALCULATED.3IONS-SCNC: 8 MMOL/L (ref 4–13)
AST SERPL W P-5'-P-CCNC: 14 U/L (ref 5–45)
BASOPHILS # BLD AUTO: 0.03 THOUSANDS/ΜL (ref 0–0.1)
BASOPHILS NFR BLD AUTO: 0 % (ref 0–1)
BILIRUB SERPL-MCNC: 0.38 MG/DL (ref 0.2–1)
BUN SERPL-MCNC: 31 MG/DL (ref 5–25)
CALCIUM ALBUM COR SERPL-MCNC: 10.3 MG/DL (ref 8.3–10.1)
CALCIUM SERPL-MCNC: 9.8 MG/DL (ref 8.3–10.1)
CHLORIDE SERPL-SCNC: 103 MMOL/L (ref 100–108)
CO2 SERPL-SCNC: 29 MMOL/L (ref 21–32)
CREAT SERPL-MCNC: 1.45 MG/DL (ref 0.6–1.3)
EOSINOPHIL # BLD AUTO: 0.05 THOUSAND/ΜL (ref 0–0.61)
EOSINOPHIL NFR BLD AUTO: 1 % (ref 0–6)
ERYTHROCYTE [DISTWIDTH] IN BLOOD BY AUTOMATED COUNT: 14 % (ref 11.6–15.1)
GFR SERPL CREATININE-BSD FRML MDRD: 32 ML/MIN/1.73SQ M
GLUCOSE SERPL-MCNC: 155 MG/DL (ref 65–140)
HCT VFR BLD AUTO: 38.5 % (ref 34.8–46.1)
HGB BLD-MCNC: 11.7 G/DL (ref 11.5–15.4)
IMM GRANULOCYTES # BLD AUTO: 0.01 THOUSAND/UL (ref 0–0.2)
IMM GRANULOCYTES NFR BLD AUTO: 0 % (ref 0–2)
LYMPHOCYTES # BLD AUTO: 2.35 THOUSANDS/ΜL (ref 0.6–4.47)
LYMPHOCYTES NFR BLD AUTO: 33 % (ref 14–44)
MCH RBC QN AUTO: 27.9 PG (ref 26.8–34.3)
MCHC RBC AUTO-ENTMCNC: 30.4 G/DL (ref 31.4–37.4)
MCV RBC AUTO: 92 FL (ref 82–98)
MONOCYTES # BLD AUTO: 0.49 THOUSAND/ΜL (ref 0.17–1.22)
MONOCYTES NFR BLD AUTO: 7 % (ref 4–12)
NEUTROPHILS # BLD AUTO: 4.28 THOUSANDS/ΜL (ref 1.85–7.62)
NEUTS SEG NFR BLD AUTO: 59 % (ref 43–75)
NRBC BLD AUTO-RTO: 0 /100 WBCS
PLATELET # BLD AUTO: 316 THOUSANDS/UL (ref 149–390)
PMV BLD AUTO: 11.2 FL (ref 8.9–12.7)
POTASSIUM SERPL-SCNC: 4.6 MMOL/L (ref 3.5–5.3)
PROT SERPL-MCNC: 8.5 G/DL (ref 6.4–8.2)
RBC # BLD AUTO: 4.2 MILLION/UL (ref 3.81–5.12)
SODIUM SERPL-SCNC: 140 MMOL/L (ref 136–145)
WBC # BLD AUTO: 7.21 THOUSAND/UL (ref 4.31–10.16)

## 2021-11-12 PROCEDURE — 80053 COMPREHEN METABOLIC PANEL: CPT

## 2021-11-12 PROCEDURE — 36415 COLL VENOUS BLD VENIPUNCTURE: CPT

## 2021-11-12 PROCEDURE — 85025 COMPLETE CBC W/AUTO DIFF WBC: CPT

## 2021-11-15 ENCOUNTER — HOSPITAL ENCOUNTER (OUTPATIENT)
Dept: RADIOLOGY | Facility: HOSPITAL | Age: 86
Discharge: HOME | End: 2021-11-15
Attending: RADIOLOGY | Admitting: RADIOLOGY
Payer: COMMERCIAL

## 2021-11-15 ENCOUNTER — APPOINTMENT (OUTPATIENT)
Dept: LAB | Facility: HOSPITAL | Age: 86
End: 2021-11-15
Payer: COMMERCIAL

## 2021-11-15 VITALS
HEIGHT: 62 IN | WEIGHT: 136 LBS | TEMPERATURE: 97.7 F | RESPIRATION RATE: 18 BRPM | BODY MASS INDEX: 25.03 KG/M2 | SYSTOLIC BLOOD PRESSURE: 173 MMHG | HEART RATE: 73 BPM | OXYGEN SATURATION: 92 % | DIASTOLIC BLOOD PRESSURE: 74 MMHG

## 2021-11-15 DIAGNOSIS — R91.8 LUNG MASS: ICD-10-CM

## 2021-11-15 LAB
ANION GAP SERPL CALCULATED.3IONS-SCNC: 3 MMOL/L (ref 4–13)
BUN SERPL-MCNC: 30 MG/DL (ref 5–25)
CALCIUM SERPL-MCNC: 9.9 MG/DL (ref 8.3–10.1)
CHLORIDE SERPL-SCNC: 106 MMOL/L (ref 100–108)
CO2 SERPL-SCNC: 27 MMOL/L (ref 21–32)
CREAT SERPL-MCNC: 1.43 MG/DL (ref 0.6–1.3)
CREAT UR-MCNC: 57.8 MG/DL
ERYTHROCYTE [DISTWIDTH] IN BLOOD BY AUTOMATED COUNT: 14.2 % (ref 11.6–15.1)
GFR SERPL CREATININE-BSD FRML MDRD: 33 ML/MIN/1.73SQ M
GLUCOSE P FAST SERPL-MCNC: 146 MG/DL (ref 65–99)
GLUCOSE SERPL-MCNC: 129 MG/DL (ref 65–140)
GLUCOSE SERPL-MCNC: 133 MG/DL (ref 65–140)
GLUCOSE SERPL-MCNC: 146 MG/DL (ref 65–140)
HCT VFR BLD AUTO: 37.6 % (ref 34.8–46.1)
HGB BLD-MCNC: 11.8 G/DL (ref 11.5–15.4)
INR PPP: 0.91 (ref 0.84–1.19)
MCH RBC QN AUTO: 28.2 PG (ref 26.8–34.3)
MCHC RBC AUTO-ENTMCNC: 31.4 G/DL (ref 31.4–37.4)
MCV RBC AUTO: 90 FL (ref 82–98)
MICROALBUMIN UR-MCNC: 119 MG/L (ref 0–20)
MICROALBUMIN/CREAT 24H UR: 206 MG/G CREATININE (ref 0–30)
PLATELET # BLD AUTO: 278 THOUSANDS/UL (ref 149–390)
PMV BLD AUTO: 11.4 FL (ref 8.9–12.7)
POTASSIUM SERPL-SCNC: 4.9 MMOL/L (ref 3.5–5.3)
PROTHROMBIN TIME: 11.9 SECONDS (ref 11.6–14.5)
RBC # BLD AUTO: 4.19 MILLION/UL (ref 3.81–5.12)
SODIUM SERPL-SCNC: 136 MMOL/L (ref 136–145)
WBC # BLD AUTO: 7.07 THOUSAND/UL (ref 4.31–10.16)

## 2021-11-15 PROCEDURE — 99024 POSTOP FOLLOW-UP VISIT: CPT | Performed by: RADIOLOGY

## 2021-11-15 PROCEDURE — 82570 ASSAY OF URINE CREATININE: CPT | Performed by: FAMILY MEDICINE

## 2021-11-15 PROCEDURE — 88305 TISSUE EXAM BY PATHOLOGIST: CPT | Performed by: PATHOLOGY

## 2021-11-15 PROCEDURE — 88333 PATH CONSLTJ SURG CYTO XM 1: CPT | Performed by: PATHOLOGY

## 2021-11-15 PROCEDURE — 88341 IMHCHEM/IMCYTCHM EA ADD ANTB: CPT | Performed by: PATHOLOGY

## 2021-11-15 PROCEDURE — 32408 CORE NDL BX LNG/MED PERQ: CPT

## 2021-11-15 PROCEDURE — 88342 IMHCHEM/IMCYTCHM 1ST ANTB: CPT | Performed by: PATHOLOGY

## 2021-11-15 PROCEDURE — 99152 MOD SED SAME PHYS/QHP 5/>YRS: CPT | Performed by: RADIOLOGY

## 2021-11-15 PROCEDURE — 32408 CORE NDL BX LNG/MED PERQ: CPT | Performed by: RADIOLOGY

## 2021-11-15 PROCEDURE — 80048 BASIC METABOLIC PNL TOTAL CA: CPT | Performed by: STUDENT IN AN ORGANIZED HEALTH CARE EDUCATION/TRAINING PROGRAM

## 2021-11-15 PROCEDURE — 85027 COMPLETE CBC AUTOMATED: CPT | Performed by: STUDENT IN AN ORGANIZED HEALTH CARE EDUCATION/TRAINING PROGRAM

## 2021-11-15 PROCEDURE — 99152 MOD SED SAME PHYS/QHP 5/>YRS: CPT

## 2021-11-15 PROCEDURE — 77012 CT SCAN FOR NEEDLE BIOPSY: CPT

## 2021-11-15 PROCEDURE — 82043 UR ALBUMIN QUANTITATIVE: CPT | Performed by: FAMILY MEDICINE

## 2021-11-15 PROCEDURE — 82948 REAGENT STRIP/BLOOD GLUCOSE: CPT

## 2021-11-15 PROCEDURE — 88334 PATH CONSLTJ SURG CYTO XM EA: CPT | Performed by: PATHOLOGY

## 2021-11-15 PROCEDURE — 85610 PROTHROMBIN TIME: CPT | Performed by: STUDENT IN AN ORGANIZED HEALTH CARE EDUCATION/TRAINING PROGRAM

## 2021-11-15 PROCEDURE — 99153 MOD SED SAME PHYS/QHP EA: CPT

## 2021-11-15 RX ORDER — FENTANYL CITRATE 50 UG/ML
INJECTION, SOLUTION INTRAMUSCULAR; INTRAVENOUS CODE/TRAUMA/SEDATION MEDICATION
Status: COMPLETED | OUTPATIENT
Start: 2021-11-15 | End: 2021-11-15

## 2021-11-15 RX ORDER — MIDAZOLAM HYDROCHLORIDE 2 MG/2ML
INJECTION, SOLUTION INTRAMUSCULAR; INTRAVENOUS CODE/TRAUMA/SEDATION MEDICATION
Status: COMPLETED | OUTPATIENT
Start: 2021-11-15 | End: 2021-11-15

## 2021-11-15 RX ORDER — ACETAMINOPHEN 325 MG/1
650 TABLET ORAL EVERY 4 HOURS PRN
Status: DISCONTINUED | OUTPATIENT
Start: 2021-11-15 | End: 2021-11-15 | Stop reason: HOSPADM

## 2021-11-15 RX ORDER — SODIUM CHLORIDE 9 MG/ML
75 INJECTION, SOLUTION INTRAVENOUS CONTINUOUS
Status: DISCONTINUED | OUTPATIENT
Start: 2021-11-15 | End: 2021-11-15 | Stop reason: HOSPADM

## 2021-11-15 RX ADMIN — MIDAZOLAM 1 MG: 1 INJECTION INTRAMUSCULAR; INTRAVENOUS at 15:54

## 2021-11-15 RX ADMIN — MIDAZOLAM 1 MG: 1 INJECTION INTRAMUSCULAR; INTRAVENOUS at 15:41

## 2021-11-15 RX ADMIN — FENTANYL CITRATE 50 MCG: 50 INJECTION INTRAMUSCULAR; INTRAVENOUS at 15:41

## 2021-11-15 RX ADMIN — FENTANYL CITRATE 50 MCG: 50 INJECTION INTRAMUSCULAR; INTRAVENOUS at 15:34

## 2021-11-15 RX ADMIN — ACETAMINOPHEN 650 MG: 325 TABLET, FILM COATED ORAL at 17:48

## 2021-11-15 RX ADMIN — MIDAZOLAM 1 MG: 1 INJECTION INTRAMUSCULAR; INTRAVENOUS at 15:34

## 2021-11-15 RX ADMIN — FENTANYL CITRATE 50 MCG: 50 INJECTION INTRAMUSCULAR; INTRAVENOUS at 15:54

## 2021-11-22 ENCOUNTER — DOCUMENTATION (OUTPATIENT)
Dept: CARDIAC SURGERY | Facility: CLINIC | Age: 86
End: 2021-11-22

## 2021-11-22 DIAGNOSIS — R91.8 RIGHT LOWER LOBE LUNG MASS: Primary | ICD-10-CM

## 2021-11-29 DIAGNOSIS — M54.6 CHRONIC THORACIC BACK PAIN, UNSPECIFIED BACK PAIN LATERALITY: Primary | ICD-10-CM

## 2021-11-29 DIAGNOSIS — G89.29 CHRONIC THORACIC BACK PAIN, UNSPECIFIED BACK PAIN LATERALITY: Primary | ICD-10-CM

## 2021-11-29 PROBLEM — C34.91 ADENOCARCINOMA, LUNG, RIGHT (HCC): Status: ACTIVE | Noted: 2021-11-01

## 2021-11-29 RX ORDER — LIDOCAINE 4 G/G
1 PATCH TOPICAL DAILY
Qty: 20 PATCH | Refills: 1 | Status: SHIPPED | OUTPATIENT
Start: 2021-11-29

## 2021-12-01 ENCOUNTER — RADIATION ONCOLOGY CONSULT (OUTPATIENT)
Dept: RADIATION ONCOLOGY | Facility: HOSPITAL | Age: 86
End: 2021-12-01
Attending: RADIOLOGY

## 2021-12-01 ENCOUNTER — CLINICAL SUPPORT (OUTPATIENT)
Dept: RADIATION ONCOLOGY | Facility: HOSPITAL | Age: 86
End: 2021-12-01
Payer: COMMERCIAL

## 2021-12-01 VITALS
WEIGHT: 139 LBS | RESPIRATION RATE: 18 BRPM | SYSTOLIC BLOOD PRESSURE: 124 MMHG | BODY MASS INDEX: 25.42 KG/M2 | TEMPERATURE: 97.1 F | OXYGEN SATURATION: 96 % | HEART RATE: 95 BPM | DIASTOLIC BLOOD PRESSURE: 62 MMHG

## 2021-12-01 DIAGNOSIS — R91.8 RIGHT LOWER LOBE LUNG MASS: ICD-10-CM

## 2021-12-01 DIAGNOSIS — C34.91 ADENOCARCINOMA, LUNG, RIGHT (HCC): Primary | ICD-10-CM

## 2021-12-01 PROCEDURE — 99211 OFF/OP EST MAY X REQ PHY/QHP: CPT | Performed by: RADIOLOGY

## 2021-12-01 PROCEDURE — 99204 OFFICE O/P NEW MOD 45 MIN: CPT | Performed by: RADIOLOGY

## 2021-12-09 ENCOUNTER — OFFICE VISIT (OUTPATIENT)
Dept: CARDIOLOGY CLINIC | Facility: CLINIC | Age: 86
End: 2021-12-09
Payer: COMMERCIAL

## 2021-12-09 VITALS
HEART RATE: 88 BPM | HEIGHT: 62 IN | DIASTOLIC BLOOD PRESSURE: 70 MMHG | WEIGHT: 137.8 LBS | SYSTOLIC BLOOD PRESSURE: 118 MMHG | BODY MASS INDEX: 25.36 KG/M2

## 2021-12-09 DIAGNOSIS — E78.00 HYPERCHOLESTEROLEMIA: ICD-10-CM

## 2021-12-09 DIAGNOSIS — I73.9 PAD (PERIPHERAL ARTERY DISEASE) (HCC): ICD-10-CM

## 2021-12-09 DIAGNOSIS — I65.23 ASYMPTOMATIC BILATERAL CAROTID ARTERY STENOSIS: ICD-10-CM

## 2021-12-09 DIAGNOSIS — I10 PRIMARY HYPERTENSION: Primary | ICD-10-CM

## 2021-12-09 DIAGNOSIS — R94.31 ABNORMAL EKG: ICD-10-CM

## 2021-12-09 PROCEDURE — 93000 ELECTROCARDIOGRAM COMPLETE: CPT | Performed by: INTERNAL MEDICINE

## 2021-12-09 PROCEDURE — 99213 OFFICE O/P EST LOW 20 MIN: CPT | Performed by: INTERNAL MEDICINE

## 2021-12-10 ENCOUNTER — OFFICE VISIT (OUTPATIENT)
Dept: PULMONOLOGY | Facility: CLINIC | Age: 86
End: 2021-12-10
Payer: COMMERCIAL

## 2021-12-10 VITALS
WEIGHT: 137 LBS | SYSTOLIC BLOOD PRESSURE: 120 MMHG | HEIGHT: 62 IN | OXYGEN SATURATION: 98 % | RESPIRATION RATE: 16 BRPM | HEART RATE: 84 BPM | DIASTOLIC BLOOD PRESSURE: 60 MMHG | BODY MASS INDEX: 25.21 KG/M2 | TEMPERATURE: 98.5 F

## 2021-12-10 DIAGNOSIS — C34.91 ADENOCARCINOMA, LUNG, RIGHT (HCC): Primary | ICD-10-CM

## 2021-12-10 PROCEDURE — 1036F TOBACCO NON-USER: CPT | Performed by: INTERNAL MEDICINE

## 2021-12-10 PROCEDURE — 99214 OFFICE O/P EST MOD 30 MIN: CPT | Performed by: INTERNAL MEDICINE

## 2021-12-10 PROCEDURE — 1160F RVW MEDS BY RX/DR IN RCRD: CPT | Performed by: INTERNAL MEDICINE

## 2021-12-15 ENCOUNTER — HOSPITAL ENCOUNTER (OUTPATIENT)
Dept: RADIOLOGY | Facility: HOSPITAL | Age: 86
Discharge: HOME | End: 2021-12-15
Attending: RADIOLOGY
Payer: COMMERCIAL

## 2021-12-15 DIAGNOSIS — C34.91 ADENOCARCINOMA, LUNG, RIGHT (HCC): ICD-10-CM

## 2021-12-15 LAB
CHOLEST SERPL-MCNC: 208 MG/DL
HDLC SERPL-MCNC: 94 MG/DL
LDLC SERPL CALC-MCNC: 90 MG/DL (ref 0–100)
NONHDLC SERPL-MCNC: 114 MG/DL
TRIGL SERPL-MCNC: 121 MG/DL

## 2021-12-15 PROCEDURE — G1004 CDSM NDSC: HCPCS

## 2021-12-15 PROCEDURE — 80061 LIPID PANEL: CPT

## 2021-12-15 PROCEDURE — 36415 COLL VENOUS BLD VENIPUNCTURE: CPT

## 2021-12-15 PROCEDURE — A9585 GADOBUTROL INJECTION: HCPCS | Performed by: RADIOLOGY

## 2021-12-15 PROCEDURE — 70553 MRI BRAIN STEM W/O & W/DYE: CPT

## 2021-12-15 RX ADMIN — GADOBUTROL 5 ML: 604.72 INJECTION INTRAVENOUS at 07:52

## 2021-12-16 PROCEDURE — 77263 THER RADIOLOGY TX PLNG CPLX: CPT | Performed by: RADIOLOGY

## 2021-12-28 ENCOUNTER — RADIATION THERAPY TREATMENT (OUTPATIENT)
Dept: RADIATION ONCOLOGY | Facility: HOSPITAL | Age: 86
End: 2021-12-28
Attending: RADIOLOGY
Payer: COMMERCIAL

## 2021-12-28 ENCOUNTER — DOCUMENTATION (OUTPATIENT)
Dept: RADIATION ONCOLOGY | Facility: HOSPITAL | Age: 86
End: 2021-12-28

## 2021-12-28 PROCEDURE — 77334 RADIATION TREATMENT AID(S): CPT | Performed by: RADIOLOGY

## 2021-12-29 PROCEDURE — 77399 UNLISTED PX MED RADJ PHYSICS: CPT | Performed by: RADIOLOGY

## 2022-01-19 ENCOUNTER — TELEPHONE (OUTPATIENT)
Dept: FAMILY MEDICINE CLINIC | Facility: CLINIC | Age: 87
End: 2022-01-19

## 2022-01-19 ENCOUNTER — APPOINTMENT (OUTPATIENT)
Dept: RADIATION ONCOLOGY | Facility: HOSPITAL | Age: 87
End: 2022-01-19
Attending: RADIOLOGY
Payer: COMMERCIAL

## 2022-01-19 ENCOUNTER — APPOINTMENT (OUTPATIENT)
Dept: RADIATION ONCOLOGY | Facility: HOSPITAL | Age: 87
End: 2022-01-19
Payer: COMMERCIAL

## 2022-01-19 PROCEDURE — 77014 CHG CT GUIDANCE PLACEMENT RAD THERAPY FIELDS: CPT | Performed by: RADIOLOGY

## 2022-01-19 PROCEDURE — 77386 HB NTSTY MODUL RAD TX DLVR CPLX: CPT | Performed by: RADIOLOGY

## 2022-01-19 PROCEDURE — 77427 RADIATION TX MANAGEMENT X5: CPT | Performed by: RADIOLOGY

## 2022-01-20 ENCOUNTER — APPOINTMENT (OUTPATIENT)
Dept: RADIATION ONCOLOGY | Facility: HOSPITAL | Age: 87
End: 2022-01-20
Payer: COMMERCIAL

## 2022-01-20 ENCOUNTER — APPOINTMENT (OUTPATIENT)
Dept: RADIATION ONCOLOGY | Facility: HOSPITAL | Age: 87
End: 2022-01-20
Attending: RADIOLOGY
Payer: COMMERCIAL

## 2022-01-20 PROCEDURE — 77386 HB NTSTY MODUL RAD TX DLVR CPLX: CPT | Performed by: RADIOLOGY

## 2022-01-20 PROCEDURE — 77014 CHG CT GUIDANCE PLACEMENT RAD THERAPY FIELDS: CPT | Performed by: RADIOLOGY

## 2022-01-21 ENCOUNTER — APPOINTMENT (OUTPATIENT)
Dept: RADIATION ONCOLOGY | Facility: HOSPITAL | Age: 87
End: 2022-01-21
Attending: RADIOLOGY
Payer: COMMERCIAL

## 2022-01-21 PROCEDURE — 77386 HB NTSTY MODUL RAD TX DLVR CPLX: CPT | Performed by: RADIOLOGY

## 2022-01-24 ENCOUNTER — APPOINTMENT (OUTPATIENT)
Dept: RADIATION ONCOLOGY | Facility: HOSPITAL | Age: 87
End: 2022-01-24
Attending: RADIOLOGY
Payer: COMMERCIAL

## 2022-01-24 ENCOUNTER — APPOINTMENT (OUTPATIENT)
Dept: RADIATION ONCOLOGY | Facility: HOSPITAL | Age: 87
End: 2022-01-24
Payer: COMMERCIAL

## 2022-01-24 PROCEDURE — 77014 CHG CT GUIDANCE PLACEMENT RAD THERAPY FIELDS: CPT | Performed by: INTERNAL MEDICINE

## 2022-01-24 PROCEDURE — 77386 HB NTSTY MODUL RAD TX DLVR CPLX: CPT | Performed by: INTERNAL MEDICINE

## 2022-01-25 ENCOUNTER — APPOINTMENT (OUTPATIENT)
Dept: RADIATION ONCOLOGY | Facility: HOSPITAL | Age: 87
End: 2022-01-25
Attending: RADIOLOGY
Payer: COMMERCIAL

## 2022-01-25 PROCEDURE — 77336 RADIATION PHYSICS CONSULT: CPT | Performed by: RADIOLOGY

## 2022-01-25 PROCEDURE — 77386 HB NTSTY MODUL RAD TX DLVR CPLX: CPT | Performed by: RADIOLOGY

## 2022-01-25 PROCEDURE — 77014 CHG CT GUIDANCE PLACEMENT RAD THERAPY FIELDS: CPT | Performed by: RADIOLOGY

## 2022-01-26 ENCOUNTER — APPOINTMENT (OUTPATIENT)
Dept: RADIATION ONCOLOGY | Facility: HOSPITAL | Age: 87
End: 2022-01-26
Payer: COMMERCIAL

## 2022-01-26 PROCEDURE — 77427 RADIATION TX MANAGEMENT X5: CPT | Performed by: RADIOLOGY

## 2022-01-26 PROCEDURE — 77014 CHG CT GUIDANCE PLACEMENT RAD THERAPY FIELDS: CPT | Performed by: INTERNAL MEDICINE

## 2022-01-26 PROCEDURE — 77386 HB NTSTY MODUL RAD TX DLVR CPLX: CPT | Performed by: INTERNAL MEDICINE

## 2022-01-27 ENCOUNTER — APPOINTMENT (OUTPATIENT)
Dept: RADIATION ONCOLOGY | Facility: HOSPITAL | Age: 87
End: 2022-01-27
Payer: COMMERCIAL

## 2022-01-27 ENCOUNTER — APPOINTMENT (OUTPATIENT)
Dept: RADIATION ONCOLOGY | Facility: HOSPITAL | Age: 87
End: 2022-01-27
Attending: RADIOLOGY
Payer: COMMERCIAL

## 2022-01-27 PROCEDURE — 77386 HB NTSTY MODUL RAD TX DLVR CPLX: CPT | Performed by: INTERNAL MEDICINE

## 2022-01-27 PROCEDURE — 77014 CHG CT GUIDANCE PLACEMENT RAD THERAPY FIELDS: CPT | Performed by: INTERNAL MEDICINE

## 2022-01-28 ENCOUNTER — APPOINTMENT (OUTPATIENT)
Dept: RADIATION ONCOLOGY | Facility: HOSPITAL | Age: 87
End: 2022-01-28
Payer: COMMERCIAL

## 2022-01-28 PROCEDURE — 77386 HB NTSTY MODUL RAD TX DLVR CPLX: CPT | Performed by: STUDENT IN AN ORGANIZED HEALTH CARE EDUCATION/TRAINING PROGRAM

## 2022-01-28 PROCEDURE — 77014 CHG CT GUIDANCE PLACEMENT RAD THERAPY FIELDS: CPT | Performed by: STUDENT IN AN ORGANIZED HEALTH CARE EDUCATION/TRAINING PROGRAM

## 2022-01-31 ENCOUNTER — APPOINTMENT (OUTPATIENT)
Dept: RADIATION ONCOLOGY | Facility: HOSPITAL | Age: 87
End: 2022-01-31
Attending: RADIOLOGY
Payer: COMMERCIAL

## 2022-01-31 PROCEDURE — 77014 CHG CT GUIDANCE PLACEMENT RAD THERAPY FIELDS: CPT | Performed by: RADIOLOGY

## 2022-01-31 PROCEDURE — 77386 HB NTSTY MODUL RAD TX DLVR CPLX: CPT | Performed by: RADIOLOGY

## 2022-02-01 ENCOUNTER — APPOINTMENT (OUTPATIENT)
Dept: RADIATION ONCOLOGY | Facility: HOSPITAL | Age: 87
End: 2022-02-01
Attending: RADIOLOGY
Payer: COMMERCIAL

## 2022-02-01 PROCEDURE — 77336 RADIATION PHYSICS CONSULT: CPT | Performed by: RADIOLOGY

## 2022-02-01 PROCEDURE — 77386 HB NTSTY MODUL RAD TX DLVR CPLX: CPT | Performed by: RADIOLOGY

## 2022-02-01 PROCEDURE — 77014 CHG CT GUIDANCE PLACEMENT RAD THERAPY FIELDS: CPT | Performed by: RADIOLOGY

## 2022-02-02 ENCOUNTER — APPOINTMENT (OUTPATIENT)
Dept: RADIATION ONCOLOGY | Facility: HOSPITAL | Age: 87
End: 2022-02-02
Attending: RADIOLOGY
Payer: COMMERCIAL

## 2022-02-02 PROCEDURE — 77386 HB NTSTY MODUL RAD TX DLVR CPLX: CPT | Performed by: RADIOLOGY

## 2022-02-02 PROCEDURE — 77427 RADIATION TX MANAGEMENT X5: CPT | Performed by: RADIOLOGY

## 2022-02-02 PROCEDURE — 77014 CHG CT GUIDANCE PLACEMENT RAD THERAPY FIELDS: CPT | Performed by: RADIOLOGY

## 2022-02-03 ENCOUNTER — APPOINTMENT (OUTPATIENT)
Dept: RADIATION ONCOLOGY | Facility: HOSPITAL | Age: 87
End: 2022-02-03
Attending: RADIOLOGY
Payer: COMMERCIAL

## 2022-02-03 PROCEDURE — 77014 CHG CT GUIDANCE PLACEMENT RAD THERAPY FIELDS: CPT | Performed by: RADIOLOGY

## 2022-02-03 PROCEDURE — 77386 HB NTSTY MODUL RAD TX DLVR CPLX: CPT | Performed by: RADIOLOGY

## 2022-02-04 ENCOUNTER — APPOINTMENT (OUTPATIENT)
Dept: RADIATION ONCOLOGY | Facility: HOSPITAL | Age: 87
End: 2022-02-04
Attending: RADIOLOGY
Payer: COMMERCIAL

## 2022-02-04 PROCEDURE — 77386 HB NTSTY MODUL RAD TX DLVR CPLX: CPT | Performed by: STUDENT IN AN ORGANIZED HEALTH CARE EDUCATION/TRAINING PROGRAM

## 2022-02-04 PROCEDURE — 77014 CHG CT GUIDANCE PLACEMENT RAD THERAPY FIELDS: CPT | Performed by: STUDENT IN AN ORGANIZED HEALTH CARE EDUCATION/TRAINING PROGRAM

## 2022-02-07 ENCOUNTER — APPOINTMENT (OUTPATIENT)
Dept: RADIATION ONCOLOGY | Facility: HOSPITAL | Age: 87
End: 2022-02-07
Attending: RADIOLOGY
Payer: COMMERCIAL

## 2022-02-07 PROCEDURE — 77014 CHG CT GUIDANCE PLACEMENT RAD THERAPY FIELDS: CPT | Performed by: INTERNAL MEDICINE

## 2022-02-07 PROCEDURE — 77386 HB NTSTY MODUL RAD TX DLVR CPLX: CPT | Performed by: INTERNAL MEDICINE

## 2022-02-08 ENCOUNTER — APPOINTMENT (OUTPATIENT)
Dept: RADIATION ONCOLOGY | Facility: HOSPITAL | Age: 87
End: 2022-02-08
Payer: COMMERCIAL

## 2022-02-08 ENCOUNTER — APPOINTMENT (OUTPATIENT)
Dept: RADIATION ONCOLOGY | Facility: HOSPITAL | Age: 87
End: 2022-02-08
Attending: RADIOLOGY
Payer: COMMERCIAL

## 2022-02-08 PROCEDURE — 77386 HB NTSTY MODUL RAD TX DLVR CPLX: CPT | Performed by: RADIOLOGY

## 2022-02-08 PROCEDURE — 77336 RADIATION PHYSICS CONSULT: CPT | Performed by: RADIOLOGY

## 2022-02-08 PROCEDURE — 77014 CHG CT GUIDANCE PLACEMENT RAD THERAPY FIELDS: CPT | Performed by: RADIOLOGY

## 2022-02-09 DIAGNOSIS — I16.0 HYPERTENSIVE URGENCY: ICD-10-CM

## 2022-02-09 RX ORDER — NIFEDIPINE 30 MG/1
TABLET, FILM COATED, EXTENDED RELEASE ORAL
Qty: 90 TABLET | Refills: 5 | Status: SHIPPED | OUTPATIENT
Start: 2022-02-09 | End: 2022-02-15 | Stop reason: SDUPTHER

## 2022-02-15 ENCOUNTER — TELEPHONE (OUTPATIENT)
Dept: FAMILY MEDICINE CLINIC | Facility: CLINIC | Age: 87
End: 2022-02-15

## 2022-02-16 NOTE — TELEPHONE ENCOUNTER
Please call patients  to see how she is doing on 1/2 the dose of Nifedipine in the AM     Was having low readings in the AM and PM     New dosing   30mgs in the AM/PM rather then 60mgs

## 2022-02-17 NOTE — TELEPHONE ENCOUNTER
Spoke to the patient ate decided not to make any changes because her blood pressure started becoming more regular  They think it was secondary to the treatments of the cancer that she is currently going under  They will continue to monitor her blood pressure and let me know  Incidentally she also has been having right arm pain will send her for an x-ray of the right arm just to be sure that there is no other etiology    If it is normal x-ray recommend her coming into the office for an evaluation

## 2022-02-22 ENCOUNTER — HOSPITAL ENCOUNTER (OUTPATIENT)
Dept: RADIOLOGY | Facility: HOSPITAL | Age: 87
Discharge: HOME | End: 2022-02-22
Payer: COMMERCIAL

## 2022-02-22 DIAGNOSIS — M79.601 RIGHT ARM PAIN: ICD-10-CM

## 2022-02-22 PROCEDURE — 73060 X-RAY EXAM OF HUMERUS: CPT

## 2022-02-25 ENCOUNTER — TELEPHONE (OUTPATIENT)
Dept: FAMILY MEDICINE CLINIC | Facility: CLINIC | Age: 87
End: 2022-02-25

## 2022-02-25 NOTE — TELEPHONE ENCOUNTER
----- Message from Vilma Wiley MD sent at 2/25/2022  4:50 PM EST -----  Please advise the patient that her x-rays normal

## 2022-03-09 ENCOUNTER — CLINICAL SUPPORT (OUTPATIENT)
Dept: RADIATION ONCOLOGY | Facility: HOSPITAL | Age: 87
End: 2022-03-09
Attending: RADIOLOGY
Payer: COMMERCIAL

## 2022-03-09 VITALS
TEMPERATURE: 97.2 F | DIASTOLIC BLOOD PRESSURE: 72 MMHG | HEART RATE: 94 BPM | WEIGHT: 135.8 LBS | BODY MASS INDEX: 24.84 KG/M2 | RESPIRATION RATE: 18 BRPM | SYSTOLIC BLOOD PRESSURE: 140 MMHG | OXYGEN SATURATION: 97 %

## 2022-03-09 DIAGNOSIS — C34.91 ADENOCARCINOMA, LUNG, RIGHT (HCC): Primary | ICD-10-CM

## 2022-03-09 PROCEDURE — 99024 POSTOP FOLLOW-UP VISIT: CPT | Performed by: RADIOLOGY

## 2022-03-09 PROCEDURE — 99211 OFF/OP EST MAY X REQ PHY/QHP: CPT | Performed by: RADIOLOGY

## 2022-03-09 NOTE — PROGRESS NOTES
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  She has completed radiation to right middle lung lobe in 15 fractions on 2/8/22  She tolerated treatment well and returns today for one month routine follow-up        3/21/22 Pulmonology, Dr Jim Suárez History   History of left breast cancer   8/10/2018 Biopsy    Left breast biopsy  Invasive mucinous  Grade 1      Her 2 1+     9/11/2018 Surgery    Left lumpectomy  Invasive mucinous carcinoma  Grade 1  1 1 cm in size  Margins negative      Her 2 1+  Stage 1A     10/4/2018 -  Hormone Therapy    Dr Rupert Locke  No benefit of AI     Adenocarcinoma, lung, right ([facility] )   11/2021 Initial Diagnosis    Adenocarcinoma, lung, right ([facility] )     11/15/2021 Biopsy    Lung, Right Middle Lobe, mass:  - Adenocarcinoma  Comment: Immunohistochemistry is positive in the tumor cells for TTF-1 and negative for p40 and Gata3  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 a primary pulmonary adenocarcinoma  1/19/2022 - 2/8/2022 Radiation    Course: C1    Plan ID Energy Fractions Dose per Fraction (cGy) Dose Correction (cGy) Total Dose Delivered (cGy) Elapsed Days   RML 6X 15 / 15 400 0 6,000 20      Treatment dates:  1/19/2022 - 2/8/2022          Review of Systems:  Review of Systems   Constitutional: Negative  HENT: Negative  Eyes: Negative  Respiratory: Negative  Cardiovascular: Negative  Gastrointestinal: Negative  Endocrine: Negative  Genitourinary: Negative  Musculoskeletal: Positive for arthralgias (right shoulder and back) and back pain  Skin: Negative  Allergic/Immunologic: Negative  Neurological: Negative  Hematological: Negative  Psychiatric/Behavioral: Negative          Clinical Trial: no    Covid Vaccine Status: vaccinated     Health Maintenance   Topic Date Due    PT PLAN OF CARE  02/06/2020    DM Eye Exam  02/10/2021    COVID-19 Vaccine (3 - Booster for Pfizer series) 08/30/2021    Influenza Vaccine (1) 09/01/2021    HEMOGLOBIN A1C  02/25/2022    DTaP,Tdap,and Td Vaccines (2 - Td or Tdap) 10/01/2022 (Originally 9/4/2017)    Diabetic Foot Exam  08/27/2022    Fall Risk  10/04/2022    Depression Screening  10/04/2022    Medicare Annual Wellness Visit (AWV)  10/04/2022    BMI: Adult  12/10/2022    Pneumococcal Vaccine: 65+ Years  Completed    HIB Vaccine  Aged Out    Hepatitis B Vaccine  Aged Out    IPV Vaccine  Aged Out    Hepatitis A Vaccine  Aged Out    Meningococcal ACWY Vaccine  Aged Out    HPV Vaccine  Aged Out     Patient Active Problem List   Diagnosis    Iliac artery stenosis, bilateral ([facility] )    Hyperparathyroidism ([facility] [address] )    GERD (gastroesophageal reflux disease)    Arthritis    Hypercholesterolemia    Hypertension    EKG abnormalities    Anemia    Neck pain    Dysuria    Chronic left shoulder pain    Fall from other slipping, tripping, or stumbling    Abnormal EKG    Abnormal mammogram    Accelerated essential hypertension    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)    Back pain    Bilateral carotid artery stenosis    Carotid stenosis, asymptomatic    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    Chronic myofascial pain    Chronic pain of lower extremity    CKD (chronic kidney disease) stage 3, GFR 30-59 ml/min ([facility])    Diabetic retinopathy ([facility] )    DMII (diabetes mellitus, type 2) ([facility])    Gout    Hypomagnesemia    Atherosclerosis of other specified arteries    Lymphadenopathy    Nephrolithiasis    Osteoporosis    PAD (peripheral artery disease) ([facility] )    Primary generalized (osteo)arthritis    Persistent proteinuria    Renal cyst    Vaginal prolapse    Vitamin D deficiency    Acquired complex cyst of kidney    History of left breast cancer    Type 2 diabetes mellitus with diabetic peripheral angiopathy without gangrene ([address]    Stricture of artery ([address]    Claudication in peripheral vascular disease (HCC)    Hemorrhoids    Tinea unguium    Diabetic neuropathy (HCC)    Bradycardia    Glottic insufficiency    Reflux laryngitis    Paresis of left vocal fold    Muscle tension dysphonia    Laryngocele    Anterior glottic web    Viral upper respiratory tract infection    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HCC)    Renal artery stenosis (HCC)    Left ear pain    Cervical spine disease    Osteopenia of multiple sites    Type 2 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, bilateral (HCC)    Lung mass    CKD (chronic kidney disease)    Adenocarcinoma, lung, right (HCC)     Past Medical History:   Diagnosis Date    Arthritis     Back pain     Benign essential hypertension     LAST ASSESSED: [id number]    Carotid artery stenosis     CKD (chronic kidney disease) stage 2, GFR 60-89 ml/min     Constipation, chronic     "drinks prune juice"    Diabetes mellitus (HCC)     DMII [facility]di[facility] mellitus[facility] type 2) [facility]     Epiglottitis     RESOLVED: [id number]    Gastritis     GERD (gastroesophageal reflux disease)     Gout     H/O blood clots     left lower leg,,,11 yrs ago    History of stenosis of renal artery     RESOLVED: [id number]    Hypercholesteremia     Hypercholesterolemia     Hyperlipidemia     Hyperparathyroidism ([address]     Hypertension     Iliac artery stenosis, bilateral (HCC)     Kidney stone     Lobular carcinoma ([facility] ) 09/06/2017    Left    Lung cancer ([facility] )     Mucinous carcinoma of breast, 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    Left    Presence of pessary     Renal artery stenosis (HCC)     Renal disorder     Renovascular hypertension     RESOLVED: [id number]    Segmental and somatic dysfunction of cervical region     RESOLVED: [id number]    Segmental and somatic dysfunction of lumbar region     RESOLVED: [id number]    Segmental and somatic dysfunction of pelvic region     RESOLVED: [id number]    Segmental and somatic dysfunction of thoracic region     RESOLVED: [id number]    Somatic dysfunction of abdominal region     RESOLVED: [id number]    Somatic dysfunction of head region     RESOLVED: [id number]    Somatic dysfunction of lower extremities     RESOLVED: [id number]    Somatic dysfunction of rib region     RESOLVED: [id number]    Somatic dysfunction of thoracic region     RESOLVED: [id number]    Somatic dysfunction of upper extremities     RESOLVED: [id number]    Teeth missing      Past Surgical History:   Procedure Laterality Date    ANGIOPLASTY / STENTING ILIAC      BREAST BIOPSY Left 08/10/2018    U/S BX- mucinous ca    BREAST BIOPSY Left 08/07/2017    U/S BX    BREAST LUMPECTOMY Left 09/06/2017    LAST ASSESSED: [id number]    BREAST LUMPECTOMY Left 09/11/2018    BREAST SURGERY Left     biopsy    CATARACT EXTRACTION Bilateral     CHOLECYSTECTOMY      COLONOSCOPY      EYE SURGERY      ILIAC VEIN ANGIOPLASTY / STENTING Right     INITIAL STENOSIS: ONSET: [id number]    IR BIOPSY LUNG  11/15/2021    KNEE ARTHROSCOPY Right     KNEE SURGERY Right     ONSET: [id number]    MAMMO NEEDLE LOCALIZATION LEFT (ALL INC) Left 9/11/2018    MAMMO NEEDLE LOCALIZATION LEFT (ALL INC) Left 9/6/2017    LA ESOPHAGOGASTRODUODENOSCOPY TRANSORAL DIAGNOSTIC N/A 7/26/2018    Procedure: ESOPHAGOGASTRODUODENOSCOPY (EGD); Surgeon: Vi Yanez DO;  Location: [facility];   Service: Gastroenterology    LA LARYNGOSCOPY,DIRCT,[facility] N/A 6/10/2016    Procedure: MICRO DIRECT LARYNGOSCOPY WITH VOCAL FOLD MASS EXCISION ;  Surgeon: Radha Gurrola MD;  [facility];  Service: ENT    LA MASTECTOMY, PARTIAL Left 9/6/2017    Procedure: LUMPECTOMY BREAST NEEDLE LOCALIZED WITH FAXITRON NEEDLE @ 0830;  Surgeon: Bunny Walker MD;  [facility]; Service: General    FL PERQ DEVICE PLACEMT BREAST LOC 1ST LES MARY SAVAGE Left 2018    Procedure: BREAST LUMPECTOMY; BREAST NEEDLE LOCALIZATION (NEEDLE LOC AT 1330); Surgeon: Destinee Lim MD;  [facility];  Service: Surgical Oncology    THROAT SURGERY      lump removed    TUBAL LIGATION      UPPER GASTROINTESTINAL ENDOSCOPY      US GUIDANCE BREAST BIOPSY LEFT EACH ADDITIONAL Left 2017    US GUIDED BREAST BIOPSY LEFT COMPLETE Left 8/10/2018    US GUIDED BREAST BIOPSY LEFT COMPLETE Left 2017    VASCULAR SURGERY       Family History   Problem Relation Age of Onset    Heart disease Brother     Heart disease Maternal Grandmother     Diabetes Other         of other type with arthropathy, without long term current use of insulin    Hypertension Other     No Known Problems Mother     Gout Family     Rheum arthritis Family     Lupus Family         systematic erythematosus    Heart disease Family     Stroke Family         syndrome    Stroke Maternal Uncle         syndrome    Stroke Maternal Aunt         syndrome    No Known Problems Father     No Known Problems Daughter     No Known Problems Maternal Grandfather     No Known Problems Paternal Grandmother     No Known Problems Paternal Grandfather     No Known Problems Daughter     No Known Problems Daughter     No Known Problems Maternal Aunt     No Known Problems Maternal Aunt     No Known Problems Maternal Aunt      Social History     Socioeconomic History    Marital status:       Spouse name: Not on file    Number of children: Not on file    Years of education: Not on file    Highest education level: Not on file   Occupational History    Occupation: retired   Tobacco Use    Smoking status: Former Smoker     Packs/day: 0 50     Years: 64 00     Pack years: 32 00     Start date:      Quit date:      Years since quittin 1    Smokeless tobacco: Never Used    Tobacco comment: quit in  1 ppd x 50 years   Vaping Use    Vaping Use: Never used   Substance and Sexual Activity    Alcohol use: Not Currently    Drug use: No    Sexual activity: Yes     Partners: Male   Other Topics Concern    Not on file   Social History Narrative    Daily caffeine consumption, 1 serving a day      Social Determinants of Health     Financial Resource Strain: Not on file   Food Insecurity: Not on file   Transportation Needs: Not on file   Physical Activity: Not on file   Stress: Not on file   Social Connections: Not on file   Intimate Partner Violence: Not on file   Housing Stability: Not on file       Current Outpatient Medications:     Acetaminophen (TYLENOL) 325 MG CAPS, Take 500 mg by mouth every 8 (eight) hours , Disp: , Rfl:     aspirin (ADULT ASPIRIN EC LOW STRENGTH) 81 mg EC tablet, Take 1 tablet by mouth daily, Disp: , Rfl:     Blood Glucose Monitoring Suppl (FreeStyle Precision Jalen System) w/Device KIT, by Does not apply route 3 (three) times a day, Disp: 1 kit, Rfl: 0    calcitriol (ROCALTROL) 0 25 mcg capsule, Take one capsule every Tuesday and Friday, Disp: 10 capsule, Rfl: 6    calcium carbonate-vitamin D (OSCAL-D) 500 mg-200 units per tablet, Take 1 tablet by mouth daily with breakfast, Disp: , Rfl:     diclofenac sodium (VOLTAREN) 1 %, Apply 2 g topically 4 (four) times a day, Disp: 100 g, Rfl: 1    dicyclomine (BENTYL) 10 mg capsule, Take 1 capsule (10 mg total) by mouth 3 (three) times a day as needed (gas pain), Disp: 30 capsule, Rfl: 0    docusate sodium (COLACE) 100 mg capsule, Take 1 capsule (100 mg total) by mouth 2 (two) times a day, Disp: 10 capsule, Rfl: 0    fluticasone (FLONASE) 50 mcg/act nasal spray, 1 spray into each nostril daily, Disp: 16 g, Rfl: 2    gabapentin (Neurontin) 100 mg capsule, Take 1 capsule (100 mg total) by mouth 2 (two) times a day, Disp: 180 capsule, Rfl: 1    Lancets (ONETOUCH ULTRASOFT) lancets, by Other route 2 (two) times a day Use as instructed, Disp: 100 each, Rfl: 5    Lidocaine 4 % PTCH, Apply 1 patch topically daily, Disp: 20 patch, Rfl: 1    losartan (COZAAR) 100 MG tablet, Take 1 tablet (100 mg total) by mouth daily, Disp: 90 tablet, Rfl: 2    olopatadine (PATANOL) 0 1 % ophthalmic solution, Administer 1 drop to both eyes 2 (two) times a day, Disp: 5 mL, Rfl: 0    OneTouch Delica Lancets 69A MISC, Inject as directed 2 (two) times a day Use to test, Disp: 200 each, Rfl: 0    pantoprazole (PROTONIX) 40 mg tablet, Take 1 tablet (40 mg total) by mouth daily, Disp: 90 tablet, Rfl: 1    pravastatin (PRAVACHOL) 40 mg tablet, take 1 tablet by mouth once daily, Disp: 90 tablet, Rfl: 4    DULoxetine (CYMBALTA) 30 mg delayed release capsule, Take 30 mg by mouth daily (Patient not taking: Reported on 10/28/2021), Disp: , Rfl: 0    gabapentin (NEURONTIN) 100 mg capsule, Take 2 capsules (200 mg total) by mouth daily at bedtime (Patient not taking: Reported on 12/9/2021 ), Disp: 180 capsule, Rfl: 1    Lancets (ONETOUCH DELICA PLUS [id number]) MISC, 1 Device by Does not apply route 2 (two) times a day (Patient not taking: Reported on 12/9/2021 ), Disp: 100 each, Rfl: 5    NIFEdipine ER (ADALAT CC) 30 MG 24 hr tablet, TAKE 1 TABLET AT NIGHT IN ADDITION TO THE 60MG IN THE MORNING, Disp: 90 tablet, Rfl: 5    NIFEdipine ER (ADALAT CC) 60 MG 24 hr tablet, take 1 tablet by mouth once daily (Patient not taking: Reported on 12/9/2021), Disp: 90 tablet, Rfl: 0    NIFEdipine ER (ADALAT CC) 60 MG 24 hr tablet, Take 1 tablet (60 mg total) by mouth daily (Patient not taking: Reported on 12/9/2021 ), Disp: 90 tablet, Rfl: 3    polyethylene glycol (GLYCOLAX) 17 GM/SCOOP powder, Take 17 g by mouth daily (Patient not taking: Reported on 10/28/2021), Disp: 225 g, Rfl: 1  Allergies   Allergen Reactions    Clonidine Derivatives Swelling    Diflucan [Fluconazole] Rash    Flagyl [Metronidazole] Anaphylaxis    Carvedilol Hives     And legs swelled    Latex Rash    Lipitor [Atorvastatin] Legs swelled    Other Palpitations     IV DYE    Shingrix [Zoster Vac Recomb Adjuvanted] Hives     Vitals:    03/09/22 1057   BP: 140/72   BP Location: Right arm   Pulse: 94   Resp: 18   Temp: (!) 97 2 °F (36 2 °C)   TempSrc: Temporal   SpO2: 97%   Weight: 61 6 kg (135 lb 12 8 oz)

## 2022-03-09 NOTE — PROGRESS NOTES
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  She has completed radiation to right middle lung lobe in 15 fractions on 2/8/22  She tolerated treatment well and returns today for one month routine follow-up  Interval History:  Upon interview, she endorses mild right shoulder pain  She discussed this with her primary care physician and right humerus x-ray was unrevealing  She denies worsening cough, chest pain or shortness of breath  She has follow-up with Dr Kurtis Cloud later this month to continue work-up for shoulder discomfort  She does not smoke  She has follow-up with pulmonology later this month  She has good appetite and denies weight loss  Historical Information   Oncology History   History of left breast cancer   8/10/2018 Biopsy    Left breast biopsy  Invasive mucinous  Grade 1      Her 2 1+     9/11/2018 Surgery    Left lumpectomy  Invasive mucinous carcinoma  Grade 1  1 1 cm in size  Margins negative      Her 2 1+  Stage 1A     10/4/2018 -  Hormone Therapy    Dr Pablo José  No benefit of AI     Adenocarcinoma, lung, right ([facility] [address] )   11/2021 Initial Diagnosis    Adenocarcinoma, lung, right ([facility] [address] )     11/15/2021 Biopsy    Lung, Right Middle Lobe, mass:  - Adenocarcinoma  Comment: Immunohistochemistry is positive in the tumor cells for TTF-1 and negative for p40 and Gata3    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 a primary pulmonary adenocarcinoma       1/19/2022 - 2/8/2022 Radiation    Course: C1    Plan ID Energy Fractions Dose per Fraction (cGy) Dose Correction (cGy) Total Dose Delivered (cGy) Elapsed Days   RML 6X 15 / 15 400 0 6,000 20      Treatment dates:  1/19/2022 - 2/8/2022          Past Medical History:   Diagnosis Date    Arthritis     Back pain     Benign essential hypertension     LAST ASSESSED: [id number]    Carotid artery stenosis     CKD (chronic kidney disease) stage 2, GFR 60-89 ml/min     Constipation, chronic     "drinks prune juice"    Diabetes mellitus ([facility] [address] )     DMII (diabetes mellitus, type 2) (HCC)     Epiglottitis     RESOLVED: [id number]    Gastritis     GERD (gastroesophageal reflux disease)     Gout     H/O blood clots     left lower leg,,,11 yrs ago    History of stenosis of renal artery     RESOLVED: [id number]    Hypercholesteremia     Hypercholesterolemia     Hyperlipidemia     Hyperparathyroidism ([facility] [address] )     Hypertension     Iliac artery stenosis, bilateral (HCC)     Kidney stone     Lobular carcinoma ([facility] [address] ) 09/06/2017    Left    Lung cancer ([facility] [address] )     Mucinous carcinoma of breast, left ([facility] [address] ) 09/11/2018    Left    Presence of pessary     Renal artery stenosis (HCC)     Renal disorder     Renovascular hypertension     RESOLVED: [id number]    Segmental and somatic dysfunction of cervical region     RESOLVED: [id number]    Segmental and somatic dysfunction of lumbar region     RESOLVED: [id number]    Segmental and somatic dysfunction of pelvic region     RESOLVED: [id number]    Segmental and somatic dysfunction of thoracic region     RESOLVED: 17BAO0848    Somatic dysfunction of abdominal region     RESOLVED: [id number]    Somatic dysfunction of head region     RESOLVED: [id number]    Somatic dysfunction of lower extremities     RESOLVED: 35CYJ4296    Somatic dysfunction of rib region RESOLVED: 12DXF3032    Somatic dysfunction of thoracic region     RESOLVED: 31QMH4923    Somatic dysfunction of upper extremities     RESOLVED: [id number]    Teeth missing      Past Surgical History:   Procedure Laterality Date    ANGIOPLASTY / STENTING ILIAC      BREAST BIOPSY Left 08/10/2018    U/S BX- mucinous ca    BREAST BIOPSY Left 08/07/2017    U/S BX    BREAST LUMPECTOMY Left 09/06/2017    LAST ASSESSED: [id number]    BREAST LUMPECTOMY Left 09/11/2018    BREAST SURGERY Left     biopsy    CATARACT EXTRACTION Bilateral     CHOLECYSTECTOMY      COLONOSCOPY      EYE SURGERY      ILIAC VEIN ANGIOPLASTY / STENTING Right     INITIAL STENOSIS: ONSET: [id number]    IR BIOPSY LUNG  11/15/2021    KNEE ARTHROSCOPY Right     KNEE SURGERY Right     ONSET: [id number]    MAMMO NEEDLE LOCALIZATION LEFT (ALL INC) Left 9/11/2018    MAMMO NEEDLE LOCALIZATION LEFT (ALL INC) Left 9/6/2017    NH ESOPHAGOGASTRODUODENOSCOPY TRANSORAL DIAGNOSTIC N/A 7/26/2018    Procedure: ESOPHAGOGASTRODUODENOSCOPY (EGD); Surgeon: Kellen Llanes DO;  [facility]: [facility]; Service: Gastroenterology    NH LARYNGOSCOPY,DIRCT,[facility] N/A 6/10/2016    Procedure: MICRO DIRECT LARYNGOSCOPY WITH VOCAL FOLD MASS EXCISION ;  Surgeon: Vianney Grayson MD;  [facility];  Service: ENT    NH MASTECTOMY, PARTIAL Left 9/6/2017    Procedure: LUMPECTOMY BREAST NEEDLE LOCALIZED WITH FAXITRON NEEDLE @ 0830;  Surgeon: Servando Loera MD;  [facility];  Service: General    NH PERQ DEVICE PLACEMT BREAST LOC 1ST LES W Aurfreda Humphreys Left 9/11/2018    Procedure: BREAST LUMPECTOMY; BREAST NEEDLE LOCALIZATION (NEEDLE LOC AT 1330);   Surgeon: Nargis Hale MD;  [facility];  Service: Surgical Oncology    THROAT SURGERY      lump removed    TUBAL LIGATION      UPPER GASTROINTESTINAL ENDOSCOPY      US GUIDANCE BREAST BIOPSY LEFT EACH ADDITIONAL Left 8/7/2017    US GUIDED BREAST BIOPSY LEFT COMPLETE Left 8/10/2018    US GUIDED BREAST BIOPSY LEFT COMPLETE Left 2017    VASCULAR SURGERY         Social History   Social History     Substance and Sexual Activity   Alcohol Use Not Currently     Social History     Substance and Sexual Activity   Drug Use No     Social History     Tobacco Use   Smoking Status Former Smoker    Packs/day: 0 50    Years: 64 00    Pack years: 32 00    Start date:     Quit date:     Years since quittin 1   Smokeless Tobacco Never Used   Tobacco Comment    quit in  1 ppd x 50 years         Meds/Allergies     Current Outpatient Medications:     Acetaminophen (TYLENOL) 325 MG CAPS, Take 500 mg by mouth every 8 (eight) hours , Disp: , Rfl:     aspirin (ADULT ASPIRIN EC LOW STRENGTH) 81 mg EC tablet, Take 1 tablet by mouth daily, Disp: , Rfl:     Blood Glucose Monitoring Suppl (FreeStyle Precision Jalen System) w/Device KIT, by Does not apply route 3 (three) times a day, Disp: 1 kit, Rfl: 0    calcitriol (ROCALTROL) 0 25 mcg capsule, Take one capsule every Tuesday and Friday, Disp: 10 capsule, Rfl: 6    calcium carbonate-vitamin D (OSCAL-D) 500 mg-200 units per tablet, Take 1 tablet by mouth daily with breakfast, Disp: , Rfl:     diclofenac sodium (VOLTAREN) 1 %, Apply 2 g topically 4 (four) times a day, Disp: 100 g, Rfl: 1    dicyclomine (BENTYL) 10 mg capsule, Take 1 capsule (10 mg total) by mouth 3 (three) times a day as needed (gas pain), Disp: 30 capsule, Rfl: 0    docusate sodium (COLACE) 100 mg capsule, Take 1 capsule (100 mg total) by mouth 2 (two) times a day, Disp: 10 capsule, Rfl: 0    fluticasone (FLONASE) 50 mcg/act nasal spray, 1 spray into each nostril daily, Disp: 16 g, Rfl: 2    gabapentin (Neurontin) 100 mg capsule, Take 1 capsule (100 mg total) by mouth 2 (two) times a day, Disp: 180 capsule, Rfl: 1    Lancets (ONETOUCH ULTRASOFT) lancets, by Other route 2 (two) times a day Use as instructed, Disp: 100 each, Rfl: 5    Lidocaine 4 % PTCH, Apply 1 patch topically daily, Disp: 20 patch, Rfl: 1    losartan (COZAAR) 100 MG tablet, Take 1 tablet (100 mg total) by mouth daily, Disp: 90 tablet, Rfl: 2    olopatadine (PATANOL) 0 1 % ophthalmic solution, Administer 1 drop to both eyes 2 (two) times a day, Disp: 5 mL, Rfl: 0    OneTouch Delica Lancets 39L MISC, Inject as directed 2 (two) times a day Use to test, Disp: 200 each, Rfl: 0    pantoprazole (PROTONIX) 40 mg tablet, Take 1 tablet (40 mg total) by mouth daily, Disp: 90 tablet, Rfl: 1    pravastatin (PRAVACHOL) 40 mg tablet, take 1 tablet by mouth once daily, Disp: 90 tablet, Rfl: 4    DULoxetine (CYMBALTA) 30 mg delayed release capsule, Take 30 mg by mouth daily (Patient not taking: Reported on 10/28/2021), Disp: , Rfl: 0    gabapentin (NEURONTIN) 100 mg capsule, Take 2 capsules (200 mg total) by mouth daily at bedtime (Patient not taking: Reported on 12/9/2021 ), Disp: 180 capsule, Rfl: 1    Lancets (ONETOUCH DELICA PLUS [id number]) MISC, 1 Device by Does not apply route 2 (two) times a day (Patient not taking: Reported on 12/9/2021 ), Disp: 100 each, Rfl: 5    NIFEdipine ER (ADALAT CC) 30 MG 24 hr tablet, TAKE 1 TABLET AT NIGHT IN ADDITION TO THE 60MG IN THE MORNING, Disp: 90 tablet, Rfl: 5    NIFEdipine ER (ADALAT CC) 60 MG 24 hr tablet, take 1 tablet by mouth once daily (Patient not taking: Reported on 12/9/2021), Disp: 90 tablet, Rfl: 0    NIFEdipine ER (ADALAT CC) 60 MG 24 hr tablet, Take 1 tablet (60 mg total) by mouth daily (Patient not taking: Reported on 12/9/2021 ), Disp: 90 tablet, Rfl: 3    polyethylene glycol (GLYCOLAX) 17 GM/SCOOP powder, Take 17 g by mouth daily (Patient not taking: Reported on 10/28/2021), Disp: 225 g, Rfl: 1  Allergies   Allergen Reactions    Clonidine Derivatives Swelling    Diflucan [Fluconazole] Rash    Flagyl [Metronidazole] Anaphylaxis    Carvedilol Hives     And legs swelled    Latex Rash    Lipitor [Atorvastatin]      Legs swelled    Other Palpitations     IV DYE    Shingrix [Zoster Vac Recomb Adjuvanted] Hives         Review of Systems   Constitutional: Negative  HENT: Negative  Eyes: Negative  Respiratory: Negative  Cardiovascular: Negative  Gastrointestinal: Negative  Endocrine: Negative  Genitourinary: Negative  Musculoskeletal: Positive for arthralgias (right shoulder and back) and back pain  Skin: Negative  Allergic/Immunologic: Negative  Neurological: Negative  Hematological: Negative  Psychiatric/Behavioral: Negative  OBJECTIVE:   /72 (BP Location: Right arm)   Pulse 94   Temp (!) 97 2 °F (36 2 °C) (Temporal)   Resp 18   Wt 61 6 kg (135 lb 12 8 oz)   LMP  (LMP Unknown)   SpO2 97%   BMI 24 84 kg/m²   Pain Assessment:  0  Karnofsky: 80 - Normal activity with effort; some signs or symptoms of disease    Physical Exam  Constitutional:       Appearance: She is not toxic-appearing  HENT:      Head: Normocephalic and atraumatic  Eyes:      General: No scleral icterus  Extraocular Movements: Extraocular movements intact  Pupils: Pupils are equal, round, and reactive to light  Cardiovascular:      Rate and Rhythm: Normal rate  Pulmonary:      Effort: Pulmonary effort is normal       Breath sounds: Normal breath sounds  Abdominal:      General: Abdomen is flat  There is no distension  Musculoskeletal:         General: Normal range of motion  Cervical back: Normal range of motion  Lymphadenopathy:      Cervical: No cervical adenopathy  Skin:     General: Skin is warm and dry  Neurological:      General: No focal deficit present  Mental Status: She is alert  Psychiatric:         Mood and Affect: Mood normal         RESULTS    Lab Results: No results found for this or any previous visit (from the past 672 hour(s))  Imaging Studies:XR humerus right    Result Date: 2/25/2022  Narrative: RIGHT HUMERUS INDICATION:   M79 601: Pain in right arm   COMPARISON:  None VIEWS:  XR HUMERUS RIGHT FINDINGS: There is no acute fracture or dislocation  No significant degenerative changes  No lytic or blastic osseous lesion  Soft tissues are unremarkable  Impression: No acute osseous abnormality  Workstation performed: [id number]     Assessment/Plan:  Orders Placed This Encounter   Procedures    CT chest wo contrast        Kalyani Vaughan is a 80y o  year old female with recently diagnosed at least stage IB 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  She has completed radiation to right middle lung lobe in 15 fractions on 2/8/22  She tolerated treatment well and returns today for one month routine follow-up  She has mild right shoulder discomfort and was encouraged to continue work-up and evaluation with her primary care provider  She is scheduled to follow-up with her pulmonologist   She remains without clinical evidence of disease recurrence  She is without significant toxicity attributable to treatment  She will return in 2 months with CT chest   She has been encouraged to call with questions or concerns in the interim  Shannan Scott MD  9/0/8142,1:28 PM    Portions of the record may have been created with voice recognition software   Occasional wrong word or "sound a like" substitutions may have occurred due to the inherent limitations of voice recognition software   Read the chart carefully and recognize, using context, where substitutions have occurred

## 2022-03-14 DIAGNOSIS — I10 HYPERTENSION, UNSPECIFIED TYPE: Primary | ICD-10-CM

## 2022-03-15 RX ORDER — NIFEDIPINE 60 MG/1
TABLET, FILM COATED, EXTENDED RELEASE ORAL
Qty: 90 TABLET | Refills: 3 | Status: SHIPPED | OUTPATIENT
Start: 2022-03-15 | End: 2022-04-19

## 2022-03-21 ENCOUNTER — OFFICE VISIT (OUTPATIENT)
Dept: PULMONOLOGY | Facility: CLINIC | Age: 87
End: 2022-03-21
Payer: COMMERCIAL

## 2022-03-21 VITALS
RESPIRATION RATE: 18 BRPM | HEIGHT: 62 IN | DIASTOLIC BLOOD PRESSURE: 60 MMHG | SYSTOLIC BLOOD PRESSURE: 138 MMHG | BODY MASS INDEX: 24.29 KG/M2 | OXYGEN SATURATION: 99 % | WEIGHT: 132 LBS | HEART RATE: 85 BPM | TEMPERATURE: 98.4 F

## 2022-03-21 DIAGNOSIS — J30.9 ALLERGIC RHINITIS, UNSPECIFIED SEASONALITY, UNSPECIFIED TRIGGER: ICD-10-CM

## 2022-03-21 DIAGNOSIS — C34.91 ADENOCARCINOMA, LUNG, RIGHT (HCC): Primary | ICD-10-CM

## 2022-03-21 PROCEDURE — 1160F RVW MEDS BY RX/DR IN RCRD: CPT | Performed by: INTERNAL MEDICINE

## 2022-03-21 PROCEDURE — 99214 OFFICE O/P EST MOD 30 MIN: CPT | Performed by: INTERNAL MEDICINE

## 2022-03-21 PROCEDURE — 1036F TOBACCO NON-USER: CPT | Performed by: INTERNAL MEDICINE

## 2022-03-21 RX ORDER — FLUTICASONE PROPIONATE 50 MCG
2 SPRAY, SUSPENSION (ML) NASAL DAILY
Qty: 16 G | Refills: 2 | Status: SHIPPED | OUTPATIENT
Start: 2022-03-21

## 2022-03-21 NOTE — ASSESSMENT & PLAN NOTE
Mrs Zhanna Nunn went for radiation therapy and currently denies any complaints  I reviewed her MRI of the brain which was unremarkable  She is due for a CT scan of the chest in May to assess response to treatment  From pulmonary standpoint she has no complaints and is up-to-date on her vaccines  She would like to continue following up with me so I will see her back in 6 months

## 2022-03-21 NOTE — PROGRESS NOTES
Assessment/Plan:    Adenocarcinoma, lung, right [facility])  Mrs Carolyn Wills went for radiation therapy and currently denies any complaints  I reviewed her MRI of the brain which was unremarkable  She is due for a CT scan of the chest in May to assess response to treatment  From pulmonary standpoint she has no complaints and is up-to-date on her vaccines  She would like to continue following up with me so I will see her back in 6 months  Diagnoses and all orders for this visit:    Adenocarcinoma, lung, right (HCC)    Allergic rhinitis, unspecified seasonality, unspecified trigger  -     fluticasone (FLONASE) 50 mcg/act nasal spray; 2 sprays into each nostril daily          Subjective:      Patient ID: Lyly Garcia is a 80 y o  female  Mrs Carolyn Wills is here for follow-up of her lung cancer  She had radiation therapy and feels well  She denies any shortness of breath or cough  The following portions of the patient's history were reviewed and updated as appropriate: allergies, current medications, past family history, past medical history, past social history, past surgical history and problem list     Review of Systems   Constitutional: Negative  Negative for unexpected weight change  HENT: Negative  Negative for postnasal drip  Eyes: Negative  Respiratory: Negative  Negative for cough, shortness of breath and wheezing  Cardiovascular: Negative  Negative for chest pain and leg swelling  Gastrointestinal: Negative  Endocrine: Negative  Genitourinary: Negative  Musculoskeletal: Negative  Allergic/Immunologic: Negative  Neurological: Negative  Hematological: Negative  Objective:      /60   Pulse 85   Temp 98 4 °F (36 9 °C)   Resp 18   Ht 5' 2" (1 575 m)   Wt 59 9 kg (132 lb)   LMP  (LMP Unknown)   SpO2 99%   BMI 24 14 kg/m²          Physical Exam  Constitutional:       Appearance: She is well-developed  HENT:      Head: Normocephalic     Eyes: Pupils: Pupils are equal, round, and reactive to light  Cardiovascular:      Rate and Rhythm: Normal rate  Heart sounds: No murmur heard  Pulmonary:      Effort: Pulmonary effort is normal  No respiratory distress  Breath sounds: Normal breath sounds  No wheezing or rales  Abdominal:      Palpations: Abdomen is soft  Musculoskeletal:         General: Normal range of motion  Cervical back: Normal range of motion and neck supple  Skin:     General: Skin is warm and dry  Neurological:      Mental Status: She is alert and oriented to person, place, and time

## 2022-03-26 ENCOUNTER — RA CDI HCC (OUTPATIENT)
Dept: OTHER | Facility: HOSPITAL | Age: 87
End: 2022-03-26

## 2022-03-26 NOTE — PROGRESS NOTES
[facility]  coding opportunities     E11 40, [id number], E11 22     Chart Reviewed number of suggestions sent to Provider: 3     Patients Insurance     Medicare Insurance: [address] [place] [address]

## 2022-04-04 ENCOUNTER — OFFICE VISIT (OUTPATIENT)
Dept: FAMILY MEDICINE CLINIC | Facility: CLINIC | Age: 87
End: 2022-04-04
Payer: COMMERCIAL

## 2022-04-04 VITALS
DIASTOLIC BLOOD PRESSURE: 54 MMHG | TEMPERATURE: 97.4 F | BODY MASS INDEX: 24.66 KG/M2 | WEIGHT: 134 LBS | HEIGHT: 62 IN | SYSTOLIC BLOOD PRESSURE: 136 MMHG | RESPIRATION RATE: 16 BRPM | HEART RATE: 82 BPM

## 2022-04-04 DIAGNOSIS — M25.512 ACUTE PAIN OF BOTH SHOULDERS: ICD-10-CM

## 2022-04-04 DIAGNOSIS — M25.511 ACUTE PAIN OF BOTH SHOULDERS: ICD-10-CM

## 2022-04-04 DIAGNOSIS — E11.9 TYPE 2 DIABETES MELLITUS WITHOUT COMPLICATION, WITHOUT LONG-TERM CURRENT USE OF INSULIN (HCC): Primary | ICD-10-CM

## 2022-04-04 DIAGNOSIS — I10 ESSENTIAL HYPERTENSION: ICD-10-CM

## 2022-04-04 DIAGNOSIS — K04.7 TOOTH INFECTION: ICD-10-CM

## 2022-04-04 DIAGNOSIS — R10.11 RUQ ABDOMINAL PAIN: ICD-10-CM

## 2022-04-04 DIAGNOSIS — E78.5 HYPERLIPIDEMIA, UNSPECIFIED HYPERLIPIDEMIA TYPE: ICD-10-CM

## 2022-04-04 LAB — SL AMB POCT HEMOGLOBIN AIC: 6.4 (ref ?–6.5)

## 2022-04-04 PROCEDURE — 99214 OFFICE O/P EST MOD 30 MIN: CPT | Performed by: FAMILY MEDICINE

## 2022-04-04 PROCEDURE — 83036 HEMOGLOBIN GLYCOSYLATED A1C: CPT | Performed by: FAMILY MEDICINE

## 2022-04-04 RX ORDER — AMOXICILLIN 500 MG/1
500 CAPSULE ORAL EVERY 12 HOURS SCHEDULED
Qty: 14 CAPSULE | Refills: 0 | Status: SHIPPED | OUTPATIENT
Start: 2022-04-04 | End: 2022-04-11

## 2022-04-04 NOTE — PROGRESS NOTES
Jamia Wing 1934 female MRN: [id number]    [facility] OFFICE VISIT  [facility] - [facility]      ASSESSMENT/PLAN  Jamia Wing is a 80 y o  female presents to the office for    1  Type 2 diabetes mellitus without complication, without long-term current use of insulin (HCC)  A1c well controlled  Continue off medications at this time  Recommend eye exam to be performed before next appointment  - POCT hemoglobin A1c    2  Essential hypertension  BP stable; reviewed blood pressure log at this time I do not want make any changes  Patient will be following cardiology every 6 months as well  3  Hyperlipidemia, unspecified hyperlipidemia type  Continue medications as prescribed    4  RUQ abdominal pain  History of gallbladder removal   Given recent findings of lung cancer will obtain a right upper quadrant just to be sure that there is no other etiology  - US right upper quadrant; Future    5  Acute pain of both shoulders  Refer to physical therapy  - Ambulatory Referral to Physical Therapy; Future    6  Tooth infection  Highly recommend that the amoxicillin be used  Per recommend that the patient be seen by dentist sooner than later  - amoxicillin (AMOXIL) 500 mg capsule; Take 1 capsule (500 mg total) by mouth every 12 (twelve) hours for 7 days  Dispense: 14 capsule; Refill: 0            Future Appointments   Date Time Provider Valdemar Crocker   4/4/2022  3:15 PM Tito Garza MD [facility]   4/12/2022  3:00 PM JARRED Luke [facility]G [facility]   5/9/2022  4:00 PM BE CT 1 [facility]   5/18/2022  3:00 PM BE [address]   5/18/2022  3:30 PM MD FLAVIO Perdomo   5/25/2022  3:00 PM BE [address]   5/25/2022  3:30 PM Denice Montes De Oca MD BE [facility] Jenkins          SUBJECTIVE  CC: Follow-up, Hypertension, and Diabetes      HPI:  Jamia Wing is a 80 y o  female who presents for a follow-up on chronic conditions accompanied by her   Patient states that she has had bilateral scapular shoulder pain that is feeling more muscle skeletal   Patient states that she has not done physical therapy for this  Patient also complaining of right upper quadrant pain  Patient states that her blood pressure sometimes goes up and down and her cardiologist has not made any changes  Patient states that she could not see the dentist and has had tooth infections in the past   Has not been on medications over 6 years for her diabetes  Review of Systems   Constitutional: Negative for activity change, appetite change, chills, fatigue and fever  HENT: Positive for dental problem  Negative for congestion  Respiratory: Negative for cough, chest tightness and shortness of breath  Cardiovascular: Negative for chest pain and leg swelling  Gastrointestinal: Negative for abdominal distention, abdominal pain, constipation, diarrhea, nausea and vomiting  Musculoskeletal: Positive for back pain and gait problem  All other systems reviewed and are negative        Historical Information   The patient history was reviewed as follows:  Past Medical History:   Diagnosis Date    Arthritis     Back pain     Benign essential hypertension     LAST ASSESSED: [id number]    Carotid artery stenosis     CKD (chronic kidney disease) stage 2, GFR 60-89 ml/min     Constipation, chronic     "drinks prune juice"    Diabetes mellitus ([facility]     DMII [facility]diabetes mellitus, type 2) ([facility])     Epiglottitis     RESOLVED: [id number]    Gastritis     GERD (gastroesophageal reflux disease)     Gout     H/O blood clots     left lower leg,,,11 yrs ago    History of stenosis of renal artery     RESOLVED: [id number]    Hypercholesteremia     Hypercholesterolemia     Hyperlipidemia     Hyperparathyroidism ([facility] )     Hypertension     Iliac artery stenosis, bilateral ([facility] )     Kidney stone     Lobular carcinoma ([facility] [facility] ) 09/06/2017    Left    Lung cancer ([facility] [address] )     [facility]uc[facility], [facility] 09/11/2018    Left    Presence of pessary     Renal artery stenosis (HCC)     Renal disorder     Renovascular hypertension     RESOLVED: [id number]    Segmental and somatic dysfunction of cervical region     RESOLVED: [id number]    Segmental and somatic dysfunction of lumbar region     RESOLVED: [id number]    Segmental and somatic dysfunction of pelvic region     RESOLVED: [id number]    Segmental and somatic dysfunction of thoracic region     RESOLVED: [id number]    Somatic dysfunction of abdominal region     RESOLVED: [id number]    Somatic dysfunction of head region     RESOLVED: [id number]    Somatic dysfunction of lower extremities     RESOLVED: [id number]    Somatic dysfunction of rib region     RESOLVED: [id number]    Somatic dysfunction of thoracic region     RESOLVED: [id number]    Somatic dysfunction of upper extremities     RESOLVED: [id number]    Teeth missing          Medications:     Current Outpatient Medications:     Acetaminophen (TYLENOL) 325 MG CAPS, Take 500 mg by mouth every 8 (eight) hours , Disp: , Rfl:     aspirin (ADULT ASPIRIN EC LOW STRENGTH) 81 mg EC tablet, Take 1 tablet by mouth daily, Disp: , Rfl:     Blood Glucose Monitoring Suppl (FreeStyle Precision Jalen System) w/Device KIT, by Does not apply route 3 (three) times a day, Disp: 1 kit, Rfl: 0    calcitriol (ROCALTROL) 0 25 mcg capsule, Take one capsule every Tuesday and Friday, Disp: 10 capsule, Rfl: 6    calcium carbonate-vitamin D (OSCAL-D) 500 mg-200 units per tablet, Take 1 tablet by mouth daily with breakfast, Disp: , Rfl:     diclofenac sodium (VOLTAREN) 1 %, Apply 2 g topically 4 (four) times a day, Disp: 100 g, Rfl: 1    dicyclomine (BENTYL) 10 mg capsule, Take 1 capsule (10 mg total) by mouth 3 (three) times a day as needed (gas pain), Disp: 30 capsule, Rfl: 0    docusate sodium (COLACE) 100 mg capsule, Take 1 capsule (100 mg total) by mouth 2 (two) times a day, Disp: 10 capsule, Rfl: 0    DULoxetine (CYMBALTA) 30 mg delayed release capsule, Take 30 mg by mouth daily  , Disp: , Rfl: 0    fluticasone (FLONASE) 50 mcg/act nasal spray, 2 sprays into each nostril daily, Disp: 16 g, Rfl: 2    gabapentin (NEURONTIN) 100 mg capsule, Take 2 capsules (200 mg total) by mouth daily at bedtime, Disp: 180 capsule, Rfl: 1    gabapentin (Neurontin) 100 mg capsule, Take 1 capsule (100 mg total) by mouth 2 (two) times a day, Disp: 180 capsule, Rfl: 1    Lancets (ONETOUCH DELICA PLUS [id number]) MISC, 1 Device by Does not apply route 2 (two) times a day, Disp: 100 each, Rfl: 5    Lancets (ONETOUCH ULTRASOFT) lancets, by Other route 2 (two) times a day Use as instructed, Disp: 100 each, Rfl: 5    Lidocaine 4 % PTCH, Apply 1 patch topically daily, Disp: 20 patch, Rfl: 1    losartan (COZAAR) 100 MG tablet, Take 1 tablet (100 mg total) by mouth daily, Disp: 90 tablet, Rfl: 2    NIFEdipine ER (ADALAT CC) 30 MG 24 hr tablet, TAKE 1 TABLET AT NIGHT IN ADDITION TO THE 60MG IN THE MORNING, Disp: 90 tablet, Rfl: 5    NIFEdipine ER (ADALAT CC) 60 MG 24 hr tablet, take 1 tablet by mouth once daily, Disp: 90 tablet, Rfl: 0    NIFEdipine ER (ADALAT CC) 60 MG 24 hr tablet, take 1 tablet by mouth once daily, Disp: 90 tablet, Rfl: 3    NIFEdipine ER (ADALAT CC) 60 MG 24 hr tablet, Take 1 tablet (60 mg total) by mouth daily, Disp: 90 tablet, Rfl: 2    olopatadine (PATANOL) 0 1 % ophthalmic solution, Administer 1 drop to both eyes 2 (two) times a day, Disp: 5 mL, Rfl: 0    OneTouch Delica Lancets 99Q MISC, Inject as directed 2 (two) times a day Use to test, Disp: 200 each, Rfl: 0    pantoprazole (PROTONIX) 40 mg tablet, Take 1 tablet (40 mg total) by mouth daily, Disp: 90 tablet, Rfl: 1    polyethylene glycol (GLYCOLAX) 17 GM/SCOOP powder, Take 17 g by mouth daily, Disp: 225 g, Rfl: 1    pravastatin (PRAVACHOL) 40 mg tablet, take 1 tablet by mouth once daily, Disp: 90 tablet, Rfl: 4    Allergies   Allergen Reactions    Clonidine Derivatives Swelling    Diflucan [Fluconazole] Rash    Flagyl [Metronidazole] Anaphylaxis    Carvedilol Hives     And legs swelled    Latex Rash    Lipitor [Atorvastatin]      Legs swelled    Other Palpitations     IV DYE    Shingrix [Zoster Vac Recomb Adjuvanted] Hives       OBJECTIVE  Vitals:   Vitals:    04/04/22 1427   BP: 136/54   Pulse: 82   Resp: 16   Temp: (!) 97 4 °F (36 3 °C)   Weight: 60 8 kg (134 lb)   Height: 5' 2" (1 575 m)         Physical Exam  Vitals reviewed  Constitutional:       Appearance: She is well-developed  HENT:      Head: Normocephalic and atraumatic  Eyes:      Conjunctiva/sclera: Conjunctivae normal       Pupils: Pupils are equal, round, and reactive to light  Cardiovascular:      Rate and Rhythm: Normal rate and regular rhythm  Heart sounds: Normal heart sounds  Pulmonary:      Effort: Pulmonary effort is normal  No respiratory distress  Breath sounds: Normal breath sounds  Musculoskeletal:         General: Tenderness (Over bilateral shoulders with limited range of motion secondary to pain) present  Normal range of motion  Cervical back: Normal range of motion and neck supple  Skin:     General: Skin is warm  Capillary Refill: Capillary refill takes less than 2 seconds  Neurological:      Mental Status: She is alert and oriented to person, place, and time                      Kp Segal MD,   [facility]  4/4/2022

## 2022-04-12 ENCOUNTER — OFFICE VISIT (OUTPATIENT)
Dept: SURGICAL ONCOLOGY | Facility: CLINIC | Age: 87
End: 2022-04-12
Payer: COMMERCIAL

## 2022-04-12 VITALS
HEART RATE: 94 BPM | DIASTOLIC BLOOD PRESSURE: 58 MMHG | TEMPERATURE: 98 F | BODY MASS INDEX: 24.84 KG/M2 | WEIGHT: 135 LBS | OXYGEN SATURATION: 96 % | RESPIRATION RATE: 16 BRPM | SYSTOLIC BLOOD PRESSURE: 140 MMHG | HEIGHT: 62 IN

## 2022-04-12 DIAGNOSIS — Z08 ENCOUNTER FOR FOLLOW-UP EXAMINATION AFTER COMPLETED TREATMENT FOR MALIGNANT NEOPLASM: Primary | ICD-10-CM

## 2022-04-12 DIAGNOSIS — Z85.3 HISTORY OF LEFT BREAST CANCER: ICD-10-CM

## 2022-04-12 PROCEDURE — 1160F RVW MEDS BY RX/DR IN RCRD: CPT | Performed by: NURSE PRACTITIONER

## 2022-04-12 PROCEDURE — 99213 OFFICE O/P EST LOW 20 MIN: CPT | Performed by: NURSE PRACTITIONER

## 2022-04-12 PROCEDURE — 1036F TOBACCO NON-USER: CPT | Performed by: NURSE PRACTITIONER

## 2022-04-12 NOTE — PROGRESS NOTES
Surgical Oncology Follow Up       [address] [facility]  CANCER [facility] SURGICAL ONCOLOGY [place]  [address] [address]    Sanjeev Pham  1934  [id number]  [address] [facility] SURGICAL ONCOLOGY [place]  [address] [facility] [address]    Chief Complaint   Patient presents with    Breast Cancer       Assessment/Plan:  1  Encounter for follow-up examination after completed treatment for malignant neoplasm      2  History of left breast cancer  - 6 mo f/u visit    Discussion/Summary: Patient is an 69-year-old female who presents today for a six-month follow-up visit for 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  Her pathology revealed mucinous carcinoma, ER/%, her 2-   She underwent a left lumpectomy by Dr Gustavo Feltonaela Ibarra did not receive adjuvant therapy   She had a bilateral mammogram August 9th 2021 which was BI-RADS 2, category 3 density  She has been diagnosed with a primary lung adenocarcinoma and underwent radiation therapy  She offers no new complaints today and there are no concerns on today's exam   We discussed the option of discontinuing annual mammography given the patient's advanced age  Patient feels comfortable following with clinical exams alone  I will plan to see the patient back in 6 months or sooner should the need arise  She was instructed to call with any new concerns or symptoms prior to that time    All of her and her 's questions were answered today        History of Present Illness:     Oncology History   History of left breast cancer   8/10/2018 Biopsy    Left breast biopsy  Invasive mucinous  Grade 1      Her 2 1+     9/11/2018 Surgery    Left lumpectomy  Invasive mucinous carcinoma  Grade 1  1 1 cm in size  Margins negative      Her 2 1+  Stage 1A     10/4/2018 -  Ezella Norrie Dr Hershal Blizzard  No benefit of AI     Adenocarcinoma, lung, right ([facility] [address] )   11/2021 Initial Diagnosis    Adenocarcinoma, lung, right ([facility] [address] )     11/15/2021 Biopsy    Lung, Right Middle Lobe, mass:  - Adenocarcinoma  Comment: Immunohistochemistry is positive in the tumor cells for TTF-1 and negative for p40 and Gata3  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 a primary pulmonary adenocarcinoma  1/19/2022 - 2/8/2022 Radiation    Course: C1    Plan ID Energy Fractions Dose per Fraction (cGy) Dose Correction (cGy) Total Dose Delivered (cGy) Elapsed Days   RML 6X 15 / 15 400 0 6,000 20      Treatment dates:  1/19/2022 - 2/8/2022           -Interval History: Patient presents today for follow-up visit for left breast cancer  She was diagnosed with a primary lung adenocarcinoma and underwent radiation therapy  She offers no new complaints today  She notices no changes on her self breast exam     Review of Systems:  Review of Systems   Constitutional: Negative for activity change, appetite change, chills, fatigue, fever and unexpected weight change  Respiratory: Negative for cough and shortness of breath  Cardiovascular: Negative for chest pain  Gastrointestinal: Negative for abdominal pain, constipation, diarrhea, nausea and vomiting  Musculoskeletal: Positive for arthralgias  Negative for back pain, gait problem and myalgias  Skin: Negative for color change and rash  Neurological: Negative for dizziness and headaches  Hematological: Negative for adenopathy  Psychiatric/Behavioral: Negative for agitation and confusion  All other systems reviewed and are negative        Patient Active Problem List   Diagnosis    Iliac artery stenosis, bilateral (HCC)    Hyperparathyroidism (HCC)    GERD (gastroesophageal reflux disease)    Arthritis    Hypercholesterolemia    Hypertension    EKG abnormalities    Anemia    Neck pain    Dysuria    Chronic left shoulder pain    Fall from other slipping, tripping, or stumbling    Abnormal EKG    Abnormal mammogram    Accelerated essential hypertension    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)    Back pain    Bilateral carotid artery stenosis    Carotid stenosis, asymptomatic    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    Chronic myofascial pain    Chronic pain of lower extremity    CKD (chronic kidney disease) stage 3, GFR 30-59 ml/min ([facility])    Diabetic retinopathy ([facility] [address] )    DMII (diabetes mellitus, type 2) ([facility])    Gout    Hypomagnesemia    Atherosclerosis of other specified arteries    Lymphadenopathy    Nephrolithiasis    Osteoporosis    PAD (peripheral artery disease) ([address]    Primary generalized (osteo)arthritis    Persistent proteinuria    Renal cyst    Vaginal prolapse    Vitamin D deficiency    Acquired complex cyst of kidney    History of left breast cancer    Type 2 diabetes mellitus with diabetic peripheral angiopathy without gangrene ([facility])    Stricture of artery ([address]    Claudication in peripheral vascular disease ([facility])    Hemorrhoids    Tinea unguium    Diabetic neuropathy ([facility])    Bradycardia    Glottic insufficiency    Reflux laryngitis    Paresis of left vocal fold    Muscle tension dysphonia    Laryngocele    Anterior glottic web    Viral upper respiratory tract infection    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HCC)    Renal artery stenosis ([facility])    Left ear pain    Cervical spine disease    Osteopenia of multiple sites    Type 2 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, bilateral (HCC)    Lung mass    CKD (chronic kidney disease)    Adenocarcinoma, lung, right (HCC)     Past Medical History:   Diagnosis Date    Arthritis     Back pain     Benign essential hypertension     LAST ASSESSED: [id number]    Carotid artery stenosis     CKD (chronic kidney disease) stage 2, GFR 60-89 ml/min     Constipation, chronic "drinks prune juice"    Diabetes mellitus ([facility] )     DMII (diabetes mellitus, type 2) ([place] [address] )     Epiglottitis     RESOLVED: [id number]    Gastritis     GERD (gastroesophageal reflux disease)     Gout     H/O blood clots     left lower leg,,,11 yrs ago    History of stenosis of renal artery     RESOLVED: [id number]    Hypercholesteremia     Hypercholesterolemia     Hyperlipidemia     Hyperparathyroidism ([place] [address] )     Hypertension     Iliac artery stenosis, bilateral ([place] [address] )     Kidney stone     Lobular carcinoma ([place] [address] ) 09/06/2017    Left    Lung cancer ([place] [address] )     Mucinous carcinoma of breast, left ([place] [address] ) 09/11/2018    Left    Presence of pessary     Renal artery stenosis (HCC)     Renal disorder     Renovascular hypertension     RESOLVED: [id number]    Segmental and somatic dysfunction of cervical region     RESOLVED: [id number]    Segmental and somatic dysfunction of lumbar region     RESOLVED: [id number]    Segmental and somatic dysfunction of pelvic region     RESOLVED: [id number]    Segmental and somatic dysfunction of thoracic region     RESOLVED: 81JVU3704    Somatic dysfunction of abdominal region     RESOLVED: 07SYJ8910    Somatic dysfunction of head region     RESOLVED: 40FLA2436    Somatic dysfunction of lower extremities     RESOLVED: 59SWQ3795    Somatic dysfunction of rib region     RESOLVED: 92BPS7442    Somatic dysfunction of thoracic region     RESOLVED: 46PNN3105    Somatic dysfunction of upper extremities     RESOLVED: [id number]    Teeth missing      Past Surgical History:   Procedure Laterality Date    ANGIOPLASTY / STENTING ILIAC      BREAST BIOPSY Left 08/10/2018    U/S BX- mucinous ca    BREAST BIOPSY Left 08/07/2017    U/S BX    BREAST LUMPECTOMY Left 09/06/2017    LAST ASSESSED: [id number]    BREAST LUMPECTOMY Left 09/11/2018    BREAST SURGERY Left     biopsy    CATARACT EXTRACTION Bilateral     CHOLECYSTECTOMY      COLONOSCOPY      EYE SURGERY      ILIAC VEIN ANGIOPLASTY / STENTING Right     INITIAL STENOSIS: ONSET: [id number]    IR BIOPSY LUNG  11/15/2021    KNEE ARTHROSCOPY Right     KNEE SURGERY Right     ONSET: [id number]    MAMMO NEEDLE LOCALIZATION LEFT (ALL INC) Left 9/11/2018    MAMMO NEEDLE LOCALIZATION LEFT (ALL INC) Left 9/6/2017    NY ESOPHAGOGASTRODUODENOSCOPY TRANSORAL DIAGNOSTIC N/A 7/26/2018    Procedure: ESOPHAGOGASTRODUODENOSCOPY (EGD); Surgeon: Frankey Spillers, DO;  [facility]: [facility]; Service: Gastroenterology    [place] LARYNGOSCOPY,DIRCT,OP [facility] TUMR N/A 6/10/2016    Procedure: MICRO DIRECT LARYNGOSCOPY WITH VOCAL FOLD MASS EXCISION ;  Surgeon: Sushant Davis MD;  [facility];  Service: ENT    NY MASTECTOMY, PARTIAL Left 9/6/2017    Procedure: LUMPECTOMY BREAST NEEDLE LOCALIZED WITH FAXITRON NEEDLE @ 0830;  Surgeon: Chanel Mercado MD;  [facility];  Service: General    NY PERQ DEVICE PLACEMT BREAST LOC [address] [place] [address] Left 9/11/2018    Procedure: BREAST LUMPECTOMY; BREAST NEEDLE LOCALIZATION (NEEDLE LOC AT 1330);   Surgeon: Maylin Ordoñez MD;  [facility];  Service: Surgical Oncology    THROAT SURGERY      lump removed    TUBAL LIGATION      UPPER GASTROINTESTINAL ENDOSCOPY      US GUIDANCE BREAST BIOPSY LEFT EACH ADDITIONAL Left 8/7/2017    US GUIDED BREAST BIOPSY LEFT COMPLETE Left 8/10/2018    US GUIDED BREAST BIOPSY LEFT COMPLETE Left 8/7/2017    VASCULAR SURGERY       Family History   Problem Relation Age of Onset    Heart disease Brother     Heart disease Maternal Grandmother     Diabetes Other         of other type with arthropathy, without long term current use of insulin    Hypertension Other     No Known Problems Mother     Gout Family     Rheum arthritis Family     Lupus Family         systematic erythematosus    Heart disease Family     Stroke Family         syndrome    Stroke Maternal Uncle         syndrome    Stroke Maternal Aunt         syndrome    No Known Problems Father     No Known Problems Daughter     No Known Problems Maternal Grandfather     No Known Problems Paternal Grandmother     No Known Problems Paternal Grandfather     No Known Problems Daughter     No Known Problems Daughter     No Known Problems Maternal Aunt     No Known Problems Maternal Aunt     No Known Problems Maternal Aunt      Social History     Socioeconomic History    Marital status:       Spouse name: Not on file    Number of children: Not on file    Years of education: Not on file    Highest education level: Not on file   Occupational History    Occupation: retired   Tobacco Use    Smoking status: Former Smoker     Packs/day: 1 00     Years: 55 00     Pack years: 55 00     Start date:      Quit date:      Years since quittin 2    Smokeless tobacco: Never Used   Vaping Use    Vaping Use: Never used   Substance and Sexual Activity    Alcohol use: Not Currently    Drug use: No    Sexual activity: Yes     Partners: Male   Other Topics Concern    Not on file   Social History Narrative    Daily caffeine consumption, 1 serving a day      Social Determinants of Health     Financial Resource Strain: Not on file   Food Insecurity: Not on file   Transportation Needs: Not on file   Physical Activity: Not on file   Stress: Not on file   Social Connections: Not on file   Intimate Partner Violence: Not on file   Housing Stability: Not on file       Current Outpatient Medications:     Acetaminophen (TYLENOL) 325 MG CAPS, Take 500 mg by mouth every 8 (eight) hours , Disp: , Rfl:     aspirin (ADULT ASPIRIN EC LOW STRENGTH) 81 mg EC tablet, Take 1 tablet by mouth daily, Disp: , Rfl:     Blood Glucose Monitoring Suppl (FreeStyle Precision Jalen System) w/Device KIT, by Does not apply route 3 (three) times a day, Disp: 1 kit, Rfl: 0    calcitriol (ROCALTROL) 0 25 mcg capsule, Take one capsule every Tuesday and Friday, Disp: 10 capsule, Rfl: 6    calcium carbonate-vitamin D (OSCAL-D) 500 mg-200 units per tablet, Take 1 tablet by mouth daily with breakfast, Disp: , Rfl:     diclofenac sodium (VOLTAREN) 1 %, Apply 2 g topically 4 (four) times a day, Disp: 100 g, Rfl: 1    dicyclomine (BENTYL) 10 mg capsule, Take 1 capsule (10 mg total) by mouth 3 (three) times a day as needed (gas pain), Disp: 30 capsule, Rfl: 0    docusate sodium (COLACE) 100 mg capsule, Take 1 capsule (100 mg total) by mouth 2 (two) times a day, Disp: 10 capsule, Rfl: 0    DULoxetine (CYMBALTA) 30 mg delayed release capsule, Take 30 mg by mouth daily  , Disp: , Rfl: 0    fluticasone (FLONASE) 50 mcg/act nasal spray, 2 sprays into each nostril daily, Disp: 16 g, Rfl: 2    gabapentin (NEURONTIN) 100 mg capsule, Take 2 capsules (200 mg total) by mouth daily at bedtime, Disp: 180 capsule, Rfl: 1    gabapentin (Neurontin) 100 mg capsule, Take 1 capsule (100 mg total) by mouth 2 (two) times a day, Disp: 180 capsule, Rfl: 1    Lancets (ONETOUCH DELICA PLUS [id number]) MISC, 1 Device by Does not apply route 2 (two) times a day, Disp: 100 each, Rfl: 5    Lancets (ONETOUCH ULTRASOFT) lancets, by Other route 2 (two) times a day Use as instructed, Disp: 100 each, Rfl: 5    Lidocaine 4 % PTCH, Apply 1 patch topically daily, Disp: 20 patch, Rfl: 1    losartan (COZAAR) 100 MG tablet, Take 1 tablet (100 mg total) by mouth daily, Disp: 90 tablet, Rfl: 2    NIFEdipine ER (ADALAT CC) 30 MG 24 hr tablet, TAKE 1 TABLET AT NIGHT IN ADDITION TO THE 60MG IN THE MORNING, Disp: 90 tablet, Rfl: 5    NIFEdipine ER (ADALAT CC) 60 MG 24 hr tablet, take 1 tablet by mouth once daily, Disp: 90 tablet, Rfl: 0    NIFEdipine ER (ADALAT CC) 60 MG 24 hr tablet, take 1 tablet by mouth once daily, Disp: 90 tablet, Rfl: 3    NIFEdipine ER (ADALAT CC) 60 MG 24 hr tablet, Take 1 tablet (60 mg total) by mouth daily, Disp: 90 tablet, Rfl: 2    olopatadine (PATANOL) 0 1 % ophthalmic solution, Administer 1 drop to both eyes 2 (two) times a day, Disp: 5 mL, Rfl: 0    OneTouch Delica Lancets 93I MISC, Inject as directed 2 (two) times a day Use to test, Disp: 200 each, Rfl: 0    pantoprazole (PROTONIX) 40 mg tablet, Take 1 tablet (40 mg total) by mouth daily, Disp: 90 tablet, Rfl: 1    polyethylene glycol (GLYCOLAX) 17 GM/SCOOP powder, Take 17 g by mouth daily, Disp: 225 g, Rfl: 1    pravastatin (PRAVACHOL) 40 mg tablet, take 1 tablet by mouth once daily, Disp: 90 tablet, Rfl: 4  Allergies   Allergen Reactions    Clonidine Derivatives Swelling    Diflucan [Fluconazole] Rash    Flagyl [Metronidazole] Anaphylaxis    Carvedilol Hives     And legs swelled    Latex Rash    Lipitor [Atorvastatin]      Legs swelled    Other Palpitations     IV DYE    Shingrix [Zoster Vac Recomb Adjuvanted] Hives     Vitals:    04/12/22 1442   BP: 140/58   Pulse: 94   Resp: 16   Temp: 98 °F (36 7 °C)   SpO2: 96%       Physical Exam  Vitals reviewed  Constitutional:       General: She is not in acute distress  Appearance: Normal appearance  She is well-developed  She is not diaphoretic  HENT:      Head: Normocephalic and atraumatic  Cardiovascular:      Rate and Rhythm: Normal rate and regular rhythm  Heart sounds: Normal heart sounds  Pulmonary:      Effort: Pulmonary effort is normal       Breath sounds: Normal breath sounds  Chest:   Breasts:      Right: No swelling, bleeding, inverted nipple, mass, nipple discharge, skin change, tenderness, axillary adenopathy or supraclavicular adenopathy  Left: Skin change (surgical scars) present  No swelling, bleeding, inverted nipple, mass, nipple discharge, tenderness, axillary adenopathy or supraclavicular adenopathy  Abdominal:      Palpations: Abdomen is soft  There is no mass  Tenderness: There is no abdominal tenderness  Musculoskeletal:         General: Normal range of motion  Cervical back: Normal range of motion     Lymphadenopathy:      Upper Body:      Right upper body: No supraclavicular or axillary adenopathy  Left upper body: No supraclavicular or axillary adenopathy  Skin:     General: Skin is warm and dry  Findings: No rash  Neurological:      Mental Status: She is alert and oriented to person, place, and time  Psychiatric:         Speech: Speech normal            Advance Care Planning/Advance Directives:  Discussed disease status, cancer treatment plans and/or cancer treatment goals with the patient

## 2022-04-13 ENCOUNTER — TELEPHONE (OUTPATIENT)
Dept: NEPHROLOGY | Facility: CLINIC | Age: 87
End: 2022-04-13

## 2022-04-13 NOTE — TELEPHONE ENCOUNTER
Patient's significant other Erlin Barrios called and stated that the patient is having trouble with her blood pressure states that when her blood pressure drops she gets extremely dizzy  Patient's significant other would like to know what to do

## 2022-04-15 ENCOUNTER — HOSPITAL ENCOUNTER (OUTPATIENT)
Dept: RADIOLOGY | Facility: HOSPITAL | Age: 87
Discharge: HOME | End: 2022-04-15
Payer: COMMERCIAL

## 2022-04-15 DIAGNOSIS — R10.11 RUQ ABDOMINAL PAIN: ICD-10-CM

## 2022-04-15 PROCEDURE — 76705 ECHO EXAM OF ABDOMEN: CPT

## 2022-04-19 ENCOUNTER — TELEPHONE (OUTPATIENT)
Dept: NEPHROLOGY | Facility: CLINIC | Age: 87
End: 2022-04-19

## 2022-04-19 ENCOUNTER — DOCUMENTATION (OUTPATIENT)
Dept: NEPHROLOGY | Facility: CLINIC | Age: 87
End: 2022-04-19

## 2022-04-19 DIAGNOSIS — I16.0 HYPERTENSIVE URGENCY: ICD-10-CM

## 2022-04-19 RX ORDER — NIFEDIPINE 30 MG/1
30 TABLET, FILM COATED, EXTENDED RELEASE ORAL 2 TIMES DAILY
Qty: 180 TABLET | Refills: 3 | Status: CANCELLED | OUTPATIENT
Start: 2022-04-19

## 2022-04-19 NOTE — TELEPHONE ENCOUNTER
Patient's  Lloyd Melendez called the office wanting to inform Dr Humera Medina of recent BP readings since she was discharged from the hospital  04/15 /74 /81, 04/16 /79 /77, 04/17 156/55 187/76 (pt took medication) 194/92 (death in the family) 04/18 162/68, 04/19 168/68  Pt is currently taking Nifedipine 30 mg BID and requested a refill  Please advise if any changes need to be made/recommendations

## 2022-04-27 DIAGNOSIS — I10 HYPERTENSION, UNSPECIFIED TYPE: Primary | ICD-10-CM

## 2022-04-27 RX ORDER — HYDROCHLOROTHIAZIDE 25 MG/1
25 TABLET ORAL DAILY
Qty: 90 TABLET | Refills: 3 | Status: SHIPPED | OUTPATIENT
Start: 2022-04-27

## 2022-04-27 NOTE — PROGRESS NOTES
I contacted the patient and spoke with her  and her  Presently she is on losartan 100 mg a day and nifedipine XL 30 mg twice a day  We would just reduce the nifedipine XL evening dose because her blood pressure was dropping and she was feeling dizzy  Her  contacted os and said the blood pressures up  I reviewed her allergies  Continue losartan and nifedipine at current dose  Start HCTZ 25 mg a day and prescription sent in  Obtain [facility] in 2 weeks to check electrolytes I will call him with results and see how her blood pressure is doing  I told him if there is any side effects or problems stop the medication and give me a call  They were in agreement with this plan  They also voiced understanding

## 2022-04-27 NOTE — TELEPHONE ENCOUNTER
Lindsey Arellano MD  You 7 minutes ago (11:32 AM)       Tell them to get an appointment with her primary doctor to get seen in checkup on

## 2022-04-27 NOTE — TELEPHONE ENCOUNTER
Pt's  is requesting that Dr Anton Anderson call him directly  He states PCP is not in office due to martinity leave and last time he spoke to her she stated Gerilyn Bloch needs to f/u with Nephrology in regards to high BP due to kidney function

## 2022-04-27 NOTE — TELEPHONE ENCOUNTER
Patient's  Sonam Wiseman called the office with concerns he stated pt is still having high BP readings she is taking Nifedipine 30 mg BID but BP is not controlled pt's BP dropped to 123/? And felt like she was going to fall  Pt requested to see Dr Nicholas Wise sooner f/u appt is scheduled for 07/21/22  Please advise      Last BP readings:   186/77  185/88  207/99  146/62  180/85  180/74  151/68

## 2022-05-02 DIAGNOSIS — N25.81 SECONDARY HYPERPARATHYROIDISM (HCC): ICD-10-CM

## 2022-05-02 DIAGNOSIS — M54.6 CHRONIC THORACIC BACK PAIN, UNSPECIFIED BACK PAIN LATERALITY: ICD-10-CM

## 2022-05-02 DIAGNOSIS — G89.29 CHRONIC THORACIC BACK PAIN, UNSPECIFIED BACK PAIN LATERALITY: ICD-10-CM

## 2022-05-02 RX ORDER — CALCITRIOL 0.25 UG/1
CAPSULE, LIQUID FILLED ORAL
Qty: 10 CAPSULE | Refills: 6 | Status: SHIPPED | OUTPATIENT
Start: 2022-05-02

## 2022-05-02 RX ORDER — GABAPENTIN 100 MG/1
200 CAPSULE ORAL
Qty: 180 CAPSULE | Refills: 0 | Status: SHIPPED | OUTPATIENT
Start: 2022-05-02 | End: 2022-07-29

## 2022-05-05 ENCOUNTER — TELEPHONE (OUTPATIENT)
Dept: NEPHROLOGY | Facility: CLINIC | Age: 87
End: 2022-05-05

## 2022-05-05 NOTE — TELEPHONE ENCOUNTER
Patient's significant other called and reported blood pressure readings for yesterday  05/4/22 159/76  158/71  166/81  153/73  168/68  178/79  05/05/22 160/69  Patient's significant other would like to speak with Dr Carroll Paul states they are having a problem

## 2022-05-09 ENCOUNTER — HOSPITAL ENCOUNTER (OUTPATIENT)
Dept: RADIOLOGY | Facility: HOSPITAL | Age: 87
Discharge: HOME | End: 2022-05-09
Attending: RADIOLOGY
Payer: COMMERCIAL

## 2022-05-09 DIAGNOSIS — C34.91 ADENOCARCINOMA, LUNG, RIGHT (HCC): ICD-10-CM

## 2022-05-09 PROCEDURE — 71250 CT THORAX DX C-: CPT

## 2022-05-09 PROCEDURE — G1004 CDSM NDSC: HCPCS

## 2022-05-10 NOTE — TELEPHONE ENCOUNTER
Called patients S/O and informed him that Dr Carroll Paul is suggesting to decrease the does of the losartan to 50mg a day  He verbally understood and had no further questions

## 2022-05-10 NOTE — TELEPHONE ENCOUNTER
Patients S/O called the office again and stated he tried to cut on of the pills in half to make it 50 MG but was unsuccessful  He then called the pharmacy to see if they should be cut and was told no that the patient needs a new prescription for 50mg of losartan

## 2022-05-10 NOTE — TELEPHONE ENCOUNTER
Called patient S/O and let them know that Dr Sarina Snyder has gone over the recent BP and that it looks good and there should be no changes  Patients S/O stated that the medication started to take effect yesterday and her BP went down to 145/54 Patient states that " The machine shows its 20 points higher then what it is, so her BP is really 115 or 120"   Patient states that she feels dizzy and her chest is too tight because the BP is too low  She is not experiencing any other symptoms  Please advise

## 2022-05-12 ENCOUNTER — APPOINTMENT (OUTPATIENT)
Dept: LAB | Facility: HOSPITAL | Age: 87
End: 2022-05-12
Attending: INTERNAL MEDICINE
Payer: COMMERCIAL

## 2022-05-12 DIAGNOSIS — N18.9 CHRONIC KIDNEY DISEASE, UNSPECIFIED CKD STAGE: ICD-10-CM

## 2022-05-12 DIAGNOSIS — R80.9 PROTEINURIA, UNSPECIFIED TYPE: ICD-10-CM

## 2022-05-12 LAB
ANION GAP SERPL CALCULATED.3IONS-SCNC: 7 MMOL/L (ref 4–13)
BUN SERPL-MCNC: 47 MG/DL (ref 5–25)
CALCIUM SERPL-MCNC: 9.8 MG/DL (ref 8.3–10.1)
CHLORIDE SERPL-SCNC: 105 MMOL/L (ref 100–108)
CO2 SERPL-SCNC: 28 MMOL/L (ref 21–32)
CREAT SERPL-MCNC: 1.57 MG/DL (ref 0.6–1.3)
GFR SERPL CREATININE-BSD FRML MDRD: 29 ML/MIN/1.73SQ M
GLUCOSE SERPL-MCNC: 213 MG/DL (ref 65–140)
POTASSIUM SERPL-SCNC: 3.6 MMOL/L (ref 3.5–5.3)
SODIUM SERPL-SCNC: 140 MMOL/L (ref 136–145)

## 2022-05-12 PROCEDURE — 36415 COLL VENOUS BLD VENIPUNCTURE: CPT

## 2022-05-12 PROCEDURE — 80048 BASIC METABOLIC PNL TOTAL CA: CPT

## 2022-05-13 ENCOUNTER — TELEPHONE (OUTPATIENT)
Dept: NEPHROLOGY | Facility: CLINIC | Age: 87
End: 2022-05-13

## 2022-05-13 NOTE — TELEPHONE ENCOUNTER
----- Message from Vidal Peoples MD sent at 5/12/2022  5:15 PM EDT -----  Let them know creatinine or kidney function stable with no changes which is good    ----- Message -----  From: Lab, Background User  Sent: 5/12/2022   4:08 PM EDT  To: Vidal Peoples MD

## 2022-05-17 ENCOUNTER — TELEPHONE (OUTPATIENT)
Dept: SURGICAL ONCOLOGY | Facility: CLINIC | Age: 87
End: 2022-05-17

## 2022-05-17 ENCOUNTER — TELEPHONE (OUTPATIENT)
Dept: HEMATOLOGY ONCOLOGY | Facility: CLINIC | Age: 87
End: 2022-05-17

## 2022-05-18 ENCOUNTER — RADIATION ONCOLOGY FOLLOW-UP (OUTPATIENT)
Dept: RADIATION ONCOLOGY | Facility: HOSPITAL | Age: 87
End: 2022-05-18
Attending: RADIOLOGY
Payer: COMMERCIAL

## 2022-05-18 VITALS
RESPIRATION RATE: 18 BRPM | DIASTOLIC BLOOD PRESSURE: 62 MMHG | TEMPERATURE: 96.8 F | SYSTOLIC BLOOD PRESSURE: 148 MMHG | OXYGEN SATURATION: 96 % | WEIGHT: 134.2 LBS | HEART RATE: 90 BPM | BODY MASS INDEX: 24.55 KG/M2

## 2022-05-18 DIAGNOSIS — C34.91 ADENOCARCINOMA, LUNG, RIGHT (HCC): Primary | ICD-10-CM

## 2022-05-18 PROCEDURE — 99213 OFFICE O/P EST LOW 20 MIN: CPT | Performed by: RADIOLOGY

## 2022-05-18 PROCEDURE — 99211 OFF/OP EST MAY X REQ PHY/QHP: CPT | Performed by: RADIOLOGY

## 2022-05-18 NOTE — PROGRESS NOTES
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  She has completed radiation to right middle lung lobe in 15 fractions on 2/8/22  The pt was last seen in radiation on 03/09/22  She returns for her follow up     03/21/22 - PulmonologyAve  MRI of the brain is unremarkable  Pt has no complaints  Follow up in 6 months    04/12/22 - Surg Onc[facility]  No complaints or concerns on exam  Pt to start clinical exams going forward rather than yearly mammograms  Follow up in 6 months    05/09/22 - CT chest wo contrast  1  Overall stable right middle lobe neoplasm with development of small right effusion and right middle lobe atelectasis and otherwise stable additional nodules and groundglass opacities  Groundglass opacities be of an infectious or inflammatory etiologies  2   Stable left thyroid nodule and hyperdense left renal lesion likely a hemorrhagic or proteinaceous lesion  Upcoming:  10/14/22 - Surg OncMyesha            Oncology History   History of left breast cancer   8/10/2018 Biopsy    Left breast biopsy  Invasive mucinous  Grade 1      Her 2 1+     9/11/2018 Surgery    Left lumpectomy  Invasive mucinous carcinoma  Grade 1  1 1 cm in size  Margins negative      Her 2 1+  Stage 1A     10/4/2018 -  Hormone Therapy    Dr Estevan Allen  No benefit of AI     Adenocarcinoma, lung, right ([facility] [address] )   11/2021 Initial Diagnosis    Adenocarcinoma, lung, right ([facility] [address] )     11/15/2021 Biopsy    Lung, Right Middle Lobe, mass:  - Adenocarcinoma  Comment: Immunohistochemistry is positive in the tumor cells for TTF-1 and negative for p40 and Gata3  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 a primary pulmonary adenocarcinoma       1/19/2022 - 2/8/2022 Radiation    Course: C1    Plan ID Energy Fractions Dose per Fraction (cGy) Dose Correction (cGy) Total Dose Delivered (cGy) Elapsed Days   RML 6X 15 / 15 400 0 6,000 20      Treatment dates:  1/19/2022 - 2/8/2022          Review of Systems:  Review of Systems   Constitutional: Negative  HENT: Negative  Eyes: Negative  Respiratory: Negative  Denies any shortness of breath or coughing   Cardiovascular: Negative  Gastrointestinal: Negative  Endocrine: Negative  Genitourinary: Negative  Musculoskeletal: Negative  Skin: Negative  Allergic/Immunologic: Negative  Neurological: Positive for light-headedness (only with lower BP)  Hematological: Negative  Psychiatric/Behavioral: Negative          Clinical Trial: no    Covid Vaccine Status: vaccinated    Health Maintenance   Topic Date Due    Pneumococcal Vaccine: 65+ Years (3 - PCV) 11/20/2015    PT PLAN OF CARE  02/06/2020    DM Eye Exam  02/10/2021    COVID-19 Vaccine (3 - Pfizer risk series) 04/27/2021    Depression Screening  10/04/2022    DTaP,Tdap,and Td Vaccines (1 - Tdap) 10/01/2022 (Originally 9/4/2007)    Diabetic Foot Exam  08/27/2022    Influenza Vaccine (Season Ended) 09/01/2022    Fall Risk  10/04/2022    Medicare Annual Wellness Visit (AWV)  10/04/2022    HEMOGLOBIN A1C  10/04/2022    BMI: Adult  04/12/2023    HIB Vaccine  Aged Out    Hepatitis B Vaccine  Aged Out    IPV Vaccine  Aged Out    Hepatitis A Vaccine  Aged Out    Meningococcal ACWY Vaccine  Aged Out    HPV Vaccine  Aged Out     Patient Active Problem List   Diagnosis    Iliac artery stenosis, bilateral ([address] )    Hyperparathyroidism ([facility] [address] )    GERD (gastroesophageal reflux disease)    Arthritis    Hypercholesterolemia    Hypertension    EKG abnormalities    Anemia    Neck pain    Dysuria    Chronic left shoulder pain    Fall from other slipping, tripping, or stumbling    Abnormal EKG    Abnormal mammogram    Accelerated essential hypertension    Atherosclerosis of native artery of extremity with intermittent claudication ([address] )    Back pain    Bilateral carotid artery stenosis    Carotid stenosis, asymptomatic    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    Chronic myofascial pain    Chronic pain of lower extremity    CKD (chronic kidney disease) stage 3, GFR 30-59 ml/min ([facility]    Diabetic retinopathy ([facility] [address] )    DMII (diabetes mellitus, type 2) ([facility])    Gout    Hypomagnesemia    Atherosclerosis of other specified arteries    Lymphadenopathy    Nephrolithiasis    Osteoporosis    PAD (peripheral artery disease) ([facility] [address] )    Primary generalized (osteo)arthritis    Persistent proteinuria    Renal cyst    Vaginal prolapse    Vitamin D deficiency    Acquired complex cyst of kidney    History of left breast cancer    Type 2 diabetes mellitus with diabetic peripheral angiopathy without gangrene ([facility])    Stricture of artery ([facility] [address] )    Claudication in peripheral vascular disease ([facility])    Hemorrhoids    Tinea unguium    Diabetic neuropathy ([facility])    Bradycardia    Glottic insufficiency    Reflux laryngitis    Paresis of left vocal fold    Muscle tension dysphonia    Laryngocele    Anterior glottic web    Viral upper respiratory tract infection    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[facility]    Renal artery stenosis ([facility])    Left ear pain    Cervical spine disease    Osteopenia of multiple sites    Type 2 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, bilateral ([facility])    Lung mass    CKD (chronic kidney disease)    Adenocarcinoma, lung, right ([facility])     Past Medical History:   Diagnosis Date    Arthritis     Back pain     Benign essential hypertension     LAST ASSESSED: [id number]    Carotid artery stenosis     CKD (chronic kidney disease) stage 2, GFR 60-89 ml/min     Constipation, chronic     "drinks prune juice"    Diabetes mellitus ([address] )     DMII (diabetes mellitus, type 2) ([address] )     Epiglottitis     RESOLVED: [id number]    Gastritis     GERD (gastroesophageal reflux disease)     Gout     H/O blood clots     left lower leg,,,11 yrs ago    History of stenosis of renal artery     RESOLVED: [id number]    Hypercholesteremia     Hypercholesterolemia     Hyperlipidemia     Hyperparathyroidism ([facility] [address] )     Hypertension     Iliac artery stenosis, bilateral (HCC)     Kidney stone     Lobular carcinoma ([facility] [address] ) 09/06/2017    Left    Lung cancer ([facility] )     Mucinous carcinoma of breast, 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    Presence of pessary     Renal artery stenosis (HCC)     Renal disorder     Renovascular hypertension     RESOLVED: [id number]    Segmental and somatic dysfunction of cervical region     RESOLVED: [id number]    Segmental and somatic dysfunction of lumbar region     RESOLVED: [id number]    Segmental and somatic dysfunction of pelvic region     RESOLVED: [id number]    Segmental and somatic dysfunction of thoracic region     RESOLVED: [id number]    Somatic dysfunction of abdominal region     RESOLVED: [id number]    Somatic dysfunction of head region     RESOLVED: [id number]    Somatic dysfunction of lower extremities     RESOLVED: [id number]    Somatic dysfunction of rib region     RESOLVED: [id number]    Somatic dysfunction of thoracic region     RESOLVED: [id number]    Somatic dysfunction of upper extremities     RESOLVED: [id number]    Teeth missing      Past Surgical History:   Procedure Laterality Date    ANGIOPLASTY / STENTING ILIAC      BREAST BIOPSY Left 08/10/2018    U/S BX- mucinous ca    BREAST BIOPSY Left 08/07/2017    U/S BX    BREAST LUMPECTOMY Left 09/06/2017    LAST ASSESSED: [id number]    BREAST LUMPECTOMY Left 09/11/2018    BREAST SURGERY Left     biopsy    CATARACT EXTRACTION Bilateral     CHOLECYSTECTOMY      COLONOSCOPY      EYE SURGERY      ILIAC VEIN ANGIOPLASTY / STENTING Right     INITIAL STENOSIS: ONSET: [id number]    IR BIOPSY LUNG  11/15/2021    KNEE ARTHROSCOPY Right     KNEE SURGERY Right     ONSET: [id number]    MAMMO NEEDLE LOCALIZATION LEFT (ALL INC) Left 9/11/2018    MAMMO NEEDLE LOCALIZATION LEFT (ALL INC) Left 9/6/2017    IA ESOPHAGOGASTRODUODENOSCOPY TRANSORAL DIAGNOSTIC N/A 7/26/2018    Procedure: ESOPHAGOGASTRODUODENOSCOPY (EGD); Surgeon: Amelia Sanders DO;  [facility]: [facility]; Service: Gastroenterology    IA LARYNGOSCOPY,DIRCT,OP [facility] TUMR N/A 6/10/2016    Procedure: MICRO DIRECT LARYNGOSCOPY WITH VOCAL FOLD MASS EXCISION ;  Surgeon: Amanuel Dhaliwal MD;  [facility];  Service: ENT    IA MASTECTOMY, PARTIAL Left 9/6/2017    Procedure: LUMPECTOMY BREAST NEEDLE LOCALIZED WITH FAXITRON NEEDLE @ 0830;  Surgeon: Tonya Valencia MD;  [facility];  Service: General    IA PERQ DEVICE PLACEMT BREAST LOC 1ST LES W Nelwyn Cordial Left 9/11/2018    Procedure: BREAST LUMPECTOMY; BREAST NEEDLE LOCALIZATION (NEEDLE LOC AT 1330);   Surgeon: Samy Salinas MD;  [facility];  Service: Surgical Oncology    THROAT SURGERY      lump removed    TUBAL LIGATION      UPPER GASTROINTESTINAL ENDOSCOPY      US GUIDANCE BREAST BIOPSY LEFT EACH ADDITIONAL Left 8/7/2017    US GUIDED BREAST BIOPSY LEFT COMPLETE Left 8/10/2018    US GUIDED BREAST BIOPSY LEFT COMPLETE Left 8/7/2017    VASCULAR SURGERY       Family History   Problem Relation Age of Onset    Heart disease Brother     Heart disease Maternal Grandmother     Diabetes Other         of other type with arthropathy, without long term current use of insulin    Hypertension Other     No Known Problems Mother     Gout Family     Rheum arthritis Family     Lupus Family         systematic erythematosus    Heart disease Family     Stroke Family         syndrome    Stroke Maternal Uncle         syndrome    Stroke Maternal Aunt         syndrome    No Known Problems Father     No Known Problems Daughter     No Known Problems Maternal Grandfather     No Known Problems Paternal Grandmother     No Known Problems Paternal Grandfather     No Known Problems Daughter     No Known Problems Daughter     No Known Problems Maternal Aunt     No Known Problems Maternal Aunt     No Known Problems Maternal Aunt      Social History     Socioeconomic History    Marital status:       Spouse name: Not on file    Number of children: Not on file    Years of education: Not on file    Highest education level: Not on file   Occupational History    Occupation: retired   Tobacco Use    Smoking status: Former Smoker     Packs/day: 1 00     Years: 55 00     Pack years: 55 00     Start date:      Quit date:      Years since quittin 3    Smokeless tobacco: Never Used   Vaping Use    Vaping Use: Never used   Substance and Sexual Activity    Alcohol use: Not Currently    Drug use: No    Sexual activity: Yes     Partners: Male   Other Topics Concern    Not on file   Social History Narrative    Daily caffeine consumption, 1 serving a day      Social Determinants of Health     Financial Resource Strain: Not on file   Food Insecurity: Not on file   Transportation Needs: Not on file   Physical Activity: Not on file   Stress: Not on file   Social Connections: Not on file   Intimate Partner Violence: Not on file   Housing Stability: Not on file       Current Outpatient Medications:     Acetaminophen 325 MG CAPS, Take 500 mg by mouth every 8 (eight) hours , Disp: , Rfl:     aspirin (ADULT ASPIRIN EC LOW STRENGTH) 81 mg EC tablet, Take 1 tablet by mouth daily, Disp: , Rfl:     Blood Glucose Monitoring Suppl (FreeStyle Precision Jalen System) w/Device KIT, by Does not apply route 3 (three) times a day, Disp: 1 kit, Rfl: 0    calcitriol (ROCALTROL) 0 25 mcg capsule, Take one capsule every Tuesday and Friday, Disp: 10 capsule, Rfl: 6    calcium carbonate-vitamin D (OSCAL-D) 500 mg-200 units per tablet, Take 1 tablet by mouth daily with breakfast, Disp: , Rfl:     diclofenac sodium (VOLTAREN) 1 %, Apply 2 g topically 4 (four) times a day, Disp: 100 g, Rfl: 1    dicyclomine (BENTYL) 10 mg capsule, Take 1 capsule (10 mg total) by mouth 3 (three) times a day as needed (gas pain), Disp: 30 capsule, Rfl: 0    docusate sodium (COLACE) 100 mg capsule, Take 1 capsule (100 mg total) by mouth 2 (two) times a day, Disp: 10 capsule, Rfl: 0    fluticasone (FLONASE) 50 mcg/act nasal spray, 2 sprays into each nostril daily, Disp: 16 g, Rfl: 2    gabapentin (Neurontin) 100 mg capsule, Take 1 capsule (100 mg total) by mouth 2 (two) times a day, Disp: 180 capsule, Rfl: 1    gabapentin (NEURONTIN) 100 mg capsule, Take 2 capsules (200 mg total) by mouth daily at bedtime, Disp: 180 capsule, Rfl: 0    hydrochlorothiazide (HYDRODIURIL) 25 mg tablet, Take 1 tablet (25 mg total) by mouth daily, Disp: 90 tablet, Rfl: 3    Lancets (ONETOUCH DELICA PLUS [id number]) MISC, 1 Device by Does not apply route 2 (two) times a day, Disp: 100 each, Rfl: 5    Lancets (ONETOUCH ULTRASOFT) lancets, by Other route 2 (two) times a day Use as instructed, Disp: 100 each, Rfl: 5    losartan (COZAAR) 50 mg tablet, Take 1 tablet (50 mg total) by mouth daily, Disp: 90 tablet, Rfl: 3    NIFEdipine ER (ADALAT CC) 30 MG 24 hr tablet, Take 1 tablet twice a day , Disp: 60 tablet, Rfl: 5    olopatadine (PATANOL) 0 1 % ophthalmic solution, Administer 1 drop to both eyes 2 (two) times a day, Disp: 5 mL, Rfl: 0    OneTouch Delica Lancets 61W MISC, Inject as directed 2 (two) times a day Use to test, Disp: 200 each, Rfl: 0    pantoprazole (PROTONIX) 40 mg tablet, Take 1 tablet (40 mg total) by mouth daily, Disp: 90 tablet, Rfl: 1    pravastatin (PRAVACHOL) 40 mg tablet, take 1 tablet by mouth once daily, Disp: 90 tablet, Rfl: 4    DULoxetine (CYMBALTA) 30 mg delayed release capsule, Take 30 mg by mouth daily   (Patient not taking: Reported on 5/18/2022), Disp: , Rfl: 0    Lidocaine 4 % PTCH, Apply 1 patch topically daily (Patient not taking: Reported on 5/18/2022), Disp: 20 patch, Rfl: 1    polyethylene glycol (GLYCOLAX) 17 GM/SCOOP powder, Take 17 g by mouth daily (Patient not taking: Reported on 5/18/2022), Disp: 225 g, Rfl: 1  Allergies   Allergen Reactions    Clonidine Derivatives Swelling    Diflucan [Fluconazole] Rash    Flagyl [Metronidazole] Anaphylaxis    Carvedilol Hives     And legs swelled    Latex Rash    Lipitor [Atorvastatin]      Legs swelled    Other Palpitations     IV DYE    Shingrix [Zoster Vac Recomb Adjuvanted] Hives     Vitals:    05/18/22 1500   BP: 148/62   BP Location: Left arm   Pulse: 90   Resp: 18   Temp: (!) 96 8 °F (36 °C)   TempSrc: Temporal   SpO2: 96%   Weight: 60 9 kg (134 lb 3 2 oz)

## 2022-05-18 NOTE — PROGRESS NOTES
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  She has completed radiation to the RUL tumor, 60 Gy in 15 fractions on 2/8/22 03/21/22 - PulmonologySaulotis  MRI of the brain is unremarkable  Pt has no complaints  Follow up in 6 months     04/12/22 - Surg OncMyesha  No complaints or concerns on exam  Pt to start clinical exams going forward rather than yearly mammograms  Follow up in 6 months     05/09/22 - CT chest wo contrast  1   Overall stable right middle lobe neoplasm with development of small right effusion and right middle lobe atelectasis and otherwise stable additional nodules and groundglass opacities  Groundglass opacities be of an infectious or inflammatory etiologies  2   Stable left thyroid nodule and hyperdense left renal lesion likely a hemorrhagic or proteinaceous lesion         Upcoming:  10/14/22 - Surg OncMyesha      Upon interview, she endorses the above history  She has intermittent chest pain but denies pain currently  The pain is typically tender to palpation, although yesterday she did experience mild discomfort at rest   This is sporadic and not progressive  She denies worsening cough or shortness of breath  She has good appetite, denies weight loss or painful/difficult swallowing  She is without additional acute concerns      Historical Information   Oncology History   History of left breast cancer   8/10/2018 Biopsy    Left breast biopsy  Invasive mucinous  Grade 1  ER 100    Her 2 1+     9/11/2018 Surgery    Left lumpectomy  Invasive mucinous carcinoma  Grade 1  1 1 cm in size  Margins negative      Her 2 1+  Stage 1A     10/4/2018 -  Hormone Therapy    Dr Hershal Blizzard  No benefit of AI     Adenocarcinoma, lung, right ([facility] [address] )   11/2021 Initial Diagnosis    Adenocarcinoma, lung, right (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 a primary pulmonary adenocarcinoma       1/19/2022 - 2/8/2022 Radiation    Course: C1    Plan ID Energy Fractions Dose per Fraction (cGy) Dose Correction (cGy) Total Dose Delivered (cGy) Elapsed Days   RML 6X 15 / 15 400 0 6,000 20      Treatment dates:  1/19/2022 - 2/8/2022          Past Medical History:   Diagnosis Date    Arthritis     Back pain     Benign essential hypertension     LAST ASSESSED: [id number]    Carotid artery stenosis     CKD (chronic kidney disease) stage 2, GFR 60-89 ml/min     Constipation, chronic     "drinks prune juice"    Diabetes mellitus ([facility] [address] )     DMII (diabetes mellitus, type 2) (HCC)     Epiglottitis     RESOLVED: [id number]    Gastritis     GERD (gastroesophageal reflux disease)     Gout     H/O blood clots     left lower leg,,,11 yrs ago    History of stenosis of renal artery     RESOLVED: [id number]    Hypercholesteremia     Hypercholesterolemia     Hyperlipidemia     Hyperparathyroidism ([address] )     Hypertension     Iliac artery stenosis, bilateral (HCC)     Kidney stone     Lobular carcinoma ([facility] [address] ) 09/06/2017    Left    Lung cancer ([facility] [address] )     Mucinous carcinoma of breast, left ([facility] [address] ) 09/11/2018    Left    Presence of pessary     Renal artery stenosis ([address] )     Renal disorder     Renovascular hypertension     RESOLVED: [id number]    Segmental and somatic dysfunction of cervical region     RESOLVED: [id number]    Segmental and somatic dysfunction of lumbar region     RESOLVED: 42OCM1354    Segmental and somatic dysfunction of pelvic region     RESOLVED: [id number]    Segmental and somatic dysfunction of thoracic region     RESOLVED: [id number]    Somatic dysfunction of abdominal region     RESOLVED: 10BNZ8764    Somatic dysfunction of head region     RESOLVED: 96MLV2056    Somatic dysfunction of lower extremities     RESOLVED: 63TZC3353    Somatic dysfunction of rib region     RESOLVED: [id number]    Somatic dysfunction of thoracic region     RESOLVED: 68TOQ1252    Somatic dysfunction of upper extremities     RESOLVED: 39NBP1700    Teeth missing      Past Surgical History:   Procedure Laterality Date    ANGIOPLASTY / STENTING ILIAC      BREAST BIOPSY Left 08/10/2018    U/S BX- mucinous ca    BREAST BIOPSY Left 08/07/2017    U/S BX    BREAST LUMPECTOMY Left 09/06/2017    LAST ASSESSED: [id number]    BREAST LUMPECTOMY Left 09/11/2018    BREAST SURGERY Left     biopsy    CATARACT EXTRACTION Bilateral     CHOLECYSTECTOMY      COLONOSCOPY      EYE SURGERY      ILIAC VEIN ANGIOPLASTY / STENTING Right     INITIAL STENOSIS: ONSET: [id number]    IR BIOPSY LUNG  11/15/2021    KNEE ARTHROSCOPY Right     KNEE SURGERY Right     ONSET: [id number]    MAMMO NEEDLE LOCALIZATION LEFT (ALL INC) Left 9/11/2018    MAMMO NEEDLE LOCALIZATION LEFT (ALL INC) Left 9/6/2017    NE ESOPHAGOGASTRODUODENOSCOPY TRANSORAL DIAGNOSTIC N/A 7/26/2018    Procedure: ESOPHAGOGASTRODUODENOSCOPY (EGD); Surgeon: Yolette Conley DO;  [facility];   Service: Gastroenterology    NE L[facility]NGOSCOPY,DIRCT[facility] N/A 6/10/2016    Procedure: MICRO DIRECT LARYNGOSCOPY WITH VOCAL FOLD MASS EXCISION ;  Surgeon: Melissa Nance MD;  [facility];  Service: ENT    NE MASTECTOMY, PARTIAL Left 9/6/2017    Procedure: LUMPECTOMY BREAST NEEDLE LOCALIZED WITH FAXITRON NEEDLE @ 5873;  Surgeon: Odalis Savage MD;  [facility];  Service: General    TX PERQ DEVICE PLACEMT BREAST LOC 1ST LES W Corin White Left 2018    Procedure: BREAST LUMPECTOMY; BREAST NEEDLE LOCALIZATION (NEEDLE LOC AT 1330);   Surgeon: Cara Redman MD;  [facility];  Service: Surgical Oncology    THROAT SURGERY      lump removed    TUBAL LIGATION      UPPER GASTROINTESTINAL ENDOSCOPY      US GUIDANCE BREAST BIOPSY LEFT EACH ADDITIONAL Left 2017    US GUIDED BREAST BIOPSY LEFT COMPLETE Left 8/10/2018    US GUIDED BREAST BIOPSY LEFT COMPLETE Left 2017    VASCULAR SURGERY         Social History   Social History     Substance and Sexual Activity   Alcohol Use Not Currently     Social History     Substance and Sexual Activity   Drug Use No     Social History     Tobacco Use   Smoking Status Former Smoker    Packs/day: 1 00    Years: 55 00    Pack years: 55 00    Start date:     Quit date:     Years since quittin 3   Smokeless Tobacco Never Used         Meds/Allergies     Current Outpatient Medications:     Acetaminophen 325 MG CAPS, Take 500 mg by mouth every 8 (eight) hours , Disp: , Rfl:     aspirin (ADULT ASPIRIN EC LOW STRENGTH) 81 mg EC tablet, Take 1 tablet by mouth daily, Disp: , Rfl:     Blood Glucose Monitoring Suppl (FreeStyle Precision Jalen System) w/Device KIT, by Does not apply route 3 (three) times a day, Disp: 1 kit, Rfl: 0    calcitriol (ROCALTROL) 0 25 mcg capsule, Take one capsule every Tuesday and Friday, Disp: 10 capsule, Rfl: 6    calcium carbonate-vitamin D (OSCAL-D) 500 mg-200 units per tablet, Take 1 tablet by mouth daily with breakfast, Disp: , Rfl:     diclofenac sodium (VOLTAREN) 1 %, Apply 2 g topically 4 (four) times a day, Disp: 100 g, Rfl: 1    dicyclomine (BENTYL) 10 mg capsule, Take 1 capsule (10 mg total) by mouth 3 (three) times a day as needed (gas pain), Disp: 30 capsule, Rfl: 0    docusate sodium (COLACE) 100 mg capsule, Take 1 capsule (100 mg total) by mouth 2 (two) times a day, Disp: 10 capsule, Rfl: 0    fluticasone (FLONASE) 50 mcg/act nasal spray, 2 sprays into each nostril daily, Disp: 16 g, Rfl: 2    gabapentin (Neurontin) 100 mg capsule, Take 1 capsule (100 mg total) by mouth 2 (two) times a day, Disp: 180 capsule, Rfl: 1    gabapentin (NEURONTIN) 100 mg capsule, Take 2 capsules (200 mg total) by mouth daily at bedtime, Disp: 180 capsule, Rfl: 0    hydrochlorothiazide (HYDRODIURIL) 25 mg tablet, Take 1 tablet (25 mg total) by mouth daily, Disp: 90 tablet, Rfl: 3    Lancets (ONETOUCH DELICA PLUS [id number]) MISC, 1 Device by Does not apply route 2 (two) times a day, Disp: 100 each, Rfl: 5    Lancets (ONETOUCH ULTRASOFT) lancets, by Other route 2 (two) times a day Use as instructed, Disp: 100 each, Rfl: 5    losartan (COZAAR) 50 mg tablet, Take 1 tablet (50 mg total) by mouth daily, Disp: 90 tablet, Rfl: 3    NIFEdipine ER (ADALAT CC) 30 MG 24 hr tablet, Take 1 tablet twice a day , Disp: 60 tablet, Rfl: 5    olopatadine (PATANOL) 0 1 % ophthalmic solution, Administer 1 drop to both eyes 2 (two) times a day, Disp: 5 mL, Rfl: 0    OneTouch Delica Lancets 75X MISC, Inject as directed 2 (two) times a day Use to test, Disp: 200 each, Rfl: 0    pantoprazole (PROTONIX) 40 mg tablet, Take 1 tablet (40 mg total) by mouth daily, Disp: 90 tablet, Rfl: 1    pravastatin (PRAVACHOL) 40 mg tablet, take 1 tablet by mouth once daily, Disp: 90 tablet, Rfl: 4    DULoxetine (CYMBALTA) 30 mg delayed release capsule, Take 30 mg by mouth daily   (Patient not taking: Reported on 5/18/2022), Disp: , Rfl: 0    Lidocaine 4 % PTCH, Apply 1 patch topically daily (Patient not taking: Reported on 5/18/2022), Disp: 20 patch, Rfl: 1    polyethylene glycol (GLYCOLAX) 17 GM/SCOOP powder, Take 17 g by mouth daily (Patient not taking: Reported on 5/18/2022), Disp: 225 g, Rfl: 1  Allergies   Allergen Reactions    Clonidine Derivatives Swelling    Diflucan [Fluconazole] Rash    Flagyl [Metronidazole] Anaphylaxis    Carvedilol Hives     And legs swelled    Latex Rash    Lipitor [Atorvastatin]      Legs swelled    Other Palpitations     IV DYE    Shingrix [Zoster Vac Recomb Adjuvanted] Hives         Review of Systems   Constitutional: Negative  HENT: Negative  Eyes: Negative  Respiratory: Negative  Denies any shortness of breath or coughing   Cardiovascular: Negative  Gastrointestinal: Negative  Endocrine: Negative  Genitourinary: Negative  Musculoskeletal: Negative  Skin: Negative  Allergic/Immunologic: Negative  Neurological: Positive for light-headedness (only with lower BP)  Hematological: Negative  Psychiatric/Behavioral: Negative  OBJECTIVE:   /62 (BP Location: Left arm)   Pulse 90   Temp (!) 96 8 °F (36 °C) (Temporal)   Resp 18   Wt 60 9 kg (134 lb 3 2 oz)   LMP  (LMP Unknown)   SpO2 96%   BMI 24 55 kg/m²   Pain Assessment:  0  Karnofsky: 80 - Normal activity with effort; some signs or symptoms of disease    Physical Exam   Constitutional:       Appearance: She is not toxic-appearing  HENT:      Head: Normocephalic and atraumatic  Eyes:      General: No scleral icterus  Extraocular Movements: Extraocular movements intact  Pupils: Pupils are equal, round, and reactive to light  Cardiovascular:      Rate and Rhythm: Normal rate  Pulmonary:      Effort: Pulmonary effort is normal       Breath sounds: Normal breath sounds  Abdominal:      General: Abdomen is flat  There is no distension  Musculoskeletal:         General: Normal range of motion  Cervical back: Normal range of motion  Lymphadenopathy:      Cervical: No cervical adenopathy  Skin:     General: Skin is warm and dry  Neurological:      General: No focal deficit present  Mental Status: She is alert     Psychiatric:         Mood and Affect: Mood normal          RESULTS    Lab Results:   Recent Results (from the past 672 hour(s))   Basic metabolic panel    Collection Time: 05/12/22  2:53 PM   Result Value Ref Range    Sodium 140 136 - 145 mmol/L    Potassium 3 6 3 5 - 5 3 mmol/L    Chloride 105 100 - 108 mmol/L    CO2 28 21 - 32 mmol/L    ANION GAP 7 4 - 13 mmol/L    BUN 47 (H) 5 - 25 mg/dL    Creatinine 1 57 (H) 0 60 - 1 30 mg/dL    Glucose 213 (H) 65 - 140 mg/dL    Calcium 9 8 8 3 - 10 1 mg/dL    eGFR 29 ml/min/1 73sq m       Imaging Studies:CT chest wo contrast    Result Date: 5/11/2022  Narrative: CT CHEST WITHOUT IV CONTRAST INDICATION:   C34 91: Malignant neoplasm of unspecified part of right bronchus or lung  Non-small cell lung cancer staging COMPARISON:  CT thorax October 2021, PET CT October 2021 TECHNIQUE: CT examination of the chest was performed without intravenous contrast  Axial, sagittal, and coronal 2D reformatted images were created from the source data and submitted for interpretation  Radiation dose length product (DLP) for this visit:  152 mGy-cm   This examination, like all CT scans performed in the [facility], was performed utilizing techniques to minimize radiation dose exposure, including the use of iterative reconstruction and automated exposure control  FINDINGS: LUNGS:  Bilateral apical pleuroparenchymal scarring  Right middle lobe mass with associated atelectasis measures 25 x 32 mm compared to 28 x 36 mm image 71  An area of atelectasis has developed on image 69 and 70  7 mm nonspecific area of thickening of the right major fissure series 3 image 33 progressed compared to 5 mm on image 35  Attention is needed on follow up  Left upper lobe groundglass opacity image 37, unchanged  Groundglass opacities in the right minimal image 52) right lower lobe image 52 are unchanged  4 mm nodule image 51 stable  Right lower lobe cavitary nodule measures 7 mm on image 39, unchanged  Right middle lobe 3 mm nodule image 65 stable   PLEURA:  Small right effusion has developed  Trace pericardial effusion  HEART/GREAT VESSELS: Heavy atherosclerotic coronary artery calcification is noted  Heart is otherwise unremarkable  No thoracic aortic aneurysm  MEDIASTINUM AND MARY: Nodularity of the thyroid gland is unchanged  No adenopathy on unenhanced exam  CHEST WALL AND LOWER NECK:  Unremarkable  VISUALIZED STRUCTURES IN THE UPPER ABDOMEN:  Hyperdense lesion upper pole left kidney measuring 12 mm, most likely a hemorrhagic or proteinaceous lesion is stable  Bilateral renal vascular calcifications  Atherosclerotic disease of the abdominal aorta OSSEOUS STRUCTURES:  No acute fracture or destructive osseous lesion  Impression: 1  Overall stable right middle lobe neoplasm with development of small right effusion and right middle lobe atelectasis and otherwise stable additional nodules and groundglass opacities  Groundglass opacities be of an infectious or inflammatory etiologies  2   Stable left thyroid nodule and hyperdense left renal lesion likely a hemorrhagic or proteinaceous lesion  Workstation performed: [id number]           Assessment/Plan:  Orders Placed This Encounter   Procedures    CT chest w contrast    BUN    Creatinine, serum        [place] Maciej is a 80y o  year old female with 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  She has completed radiation to the RUL tumor, 60 Gy in 15 fractions on 2/8/22      She remains without clinical or radiographic evidence of disease recurrence  She has likely posttreatment related changes appreciated at the RUL lesion  She was encouraged to discuss her chest discomfort with her primary care physician and she voiced understanding  She will return in 3 months with repeat CT chest to assess treatment response and provide surveillance of additional pulmonary nodules  All questions were answered to her satisfaction        Alban De La Garza MD  7/35/8468,4:04 PM    Portions of the record may have been created with voice recognition software   Occasional wrong word or "sound a like" substitutions may have occurred due to the inherent limitations of voice recognition software   Read the chart carefully and recognize, using context, where substitutions have occurred

## 2022-07-18 ENCOUNTER — TELEPHONE (OUTPATIENT)
Dept: OBGYN CLINIC | Facility: CLINIC | Age: 87
End: 2022-07-18

## 2022-07-18 NOTE — TELEPHONE ENCOUNTER
Pt was last seen 12/31/19 with Dr Marco Antonio Huang  She is currently having itching and overall uncomfortable   Her  would like to know if she can get a Rx or would she need an appt    Please advise

## 2022-07-18 NOTE — TELEPHONE ENCOUNTER
Last seen in 22019, advised she try monistat 7 day or call pcp for immediate apt, made adelia apt for August

## 2022-07-19 ENCOUNTER — APPOINTMENT (OUTPATIENT)
Dept: LAB | Facility: HOSPITAL | Age: 87
End: 2022-07-19
Attending: RADIOLOGY
Payer: COMMERCIAL

## 2022-07-19 DIAGNOSIS — C34.91 ADENOCARCINOMA, LUNG, RIGHT (HCC): ICD-10-CM

## 2022-07-19 LAB
CREAT UR-MCNC: 66.3 MG/DL
PROT UR-MCNC: 221 MG/DL
PROT/CREAT UR: 3.33 MG/G{CREAT} (ref 0–0.1)

## 2022-07-19 PROCEDURE — 84156 ASSAY OF PROTEIN URINE: CPT

## 2022-07-19 PROCEDURE — 82570 ASSAY OF URINE CREATININE: CPT

## 2022-07-20 ENCOUNTER — TELEPHONE (OUTPATIENT)
Dept: NEPHROLOGY | Facility: CLINIC | Age: 87
End: 2022-07-20

## 2022-07-20 ENCOUNTER — TELEPHONE (OUTPATIENT)
Dept: HEMATOLOGY ONCOLOGY | Facility: CLINIC | Age: 87
End: 2022-07-20

## 2022-07-20 NOTE — TELEPHONE ENCOUNTER
Incoming call from [facility], patient's   States Monistat is causing more discomfort  Patient clarifying she does not have itching, rather burning with occasional odor x 3 weeks  Denies discharge  Will route to a provider to see if we can call in something else or appt needed

## 2022-07-20 NOTE — TELEPHONE ENCOUNTER
CALL RETURN FORM   Reason for patient call? Patients spouse is calling stating that for patients upcoming imaging (he does not know  what imaging this is)  He received a letter stating he needed a referral for this test and he is confused  He would like advice on this   Patient's primary oncologist? Juliette Fajardo   Name of person the patient was calling for? Juliette Fajardo    Any additional information to add, if applicable? [phone]   Informed patient that the message will be forwarded to the team and someone will get back to them as soon as possible    Did you relay this information to the patient?  Yes

## 2022-07-21 ENCOUNTER — OFFICE VISIT (OUTPATIENT)
Dept: NEPHROLOGY | Facility: CLINIC | Age: 87
End: 2022-07-21
Payer: COMMERCIAL

## 2022-07-21 VITALS
SYSTOLIC BLOOD PRESSURE: 160 MMHG | HEIGHT: 62 IN | DIASTOLIC BLOOD PRESSURE: 62 MMHG | BODY MASS INDEX: 24.55 KG/M2 | HEART RATE: 80 BPM

## 2022-07-21 DIAGNOSIS — R80.1 PERSISTENT PROTEINURIA: ICD-10-CM

## 2022-07-21 DIAGNOSIS — E11.51 TYPE 2 DIABETES MELLITUS WITH DIABETIC PERIPHERAL ANGIOPATHY WITHOUT GANGRENE, WITHOUT LONG-TERM CURRENT USE OF INSULIN (HCC): ICD-10-CM

## 2022-07-21 DIAGNOSIS — N18.32 STAGE 3B CHRONIC KIDNEY DISEASE (HCC): ICD-10-CM

## 2022-07-21 DIAGNOSIS — I10 HYPERTENSION, UNSPECIFIED TYPE: Primary | ICD-10-CM

## 2022-07-21 DIAGNOSIS — E21.3 HYPERPARATHYROIDISM (HCC): ICD-10-CM

## 2022-07-21 PROCEDURE — 1160F RVW MEDS BY RX/DR IN RCRD: CPT | Performed by: INTERNAL MEDICINE

## 2022-07-21 PROCEDURE — 99214 OFFICE O/P EST MOD 30 MIN: CPT | Performed by: INTERNAL MEDICINE

## 2022-07-21 RX ORDER — CANDESARTAN 32 MG/1
32 TABLET ORAL DAILY
Qty: 90 TABLET | Refills: 3 | Status: SHIPPED | OUTPATIENT
Start: 2022-07-21 | End: 2022-10-06

## 2022-07-21 NOTE — PROGRESS NOTES
NEPHROLOGY PROGRESS NOTE    Beverly Mai 80 y o  female MRN: [id number]  Unit/Bed#:  Encounter: [id number]  Reason for Consult:  Chronic kidney disease, proteinuria and hypertension    Patient is here with her  for follow-up she looks well states she has had no intercurrent illnesses or hospitalizations  They did report to me and were communicating with me between visits that she was having issues with big fluctuations in her blood pressure  The patient feels if her blood pressure is 1  she feels well and went goes lower than that she does not feel well  She also feels that her nifedipine might be causing muscle cramps and was concerned losartan was causing some mouth ulcers  ASSESSMENT/PLAN:  1  Renal    Patient's chronic kidney disease with proteinuria likely due to diabetic nephropathy  Her creatinine stable at 1 5 and has not progressed in a couple years  She has very well-preserved kidney function at this point and will continue to monitor with blood pressure control which needs help see below  Glycemic control target A1c is 7%  She is on a statin  In talking to her it sounds like she does not really take the losartan on a regular basis and protein estimation was higher at 3 3 g which may be related to not taking the medication regularly  See below for plan on blood pressure  2  Hypertension    Patient has elevated blood pressure it has been difficult to manage because of medication intolerance  In talking to her and her  it sounds like she will wake up in the morning check her blood pressure it is usually okay so they do not take medications  Then they check it in the afternoon if it is elevated will take losartan and they check it later in the day and if it is elevated will take their nifedipine  Of course he would be ideal to be able to take medications once a day regularly to have good well controlled blood pressure    Given that she has had issues will make some adjustments  Of note she said when she went on the HCTZ that I prescribed her blood pressure was low  What we can try is HCTZ 25 mg a day  Switched to candesartan 32 mg a day it her 's request    Discontinue losartan and nifedipine  Given that the use to taking nifedipine if the blood pressure is elevated it seems to bring it down I told him that if he feels he needs to use that he should and he will be in communication with me and will work on finding a good stable regiment for her blood pressure  I stressed the importance of her  communicating with me through e-mail which he does and I will help manage this  SUBJECTIVE:  Review of Systems   Constitutional: Negative for chills, diaphoresis and fever  HENT: Negative  Eyes: Negative  Cardiovascular: Negative for chest pain, dyspnea on exertion, orthopnea and palpitations  Respiratory: Negative  Negative for cough, shortness of breath, sputum production and wheezing  Skin: Negative  Negative for rash  Musculoskeletal:        Feels nifedipine causes muscle aches  Gastrointestinal: Negative for abdominal pain, diarrhea, nausea and vomiting  Genitourinary: Negative for dysuria, flank pain, hematuria and incomplete emptying  Neurological: Negative for dizziness, focal weakness, headaches and weakness  Psychiatric/Behavioral: Negative for altered mental status, depression, hallucinations and hypervigilance  OBJECTIVE:  Current Weight:    [email or web address]:     Blood pressure 160/62, pulse 80, height 5' 2" (1 575 m), not currently breastfeeding  , Body mass index is 24 55 kg/m²  [unfilled]    Physical Exam: /62 (BP Location: Left arm, Patient Position: Sitting, Cuff Size: Standard)   Pulse 80   Ht 5' 2" (1 575 m)   LMP  (LMP Unknown)   BMI 24 55 kg/m²   Physical Exam  Constitutional:       General: She is not in acute distress  Appearance: She is not ill-appearing or toxic-appearing     HENT: Head: Normocephalic and atraumatic  Mouth/Throat:      Mouth: Mucous membranes are moist    Eyes:      General: No scleral icterus  Extraocular Movements: Extraocular movements intact  Cardiovascular:      Rate and Rhythm: Normal rate and regular rhythm  Heart sounds: No friction rub  No gallop  Comments: Mild edema  Pulmonary:      Effort: Pulmonary effort is normal  No respiratory distress  Breath sounds: No wheezing, rhonchi or rales  Abdominal:      General: Bowel sounds are normal  There is no distension  Palpations: Abdomen is soft  Tenderness: There is no abdominal tenderness  There is no rebound  Musculoskeletal:      Cervical back: Normal range of motion and neck supple  Neurological:      General: No focal deficit present  Mental Status: She is alert and oriented to person, place, and time  Mental status is at baseline  Psychiatric:         Mood and Affect: Mood normal          Behavior: Behavior normal          Thought Content:  Thought content normal          Judgment: Judgment normal          Medications:    Current Outpatient Medications:     Acetaminophen 325 MG CAPS, Take 500 mg by mouth every 8 (eight) hours , Disp: , Rfl:     aspirin (ADULT ASPIRIN EC LOW STRENGTH) 81 mg EC tablet, Take 1 tablet by mouth daily, Disp: , Rfl:     calcitriol (ROCALTROL) 0 25 mcg capsule, Take one capsule every Tuesday and Friday, Disp: 10 capsule, Rfl: 6    calcium carbonate-vitamin D (OSCAL-D) 500 mg-200 units per tablet, Take 1 tablet by mouth daily with breakfast, Disp: , Rfl:     candesartan (ATACAND) 32 MG tablet, Take 1 tablet (32 mg total) by mouth daily, Disp: 90 tablet, Rfl: 3    dicyclomine (BENTYL) 10 mg capsule, Take 1 capsule (10 mg total) by mouth 3 (three) times a day as needed (gas pain), Disp: 30 capsule, Rfl: 0    docusate sodium (COLACE) 100 mg capsule, Take 1 capsule (100 mg total) by mouth 2 (two) times a day, Disp: 10 capsule, Rfl: 0    fluticasone (FLONASE) 50 mcg/act nasal spray, 2 sprays into each nostril daily, Disp: 16 g, Rfl: 2    gabapentin (NEURONTIN) 100 mg capsule, Take 2 capsules (200 mg total) by mouth daily at bedtime, Disp: 180 capsule, Rfl: 0    hydrochlorothiazide (HYDRODIURIL) 25 mg tablet, Take 1 tablet (25 mg total) by mouth daily, Disp: 90 tablet, Rfl: 3    NIFEdipine ER (ADALAT CC) 30 MG 24 hr tablet, Take 1 tablet twice a day   (Patient taking differently: 30 mg daily), Disp: 60 tablet, Rfl: 5    olopatadine (PATANOL) 0 1 % ophthalmic solution, Administer 1 drop to both eyes 2 (two) times a day, Disp: 5 mL, Rfl: 0    pantoprazole (PROTONIX) 40 mg tablet, take 1 tablet by mouth once daily, Disp: 90 tablet, Rfl: 0    pravastatin (PRAVACHOL) 40 mg tablet, take 1 tablet by mouth once daily (Patient taking differently: Take 40 mg by mouth every other day), Disp: 90 tablet, Rfl: 4    Blood Glucose Monitoring Suppl (FreeStyle Precision Jalen System) w/Device KIT, by Does not apply route 3 (three) times a day, Disp: 1 kit, Rfl: 0    diclofenac sodium (VOLTAREN) 1 %, Apply 2 g topically 4 (four) times a day (Patient not taking: Reported on 7/21/2022), Disp: 100 g, Rfl: 1    DULoxetine (CYMBALTA) 30 mg delayed release capsule, Take 30 mg by mouth daily   (Patient not taking: No sig reported), Disp: , Rfl: 0    gabapentin (Neurontin) 100 mg capsule, Take 1 capsule (100 mg total) by mouth 2 (two) times a day, Disp: 180 capsule, Rfl: 1    Lancets (ONETOUCH DELICA PLUS CFYVQD07N) MISC, 1 Device by Does not apply route 2 (two) times a day, Disp: 100 each, Rfl: 5    Lancets (ONETOUCH ULTRASOFT) lancets, by Other route 2 (two) times a day Use as instructed, Disp: 100 each, Rfl: 5    Lidocaine 4 % PTCH, Apply 1 patch topically daily (Patient not taking: No sig reported), Disp: 20 patch, Rfl: 1    OneTouch Delica Lancets 57A MISC, Inject as directed 2 (two) times a day Use to test (Patient not taking: Reported on 7/21/2022), Disp: 200 each, Rfl: 0    polyethylene glycol (GLYCOLAX) 17 GM/SCOOP powder, Take 17 g by mouth daily (Patient not taking: No sig reported), Disp: 225 g, Rfl: 1    Laboratory Results:  Lab Results   Component Value Date    WBC 7 07 11/15/2021    HGB 11 8 11/15/2021    HCT 37 6 11/15/2021    MCV 90 11/15/2021     11/15/2021     Lab Results   Component Value Date    SODIUM 140 05/12/2022    K 3 6 05/12/2022     05/12/2022    CO2 28 05/12/2022    BUN 47 (H) 05/12/2022    CREATININE 1 57 (H) 05/12/2022    GLUC 213 (H) 05/12/2022    CALCIUM 9 8 05/12/2022     Lab Results   Component Value Date    CALCIUM 9 8 05/12/2022    PHOS 4 2 (H) 04/08/2021     No results found for: LABPROT

## 2022-07-21 NOTE — TELEPHONE ENCOUNTER
Spoke with patient's   Reiterated that at last OV, discussed discontinuation of mammography secondary to patients age and other health issues  Reviewed they can disregard reminder letter regarding mammogram  I will see her in the office in October for a CBE

## 2022-07-21 NOTE — LETTER
July 21, 2022     León Leal MD  [address] [facility]    Patient: Patrica Geiger   YOB: 1934   Date of Visit: 7/21/2022       Dear Dr Cary Hall: Thank you for referring Patrica Geiger to me for evaluation  Below are my notes for this consultation  If you have questions, please do not hesitate to call me  I look forward to following your patient along with you  Sincerely,        Melchor Singh MD        CC: No Recipients  Melchor Singh MD  7/21/2022  4:46 PM  Sign when Signing Visit  Etienne Auguste 80 y o  female MRN: [id number]  Unit/Bed#:  Encounter: [id number]  Reason for Consult:  Chronic kidney disease, proteinuria and hypertension    Patient is here with her  for follow-up she looks well states she has had no intercurrent illnesses or hospitalizations  They did report to me and were communicating with me between visits that she was having issues with big fluctuations in her blood pressure  The patient feels if her blood pressure is 1  she feels well and went goes lower than that she does not feel well  She also feels that her nifedipine might be causing muscle cramps and was concerned losartan was causing some mouth ulcers  ASSESSMENT/PLAN:  1  Renal    Patient's chronic kidney disease with proteinuria likely due to diabetic nephropathy  Her creatinine stable at 1 5 and has not progressed in a couple years  She has very well-preserved kidney function at this point and will continue to monitor with blood pressure control which needs help see below  Glycemic control target A1c is 7%  She is on a statin  In talking to her it sounds like she does not really take the losartan on a regular basis and protein estimation was higher at 3 3 g which may be related to not taking the medication regularly  See below for plan on blood pressure      2  Hypertension    Patient has elevated blood pressure it has been difficult to manage because of medication intolerance  In talking to her and her  it sounds like she will wake up in the morning check her blood pressure it is usually okay so they do not take medications  Then they check it in the afternoon if it is elevated will take losartan and they check it later in the day and if it is elevated will take their nifedipine  Of course he would be ideal to be able to take medications once a day regularly to have good well controlled blood pressure  Given that she has had issues will make some adjustments  Of note she said when she went on the HCTZ that I prescribed her blood pressure was low  What we can try is HCTZ 25 mg a day  Switched to candesartan 32 mg a day it her 's request    Discontinue losartan and nifedipine  Given that the use to taking nifedipine if the blood pressure is elevated it seems to bring it down I told him that if he feels he needs to use that he should and he will be in communication with me and will work on finding a good stable regiment for her blood pressure  I stressed the importance of her  communicating with me through e-mail which he does and I will help manage this  SUBJECTIVE:  Review of Systems   Constitutional: Negative for chills, diaphoresis and fever  HENT: Negative  Eyes: Negative  Cardiovascular: Negative for chest pain, dyspnea on exertion, orthopnea and palpitations  Respiratory: Negative  Negative for cough, shortness of breath, sputum production and wheezing  Skin: Negative  Negative for rash  Musculoskeletal:        Feels nifedipine causes muscle aches  Gastrointestinal: Negative for abdominal pain, diarrhea, nausea and vomiting  Genitourinary: Negative for dysuria, flank pain, hematuria and incomplete emptying  Neurological: Negative for dizziness, focal weakness, headaches and weakness     Psychiatric/Behavioral: Negative for altered mental status, depression, hallucinations and hypervigilance  OBJECTIVE:  Current Weight:    [email or web address]:     Blood pressure 160/62, pulse 80, height 5' 2" (1 575 m), not currently breastfeeding  , Body mass index is 24 55 kg/m²  [unfilled]    Physical Exam: /62 (BP Location: Left arm, Patient Position: Sitting, Cuff Size: Standard)   Pulse 80   Ht 5' 2" (1 575 m)   LMP  (LMP Unknown)   BMI 24 55 kg/m²   Physical Exam  Constitutional:       General: She is not in acute distress  Appearance: She is not ill-appearing or toxic-appearing  HENT:      Head: Normocephalic and atraumatic  Mouth/Throat:      Mouth: Mucous membranes are moist    Eyes:      General: No scleral icterus  Extraocular Movements: Extraocular movements intact  Cardiovascular:      Rate and Rhythm: Normal rate and regular rhythm  Heart sounds: No friction rub  No gallop  Comments: Mild edema  Pulmonary:      Effort: Pulmonary effort is normal  No respiratory distress  Breath sounds: No wheezing, rhonchi or rales  Abdominal:      General: Bowel sounds are normal  There is no distension  Palpations: Abdomen is soft  Tenderness: There is no abdominal tenderness  There is no rebound  Musculoskeletal:      Cervical back: Normal range of motion and neck supple  Neurological:      General: No focal deficit present  Mental Status: She is alert and oriented to person, place, and time  Mental status is at baseline  Psychiatric:         Mood and Affect: Mood normal          Behavior: Behavior normal          Thought Content:  Thought content normal          Judgment: Judgment normal          Medications:    Current Outpatient Medications:     Acetaminophen 325 MG CAPS, Take 500 mg by mouth every 8 (eight) hours , Disp: , Rfl:     aspirin (ADULT ASPIRIN EC LOW STRENGTH) 81 mg EC tablet, Take 1 tablet by mouth daily, Disp: , Rfl:     calcitriol (ROCALTROL) 0 25 mcg capsule, Take one capsule every Tuesday and Friday, Disp: 10 capsule, Rfl: 6    calcium carbonate-vitamin D (OSCAL-D) 500 mg-200 units per tablet, Take 1 tablet by mouth daily with breakfast, Disp: , Rfl:     candesartan (ATACAND) 32 MG tablet, Take 1 tablet (32 mg total) by mouth daily, Disp: 90 tablet, Rfl: 3    dicyclomine (BENTYL) 10 mg capsule, Take 1 capsule (10 mg total) by mouth 3 (three) times a day as needed (gas pain), Disp: 30 capsule, Rfl: 0    docusate sodium (COLACE) 100 mg capsule, Take 1 capsule (100 mg total) by mouth 2 (two) times a day, Disp: 10 capsule, Rfl: 0    fluticasone (FLONASE) 50 mcg/act nasal spray, 2 sprays into each nostril daily, Disp: 16 g, Rfl: 2    gabapentin (NEURONTIN) 100 mg capsule, Take 2 capsules (200 mg total) by mouth daily at bedtime, Disp: 180 capsule, Rfl: 0    hydrochlorothiazide (HYDRODIURIL) 25 mg tablet, Take 1 tablet (25 mg total) by mouth daily, Disp: 90 tablet, Rfl: 3    NIFEdipine ER (ADALAT CC) 30 MG 24 hr tablet, Take 1 tablet twice a day   (Patient taking differently: 30 mg daily), Disp: 60 tablet, Rfl: 5    olopatadine (PATANOL) 0 1 % ophthalmic solution, Administer 1 drop to both eyes 2 (two) times a day, Disp: 5 mL, Rfl: 0    pantoprazole (PROTONIX) 40 mg tablet, take 1 tablet by mouth once daily, Disp: 90 tablet, Rfl: 0    pravastatin (PRAVACHOL) 40 mg tablet, take 1 tablet by mouth once daily (Patient taking differently: Take 40 mg by mouth every other day), Disp: 90 tablet, Rfl: 4    Blood Glucose Monitoring Suppl (FreeStyle Precision Jalen System) w/Device KIT, by Does not apply route 3 (three) times a day, Disp: 1 kit, Rfl: 0    diclofenac sodium (VOLTAREN) 1 %, Apply 2 g topically 4 (four) times a day (Patient not taking: Reported on 7/21/2022), Disp: 100 g, Rfl: 1    DULoxetine (CYMBALTA) 30 mg delayed release capsule, Take 30 mg by mouth daily   (Patient not taking: No sig reported), Disp: , Rfl: 0    gabapentin (Neurontin) 100 mg capsule, Take 1 capsule (100 mg total) by mouth 2 (two) times a day, Disp: 180 capsule, Rfl: 1    Lancets (ONETOUCH DELICA PLUS [id number]) MISC, 1 Device by Does not apply route 2 (two) times a day, Disp: 100 each, Rfl: 5    Lancets (ONETOUCH ULTRASOFT) lancets, by Other route 2 (two) times a day Use as instructed, Disp: 100 each, Rfl: 5    Lidocaine 4 % PTCH, Apply 1 patch topically daily (Patient not taking: No sig reported), Disp: 20 patch, Rfl: 1    OneTouch Delica Lancets 61X MISC, Inject as directed 2 (two) times a day Use to test (Patient not taking: Reported on 7/21/2022), Disp: 200 each, Rfl: 0    polyethylene glycol (GLYCOLAX) 17 GM/SCOOP powder, Take 17 g by mouth daily (Patient not taking: No sig reported), Disp: 225 g, Rfl: 1    Laboratory Results:  Lab Results   Component Value Date    WBC 7 07 11/15/2021    HGB 11 8 11/15/2021    HCT 37 6 11/15/2021    MCV 90 11/15/2021     11/15/2021     Lab Results   Component Value Date    SODIUM 140 05/12/2022    K 3 6 05/12/2022     05/12/2022    CO2 28 05/12/2022    BUN 47 (H) 05/12/2022    CREATININE 1 57 (H) 05/12/2022    GLUC 213 (H) 05/12/2022    CALCIUM 9 8 05/12/2022     Lab Results   Component Value Date    CALCIUM 9 8 05/12/2022    PHOS 4 2 (H) 04/08/2021     No results found for: LABPROT

## 2022-07-21 NOTE — PATIENT INSTRUCTIONS
You are here for follow-up  First off your creatinine which is the blood test for the kidney function is stable at 1 5 it has not gotten worse in a couple years  There is protein in the urine so he likely has diabetes affecting the kidneys but again your functions very well preserved it has not gotten worse  The way to help delay damage is good sugar control blood pressure control which were working on lipid treatment and a medicine like candesartan to help lower protein in the urine  The biggest issue is been the blood pressure fluctuations and you been in communication with me  Your having difficulty giving the medications regularly because there is times the blood pressure drops a lot she seems to be very sensitive to the medications and also if it goes too low she does not feel well  We discussed a strategy to try and find a regimen that will control her blood pressure and hopefully she will tolerate it feel better and you will need to continuously focus on checking the blood pressure as much as you do  You did tell me that when she took the HCTZ her blood pressure responded to that  For now start HCTZ 25 mg a day  Start candesartan 32 mg once a day  Stop losartan    You have been using her nifedipine when the blood pressure is high and there was times that you skip a dose so you still can follow that strategy and if you find her using the nifedipine every day or having to use it contact me to let me know

## 2022-07-28 ENCOUNTER — TELEMEDICINE (OUTPATIENT)
Dept: FAMILY MEDICINE CLINIC | Facility: CLINIC | Age: 87
End: 2022-07-28
Payer: COMMERCIAL

## 2022-07-28 DIAGNOSIS — R30.0 DYSURIA: Primary | ICD-10-CM

## 2022-07-28 PROCEDURE — 99213 OFFICE O/P EST LOW 20 MIN: CPT | Performed by: FAMILY MEDICINE

## 2022-07-28 RX ORDER — AMOXICILLIN 500 MG/1
500 CAPSULE ORAL EVERY 12 HOURS SCHEDULED
Qty: 14 CAPSULE | Refills: 0 | Status: SHIPPED | OUTPATIENT
Start: 2022-07-28 | End: 2022-08-04

## 2022-07-28 NOTE — PROGRESS NOTES
Virtual Regular Visit    Verification of patient location:    Patient is located in the following state in which I hold an active license [place]      Assessment/Plan:    Problem List Items Addressed This Visit        Other    Dysuria - Primary    Relevant Medications    amoxicillin (AMOXIL) 500 mg capsule    Other Relevant Orders    UA w Reflex to Microscopic w Reflex to Culture -Lab Collect (Completed)    Comprehensive metabolic panel               Reason for visit is   Chief Complaint   Patient presents with    Virtual Regular Visit        Encounter provider Radha Vargas MD    Provider located at [address] [place] [phone]      Recent Visits  Date Type Provider Dept   07/28/22 Telemedicine Radha Vargas  [facility] recent visits within past 7 days and meeting all other requirements  Future Appointments  No visits were found meeting these conditions  Showing future appointments within next 150 days and meeting all other requirements       The patient was identified by name and date of birth  Codey Carvalho was informed that this is a telemedicine visit and that the visit is being conducted through Telephone  My office door was closed  No one else was in the room  She acknowledged consent and understanding of privacy and security of the video platform  The patient has agreed to participate and understands they can discontinue the visit at any time  It was my intent to perform this visit via video technology but the patient was not able to do a video connection so the visit was completed via audio telephone only  Patient is aware this is a billable service  Subjective  Codey Carvalho is a 80 y o  female presents via telephone  Several weeks of dysuria  Worsening symptoms would like to be evaluated   Denies any fevers, chills, or kidney discomfort      =    Past Medical History:   Diagnosis Date    Arthritis  Back pain     Benign essential hypertension     LAST ASSESSED: [id number]    Carotid artery stenosis     CKD (chronic kidney disease) stage 2, GFR 60-89 ml/min     Constipation, chronic     "drinks prune juice"    Diabetes mellitus ([facility] )     DMII (diabetes mellitus, type 2) (HCC)     Epiglottitis     RESOLVED: [id number]    Gastritis     GERD (gastroesophageal reflux disease)     Gout     H/O blood clots     left lower leg,,,11 yrs ago    History of stenosis of renal artery     RESOLVED: [id number]    Hypercholesteremia     Hypercholesterolemia     Hyperlipidemia     Hyperparathyroidism ([facility] )     Hypertension     Iliac artery stenosis, bilateral (HCC)     Kidney stone     Lobular carcinoma ([place] [address] ) 09/06/2017    Left    Lung cancer ([place] [address] )     Mucinous carcinoma of breast, 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    Left    Presence of pessary     Renal artery stenosis (HCC)     Renal disorder     Renovascular hypertension     RESOLVED: 58DBH5026    Segmental and somatic dysfunction of cervical region     RESOLVED: 57GLA3521    Segmental and somatic dysfunction of lumbar region     RESOLVED: [id number]    Segmental and somatic dysfunction of pelvic region     RESOLVED: 49RJL1621    Segmental and somatic dysfunction of thoracic region     RESOLVED: 75QNY7564    Somatic dysfunction of abdominal region     RESOLVED: [id number]    Somatic dysfunction of head region     RESOLVED: 22VCI7307    Somatic dysfunction of lower extremities     RESOLVED: 56HGQ8567    Somatic dysfunction of rib region     RESOLVED: 63CIL2740    Somatic dysfunction of thoracic region     RESOLVED: 73XPP2992    Somatic dysfunction of upper extremities     RESOLVED: 92WYL0842    Teeth missing        Past Surgical History:   Procedure Laterality Date    ANGIOPLASTY / STENTING ILIAC      BREAST BIOPSY Left 08/10/2018    U/S BX- mucinous ca    BREAST BIOPSY Left 08/07/2017    U/S BX    BREAST LUMPECTOMY Left 09/06/2017    LAST ASSESSED: [id number]    BREAST LUMPECTOMY Left 09/11/2018    BREAST SURGERY Left     biopsy    CATARACT EXTRACTION Bilateral     CHOLECYSTECTOMY      COLONOSCOPY      EYE SURGERY      ILIAC VEIN ANGIOPLASTY / STENTING Right     INITIAL STENOSIS: ONSET: [id number]    IR BIOPSY LUNG  11/15/2021    KNEE ARTHROSCOPY Right     KNEE SURGERY Right     ONSET: [id number]    MAMMO NEEDLE LOCALIZATION LEFT (ALL INC) Left 9/11/2018    MAMMO NEEDLE LOCALIZATION LEFT (ALL INC) Left 9/6/2017    WA ESOPHAGOGASTRODUODENOSCOPY TRANSORAL DIAGNOSTIC N/A 7/26/2018    Procedure: ESOPHAGOGASTRODUODENOSCOPY (EGD); Surgeon: Mark Gurrola DO;  Location: [facility]; Service: Gastroenterology    WA LARYNGOSCOPY,DIRCT,[facility] N/A 6/10/2016    Procedure: MICRO DIRECT LARYNGOSCOPY WITH VOCAL FOLD MASS EXCISION ;  Surgeon: Aleisha Zapata MD;  Location: [facility];  Service: ENT    WA MASTECTOMY, PARTIAL Left 9/6/2017    Procedure: LUMPECTOMY BREAST NEEDLE LOCALIZED WITH FAXITRON NEEDLE @ 0830;  Surgeon: Jacobo Steele MD;  [facility];  Service: General    WA PERQ DEVICE PLACEMT BREAST LOC 1ST LES W Ramesh Dears Left 9/11/2018    Procedure: BREAST LUMPECTOMY; BREAST NEEDLE LOCALIZATION (NEEDLE LOC AT 1330);   Surgeon: Yumiko Scherer MD;  Location: [facility];  Service: Surgical Oncology    THROAT SURGERY      lump removed    TUBAL LIGATION      UPPER GASTROINTESTINAL ENDOSCOPY      US GUIDANCE BREAST BIOPSY LEFT EACH ADDITIONAL Left 8/7/2017    US GUIDED BREAST BIOPSY LEFT COMPLETE Left 8/10/2018    US GUIDED BREAST BIOPSY LEFT COMPLETE Left 8/7/2017    VASCULAR SURGERY         Current Outpatient Medications   Medication Sig Dispense Refill    amoxicillin (AMOXIL) 500 mg capsule Take 1 capsule (500 mg total) by mouth every 12 (twelve) hours for 7 days 14 capsule 0    Acetaminophen 325 MG CAPS Take 500 mg by mouth every 8 (eight) hours       aspirin (ADULT ASPIRIN EC LOW STRENGTH) 81 mg EC tablet Take 1 tablet by mouth daily      Blood Glucose Monitoring Suppl (FreeStyle Precision Jalen System) w/Device KIT by Does not apply route 3 (three) times a day 1 kit 0    calcitriol (ROCALTROL) 0 25 mcg capsule Take one capsule every Tuesday and Friday 10 capsule 6    calcium carbonate-vitamin D (OSCAL-D) 500 mg-200 units per tablet Take 1 tablet by mouth daily with breakfast      candesartan (ATACAND) 32 MG tablet Take 1 tablet (32 mg total) by mouth daily 90 tablet 3    diclofenac sodium (VOLTAREN) 1 % Apply 2 g topically 4 (four) times a day (Patient not taking: Reported on 7/21/2022) 100 g 1    dicyclomine (BENTYL) 10 mg capsule Take 1 capsule (10 mg total) by mouth 3 (three) times a day as needed (gas pain) 30 capsule 0    docusate sodium (COLACE) 100 mg capsule Take 1 capsule (100 mg total) by mouth 2 (two) times a day 10 capsule 0    DULoxetine (CYMBALTA) 30 mg delayed release capsule Take 30 mg by mouth daily   (Patient not taking: No sig reported)  0    fluticasone (FLONASE) 50 mcg/act nasal spray 2 sprays into each nostril daily 16 g 2    gabapentin (Neurontin) 100 mg capsule Take 1 capsule (100 mg total) by mouth 2 (two) times a day 180 capsule 1    gabapentin (NEURONTIN) 100 mg capsule take 2 capsules by mouth daily at bedtime 180 capsule 0    hydrochlorothiazide (HYDRODIURIL) 25 mg tablet Take 1 tablet (25 mg total) by mouth daily 90 tablet 3    Lancets (ONETOUCH DELICA PLUS [id number]) MISC 1 Device by Does not apply route 2 (two) times a day 100 each 5    Lancets (ONETOUCH ULTRASOFT) lancets by Other route 2 (two) times a day Use as instructed 100 each 5    Lidocaine 4 % PTCH Apply 1 patch topically daily (Patient not taking: No sig reported) 20 patch 1    NIFEdipine ER (ADALAT CC) 30 MG 24 hr tablet Take 1 tablet twice a day   (Patient taking differently: 30 mg daily) 60 tablet 5    olopatadine (PATANOL) 0 1 % ophthalmic solution Administer 1 drop to both eyes 2 (two) times a day 5 mL 0    OneTouch Delica Lancets 33G MISC Inject as directed 2 (two) times a day Use to test (Patient not taking: Reported on 7/21/2022) 200 each 0    pantoprazole (PROTONIX) 40 mg tablet take 1 tablet by mouth once daily 90 tablet 0    polyethylene glycol (GLYCOLAX) 17 GM/SCOOP powder Take 17 g by mouth daily (Patient not taking: No sig reported) 225 g 1    pravastatin (PRAVACHOL) 40 mg tablet take 1 tablet by mouth once daily (Patient taking differently: Take 40 mg by mouth every other day) 90 tablet 4     No current facility-administered medications for this visit  Allergies   Allergen Reactions    Clonidine Derivatives Swelling    Diflucan [Fluconazole] Rash    Flagyl [Metronidazole] Anaphylaxis    Carvedilol Hives     And legs swelled    Latex Rash    Lipitor [Atorvastatin]      Legs swelled    Other Palpitations     IV DYE    Shingrix [Zoster Vac Recomb Adjuvanted] Hives       Review of Systems   Constitutional: Negative for fatigue and fever  Genitourinary: Positive for dysuria  Video Exam    There were no vitals filed for this visit  Physical Exam     I spent 10 minutes directly with the patient during this visit    VIRTUAL VISIT Nyla Evelyn verbally agrees to participate in [employer]  Pt is aware that [employer] could be limited without vital signs or the ability to perform a full hands-on physical Lili Yobani understands she or the provider may request at any time to terminate the video visit and request the patient to seek care or treatment in person

## 2022-07-29 ENCOUNTER — APPOINTMENT (OUTPATIENT)
Dept: LAB | Facility: HOSPITAL | Age: 87
End: 2022-07-29
Payer: COMMERCIAL

## 2022-07-29 DIAGNOSIS — M54.6 CHRONIC THORACIC BACK PAIN, UNSPECIFIED BACK PAIN LATERALITY: ICD-10-CM

## 2022-07-29 DIAGNOSIS — R30.0 DYSURIA: ICD-10-CM

## 2022-07-29 DIAGNOSIS — G89.29 CHRONIC THORACIC BACK PAIN, UNSPECIFIED BACK PAIN LATERALITY: ICD-10-CM

## 2022-07-29 LAB
BACTERIA UR QL AUTO: ABNORMAL /HPF
BILIRUB UR QL STRIP: NEGATIVE
CLARITY UR: ABNORMAL
COLOR UR: ABNORMAL
GLUCOSE UR STRIP-MCNC: NEGATIVE MG/DL
HGB UR QL STRIP.AUTO: NEGATIVE
HYALINE CASTS #/AREA URNS LPF: ABNORMAL /LPF
KETONES UR STRIP-MCNC: NEGATIVE MG/DL
LEUKOCYTE ESTERASE UR QL STRIP: ABNORMAL
NITRITE UR QL STRIP: NEGATIVE
NON-SQ EPI CELLS URNS QL MICRO: ABNORMAL /HPF
PH UR STRIP.AUTO: 6 [PH]
PROT UR STRIP-MCNC: ABNORMAL MG/DL
RBC #/AREA URNS AUTO: ABNORMAL /HPF
SP GR UR STRIP.AUTO: 1.01 (ref 1–1.03)
UROBILINOGEN UR STRIP-ACNC: <2 MG/DL
WBC #/AREA URNS AUTO: ABNORMAL /HPF

## 2022-07-29 PROCEDURE — 81001 URINALYSIS AUTO W/SCOPE: CPT

## 2022-07-29 PROCEDURE — 87086 URINE CULTURE/COLONY COUNT: CPT

## 2022-07-29 RX ORDER — GABAPENTIN 100 MG/1
CAPSULE ORAL
Qty: 180 CAPSULE | Refills: 0 | Status: SHIPPED | OUTPATIENT
Start: 2022-07-29 | End: 2022-10-06

## 2022-07-31 LAB — BACTERIA UR CULT: NORMAL

## 2022-08-01 ENCOUNTER — PATIENT OUTREACH (OUTPATIENT)
Dept: FAMILY MEDICINE CLINIC | Facility: CLINIC | Age: 87
End: 2022-08-01

## 2022-08-01 ENCOUNTER — EPISODE CHANGES (OUTPATIENT)
Dept: CASE MANAGEMENT | Facility: OTHER | Age: 87
End: 2022-08-01

## 2022-08-01 NOTE — PROGRESS NOTES
Chart review completed for the following sections:   Recent Vital Signs   Allergies/Contradictions   Medication Review    History    [facility]    Problem List   Immunizations   Past hospitalizations and major procedures, including surgery   Significant past illnesses and treatment history including: History and Physical   Relevant past medications related to the patient's condition    Chart review completed  Pt does not have a behavioral health dx, and is not eligible for [address] [place] [address] E   Note routed to [facility]

## 2022-08-04 ENCOUNTER — TRANSCRIBE ORDERS (OUTPATIENT)
Dept: VASCULAR SURGERY | Facility: CLINIC | Age: 87
End: 2022-08-04

## 2022-08-04 DIAGNOSIS — I65.23 CAROTID STENOSIS, ASYMPTOMATIC, BILATERAL: Primary | ICD-10-CM

## 2022-08-09 ENCOUNTER — APPOINTMENT (OUTPATIENT)
Dept: LAB | Facility: HOSPITAL | Age: 87
End: 2022-08-09
Attending: RADIOLOGY
Payer: COMMERCIAL

## 2022-08-09 DIAGNOSIS — R30.0 DYSURIA: ICD-10-CM

## 2022-08-09 DIAGNOSIS — C34.91 ADENOCARCINOMA, LUNG, RIGHT (HCC): ICD-10-CM

## 2022-08-09 LAB
ALBUMIN SERPL BCP-MCNC: 3.3 G/DL (ref 3.5–5)
ALP SERPL-CCNC: 63 U/L (ref 46–116)
ALT SERPL W P-5'-P-CCNC: 16 U/L (ref 12–78)
ANION GAP SERPL CALCULATED.3IONS-SCNC: 6 MMOL/L (ref 4–13)
AST SERPL W P-5'-P-CCNC: 17 U/L (ref 5–45)
BILIRUB SERPL-MCNC: 0.29 MG/DL (ref 0.2–1)
BUN SERPL-MCNC: 37 MG/DL (ref 5–25)
CALCIUM ALBUM COR SERPL-MCNC: 10.5 MG/DL (ref 8.3–10.1)
CALCIUM SERPL-MCNC: 9.9 MG/DL (ref 8.3–10.1)
CHLORIDE SERPL-SCNC: 103 MMOL/L (ref 96–108)
CO2 SERPL-SCNC: 30 MMOL/L (ref 21–32)
CREAT SERPL-MCNC: 1.41 MG/DL (ref 0.6–1.3)
GFR SERPL CREATININE-BSD FRML MDRD: 33 ML/MIN/1.73SQ M
GLUCOSE SERPL-MCNC: 122 MG/DL (ref 65–140)
POTASSIUM SERPL-SCNC: 3.6 MMOL/L (ref 3.5–5.3)
PROT SERPL-MCNC: 7.9 G/DL (ref 6.4–8.4)
SODIUM SERPL-SCNC: 139 MMOL/L (ref 135–147)

## 2022-08-09 PROCEDURE — 36415 COLL VENOUS BLD VENIPUNCTURE: CPT

## 2022-08-09 PROCEDURE — 80053 COMPREHEN METABOLIC PANEL: CPT

## 2022-08-11 ENCOUNTER — OFFICE VISIT (OUTPATIENT)
Dept: FAMILY MEDICINE CLINIC | Facility: CLINIC | Age: 87
End: 2022-08-11
Payer: COMMERCIAL

## 2022-08-11 VITALS
BODY MASS INDEX: 24.11 KG/M2 | RESPIRATION RATE: 16 BRPM | TEMPERATURE: 97.4 F | HEIGHT: 62 IN | OXYGEN SATURATION: 97 % | DIASTOLIC BLOOD PRESSURE: 70 MMHG | HEART RATE: 80 BPM | WEIGHT: 131 LBS | SYSTOLIC BLOOD PRESSURE: 180 MMHG

## 2022-08-11 DIAGNOSIS — R10.13 EPIGASTRIC ABDOMINAL PAIN: ICD-10-CM

## 2022-08-11 DIAGNOSIS — E11.9 TYPE 2 DIABETES MELLITUS WITHOUT COMPLICATION, WITHOUT LONG-TERM CURRENT USE OF INSULIN (HCC): Primary | ICD-10-CM

## 2022-08-11 DIAGNOSIS — I10 PRIMARY HYPERTENSION: ICD-10-CM

## 2022-08-11 PROCEDURE — 99214 OFFICE O/P EST MOD 30 MIN: CPT | Performed by: FAMILY MEDICINE

## 2022-08-11 PROCEDURE — 3725F SCREEN DEPRESSION PERFORMED: CPT | Performed by: FAMILY MEDICINE

## 2022-08-11 PROCEDURE — 1101F PT FALLS ASSESS-DOCD LE1/YR: CPT | Performed by: FAMILY MEDICINE

## 2022-08-11 PROCEDURE — 3288F FALL RISK ASSESSMENT DOCD: CPT | Performed by: FAMILY MEDICINE

## 2022-08-11 NOTE — PROGRESS NOTES
Kalyani Vaughan 1934 female MRN: [id number]    [facility] PRACTICE OFFICE VISIT  [facility]      ASSESSMENT/PLAN  Kalyani Vaughan is a 80 y o  female presents to the office for    Diagnoses and all orders for this visit:    Type 2 diabetes mellitus without complication, without long-term current use of insulin ([address] )  -     Cancel: POCT hemoglobin A1c    Epigastric abdominal pain  -     Ambulatory Referral to Gastroenterology; Future    Primary hypertension   A1c currently stable continue on diet control  Referred patient to GI given epigastric tenderness  Blood pressure elevated in our office highly recommend taking her pills today after being seen at our office  Given the elevation      Depression Screening and Follow-up Plan: Patient was screened for depression during today's encounter  They screened negative with a PHQ-2 score of 0  Future Appointments   Date Time Provider Valdemar Crocker   8/25/2022  3:00 PM [facility] [address]   8/25/2022  3:30 PM Shannan Scott MD [facility] [address]   8/30/2022  2:20 PM Junior Sparks MD [facility]   8/31/2022  3:00 PM BE HV VASCULAR 2 BE HV [address]   10/6/2022  2:45 PM Bryson Richardson  [facility]   10/21/2022  3:00 PM JARRED Villalobos SURG ONC ALL [facility]   11/17/2022  3:00 PM Rasheed Fragoso MD [facility]   2/13/2023  3:15 PM Bryson Richardson  [facility]          SUBJECTIVE  CC: Diabetes      HPI:  Kalyani Vaughan is a 80 y o  female who presents for a follow-up on diabetes  Currently diet controlled  Has been watching what she eats  Patient continues to have right shoulder pain  Was advised that it was not secondary to her lung disease    Patient with epigastric pain would like to see the gastroenterologist   Patient is taking her blood pressure medications as on her list   Just recently had some adjustments done a cardiology  Review of Systems   Constitutional: Positive for fatigue  Negative for activity change, appetite change, chills and fever  HENT: Negative for congestion  Respiratory: Negative for cough, chest tightness and shortness of breath  Cardiovascular: Negative for chest pain and leg swelling  Gastrointestinal: Positive for abdominal pain  Negative for abdominal distention, constipation, diarrhea, nausea and vomiting  Musculoskeletal: Positive for arthralgias (Right back pain)  All other systems reviewed and are negative        Historical Information   The patient history was reviewed as follows:  Past Medical History:   Diagnosis Date    Arthritis     Back pain     Benign essential hypertension     LAST ASSESSED: [id number]    Carotid artery stenosis     CKD (chronic kidney disease) stage 2, GFR 60-89 ml/min     Constipation, chronic     "drinks prune juice"    Diabetes [facility]     DMII (diabetes mellitus, type 2) (HCC)     Epiglottitis     RESOLVED: [id number]    Gastritis     GERD (gastroesophageal reflux disease)     Gout     H/O blood clots     left lower leg,,,11 yrs ago    History of stenosis of renal artery     RESOLVED: [id number]    Hypercholesteremia     Hypercholesterolemia     Hyperlipidemia     [facility]     Hypertension     Iliac artery stenosis, bilateral (HCC)     Kidney stone     [facility] ) 09/06/2017    Left    Lung cancer ([facility] )     Mucinous carcinoma of breast, left ([facility] ) 09/11/2018    Left    Presence of pessary     Renal artery stenosis (HCC)     Renal disorder     Renovascular hypertension     RESOLVED: [id number]    Segmental and somatic dysfunction of cervical region     RESOLVED: [id number]    Segmental and somatic dysfunction of lumbar region     RESOLVED: 50EVU5249    Segmental and somatic dysfunction of pelvic region     RESOLVED: 28VPH6737    Segmental and somatic dysfunction of thoracic region     RESOLVED: 91CKI7110    Somatic dysfunction of abdominal region     RESOLVED: 39PJS7133    Somatic dysfunction of head region     RESOLVED: [id number]    Somatic dysfunction of lower extremities     RESOLVED: 47UCL1071    Somatic dysfunction of rib region     RESOLVED: 69JHB8983    Somatic dysfunction of thoracic region     RESOLVED: 23NSH7336    Somatic dysfunction of upper extremities     RESOLVED: 27XUB4573    Teeth missing          Medications:     Current Outpatient Medications:     Acetaminophen 325 MG CAPS, Take 500 mg by mouth every 8 (eight) hours , Disp: , Rfl:     aspirin (ADULT ASPIRIN EC LOW STRENGTH) 81 mg EC tablet, Take 1 tablet by mouth daily, Disp: , Rfl:     Blood Glucose Monitoring Suppl (FreeStyle Precision Jalen System) w/Device KIT, by Does not apply route 3 (three) times a day, Disp: 1 kit, Rfl: 0    calcitriol (ROCALTROL) 0 25 mcg capsule, Take one capsule every Tuesday and Friday, Disp: 10 capsule, Rfl: 6    calcium carbonate-vitamin D (OSCAL-D) 500 mg-200 units per tablet, Take 1 tablet by mouth daily with breakfast, Disp: , Rfl:     dicyclomine (BENTYL) 10 mg capsule, Take 1 capsule (10 mg total) by mouth 3 (three) times a day as needed (gas pain), Disp: 30 capsule, Rfl: 0    docusate sodium (COLACE) 100 mg capsule, Take 1 capsule (100 mg total) by mouth 2 (two) times a day, Disp: 10 capsule, Rfl: 0    fluticasone (FLONASE) 50 mcg/act nasal spray, 2 sprays into each nostril daily, Disp: 16 g, Rfl: 2    gabapentin (NEURONTIN) 100 mg capsule, take 2 capsules by mouth daily at bedtime, Disp: 180 capsule, Rfl: 0    Lancets (ONETOUCH DELICA PLUS [id number]) MISC, 1 Device by Does not apply route 2 (two) times a day, Disp: 100 each, Rfl: 5    Lancets (ONETOUCH ULTRASOFT) lancets, by Other route 2 (two) times a day Use as instructed, Disp: 100 each, Rfl: 5    NIFEdipine ER (ADALAT CC) 30 MG 24 hr tablet, Take 1 tablet twice a day   (Patient taking differently: 30 mg daily), Disp: 60 tablet, Rfl: 5    olopatadine (PATANOL) 0 1 % ophthalmic solution, Administer 1 drop to both eyes 2 (two) times a day, Disp: 5 mL, Rfl: 0    pantoprazole (PROTONIX) 40 mg tablet, take 1 tablet by mouth once daily, Disp: 90 tablet, Rfl: 0    polyethylene glycol (GLYCOLAX) 17 GM/SCOOP powder, Take 17 g by mouth daily, Disp: 225 g, Rfl: 1    pravastatin (PRAVACHOL) 40 mg tablet, take 1 tablet by mouth once daily (Patient taking differently: Take 40 mg by mouth every other day), Disp: 90 tablet, Rfl: 4    candesartan (ATACAND) 32 MG tablet, Take 1 tablet (32 mg total) by mouth daily (Patient not taking: Reported on 8/11/2022), Disp: 90 tablet, Rfl: 3    diclofenac sodium (VOLTAREN) 1 %, Apply 2 g topically 4 (four) times a day (Patient not taking: No sig reported), Disp: 100 g, Rfl: 1    DULoxetine (CYMBALTA) 30 mg delayed release capsule, Take 30 mg by mouth daily   (Patient not taking: No sig reported), Disp: , Rfl: 0    gabapentin (Neurontin) 100 mg capsule, Take 1 capsule (100 mg total) by mouth 2 (two) times a day, Disp: 180 capsule, Rfl: 1    hydrochlorothiazide (HYDRODIURIL) 25 mg tablet, Take 1 tablet (25 mg total) by mouth daily (Patient not taking: Reported on 8/11/2022), Disp: 90 tablet, Rfl: 3    Lidocaine 4 % PTCH, Apply 1 patch topically daily (Patient not taking: No sig reported), Disp: 20 patch, Rfl: 1    OneTouch Delica Lancets 65O MISC, Inject as directed 2 (two) times a day Use to test (Patient not taking: Reported on 8/11/2022), Disp: 200 each, Rfl: 0    Allergies   Allergen Reactions    Clonidine Derivatives Swelling    Diflucan [Fluconazole] Rash    Flagyl [Metronidazole] Anaphylaxis    Carvedilol Hives     And legs swelled    Latex Rash    Lipitor [Atorvastatin]      Legs swelled    Other Palpitations     IV DYE    Shingrix [Zoster Vac Recomb Adjuvanted] Hives       OBJECTIVE  Vitals:   Vitals:    08/11/22 1411   BP: (!) 180/70   BP Location: Left arm   Patient Position: Sitting   Cuff Size: Standard   Pulse: 80   Resp: 16   Temp: (!) 97 4 °F (36 3 °C)   SpO2: 97%   Weight: 59 4 kg (131 lb)   Height: 5' 2" (1 575 m)         Physical Exam  Vitals reviewed  Constitutional:       Appearance: She is well-developed  HENT:      Head: Normocephalic and atraumatic  Eyes:      Conjunctiva/sclera: Conjunctivae normal       Pupils: Pupils are equal, round, and reactive to light  Cardiovascular:      Rate and Rhythm: Normal rate and regular rhythm  Heart sounds: Normal heart sounds  Pulmonary:      Effort: Pulmonary effort is normal  No respiratory distress  Breath sounds: Normal breath sounds  Musculoskeletal:         General: Normal range of motion  Cervical back: Normal range of motion and neck supple  Skin:     General: Skin is warm  Capillary Refill: Capillary refill takes less than 2 seconds  Neurological:      Mental Status: She is alert and oriented to person, place, and time                      Gopal Kennedy MD,   [facility]  8/21/2022

## 2022-08-15 ENCOUNTER — TELEPHONE (OUTPATIENT)
Dept: PULMONOLOGY | Facility: CLINIC | Age: 87
End: 2022-08-15

## 2022-08-19 ENCOUNTER — HOSPITAL ENCOUNTER (OUTPATIENT)
Dept: RADIOLOGY | Facility: HOSPITAL | Age: 87
Discharge: HOME | End: 2022-08-19
Attending: RADIOLOGY
Payer: COMMERCIAL

## 2022-08-19 DIAGNOSIS — C34.91 ADENOCARCINOMA, LUNG, RIGHT (HCC): ICD-10-CM

## 2022-08-19 PROCEDURE — G1004 CDSM NDSC: HCPCS

## 2022-08-19 PROCEDURE — 71250 CT THORAX DX C-: CPT

## 2022-08-23 ENCOUNTER — TELEPHONE (OUTPATIENT)
Dept: OTHER | Facility: OTHER | Age: 87
End: 2022-08-23

## 2022-08-23 NOTE — TELEPHONE ENCOUNTER
Spoke w/ Veena Barron and scheduled appt for 11/8/22 @ 3:40pm w/ Dr Karen Echavarria in [facility]

## 2022-08-24 ENCOUNTER — APPOINTMENT (OUTPATIENT)
Dept: RADIATION ONCOLOGY | Facility: HOSPITAL | Age: 87
End: 2022-08-24
Payer: COMMERCIAL

## 2022-08-25 ENCOUNTER — RADIATION ONCOLOGY FOLLOW-UP (OUTPATIENT)
Dept: RADIATION ONCOLOGY | Facility: HOSPITAL | Age: 87
End: 2022-08-25
Attending: RADIOLOGY
Payer: COMMERCIAL

## 2022-08-25 ENCOUNTER — APPOINTMENT (OUTPATIENT)
Dept: RADIATION ONCOLOGY | Facility: HOSPITAL | Age: 87
End: 2022-08-25
Payer: COMMERCIAL

## 2022-08-25 VITALS
SYSTOLIC BLOOD PRESSURE: 158 MMHG | TEMPERATURE: 97.9 F | RESPIRATION RATE: 18 BRPM | HEART RATE: 93 BPM | OXYGEN SATURATION: 97 % | DIASTOLIC BLOOD PRESSURE: 62 MMHG

## 2022-08-25 DIAGNOSIS — C34.91 ADENOCARCINOMA, LUNG, RIGHT (HCC): Primary | ICD-10-CM

## 2022-08-25 PROCEDURE — 99213 OFFICE O/P EST LOW 20 MIN: CPT | Performed by: RADIOLOGY

## 2022-08-25 PROCEDURE — 99211 OFF/OP EST MAY X REQ PHY/QHP: CPT | Performed by: RADIOLOGY

## 2022-08-25 NOTE — PROGRESS NOTES
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  She completed radiation to right middle lung lobe in 15 fractions on 22  The pt was last seen for follow-up on 22  She returns today for routine follow-up  22 - CT chest wo contrast  Stable right middle lobe mass since May 2022  Multifocal thickening of the right major fissure, unchanged from May 2022, but slightly increased from 2021  Continued follow-up is needed to exclude pleural metastases  Slight increase in small right pleural effusion  Stable bilateral lung nodules since 2021  [facility]22 - OB/GYN  22 - Vascular Center  10/6/22 - [facility]  10/21/22 - Surgical Oncology  22 - Pulmonary   22 - Gastroenterology  22 - Nephrology      Oncology History   History of left breast cancer   8/10/2018 Biopsy    Left breast biopsy  Invasive mucinous  Grade 1      Her 2 1+     2018 Surgery    Left lumpectomy  Invasive mucinous carcinoma  Grade 1  1 1 cm in size  Margins negative      Her 2 1+  Stage 1A     10/4/2018 -  Hormone Therapy    Dr Pascual Hernandez  No benefit of AI     Adenocarcinoma, lung, right ([facility] [address] )   2021 Initial Diagnosis    Adenocarcinoma, lung, right ([facility] [address] )     11/15/2021 Biopsy    Lung, Right Middle Lobe, mass:  - Adenocarcinoma  Comment: Immunohistochemistry is positive in the tumor cells for TTF-1 and negative for p40 and Gata3  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 a primary pulmonary adenocarcinoma       2022 - 2022 Radiation    Course: C1    Plan ID Energy Fractions Dose per Fraction (cGy) Dose Correction (cGy) Total Dose Delivered (cGy) Elapsed Days   RML 6X 15 / 15 400 0 6,000 20      Treatment dates:  2022 - 2022          Review of Systems:  Review of Systems Constitutional: Negative  HENT: Negative  Eyes: Negative  Respiratory: Negative  Denies any shortness of breath or coughing   Cardiovascular: Negative  Gastrointestinal: Positive for constipation (occasional)  Endocrine: Negative  Genitourinary: Negative  Musculoskeletal: Positive for back pain and neck pain  Right shoulder blade soreness  Lateral right breast   Skin: Negative  Allergic/Immunologic: Negative  Neurological: Positive for light-headedness (occasional)  Hematological: Negative  Psychiatric/Behavioral: Negative          Clinical Trial: no    Covid Vaccine Status: vaccinated    Health Maintenance   Topic Date Due    PT PLAN OF CARE  02/06/2020    DM Eye Exam  02/10/2021    COVID-19 Vaccine (3 - Pfizer risk series) 04/27/2021    Diabetic Foot Exam  08/27/2022    Influenza Vaccine (1) 09/01/2022    Medicare Annual Wellness Visit (AWV)  10/04/2022    HEMOGLOBIN A1C  10/04/2022    Pneumococcal Vaccine: 65+ Years (3 - PCV) 08/11/2023 (Originally 11/20/2015)    Urinary Incontinence Screening  10/04/2022    Fall Risk  08/11/2023    BMI: Adult  08/11/2023    Depression Screening  08/25/2023    HIB Vaccine  Aged Out    Hepatitis B Vaccine  Aged Out    IPV Vaccine  Aged Out    Hepatitis A Vaccine  Aged Out    Meningococcal ACWY Vaccine  Aged Out    HPV Vaccine  Aged Out     Patient Active Problem List   Diagnosis    Iliac artery stenosis, bilateral ([address] )    GERD (gastroesophageal reflux disease)    Arthritis    Hypercholesterolemia    Hypertension    EKG abnormalities    Anemia    Neck pain    Dysuria    Chronic left shoulder pain    Fall from other slipping, tripping, or stumbling    Abnormal EKG    Abnormal mammogram    Accelerated essential hypertension    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)    Back pain    Bilateral carotid artery stenosis    Carotid stenosis, asymptomatic    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    Chronic myofascial pain    Chronic pain of lower extremity    CKD (chronic kidney disease) stage 3, GFR 30-59 ml/min ([facility])    Diabetic retinopathy ([facility] )    DMII (diabetes mellitus, type 2) ([facility])    Gout    Hypomagnesemia    Atherosclerosis of other specified arteries    Lymphadenopathy    Nephrolithiasis    Osteoporosis    PAD (peripheral artery disease) ([facility] [facility] )    Primary generalized (osteo)arthritis    Persistent proteinuria    Renal cyst    Vaginal prolapse    Vitamin D deficiency    Acquired complex cyst of kidney    History of left breast cancer    Stricture of artery ([facility])    Claudication in peripheral vascular disease ([facility])    Hemorrhoids    Tinea unguium    Diabetic neuropathy ([facility])    Bradycardia    Glottic insufficiency    Reflux laryngitis    Paresis of left vocal fold    Muscle tension dysphonia    Laryngocele    Anterior glottic web    Viral upper respiratory tract infection    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[facility])    Renal artery stenosis ([facility])    Left ear pain    Cervical spine disease    Osteopenia of multiple sites    Type 2 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, bilateral ([facility])    Lung mass    CKD (chronic kidney disease)    Adenocarcinoma, lung, right ([facility])     Past Medical History:   Diagnosis Date    Arthritis     Back pain     Benign essential hypertension     LAST ASSESSED: [id number]    Carotid artery stenosis     CKD (chronic kidney disease) stage 2, GFR 60-89 ml/min     Constipation, chronic     "drinks prune juice"    Diabetes mellitus ([facility] [address] )     DMII (diabetes mellitus, type 2) ([facility])     Epiglottitis     RESOLVED: [id number]    Gastritis     GERD (gastroesophageal reflux disease)     Gout     H/O blood clots     left lower leg,,,11 yrs ago    History of stenosis of renal artery     RESOLVED: [id number]    Hypercholesteremia  Hypercholesterolemia     Hyperlipidemia     Hyperparathyroidism ([facility] [address] )     Hypertension     Iliac artery stenosis, bilateral (HCC)     Kidney stone     L[facility] carcinoma [facility] ) 09/06/2017    Left    Lung cancer ([facility] )     Mucinous carcinoma of breast, left ([facility] ) 09/11/2018    Left    Presence of pessary     Renal artery stenosis (HCC)     Renal disorder     Renovascular hypertension     RESOLVED: [id number]    Segmental and somatic dysfunction of cervical region     RESOLVED: [id number]    Segmental and somatic dysfunction of lumbar region     RESOLVED: [id number]    Segmental and somatic dysfunction of pelvic region     RESOLVED: [id number]    Segmental and somatic dysfunction of thoracic region     RESOLVED: [id number]    Somatic dysfunction of abdominal region     RESOLVED: [id number]    Somatic dysfunction of head region     RESOLVED: [id number]    Somatic dysfunction of lower extremities     RESOLVED: [id number]    Somatic dysfunction of rib region     RESOLVED: [id number]    Somatic dysfunction of thoracic region     RESOLVED: [id number]    Somatic dysfunction of upper extremities     RESOLVED: [id number]    Teeth missing      Past Surgical History:   Procedure Laterality Date    ANGIOPLASTY / STENTING ILIAC      BREAST BIOPSY Left 08/10/2018    U/S BX- mucinous ca    BREAST BIOPSY Left 08/07/2017    U/S BX    BREAST LUMPECTOMY Left 09/06/2017    LAST ASSESSED: [id number]    BREAST LUMPECTOMY Left 09/11/2018    BREAST SURGERY Left     biopsy    CATARACT EXTRACTION Bilateral     CHOLECYSTECTOMY      COLONOSCOPY      EYE SURGERY      ILIAC VEIN ANGIOPLASTY / STENTING Right     INITIAL STENOSIS: ONSET: [id number]    IR BIOPSY LUNG  11/15/2021    KNEE ARTHROSCOPY Right     KNEE SURGERY Right     ONSET: [id number]    MAMMO NEEDLE LOCALIZATION LEFT (ALL INC) Left 9/11/2018    MAMMO NEEDLE LOCALIZATION LEFT (ALL INC) Left 9/6/2017    [facility] ESOPHAGOGASTRODUODENOSCOPY TRANSORAL DIAGNOSTIC N/A 7/26/2018    Procedure: ESOPHAGOGASTRODUODENOSCOPY (EGD); Surgeon: Nicci Brush DO;  [facility]: [facility]; Service: Gastroenterology    [place] LARYNGOSCOPY,DIRCT,OP [facility] N/A 6/10/2016    Procedure: MICRO DIRECT LARYNGOSCOPY WITH VOCAL FOLD MASS EXCISION ;  Surgeon: Danny Jimenez MD;  [facility];  Service: ENT    [place] MASTECTOMY, PARTIAL Left 9/6/2017    Procedure: LUMPECTOMY BREAST NEEDLE LOCALIZED WITH FAXITRON NEEDLE @ 0830;  Surgeon: Padmini Wesley MD;  [facility];  Service: General    NY PERQ DEVICE PLACEMT BREAST LOC 1ST LES W Joaquim Kirkwood Left 9/11/2018    Procedure: BREAST LUMPECTOMY; BREAST NEEDLE LOCALIZATION (NEEDLE LOC AT 1330);   Surgeon: Umer Martini MD;  [facility];  Service: Surgical Oncology    THROAT SURGERY      lump removed    TUBAL LIGATION      UPPER GASTROINTESTINAL ENDOSCOPY      US GUIDANCE BREAST BIOPSY LEFT EACH ADDITIONAL Left 8/7/2017    US GUIDED BREAST BIOPSY LEFT COMPLETE Left 8/10/2018    US GUIDED BREAST BIOPSY LEFT COMPLETE Left 8/7/2017    VASCULAR SURGERY       Family History   Problem Relation Age of Onset    Heart disease Brother     Heart disease Maternal Grandmother     Diabetes Other         of other type with arthropathy, without long term current use of insulin    Hypertension Other     No Known Problems Mother     Gout Family     Rheum arthritis Family     Lupus Family         systematic erythematosus    Heart disease Family     Stroke Family         syndrome    Stroke Maternal Uncle         syndrome    Stroke Maternal Aunt         syndrome    No Known Problems Father     No Known Problems Daughter     No Known Problems Maternal Grandfather     No Known Problems Paternal Grandmother     No Known Problems Paternal Grandfather     No Known Problems Daughter     No Known Problems Daughter     No Known Problems Maternal Aunt     No Known Problems Maternal Aunt     No Known Problems Maternal Aunt      Social History     Socioeconomic History    Marital status:       Spouse name: Not on file    Number of children: Not on file    Years of education: Not on file    Highest education level: Not on file   Occupational History    Occupation: retired   Tobacco Use    Smoking status: Former Smoker     Packs/day: 1 00     Years: 55 00     Pack years: 55 00     Start date:      Quit date:      Years since quittin 6    Smokeless tobacco: Never Used   Vaping Use    Vaping Use: Never used   Substance and Sexual Activity    Alcohol use: Not Currently    Drug use: No    Sexual activity: Yes     Partners: Male   Other Topics Concern    Not on file   Social History Narrative    Daily caffeine consumption, 1 serving a day      Social Determinants of Health     Financial Resource Strain: Not on file   Food Insecurity: Not on file   Transportation Needs: Not on file   Physical Activity: Not on file   Stress: Not on file   Social Connections: Not on file   Intimate Partner Violence: Not on file   Housing Stability: Not on file       Current Outpatient Medications:     Acetaminophen 325 MG CAPS, Take 500 mg by mouth every 8 (eight) hours , Disp: , Rfl:     aspirin (ADULT ASPIRIN EC LOW STRENGTH) 81 mg EC tablet, Take 1 tablet by mouth daily, Disp: , Rfl:     Blood Glucose Monitoring Suppl (FreeStyle Precision Jalen System) w/Device KIT, by Does not apply route 3 (three) times a day, Disp: 1 kit, Rfl: 0    calcitriol (ROCALTROL) 0 25 mcg capsule, Take one capsule every Tuesday and Friday, Disp: 10 capsule, Rfl: 6    calcium carbonate-vitamin D (OSCAL-D) 500 mg-200 units per tablet, Take 1 tablet by mouth daily with breakfast, Disp: , Rfl:     dicyclomine (BENTYL) 10 mg capsule, Take 1 capsule (10 mg total) by mouth 3 (three) times a day as needed (gas pain), Disp: 30 capsule, Rfl: 0    fluticasone (FLONASE) 50 mcg/act nasal spray, 2 sprays into each nostril daily, Disp: 16 g, Rfl: 2    gabapentin (Neurontin) 100 mg capsule, Take 1 capsule (100 mg total) by mouth 2 (two) times a day, Disp: 180 capsule, Rfl: 1    Lancets (ONETOUCH DELICA PLUS [id number]) MISC, 1 Device by Does not apply route 2 (two) times a day, Disp: 100 each, Rfl: 5    Lancets (ONETOUCH ULTRASOFT) lancets, by Other route 2 (two) times a day Use as instructed, Disp: 100 each, Rfl: 5    Lidocaine 4 % PTCH, Apply 1 patch topically daily, Disp: 20 patch, Rfl: 1    NIFEdipine ER (ADALAT CC) 30 MG 24 hr tablet, Take 1 tablet twice a day   (Patient taking differently: 30 mg daily), Disp: 60 tablet, Rfl: 5    olopatadine (PATANOL) 0 1 % ophthalmic solution, Administer 1 drop to both eyes 2 (two) times a day, Disp: 5 mL, Rfl: 0    pantoprazole (PROTONIX) 40 mg tablet, take 1 tablet by mouth once daily, Disp: 90 tablet, Rfl: 0    pravastatin (PRAVACHOL) 40 mg tablet, take 1 tablet by mouth once daily (Patient taking differently: Take 40 mg by mouth every other day), Disp: 90 tablet, Rfl: 4    candesartan (ATACAND) 32 MG tablet, Take 1 tablet (32 mg total) by mouth daily (Patient not taking: No sig reported), Disp: 90 tablet, Rfl: 3    diclofenac sodium (VOLTAREN) 1 %, Apply 2 g topically 4 (four) times a day, Disp: 100 g, Rfl: 1    docusate sodium (COLACE) 100 mg capsule, Take 1 capsule (100 mg total) by mouth 2 (two) times a day (Patient not taking: Reported on 8/25/2022), Disp: 10 capsule, Rfl: 0    DULoxetine (CYMBALTA) 30 mg delayed release capsule, Take 30 mg by mouth daily, Disp: , Rfl: 0    gabapentin (NEURONTIN) 100 mg capsule, take 2 capsules by mouth daily at bedtime, Disp: 180 capsule, Rfl: 0    hydrochlorothiazide (HYDRODIURIL) 25 mg tablet, Take 1 tablet (25 mg total) by mouth daily (Patient not taking: No sig reported), Disp: 90 tablet, Rfl: 3    OneTouch Delica Lancets 57Q MISC, Inject as directed 2 (two) times a day Use to test (Patient not taking: No sig reported), Disp: 200 each, Rfl: 0    polyethylene glycol (GLYCOLAX) 17 GM/SCOOP powder, Take 17 g by mouth daily (Patient not taking: Reported on 8/25/2022), Disp: 225 g, Rfl: 1  Allergies   Allergen Reactions    Clonidine Derivatives Swelling    Diflucan [Fluconazole] Rash    Flagyl [Metronidazole] Anaphylaxis    Carvedilol Hives     And legs swelled    Latex Rash    Lipitor [Atorvastatin]      Legs swelled    Other Palpitations     IV DYE    Shingrix [Zoster Vac Recomb Adjuvanted] Hives     Vitals:    08/25/22 1454   BP: 158/62   BP Location: Left arm   Patient Position: Sitting   Cuff Size: Standard   Pulse: 93   Resp: 18   Temp: 97 9 °F (36 6 °C)   TempSrc: Temporal   SpO2: 97%      Pain Score:   8

## 2022-08-25 NOTE — PROGRESS NOTES
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  She completed definitive radiation (15 fractions) on 22  She was last seen for follow-up on 22  She returns today for routine follow-up      22 - CT chest wo contrast  Stable right middle lobe mass since May 2022    Multifocal thickening of the right major fissure, unchanged from May 2022, but slightly increased from 2021   Continued follow-up is needed to exclude pleural metastases    Slight increase in small right pleural effusion    Stable bilateral lung nodules since 2021         Upcomin22 - OB/GYN  22 - Vascular Center  10/6/22 - [facility]  10/21/22 - Surgical Oncology  22 - Pulmonary   22 - Gastroenterology  22 - Nephrology      Interval History:  Upon interview, she endorses the above history  She denies chest pain  She denies worsening cough or shortness of breath  She has good appetite, denies weight loss or painful/difficult swallowing    She is without additional acute concerns          Historical Information   Oncology History   History of left breast cancer   8/10/2018 Biopsy    Left breast biopsy  Invasive mucinous  Grade 1      Her 2 1+     2018 Surgery    Left lumpectomy  Invasive mucinous carcinoma  Grade 1  1 1 cm in size  Margins negative      Her 2 1+  Stage 1A     10/4/2018 -  [facility]  No benefit of AI     Adenocarcinoma, lung, right ([facility] [address] ) 11/2021 Initial Diagnosis    Adenocarcinoma, lung, right ([facility] [address] )     11/15/2021 Biopsy    Lung, Right Middle Lobe, mass:  - Adenocarcinoma  Comment: Immunohistochemistry is positive in the tumor cells for TTF-1 and negative for p40 and Gata3  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 a primary pulmonary adenocarcinoma       1/19/2022 - 2/8/2022 Radiation    Course: C1    Plan ID Energy Fractions Dose per Fraction (cGy) Dose Correction (cGy) Total Dose Delivered (cGy) Elapsed Days   RML 6X 15 / 15 400 0 6,000 20      Treatment dates:  1/19/2022 - 2/8/2022          Past Medical History:   Diagnosis Date    Arthritis     Back pain     Benign essential hypertension     LAST ASSESSED: [id number]    Carotid artery stenosis     CKD (chronic kidney disease) stage 2, GFR 60-89 ml/min     Constipation, chronic     "drinks prune juice"    Diabetes mellitus ([facility] [address] )     DMII (diabetes mellitus, type 2) (HCC)     Epiglottitis     RESOLVED: [id number]    Gastritis     GERD (gastroesophageal reflux disease)     Gout     H/O blood clots     left lower leg,,,11 yrs ago    History of stenosis of renal artery     RESOLVED: [id number]    Hypercholesteremia     Hypercholesterolemia     Hyperlipidemia     Hyperparathyroidism ([facility] [address] )     Hypertension     Iliac artery stenosis, bilateral (HCC)     Kidney stone     Lobular carcinoma ([facility] [address] ) 09/06/2017    Left    Lung cancer ([facility] [address] )     Mucinous carcinoma of breast, left ([facility] [address] ) 09/11/2018    Left    Presence of pessary     Renal artery stenosis (HCC)     Renal disorder     Renovascular hypertension     RESOLVED: [id number]    Segmental and somatic dysfunction of cervical region     RESOLVED: [id number]    Segmental and somatic dysfunction of lumbar region     RESOLVED: [id number]    Segmental and somatic dysfunction of pelvic region     RESOLVED: [id number]    Segmental and somatic dysfunction of thoracic region RESOLVED: [id number]    Somatic dysfunction of abdominal region     RESOLVED: [id number]    Somatic dysfunction of head region     RESOLVED: [id number]    Somatic dysfunction of lower extremities     RESOLVED: [id number]    Somatic dysfunction of rib region     RESOLVED: 59YQZ4120    Somatic dysfunction of thoracic region     RESOLVED: [id number]    Somatic dysfunction of upper extremities     RESOLVED: [id number]    Teeth missing      Past Surgical History:   Procedure Laterality Date    ANGIOPLASTY / STENTING ILIAC      BREAST BIOPSY Left 08/10/2018    U/S BX- mucinous ca    BREAST BIOPSY Left 08/07/2017    U/S BX    BREAST LUMPECTOMY Left 09/06/2017    LAST ASSESSED: [id number]    BREAST LUMPECTOMY Left 09/11/2018    BREAST SURGERY Left     biopsy    CATARACT EXTRACTION Bilateral     CHOLECYSTECTOMY      COLONOSCOPY      EYE SURGERY      ILIAC VEIN ANGIOPLASTY / STENTING Right     INITIAL STENOSIS: ONSET: [id number]    IR BIOPSY LUNG  11/15/2021    KNEE ARTHROSCOPY Right     KNEE SURGERY Right     ONSET: [id number]    MAMMO NEEDLE LOCALIZATION LEFT (ALL INC) Left 9/11/2018    MAMMO NEEDLE LOCALIZATION LEFT (ALL INC) Left 9/6/2017    SD ESOPHAGOGASTRODUODENOSCOPY TRANSORAL DIAGNOSTIC N/A 7/26/2018    Procedure: ESOPHAGOGASTRODUODENOSCOPY (EGD); Surgeon: Tonio Gonzales DO;  [facility]: [facility]; Service: Gastroenterology    SD LARYNGOSCOPY,DIRCT,[facility] N/A 6/10/2016    Procedure: MICRO DIRECT LARYNGOSCOPY WITH VOCAL FOLD MASS EXCISION ;  Surgeon: Alan Momin MD;  [facility];  Service: ENT    SD MASTECTOMY, PARTIAL Left 9/6/2017    Procedure: LUMPECTOMY BREAST NEEDLE LOCALIZED WITH FAXITRON NEEDLE @ 0830;  Surgeon: Rosey Torres MD;  [facility];  Service: General    SD PERQ DEVICE PLACEMT BREAST LOC 1ST LES W Yumiko Hayes Left 9/11/2018    Procedure: BREAST LUMPECTOMY; BREAST NEEDLE LOCALIZATION (NEEDLE LOC AT 1330);   Surgeon: Jaswinder Yañez MD;  [facility];  Service: Surgical Oncology    THROAT SURGERY      lump removed    TUBAL LIGATION      UPPER GASTROINTESTINAL ENDOSCOPY      US GUIDANCE BREAST BIOPSY LEFT EACH ADDITIONAL Left 2017    US GUIDED BREAST BIOPSY LEFT COMPLETE Left 8/10/2018    US GUIDED BREAST BIOPSY LEFT COMPLETE Left 2017    VASCULAR SURGERY         Social History   Social History     Substance and Sexual Activity   Alcohol Use Not Currently     Social History     Substance and Sexual Activity   Drug Use No     Social History     Tobacco Use   Smoking Status Former Smoker    Packs/day: 1 00    Years: 55 00    Pack years: 55 00    Start date:     Quit date:     Years since quittin 6   Smokeless Tobacco Never Used         Meds/Allergies     Current Outpatient Medications:     Acetaminophen 325 MG CAPS, Take 500 mg by mouth every 8 (eight) hours , Disp: , Rfl:     aspirin (ADULT ASPIRIN EC LOW STRENGTH) 81 mg EC tablet, Take 1 tablet by mouth daily, Disp: , Rfl:     Blood Glucose Monitoring Suppl (FreeStyle Precision Jalen System) w/Device KIT, by Does not apply route 3 (three) times a day, Disp: 1 kit, Rfl: 0    calcitriol (ROCALTROL) 0 25 mcg capsule, Take one capsule every Tuesday and Friday, Disp: 10 capsule, Rfl: 6    calcium carbonate-vitamin D (OSCAL-D) 500 mg-200 units per tablet, Take 1 tablet by mouth daily with breakfast, Disp: , Rfl:     dicyclomine (BENTYL) 10 mg capsule, Take 1 capsule (10 mg total) by mouth 3 (three) times a day as needed (gas pain), Disp: 30 capsule, Rfl: 0    fluticasone (FLONASE) 50 mcg/act nasal spray, 2 sprays into each nostril daily, Disp: 16 g, Rfl: 2    gabapentin (Neurontin) 100 mg capsule, Take 1 capsule (100 mg total) by mouth 2 (two) times a day, Disp: 180 capsule, Rfl: 1    Lancets (ONETOUCH DELICA PLUS [id number]) MISC, 1 Device by Does not apply route 2 (two) times a day, Disp: 100 each, Rfl: 5    Lancets (ONETOUCH ULTRASOFT) lancets, by Other route 2 (two) times a day Use as instructed, Disp: 100 each, Rfl: 5    Lidocaine 4 % PTCH, Apply 1 patch topically daily, Disp: 20 patch, Rfl: 1    NIFEdipine ER (ADALAT CC) 30 MG 24 hr tablet, Take 1 tablet twice a day   (Patient taking differently: 30 mg daily), Disp: 60 tablet, Rfl: 5    olopatadine (PATANOL) 0 1 % ophthalmic solution, Administer 1 drop to both eyes 2 (two) times a day, Disp: 5 mL, Rfl: 0    pantoprazole (PROTONIX) 40 mg tablet, take 1 tablet by mouth once daily, Disp: 90 tablet, Rfl: 0    pravastatin (PRAVACHOL) 40 mg tablet, take 1 tablet by mouth once daily (Patient taking differently: Take 40 mg by mouth every other day), Disp: 90 tablet, Rfl: 4    candesartan (ATACAND) 32 MG tablet, Take 1 tablet (32 mg total) by mouth daily (Patient not taking: No sig reported), Disp: 90 tablet, Rfl: 3    diclofenac sodium (VOLTAREN) 1 %, Apply 2 g topically 4 (four) times a day, Disp: 100 g, Rfl: 1    docusate sodium (COLACE) 100 mg capsule, Take 1 capsule (100 mg total) by mouth 2 (two) times a day (Patient not taking: Reported on 8/25/2022), Disp: 10 capsule, Rfl: 0    DULoxetine (CYMBALTA) 30 mg delayed release capsule, Take 30 mg by mouth daily, Disp: , Rfl: 0    gabapentin (NEURONTIN) 100 mg capsule, take 2 capsules by mouth daily at bedtime, Disp: 180 capsule, Rfl: 0    hydrochlorothiazide (HYDRODIURIL) 25 mg tablet, Take 1 tablet (25 mg total) by mouth daily (Patient not taking: No sig reported), Disp: 90 tablet, Rfl: 3    OneTouch Delica Lancets 19Q MISC, Inject as directed 2 (two) times a day Use to test (Patient not taking: No sig reported), Disp: 200 each, Rfl: 0    polyethylene glycol (GLYCOLAX) 17 GM/SCOOP powder, Take 17 g by mouth daily (Patient not taking: Reported on 8/25/2022), Disp: 225 g, Rfl: 1  Allergies   Allergen Reactions    Clonidine Derivatives Swelling    Diflucan [Fluconazole] Rash    Flagyl [Metronidazole] Anaphylaxis    Carvedilol Hives     And legs swelled    Latex Rash    Lipitor [Atorvastatin]      Legs swelled    Other Palpitations     IV DYE    Shingrix [Zoster Vac Recomb Adjuvanted] Hives         Review of Systems   Constitutional: Negative  HENT: Negative  Eyes: Negative  Respiratory: Negative  Denies any shortness of breath or coughing   Cardiovascular: Negative  Gastrointestinal: Positive for constipation (occasional)  Endocrine: Negative  Genitourinary: Negative  Musculoskeletal: Positive for back pain and neck pain  Right shoulder blade soreness  Lateral right breast   Skin: Negative  Allergic/Immunologic: Negative  Neurological: Positive for light-headedness (occasional)  Hematological: Negative  Psychiatric/Behavioral: Negative  OBJECTIVE:   /62 (BP Location: Left arm, Patient Position: Sitting, Cuff Size: Standard)   Pulse 93   Temp 97 9 °F (36 6 °C) (Temporal)   Resp 18   LMP  (LMP Unknown)   SpO2 97%   Pain Assessment:  0  Karnofsky: 90 - Able to carry on normal activity; minor signs or symptoms of disease     Physical Exam   Constitutional:       Appearance: She is not toxic-appearing  HENT:      Head: Normocephalic and atraumatic  Eyes:      General: No scleral icterus      Extraocular Movements: Extraocular movements intact       Pupils: Pupils are equal, round, and reactive to light  Cardiovascular:      Rate and Rhythm: Normal rate  Pulmonary:      Effort: Pulmonary effort is normal    Abdominal:      General: Abdomen is flat  There is no distension  Musculoskeletal:         General: Normal range of motion       Cervical back: Normal range of motion  Skin:     General: Skin is warm and dry  Neurological:      General: No focal deficit present       Mental Status: She is alert     Psychiatric:         Mood and Affect: Mood normal          RESULTS    Lab Results:   Recent Results (from the past 672 hour(s))   UA w Reflex to Microscopic w Reflex to Culture -Lab Collect    Collection Time: 07/29/22  1:36 PM    Specimen: Urine   Result Value Ref Range    Color, UA Light Yellow     Clarity, UA Turbid     Specific Dandridge, UA 1 013 1 003 - 1 030    pH, UA 6 0 4 5, 5 0, 5 5, 6 0, 6 5, 7 0, 7 5, 8 0    Leukocytes, UA Large (A) Negative    Nitrite, UA Negative Negative    Protein, UA 70 (1+) (A) Negative mg/dl    Glucose, UA Negative Negative mg/dl    Ketones, UA Negative Negative mg/dl    Urobilinogen, UA <2 0 <2 0 mg/dl mg/dl    Bilirubin, UA Negative Negative    Occult Blood, UA Negative Negative   Urine Microscopic    Collection Time: 07/29/22  1:36 PM   Result Value Ref Range    RBC, UA 1-2 None Seen, 1-2 /hpf    WBC, UA 10-20 (A) None Seen, 1-2 /hpf    Epithelial Cells Occasional None Seen, Occasional /hpf    Bacteria, UA Occasional None Seen, Occasional /hpf    Hyaline Casts, UA 3-5 (A) None Seen /lpf   Urine culture    Collection Time: 07/29/22  1:36 PM    Specimen: Urine   Result Value Ref Range    Urine Culture 70,000-79,000 cfu/ml     Comprehensive metabolic panel    Collection Time: 08/09/22  1:58 PM   Result Value Ref Range    Sodium 139 135 - 147 mmol/L    Potassium 3 6 3 5 - 5 3 mmol/L    Chloride 103 96 - 108 mmol/L    CO2 30 21 - 32 mmol/L    ANION GAP 6 4 - 13 mmol/L    BUN 37 (H) 5 - 25 mg/dL    Creatinine 1 41 (H) 0 60 - 1 30 mg/dL    Glucose 122 65 - 140 mg/dL    Calcium 9 9 8 3 - 10 1 mg/dL    Corrected Calcium 10 5 (H) 8 3 - 10 1 mg/dL    AST 17 5 - 45 U/L    ALT 16 12 - 78 U/L    Alkaline Phosphatase 63 46 - 116 U/L    Total Protein 7 9 6 4 - 8 4 g/dL    Albumin 3 3 (L) 3 5 - 5 0 g/dL    Total Bilirubin 0 29 0 20 - 1 00 mg/dL    eGFR 33 ml/min/1 73sq m       Imaging Studies:CT chest wo contrast    Result Date: 8/24/2022  Narrative: CT CHEST WITHOUT IV CONTRAST INDICATION:   C34 91: Malignant neoplasm of unspecified part of right bronchus or lung  Completed SBRT to right middle lobe squamous cell carcinoma on 2/8/2022  History of left breast cancer   COMPARISON:  Chest CT from 5/9/2022 and 10/6/2021  TECHNIQUE: Chest CT without intravenous contrast   Axial, sagittal, coronal 2D reformats and coronal MIPS from source data  Radiation dose length product (DLP):  141 23 mGy-cm   Radiation dose exposure minimized using iterative reconstruction and automated exposure control  FINDINGS: LUNGS:  Stable right middle lobe mass at 3 3 x 2 4 cm (3/76)  1 5 cm juxtapleural ground glass opacity in the lateral left upper lobe (3/39), 9 mm groundglass opacity in the posterior right upper lobe, 7 mm cavitary nodule in the right lower lobe (3/58), 5 mm groundglass nodule in the right lower lobe (3/68), and 4  mm solid nodule in the lateral basal left lower lobe (3/77), stable since October 2021  AIRWAYS: No significant filling defects  PLEURA:  Slight increase in small right pleural effusion  Multifocal thickening of the right major fissure, unchanged from May 2022, but slightly increased since October 2021  HEART/GREAT VESSELS:  Normal heart size  Mild coronary artery calcification indicating atherosclerotic heart disease  MEDIASTINUM AND MARY:  Unremarkable  CHEST WALL AND LOWER NECK: Stable thyroid nodules  UPPER ABDOMEN:  Nonobstructing bilateral renal calcification  Hyperdense left renal cyst  OSSEOUS STRUCTURES: Mild degenerative disease in the spine  Impression: Stable right middle lobe mass since May 2022  Multifocal thickening of the right major fissure, unchanged from May 2022, but slightly increased from October 2021  Continued follow-up is needed to exclude pleural metastases  Slight increase in small right pleural effusion  Stable bilateral lung nodules since October 2021  Workstation performed: [id number]           Assessment/Plan:  Orders Placed This Encounter   Procedures    CT chest wo contrast        Beverly Mai is a 80 y o  female with 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   She has completed radiation to the RUL tumor, 60 Gy in 15 fractions on 2/8/22       She remains without clinical or radiographic evidence of disease recurrence  There is thickening of the right major fissure, requiring ongoing surveillance as well  She will return in 4 months with repeat CT chest to assess treatment response and provide surveillance of additional pulmonary findings  All questions were answered to her satisfaction  Isabela Kenny MD  7/02/1809,5:44 PM    Portions of the record may have been created with voice recognition software   Occasional wrong word or "sound a like" substitutions may have occurred due to the inherent limitations of voice recognition software   Read the chart carefully and recognize, using context, where substitutions have occurred

## 2022-08-30 ENCOUNTER — OFFICE VISIT (OUTPATIENT)
Dept: OBGYN CLINIC | Facility: CLINIC | Age: 87
End: 2022-08-30
Payer: COMMERCIAL

## 2022-08-30 VITALS
DIASTOLIC BLOOD PRESSURE: 90 MMHG | HEIGHT: 60 IN | SYSTOLIC BLOOD PRESSURE: 190 MMHG | BODY MASS INDEX: 25.36 KG/M2 | WEIGHT: 129.2 LBS

## 2022-08-30 DIAGNOSIS — N89.8 VAGINAL IRRITATION: Primary | ICD-10-CM

## 2022-08-30 DIAGNOSIS — Z85.3 HISTORY OF CANCER OF LEFT BREAST: ICD-10-CM

## 2022-08-30 DIAGNOSIS — C34.91 ADENOCARCINOMA, LUNG, RIGHT (HCC): ICD-10-CM

## 2022-08-30 DIAGNOSIS — R92.2 DENSE BREAST TISSUE ON MAMMOGRAM: ICD-10-CM

## 2022-08-30 DIAGNOSIS — Z12.31 ENCOUNTER FOR SCREENING MAMMOGRAM FOR BREAST CANCER: ICD-10-CM

## 2022-08-30 DIAGNOSIS — Z96.0 VAGINAL PESSARY PRESENT: ICD-10-CM

## 2022-08-30 PROBLEM — Z01.419 ENCOUNTER FOR ANNUAL ROUTINE GYNECOLOGICAL EXAMINATION: Status: ACTIVE | Noted: 2022-08-30

## 2022-08-30 PROBLEM — R92.30 DENSE BREAST TISSUE ON MAMMOGRAM: Status: ACTIVE | Noted: 2022-08-30

## 2022-08-30 LAB
BV WHIFF TEST VAG QL: NEGATIVE
CLUE CELLS SPEC QL WET PREP: NEGATIVE
PH SMN: NORMAL [PH]
SL AMB POCT WET MOUNT: NORMAL
T VAGINALIS VAG QL WET PREP: NEGATIVE
YEAST VAG QL WET PREP: NEGATIVE

## 2022-08-30 PROCEDURE — 87210 SMEAR WET MOUNT SALINE/INK: CPT | Performed by: OBSTETRICS & GYNECOLOGY

## 2022-08-30 PROCEDURE — 99213 OFFICE O/P EST LOW 20 MIN: CPT | Performed by: OBSTETRICS & GYNECOLOGY

## 2022-08-30 NOTE — PROGRESS NOTES
Assessment/Plan:  Vaginal irritation apparently due to dryness  Normal wet  Recent negative evaluation for UTI          Pessary in place - just evaluated by per provider last month                                                                                                    3 D Mammography - L Breast Ca - Dx'ic Chaim Mammogram per Radiology  Dense Breasts    Recommendations:  Replens or Humera Iron, check for coupons  Call if no better, will consider Diflucan  Couple to verify that skin diagnostic mammogram is no longer needed  She sees the breast surgeon in 3 months       Diagnoses and all orders for this visit:    Vaginal irritation  -     POCT wet mount    Encounter for screening mammogram for breast cancer  -     Mammo diagnostic bilateral w 3d & cad; Future    History of cancer of left breast  -     Mammo diagnostic bilateral w 3d & cad; Future    Dense breast tissue on mammogram  -     Mammo diagnostic bilateral w 3d & cad; Future    Adenocarcinoma, lung, right (HCC)    Vaginal pessary present              Subjective:        Patient ID: Ade Hernandez is a 80 y o  female  Mrs Daniel Kline presents today complaining of vaginal irritation  She had called mid July with vaginal itching and feeling uncomfortable  Monistat 7 was recommended but when she used it made her feel worse  Because of that I recommended a visit  She had a telemedicine visit with her PCP a complaint at that time was dysuria  A urinalysis was performed which was abnormal   Both leukocytes and protein were present  A culture was performed and negative  Her  states that she was prescribed a cream which did give her relief  I went into [facility] and could not find any prescription  He said it was an over-the-counter lubricant  She last used 2 days ago  She has had a pessary for 5-6 years  She sees an out-of-network physician every 3 months for evaluation  There is a slight language barrier    Her  thought was a gynecologist  I then questioned why she did not see him for this complaint  He said they were a different kind of doctor  I suspect this is a urogynecologist   She is overdue for an annual visit but only wishes this to be dealt with today  I had already ordered a diagnostic mammogram after reviewing her previous mammogram   She will be seeing the breast surgeon in December  Her  stated she no longer needs to have mammograms due to her age  The following portions of the patient's history were reviewed and updated as appropriate: She  has a past medical history of Arthritis, Back pain, Benign essential hypertension, Carotid artery stenosis, CKD (chronic kidney disease) stage 2, GFR 60-89 ml/min, Constipation, chronic, Diabetes mellitus ([facility] ), DMII (diabetes mellitus, type 2) ([facility] [facility] ), Epiglottitis, Gastritis, GERD (gastroesophageal reflux disease), Gout, H/O blood clots, History of stenosis of renal artery, Hypercholesteremia, Hypercholesterolemia, Hyperlipidemia, Hyperparathyroidism ([facility] [address] ), Hypertension, Iliac artery stenosis, bilateral ([facility] ), Kidney stone, [facility] (09/06/2017), 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), Presence of pessary, Renal artery stenosis (HCC), Renal disorder, Renovascular hypertension, Segmental and somatic dysfunction of cervical region, Segmental and somatic dysfunction of lumbar region, Segmental and somatic dysfunction of pelvic region, Segmental and somatic dysfunction of thoracic region, Somatic dysfunction of abdominal region, Somatic dysfunction of head region, Somatic dysfunction of lower extremities, Somatic dysfunction of rib region, Somatic dysfunction of thoracic region, Somatic dysfunction of upper extremities, and Teeth missing    Patient Active Problem List    Diagnosis Date Noted    Encounter for annual routine gynecological examination 08/30/2022    Encounter for screening mammogram for breast cancer 08/30/2022    History of cancer of left breast 08/30/2022    Dense breast tissue on mammogram 08/30/2022    CKD (chronic kidney disease) 11/03/2021    Adenocarcinoma, lung, right ([address] ) 11/2021    Lung mass 10/14/2021    Type 2 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, bilateral ([address] ) 09/29/2021    Osteopenia of multiple sites 11/04/2020    Cervical spine disease 01/16/2020    Left ear pain 12/11/2019    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    Renal artery stenosis (HCC) 11/12/2019    Viral upper respiratory tract infection 10/18/2019    Glottic insufficiency 07/20/2019    Reflux laryngitis 07/20/2019    Paresis of left vocal fold 07/20/2019    Muscle tension dysphonia 07/20/2019    Laryngocele 07/20/2019    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    Bradycardia 06/16/2019    Tinea unguium 06/13/2019    Diabetic neuropathy ([facility] [address] ) 06/13/2019    Hemorrhoids 06/04/2019    Claudication in peripheral vascular disease ([facility] [address] ) 05/17/2019    Stricture of artery ([facility] [address] ) 04/22/2019    History of left breast cancer 08/15/2018    Acquired complex cyst of kidney 05/10/2018    Chronic left shoulder pain 04/04/2018    Dysuria 03/16/2018    Bilateral carotid artery stenosis 01/10/2018    Gout 10/24/2017    Neck pain 08/15/2017    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 08/02/2017    Primary generalized (osteo)arthritis 08/02/2017    Abnormal mammogram 07/20/2017    Chronic myofascial pain 04/26/2017    CKD (chronic kidney disease) stage 3, GFR 30-59 ml/min ([facility]) 03/24/2017    PAD (peripheral artery disease) ([facility] [address] ) 01/30/2017    Persistent proteinuria 09/22/2016    Chronic pain of lower extremity 09/16/2016    Anemia 09/03/2016    EKG abnormalities 09/01/2016    Abnormal EKG 08/09/2016    Iliac artery stenosis, bilateral ([facility])     GERD (gastroesophageal reflux disease)     Arthritis     Hypercholesterolemia     Hypertension     Hypomagnesemia 03/10/2016    Accelerated essential hypertension 12/08/2015    Renal cyst 10/15/2015    Carotid stenosis, asymptomatic 05/19/2015    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) 05/07/2015    Vaginal prolapse 08/04/2014    Back pain 06/30/2014    Nephrolithiasis 09/04/2013    [facility] 07/30/2013    Vitamin D deficiency 07/30/2013    Atherosclerosis of other specified arteries 06/11/2013    Lymphadenopathy 06/10/2013    Fall from other slipping, tripping, or stumbling 01/24/2013    DMII (diabetes mellitus, type 2) [facility] ) 09/24/2012    Osteoporosis 09/21/2012   PMH:  Menarche 15  G3, P3  TL  DM  HTN  Hyperlipidemia  DVT '11  Left breast cancer - lumpectomy 9/17  Monilia 4/18, 12/19  Lung Ca 12/21  She  has a past surgical history that includes Angioplasty / stenting iliac; Cholecystectomy; Tubal ligation; Eye surgery; Vascular surgery; Throat surgery; pr laryngoscopy,dirct,op scop,exc tumr (N/A, 6/10/2016); Cataract extraction (Bilateral); Colonoscopy; Knee arthroscopy (Right); Knee surgery (Right); Iliac vein angioplasty / stenting (Right); pr esophagogastroduodenoscopy transoral diagnostic (N/A, 7/26/2018); US guided breast biopsy left complete (Left, 8/10/2018); Mammo needle localization left (all inc) (Left, 9/11/2018); pr perq device placemt breast loc [facility] [place] (Left, 9/11/2018); US guidance breast biopsy left each additional (Left, 8/7/2017); US guided breast biopsy left complete (Left, 8/7/2017); Mammo needle localization left (all inc) (Left, 9/6/2017); Upper gastrointestinal endoscopy; pr mastectomy, partial (Left, 9/6/2017); Breast surgery (Left); Breast biopsy (Left, 08/10/2018); Breast biopsy (Left, 08/07/2017); Breast lumpectomy (Left, 09/06/2017); Breast lumpectomy (Left, 09/11/2018); and IR biopsy lung (11/15/2021)  Her family history includes Diabetes in her other; Gout in her family;  Heart disease in her brother, family, and maternal grandmother; Hypertension in her other; Lupus in her family; No Known Problems in her daughter, daughter, daughter, father, maternal aunt, maternal aunt, maternal aunt, maternal grandfather, mother, paternal grandfather, and paternal grandmother; Rheum arthritis in her family; Stroke in her family, maternal aunt, and maternal uncle  She  reports that she quit smoking about 18 years ago  She started smoking about 73 years ago  She has a 55 00 pack-year smoking history  She has never used smokeless tobacco  She reports previous alcohol use  She reports that she does not use drugs    Current Outpatient Medications   Medication Sig Dispense Refill    Acetaminophen 325 MG CAPS Take 500 mg by mouth every 8 (eight) hours       aspirin (ADULT ASPIRIN EC LOW STRENGTH) 81 mg EC tablet Take 1 tablet by mouth daily      Blood Glucose Monitoring Suppl (FreeStyle Precision Jalen System) w/Device KIT by Does not apply route 3 (three) times a day 1 kit 0    calcitriol (ROCALTROL) 0 25 mcg capsule Take one capsule every Tuesday and Friday 10 capsule 6    calcium carbonate-vitamin D (OSCAL-D) 500 mg-200 units per tablet Take 1 tablet by mouth daily with breakfast      candesartan (ATACAND) 32 MG tablet Take 1 tablet (32 mg total) by mouth daily 90 tablet 3    diclofenac sodium (VOLTAREN) 1 % Apply 2 g topically 4 (four) times a day 100 g 1    dicyclomine (BENTYL) 10 mg capsule Take 1 capsule (10 mg total) by mouth 3 (three) times a day as needed (gas pain) 30 capsule 0    docusate sodium (COLACE) 100 mg capsule Take 1 capsule (100 mg total) by mouth 2 (two) times a day 10 capsule 0    DULoxetine (CYMBALTA) 30 mg delayed release capsule Take 30 mg by mouth daily  0    fluticasone (FLONASE) 50 mcg/act nasal spray 2 sprays into each nostril daily 16 g 2    gabapentin (NEURONTIN) 100 mg capsule take 2 capsules by mouth daily at bedtime 180 capsule 0    hydrochlorothiazide (HYDRODIURIL) 25 mg tablet Take 1 tablet (25 mg total) by mouth daily 90 tablet 3    Lancets (ONETOUCH DELICA PLUS [id number]) MISC 1 Device by Does not apply route 2 (two) times a day 100 each 5    Lancets (ONETOUCH ULTRASOFT) lancets by Other route 2 (two) times a day Use as instructed 100 each 5    Lidocaine 4 % PTCH Apply 1 patch topically daily 20 patch 1    NIFEdipine ER (ADALAT CC) 30 MG 24 hr tablet Take 1 tablet twice a day  (Patient taking differently: 30 mg daily) 60 tablet 5    olopatadine (PATANOL) 0 1 % ophthalmic solution Administer 1 drop to both eyes 2 (two) times a day 5 mL 0    OneTouch Delica Lancets 66J MISC Inject as directed 2 (two) times a day Use to test 200 each 0    pantoprazole (PROTONIX) 40 mg tablet take 1 tablet by mouth once daily 90 tablet 0    polyethylene glycol (GLYCOLAX) 17 GM/SCOOP powder Take 17 g by mouth daily 225 g 1    pravastatin (PRAVACHOL) 40 mg tablet take 1 tablet by mouth once daily (Patient taking differently: Take 40 mg by mouth every other day) 90 tablet 4    gabapentin (Neurontin) 100 mg capsule Take 1 capsule (100 mg total) by mouth 2 (two) times a day 180 capsule 1     No current facility-administered medications for this visit       Current Outpatient Medications on File Prior to Visit   Medication Sig    Acetaminophen 325 MG CAPS Take 500 mg by mouth every 8 (eight) hours     aspirin (ADULT ASPIRIN EC LOW STRENGTH) 81 mg EC tablet Take 1 tablet by mouth daily    Blood Glucose Monitoring Suppl (FreeStyle Precision Jalen System) w/Device KIT by Does not apply route 3 (three) times a day    calcitriol (ROCALTROL) 0 25 mcg capsule Take one capsule every Tuesday and Friday    calcium carbonate-vitamin D (OSCAL-D) 500 mg-200 units per tablet Take 1 tablet by mouth daily with breakfast    candesartan (ATACAND) 32 MG tablet Take 1 tablet (32 mg total) by mouth daily    diclofenac sodium (VOLTAREN) 1 % Apply 2 g topically 4 (four) times a day    dicyclomine (BENTYL) 10 mg capsule Take 1 capsule (10 mg total) by mouth 3 (three) times a day as needed (gas pain)    docusate sodium (COLACE) 100 mg capsule Take 1 capsule (100 mg total) by mouth 2 (two) times a day    DULoxetine (CYMBALTA) 30 mg delayed release capsule Take 30 mg by mouth daily    fluticasone (FLONASE) 50 mcg/act nasal spray 2 sprays into each nostril daily    gabapentin (NEURONTIN) 100 mg capsule take 2 capsules by mouth daily at bedtime    hydrochlorothiazide (HYDRODIURIL) 25 mg tablet Take 1 tablet (25 mg total) by mouth daily    Lancets (ONETOUCH DELICA PLUS [id number]) MISC 1 Device by Does not apply route 2 (two) times a day    Lancets (ONETOUCH ULTRASOFT) lancets by Other route 2 (two) times a day Use as instructed    Lidocaine 4 % PTCH Apply 1 patch topically daily    NIFEdipine ER (ADALAT CC) 30 MG 24 hr tablet Take 1 tablet twice a day  (Patient taking differently: 30 mg daily)    olopatadine (PATANOL) 0 1 % ophthalmic solution Administer 1 drop to both eyes 2 (two) times a day    OneTouch Delica Lancets 89K MISC Inject as directed 2 (two) times a day Use to test    pantoprazole (PROTONIX) 40 mg tablet take 1 tablet by mouth once daily    polyethylene glycol (GLYCOLAX) 17 GM/SCOOP powder Take 17 g by mouth daily    pravastatin (PRAVACHOL) 40 mg tablet take 1 tablet by mouth once daily (Patient taking differently: Take 40 mg by mouth every other day)    gabapentin (Neurontin) 100 mg capsule Take 1 capsule (100 mg total) by mouth 2 (two) times a day     No current facility-administered medications on file prior to visit  She is allergic to clonidine derivatives, diflucan [fluconazole], flagyl [metronidazole], carvedilol, latex, lipitor [atorvastatin], other, and shingrix [zoster vac recomb adjuvanted]       Review of Systems   Constitutional: Negative  Genitourinary: Positive for vaginal pain  Negative for difficulty urinating, dysuria, flank pain, frequency, hematuria, urgency, vaginal bleeding and vaginal discharge  Objective:    Vitals:    08/30/22 1433   BP: (!) 190/90   BP Location: Left arm   Patient Position: Sitting   Cuff Size: Standard   Weight: 58 6 kg (129 lb 3 2 oz)   Height: 5' (1 524 m)            Physical Exam  Vitals and nursing note reviewed  HENT:      Head: Normocephalic and atraumatic  Eyes:      General: No scleral icterus  Right eye: No discharge  Left eye: No discharge  Extraocular Movements: Extraocular movements intact  Pulmonary:      Effort: Pulmonary effort is normal  No respiratory distress  Abdominal:      General: There is no distension  Palpations: Abdomen is soft  There is no mass  Tenderness: There is no abdominal tenderness  There is no right CVA tenderness, left CVA tenderness, guarding or rebound  Genitourinary:     General: Normal vulva  Comments: Physiologic vulvar and vaginal changes  There is no erythema  The area of concern is the introitus  A pessary is in place  There is no evidence of bleeding  There is a classic green white discharge from the pessary  Skin:     General: Skin is warm and dry  Neurological:      Mental Status: She is alert  Psychiatric:         Mood and Affect: Mood normal          Behavior: Behavior normal          Thought Content:  Thought content normal          Judgment: Judgment normal

## 2022-08-31 ENCOUNTER — HOSPITAL ENCOUNTER (OUTPATIENT)
Dept: NON INVASIVE DIAGNOSTICS | Facility: CLINIC | Age: 87
Discharge: HOME | End: 2022-08-31
Payer: COMMERCIAL

## 2022-08-31 DIAGNOSIS — I65.23 CAROTID STENOSIS, ASYMPTOMATIC, BILATERAL: ICD-10-CM

## 2022-08-31 PROCEDURE — 93880 EXTRACRANIAL BILAT STUDY: CPT

## 2022-09-01 PROCEDURE — 93880 EXTRACRANIAL BILAT STUDY: CPT | Performed by: SURGERY

## 2022-09-02 ENCOUNTER — TRANSCRIBE ORDERS (OUTPATIENT)
Dept: VASCULAR SURGERY | Facility: CLINIC | Age: 87
End: 2022-09-02

## 2022-09-02 DIAGNOSIS — I65.23 CAROTID STENOSIS, ASYMPTOMATIC, BILATERAL: Primary | ICD-10-CM

## 2022-09-15 ENCOUNTER — TELEPHONE (OUTPATIENT)
Dept: SURGICAL ONCOLOGY | Facility: CLINIC | Age: 87
End: 2022-09-15

## 2022-09-19 ENCOUNTER — TELEPHONE (OUTPATIENT)
Dept: HEMATOLOGY ONCOLOGY | Facility: CLINIC | Age: 87
End: 2022-09-19

## 2022-09-19 ENCOUNTER — TRANSCRIBE ORDERS (OUTPATIENT)
Dept: ADMINISTRATIVE | Facility: HOSPITAL | Age: 87
End: 2022-09-19

## 2022-09-19 DIAGNOSIS — I70.213 ATHEROSCLEROSIS OF NATIVE ARTERY OF BOTH LOWER EXTREMITIES WITH INTERMITTENT CLAUDICATION (HCC): Primary | ICD-10-CM

## 2022-09-19 NOTE — TELEPHONE ENCOUNTER
Appointment Cancellation Or Reschedule     Person calling In    Provider [address]  [facility]   Office Visit Date and Time 10/21 3PM   [place]   Did patient want to reschedule their office appointment? If so, when was it scheduled to? Yes, 10/19 3PM   Did you have STAR scheduled for this appointment? no   Do you need STAR set up for your new appointment? If yes, please send to "PATIENT RIDESHARE" pool for STAR rescheduling no   If you are cancelling appointment, can we notify STAR to cancel ride? If yes, please send to "PATIENT RIDESHARE" pool for STAR to cancel service no   Is this patient calling to reschedule an infusion appointment? no   When is their next infusion appointment? no   Is this patient a Chemo patient? no   Reason for Cancellation or Reschedule Provider availability     If the patient is a treatment patient, please route this to the office nurse  If the patient is not on treatment, please route to the office MA  If the patient is a surgical oncology patient, please route to surg/onc clinical pool

## 2022-09-21 DIAGNOSIS — K21.9 GASTROESOPHAGEAL REFLUX DISEASE WITHOUT ESOPHAGITIS: ICD-10-CM

## 2022-09-21 RX ORDER — PANTOPRAZOLE SODIUM 40 MG/1
40 TABLET, DELAYED RELEASE ORAL DAILY
Qty: 90 TABLET | Refills: 0 | Status: SHIPPED | OUTPATIENT
Start: 2022-09-21

## 2022-10-04 ENCOUNTER — RA CDI HCC (OUTPATIENT)
Dept: OTHER | Facility: HOSPITAL | Age: 87
End: 2022-10-04

## 2022-10-04 NOTE — PROGRESS NOTES
[facility]  coding opportunities     [id number], E11 [id number], E11 40     Chart Reviewed number of suggestions sent to Provider: 3     Patients Insurance     Medicare Insurance: [address] [place] [address]

## 2022-10-06 ENCOUNTER — OFFICE VISIT (OUTPATIENT)
Dept: FAMILY MEDICINE CLINIC | Facility: CLINIC | Age: 87
End: 2022-10-06
Payer: COMMERCIAL

## 2022-10-06 VITALS
RESPIRATION RATE: 16 BRPM | DIASTOLIC BLOOD PRESSURE: 60 MMHG | BODY MASS INDEX: 24.17 KG/M2 | HEIGHT: 61 IN | WEIGHT: 128 LBS | OXYGEN SATURATION: 97 % | HEART RATE: 78 BPM | TEMPERATURE: 97.6 F | SYSTOLIC BLOOD PRESSURE: 154 MMHG

## 2022-10-06 DIAGNOSIS — Z78.0 MENOPAUSE: ICD-10-CM

## 2022-10-06 DIAGNOSIS — J30.9 ALLERGIC RHINITIS, UNSPECIFIED SEASONALITY, UNSPECIFIED TRIGGER: ICD-10-CM

## 2022-10-06 DIAGNOSIS — G89.29 CHRONIC THORACIC BACK PAIN, UNSPECIFIED BACK PAIN LATERALITY: ICD-10-CM

## 2022-10-06 DIAGNOSIS — M54.6 CHRONIC THORACIC BACK PAIN, UNSPECIFIED BACK PAIN LATERALITY: ICD-10-CM

## 2022-10-06 DIAGNOSIS — I16.0 HYPERTENSIVE URGENCY: ICD-10-CM

## 2022-10-06 DIAGNOSIS — K92.89 GAS BLOAT SYNDROME: ICD-10-CM

## 2022-10-06 DIAGNOSIS — E11.3293 TYPE 2 DIABETES MELLITUS WITH BOTH EYES AFFECTED BY MILD NONPROLIFERATIVE RETINOPATHY WITHOUT MACULAR EDEMA, WITH LONG-TERM CURRENT USE OF INSULIN (HCC): Primary | ICD-10-CM

## 2022-10-06 DIAGNOSIS — Z79.4 TYPE 2 DIABETES MELLITUS WITH BOTH EYES AFFECTED BY MILD NONPROLIFERATIVE RETINOPATHY WITHOUT MACULAR EDEMA, WITH LONG-TERM CURRENT USE OF INSULIN (HCC): Primary | ICD-10-CM

## 2022-10-06 DIAGNOSIS — N25.81 SECONDARY HYPERPARATHYROIDISM (HCC): ICD-10-CM

## 2022-10-06 LAB — SL AMB POCT HEMOGLOBIN AIC: 6.3 (ref ?–6.5)

## 2022-10-06 PROCEDURE — 83036 HEMOGLOBIN GLYCOSYLATED A1C: CPT | Performed by: FAMILY MEDICINE

## 2022-10-06 PROCEDURE — 99214 OFFICE O/P EST MOD 30 MIN: CPT | Performed by: FAMILY MEDICINE

## 2022-10-06 PROCEDURE — G0439 PPPS, SUBSEQ VISIT: HCPCS | Performed by: FAMILY MEDICINE

## 2022-10-06 RX ORDER — LIDOCAINE 4 G/G
1 PATCH TOPICAL DAILY
Qty: 20 PATCH | Refills: 1 | Status: SHIPPED | OUTPATIENT
Start: 2022-10-06

## 2022-10-06 RX ORDER — CALCITRIOL 0.25 UG/1
CAPSULE, LIQUID FILLED ORAL
Qty: 10 CAPSULE | Refills: 6 | Status: SHIPPED | OUTPATIENT
Start: 2022-10-06

## 2022-10-06 RX ORDER — DICYCLOMINE HYDROCHLORIDE 10 MG/1
10 CAPSULE ORAL 3 TIMES DAILY PRN
Qty: 30 CAPSULE | Refills: 0 | Status: SHIPPED | OUTPATIENT
Start: 2022-10-06 | End: 2022-10-13

## 2022-10-06 RX ORDER — FLUTICASONE PROPIONATE 50 MCG
2 SPRAY, SUSPENSION (ML) NASAL DAILY
Qty: 16 G | Refills: 2 | Status: SHIPPED | OUTPATIENT
Start: 2022-10-06

## 2022-10-06 RX ORDER — NIFEDIPINE 30 MG/1
TABLET, FILM COATED, EXTENDED RELEASE ORAL
Qty: 180 TABLET | Refills: 5 | Status: SHIPPED | OUTPATIENT
Start: 2022-10-06

## 2022-10-06 NOTE — PROGRESS NOTES
Assessment and Plan:     Problem List Items Addressed This Visit        Endocrine    DMII (diabetes mellitus, type 2) ([facility] [address] ) - Primary    Relevant Orders    POCT hemoglobin A1c (Completed)    Lipid panel    Microalbumin / creatinine urine ratio    Comprehensive metabolic panel    Compression Stocking       Other    Back pain    Relevant Medications    Lidocaine 4 % PTCH      Other Visit Diagnoses     Hypertensive urgency        Relevant Medications    NIFEdipine ER (ADALAT CC) 30 MG 24 hr tablet    Other Relevant Orders    Lipid panel    Secondary hyperparathyroidism (HCC)        Relevant Medications    calcitriol (ROCALTROL) 0 25 mcg capsule    Allergic rhinitis, unspecified seasonality, unspecified trigger        Relevant Medications    fluticasone (FLONASE) 50 mcg/act nasal spray    Gas bloat syndrome        Menopause        Relevant Orders    DXA bone density spine hip and pelvis           Preventive health issues were discussed with patient, and age appropriate screening tests were ordered as noted in patient's After Visit Summary  Personalized health advice and appropriate referrals for health education or preventive services given if needed, as noted in patient's After Visit Summary  History of Present Illness:     Patient presents for a Medicare Wellness Visit patient continues to be diabetic controlled off of no medications  Currently diet controlled  Seeing all the specialist for her cancers regularly  Does have some gas bloating at times  Has a runny nose and would like a refill of her Flonase  Would like a refill of her calcium supplements    Continues to have right shoulder pain that comes and goes HPI   Patient Care Team:  Abelino Kumar MD as PCP - General (Family Medicine)  Saji Carrasco MD as PCP - [address] [place] [address] (RTE)  Saji Carrasco MD as PCP - PCP-[facility] (RTE)  KAVEH Osorio CRNP Gaines Sieve, MD Stewart Husbands, MD Verlyn Clutter Doctor, MD Mati Sanchez DO Manette Charity, MD (Vascular Surgery)  Ulices Coleman MD (Surgical Oncology)  Marley Dorsey MD (Radiation Oncology)  PATSY Chen as  Care Manager (Oncology)     Review of Systems:     Review of Systems     Problem List:     Patient Active Problem List   Diagnosis   • Iliac artery stenosis, bilateral ([facility] )   • GERD (gastroesophageal reflux disease)   • Arthritis   • Hypercholesterolemia   • Hypertension   • EKG abnormalities   • Anemia   • Neck pain   • Dysuria   • Chronic left shoulder pain   • Fall from other slipping, tripping, or stumbling   • Abnormal EKG   • Abnormal mammogram   • Accelerated essential hypertension   • Atherosclerosis of native artery of extremity with intermittent claudication ([facility])   • Back pain   • Bilateral carotid artery stenosis   • Carotid stenosis, asymptomatic   • 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   • Chronic myofascial pain   • Chronic pain of lower extremity   • CKD (chronic kidney disease) stage 3, GFR 30-59 ml/min ([facility])   • Diabetic retinopathy ([place] [address] )   • DMII (diabetes mellitus, type 2) ([facility])   • Gout   • Hypomagnesemia   • Atherosclerosis of other specified arteries   • Lymphadenopathy   • Nephrolithiasis   • Osteoporosis   • PAD (peripheral artery disease) ([facility] )   • Primary generalized (osteo)arthritis   • Persistent proteinuria   • Renal cyst   • Vaginal prolapse   • Vitamin D deficiency   • Acquired complex cyst of kidney   • History of left breast cancer   • Stricture of artery ([facility] )   • Claudication in peripheral vascular disease ([place] [address] )   • Hemorrhoids   • Tinea unguium   • Diabetic neuropathy ([facility])   • Bradycardia   • Glottic insufficiency   • Reflux laryngitis   • Paresis of left vocal fold   • Muscle tension dysphonia   • Laryngocele   • Anterior glottic web   • Viral upper respiratory tract infection   • 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[facility] ) • Renal artery stenosis (HCC)   • Left ear pain   • Cervical spine disease   • Osteopenia of multiple sites   • Type 2 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, bilateral (HCC)   • Lung mass   • CKD (chronic kidney disease)   • Adenocarcinoma, lung, right (HCC)   • Encounter for annual routine gynecological examination   • Encounter for screening mammogram for breast cancer   • History of cancer of left breast   • Dense breast tissue on mammogram      Past Medical and Surgical History:     Past Medical History:   Diagnosis Date   • Arthritis    • Back pain    • Benign essential hypertension     LAST ASSESSED: [id number]   • Carotid artery stenosis    • CKD (chronic kidney disease) stage 2, GFR 60-89 ml/min    • Constipation, chronic     "drinks prune juice"   • Diabetes mellitus (HCC)    • DMII (diabetes mellitus, type 2) (HCC)    • Epiglottitis     RESOLVED: [id number]   • Gastritis    • GERD (gastroesophageal reflux disease)    • Gout    • H/O blood clots     left lower leg,,,11 yrs ago   • History of stenosis of renal artery     RESOLVED: [id number]   • Hypercholesteremia    • Hypercholesterolemia    • Hyperlipidemia    • Hyperparathyroidism ([facility] [address] )    • Hypertension    • Iliac artery stenosis, bilateral (HCC)    • Kidney stone    • Lobular carcinoma ([facility] ) 09/06/2017    Left   • Lung cancer (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    Left   • Presence of pessary    • Renal artery stenosis (HCC)    • Renal disorder    • Renovascular hypertension     RESOLVED: [id number]   • Segmental and somatic dysfunction of cervical region     RESOLVED: [id number]   • Segmental and somatic dysfunction of lumbar region     RESOLVED: [id number]   • Segmental and somatic dysfunction of pelvic region     RESOLVED: [id number]   • Segmental and somatic dysfunction of thoracic region     RESOLVED: 12MLS2466   • Somatic dysfunction of abdominal region     RESOLVED: [id number] • Somatic dysfunction of head region     RESOLVED: 02AUG2017   • Somatic dysfunction of lower extremities     RESOLVED: 02AUG2017   • Somatic dysfunction of rib region     RESOLVED: 02AUG2017   • Somatic dysfunction of thoracic region     RESOLVED: 02AUG2017   • Somatic dysfunction of upper extremities     RESOLVED: 02AUG2017   • Teeth missing      Past Surgical History:   Procedure Laterality Date   • ANGIOPLASTY / STENTING ILIAC     • BREAST BIOPSY Left 08/10/2018    U/S BX- mucinous ca   • BREAST BIOPSY Left 08/07/2017    U/S BX   • BREAST LUMPECTOMY Left 09/06/2017    LAST ASSESSED: [id number]   • BREAST LUMPECTOMY Left 09/11/2018   • BREAST SURGERY Left     biopsy   • CATARACT EXTRACTION Bilateral    • CHOLECYSTECTOMY     • COLONOSCOPY     • EYE SURGERY     • ILIAC VEIN ANGIOPLASTY / STENTING Right     INITIAL STENOSIS: ONSET: [id number]   • IR BIOPSY LUNG  11/15/2021   • KNEE ARTHROSCOPY Right    • KNEE SURGERY Right     ONSET: [id number]   • MAMMO NEEDLE LOCALIZATION LEFT (ALL INC) Left 9/11/2018   • MAMMO NEEDLE LOCALIZATION LEFT (ALL INC) Left 9/6/2017   • ND ESOPHAGOGASTRODUODENOSCOPY TRANSORAL DIAGNOSTIC N/A 7/26/2018    Procedure: ESOPHAGOGASTRODUODENOSCOPY (EGD); Surgeon: Selam Barber DO;  Location: [facility]; Service: Gastroenterology   • ND LARYNGOSCOPY,DIRCT,OP [facility] N/A 6/10/2016    Procedure: MICRO DIRECT LARYNGOSCOPY WITH VOCAL FOLD MASS EXCISION ;  Surgeon: Ciara Keane MD;  Location: [facility];  Service: ENT   • ND MASTECTOMY, PARTIAL Left 9/6/2017    Procedure: LUMPECTOMY BREAST NEEDLE LOCALIZED WITH FAXITRON NEEDLE @ 0830;  Surgeon: Tereso Fam MD;  Location: [facility];  Service: General   • ND PERQ DEVICE PLACEMT BREAST LOC 1ST LES W Jazz Gone Left 9/11/2018    Procedure: BREAST LUMPECTOMY; BREAST NEEDLE LOCALIZATION (NEEDLE LOC AT 1330);   Surgeon: Maria G Zuluaga MD;  Location[facility];  Service: Surgical Oncology   • THROAT SURGERY      lump removed   • TUBAL LIGATION • UPPER GASTROINTESTINAL ENDOSCOPY     • US GUIDANCE BREAST BIOPSY LEFT EACH ADDITIONAL Left 2017   • US GUIDED BREAST BIOPSY LEFT COMPLETE Left 8/10/2018   • US GUIDED BREAST BIOPSY LEFT COMPLETE Left 2017   • VASCULAR SURGERY        Family History:     Family History   Problem Relation Age of Onset   • Heart disease Brother    • Heart disease Maternal Grandmother    • Diabetes Other         of other type with arthropathy, without long term current use of insulin   • Hypertension Other    • No Known Problems Mother    • Gout Family    • Rheum arthritis Family    • Lupus Family         systematic erythematosus   • Heart disease Family    • Stroke Family         syndrome   • Stroke Maternal Uncle         syndrome   • Stroke Maternal Aunt         syndrome   • No Known Problems Father    • No Known Problems Daughter    • No Known Problems Maternal Grandfather    • No Known Problems Paternal Grandmother    • No Known Problems Paternal Grandfather    • No Known Problems Daughter    • No Known Problems Daughter    • No Known Problems Maternal Aunt    • No Known Problems Maternal Aunt    • No Known Problems Maternal Aunt       Social History:     Social History     Socioeconomic History   • Marital status:       Spouse name: None   • Number of children: None   • Years of education: None   • Highest education level: None   Occupational History   • Occupation: retired   Tobacco Use   • Smoking status: Former Smoker     Packs/day: 1 00     Years: 55 00     Pack years: 55 00     Start date:      Quit date:      Years since quittin 8   • Smokeless tobacco: Never Used   Vaping Use   • Vaping Use: Never used   Substance and Sexual Activity   • Alcohol use: Not Currently   • Drug use: No   • Sexual activity: Yes     Partners: Male   Other Topics Concern   • None   Social History Narrative    Daily caffeine consumption, 1 serving a day      Social Determinants of Health     Financial Resource Strain: Low Risk    • Difficulty of Paying Living Expenses: Not hard at all   Food Insecurity: Not on file   Transportation Needs: No Transportation Needs   • Lack of Transportation (Medical): No   • Lack of Transportation (Non-Medical): No   Physical Activity: Not on file   Stress: Not on file   Social Connections: Not on file   Intimate Partner Violence: Not on file   Housing Stability: Not on file      Medications and Allergies:     Current Outpatient Medications   Medication Sig Dispense Refill   • Acetaminophen 325 MG CAPS Take 500 mg by mouth every 8 (eight) hours     • aspirin (ADULT ASPIRIN EC LOW STRENGTH) 81 mg EC tablet Take 1 tablet by mouth daily     • Blood Glucose Monitoring Suppl (FreeStyle Precision Jalen System) w/Device KIT by Does not apply route 3 (three) times a day 1 kit 0   • calcitriol (ROCALTROL) 0 25 mcg capsule Take one capsule every Tuesday and Friday 10 capsule 6   • calcium carbonate-vitamin D (OSCAL-D) 500 mg-200 units per tablet Take 1 tablet by mouth daily with breakfast     • fluticasone (FLONASE) 50 mcg/act nasal spray 2 sprays into each nostril daily 16 g 2   • gabapentin (Neurontin) 100 mg capsule Take 1 capsule (100 mg total) by mouth 2 (two) times a day 180 capsule 1   • Lancets (ONETOUCH DELICA PLUS [id number]) MISC 1 Device by Does not apply route 2 (two) times a day 100 each 5   • Lancets (ONETOUCH ULTRASOFT) lancets by Other route 2 (two) times a day Use as instructed 100 each 5   • Lidocaine 4 % PTCH Apply 1 patch topically daily 20 patch 1   • NIFEdipine ER (ADALAT CC) 30 MG 24 hr tablet Take 1 tablet twice a day   180 tablet 5   • olopatadine (PATANOL) 0 1 % ophthalmic solution Administer 1 drop to both eyes 2 (two) times a day 5 mL 0   • OneTouch Delica Lancets 25G MISC Inject as directed 2 (two) times a day Use to test 200 each 0   • pantoprazole (PROTONIX) 40 mg tablet Take 1 tablet (40 mg total) by mouth daily 90 tablet 0   • dicyclomine (BENTYL) 10 mg capsule take 1 capsule by mouth three times a day if needed for (GAS PAIN) 30 capsule 0   • pravastatin (PRAVACHOL) 40 mg tablet Take 1 tablet (40 mg total) by mouth daily 90 tablet 3     No current facility-administered medications for this visit  Allergies   Allergen Reactions   • Clonidine Derivatives Swelling   • Diflucan [Fluconazole] Rash   • Flagyl [Metronidazole] Anaphylaxis   • Carvedilol Hives     And legs swelled   • Latex Rash   • Lipitor [Atorvastatin]      Legs swelled   • Other Palpitations     IV DYE   • Shingrix [Zoster Vac Recomb Adjuvanted] Hives      Immunizations:     Immunization History   Administered Date(s) Administered   • COVID-19 PFIZER VACCINE 0 3 ML IM 03/09/2021, 03/30/2021, 12/21/2021   • Influenza Quadrivalent Preservative Free 3 years and older IM 11/20/2014   • Influenza, high dose seasonal 0 7 mL 11/04/2020   • Influenza, seasonal, injectable 01/13/2014   • Pneumococcal Polysaccharide PPV23 09/04/2007, 11/20/2014   • Tdap 09/04/2007   • Zoster Vaccine Recombinant 08/27/2021      Health Maintenance:         Topic Date Due   • Cervical Cancer Screening  Never done         Topic Date Due   • COVID-19 Vaccine (4 - Booster for [place] series) 03/21/2022      Medicare Screening Tests and Risk Assessments:     Kamaljit Monge is here for her Subsequent Wellness visit  Health Risk Assessment:   Patient rates overall health as good  Patient feels that their physical health rating is same  Patient is very satisfied with their life  Eyesight was rated as slightly worse  Hearing was rated as same  Patient feels that their emotional and mental health rating is same  Patients states they are never, rarely angry  Patient states they are never, rarely unusually tired/fatigued  Pain experienced in the last 7 days has been some  Patient's pain rating has been 5/10  Fall Risk Screening:    In the past year, patient has experienced: no history of falling in past year      Urinary Incontinence Screening:   Patient has not leaked urine accidently in the last six months  Home Safety:  Patient does not have trouble with stairs inside or outside of their home  Patient has working smoke alarms and has working carbon monoxide detector  Home safety hazards include: none  Nutrition:   Current diet is Regular and Diabetic  Medications:   Patient is currently taking over-the-counter supplements  OTC medications include: see medication list  Patient is able to manage medications  Activities of Daily Living (ADLs)/Instrumental Activities of Daily Living (IADLs):   Walk and transfer into and out of bed and chair?: Yes  Dress and groom yourself?: Yes    Bathe or shower yourself?: Yes    Feed yourself?  Yes  Do your laundry/housekeeping?: Yes  Manage your money, pay your bills and track your expenses?: Yes  Make your own meals?: Yes    Do your own shopping?: Yes    Previous Hospitalizations:   Any hospitalizations or ED visits within the last 12 months?: No      Advance Care Planning:   Living will: No      Cognitive Screening:   Provider or family/friend/caregiver concerned regarding cognition?: No    PREVENTIVE SCREENINGS      Cardiovascular Screening:    General: Screening Not Indicated and History Lipid Disorder      Diabetes Screening:     General: Screening Not Indicated and History Diabetes      Colorectal Cancer Screening:     General: Screening Not Indicated      Breast Cancer Screening:     General: History Breast Cancer      Cervical Cancer Screening:    General: Screening Not Indicated      Osteoporosis Screening:    General: Screening Not Indicated and History Osteoporosis      Abdominal Aortic Aneurysm (AAA) Screening:        General: Risks and Benefits Discussed      Lung Cancer Screening:     General: Screening Not Indicated and History Lung Cancer      Hepatitis C Screening:    General: Risks and Benefits Discussed    Screening, Brief Intervention, and Referral to Treatment (SBIRT)    Screening  Typical number of drinks in a day: 0  Typical number of drinks in a week: 0  Interpretation: Low risk drinking behavior  Single Item Drug Screening:  How often have you used an illegal drug (including marijuana) or a prescription medication for non-medical reasons in the past year? never    Single Item Drug Screen Score: 0  Interpretation: Negative screen for possible drug use disorder    No exam data present     Physical Exam:     /60 (BP Location: Left arm, Patient Position: Sitting, Cuff Size: Standard)   Pulse 78   Temp 97 6 °F (36 4 °C)   Resp 16   Ht 5' 1" (1 549 m)   Wt 58 1 kg (128 lb)   LMP  (LMP Unknown)   SpO2 97%   BMI 24 19 kg/m²     Physical Exam  Vitals reviewed  Constitutional:       Appearance: Normal appearance  She is well-developed  HENT:      Head: Normocephalic and atraumatic  Right Ear: Tympanic membrane, ear canal and external ear normal  There is no impacted cerumen  Left Ear: Tympanic membrane, ear canal and external ear normal  There is no impacted cerumen  Nose: Nose normal       Mouth/Throat:      Mouth: Mucous membranes are moist       Pharynx: Oropharynx is clear  Eyes:      Conjunctiva/sclera: Conjunctivae normal       Pupils: Pupils are equal, round, and reactive to light  Cardiovascular:      Rate and Rhythm: Normal rate and regular rhythm  Pulses: no weak pulses          Dorsalis pedis pulses are 2+ on the right side and 2+ on the left side  Posterior tibial pulses are 2+ on the right side and 2+ on the left side  Heart sounds: Normal heart sounds  Pulmonary:      Effort: Pulmonary effort is normal       Breath sounds: Normal breath sounds  Abdominal:      General: Abdomen is flat  Bowel sounds are normal       Palpations: Abdomen is soft  Musculoskeletal:         General: Normal range of motion  Cervical back: Normal range of motion and neck supple     Feet:      Right foot:      Skin integrity: No ulcer, skin breakdown, erythema, warmth, callus or dry skin  Left foot:      Skin integrity: No ulcer, skin breakdown, erythema, warmth, callus or dry skin  Skin:     General: Skin is warm  Capillary Refill: Capillary refill takes less than 2 seconds  Neurological:      General: No focal deficit present  Mental Status: She is alert and oriented to person, place, and time  Mental status is at baseline  Psychiatric:         Mood and Affect: Mood normal          Behavior: Behavior normal          Thought Content: Thought content normal          Judgment: Judgment normal       Diabetic Foot Exam    Patient's shoes and socks removed  Right Foot/Ankle   Right Foot Inspection  Skin Exam: skin normal and skin intact  No dry skin, no warmth, no callus, no erythema, no maceration, no abnormal color, no pre-ulcer, no ulcer and no callus  Toe Exam: ROM and strength within normal limits  No swelling    Sensory   Vibration: intact  Proprioception: intact  Monofilament testing: intact    Vascular  Capillary refills: < 3 seconds  The right DP pulse is 2+  The right PT pulse is 2+  Left Foot/Ankle  Left Foot Inspection  Skin Exam: skin normal and skin intact  No dry skin, no warmth, no erythema, no maceration, normal color, no pre-ulcer, no ulcer and no callus  Toe Exam: ROM and strength within normal limits  No swelling  Sensory   Vibration: intact  Proprioception: intact  Monofilament testing: intact    Vascular  Capillary refills: < 3 seconds  The left DP pulse is 2+  The left PT pulse is 2+       Assign Risk Category  No deformity present  No loss of protective sensation  No weak pulses  Risk: 0      Davon Sanders MD

## 2022-10-10 ENCOUNTER — HOSPITAL ENCOUNTER (OUTPATIENT)
Dept: NON INVASIVE DIAGNOSTICS | Facility: CLINIC | Age: 87
Discharge: HOME | End: 2022-10-10
Payer: COMMERCIAL

## 2022-10-10 DIAGNOSIS — I70.213 ATHEROSCLEROSIS OF NATIVE ARTERY OF BOTH LOWER EXTREMITIES WITH INTERMITTENT CLAUDICATION (HCC): ICD-10-CM

## 2022-10-10 PROCEDURE — 93925 LOWER EXTREMITY STUDY: CPT

## 2022-10-10 PROCEDURE — 93922 UPR/L XTREMITY ART 2 LEVELS: CPT | Performed by: SURGERY

## 2022-10-10 PROCEDURE — 93925 LOWER EXTREMITY STUDY: CPT | Performed by: SURGERY

## 2022-10-10 PROCEDURE — 93923 UPR/LXTR ART STDY 3+ LVLS: CPT

## 2022-10-13 ENCOUNTER — TELEPHONE (OUTPATIENT)
Dept: HEMATOLOGY ONCOLOGY | Facility: CLINIC | Age: 87
End: 2022-10-13

## 2022-10-13 DIAGNOSIS — K92.89 GAS BLOAT SYNDROME: ICD-10-CM

## 2022-10-13 RX ORDER — DICYCLOMINE HYDROCHLORIDE 10 MG/1
CAPSULE ORAL
Qty: 30 CAPSULE | Refills: 0 | Status: SHIPPED | OUTPATIENT
Start: 2022-10-13 | End: 2022-10-24

## 2022-10-13 NOTE — TELEPHONE ENCOUNTER
Left 2nd vm for patient to r/s 10/20 appt with hany   Patient can see Racquel Negro for a survivorship tues-Thursday for 60 mins

## 2022-10-13 NOTE — TELEPHONE ENCOUNTER
CALL RETURN FORM   Reason for patient call? Patient  called and stated 10/20/22 at 11am is not a good time to change appointment    Patient's primary oncologist? Fredis Noel    Name of person the patient was calling for? King Isabela    Any additional information to add, if applicable? N/A   Informed patient that the message will be forwarded to the team and someone will get back to them as soon as possible    Did you relay this information to the patient?

## 2022-10-18 ENCOUNTER — OFFICE VISIT (OUTPATIENT)
Dept: VASCULAR SURGERY | Facility: CLINIC | Age: 87
End: 2022-10-18
Payer: COMMERCIAL

## 2022-10-18 VITALS
DIASTOLIC BLOOD PRESSURE: 74 MMHG | HEART RATE: 66 BPM | BODY MASS INDEX: 24.39 KG/M2 | SYSTOLIC BLOOD PRESSURE: 140 MMHG | HEIGHT: 61 IN | WEIGHT: 129.2 LBS

## 2022-10-18 DIAGNOSIS — I65.23 BILATERAL CAROTID ARTERY STENOSIS: ICD-10-CM

## 2022-10-18 DIAGNOSIS — K55.1 MESENTERIC ARTERY STENOSIS (HCC): ICD-10-CM

## 2022-10-18 DIAGNOSIS — M79.89 LEFT LEG SWELLING: Primary | ICD-10-CM

## 2022-10-18 PROCEDURE — 99213 OFFICE O/P EST LOW 20 MIN: CPT | Performed by: NURSE PRACTITIONER

## 2022-10-18 NOTE — PROGRESS NOTES
Assessment/Plan:    PAD (peripheral artery disease) ([facility] [address] )  80year old female with HTN, HLD ,DM w/ peripheral neuropathy, CKDIII, L breast cancer, asymptomatic bilateral carotid stenosis, mesenteric artery stenosis, renal artery stenosis, AIOD/PAD s/p R EIA/VIDA and L EIA PTA/stent '10  Patient presents to the office for evaluation of left leg pain to review LEAD  -Left calf cramping at night, shoots down left leg, "aching"   Not consistent with ischemic rest pain   -LEAD showed R CFA/PFA/distal SFA 50-75% stenosis, R MICHEAL 0 49/64/36 and L CFA/mid SFA 50-75% stenosis, L MICHEAL 0 60/84/61   -Decrease in MICHEAL bilaterally   -Intermittent claudication symptoms with walking long distances  No rest pain or tissue loss   -Left calf tenderness on exam  Will evaluate with venous duplex to rule out DVT   -Surveillance arterial study   -Continue antiplatelet and statin therapy   -Follow up in 1 year     Bilateral carotid artery stenosis  -Asymptomatic bilateral carotid stenosis  -CV duplex 8/31/22 showed right ICA 50-69% stenosis and left ICA less 50% stenosis  -Continue antiplatelet and statin therapy  -Repeat duplex in 1 year   -Follow up in the office in 1 year    Left leg swelling  -Trace left leg swelling, aching in calf, tenderness on exam   -Will evaluate venous duplex to rule out DVT          Diagnoses and all orders for this visit:    Left leg swelling  -     VAS lower limb venous duplex study, unilateral/limited; Future    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HCC)  -     VAS celiac and/or mesenteric duplex; complete study; Future    Bilateral carotid artery stenosis          Subjective:      Patient ID: Sangeeta Dickerson is a 80 y o  female  Pt is here to rev LEOBARDO 10/10/22  Pt c/o LL pain in her calfs at night  Pt is taking ASA and Pravastatin  Pt is a former smoker       HPI  80year old female with HTN, HLD ,DM w/ peripheral neuropathy, CKDIII, L breast cancer, asymptomatic bilateral carotid stenosis, mesenteric artery stenosis, renal artery stenosis, AIOD/PAD s/p R EIA/VIDA and L EIA PTA/stent '10  Patient presents to the office for evaluation of left leg pain to review LEAD  Patient call the office with concern for left leg pain at night  Describes this as calf pain shoots from hip to calf and leaves left leg aching  She does have mild left lower extremity swelling calf tenderness on exam   She was evaluated with lower extremity arterial duplex which showed decreased MICHEAL  She has intermittent claudication symptoms with walking 10-15 minutes  Able to do daily activities and house chores  No ischemic rest pain or ischemic tissue loss  LEAD showed R CFA/PFA/distal SFA 50-75% stenosis, R MICHEAL 0 49/64/36 and L CFA/mid SFA 50-75% stenosis, L MICHEAL 0 60/84/61   The following portions of the patient's history were reviewed and updated as appropriate: allergies, current medications, past family history, past medical history, past social history, past surgical history and problem list   ROS reviewed     Review of Systems   Constitutional: Negative  HENT: Negative  Eyes: Negative  Respiratory: Negative  Cardiovascular: Negative  Gastrointestinal: Negative  Endocrine: Negative  Genitourinary: Negative  Musculoskeletal: Negative  Skin: Negative  Allergic/Immunologic: Negative  Neurological: Negative  Hematological: Negative  Psychiatric/Behavioral: Negative  Objective:    I have reviewed and made appropriate changes to the review of systems input by the medical assistant      Vitals:    10/18/22 1509   BP: 140/74   BP Location: Right arm   Patient Position: Sitting   Cuff Size: Standard   Pulse: 66   Weight: 58 6 kg (129 lb 3 2 oz)   Height: 5' 1" (1 549 m)       Patient Active Problem List   Diagnosis   • Iliac artery stenosis, bilateral (HCC)   • GERD (gastroesophageal reflux disease)   • Arthritis   • Hypercholesterolemia   • Hypertension   • EKG abnormalities   • Anemia   • Neck pain   • Dysuria   • Chronic left shoulder pain   • Fall from other slipping, tripping, or stumbling   • Abnormal EKG   • Abnormal mammogram   • Accelerated essential hypertension   • Atherosclerosis of native artery of extremity with intermittent claudication ([facility])   • Back pain   • Bilateral carotid artery stenosis   • Carotid stenosis, asymptomatic   • 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   • Chronic myofascial pain   • Chronic pain of lower extremity   • CKD (chronic kidney disease) stage 3, GFR 30-59 ml/min ([facility])   • Diabetic retinopathy ([facility] [address] )   • DMII (diabetes mellitus, type 2) ([facility])   • Gout   • Hypomagnesemia   • Atherosclerosis of other specified arteries   • Lymphadenopathy   • Nephrolithiasis   • Osteoporosis   • PAD (peripheral artery disease) ([facility])   • Primary generalized (osteo)arthritis   • Persistent proteinuria   • Renal cyst   • Vaginal prolapse   • Vitamin D deficiency   • Acquired complex cyst of kidney   • History of left breast cancer   • Stricture of artery ([facility])   • Claudication in peripheral vascular disease ([facility])   • Hemorrhoids   • Tinea unguium   • Diabetic neuropathy ([facility])   • Bradycardia   • Glottic insufficiency   • Reflux laryngitis   • Paresis of left vocal fold   • Muscle tension dysphonia   • Laryngocele   • Anterior glottic web   • Viral upper respiratory tract infection   • 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[facility])   • Renal artery stenosis ([facility])   • Left ear pain   • Cervical spine disease   • Osteopenia of multiple sites   • Type 2 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, bilateral ([facility])   • Lung mass   • CKD (chronic kidney disease)   • Adenocarcinoma, lung, right ([facility])   • Encounter for annual routine gynecological examination   • Encounter for screening mammogram for breast cancer   • History of cancer of left breast   • Dense breast tissue on mammogram   • Left leg swelling       Past Surgical History:   Procedure Laterality Date   • ANGIOPLASTY / STENTING ILIAC     • BREAST BIOPSY Left 08/10/2018    U/S BX- mucinous ca   • BREAST BIOPSY Left 08/07/2017    U/S BX   • BREAST LUMPECTOMY Left 09/06/2017    LAST ASSESSED: [id number]   • BREAST LUMPECTOMY Left 09/11/2018   • BREAST SURGERY Left     biopsy   • CATARACT EXTRACTION Bilateral    • CHOLECYSTECTOMY     • COLONOSCOPY     • EYE SURGERY     • ILIAC VEIN ANGIOPLASTY / STENTING Right     INITIAL STENOSIS: ONSET: [id number]   • IR BIOPSY LUNG  11/15/2021   • KNEE ARTHROSCOPY Right    • KNEE SURGERY Right     ONSET: [id number]   • MAMMO NEEDLE LOCALIZATION LEFT (ALL INC) Left 9/11/2018   • MAMMO NEEDLE LOCALIZATION LEFT (ALL INC) Left 9/6/2017   • WI ESOPHAGOGASTRODUODENOSCOPY TRANSORAL DIAGNOSTIC N/A 7/26/2018    Procedure: ESOPHAGOGASTRODUODENOSCOPY (EGD); Surgeon: Angelica Orozco DO;  Location: [facility]; Service: Gastroenterology   • WI LARYNGOSCOPY,DIRCT,[facility] N/A 6/10/2016    Procedure: MICRO DIRECT LARYNGOSCOPY WITH VOCAL FOLD MASS EXCISION ;  Surgeon: Jazz Woodson MD;  Location: [facility];  Service: ENT   • WI MASTECTOMY, PARTIAL Left 9/6/2017    Procedure: LUMPECTOMY BREAST NEEDLE LOCALIZED WITH FAXITRON NEEDLE @ 0830;  Surgeon: Seema Brady MD;  Location: [facility];  Service: General   • WI PERQ DEVICE PLACEMT BREAST LOC 1ST LES W Aminata Saleem Left 9/11/2018    Procedure: BREAST LUMPECTOMY; BREAST NEEDLE LOCALIZATION (NEEDLE LOC AT 1330);   Surgeon: Lloyd Peraza MD;  Location: [facility];  Service: Surgical Oncology   • THROAT SURGERY      lump removed   • TUBAL LIGATION     • UPPER GASTROINTESTINAL ENDOSCOPY     • US GUIDANCE BREAST BIOPSY LEFT EACH ADDITIONAL Left 8/7/2017   • US GUIDED BREAST BIOPSY LEFT COMPLETE Left 8/10/2018   • US GUIDED BREAST BIOPSY LEFT COMPLETE Left 8/7/2017   • VASCULAR SURGERY         Family History   Problem Relation Age of Onset   • Heart disease Brother    • Heart disease Maternal Grandmother    • Diabetes Other         of other type with arthropathy, without long term current use of insulin   • Hypertension Other    • No Known Problems Mother    • Gout Family    • Rheum arthritis Family    • Lupus Family         systematic erythematosus   • Heart disease Family    • Stroke Family         syndrome   • Stroke Maternal Uncle         syndrome   • Stroke Maternal Aunt         syndrome   • No Known Problems Father    • No Known Problems Daughter    • No Known Problems Maternal Grandfather    • No Known Problems Paternal Grandmother    • No Known Problems Paternal Grandfather    • No Known Problems Daughter    • No Known Problems Daughter    • No Known Problems Maternal Aunt    • No Known Problems Maternal Aunt    • No Known Problems Maternal Aunt        Social History     Socioeconomic History   • Marital status:      Spouse name: Not on file   • Number of children: Not on file   • Years of education: Not on file   • Highest education level: Not on file   Occupational History   • Occupation: retired   Tobacco Use   • Smoking status: Former Smoker     Packs/day: 1 00     Years: 55 00     Pack years: 55 00     Start date:      Quit date:      Years since quittin 8   • Smokeless tobacco: Never Used   Vaping Use   • Vaping Use: Never used   Substance and Sexual Activity   • Alcohol use: Not Currently   • Drug use: No   • Sexual activity: Yes     Partners: Male   Other Topics Concern   • Not on file   Social History Narrative    Daily caffeine consumption, 1 serving a day      Social Determinants of Health     Financial Resource Strain: Low Risk    • Difficulty of Paying Living Expenses: Not hard at all   Food Insecurity: Not on file   Transportation Needs: No Transportation Needs   • Lack of Transportation (Medical): No   • Lack of Transportation (Non-Medical):  No   Physical Activity: Not on file   Stress: Not on file   Social Connections: Not on file   Intimate Partner Violence: Not on file   Housing Stability: Not on file Allergies   Allergen Reactions   • Clonidine Derivatives Swelling   • Diflucan [Fluconazole] Rash   • Flagyl [Metronidazole] Anaphylaxis   • Carvedilol Hives     And legs swelled   • Latex Rash   • Lipitor [Atorvastatin]      Legs swelled   • Other Palpitations     IV DYE   • Shingrix [Zoster Vac Recomb Adjuvanted] Hives         Current Outpatient Medications:   •  aspirin (ADULT ASPIRIN EC LOW STRENGTH) 81 mg EC tablet, Take 1 tablet by mouth daily, Disp: , Rfl:   •  Blood Glucose Monitoring Suppl (FreeStyle Precision Jalen System) w/Device KIT, by Does not apply route 3 (three) times a day, Disp: 1 kit, Rfl: 0  •  calcitriol (ROCALTROL) 0 25 mcg capsule, Take one capsule every Tuesday and Friday, Disp: 10 capsule, Rfl: 6  •  calcium carbonate-vitamin D (OSCAL-D) 500 mg-200 units per tablet, Take 1 tablet by mouth daily with breakfast, Disp: , Rfl:   •  Lancets (ONETOUCH DELICA PLUS [id number]) [facility] [place] Device by Does not apply route 2 (two) times a day, Disp: 100 each, Rfl: 5  •  Lancets (ONETOUCH ULTRASOFT) lancets, by Other route 2 (two) times a day Use as instructed, Disp: 100 each, Rfl: 5  •  NIFEdipine ER (ADALAT CC) 30 MG 24 hr tablet, Take 1 tablet twice a day , Disp: 180 tablet, Rfl: 5  •  olopatadine (PATANOL) 0 1 % ophthalmic solution, Administer 1 drop to both eyes 2 (two) times a day, Disp: 5 mL, Rfl: 0  •  OneTouch Delica Lancets 90Z MISC, Inject as directed 2 (two) times a day Use to test, Disp: 200 each, Rfl: 0  •  pantoprazole (PROTONIX) 40 mg tablet, Take 1 tablet (40 mg total) by mouth daily, Disp: 90 tablet, Rfl: 0  •  Acetaminophen 325 MG CAPS, Take 500 mg by mouth every 8 (eight) hours, Disp: , Rfl:   •  dicyclomine (BENTYL) 10 mg capsule, take 1 capsule by mouth three times a day if needed for (GAS PAIN), Disp: 30 capsule, Rfl: 0  •  fluticasone (FLONASE) 50 mcg/act nasal spray, 2 sprays into each nostril daily, Disp: 16 g, Rfl: 2  •  gabapentin (Neurontin) 100 mg capsule, Take 1 capsule (100 mg total) by mouth 2 (two) times a day, Disp: 180 capsule, Rfl: 1  •  Lidocaine 4 % PTCH, Apply 1 patch topically daily, Disp: 20 patch, Rfl: 1  •  pravastatin (PRAVACHOL) 40 mg tablet, Take 1 tablet (40 mg total) by mouth daily, Disp: 90 tablet, Rfl: 3    /74 (BP Location: Right arm, Patient Position: Sitting, Cuff Size: Standard)   Pulse 66   Ht 5' 1" (1 549 m)   Wt 58 6 kg (129 lb 3 2 oz)   LMP  (LMP Unknown)   BMI 24 41 kg/m²          Physical Exam  Vitals and nursing note reviewed  Constitutional:       Appearance: She is well-developed  HENT:      Head: Normocephalic and atraumatic  Eyes:      Extraocular Movements: Extraocular movements intact  Cardiovascular:      Pulses:           Femoral pulses are 2+ on the right side and 2+ on the left side  Dorsalis pedis pulses are 0 on the right side and 0 on the left side  Posterior tibial pulses are 0 on the right side and 0 on the left side  Heart sounds: Normal heart sounds  Pulmonary:      Effort: Pulmonary effort is normal       Breath sounds: Normal breath sounds  Abdominal:      General: Bowel sounds are normal       Palpations: Abdomen is soft  Musculoskeletal:         General: Normal range of motion  Cervical back: Neck supple  Skin:     General: Skin is warm  Neurological:      Mental Status: She is alert and oriented to person, place, and time  Psychiatric:         Behavior: Behavior normal          Thought Content:  Thought content normal

## 2022-10-18 NOTE — ASSESSMENT & PLAN NOTE
80year old female with HTN, HLD ,DM w/ peripheral neuropathy, CKDIII, L breast cancer, asymptomatic bilateral carotid stenosis, mesenteric artery stenosis, renal artery stenosis, AIOD/PAD s/p R EIA/VIDA and L EIA PTA/stent '10  Patient presents to the office for evaluation of left leg pain to review LEAD  -Left calf cramping at night, shoots down left leg, "aching"   Not consistent with ischemic rest pain   -LEAD showed R CFA/PFA/distal SFA 50-75% stenosis, R MICHEAL 0 49/64/36 and L CFA/mid SFA 50-75% stenosis, L MICHEAL 0 60/84/61   -Decrease in MICHEAL bilaterally   -Intermittent claudication symptoms with walking long distances   No rest pain or tissue loss   -Left calf tenderness on exam  Will evaluate with venous duplex to rule out DVT   -Surveillance arterial study   -Continue antiplatelet and statin therapy   -Follow up in 1 year

## 2022-10-19 ENCOUNTER — ONCOLOGY SURVIVORSHIP (OUTPATIENT)
Dept: SURGICAL ONCOLOGY | Facility: CLINIC | Age: 87
End: 2022-10-19
Payer: COMMERCIAL

## 2022-10-19 VITALS
OXYGEN SATURATION: 94 % | RESPIRATION RATE: 16 BRPM | DIASTOLIC BLOOD PRESSURE: 60 MMHG | HEIGHT: 61 IN | TEMPERATURE: 97.8 F | BODY MASS INDEX: 24.35 KG/M2 | HEART RATE: 82 BPM | WEIGHT: 129 LBS | SYSTOLIC BLOOD PRESSURE: 140 MMHG

## 2022-10-19 DIAGNOSIS — Z08 ENCOUNTER FOR FOLLOW-UP EXAMINATION AFTER COMPLETED TREATMENT FOR MALIGNANT NEOPLASM: Primary | ICD-10-CM

## 2022-10-19 DIAGNOSIS — Z85.3 HISTORY OF LEFT BREAST CANCER: ICD-10-CM

## 2022-10-19 PROCEDURE — 99215 OFFICE O/P EST HI 40 MIN: CPT | Performed by: NURSE PRACTITIONER

## 2022-10-19 NOTE — PROGRESS NOTES
Assessment/Plan:    Diagnoses and all orders for this visit:    Encounter for follow-up examination after completed treatment for malignant neoplasm    History of left breast cancer    Patient is an 80-year-old female that was diagnosed with left breast cancer in August of 2018  Her pathology revealed mucinous carcinoma, ER/%, her 2-   She underwent a left lumpectomy by Dr Chantale Bolandsterling Mathews did not receive adjuvant therapy   She was also diagnosed with a primary lung adenocarcinoma and underwent radiation therapy  She offers no new complaints today and there are no concerns on today's exam   We are no longer obtaining mammogram secondary to patient's advanced age  Breast cancer survivorship visit performed today and treatment summary and care plan were reviewed  She states that she will be having a DEXA scan in the near future  She no longer requires colorectal or cervical cancer screenings  I will plan to see her back in 6 months for a follow-up visit or sooner should the need arise  She was instructed to contact me with any changes or concerns in the interim  All of her questions were answered today  REASON FOR VISIT:   Survivorship      Previous therapy:  Oncology History   History of left breast cancer   8/10/2018 Biopsy    Left breast biopsy  Invasive mucinous  Grade 1      Her 2 1+     9/11/2018 Surgery    Left lumpectomy  Invasive mucinous carcinoma  Grade 1  1 1 cm in size  Margins negative      Her 2 1+  Stage 1A     Adenocarcinoma, lung, right ([facility] [address] )   11/2021 Initial Diagnosis    Adenocarcinoma, lung, right ([facility] [address] )     11/15/2021 Biopsy    Lung, Right Middle Lobe, mass:  - Adenocarcinoma  Comment: Immunohistochemistry is positive in the tumor cells for TTF-1 and negative for p40 and Gata3  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 a primary pulmonary adenocarcinoma       1/19/2022 - 2/8/2022 Radiation Course: C1    Plan ID Energy Fractions Dose per Fraction (cGy) Dose Correction (cGy) Total Dose Delivered (cGy) Elapsed Days   RML 6X 15 / 15 400 0 6,000 20      Treatment dates:  1/19/2022 - 2/8/2022            Patient ID: Anastacia Little is a 80 y o  female  Presenting today for breast cancer survivorship visit  She notices no changes on her self-breast exam   She is no longer getting mammograms  She denies persistent headaches, back pain or bone pain, cough or shortness of breath, abdominal pain  She reports an occasional tenderness of bilateral breasts and some occasional discomfort in her right scapular area  She states she has been scheduled for an upcoming DEXA scan  She continues to follow-up with Radiation Oncology regarding her history of lung cancer  Review of Systems   Constitutional: Negative for activity change, appetite change, chills, fatigue, fever and unexpected weight change  Respiratory: Negative for cough and shortness of breath  Cardiovascular: Negative for chest pain  Gastrointestinal: Negative for abdominal pain, constipation, diarrhea, nausea and vomiting  Musculoskeletal: Negative for arthralgias, back pain, gait problem and myalgias  Skin: Negative for color change and rash  Neurological: Negative for dizziness and headaches  Hematological: Negative for adenopathy  Psychiatric/Behavioral: Negative for agitation and confusion  All other systems reviewed and are negative  Objective:    Blood pressure 140/60, pulse 82, temperature 97 8 °F (36 6 °C), temperature source Temporal, resp  rate 16, height 5' 1" (1 549 m), weight 58 5 kg (129 lb), SpO2 94 %, not currently breastfeeding  Body mass index is 24 37 kg/m²  Physical Exam  Vitals reviewed  Constitutional:       General: She is not in acute distress  Appearance: Normal appearance  She is well-developed  She is not diaphoretic  HENT:      Head: Normocephalic and atraumatic     Cardiovascular: Rate and Rhythm: Normal rate and regular rhythm  Heart sounds: Normal heart sounds  Pulmonary:      Effort: Pulmonary effort is normal       Breath sounds: Normal breath sounds  Chest:   Breasts:      Right: No swelling, bleeding, inverted nipple, mass, nipple discharge, skin change, tenderness, axillary adenopathy or supraclavicular adenopathy  Left: Skin change (surgical scars) present  No swelling, bleeding, inverted nipple, mass, nipple discharge, tenderness, axillary adenopathy or supraclavicular adenopathy  Abdominal:      Palpations: Abdomen is soft  There is no mass  Tenderness: There is no abdominal tenderness  Musculoskeletal:         General: Normal range of motion  Cervical back: Normal range of motion  Lymphadenopathy:      Upper Body:      Right upper body: No supraclavicular or axillary adenopathy  Left upper body: No supraclavicular or axillary adenopathy  Skin:     General: Skin is warm and dry  Findings: No rash  Neurological:      Mental Status: She is alert and oriented to person, place, and time  Psychiatric:         Speech: Speech normal          Discussed symptoms related to disease recurrence, Yes    Evaluated for late effects related to cancer treatment, Yes, describe: discussed effects of surgery    Screening current for cervical cancer, Yes, describe: n/a age    Screening current for colon cancer, Yes, describe: n/a age    Cancer rehabilitation services addressed, Yes, describe: declines [employer]    Screening current for osteoporosis, Yes, describe: pt states she has upcoming DXA    Oncology Treatment Summary reviewed with patient and copy provided, Yes    Referral placed for psychosocial evaluation/screening to oncology social work  Yes    I have spent 45 minutes with Patient and family today in which greater than 50% of this time was spent in counseling/coordination of care regarding breast cancer survivorship

## 2022-10-20 ENCOUNTER — PATIENT OUTREACH (OUTPATIENT)
Dept: CASE MANAGEMENT | Facility: OTHER | Age: 87
End: 2022-10-20

## 2022-10-20 NOTE — PROGRESS NOTES
OSW received referral from oncology survivorship team  Placed call to pt today utilizing [employer] [facility], [address] #[phone], however Sra Auguste did not answer  LM on answering machine with return call information  Will follow

## 2022-10-23 DIAGNOSIS — K92.89 GAS BLOAT SYNDROME: ICD-10-CM

## 2022-10-24 RX ORDER — DICYCLOMINE HYDROCHLORIDE 10 MG/1
CAPSULE ORAL
Qty: 30 CAPSULE | Refills: 0 | Status: SHIPPED | OUTPATIENT
Start: 2022-10-24

## 2022-10-25 PROBLEM — M79.89 LEFT LEG SWELLING: Status: ACTIVE | Noted: 2022-10-25

## 2022-10-25 NOTE — ASSESSMENT & PLAN NOTE
-Asymptomatic bilateral carotid stenosis  -CV duplex 8/31/22 showed right ICA 50-69% stenosis and left ICA less 50% stenosis  -Continue antiplatelet and statin therapy  -Repeat duplex in 1 year   -Follow up in the office in 1 year

## 2022-10-25 NOTE — ASSESSMENT & PLAN NOTE
-Trace left leg swelling, aching in calf, tenderness on exam   -Will evaluate venous duplex to rule out DVT

## 2022-10-27 ENCOUNTER — PATIENT OUTREACH (OUTPATIENT)
Dept: CASE MANAGEMENT | Facility: OTHER | Age: 87
End: 2022-10-27

## 2022-10-27 NOTE — PROGRESS NOTES
OSW attempted to call daughter's number x2, however no answer and no ability to leave message  Will attempt again at a later time

## 2022-11-02 ENCOUNTER — HOSPITAL ENCOUNTER (OUTPATIENT)
Dept: NON INVASIVE DIAGNOSTICS | Facility: CLINIC | Age: 87
Discharge: HOME | End: 2022-11-02

## 2022-11-02 DIAGNOSIS — M79.89 LEFT LEG SWELLING: ICD-10-CM

## 2022-11-04 ENCOUNTER — PATIENT OUTREACH (OUTPATIENT)
Dept: CASE MANAGEMENT | Facility: OTHER | Age: 87
End: 2022-11-04

## 2022-11-04 NOTE — PROGRESS NOTES
OSW placed 3rd call to Sra Auguste today  Utilized [employer] #[id number]  Sra Auguste answered call and was receptive to speaking with this writer  Explained reason for call from survivorship team  Pt stated she is doing very well and has excellent support from her family  She denies any psychosocial or financial stressors  Explained oncology SW team is available for support as needed  Pt appreciative of call

## 2022-11-08 ENCOUNTER — OFFICE VISIT (OUTPATIENT)
Dept: PULMONOLOGY | Facility: CLINIC | Age: 87
End: 2022-11-08

## 2022-11-08 VITALS
BODY MASS INDEX: 24.34 KG/M2 | SYSTOLIC BLOOD PRESSURE: 174 MMHG | RESPIRATION RATE: 18 BRPM | TEMPERATURE: 97.6 F | WEIGHT: 128.9 LBS | HEART RATE: 73 BPM | HEIGHT: 61 IN | OXYGEN SATURATION: 98 % | DIASTOLIC BLOOD PRESSURE: 30 MMHG

## 2022-11-08 DIAGNOSIS — C34.91 ADENOCARCINOMA, LUNG, RIGHT (HCC): Primary | ICD-10-CM

## 2022-11-08 DIAGNOSIS — E11.51 TYPE 2 DIABETES MELLITUS WITH DIABETIC PERIPHERAL ANGIOPATHY WITHOUT GANGRENE, WITHOUT LONG-TERM CURRENT USE OF INSULIN (HCC): ICD-10-CM

## 2022-11-08 RX ORDER — GABAPENTIN 100 MG/1
100 CAPSULE ORAL 2 TIMES DAILY
Qty: 180 CAPSULE | Refills: 1 | Status: SHIPPED | OUTPATIENT
Start: 2022-11-08 | End: 2023-05-07

## 2022-11-08 NOTE — PROGRESS NOTES
Assessment/Plan:    Adenocarcinoma, lung, right ([address] )  I reviewed patient's CT scan from August which looks stable, although otherwise concerning area in her fissure  Follow-up CT scan is scheduled for December 23rd  She does have this shoulder pain on her right scapula which does appear to be related to the cancer however persistent over 6 months  I have instructed her on stretching heat and referred her to physical therapy  Diagnoses and all orders for this visit:    Adenocarcinoma, lung, right [facility])  -     Ambulatory Referral to Physical Therapy; Future    Type 2 diabetes mellitus with diabetic peripheral angiopathy without gangrene, without long-term current use of insulin ([facility])  -     gabapentin (Neurontin) 100 mg capsule; Take 1 capsule (100 mg total) by mouth 2 (two) times a day          Subjective:      Patient ID: Constance Newton is a 80 y o  female  Florecita Quigley is here for follow-up of her lung mass  She denies any shortness of breath or cough  She does have some right scapular pain which has been present for 6 months and hurts worse with palpation and cough      The following portions of the patient's history were reviewed and updated as appropriate: allergies, current medications, past family history, past medical history, past social history, past surgical history and problem list     Review of Systems   Constitutional: Negative  Negative for unexpected weight change  HENT: Negative  Negative for postnasal drip  Eyes: Negative  Respiratory: Negative  Negative for cough, shortness of breath and wheezing  Cardiovascular: Negative  Negative for chest pain and leg swelling  Gastrointestinal: Negative  Endocrine: Negative  Genitourinary: Negative  Musculoskeletal: Negative  Allergic/Immunologic: Negative  Neurological: Negative  Hematological: Negative            Objective:      BP (!) 174/30 (BP Location: Left arm, Patient Position: Sitting, Cuff Size: Standard) Pulse 73   Temp 97 6 °F (36 4 °C) (Tympanic)   Resp 18   Ht 5' 1" (1 549 m)   Wt 58 5 kg (128 lb 14 4 oz)   LMP  (LMP Unknown)   SpO2 98%   BMI 24 36 kg/m²          Physical Exam  Constitutional:       Appearance: She is well-developed  HENT:      Head: Normocephalic  Eyes:      Pupils: Pupils are equal, round, and reactive to light  Cardiovascular:      Rate and Rhythm: Normal rate  Heart sounds: No murmur heard  Pulmonary:      Effort: Pulmonary effort is normal  No respiratory distress  Breath sounds: Normal breath sounds  No wheezing or rales  Abdominal:      Palpations: Abdomen is soft  Musculoskeletal:         General: Normal range of motion  Cervical back: Normal range of motion and neck supple  Skin:     General: Skin is warm and dry  Neurological:      Mental Status: She is alert and oriented to person, place, and time

## 2022-11-08 NOTE — ASSESSMENT & PLAN NOTE
I reviewed patient's CT scan from August which looks stable, although otherwise concerning area in her fissure  Follow-up CT scan is scheduled for December 23rd  She does have this shoulder pain on her right scapula which does appear to be related to the cancer however persistent over 6 months  I have instructed her on stretching heat and referred her to physical therapy

## 2022-11-10 ENCOUNTER — TELEPHONE (OUTPATIENT)
Dept: NEPHROLOGY | Facility: CLINIC | Age: 87
End: 2022-11-10

## 2022-11-14 ENCOUNTER — CONSULT (OUTPATIENT)
Dept: GASTROENTEROLOGY | Facility: CLINIC | Age: 87
End: 2022-11-14

## 2022-11-14 VITALS
HEIGHT: 61 IN | TEMPERATURE: 97.8 F | SYSTOLIC BLOOD PRESSURE: 124 MMHG | WEIGHT: 127 LBS | BODY MASS INDEX: 23.98 KG/M2 | DIASTOLIC BLOOD PRESSURE: 74 MMHG

## 2022-11-14 DIAGNOSIS — K64.9 HEMORRHOIDS, UNSPECIFIED HEMORRHOID TYPE: ICD-10-CM

## 2022-11-14 DIAGNOSIS — K55.1 MESENTERIC ARTERY STENOSIS (HCC): ICD-10-CM

## 2022-11-14 DIAGNOSIS — K21.9 GASTROESOPHAGEAL REFLUX DISEASE, UNSPECIFIED WHETHER ESOPHAGITIS PRESENT: Primary | ICD-10-CM

## 2022-11-14 DIAGNOSIS — R10.13 EPIGASTRIC ABDOMINAL PAIN: ICD-10-CM

## 2022-11-14 RX ORDER — OMEPRAZOLE 40 MG/1
40 CAPSULE, DELAYED RELEASE ORAL DAILY
Qty: 90 CAPSULE | Refills: 3 | Status: SHIPPED | OUTPATIENT
Start: 2022-11-14

## 2022-11-14 NOTE — PROGRESS NOTES
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 seen on imaging in the past and so a duplex ultrasound of her GI tract is ordered as well  I will also check her for H pylori  She was negative in 2016  She is on Protonix 40 mg daily so I will DC this and start her on omeprazole 40 mg daily which is more potent than Protonix  Will have her follow-up in a few months time  ______________________________________________________________________    HPI:  80year old female referred for abdominal pain with 5 lb weight loss for the past few months  Pain is worse after eating  Her family member who accompanies her says she has Celiac disease and was diagnosed by blood work  She is taking Protonix 40 mg daily  REVIEW OF SYSTEMS:    CONSTITUTIONAL: Denies any fever, chills, rigors, and weight loss  HEENT: No earache or tinnitus  Denies hearing loss or visual disturbances  CARDIOVASCULAR: No chest pain or palpitations  RESPIRATORY: Denies any cough, hemoptysis, shortness of breath or dyspnea on exertion  GASTROINTESTINAL: As noted in the History of Present Illness  GENITOURINARY: No problems with urination  Denies any hematuria or dysuria  NEUROLOGIC: No dizziness or vertigo, denies headaches  MUSCULOSKELETAL: Denies any muscle or joint pain  SKIN: Denies skin rashes or itching     ENDOCRINE: Denies excessive thirst  Denies intolerance to heat or cold  PSYCHOSOCIAL: Denies depression or anxiety  Denies any recent memory loss         Historical Information   Past Medical History:   Diagnosis Date   • Arthritis    • Back pain    • Benign essential hypertension     LAST ASSESSED: [id number]   • Carotid artery stenosis    • CKD (chronic kidney disease) stage 2, GFR 60-89 ml/min    • Constipation, chronic     "drinks prune juice"   • Diabetes mellitus (HCC)    • DMII (diabetes mellitus, type 2) ([facility])    • Epiglottitis     RESOLVED: [id number]   • Gastritis    • GERD (gastroesophageal reflux disease)    • Gout    • H/O blood clots     left lower leg,,,11 yrs ago   • History of stenosis of renal artery     RESOLVED: [id number]   • Hypercholesteremia    • Hypercholesterolemia    • Hyperlipidemia    • Hyperparathyroidism ([facility] )    • Hypertension    • Iliac artery stenosis, bilateral ([facility])    • Kidney stone    • Lobular carcinoma ([facility] ) 09/06/2017    Left   • Lung cancer ([facility] [address] )    • Mucinous carcinoma of breast, 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    Left   • Presence of pessary    • Renal artery stenosis ([facility])    • Renal disorder    • Renovascular hypertension     RESOLVED: [id number]   • Segmental and somatic dysfunction of cervical region     RESOLVED: 10IVL2826   • Segmental and somatic dysfunction of lumbar region     RESOLVED: 70LBD4378   • Segmental and somatic dysfunction of pelvic region     RESOLVED: 13ZJC9133   • Segmental and somatic dysfunction of thoracic region     RESOLVED: 26GYH0415   • Somatic dysfunction of abdominal region     RESOLVED: 48QSK7557   • Somatic dysfunction of head region     RESOLVED: 31SNG2840   • Somatic dysfunction of lower extremities     RESOLVED: 15MJN6723   • Somatic dysfunction of rib region     RESOLVED: 40HGA0774   • Somatic dysfunction of thoracic region     RESOLVED: 68CJJ0131   • Somatic dysfunction of upper extremities     RESOLVED: 85BEH5679   • Teeth missing      Past Surgical History:   Procedure Laterality Date   • ANGIOPLASTY / STENTING ILIAC     • BREAST BIOPSY Left 08/10/2018    U/S BX- mucinous ca   • BREAST BIOPSY Left 08/07/2017    U/S BX   • BREAST LUMPECTOMY Left 09/06/2017    LAST ASSESSED: [id number]   • BREAST LUMPECTOMY Left 09/11/2018   • BREAST SURGERY Left     biopsy   • CATARACT EXTRACTION Bilateral    • CHOLECYSTECTOMY     • COLONOSCOPY     • EYE SURGERY     • ILIAC VEIN ANGIOPLASTY / STENTING Right     INITIAL STENOSIS: ONSET: [id number]   • IR BIOPSY LUNG  11/15/2021   • KNEE ARTHROSCOPY Right    • KNEE SURGERY Right     ONSET: [id number]   • MAMMO NEEDLE LOCALIZATION LEFT (ALL INC) Left 9/11/2018   • MAMMO NEEDLE LOCALIZATION LEFT (ALL INC) Left 9/6/2017   • IN ESOPHAGOGASTRODUODENOSCOPY TRANSORAL DIAGNOSTIC N/A 7/26/2018    Procedure: ESOPHAGOGASTRODUODENOSCOPY (EGD); Surgeon: Janet Ahn DO;  Location: [facility]; Service: Gastroenterology   • IN LARYNGOSCOPY,DIRCT,Count includes the [facility] N/A 6/10/2016    Procedure: MICRO DIRECT LARYNGOSCOPY WITH VOCAL FOLD MASS EXCISION ;  Surgeon: Pratik Diaz MD;  [facility];  Service: ENT   • IN MASTECTOMY, PARTIAL Left 9/6/2017    Procedure: LUMPECTOMY BREAST NEEDLE LOCALIZED WITH FAXITRON NEEDLE @ 0830;  Surgeon: Garrett Simon MD;  Location[facility];  Service: General   • IN PERQ DEVICE PLACEMT BREAST LOC 1ST LES W Kori Wu Left 9/11/2018    Procedure: BREAST LUMPECTOMY; BREAST NEEDLE LOCALIZATION (NEEDLE LOC AT 1330);   Surgeon: Stacey Kate MD;  [facility];  Service: Surgical Oncology   • THROAT SURGERY      lump removed   • TUBAL LIGATION     • UPPER GASTROINTESTINAL ENDOSCOPY     • US GUIDANCE BREAST BIOPSY LEFT EACH ADDITIONAL Left 8/7/2017   • US GUIDED BREAST BIOPSY LEFT COMPLETE Left 8/10/2018   • US GUIDED BREAST BIOPSY LEFT COMPLETE Left 8/7/2017   • VASCULAR SURGERY       Social History   Social History     Substance and Sexual Activity   Alcohol Use Not Currently     Social History     Substance and Sexual Activity   Drug Use No     Social History     Tobacco Use   Smoking Status Former Smoker   • Packs/day: 1 00   • Years: 55 00   • Pack years: 55 00   • Types: Cigarettes   • Start date:    • Quit date:    • Years since quittin 8   Smokeless Tobacco Never Used     Family History   Problem Relation Age of Onset   • Heart disease Brother    • Heart disease Maternal Grandmother    • Diabetes Other         of other type with arthropathy, without long term current use of insulin   • Hypertension Other    • No Known Problems Mother    • Gout Family    • Rheum arthritis Family    • Lupus Family         systematic erythematosus   • Heart disease Family    • Stroke Family         syndrome   • Stroke Maternal Uncle         syndrome   • Stroke Maternal Aunt         syndrome   • No Known Problems Father    • No Known Problems Daughter    • No Known Problems Maternal Grandfather    • No Known Problems Paternal Grandmother    • No Known Problems Paternal Grandfather    • No Known Problems Daughter    • No Known Problems Daughter    • No Known Problems Maternal Aunt    • No Known Problems Maternal Aunt    • No Known Problems Maternal Aunt        Meds/Allergies       Current Outpatient Medications:   •  Acetaminophen 325 MG CAPS  •  aspirin (ADULT ASPIRIN EC LOW STRENGTH) 81 mg EC tablet  •  Blood Glucose Monitoring Suppl (FreeStyle Precision Jalen System) w/Device KIT  •  calcitriol (ROCALTROL) 0 25 mcg capsule  •  calcium carbonate-vitamin D (OSCAL-D) 500 mg-200 units per tablet  •  fluticasone (FLONASE) 50 mcg/act nasal spray  •  gabapentin (Neurontin) 100 mg capsule  •  Lancets (ONETOUCH DELICA PLUS [id number]) MISC  •  Lancets (ONETOUCH ULTRASOFT) lancets  •  Lidocaine 4 % PTCH  •  NIFEdipine ER (ADALAT CC) 30 MG 24 hr tablet  •  olopatadine (PATANOL) 0 1 % ophthalmic solution  •  OneTouch Delica Lancets 94W MISC  •  pantoprazole (PROTONIX) 40 mg tablet  •  pravastatin (PRAVACHOL) 40 mg tablet    Allergies   Allergen Reactions   • Clonidine Derivatives Swelling   • Diflucan [Fluconazole] Rash   • Flagyl [Metronidazole] Anaphylaxis   • Carvedilol Hives     And legs swelled   • Latex Rash   • Lipitor [Atorvastatin]      Legs swelled   • Other Palpitations     IV DYE   • Shingrix [Zoster Vac Recomb Adjuvanted] Hives           Objective     Blood pressure 124/74, temperature 97 8 °F (36 6 °C), temperature source Tympanic, height 5' 1" (1 549 m), weight 57 6 kg (127 lb), not currently breastfeeding  Body mass index is 24 kg/m²  PHYSICAL EXAM:      General Appearance:   Alert, cooperative, no distress   HEENT:   Normocephalic, atraumatic, anicteric      Neck:  Supple, symmetrical, trachea midline   Lungs:   Clear to auscultation bilaterally; no rales, rhonchi or wheezing; respirations unlabored    Heart[de-identified]   Regular rate and rhythm; no murmur, rub, or gallop  Abdomen:   Soft, non-tender, non-distended; normal bowel sounds; no masses, no organomegaly    Genitalia:   Deferred    Rectal:   Deferred    Extremities:  No cyanosis, clubbing or edema    Pulses:  2+ and symmetric    Skin:  No jaundice, rashes, or lesions    Lymph nodes:  No palpable cervical lymphadenopathy        Lab Results:   No visits with results within 1 Day(s) from this visit  Latest known visit with results is:   Office Visit on 10/06/2022   Component Date Value   • Hemoglobin A1C 10/06/2022 6 3          Radiology Results:   VAS lower limb venous duplex study, unilateral/limited    Result Date: 11/2/2022  Narrative:  [facility] REPORT [facility]INICAL: Indications: Patient presents with left lower extremity edema x several days   Operative History: 2016-09-02 Renal angiogram without intervention 2010-01-07 Right PTA VIDA & EIA with stents 2010-01-07 Left PTA EIA with stent 2010-01-07 Left Transluminal placement of iliac stent, percutaneous 2010-01-07 Right Transluminal placement of iliac stent, percutaneous 2010-01-07 Right Transluminal placement of iliac stent, percutaneous Risk Factors The patient has history of HTN, Diabetes (Yes), Hyperlipidemia, CKD, PAD and previous smoking (quit >10yrs ago)  CONCLUSION:  Impression: RIGHT LOWER LIMB LIMITED: Evaluation shows no evidence of thrombus in the common femoral vein  Doppler evaluation shows a normal response to augmentation maneuvers  LEFT LOWER LIMB: No evidence of acute or chronic deep vein thrombosis No evidence of superficial thrombophlebitis noted  Doppler evaluation shows a normal response to augmentation maneuvers  Popliteal, posterior tibial and anterior tibial arterial Doppler waveforms are triphasic/biphasic    SIGNATURE: Electronically Signed byYonathan Lamar on 2022-11-02 06:43:04 PM

## 2022-11-15 ENCOUNTER — APPOINTMENT (OUTPATIENT)
Dept: LAB | Facility: CLINIC | Age: 87
End: 2022-11-15

## 2022-11-15 DIAGNOSIS — Z79.4 TYPE 2 DIABETES MELLITUS WITH BOTH EYES AFFECTED BY MILD NONPROLIFERATIVE RETINOPATHY WITHOUT MACULAR EDEMA, WITH LONG-TERM CURRENT USE OF INSULIN (HCC): ICD-10-CM

## 2022-11-15 DIAGNOSIS — E11.3293 TYPE 2 DIABETES MELLITUS WITH BOTH EYES AFFECTED BY MILD NONPROLIFERATIVE RETINOPATHY WITHOUT MACULAR EDEMA, WITH LONG-TERM CURRENT USE OF INSULIN (HCC): ICD-10-CM

## 2022-11-15 DIAGNOSIS — I16.0 HYPERTENSIVE URGENCY: ICD-10-CM

## 2022-11-15 DIAGNOSIS — R10.13 EPIGASTRIC ABDOMINAL PAIN: ICD-10-CM

## 2022-11-15 DIAGNOSIS — I10 HYPERTENSION, UNSPECIFIED TYPE: ICD-10-CM

## 2022-11-15 LAB
ALBUMIN SERPL BCP-MCNC: 3.5 G/DL (ref 3.5–5)
ALP SERPL-CCNC: 77 U/L (ref 46–116)
ALT SERPL W P-5'-P-CCNC: 18 U/L (ref 12–78)
ANION GAP SERPL CALCULATED.3IONS-SCNC: 3 MMOL/L (ref 4–13)
AST SERPL W P-5'-P-CCNC: 19 U/L (ref 5–45)
BILIRUB SERPL-MCNC: 0.36 MG/DL (ref 0.2–1)
BUN SERPL-MCNC: 33 MG/DL (ref 5–25)
CALCIUM SERPL-MCNC: 10.7 MG/DL (ref 8.3–10.1)
CHLORIDE SERPL-SCNC: 102 MMOL/L (ref 96–108)
CHOLEST SERPL-MCNC: 199 MG/DL
CO2 SERPL-SCNC: 33 MMOL/L (ref 21–32)
CREAT SERPL-MCNC: 1.37 MG/DL (ref 0.6–1.3)
GFR SERPL CREATININE-BSD FRML MDRD: 34 ML/MIN/1.73SQ M
GLUCOSE P FAST SERPL-MCNC: 196 MG/DL (ref 65–99)
HDLC SERPL-MCNC: 94 MG/DL
LDLC SERPL CALC-MCNC: 82 MG/DL (ref 0–100)
NONHDLC SERPL-MCNC: 105 MG/DL
POTASSIUM SERPL-SCNC: 4.4 MMOL/L (ref 3.5–5.3)
PROT SERPL-MCNC: 8.8 G/DL (ref 6.4–8.4)
SODIUM SERPL-SCNC: 138 MMOL/L (ref 135–147)
TRIGL SERPL-MCNC: 117 MG/DL

## 2022-11-16 ENCOUNTER — APPOINTMENT (OUTPATIENT)
Dept: LAB | Facility: CLINIC | Age: 87
End: 2022-11-16

## 2022-11-16 ENCOUNTER — TELEPHONE (OUTPATIENT)
Dept: NEPHROLOGY | Facility: CLINIC | Age: 87
End: 2022-11-16

## 2022-11-16 LAB
CREAT UR-MCNC: 35.2 MG/DL
MICROALBUMIN UR-MCNC: 771 MG/L (ref 0–20)
MICROALBUMIN/CREAT 24H UR: 2190 MG/G CREATININE (ref 0–30)

## 2022-11-16 NOTE — TELEPHONE ENCOUNTER
Appointment Confirmation   Person confirmed appointment with  If not patient, name of the person Answering Machine    Date and time of appointment 11/17/22 3;00   Patient acknowledged and will be at appointment? no    Did you advise the patient that they will need a urine sample if they are a new patient?  N/A    Did you advise the patient to bring their current medications for verification? (including any OTC) Yes    Additional Information

## 2022-11-17 ENCOUNTER — OFFICE VISIT (OUTPATIENT)
Dept: NEPHROLOGY | Facility: CLINIC | Age: 87
End: 2022-11-17

## 2022-11-17 ENCOUNTER — APPOINTMENT (OUTPATIENT)
Dept: LAB | Facility: CLINIC | Age: 87
End: 2022-11-17

## 2022-11-17 VITALS
HEIGHT: 61 IN | WEIGHT: 126.6 LBS | DIASTOLIC BLOOD PRESSURE: 60 MMHG | HEART RATE: 82 BPM | SYSTOLIC BLOOD PRESSURE: 140 MMHG | BODY MASS INDEX: 23.9 KG/M2

## 2022-11-17 DIAGNOSIS — R10.13 EPIGASTRIC ABDOMINAL PAIN: ICD-10-CM

## 2022-11-17 DIAGNOSIS — R80.1 PERSISTENT PROTEINURIA: ICD-10-CM

## 2022-11-17 DIAGNOSIS — E83.52 HYPERCALCEMIA: ICD-10-CM

## 2022-11-17 DIAGNOSIS — I10 HYPERTENSION, UNSPECIFIED TYPE: Primary | ICD-10-CM

## 2022-11-17 DIAGNOSIS — N18.30 STAGE 3 CHRONIC KIDNEY DISEASE, UNSPECIFIED WHETHER STAGE 3A OR 3B CKD (HCC): ICD-10-CM

## 2022-11-17 NOTE — PROGRESS NOTES
NEPHROLOGY PROGRESS NOTE    Madi Epperson 80 y o  female MRN: [id number]  Unit/Bed#:  Encounter: [id number]  Reason for Consult:  Chronic kidney disease with proteinuria    Patient is here for follow-up with her  she is doing well and she really had no complaints but they did tell me that she has been having heartburn is started new medication for that and also left knee discomfort due to arthritis and otherwise she is eating well  ASSESSMENT/PLAN:  1  Renal    Patient has chronic kidney disease with proteinuria due to diabetic nephropathy  Blood sugars are under excellent control and creatinine is actually lower at 1 3 looking back 2 years there has been no significant progression  Protein estimation is a couple grams and is actually little bit lower than the past     The patient has excellent glycemic control    She does not take a statin due to reported allergies  Continue current medications  Monitor renal function  Continue with excellent glycemic control    2  Hypertension    In between last visit and now I was contacted by her  a few times for blood pressure that was fluctuating and elevated  At this point we have changed her medication she is on nifedipine XL 30 mg twice a day and her blood pressure is well controlled at home they monitored it is well controlled throughout the day so no changes  3  Hypercalcemia    The patient was noted to have hypercalcemia 1st time on this most recent chemistry  She is taking vitamin D3 and calcium supplement as well as calcitriol  The possibilities are that she has a suppressed parathyroid hormone resulting in adynamic bone disease which can cause hypercalcemia with a calcium just elevated to increase vitamin D and calcium intake  Given that is the 1st time were just going to start by holding the vitamin-D and calcium supplements and repeating the level in a couple months if it is still elevated we can pursue other etiologies      Discontinue vitamin D3 and calcium  Discontinue calcitriol  BMP in January to check calcium  Will check parathyroid hormone in follow-up  They were told to contact me if there is any problems or concerns before next visit  SUBJECTIVE:  Review of Systems   Constitutional: Negative for chills, diaphoresis, fever and malaise/fatigue  HENT: Negative  Eyes: Negative  Cardiovascular: Negative for chest pain, dyspnea on exertion, leg swelling and orthopnea  Respiratory: Negative  Negative for cough, shortness of breath, sputum production and wheezing  Musculoskeletal:        Left knee pain  Gastrointestinal: Positive for heartburn  Negative for abdominal pain, diarrhea, nausea and vomiting  Genitourinary: Negative for dysuria, flank pain, hematuria and incomplete emptying  Neurological: Negative for dizziness, focal weakness, headaches and weakness  Psychiatric/Behavioral: Negative for altered mental status, depression, hallucinations and hypervigilance  OBJECTIVE:  Current Weight: Weight - Scale: 57 4 kg (126 lb 9 6 oz)  [email or web address] com:     Blood pressure 140/60, pulse 82, height 5' 1" (1 549 m), weight 57 4 kg (126 lb 9 6 oz), not currently breastfeeding  , Body mass index is 23 92 kg/m²  [unfilled]    Physical Exam: /60 (BP Location: Left arm, Patient Position: Sitting, Cuff Size: Standard)   Pulse 82   Ht 5' 1" (1 549 m)   Wt 57 4 kg (126 lb 9 6 oz)   LMP  (LMP Unknown)   BMI 23 92 kg/m²   Physical Exam  Constitutional:       General: She is not in acute distress  Appearance: She is not toxic-appearing or diaphoretic  HENT:      Head: Normocephalic and atraumatic  Nose: Nose normal       Mouth/Throat:      Mouth: Mucous membranes are moist    Eyes:      General: No scleral icterus  Extraocular Movements: Extraocular movements intact  Cardiovascular:      Rate and Rhythm: Normal rate and regular rhythm  Heart sounds: No friction rub  No gallop  Comments: No significant edema  Pulmonary:      Effort: Pulmonary effort is normal  No respiratory distress  Breath sounds: No wheezing, rhonchi or rales  Abdominal:      General: Bowel sounds are normal  There is no distension  Palpations: Abdomen is soft  Tenderness: There is no abdominal tenderness  There is no rebound  Musculoskeletal:      Cervical back: Normal range of motion and neck supple  Neurological:      General: No focal deficit present  Mental Status: She is alert and oriented to person, place, and time  Mental status is at baseline  Psychiatric:         Mood and Affect: Mood normal          Behavior: Behavior normal          Thought Content:  Thought content normal          Judgment: Judgment normal          Medications:    Current Outpatient Medications:   •  Acetaminophen 325 MG CAPS, Take 500 mg by mouth every 8 (eight) hours, Disp: , Rfl:   •  aspirin (ADULT ASPIRIN EC LOW STRENGTH) 81 mg EC tablet, Take 1 tablet by mouth daily, Disp: , Rfl:   •  Blood Glucose Monitoring Suppl (FreeStyle Precision Jalen System) w/Device KIT, by Does not apply route 3 (three) times a day, Disp: 1 kit, Rfl: 0  •  calcitriol (ROCALTROL) 0 25 mcg capsule, Take one capsule every Tuesday and Friday, Disp: 10 capsule, Rfl: 6  •  calcium carbonate-vitamin D (OSCAL-D) 500 mg-200 units per tablet, Take 1 tablet by mouth daily with breakfast, Disp: , Rfl:   •  fluticasone (FLONASE) 50 mcg/act nasal spray, 2 sprays into each nostril daily, Disp: 16 g, Rfl: 2  •  gabapentin (Neurontin) 100 mg capsule, Take 1 capsule (100 mg total) by mouth 2 (two) times a day, Disp: 180 capsule, Rfl: 1  •  Lancets (ONETOUCH DELICA PLUS [id number]) MISC, 1 Device by Does not apply route 2 (two) times a day, Disp: 100 each, Rfl: 5  •  Lancets (ONETOUCH ULTRASOFT) lancets, by Other route 2 (two) times a day Use as instructed, Disp: 100 each, Rfl: 5  •  Lidocaine 4 % PTCH, Apply 1 patch topically daily, Disp: 20 patch, Rfl: 1  •  NIFEdipine ER (ADALAT CC) 30 MG 24 hr tablet, Take 1 tablet twice a day , Disp: 180 tablet, Rfl: 5  •  olopatadine (PATANOL) 0 1 % ophthalmic solution, Administer 1 drop to both eyes 2 (two) times a day, Disp: 5 mL, Rfl: 0  •  omeprazole (PriLOSEC) 40 MG capsule, Take 1 capsule (40 mg total) by mouth daily, Disp: 90 capsule, Rfl: 3  •  OneTouch Delica Lancets 62W MISC, Inject as directed 2 (two) times a day Use to test, Disp: 200 each, Rfl: 0  •  pravastatin (PRAVACHOL) 40 mg tablet, Take 1 tablet (40 mg total) by mouth daily, Disp: 90 tablet, Rfl: 3    Laboratory Results:  Lab Results   Component Value Date    WBC 7 07 11/15/2021    HGB 11 8 11/15/2021    HCT 37 6 11/15/2021    MCV 90 11/15/2021     11/15/2021     Lab Results   Component Value Date    SODIUM 138 11/15/2022    K 4 4 11/15/2022     11/15/2022    CO2 33 (H) 11/15/2022    BUN 33 (H) 11/15/2022    CREATININE 1 37 (H) 11/15/2022    GLUC 122 08/09/2022    CALCIUM 10 7 (H) 11/15/2022     Lab Results   Component Value Date    CALCIUM 10 7 (H) 11/15/2022    PHOS 4 2 (H) 04/08/2021     No results found for: LABPROT

## 2022-11-17 NOTE — PROGRESS NOTES
Hi,    Thank you for seeing Cristiандрейne Mira today  I was curious if the microalbumin results came in prior to your appointment       Sorry for the bother was going to talk to patient but wanted to see prior to calling her    Stormy

## 2022-11-17 NOTE — LETTER
November 17, 2022     Dhaval Jiménez MD  [facility]    Patient: Misha Clayton   YOB: 1934   Date of Visit: 11/17/2022       Dear Dr Melissa Plaza: Thank you for referring Misha Clayton to me for evaluation  Below are my notes for this consultation  If you have questions, please do not hesitate to call me  I look forward to following your patient along with you  Sincerely,        Miguel Johnson MD        CC: No Recipients  Miguel Johnson MD  11/17/2022  3:17 PM  Sign when Signing Visit  Etienne Auguste 80 y o  female MRN: [id number]  Unit/Bed#:  Encounter: [id number]  Reason for Consult:  Chronic kidney disease with proteinuria    Patient is here for follow-up with her  she is doing well and she really had no complaints but they did tell me that she has been having heartburn is started new medication for that and also left knee discomfort due to arthritis and otherwise she is eating well  ASSESSMENT/PLAN:  1  Renal    Patient has chronic kidney disease with proteinuria due to diabetic nephropathy  Blood sugars are under excellent control and creatinine is actually lower at 1 3 looking back 2 years there has been no significant progression  Protein estimation is a couple grams and is actually little bit lower than the past     The patient has excellent glycemic control    She does not take a statin due to reported allergies  Continue current medications  Monitor renal function  Continue with excellent glycemic control    2  Hypertension    In between last visit and now I was contacted by her  a few times for blood pressure that was fluctuating and elevated  At this point we have changed her medication she is on nifedipine XL 30 mg twice a day and her blood pressure is well controlled at home they monitored it is well controlled throughout the day so no changes      3  Hypercalcemia    The patient was noted to have hypercalcemia 1st time on this most recent chemistry  She is taking vitamin D3 and calcium supplement as well as calcitriol  The possibilities are that she has a suppressed parathyroid hormone resulting in adynamic bone disease which can cause hypercalcemia with a calcium just elevated to increase vitamin D and calcium intake  Given that is the 1st time were just going to start by holding the vitamin-D and calcium supplements and repeating the level in a couple months if it is still elevated we can pursue other etiologies  Discontinue vitamin D3 and calcium  Discontinue calcitriol  BMP in January to check calcium  Will check parathyroid hormone in follow-up  They were told to contact me if there is any problems or concerns before next visit  SUBJECTIVE:  Review of Systems   Constitutional: Negative for chills, diaphoresis, fever and malaise/fatigue  HENT: Negative  Eyes: Negative  Cardiovascular: Negative for chest pain, dyspnea on exertion, leg swelling and orthopnea  Respiratory: Negative  Negative for cough, shortness of breath, sputum production and wheezing  Musculoskeletal:        Left knee pain  Gastrointestinal: Positive for heartburn  Negative for abdominal pain, diarrhea, nausea and vomiting  Genitourinary: Negative for dysuria, flank pain, hematuria and incomplete emptying  Neurological: Negative for dizziness, focal weakness, headaches and weakness  Psychiatric/Behavioral: Negative for altered mental status, depression, hallucinations and hypervigilance  OBJECTIVE:  Current Weight: Weight - Scale: 57 4 kg (126 lb 9 6 oz)  [email or web address]:     Blood pressure 140/60, pulse 82, height 5' 1" (1 549 m), weight 57 4 kg (126 lb 9 6 oz), not currently breastfeeding  , Body mass index is 23 92 kg/m²      [unfilled]    Physical Exam: /60 (BP Location: Left arm, Patient Position: Sitting, Cuff Size: Standard)   Pulse 82   Ht 5' 1" (1 549 m)   Wt 57 4 kg (126 lb 9 6 oz)   LMP  (LMP Unknown)   BMI 23 92 kg/m²   Physical Exam  Constitutional:       General: She is not in acute distress  Appearance: She is not toxic-appearing or diaphoretic  HENT:      Head: Normocephalic and atraumatic  Nose: Nose normal       Mouth/Throat:      Mouth: Mucous membranes are moist    Eyes:      General: No scleral icterus  Extraocular Movements: Extraocular movements intact  Cardiovascular:      Rate and Rhythm: Normal rate and regular rhythm  Heart sounds: No friction rub  No gallop  Comments: No significant edema  Pulmonary:      Effort: Pulmonary effort is normal  No respiratory distress  Breath sounds: No wheezing, rhonchi or rales  Abdominal:      General: Bowel sounds are normal  There is no distension  Palpations: Abdomen is soft  Tenderness: There is no abdominal tenderness  There is no rebound  Musculoskeletal:      Cervical back: Normal range of motion and neck supple  Neurological:      General: No focal deficit present  Mental Status: She is alert and oriented to person, place, and time  Mental status is at baseline  Psychiatric:         Mood and Affect: Mood normal          Behavior: Behavior normal          Thought Content:  Thought content normal          Judgment: Judgment normal          Medications:    Current Outpatient Medications:   •  Acetaminophen 325 MG CAPS, Take 500 mg by mouth every 8 (eight) hours, Disp: , Rfl:   •  aspirin (ADULT ASPIRIN EC LOW STRENGTH) 81 mg EC tablet, Take 1 tablet by mouth daily, Disp: , Rfl:   •  Blood Glucose Monitoring Suppl (FreeStyle Precision Jalen System) w/Device KIT, by Does not apply route 3 (three) times a day, Disp: 1 kit, Rfl: 0  •  calcitriol (ROCALTROL) 0 25 mcg capsule, Take one capsule every Tuesday and Friday, Disp: 10 capsule, Rfl: 6  •  calcium carbonate-vitamin D (OSCAL-D) 500 mg-200 units per tablet, Take 1 tablet by mouth daily with breakfast, Disp: , Rfl:   • fluticasone (FLONASE) 50 mcg/act nasal spray, 2 sprays into each nostril daily, Disp: 16 g, Rfl: 2  •  gabapentin (Neurontin) 100 mg capsule, Take 1 capsule (100 mg total) by mouth 2 (two) times a day, Disp: 180 capsule, Rfl: 1  •  Lancets (ONETOUCH DELICA PLUS CVMMFQ31H) MISC, 1 Device by Does not apply route 2 (two) times a day, Disp: 100 each, Rfl: 5  •  Lancets (ONETOUCH ULTRASOFT) lancets, by Other route 2 (two) times a day Use as instructed, Disp: 100 each, Rfl: 5  •  Lidocaine 4 % PTCH, Apply 1 patch topically daily, Disp: 20 patch, Rfl: 1  •  NIFEdipine ER (ADALAT CC) 30 MG 24 hr tablet, Take 1 tablet twice a day , Disp: 180 tablet, Rfl: 5  •  olopatadine (PATANOL) 0 1 % ophthalmic solution, Administer 1 drop to both eyes 2 (two) times a day, Disp: 5 mL, Rfl: 0  •  omeprazole (PriLOSEC) 40 MG capsule, Take 1 capsule (40 mg total) by mouth daily, Disp: 90 capsule, Rfl: 3  •  OneTouch Delica Lancets 92Y MISC, Inject as directed 2 (two) times a day Use to test, Disp: 200 each, Rfl: 0  •  pravastatin (PRAVACHOL) 40 mg tablet, Take 1 tablet (40 mg total) by mouth daily, Disp: 90 tablet, Rfl: 3    Laboratory Results:  Lab Results   Component Value Date    WBC 7 07 11/15/2021    HGB 11 8 11/15/2021    HCT 37 6 11/15/2021    MCV 90 11/15/2021     11/15/2021     Lab Results   Component Value Date    SODIUM 138 11/15/2022    K 4 4 11/15/2022     11/15/2022    CO2 33 (H) 11/15/2022    BUN 33 (H) 11/15/2022    CREATININE 1 37 (H) 11/15/2022    GLUC 122 08/09/2022    CALCIUM 10 7 (H) 11/15/2022     Lab Results   Component Value Date    CALCIUM 10 7 (H) 11/15/2022    PHOS 4 2 (H) 04/08/2021     No results found for: LABPROT

## 2022-11-17 NOTE — PATIENT INSTRUCTIONS
You are here for follow-up you look great you told me been having issues really with heartburn and you were started on a new medication that your beginning soon  With respect your kidney function the creatinine is 1 3 which is 1 of the best it has been in the last couple years  With the protein in the urine and having diabetes is likely from diabetes but again there has been no progression and your doing extremely well with her blood sugar control  Also with communication from her  and you there has been some blood pressure medication adjustments in her blood pressures come under better control on her current regiment so that is great  Your calcium was high on this test and I have a suspicion it is related to the vitamin-D therapy  Right now your taking vitamin D3 and calcitriol which is also a form of vitamin D3  For now you could stop the vitamin D therapy and will just repeat the level and make sure it comes back to normal     1  Stop vitamin D and calcitriol  2  No other changes in medications  3  Labs and follow-up as scheduled please call if you have any questions or concerns before next visit

## 2022-11-18 ENCOUNTER — HOSPITAL ENCOUNTER (OUTPATIENT)
Dept: RADIOLOGY | Facility: HOSPITAL | Age: 87
Discharge: HOME | End: 2022-11-18
Attending: INTERNAL MEDICINE

## 2022-11-18 DIAGNOSIS — R10.13 EPIGASTRIC ABDOMINAL PAIN: ICD-10-CM

## 2022-11-18 LAB — H PYLORI AG STL QL IA: NEGATIVE

## 2022-11-22 ENCOUNTER — APPOINTMENT (OUTPATIENT)
Dept: LAB | Facility: CLINIC | Age: 87
End: 2022-11-22

## 2022-11-22 DIAGNOSIS — I10 BENIGN HYPERTENSION: ICD-10-CM

## 2022-11-23 LAB
ENDOMYSIUM IGA SER QL: NEGATIVE
GLIADIN PEPTIDE IGA SER-ACNC: 9 UNITS (ref 0–19)
GLIADIN PEPTIDE IGG SER-ACNC: 3 UNITS (ref 0–19)
IGA SERPL-MCNC: 700 MG/DL (ref 64–422)
TTG IGA SER-ACNC: <2 U/ML (ref 0–3)
TTG IGG SER-ACNC: <2 U/ML (ref 0–5)

## 2022-11-28 ENCOUNTER — HOSPITAL ENCOUNTER (OUTPATIENT)
Dept: NON INVASIVE DIAGNOSTICS | Facility: CLINIC | Age: 87
Discharge: HOME | End: 2022-11-28

## 2022-11-28 DIAGNOSIS — K55.1 MESENTERIC ARTERY STENOSIS (HCC): ICD-10-CM

## 2022-12-07 ENCOUNTER — OFFICE VISIT (OUTPATIENT)
Dept: VASCULAR SURGERY | Facility: CLINIC | Age: 87
End: 2022-12-07

## 2022-12-07 VITALS
SYSTOLIC BLOOD PRESSURE: 130 MMHG | DIASTOLIC BLOOD PRESSURE: 74 MMHG | WEIGHT: 125.4 LBS | HEART RATE: 84 BPM | HEIGHT: 61 IN | BODY MASS INDEX: 23.68 KG/M2

## 2022-12-07 DIAGNOSIS — I70.213 ATHEROSCLEROSIS OF NATIVE ARTERY OF BOTH LOWER EXTREMITIES WITH INTERMITTENT CLAUDICATION (HCC): Primary | ICD-10-CM

## 2022-12-07 DIAGNOSIS — K55.1 MESENTERIC ARTERY STENOSIS (HCC): ICD-10-CM

## 2022-12-07 DIAGNOSIS — M79.89 LEFT LEG SWELLING: ICD-10-CM

## 2022-12-07 NOTE — ASSESSMENT & PLAN NOTE
Extremity arterial duplex appears to be relatively stable  Patient's complaint of lower extremity pain appears to be secondary to osteoarthritis and swelling of the knee  Is not consistent with claudication-like pain or rest pain  Continue with aspirin 81 mg as well as her statin therapy    We will repeat lower extremity duplex in 1 year

## 2022-12-07 NOTE — ASSESSMENT & PLAN NOTE
59-year-old female with complaints of left lower extremity swelling and pain  Patient states her pain has been for roughly 2 months  When she is describing her pain she is pointing and rubbing her knee mostly  There is some pain in the upper portion of her posterior calf however majority of her pain appears to be focused at the knee itself  There is tenderness around the knee and there is also pain and tenderness of the right knee  This however is less severe than the left side  LEAD's appear relatively stable with some progression of disease  Lower extremity venous duplex showed no sign of DVT  This is not consistent with claudication-like symptoms or rest pain secondary to atherosclerotic disease  This appears to be likely osteoarthritic however will defer that management to her primary care physician

## 2022-12-07 NOTE — ASSESSMENT & PLAN NOTE
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%  Patient has been seen gastroenterology and being worked out for her abdominal pain and bloating  She states she has her pain about 10 minutes after eating with abdominal bloating  She has changed her diet to gluten-free diet without concerns for celiac disease however she is still having this pain  She is also had a weight loss of roughly 8 pounds unintentionally over the past 6 months  The patient has had some work-up done by the gastroenterologist has not had follow-up yet however I am concerned patient may have symptoms of chronic mesenteric ischemia  I would like to reach out to his gastroenterologist to discuss potential upper endoscopy rule out gastritis versus PUD and if anything from her lab work is of concern before considering a intervention for her SMA

## 2022-12-07 NOTE — PROGRESS NOTES
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)  Extremity arterial duplex appears to be relatively stable  Patient's complaint of lower extremity pain appears to be secondary to osteoarthritis and swelling of the knee  Is not consistent with claudication-like pain or rest pain  Continue with aspirin 81 mg as well as her statin therapy  We will repeat lower extremity duplex in 1 year    Left leg swelling  80year-old female with complaints of left lower extremity swelling and pain  Patient states her pain has been for roughly 2 months  When she is describing her pain she is pointing and rubbing her knee mostly  There is some pain in the upper portion of her posterior calf however majority of her pain appears to be focused at the knee itself  There is tenderness around the knee and there is also pain and tenderness of the right knee  This however is less severe than the left side  LEAD's appear relatively stable with some progression of disease  Lower extremity venous duplex showed no sign of DVT  This is not consistent with claudication-like symptoms or rest pain secondary to atherosclerotic disease  This appears to be likely osteoarthritic however will defer that management to her primary care physician  Mesenteric artery stenosis [facility])  75-year-old female with postprandial pain and 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%  Patient has been seen gastroenterology and being worked out for her abdominal pain and bloating  She states she has her pain about 10 minutes after eating with abdominal bloating  She has changed her diet to gluten-free diet without concerns for celiac disease however she is still having this pain  She is also had a weight loss of roughly 8 pounds unintentionally over the past 6 months        The patient has had some work-up done by the gastroenterologist has not had follow-up yet however I am concerned patient may have symptoms of chronic mesenteric ischemia  I would like to reach out to his gastroenterologist to discuss potential upper endoscopy rule out gastritis versus PUD and if anything from her lab work is of concern before considering a intervention for her SMA  Diagnoses and all orders for this visit:    Atherosclerosis of native artery of both lower extremities with intermittent claudication (HCC)    Left leg swelling    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HCC)          Subjective:      Patient ID: Trena Snellen is a 80 y o  female  Pt is here to rev LEVDR 11/222 and Mesenteric 11/28/22  Pt c/o LL swelling in knee and L calf pain  Pt says she has been having abd pain and bloating  Pt is taking ASA and Pravastatin  Pt is a former smoker  HPI  80-year female with a history of right lung adenocarcinoma, diabetes, hypertension, PAD status post bilateral common and external iliac stents placed in 2007, and 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% presented to the office today with complaints of left lower extremity pain as well as postprandial pain with weight loss  Patient is stating that for the past 2 months she has had pain in her left knee and posterior calf  She states it mostly during walking  She has not had any accidents, no signs of trauma  She had some swelling of the bilateral lower extremities she also states her right knee is painful to but not nearly as much of the left knee  Lower extremity venous duplex showed no DVT  Although the patient does have claudication-like symptoms her pain is new and different from the claudication symptoms  Patient is also stating she has had 6 months of abdominal bloating and pain after eating  She states this happens roughly 10 minutes  She is stating she is change her diet to gluten-free diet and the concern for celiac disease however she still continued to have pain  She has had 8 pound weight loss over the past 6 months    She has been worked up by gastroenterology and her family doctor  The following portions of the patient's history were reviewed and updated as appropriate: allergies, current medications, past family history, past medical history, past social history, past surgical history and problem list     I have spent 30 minutes with Patient and family today in which greater than 50% of this time was spent in counseling/coordination of care regarding Diagnostic results, Prognosis, Risks and benefits of tx options, Intructions for management, Patient and family education, Importance of tx compliance, Risk factor reductions and Impressions  Review of Systems   Constitutional: Negative  HENT: Negative  Eyes: Negative  Respiratory: Negative  Cardiovascular: Positive for leg swelling  Gastrointestinal: Positive for abdominal pain  Endocrine: Negative  Genitourinary: Negative  Musculoskeletal: Negative  LL pain   Skin: Negative  Allergic/Immunologic: Negative  Neurological: Negative  Hematological: Negative  Psychiatric/Behavioral: Negative  Objective:      /74 (BP Location: Right arm, Patient Position: Sitting, Cuff Size: Standard)   Pulse 84   Ht 5' 1" (1 549 m)   Wt 56 9 kg (125 lb 6 4 oz)   LMP  (LMP Unknown)   BMI 23 69 kg/m²          Physical Exam  Constitutional:       Appearance: Normal appearance  HENT:      Head: Normocephalic  Mouth/Throat:      Mouth: Mucous membranes are moist       Pharynx: Oropharynx is clear  Eyes:      Extraocular Movements: Extraocular movements intact  Pupils: Pupils are equal, round, and reactive to light  Cardiovascular:      Rate and Rhythm: Normal rate and regular rhythm  Pulmonary:      Effort: Pulmonary effort is normal    Abdominal:      General: Abdomen is flat  Tenderness: There is no abdominal tenderness  Musculoskeletal:      Cervical back: Normal range of motion  Right lower leg: Edema present  Left lower leg: Edema present  Comments: Bilateral knees with some mild to moderate swelling with left worse than right  Left knee tender to palpation  Very mild to no tenderness in the posterior calf of the bilateral legs  No tenderness in the anterior compartments  Palpable DP pulse bilaterally  Skin:     General: Skin is warm and dry  Neurological:      General: No focal deficit present  Mental Status: She is alert and oriented to person, place, and time     Psychiatric:         Mood and Affect: Mood normal          Behavior: Behavior normal

## 2022-12-08 ENCOUNTER — PREP FOR PROCEDURE (OUTPATIENT)
Dept: GASTROENTEROLOGY | Facility: CLINIC | Age: 87
End: 2022-12-08

## 2022-12-08 DIAGNOSIS — R10.13 EPIGASTRIC ABDOMINAL PAIN: Primary | ICD-10-CM

## 2022-12-13 ENCOUNTER — TELEPHONE (OUTPATIENT)
Dept: GASTROENTEROLOGY | Facility: CLINIC | Age: 87
End: 2022-12-13

## 2022-12-13 NOTE — TELEPHONE ENCOUNTER
----- Message from Adiel Sprague sent at 12/13/2022 12:20 PM EST -----    ----- Message -----  From: Gilberto Gonzalez MA  Sent: 12/8/2022   8:42 AM EST  To: , #      ----- Message -----  From: Angie Neville DO  Sent: 12/8/2022   8:00 AM EST  To: , #    Please schedule [facility]  If I do not have any availability any physician is fine

## 2022-12-13 NOTE — TELEPHONE ENCOUNTER
With assistance from , [facility] for pt to callback to schedule EGD at [facility]  Pt is diabetic

## 2022-12-14 NOTE — TELEPHONE ENCOUNTER
Patients GI provider:  Dr Carmen Ely    Number to return call: [phone]    Reason for call: Pt spouse, Yadiel Meng called to set up endoscopy  I scheduled EGD for 2/23  He states he needs to know the time of the procedure to assure she can be there      Scheduled procedure/appointment date if applicable: procedure 2/50

## 2022-12-15 NOTE — TELEPHONE ENCOUNTER
LMOM for pt's significant other, Zaynab Rasconp - advised no time assigned for procedure, but will be scheduled in AM

## 2022-12-17 ENCOUNTER — HOSPITAL ENCOUNTER (OUTPATIENT)
Dept: RADIOLOGY | Facility: HOSPITAL | Age: 87
Discharge: HOME | End: 2022-12-17

## 2022-12-17 ENCOUNTER — APPOINTMENT (OUTPATIENT)
Dept: LAB | Facility: CLINIC | Age: 87
End: 2022-12-17

## 2022-12-17 DIAGNOSIS — M25.562 PAIN AND SWELLING OF LEFT KNEE: Primary | ICD-10-CM

## 2022-12-17 DIAGNOSIS — M25.462 PAIN AND SWELLING OF LEFT KNEE: Primary | ICD-10-CM

## 2022-12-17 DIAGNOSIS — M25.562 PAIN AND SWELLING OF LEFT KNEE: ICD-10-CM

## 2022-12-17 DIAGNOSIS — M25.462 PAIN AND SWELLING OF LEFT KNEE: ICD-10-CM

## 2022-12-17 LAB
ERYTHROCYTE [SEDIMENTATION RATE] IN BLOOD: 95 MM/HOUR (ref 0–29)
URATE SERPL-MCNC: 6.7 MG/DL (ref 2–7.5)

## 2022-12-17 RX ORDER — METHYLPREDNISOLONE 4 MG/1
TABLET ORAL
Qty: 21 EACH | Refills: 0 | Status: SHIPPED | OUTPATIENT
Start: 2022-12-17 | End: 2023-02-23

## 2022-12-20 ENCOUNTER — TELEPHONE (OUTPATIENT)
Dept: FAMILY MEDICINE CLINIC | Facility: CLINIC | Age: 87
End: 2022-12-20

## 2022-12-20 LAB
LEFT EYE DIABETIC RETINOPATHY: POSITIVE
RIGHT EYE DIABETIC RETINOPATHY: POSITIVE

## 2022-12-20 NOTE — TELEPHONE ENCOUNTER
Please see how the patient is feeling on steroid pack    Advised her that the x-ray did show little bit of narrowing of arthritis and I recommend her seeing the orthopedic like we discussed

## 2022-12-21 NOTE — TELEPHONE ENCOUNTER
I called and spoke with pt she is aware   She did say that she is feeling a little better on the steroid pack

## 2022-12-23 ENCOUNTER — HOSPITAL ENCOUNTER (OUTPATIENT)
Dept: RADIOLOGY | Facility: HOSPITAL | Age: 87
Discharge: HOME | End: 2022-12-23
Attending: RADIOLOGY

## 2022-12-23 DIAGNOSIS — C34.91 ADENOCARCINOMA, LUNG, RIGHT (HCC): ICD-10-CM

## 2023-01-12 ENCOUNTER — DOCUMENTATION (OUTPATIENT)
Dept: HEMATOLOGY ONCOLOGY | Facility: CLINIC | Age: 88
End: 2023-01-12

## 2023-01-12 ENCOUNTER — RADIATION ONCOLOGY FOLLOW-UP (OUTPATIENT)
Dept: RADIATION ONCOLOGY | Facility: HOSPITAL | Age: 88
End: 2023-01-12
Attending: RADIOLOGY

## 2023-01-12 VITALS
TEMPERATURE: 96.3 F | DIASTOLIC BLOOD PRESSURE: 62 MMHG | WEIGHT: 121.8 LBS | SYSTOLIC BLOOD PRESSURE: 126 MMHG | BODY MASS INDEX: 23 KG/M2 | HEART RATE: 83 BPM | HEIGHT: 61 IN | OXYGEN SATURATION: 96 % | RESPIRATION RATE: 20 BRPM

## 2023-01-12 DIAGNOSIS — C34.91 ADENOCARCINOMA, LUNG, RIGHT (HCC): Primary | ICD-10-CM

## 2023-01-12 NOTE — PROGRESS NOTES
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  She completed definitive radiation (15 fractions) on 2/8/22  She was last seen for follow-up on 8/25/22  She returns today for follow-up  10/19/22 Feli Heredia NP-  Breast cancer f/u  No concerns on exam  No longer obtaining mammogram due to advanced age  F/u 6 months    11/8/22 Dr Rodri De Santiago from August looks stable although concerning area in her fissure  F/u CT 12/23  Right scapular pain which has been persistent over 6 months  Referred to PT and instructed on stretching and heat  12/23/22 CT chest-  -New 1 cm pleural nodule at the junction of the right major and minor fissures since August 2022 with multifocal right pleural thickening which has increased since October 2021, likely pleural metastases   -No change in 3 2 x 2 3 cm right middle lobe mass, question residual tumor   -Slight increase in right pleural effusion   -Stable bilateral lung nodules since October 2021  No pulmonary follow-up scheduled    Appts  2 23 23  GI Endo   3 10 23   Rad Dexa   3 22 23   GI [place]   4 6 23     PCP   4 20 23   Surg Onc   5 17 23  Nephro       Follow up visit     Oncology History   History of left breast cancer   8/10/2018 Biopsy    Left breast biopsy  Invasive mucinous  Grade 1      Her 2 1+     9/11/2018 Surgery    Left lumpectomy  Invasive mucinous carcinoma  Grade 1  1 1 cm in size  Margins negative      Her 2 1+  Stage 1A     Adenocarcinoma, lung, right ([address] )   11/2021 Initial Diagnosis    Adenocarcinoma, lung, right ([facility] [address] )     11/15/2021 Biopsy    Lung, Right Middle Lobe, mass:  - Adenocarcinoma  Comment: Immunohistochemistry is positive in the tumor cells for TTF-1 and negative for p40 and Gata3  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 a primary pulmonary adenocarcinoma       1/19/2022 - 2/8/2022 Radiation    Course: C1    Plan ID Energy Fractions Dose per Fraction (cGy) Dose Correction (cGy) Total Dose Delivered (cGy) Elapsed Days   RML 6X 15 / 15 400 0 6,000 20      Treatment dates:  1/19/2022 - 2/8/2022          Review of Systems:  Review of Systems   Constitutional: Positive for appetite change (Decreased ) and unexpected weight change (Decreased )  Endoscopy/GI   Feb 2023 planned    HENT: Negative  Eyes: Negative  Respiratory: Positive for cough and shortness of breath  Chest discomfort w/coughing      Cardiovascular: Negative  Gastrointestinal: Positive for constipation (Chronic )  "Belly pain w/eating" x mons  Endocrine: Negative  Genitourinary: Negative  Musculoskeletal: Positive for back pain (Chronic )  Skin: Negative  Allergic/Immunologic: Negative  Neurological: Positive for dizziness (Occasional ) and light-headedness (Occasional )  Hematological: Negative  Psychiatric/Behavioral: Negative          Clinical Trial: no    Teaching    Health Maintenance   Topic Date Due   • Cervical Cancer Screening  Never done   • PT PLAN OF CARE  02/06/2020   • COVID-19 Vaccine (4 - Booster for Pfizer series) 02/15/2022   • Breast Cancer Survivorship  01/17/2023   • Colorectal Surgery Screening  02/28/2023   • Onc DXA Scan  03/18/2023   • HEMOGLOBIN A1C  04/06/2023   • Influenza Vaccine (1) 06/30/2023 (Originally 9/1/2022)   • Pneumococcal Vaccine: 65+ Years (3 - PCV) 08/11/2023 (Originally 11/20/2015)   • Depression Screening  10/06/2023   • Medicare Annual Wellness Visit (AWV)  10/06/2023   • Fall Risk  10/23/2023   • Urinary Incontinence Screening  10/23/2023   • Diabetic Foot Exam  10/23/2023   • Kidney Health Evaluation: GFR  11/15/2023   • Kidney Health Evaluation: Microalbumin/Creatinine Ratio  11/16/2023   • BMI: Adult  12/07/2023   • DM Eye Exam  12/20/2023   • HIB Vaccine  Aged Out   • IPV Vaccine  Aged Out   • Hepatitis A Vaccine  Aged Out • Meningococcal ACWY Vaccine  Aged Out   • HPV Vaccine  Aged Out   • ONC Physical Therapy Referral  Discontinued     Patient Active Problem List   Diagnosis   • Iliac artery stenosis, bilateral ([facility])   • GERD (gastroesophageal reflux disease)   • Arthritis   • Hypercholesterolemia   • Hypertension   • EKG abnormalities   • Anemia   • Neck pain   • Dysuria   • Chronic left shoulder pain   • Fall from other slipping, tripping, or stumbling   • Abnormal EKG   • Abnormal mammogram   • Atherosclerosis of native artery of extremity with intermittent claudication ([facility])   • Back pain   • Bilateral carotid artery stenosis   • Carotid stenosis, asymptomatic   • 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   • Chronic myofascial pain   • Chronic pain of lower extremity   • CKD (chronic kidney disease) stage 3, GFR 30-59 ml/min ([facility])   • Diabetic retinopathy ([facility] [address] )   • DMII (diabetes mellitus, type 2) ([facility])   • Gout   • Hypomagnesemia   • Atherosclerosis of other specified arteries   • Lymphadenopathy   • Nephrolithiasis   • Osteoporosis   • PAD (peripheral artery disease) ([facility])   • Primary generalized (osteo)arthritis   • Persistent proteinuria   • Renal cyst   • Vaginal prolapse   • Vitamin D deficiency   • Acquired complex cyst of kidney   • History of left breast cancer   • Stricture of artery ([facility])   • Claudication in peripheral vascular disease ([facility])   • Hemorrhoids   • Tinea unguium   • Diabetic neuropathy ([facility])   • Bradycardia   • Glottic insufficiency   • Reflux laryngitis   • Paresis of left vocal fold   • Muscle tension dysphonia   • Laryngocele   • Anterior glottic web   • Viral upper respiratory tract infection   • 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[facility])   • Renal artery stenosis ([facility])   • Left ear pain   • Cervical spine disease   • Osteopenia of multiple sites   • Type 2 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, bilateral ([facility])   • Lung mass   • CKD (chronic kidney disease)   • Adenocarcinoma, lung, right ([facility])   • Encounter for annual routine gynecological examination   • Encounter for screening mammogram for breast cancer   • History of cancer of left breast   • Dense breast tissue on mammogram   • Left leg swelling   • Hypercalcemia     Past Medical History:   Diagnosis Date   • Arthritis    • Back pain    • Benign essential hypertension     LAST ASSESSED: [id number]   • Carotid artery stenosis    • CKD (chronic kidney disease) stage 2, GFR 60-89 ml/min    • Constipation, chronic     "drinks prune juice"   • Diabetes mellitus ([facility])    • DMII (diabetes mellitus, type 2) ([facility])    • Epiglottitis     RESOLVED: [id number]   • Gastritis    • GERD (gastroesophageal reflux disease)    • Gout    • H/O blood clots     left lower leg,,,11 yrs ago   • History of stenosis of renal artery     RESOLVED: [id number]   • Hypercholesteremia    • Hypercholesterolemia    • Hyperlipidemia    • Hyperparathyroidism ([facility] [address] )    • Hypertension    • Iliac artery stenosis, bilateral ([facility])    • Kidney stone    • Lobular carcinoma ([facility] [address] ) 09/06/2017    Left   • Lung cancer ([facility] [address] )    • Mucinous carcinoma of breast, left ([facility] [address] ) 09/11/2018    Left   • Presence of pessary    • Renal artery stenosis ([facility])    • Renal disorder    • Renovascular hypertension     RESOLVED: [id number]   • Segmental and somatic dysfunction of cervical region     RESOLVED: 94PTJ3366   • Segmental and somatic dysfunction of lumbar region     RESOLVED: [id number]   • Segmental and somatic dysfunction of pelvic region     RESOLVED: 75XZR1666   • Segmental and somatic dysfunction of thoracic region     RESOLVED: 75MCH0036   • Somatic dysfunction of abdominal region     RESOLVED: 71KLK3238   • Somatic dysfunction of head region     RESOLVED: 41AWV0765   • Somatic dysfunction of lower extremities     RESOLVED: 13SKS2359   • Somatic dysfunction of rib region     RESOLVED: 79JVW4394   • Somatic dysfunction of thoracic region RESOLVED: [id number]   • Somatic dysfunction of upper extremities     RESOLVED: 62FCL2210   • Teeth missing      Past Surgical History:   Procedure Laterality Date   • ANGIOPLASTY / STENTING ILIAC     • BREAST BIOPSY Left 08/10/2018    U/S BX- mucinous ca   • BREAST BIOPSY Left 08/07/2017    U/S BX   • BREAST LUMPECTOMY Left 09/06/2017    LAST ASSESSED: [id number]   • BREAST LUMPECTOMY Left 09/11/2018   • BREAST SURGERY Left     biopsy   • CATARACT EXTRACTION Bilateral    • CHOLECYSTECTOMY     • COLONOSCOPY     • EYE SURGERY     • ILIAC VEIN ANGIOPLASTY / STENTING Right     INITIAL STENOSIS: ONSET: [id number]   • IR BIOPSY LUNG  11/15/2021   • KNEE ARTHROSCOPY Right    • KNEE SURGERY Right     ONSET: [id number]   • MAMMO NEEDLE LOCALIZATION LEFT (ALL INC) Left 9/11/2018   • MAMMO NEEDLE LOCALIZATION LEFT (ALL INC) Left 9/6/2017   • MO ESOPHAGOGASTRODUODENOSCOPY TRANSORAL DIAGNOSTIC N/A 7/26/2018    Procedure: ESOPHAGOGASTRODUODENOSCOPY (EGD); Surgeon: Kranthi Koch DO;  Location: [facility]; Service: Gastroenterology   • MO LARYNGOSCOPY,DIRCT,[facility] N/A 6/10/2016    Procedure: MICRO DIRECT LARYNGOSCOPY WITH VOCAL FOLD MASS EXCISION ;  Surgeon: Nirali May MD;  Location: [facility];  Service: ENT   • MO MASTECTOMY, PARTIAL Left 9/6/2017    Procedure: LUMPECTOMY BREAST NEEDLE LOCALIZED WITH FAXITRON NEEDLE @ 0830;  Surgeon: Fercho Samuel MD;  Location: [facility];  Service: General   • MO PERQ DEVICE PLACEMT BREAST LOC 1ST LES MARY Bowman Left 9/11/2018    Procedure: BREAST LUMPECTOMY; BREAST NEEDLE LOCALIZATION (NEEDLE LOC AT 1330);   Surgeon: Herminia Reeves MD;  Location: [facility];  Service: Surgical Oncology   • THROAT SURGERY      lump removed   • TUBAL LIGATION     • UPPER GASTROINTESTINAL ENDOSCOPY     • US GUIDANCE BREAST BIOPSY LEFT EACH ADDITIONAL Left 8/7/2017   • US GUIDED BREAST BIOPSY LEFT COMPLETE Left 8/10/2018   • US GUIDED BREAST BIOPSY LEFT COMPLETE Left 8/7/2017   • VASCULAR SURGERY Family History   Problem Relation Age of Onset   • Heart disease Brother    • Heart disease Maternal Grandmother    • Diabetes Other         of other type with arthropathy, without long term current use of insulin   • Hypertension Other    • No Known Problems Mother    • Gout Family    • Rheum arthritis Family    • Lupus Family         systematic erythematosus   • Heart disease Family    • Stroke Family         syndrome   • Stroke Maternal Uncle         syndrome   • Stroke Maternal Aunt         syndrome   • No Known Problems Father    • No Known Problems Daughter    • No Known Problems Maternal Grandfather    • No Known Problems Paternal Grandmother    • No Known Problems Paternal Grandfather    • No Known Problems Daughter    • No Known Problems Daughter    • No Known Problems Maternal Aunt    • No Known Problems Maternal Aunt    • No Known Problems Maternal Aunt      Social History     Socioeconomic History   • Marital status:      Spouse name: Not on file   • Number of children: Not on file   • Years of education: Not on file   • Highest education level: Not on file   Occupational History   • Occupation: retired   Tobacco Use   • Smoking status: Former     Packs/day: 1 00     Years: 55 00     Pack years: 55 00     Types: Cigarettes     Start date:      Quit date:      Years since quittin 0   • Smokeless tobacco: Never   Vaping Use   • Vaping Use: Never used   Substance and Sexual Activity   • Alcohol use: Not Currently   • Drug use: No   • Sexual activity: Yes     Partners: Male   Other Topics Concern   • Not on file   Social History Narrative    Daily caffeine consumption, 1 serving a day      Social Determinants of Health     Financial Resource Strain: Low Risk    • Difficulty of Paying Living Expenses: Not hard at all   Food Insecurity: Not on file   Transportation Needs: No Transportation Needs   • Lack of Transportation (Medical): No   • Lack of Transportation (Non-Medical):  No Physical Activity: Not on file   Stress: Not on file   Social Connections: Not on file   Intimate Partner Violence: Not on file   Housing Stability: Not on file       Current Outpatient Medications:   •  aspirin (ADULT ASPIRIN EC LOW STRENGTH) 81 mg EC tablet, Take 1 tablet by mouth daily, Disp: , Rfl:   •  Blood Glucose Monitoring Suppl (FreeStyle Precision Jalen System) w/Device KIT, by Does not apply route 3 (three) times a day, Disp: 1 kit, Rfl: 0  •  fluticasone (FLONASE) 50 mcg/act nasal spray, 2 sprays into each nostril daily, Disp: 16 g, Rfl: 2  •  gabapentin (Neurontin) 100 mg capsule, Take 1 capsule (100 mg total) by mouth 2 (two) times a day, Disp: 180 capsule, Rfl: 1  •  Lancets (ONETOUCH DELICA PLUS [id number]) MISC, 1 Device by Does not apply route 2 (two) times a day, Disp: 100 each, Rfl: 5  •  Lancets (ONETOUCH ULTRASOFT) lancets, by Other route 2 (two) times a day Use as instructed, Disp: 100 each, Rfl: 5  •  Lidocaine 4 % PTCH, Apply 1 patch topically daily, Disp: 20 patch, Rfl: 1  •  NIFEdipine ER (ADALAT CC) 30 MG 24 hr tablet, Take 1 tablet twice a day , Disp: 180 tablet, Rfl: 5  •  olopatadine (PATANOL) 0 1 % ophthalmic solution, Administer 1 drop to both eyes 2 (two) times a day, Disp: 5 mL, Rfl: 0  •  omeprazole (PriLOSEC) 40 MG capsule, Take 1 capsule (40 mg total) by mouth daily, Disp: 90 capsule, Rfl: 3  •  OneTouch Delica Lancets 59M MISC, Inject as directed 2 (two) times a day Use to test, Disp: 200 each, Rfl: 0  •  pravastatin (PRAVACHOL) 40 mg tablet, Take 1 tablet (40 mg total) by mouth daily, Disp: 90 tablet, Rfl: 3  •  Acetaminophen 325 MG CAPS, Take 500 mg by mouth every 8 (eight) hours (Patient not taking: Reported on 12/7/2022), Disp: , Rfl:   •  methylPREDNISolone 4 MG tablet therapy pack, Use as directed on package (Patient not taking: Reported on 1/12/2023), Disp: 21 each, Rfl: 0  Allergies   Allergen Reactions   • Clonidine Derivatives Swelling   • Diflucan [Fluconazole] Rash • Flagyl [Metronidazole] Anaphylaxis   • Carvedilol Hives     And legs swelled   • Latex Rash   • Lipitor [Atorvastatin]      Legs swelled   • Other Palpitations     IV DYE   • Shingrix [Zoster Vac Recomb Adjuvanted] Hives     Vitals:    01/12/23 1454   BP: 126/62   Pulse: 83   Resp: 20   Temp: (!) 96 3 °F (35 7 °C)   SpO2: 96%   Weight: 55 2 kg (121 lb 12 8 oz)   Height: 5' 1" (1 549 m)      Pain Score:   6

## 2023-01-12 NOTE — PROGRESS NOTES
In-basket message received from Dr Kasandra Ingram to add pt to 1/16/23 tumor board  Chart Reviewed and tumor board prep completed

## 2023-01-12 NOTE — PROGRESS NOTES
Chart reviewed in preparation of Thoracic Tumor Conference presentation by Dr Valdemar Douglas on 1/16/2023  Patient has a history of stage IA left breast cancer s/p left lumpectomy and stage IB adenocarcinoma of the right middle lobe s/p definitive radiation completed on 2/8/2022 8/19/2022 chest CT demonstrated thickening of the right major fissure, requiring ongoing surveillance  She had repeat chest CT on 12/23/2022

## 2023-01-12 NOTE — PROGRESS NOTES
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  She completed definitive radiation (15 fractions) on 2/8/22      Interval History:  10/19/22 July Caldwell NP-  Breast cancer f/u  No concerns on exam  No longer obtaining mammogram due to advanced age  F/u 6 months     11/8/22 Dr Rebeca Rollins from August looks stable although concerning area in her fissure  F/u CT 12/23  Right scapular pain which has been persistent over 6 months  Referred to PT and instructed on stretching and heat      12/23/22 CT chest-  -New 1 cm pleural nodule at the junction of the right major and minor fissures since August 2022 with multifocal right pleural thickening which has increased since October 2021, likely pleural metastases   -No change in 3 2 x 2 3 cm right middle lobe mass, question residual tumor   -Slight increase in right pleural effusion   -Stable bilateral lung nodules since October 2021      No pulmonary follow-up scheduled     Appts  2 23 23  GI Endo   3 10 23   Rad Dexa   3 22 23   GI Chaput   4 6 23     PCP   4 20 23   Surg Onc   5 17 23  Nephro     Upon interview, she endorses the above history  She has been experiencing increasing shortness of breath and has persistent right subscapular discomfort  She has had 4 lbs weight loss  She denies bothersome cough  She denies headaches, nausea/vomiting, focal numbness/weakness/tingling or visual disturbances  She is without additional acute concerns          Historical Information Oncology History   History of left breast cancer   8/10/2018 Biopsy    Left breast biopsy  Invasive mucinous  Grade 1      Her 2 1+     9/11/2018 Surgery    Left lumpectomy  Invasive mucinous carcinoma  Grade 1  1 1 cm in size  Margins negative      Her 2 1+  Stage 1A     Adenocarcinoma, lung, right ([facility] )   11/2021 Initial Diagnosis    Adenocarcinoma, lung, right ([facility] )     11/15/2021 Biopsy    Lung, Right Middle Lobe, mass:  - Adenocarcinoma  Comment: Immunohistochemistry is positive in the tumor cells for TTF-1 and negative for p40 and Gata3  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 a primary pulmonary adenocarcinoma       1/19/2022 - 2/8/2022 Radiation    Course: C1    Plan ID Energy Fractions Dose per Fraction (cGy) Dose Correction (cGy) Total Dose Delivered (cGy) Elapsed Days   RML 6X 15 / 15 400 0 6,000 20      Treatment dates:  1/19/2022 - 2/8/2022          Past Medical History:   Diagnosis Date   • Arthritis    • Back pain    • Benign essential hypertension     LAST ASSESSED: [id number]   • Carotid artery stenosis    • CKD (chronic kidney disease) stage 2, GFR 60-89 ml/min    • Constipation, chronic     "drinks prune juice"   • Diabetes mellitus (HCC)    • DMII (diabetes mellitus, type 2) (HCC)    • Epiglottitis     RESOLVED: [id number]   • Gastritis    • GERD (gastroesophageal reflux disease)    • Gout    • H/O blood clots     left lower leg,,,11 yrs ago   • History of stenosis of renal artery     RESOLVED: [id number]   • Hypercholesteremia    • Hypercholesterolemia    • Hyperlipidemia    • Hyperparathyroidism ([facility] )    • Hypertension    • Iliac artery stenosis, bilateral (HCC)    • Kidney stone    • Lobular carcinoma ([facility] ) 09/06/2017    Left   • Lung cancer ([place] [address] )    • Mucinous carcinoma of breast, 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    Left   • Presence of pessary    • Renal artery stenosis [facility])    • Renal disorder    • Renovascular hypertension     RESOLVED: [id number]   • Segmental and somatic dysfunction of cervical region     RESOLVED: 90TPT2196   • Segmental and somatic dysfunction of lumbar region     RESOLVED: [id number]   • Segmental and somatic dysfunction of pelvic region     RESOLVED: 63OEM9581   • Segmental and somatic dysfunction of thoracic region     RESOLVED: 43BAS0856   • Somatic dysfunction of abdominal region     RESOLVED: 02AUG2017   • Somatic dysfunction of head region     RESOLVED: 44VTA0815   • Somatic dysfunction of lower extremities     RESOLVED: 85UJW6380   • Somatic dysfunction of rib region     RESOLVED: 02AUG2017   • Somatic dysfunction of thoracic region     RESOLVED: 02AUG2017   • Somatic dysfunction of upper extremities     RESOLVED: 02AUG2017   • Teeth missing      Past Surgical History:   Procedure Laterality Date   • ANGIOPLASTY / STENTING ILIAC     • BREAST BIOPSY Left 08/10/2018    U/S BX- mucinous ca   • BREAST BIOPSY Left 08/07/2017    U/S BX   • BREAST LUMPECTOMY Left 09/06/2017    LAST ASSESSED: [id number]   • BREAST LUMPECTOMY Left 09/11/2018   • BREAST SURGERY Left     biopsy   • CATARACT EXTRACTION Bilateral    • CHOLECYSTECTOMY     • COLONOSCOPY     • EYE SURGERY     • ILIAC VEIN ANGIOPLASTY / STENTING Right     INITIAL STENOSIS: ONSET: [id number]   • IR BIOPSY LUNG  11/15/2021   • KNEE ARTHROSCOPY Right    • KNEE SURGERY Right     ONSET: [id number]   • MAMMO NEEDLE LOCALIZATION LEFT (ALL INC) Left 9/11/2018   • MAMMO NEEDLE LOCALIZATION LEFT (ALL INC) Left 9/6/2017   • OH ESOPHAGOGASTRODUODENOSCOPY TRANSORAL DIAGNOSTIC N/A 7/26/2018    Procedure: ESOPHAGOGASTRODUODENOSCOPY (EGD); Surgeon: Vi Yanez DO;  Location: [facility];   Service: Gastroenterology   • OH LARYNGOSCOPY,DIRCT,[facility] N/A 6/10/2016    Procedure: MICRO DIRECT LARYNGOSCOPY WITH VOCAL FOLD MASS EXCISION ;  Surgeon: Radha Gurrola MD;  [facility];  Service: ENT   • OH MASTECTOMY, PARTIAL Left 9/6/2017    Procedure: LUMPECTOMY BREAST NEEDLE LOCALIZED WITH FAXITRON NEEDLE @ 0830;  Surgeon: Yoko Sommers MD;  Location[facility];  Service: General   • NM PERQ DEVICE PLACEMT BREAST LOC 1ST LES MARY [place] Left 2018    Procedure: BREAST LUMPECTOMY; BREAST NEEDLE LOCALIZATION (NEEDLE LOC AT 1330);   Surgeon: Uli Butler MD;  Location[facility];  Service: Surgical Oncology   • THROAT SURGERY      lump removed   • TUBAL LIGATION     • UPPER GASTROINTESTINAL ENDOSCOPY     • US GUIDANCE BREAST BIOPSY LEFT EACH ADDITIONAL Left 2017   • US GUIDED BREAST BIOPSY LEFT COMPLETE Left 8/10/2018   • US GUIDED BREAST BIOPSY LEFT COMPLETE Left 2017   • VASCULAR SURGERY         Social History   Social History     Substance and Sexual Activity   Alcohol Use Not Currently     Social History     Substance and Sexual Activity   Drug Use No     Social History     Tobacco Use   Smoking Status Former   • Packs/day: 1 00   • Years: 55 00   • Pack years: 55 00   • Types: Cigarettes   • Start date:    • Quit date:    • Years since quittin 0   Smokeless Tobacco Never         Meds/Allergies     Current Outpatient Medications:   •  aspirin (ADULT ASPIRIN EC LOW STRENGTH) 81 mg EC tablet, Take 1 tablet by mouth daily, Disp: , Rfl:   •  Blood Glucose Monitoring Suppl (FreeStyle Precision Jalen System) w/Device KIT, by Does not apply route 3 (three) times a day, Disp: 1 kit, Rfl: 0  •  fluticasone (FLONASE) 50 mcg/act nasal spray, 2 sprays into each nostril daily, Disp: 16 g, Rfl: 2  •  gabapentin (Neurontin) 100 mg capsule, Take 1 capsule (100 mg total) by mouth 2 (two) times a day, Disp: 180 capsule, Rfl: 1  •  Lancets (ONETOUCH DELICA PLUS [id number]) MISC, 1 Device by Does not apply route 2 (two) times a day, Disp: 100 each, Rfl: 5  •  Lancets (ONETOUCH ULTRASOFT) lancets, by Other route 2 (two) times a day Use as instructed, Disp: 100 each, Rfl: 5  •  Lidocaine 4 % PTCH, Apply 1 patch topically daily, Disp: 20 patch, Rfl: 1  • NIFEdipine ER (ADALAT CC) 30 MG 24 hr tablet, Take 1 tablet twice a day , Disp: 180 tablet, Rfl: 5  •  olopatadine (PATANOL) 0 1 % ophthalmic solution, Administer 1 drop to both eyes 2 (two) times a day, Disp: 5 mL, Rfl: 0  •  omeprazole (PriLOSEC) 40 MG capsule, Take 1 capsule (40 mg total) by mouth daily, Disp: 90 capsule, Rfl: 3  •  OneTouch Delica Lancets 78A MISC, Inject as directed 2 (two) times a day Use to test, Disp: 200 each, Rfl: 0  •  pravastatin (PRAVACHOL) 40 mg tablet, Take 1 tablet (40 mg total) by mouth daily, Disp: 90 tablet, Rfl: 3  •  Acetaminophen 325 MG CAPS, Take 500 mg by mouth every 8 (eight) hours (Patient not taking: Reported on 12/7/2022), Disp: , Rfl:   •  methylPREDNISolone 4 MG tablet therapy pack, Use as directed on package (Patient not taking: Reported on 1/12/2023), Disp: 21 each, Rfl: 0  Allergies   Allergen Reactions   • Clonidine Derivatives Swelling   • Diflucan [Fluconazole] Rash   • Flagyl [Metronidazole] Anaphylaxis   • Carvedilol Hives     And legs swelled   • Latex Rash   • Lipitor [Atorvastatin]      Legs swelled   • Other Palpitations     IV DYE   • Shingrix [Zoster Vac Recomb Adjuvanted] Hives         Review of Systems   Constitutional: Positive for appetite change (Decreased ) and unexpected weight change (Decreased )  Endoscopy/GI   Feb 2023 planned    HENT: Negative  Eyes: Negative  Respiratory: Positive for cough and shortness of breath  Chest discomfort w/coughing      Cardiovascular: Negative  Gastrointestinal: Positive for constipation (Chronic )  "Belly pain w/eating" x mons  Endocrine: Negative  Genitourinary: Negative  Musculoskeletal: Positive for back pain (Chronic )  Skin: Negative  Allergic/Immunologic: Negative  Neurological: Positive for dizziness (Occasional ) and light-headedness (Occasional )  Hematological: Negative  Psychiatric/Behavioral: Negative           OBJECTIVE:   /62   Pulse 83 Temp (!) 96 3 °F (35 7 °C)   Resp 20   Ht 5' 1" (1 549 m)   Wt 55 2 kg (121 lb 12 8 oz)   LMP  (LMP Unknown)   SpO2 96%   BMI 23 01 kg/m²   Pain Assessment:  0  Karnofsky: 80 - Normal activity with effort; some signs or symptoms of disease    Physical Exam  HENT:      Head: Normocephalic and atraumatic  Mouth/Throat:      Mouth: Mucous membranes are moist       Pharynx: Oropharynx is clear  Eyes:      General: No scleral icterus  Pupils: Pupils are equal, round, and reactive to light  Cardiovascular:      Rate and Rhythm: Normal rate  Pulses: Normal pulses  Pulmonary:      Effort: Pulmonary effort is normal       Comments: Diminished breath sounds on right  Abdominal:      General: Abdomen is flat  There is no distension  Musculoskeletal:         General: Normal range of motion  Cervical back: Normal range of motion  Skin:     General: Skin is warm and dry  Neurological:      General: No focal deficit present  Mental Status: She is alert  Psychiatric:         Mood and Affect: Mood normal         RESULTS    Lab Results:   Recent Results (from the past 672 hour(s))   Uric acid    Collection Time: 12/17/22  9:43 AM   Result Value Ref Range    Uric Acid 6 7 2 0 - 7 5 mg/dL   Sedimentation rate, automated    Collection Time: 12/17/22  9:43 AM   Result Value Ref Range    Sed Rate 95 (H) 0 - 29 mm/hour    Diabetes Eye Exam    Collection Time: 12/20/22 12:00 AM   Result Value Ref Range    Right Eye Diabetic Retinopathy Positive     Left Eye Diabetic Retinopathy Positive        Imaging Studies:XR knee 3 vw left non injury    Result Date: 12/17/2022  Narrative: LEFT KNEE INDICATION:   M25 562: Pain in left knee M25 462: Effusion, left knee  COMPARISON:  1/24/2013 VIEWS:  XR KNEE 3 VW LEFT NON INJURY FINDINGS: There is no acute fracture or dislocation  There is a small joint effusion  Medial compartment narrowing  Anterior patella enthesophyte  No lytic or blastic osseous lesion  Soft tissues are unremarkable  Impression: No acute osseous abnormality  Workstation performed: [id number]     CT chest wo contrast    Result Date: 12/30/2022  Narrative: CT CHEST WITHOUT IV CONTRAST INDICATION:   C34 91: Malignant neoplasm of unspecified part of right bronchus or lung  Per my review of the medical record, the patient completed radiation to squamous cell carcinoma of the right middle lobe on 2/8/2022  Left lumpectomy for breast cancer in September 2018  COMPARISON:  Chest CT 8/19/2022, 5/9/2022, 10/6/2021  PET/CT 10/25/2021  TECHNIQUE: Chest CT without intravenous contrast   Axial, sagittal, coronal 2D reformats and coronal MIPS from source data  Radiation dose length product (DLP):  146 68 mGy-cm   Radiation dose exposure minimized using iterative reconstruction and automated exposure control  FINDINGS: LUNGS:  Stable 3 2 x 2 3 cm mass in the right middle lobe at the site of treated tumor since August 2022 (3/70)  7 mm cavitary right lower lobe nodule with several additional bilateral solid and groundglass nodules, stable since October 2021, marked on series 3  Moderate benign biapical pleural-parenchymal scar  AIRWAYS: No significant filling defects  PLEURA:  Slight increase in small right pleural effusion  New 1 cm nodule at the junction of the right major and minor fissures (3/61) since August 2022 with increased multifocal right pleural thickening since October 2021  HEART/GREAT VESSELS:  Normal heart size  Moderate coronary artery calcification indicating atherosclerotic heart disease  MEDIASTINUM AND MARY:  Unremarkable  CHEST WALL AND LOWER NECK: Left thyroid nodules  No follow-up needed given patient's advanced age  UPPER ABDOMEN:  Cholecystectomy  Bilateral nonobstructing renal cysts  1 3 cm hyperdense cyst in the upper pole left kidney  OSSEOUS STRUCTURES: Mild degenerative disease in the spine       Impression: New 1 cm pleural nodule at the junction of the right major and minor fissures since August 2022 with multifocal right pleural thickening which has increased since October 2021, likely pleural metastases  No change in 3 2 x 2 3 cm right middle lobe mass, question residual tumor  Slight increase in right pleural effusion  Stable bilateral lung nodules since October 2021  Workstation performed: [id number]           Assessment/Plan:  Orders Placed This Encounter   Procedures   • NM PET CT skull base to mid thigh        Bret Sandra is a 80y o  year old female with 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  She completed definitive radiation (15 fractions) on 2/8/22    CT chest surveillance is concerning for progressive disease with pleural-based metastatic foci  I have ordered PET/CT for restaging purposes and will present her case at thoracic multidisciplinary conference  She will likely require biopsy for tissue confirmation with or without diagnostic thoracentesis (pending results of PET/CT)  All questions were answered to her satisfaction  Basia Tomas MD  1/12/2023,3:23 PM    Portions of the record may have been created with voice recognition software   Occasional wrong word or "sound a like" substitutions may have occurred due to the inherent limitations of voice recognition software   Read the chart carefully and recognize, using context, where substitutions have occurred

## 2023-01-16 ENCOUNTER — DOCUMENTATION (OUTPATIENT)
Dept: HEMATOLOGY ONCOLOGY | Facility: CLINIC | Age: 88
End: 2023-01-16

## 2023-01-16 NOTE — PROGRESS NOTES
THORACIC ONCOLOGY MULTIDISCIPLINARY CASE REVIEW    DATE:  2023    PRESENTING DOCTOR:  Dr Gilma Thomas    DIAGNOSIS:  Hx of NSCLC~adenocarcinoma  STAGING:  IB T2N0M0    Hermann Cam is a 80 y o  female who was presented at the Thoracic Oncology Multidisciplinary Tumor Conference today  She has a history of stage IA left breast cancer s/p left lumpectomy and stage IB adenocarcinoma of the right middle lobe s/p definitive radiation completed on 2022 chest CT demonstrated thickening of the right major fissure, requiring ongoing surveillance  She had repeat chest CT on 2022  PHYSICIAN RECOMMENDED PLAN:  Referral to Medical Oncology for next gen sequencing on prior tissue sample  Obtain PET/CT  Obtain thoracentesis and possible core biopsy  +/- brain MRI  Imaging reviewed:   2022 CT chest-New 1 cm pleural nodule at the junction of the right major and minor fissures since 2022 with multifocal right pleural thickening which has increased since 2021, likely pleural metastases  No change in 3 2 x 2 3 cm right middle lobe mass, question residual tumor  Slight increase in right pleural effusion  Stable bilateral lung nodules since 2022 CT chest  2022 CT chest  10/6/2021 CT chest    Pathology reviewed:  No    PFT's reviewed:  No    Future imagin2023 PET/CT; +/-brain MRI    Referrals:  Medical Oncology    Procedures:  TBD      Team agreed to plan  NCCN guidelines were readily available for review at this discussion    The final treatment plan will be left to the discretion of the patient and the treating physician  DISCLAIMERS:  TO THE TREATING PHYSICIAN:  This conference is a meeting of clinicians from various specialty areas who evaluate and discuss patients for whom a multidisciplinary treatment approach is being considered   Please note that the above opinion was a consensus of the conference attendees and is intended only to assist in quality care of the discussed patient  The responsibility for follow up on the input given during the conference, along with any final decisions regarding plan of care, is that of the patient and the patient's provider  Accordingly, appointments have only been recommended based on this information and have NOT been scheduled unless otherwise noted  TO THE PATIENT:  This summary is a brief record of major aspects of your cancer treatment  You may choose to share a copy with any of your doctors or nurses  However, this is not a detailed or comprehensive record of your care

## 2023-01-19 DIAGNOSIS — E11.51 TYPE 2 DIABETES MELLITUS WITH DIABETIC PERIPHERAL ANGIOPATHY WITHOUT GANGRENE, WITHOUT LONG-TERM CURRENT USE OF INSULIN (HCC): ICD-10-CM

## 2023-01-20 ENCOUNTER — HOSPITAL ENCOUNTER (OUTPATIENT)
Dept: RADIOLOGY | Age: 88
Discharge: HOME | End: 2023-01-20

## 2023-01-20 ENCOUNTER — TELEPHONE (OUTPATIENT)
Dept: RADIATION ONCOLOGY | Facility: HOSPITAL | Age: 88
End: 2023-01-20

## 2023-01-20 DIAGNOSIS — C34.91 ADENOCARCINOMA, LUNG, RIGHT (HCC): Primary | ICD-10-CM

## 2023-01-20 DIAGNOSIS — C34.91 ADENOCARCINOMA, LUNG, RIGHT (HCC): ICD-10-CM

## 2023-01-20 LAB — GLUCOSE SERPL-MCNC: 151 MG/DL (ref 65–140)

## 2023-01-20 RX ORDER — GABAPENTIN 100 MG/1
100 CAPSULE ORAL 2 TIMES DAILY
Qty: 180 CAPSULE | Refills: 0 | Status: SHIPPED | OUTPATIENT
Start: 2023-01-20 | End: 2023-07-19

## 2023-01-20 NOTE — TELEPHONE ENCOUNTER
Reviewed results of PET/CT  She will be referred for IR diagnostic thoracentesis  All questions were answered to her satisfaction

## 2023-01-25 ENCOUNTER — HOSPITAL ENCOUNTER (OUTPATIENT)
Dept: RADIOLOGY | Facility: HOSPITAL | Age: 88
Discharge: HOME | End: 2023-01-25
Attending: RADIOLOGY | Admitting: STUDENT IN AN ORGANIZED HEALTH CARE EDUCATION/TRAINING PROGRAM

## 2023-01-25 VITALS
RESPIRATION RATE: 18 BRPM | SYSTOLIC BLOOD PRESSURE: 184 MMHG | HEART RATE: 91 BPM | OXYGEN SATURATION: 96 % | DIASTOLIC BLOOD PRESSURE: 74 MMHG

## 2023-01-25 DIAGNOSIS — C34.91 ADENOCARCINOMA, LUNG, RIGHT (HCC): ICD-10-CM

## 2023-01-25 NOTE — TELEPHONE ENCOUNTER
Med Panda from [facility] calling this morning regarding this, never received the updated prescription it stills says [employer]
Order entered, please sign off 
Patient called in for refill on One Touch Ultra Soft Lancets but One Touch Deliza Plus instead  Please resend with corrections requested by patients priscila Villalobos 
Signed, thank you
show

## 2023-01-25 NOTE — QUICK NOTE
I reviewed schedule thoracentesis procedure with the patient at length and obtained signed consent  Persian  was used  Upon assessment with ultrasound, there is insufficient fluid in either pleural space to proceed with thoracentesis  There is only trace fluid identified in the right pleural cavity  No safe window for thoracentesis  Patient was advised of this and verbalized understanding

## 2023-01-27 ENCOUNTER — TELEPHONE (OUTPATIENT)
Dept: NEPHROLOGY | Facility: CLINIC | Age: 88
End: 2023-01-27

## 2023-01-27 NOTE — TELEPHONE ENCOUNTER
Spoke with Sabina Kim patients S O  and he is aware about the lab work  Patient will be going soon to get them done

## 2023-01-27 NOTE — TELEPHONE ENCOUNTER
----- Message from Lu Graves MD sent at 1/27/2023  8:42 AM EST -----  Ask when they will go for repeat labs I ordered at visit 11/17  Bmp, pth  Due in January

## 2023-01-30 DIAGNOSIS — K92.89 GAS BLOAT SYNDROME: Primary | ICD-10-CM

## 2023-01-30 RX ORDER — PANTOPRAZOLE SODIUM 40 MG/1
40 TABLET, DELAYED RELEASE ORAL
Qty: 30 TABLET | Refills: 0 | Status: SHIPPED | OUTPATIENT
Start: 2023-01-30

## 2023-01-31 NOTE — PROGRESS NOTES
Started the patient on Protonix  Stop taking Prilosec  See if this helps with patient's bloating  Patient is scheduled with an endoscopy next week  Patiently using Gas-X today

## 2023-02-01 ENCOUNTER — TELEPHONE (OUTPATIENT)
Dept: RADIATION ONCOLOGY | Facility: HOSPITAL | Age: 88
End: 2023-02-01

## 2023-02-01 DIAGNOSIS — C34.91 ADENOCARCINOMA, LUNG, RIGHT (HCC): Primary | ICD-10-CM

## 2023-02-01 NOTE — TELEPHONE ENCOUNTER
Call placed to patient's Daughter and , Dawson Davison, to inform her that a referral was made to medical oncology by Dr Valdemar Douglas and that someone would be reaching out to them shortly  She verbalized her understanding

## 2023-02-02 ENCOUNTER — OFFICE VISIT (OUTPATIENT)
Dept: OBGYN CLINIC | Facility: OTHER | Age: 88
End: 2023-02-02

## 2023-02-02 VITALS — WEIGHT: 121.8 LBS | BODY MASS INDEX: 23 KG/M2 | HEIGHT: 61 IN

## 2023-02-02 DIAGNOSIS — M17.12 PRIMARY OSTEOARTHRITIS OF LEFT KNEE: Primary | ICD-10-CM

## 2023-02-02 RX ORDER — BETAMETHASONE SODIUM PHOSPHATE AND BETAMETHASONE ACETATE 3; 3 MG/ML; MG/ML
6 INJECTION, SUSPENSION INTRA-ARTICULAR; INTRALESIONAL; INTRAMUSCULAR; SOFT TISSUE
Status: COMPLETED | OUTPATIENT
Start: 2023-02-02 | End: 2023-02-02

## 2023-02-02 RX ORDER — BUPIVACAINE HYDROCHLORIDE 2.5 MG/ML
2 INJECTION, SOLUTION INFILTRATION; PERINEURAL
Status: COMPLETED | OUTPATIENT
Start: 2023-02-02 | End: 2023-02-02

## 2023-02-02 RX ADMIN — BUPIVACAINE HYDROCHLORIDE 2 ML: 2.5 INJECTION, SOLUTION INFILTRATION; PERINEURAL at 15:43

## 2023-02-02 RX ADMIN — BETAMETHASONE SODIUM PHOSPHATE AND BETAMETHASONE ACETATE 6 MG: 3; 3 INJECTION, SUSPENSION INTRA-ARTICULAR; INTRALESIONAL; INTRAMUSCULAR; SOFT TISSUE at 15:43

## 2023-02-02 NOTE — PROGRESS NOTES
Assessment  Diagnoses and all orders for this visit:    Primary osteoarthritis of left knee        Discussion and Plan:    The patient has an examination consistent with left knee OA  I have discussed with the patient the pathophysiology of this diagnosis and reviewed how the examination correlates with this diagnosis  Treatment options were discussed at length and after discussing these treatment options, the patient elected for left knee CS injection today  We can seen the patient back in 2 weeks if she would like the right knee injected  I do not want to inject both knees today as it will elevate her blood sugar  Subjective:   Patient ID: Bryan Livingston is a 80 y o  female      HPI    The patient presents with a chief complaint of left knee pain  The pain began 1 month(s) ago and is not associated with an acute injury  The patient describes the pain as aching, dull and sharp  It is intermittent in timing, and localizes the pain to the globally  The pain is worse with walking and standing and relieved with rest   She denies mechanical symptoms such as locking and catching  She denies instability of the knee  The following portions of the patient's history were reviewed and updated as appropriate: allergies, current medications, past family history, past medical history, past social history, past surgical history and problem list     Review of Systems   Constitutional: Negative for chills and fever  HENT: Negative for drooling and sneezing  Eyes: Negative for redness  Respiratory: Negative for cough and wheezing  Gastrointestinal: Negative for nausea and vomiting  Musculoskeletal:        Please see ortho exam   Psychiatric/Behavioral: Negative for behavioral problems  The patient is not nervous/anxious          Objective:  Ht 5' 1" (1 549 m)   Wt 55 2 kg (121 lb 12 8 oz)   LMP  (LMP Unknown)   BMI 23 01 kg/m²       Left Knee Exam     Tenderness   The patient is experiencing tenderness in the medial joint line, lateral joint line and pes anserinus  Range of Motion   The patient has normal left knee ROM  Tests   Varus: negative Valgus: negative    Other   Erythema: absent  Sensation: normal  Pulse: present  Swelling: none  Effusion: no effusion present    Comments:    Varus alignment             Physical Exam  Vitals reviewed  Constitutional:       Appearance: She is well-developed  Eyes:      Pupils: Pupils are equal, round, and reactive to light  Pulmonary:      Effort: Pulmonary effort is normal       Breath sounds: Normal breath sounds  Abdominal:      General: Abdomen is flat  There is no distension  Musculoskeletal:      Left knee: No effusion  Skin:     General: Skin is warm and dry  Neurological:      Mental Status: She is alert and oriented to person, place, and time  Psychiatric:         Behavior: Behavior normal          Thought Content: Thought content normal          Judgment: Judgment normal          Large joint arthrocentesis: L knee  Universal Protocol:  Consent: Verbal consent obtained  Consent given by: patient  Patient understanding: patient states understanding of the procedure being performed  Site marked: the operative site was marked  Patient identity confirmed: verbally with patient    Supporting Documentation  Indications: pain   Procedure Details  Location: knee - L knee  Needle size: 22 G  Ultrasound guidance: no  Approach: lateral  Medications administered: 2 mL bupivacaine 0 25 %; 6 mg betamethasone acetate-betamethasone sodium phosphate 6 (3-3) mg/mL    Patient tolerance: patient tolerated the procedure well with no immediate complications  Dressing:  Sterile dressing applied          I have personally reviewed pertinent films in PACS and my interpretation is as follows  Left knee x-rays demonstrates no fracture or dislocation, medial joint space narrowing       Scribe Attestation    I,:  Delmis Vargas am acting as a scribe while in the presence of the attending physician :       I,:  Lima Gustafson MD personally performed the services described in this documentation    as scribed in my presence :

## 2023-02-02 NOTE — PATIENT INSTRUCTIONS

## 2023-02-03 ENCOUNTER — DOCUMENTATION (OUTPATIENT)
Dept: HEMATOLOGY ONCOLOGY | Facility: CLINIC | Age: 88
End: 2023-02-03

## 2023-02-03 NOTE — PROGRESS NOTES
Intake received/ Chart reviewed for services completed outside of [facility]    Pathology completed: 11/15/21[facility]    Imaging completed: CT Chest 12/23/23, PET/CT 01/20/23[facility]    All records needed are in patients chart  No records retrieval needed at this time

## 2023-02-09 ENCOUNTER — PATIENT OUTREACH (OUTPATIENT)
Dept: HEMATOLOGY ONCOLOGY | Facility: CLINIC | Age: 88
End: 2023-02-09

## 2023-02-09 DIAGNOSIS — C34.91 ADENOCARCINOMA, LUNG, RIGHT (HCC): Primary | ICD-10-CM

## 2023-02-09 NOTE — PROGRESS NOTES
Initial outreach  Called and spoke to patient's daughter Vicky Martinez  Introduced myself and explained the role of a Nurse Navigator  Reviewed upcoming appointments including date, time and location  Assessed for barriers of care  Patient lives with her SO, Filiberto Tonyhansdilan  Filiberto Cecilylodilan provides transportation and if he is unavailable one of her daughters will transport her  The patient understands spoken English but does not speak it herself  I provided my direct contact information to Vicky Jhonnyas for questions or concerns  Informed her either myself or Oncology Care Coordinator, Elia Hernández will be reaching out after patient's consult with Dr General Lainez  She was appreciative

## 2023-02-11 ENCOUNTER — APPOINTMENT (EMERGENCY)
Dept: RADIOLOGY | Facility: HOSPITAL | Age: 88
End: 2023-02-11

## 2023-02-11 ENCOUNTER — HOSPITAL ENCOUNTER (EMERGENCY)
Facility: HOSPITAL | Age: 88
Discharge: HOME | End: 2023-02-12
Attending: EMERGENCY MEDICINE

## 2023-02-11 DIAGNOSIS — R06.02 SHORTNESS OF BREATH: ICD-10-CM

## 2023-02-11 DIAGNOSIS — K21.9 GERD (GASTROESOPHAGEAL REFLUX DISEASE): ICD-10-CM

## 2023-02-11 DIAGNOSIS — R07.89 CHEST TIGHTNESS: Primary | ICD-10-CM

## 2023-02-11 DIAGNOSIS — K21.9 GASTROESOPHAGEAL REFLUX DISEASE, UNSPECIFIED WHETHER ESOPHAGITIS PRESENT: ICD-10-CM

## 2023-02-11 DIAGNOSIS — J90 PLEURAL EFFUSION: ICD-10-CM

## 2023-02-11 LAB
ANION GAP SERPL CALCULATED.3IONS-SCNC: 7 MMOL/L (ref 4–13)
ATRIAL RATE: 100 BPM
BASOPHILS # BLD AUTO: 0.02 THOUSANDS/ÂΜL (ref 0–0.1)
BASOPHILS NFR BLD AUTO: 0 % (ref 0–1)
BUN SERPL-MCNC: 35 MG/DL (ref 5–25)
CALCIUM SERPL-MCNC: 9.3 MG/DL (ref 8.3–10.1)
CARDIAC TROPONIN I PNL SERPL HS: 17 NG/L
CHLORIDE SERPL-SCNC: 99 MMOL/L (ref 96–108)
CO2 SERPL-SCNC: 32 MMOL/L (ref 21–32)
CREAT SERPL-MCNC: 1.57 MG/DL (ref 0.6–1.3)
EOSINOPHIL # BLD AUTO: 0.08 THOUSAND/ÂΜL (ref 0–0.61)
EOSINOPHIL NFR BLD AUTO: 1 % (ref 0–6)
ERYTHROCYTE [DISTWIDTH] IN BLOOD BY AUTOMATED COUNT: 13.6 % (ref 11.6–15.1)
GFR SERPL CREATININE-BSD FRML MDRD: 29 ML/MIN/1.73SQ M
GLUCOSE SERPL-MCNC: 180 MG/DL (ref 65–140)
HCT VFR BLD AUTO: 39.3 % (ref 34.8–46.1)
HGB BLD-MCNC: 12.1 G/DL (ref 11.5–15.4)
IMM GRANULOCYTES # BLD AUTO: 0.02 THOUSAND/UL (ref 0–0.2)
IMM GRANULOCYTES NFR BLD AUTO: 0 % (ref 0–2)
LYMPHOCYTES # BLD AUTO: 2.11 THOUSANDS/ÂΜL (ref 0.6–4.47)
LYMPHOCYTES NFR BLD AUTO: 27 % (ref 14–44)
MCH RBC QN AUTO: 27.8 PG (ref 26.8–34.3)
MCHC RBC AUTO-ENTMCNC: 30.8 G/DL (ref 31.4–37.4)
MCV RBC AUTO: 90 FL (ref 82–98)
MONOCYTES # BLD AUTO: 0.81 THOUSAND/ÂΜL (ref 0.17–1.22)
MONOCYTES NFR BLD AUTO: 10 % (ref 4–12)
NEUTROPHILS # BLD AUTO: 4.79 THOUSANDS/ÂΜL (ref 1.85–7.62)
NEUTS SEG NFR BLD AUTO: 62 % (ref 43–75)
NRBC BLD AUTO-RTO: 0 /100 WBCS
NT-PROBNP SERPL-MCNC: 458 PG/ML
P AXIS: 85 DEGREES
PLATELET # BLD AUTO: 299 THOUSANDS/UL (ref 149–390)
PMV BLD AUTO: 11.7 FL (ref 8.9–12.7)
POTASSIUM SERPL-SCNC: 3.1 MMOL/L (ref 3.5–5.3)
PR INTERVAL: 176 MS
QRS AXIS: -14 DEGREES
QRSD INTERVAL: 82 MS
QT INTERVAL: 312 MS
QTC INTERVAL: 402 MS
RBC # BLD AUTO: 4.35 MILLION/UL (ref 3.81–5.12)
SODIUM SERPL-SCNC: 138 MMOL/L (ref 135–147)
T WAVE AXIS: 92 DEGREES
VENTRICULAR RATE: 100 BPM
WBC # BLD AUTO: 7.83 THOUSAND/UL (ref 4.31–10.16)

## 2023-02-11 RX ORDER — MAGNESIUM HYDROXIDE/ALUMINUM HYDROXICE/SIMETHICONE 120; 1200; 1200 MG/30ML; MG/30ML; MG/30ML
30 SUSPENSION ORAL ONCE
Status: COMPLETED | OUTPATIENT
Start: 2023-02-11 | End: 2023-02-11

## 2023-02-11 RX ORDER — NIFEDIPINE 30 MG/1
30 TABLET, EXTENDED RELEASE ORAL ONCE
Status: COMPLETED | OUTPATIENT
Start: 2023-02-11 | End: 2023-02-11

## 2023-02-11 RX ORDER — SUCRALFATE 1 G/1
1 TABLET ORAL ONCE
Status: COMPLETED | OUTPATIENT
Start: 2023-02-11 | End: 2023-02-11

## 2023-02-11 RX ORDER — POTASSIUM CHLORIDE 20 MEQ/1
40 TABLET, EXTENDED RELEASE ORAL ONCE
Status: COMPLETED | OUTPATIENT
Start: 2023-02-11 | End: 2023-02-12

## 2023-02-11 RX ORDER — FAMOTIDINE 20 MG/1
20 TABLET, FILM COATED ORAL ONCE
Status: COMPLETED | OUTPATIENT
Start: 2023-02-11 | End: 2023-02-11

## 2023-02-11 RX ADMIN — FAMOTIDINE 20 MG: 20 TABLET, FILM COATED ORAL at 21:28

## 2023-02-11 RX ADMIN — ALUMINUM HYDROXIDE, MAGNESIUM HYDROXIDE, AND DIMETHICONE 30 ML: 200; 20; 200 SUSPENSION ORAL at 21:28

## 2023-02-11 RX ADMIN — NIFEDIPINE 30 MG: 30 TABLET, FILM COATED, EXTENDED RELEASE ORAL at 21:53

## 2023-02-11 RX ADMIN — SUCRALFATE 1 G: 1 TABLET ORAL at 21:28

## 2023-02-12 ENCOUNTER — APPOINTMENT (EMERGENCY)
Dept: RADIOLOGY | Facility: HOSPITAL | Age: 88
End: 2023-02-12

## 2023-02-12 VITALS
DIASTOLIC BLOOD PRESSURE: 97 MMHG | RESPIRATION RATE: 21 BRPM | TEMPERATURE: 97.9 F | HEART RATE: 96 BPM | OXYGEN SATURATION: 95 % | SYSTOLIC BLOOD PRESSURE: 237 MMHG

## 2023-02-12 LAB
2HR DELTA HS TROPONIN: 3 NG/L
CARDIAC TROPONIN I PNL SERPL HS: 20 NG/L

## 2023-02-12 RX ORDER — SUCRALFATE 1 G/1
1 TABLET ORAL 4 TIMES DAILY
Qty: 60 TABLET | Refills: 0 | Status: SHIPPED | OUTPATIENT
Start: 2023-02-12

## 2023-02-12 RX ORDER — FAMOTIDINE 20 MG/1
20 TABLET, FILM COATED ORAL DAILY
Qty: 30 TABLET | Refills: 0 | Status: SHIPPED | OUTPATIENT
Start: 2023-02-12

## 2023-02-12 RX ORDER — FAMOTIDINE 20 MG/1
20 TABLET, FILM COATED ORAL ONCE
Status: COMPLETED | OUTPATIENT
Start: 2023-02-12 | End: 2023-02-12

## 2023-02-12 RX ADMIN — POTASSIUM CHLORIDE 40 MEQ: 1500 TABLET, EXTENDED RELEASE ORAL at 00:36

## 2023-02-12 RX ADMIN — FAMOTIDINE 20 MG: 20 TABLET, FILM COATED ORAL at 03:01

## 2023-02-12 NOTE — ED PROVIDER NOTES
History  Chief Complaint   Patient presents with   • Chest Pain     Pt presents ambulatory with c/o chest tightness x 3 weeks      HPI     Patient is an 79 y/o F with PMH HTN, HLD, lung ca presenting with chest tightness for past 3 weeks  Accompanied by family who state pt has c/o of chest tightness that has been worsening, felt it was worse today and wanted her to be evaluated  They note she had some imaging studies recently that ruled out evidence of pleural effusion  They state she has mentioned some bloating pains and feeling better with belching  Associated SOB, non-exertional, non-pleuritic  Denies fever, chills, n/v/d, abd pain  Prior to Admission Medications   Prescriptions Last Dose Informant Patient Reported? Taking? Acetaminophen 325 MG CAPS   Yes No   Sig: Take 500 mg by mouth every 8 (eight) hours   Patient not taking: Reported on 2022   Blood Glucose Monitoring Suppl (FreeStyle Precision Jalen System) w/Device KIT   No No   Sig: by Does not apply route 3 (three) times a day   Lancets (ONETOUCH DELICA PLUS [id number]) MISC   No No   Si Device by Does not apply route 2 (two) times a day   Lancets (ONETOUCH ULTRASOFT) lancets   No No   Sig: by Other route 2 (two) times a day Use as instructed   Lidocaine 4 % PTCH   No No   Sig: Apply 1 patch topically daily   NIFEdipine ER (ADALAT CC) 30 MG 24 hr tablet   No No   Sig: Take 1 tablet twice a day     OneTouch Delica Lancets 05E MISC   No No   Sig: Inject as directed 2 (two) times a day Use to test   aspirin (ADULT ASPIRIN EC LOW STRENGTH) 81 mg EC tablet   Yes No   Sig: Take 1 tablet by mouth daily   fluticasone (FLONASE) 50 mcg/act nasal spray   No No   Si sprays into each nostril daily   gabapentin (Neurontin) 100 mg capsule   No No   Sig: Take 1 capsule (100 mg total) by mouth 2 (two) times a day   methylPREDNISolone 4 MG tablet therapy pack   No No   Sig: Use as directed on package   Patient not taking: Reported on 2023   olopatadine (PATANOL) 0 1 % ophthalmic solution   No No   Sig: Administer 1 drop to both eyes 2 (two) times a day   pantoprazole (PROTONIX) 40 mg tablet   No No   Sig: Take 1 tablet (40 mg total) by mouth daily before breakfast   pravastatin (PRAVACHOL) 40 mg tablet   No No   Sig: Take 1 tablet (40 mg total) by mouth daily      Facility-Administered Medications: None       Past Medical History:   Diagnosis Date   • Arthritis    • Back pain    • Benign essential hypertension     LAST ASSESSED: [id number]   • Carotid artery stenosis    • CKD (chronic kidney disease) stage 2, GFR 60-89 ml/min    • Constipation, chronic     "drinks prune juice"   • Diabetes mellitus (HCC)    • DMII (diabetes mellitus, type 2) ([facility])    • Epiglottitis     RESOLVED: [id number]   • Gastritis    • GERD (gastroesophageal reflux disease)    • Gout    • H/O blood clots     left lower leg,,,11 yrs ago   • History of stenosis of renal artery     RESOLVED: [id number]   • Hypercholesteremia    • Hypercholesterolemia    • Hyperlipidemia    • Hyperparathyroidism ([facility] [address] )    • Hypertension    • Iliac artery stenosis, bilateral ([facility])    • Kidney stone    • Lobular carcinoma ([facility] ) 09/06/2017    Left   • Lung cancer ([facility] [address] )    • Mucinous carcinoma of breast, 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   • Presence of pessary    • Renal artery stenosis ([facility])    • Renal disorder    • Renovascular hypertension     RESOLVED: [id number]   • Segmental and somatic dysfunction of cervical region     RESOLVED: 55TMK4409   • Segmental and somatic dysfunction of lumbar region     RESOLVED: 61WIG9603   • Segmental and somatic dysfunction of pelvic region     RESOLVED: 89QJC4025   • Segmental and somatic dysfunction of thoracic region     RESOLVED: 31BAU5732   • Somatic dysfunction of abdominal region     RESOLVED: 62PXM8292   • Somatic dysfunction of head region     RESOLVED: 73OSK4680   • Somatic dysfunction of lower extremities     RESOLVED: 60XDQ6665   • Somatic dysfunction of rib region RESOLVED: 86KEZ2525   • Somatic dysfunction of thoracic region     RESOLVED: 28SFW9780   • Somatic dysfunction of upper extremities     RESOLVED: 24PCF9042   • Teeth missing        Past Surgical History:   Procedure Laterality Date   • ANGIOPLASTY / STENTING ILIAC     • BREAST BIOPSY Left 08/10/2018    U/S BX- mucinous ca   • BREAST BIOPSY Left 08/07/2017    U/S BX   • BREAST LUMPECTOMY Left 09/06/2017    LAST ASSESSED: [id number]   • BREAST LUMPECTOMY Left 09/11/2018   • BREAST SURGERY Left     biopsy   • CATARACT EXTRACTION Bilateral    • CHOLECYSTECTOMY     • COLONOSCOPY     • EYE SURGERY     • ILIAC VEIN ANGIOPLASTY / STENTING Right     INITIAL STENOSIS: ONSET: [id number]   • IR BIOPSY LUNG  11/15/2021   • KNEE ARTHROSCOPY Right    • KNEE SURGERY Right     ONSET: 07HNL9341   • MAMMO NEEDLE LOCALIZATION LEFT (ALL INC) Left 9/11/2018   • MAMMO NEEDLE LOCALIZATION LEFT (ALL INC) Left 9/6/2017   • RI ESOPHAGOGASTRODUODENOSCOPY TRANSORAL DIAGNOSTIC N/A 7/26/2018    Procedure: ESOPHAGOGASTRODUODENOSCOPY (EGD); Surgeon: Romayne Golas, DO;  Location: [facility]; Service: Gastroenterology   • RI [address] EXC SILVA&/STRPG CORDS/EPIGL MCRSCP/TLSCP N/A 6/10/2016    Procedure: MICRO DIRECT LARYNGOSCOPY WITH VOCAL FOLD MASS EXCISION ;  Surgeon: Aurora Collins MD;  [facility];  Service: ENT   • RI MASTECTOMY PARTIAL Left 9/6/2017    Procedure: LUMPECTOMY BREAST NEEDLE LOCALIZED WITH FAXITRON NEEDLE @ 0830;  Surgeon: Valdez Alexandra MD;  Location[facility];  Service: General   • RI PERQ DEVICE PLACEMENT BREAST LOC 1ST LES W/GDNCE Left 9/11/2018    Procedure: BREAST LUMPECTOMY; BREAST NEEDLE LOCALIZATION (NEEDLE LOC AT 1330);   Surgeon: Casey Ohara MD;  [facility];  Service: Surgical Oncology   • THROAT SURGERY      lump removed   • TUBAL LIGATION     • UPPER GASTROINTESTINAL ENDOSCOPY     • US GUIDANCE BREAST BIOPSY LEFT EACH ADDITIONAL Left 8/7/2017   • US GUIDED BREAST BIOPSY LEFT COMPLETE Left 8/10/2018   • US GUIDED BREAST BIOPSY LEFT COMPLETE Left 2017   • VASCULAR SURGERY         Family History   Problem Relation Age of Onset   • Heart disease Brother    • Heart disease Maternal Grandmother    • Diabetes Other         of other type with arthropathy, without long term current use of insulin   • Hypertension Other    • No Known Problems Mother    • Gout Family    • Rheum arthritis Family    • Lupus Family         systematic erythematosus   • Heart disease Family    • Stroke Family         syndrome   • Stroke Maternal Uncle         syndrome   • Stroke Maternal Aunt         syndrome   • No Known Problems Father    • No Known Problems Daughter    • No Known Problems Maternal Grandfather    • No Known Problems Paternal Grandmother    • No Known Problems Paternal Grandfather    • No Known Problems Daughter    • No Known Problems Daughter    • No Known Problems Maternal Aunt    • No Known Problems Maternal Aunt    • No Known Problems Maternal Aunt      I have reviewed and agree with the history as documented  E-Cigarette/Vaping   • E-Cigarette Use Never User      E-Cigarette/Vaping Substances   • Nicotine No    • THC No    • CBD No    • Flavoring No    • Other No    • Unknown No      Social History     Tobacco Use   • Smoking status: Former     Packs/day: 1 00     Years: 55 00     Pack years: 55 00     Types: Cigarettes     Start date:      Quit date:      Years since quittin    • Smokeless tobacco: Never   Vaping Use   • Vaping Use: Never used   Substance Use Topics   • Alcohol use: Not Currently   • Drug use: No        Review of Systems   Constitutional: Negative for chills and fever  HENT: Negative for ear pain and sore throat  Eyes: Negative for pain and visual disturbance  Respiratory: Positive for shortness of breath  Negative for cough  Cardiovascular: Positive for chest pain  Negative for palpitations  Gastrointestinal: Negative for abdominal pain and vomiting     Genitourinary: Negative for dysuria and hematuria  Musculoskeletal: Negative for arthralgias and back pain  Skin: Negative for color change and rash  Neurological: Negative for seizures and syncope  All other systems reviewed and are negative  Physical Exam  ED Triage Vitals [02/11/23 2130]   Temperature Pulse Respirations Blood Pressure SpO2   97 9 °F (36 6 °C) 98 20 (!) 200/131 96 %      Temp Source Heart Rate Source Patient Position - Orthostatic VS BP Location FiO2 (%)   Tympanic Monitor Sitting Left arm --      Pain Score       --             Orthostatic Vital Signs  Vitals:    02/11/23 2130 02/11/23 2200 02/12/23 0039   BP: (!) 200/131 (!) 197/88 (!) 237/97   Pulse: 98  96   Patient Position - Orthostatic VS: Sitting  Sitting       Physical Exam  Vitals and nursing note reviewed  Constitutional:       General: She is not in acute distress  HENT:      Head: Normocephalic and atraumatic  Right Ear: External ear normal       Left Ear: External ear normal       Nose: Nose normal       Mouth/Throat:      Pharynx: Oropharynx is clear  Eyes:      Extraocular Movements: Extraocular movements intact  Pupils: Pupils are equal, round, and reactive to light  Cardiovascular:      Rate and Rhythm: Normal rate and regular rhythm  Pulses: Normal pulses  Heart sounds: Normal heart sounds  No murmur heard  No friction rub  No gallop  Pulmonary:      Effort: Pulmonary effort is normal  No tachypnea or respiratory distress  Breath sounds: Normal breath sounds  No wheezing, rhonchi or rales  Abdominal:      General: Abdomen is flat  There is no distension  Palpations: Abdomen is soft  Tenderness: There is no abdominal tenderness  There is no guarding or rebound  Musculoskeletal:         General: No deformity  Normal range of motion  Cervical back: Normal range of motion  Right lower leg: No edema  Left lower leg: No edema  Skin:     General: Skin is warm and dry  Capillary Refill: Capillary refill takes less than 2 seconds  Findings: No rash  Neurological:      General: No focal deficit present  Mental Status: She is alert and oriented to person, place, and time        Gait: Gait normal    Psychiatric:         Mood and Affect: Mood normal          ED Medications  Medications   NIFEdipine (PROCARDIA XL) 24 hr tablet 30 mg (30 mg Oral Given 2/11/23 2153)   famotidine (PEPCID) tablet 20 mg (20 mg Oral Given 2/11/23 2128)   aluminum-magnesium hydroxide-simethicone (MYLANTA) oral suspension 30 mL (30 mL Oral Given 2/11/23 2128)   sucralfate (CARAFATE) tablet 1 g (1 g Oral Given 2/11/23 2128)   potassium chloride (K-DUR,KLOR-CON) CR tablet 40 mEq (40 mEq Oral Given 2/12/23 0036)   famotidine (PEPCID) tablet 20 mg (20 mg Oral Given 2/12/23 0301)       Diagnostic Studies  Results Reviewed     Procedure Component Value Units Date/Time    HS Troponin I 2hr [[id number]]  (Normal) Collected: 02/11/23 2354    Lab Status: Final result Specimen: Blood from Arm, Right Updated: 02/12/23 0037     hs TnI 2hr 20 ng/L      Delta 2hr hsTnI 3 ng/L     HS Troponin 0hr (reflex protocol) [[id number]]  (Normal) Collected: 02/11/23 2128    Lab Status: Final result Specimen: Blood from Arm, Left Updated: 02/11/23 2220     hs TnI 0hr 17 ng/L     NT-BNP PRO-[facility],[place] campuses only [[id number]]  (Abnormal) Collected: 02/11/23 2128    Lab Status: Final result Specimen: Blood from Arm, Left Updated: 02/11/23 2207     NT-proBNP 458 pg/mL     Basic metabolic panel [[id number]]  (Abnormal) Collected: 02/11/23 2128    Lab Status: Final result Specimen: Blood from Arm, Left Updated: 02/11/23 2207     Sodium 138 mmol/L      Potassium 3 1 mmol/L      Chloride 99 mmol/L      CO2 32 mmol/L      ANION GAP 7 mmol/L      BUN 35 mg/dL      Creatinine 1 57 mg/dL      Glucose 180 mg/dL      Calcium 9 3 mg/dL      eGFR 29 ml/min/1 73sq m     Narrative:      [place] guidelines for Chronic Kidney Disease (CKD):   •  Stage 1 with normal or high GFR (GFR > 90 mL/min/1 73 square meters)  •  Stage 2 Mild CKD (GFR = 60-89 mL/min/1 73 square meters)  •  Stage 3A Moderate CKD (GFR = 45-59 mL/min/1 73 square meters)  •  Stage 3B Moderate CKD (GFR = 30-44 mL/min/1 73 square meters)  •  Stage 4 Severe CKD (GFR = 15-29 mL/min/1 73 square meters)  •  Stage 5 End Stage CKD (GFR <15 mL/min/1 73 square meters)  Note: GFR calculation is accurate only with a steady state creatinine    CBC and differential [[id number]]  (Abnormal) Collected: 02/11/23 2128    Lab Status: Final result Specimen: Blood from Arm, Left Updated: 02/11/23 2147     WBC 7 83 Thousand/uL      RBC 4 35 Million/uL      Hemoglobin 12 1 g/dL      Hematocrit 39 3 %      MCV 90 fL      MCH 27 8 pg      MCHC 30 8 g/dL      RDW 13 6 %      MPV 11 7 fL      Platelets 550 Thousands/uL      nRBC 0 /100 WBCs      Neutrophils Relative 62 %      Immat GRANS % 0 %      Lymphocytes Relative 27 %      Monocytes Relative 10 %      Eosinophils Relative 1 %      Basophils Relative 0 %      Neutrophils Absolute 4 79 Thousands/µL      Immature Grans Absolute 0 02 Thousand/uL      Lymphocytes Absolute 2 11 Thousands/µL      Monocytes Absolute 0 81 Thousand/µL      Eosinophils Absolute 0 08 Thousand/µL      Basophils Absolute 0 02 Thousands/µL                  CT chest wo contrast   Final Result by Bari Ramsey DO (02/12 0139)      Moderate right pleural effusion, increased in size since prior examination  Apparent increase in size of right middle lobe masslike opacity, although this increase in size is probably secondary to combination of atelectasis and pleural fluid  Unchanged pulmonary nodules  Workstation performed: [id number]         XR chest 1 view portable   Final Result by Franco Youssef MD (02/12 9902)      Mild pulmonary venous congestion with small right effusion and right base opacity which could be due to atelectasis  Pneumonia not excluded in the appropriate clinical setting  Workstation performed: [id number]               Procedures  Procedures      ED Course  ED Course as of 02/13/23 0756   Sat Feb 11, 2023 2121 ECG 12 lead  Procedure Note: EKG  Date/Time: 02/11/23 9:21 PM   Interpreted by: Katerina Marion MD  Indications / Diagnosis: CP  ECG reviewed by me, the ED Provider: yes   The EKG demonstrates:  Rhythm: sinus tachycardia with single PVC noted  Intervals: normal intervals  Axis: left axis  QRS/Blocks: normal QRS  ST Changes: ST depression V4-V6, no ST elevation                               SBIRT 20yo+    Flowsheet Row Most Recent Value   SBIRT (25 yo +)    In order to provide better care to our patients, we are screening all of our patients for alcohol and drug use  Would it be okay to ask you these screening questions? Yes Filed at: 02/11/2023 2221   Initial Alcohol Screen: US AUDIT-C     1  How often do you have a drink containing alcohol? 0 Filed at: 02/11/2023 2221   2  How many drinks containing alcohol do you have on a typical day you are drinking? 0 Filed at: 02/11/2023 2221   3b  FEMALE Any Age, or MALE 65+: How often do you have 4 or more drinks on one occassion? 0 Filed at: 02/11/2023 2221   Audit-C Score 0 Filed at: 02/11/2023 2221   JAIME: How many times in the past year have you    Used an illegal drug or used a prescription medication for non-medical reasons? Never Filed at: 02/11/2023 2221                Medical Decision Making  81 y/o F presenting with ongoing chest tightness/SOB  Hx of lung cancer and pleural effusions  Exam nonfocal, pt hypertensive but had not taken her nighttime dose of nifedipine, will give this dose now  EKG w/o evidence of STEMI  CXR with evidence of effusion, will CT to further evaluate  CT revealing increase in size of R pleural effusion, however unclear how clinically significant as pt in RA and does not desat on ambulation   Pt also with belching and epigastric discomfort during ED stay, some improvement with GI cocktail, recommend discussing further with PCP, can try pepcid in meantime  Recommend outpatient follow up as pt has appointment on Thursday with primary team  Return precautions discussed  Chest tightness: acute illness or injury  Gastroesophageal reflux disease, unspecified whether esophagitis present: self-limited or minor problem  Pleural effusion: acute illness or injury  Shortness of breath: acute illness or injury  Amount and/or Complexity of Data Reviewed  Labs: ordered  Radiology: ordered  ECG/medicine tests:  Decision-making details documented in ED Course  Risk  OTC drugs  Prescription drug management  Disposition  Final diagnoses:   Chest tightness   Shortness of breath   Gastroesophageal reflux disease, unspecified whether esophagitis present   Pleural effusion     Time reflects when diagnosis was documented in both MDM as applicable and the Disposition within this note     Time User Action Codes Description Comment    2/12/2023  1:53 AM Phil Cunas Add [R07 89] Chest tightness     2/12/2023  1:53 AM Phil Cunas Add [R06 02] Shortness of breath     2/12/2023  1:53 AM Phil Cunas Add [K21 9] Gastroesophageal reflux disease, unspecified whether esophagitis present     2/12/2023  1:55 AM Phil Cunas Add [J90] Pleural effusion       ED Disposition     ED Disposition   Discharge    Condition   Stable    Date/Time   Sun Feb 12, 2023  2:30 AM    Comment   Nancy Higuera discharge to home/self care                 Follow-up Information     Follow up With Specialties Details Why [address]   [phone]            Discharge Medication List as of 2/12/2023  2:30 AM      START taking these medications    Details   famotidine (PEPCID) 20 mg tablet Take 1 tablet (20 mg total) by mouth daily, Starting Sun 2/12/2023, Normal sucralfate (CARAFATE) 1 g tablet Take 1 tablet (1 g total) by mouth 4 (four) times a day, Starting Sun 2/12/2023, Normal         CONTINUE these medications which have NOT CHANGED    Details   Acetaminophen 325 MG CAPS Take 500 mg by mouth every 8 (eight) hours, Starting Tue 8/15/2017, Historical Med      aspirin (ADULT ASPIRIN EC LOW STRENGTH) 81 mg EC tablet Take 1 tablet by mouth daily, Historical Med      Blood Glucose Monitoring Suppl (FreeStyle Precision Jalen System) w/Device KIT by Does not apply route 3 (three) times a day, Starting Wed 10/21/2020, Normal      fluticasone (FLONASE) 50 mcg/act nasal spray 2 sprays into each nostril daily, Starting Thu 10/6/2022, Normal      gabapentin (Neurontin) 100 mg capsule Take 1 capsule (100 mg total) by mouth 2 (two) times a day, Starting Fri 1/20/2023, Until Wed 7/19/2023, Normal      !! Lancets (ONETOUCH DELICA PLUS [id number]) MISC 1 Device by Does not apply route 2 (two) times a day, Starting Mon 7/13/2020, Normal      !! Lancets (ONETOUCH ULTRASOFT) lancets by Other route 2 (two) times a day Use as instructed, Starting Mon 7/13/2020, Normal      Lidocaine 4 % PTCH Apply 1 patch topically daily, Starting Thu 10/6/2022, Normal      methylPREDNISolone 4 MG tablet therapy pack Use as directed on package, Normal      NIFEdipine ER (ADALAT CC) 30 MG 24 hr tablet Take 1 tablet twice a day , Normal      olopatadine (PATANOL) 0 1 % ophthalmic solution Administer 1 drop to both eyes 2 (two) times a day, Starting Fri 10/18/2019, Normal      !! OneTouch Delica Lancets 07P MISC Inject as directed 2 (two) times a day Use to test, Starting Thu 7/9/2020, Normal      pantoprazole (PROTONIX) 40 mg tablet Take 1 tablet (40 mg total) by mouth daily before breakfast, Starting Mon 1/30/2023, Normal      pravastatin (PRAVACHOL) 40 mg tablet Take 1 tablet (40 mg total) by mouth daily, Starting Wed 10/19/2022, Normal       !! - Potential duplicate medications found   Please discuss with provider  No discharge procedures on file  PDMP Review     None           ED Provider  Attending physically available and evaluated Hermann Cam I managed the patient along with the ED Attending      Electronically Signed by         Ольга Jolly MD  02/13/23 1241

## 2023-02-12 NOTE — DISCHARGE INSTRUCTIONS
Your pleural effusion is larger in size, you should follow up with your pulmonology team to discuss possible interventions  Can try taking pepcid for breakthrough abdominal discomfort  If you develop new or worsening symptoms, please return to the Emergency Department for further evaluation

## 2023-02-14 RX ORDER — SUCRALFATE ORAL 1 G/10ML
1 SUSPENSION ORAL 4 TIMES DAILY
Qty: 414 ML | Refills: 0 | Status: SHIPPED | OUTPATIENT
Start: 2023-02-14

## 2023-02-15 NOTE — ED ATTENDING ATTESTATION
2/11/2023  IDuarte MD, saw and evaluated the patient  I have discussed the patient with the resident/non-physician practitioner and agree with the resident's/non-physician practitioner's findings, Plan of Care, and MDM as documented in the resident's/non-physician practitioner's note, except where noted  All available labs and Radiology studies were reviewed  I was present for key portions of any procedure(s) performed by the resident/non-physician practitioner and I was immediately available to provide assistance  At this point I agree with the current assessment done in the Emergency Department  I have conducted an independent evaluation of this patient a history and physical is as follows:  63-year-old female with history of lung CA presents with chest tightness for 3 weeks  Associated symptoms include belching bloating shortness of breath pain is nonpleuritic nonexertional denies fevers chills nausea vomiting denies abdominal pain    Vitals reviewed  Patient noted to be hypertensive on arrival   Heart regular rate rhythm no murmurs lungs clear soft nontender nondistended  Extremities no edema      Impression: Dyspnea    Differential diagnosis includes: ACS, MI, CHF, recurrence of pleural effusion  GERD  Plan check ECG, BNP, cardiac enzymes, BMP, CBC chest x-ray    Reassess  ED Course     Labs reviewed:  abnormal   Serial troponins at 17 and 20 with delta 3  BMP remarkable for mild hypokalemia mild NORTH CBC unremarkable  ECG interpreted by me sinus tachycardia with PVC  Normal axis normal intervals ST depressions V4-6    Chest x-ray interpreted by me: pulmonary venous congestion with right effusion and right base opacity clear of atelectasis versus pneumonia will obtain chest x-ray    CT imaging of chest reviewed report increased size right middle lobe masslike opacity moderate pleural effusion increased in size from prior examination      Case discussed with patient including all results  Discussion regarding hospitalization versus home  Patient would prefer to be discharged to home  Has follow-up appointment on Thursday  Treat with Pepcid GI cocktail    Return precautions given    Critical Care Time  Procedures

## 2023-02-16 ENCOUNTER — PREP FOR PROCEDURE (OUTPATIENT)
Dept: INTERVENTIONAL RADIOLOGY/VASCULAR | Facility: CLINIC | Age: 88
End: 2023-02-16

## 2023-02-16 ENCOUNTER — OFFICE VISIT (OUTPATIENT)
Dept: OBGYN CLINIC | Facility: OTHER | Age: 88
End: 2023-02-16

## 2023-02-16 ENCOUNTER — CONSULT (OUTPATIENT)
Dept: HEMATOLOGY ONCOLOGY | Facility: CLINIC | Age: 88
End: 2023-02-16

## 2023-02-16 VITALS
SYSTOLIC BLOOD PRESSURE: 180 MMHG | RESPIRATION RATE: 16 BRPM | TEMPERATURE: 97.4 F | HEART RATE: 78 BPM | WEIGHT: 118 LBS | DIASTOLIC BLOOD PRESSURE: 60 MMHG | HEIGHT: 61 IN | BODY MASS INDEX: 22.28 KG/M2 | OXYGEN SATURATION: 96 %

## 2023-02-16 VITALS
HEART RATE: 87 BPM | BODY MASS INDEX: 22.47 KG/M2 | DIASTOLIC BLOOD PRESSURE: 62 MMHG | WEIGHT: 119 LBS | SYSTOLIC BLOOD PRESSURE: 157 MMHG | HEIGHT: 61 IN

## 2023-02-16 DIAGNOSIS — Z85.3 HISTORY OF LEFT BREAST CANCER: Primary | ICD-10-CM

## 2023-02-16 DIAGNOSIS — C34.91 ADENOCARCINOMA, LUNG, RIGHT (HCC): ICD-10-CM

## 2023-02-16 DIAGNOSIS — M17.12 PRIMARY OSTEOARTHRITIS OF LEFT KNEE: Primary | ICD-10-CM

## 2023-02-16 DIAGNOSIS — C34.91 ADENOCARCINOMA, LUNG, RIGHT (HCC): Primary | ICD-10-CM

## 2023-02-16 NOTE — PROGRESS NOTES
Yair Castellon  1934  [address] [facility] [place]  [address] [facility] [address]  [place] [id number]  HEMATOLOGY/ONCOLOGY CONSULTATION REPORT    DISCUSSION/SUMMARY:    59-year-old female with history of stage IA (pT1c, pN0, cM0, G1, ER+, OR+, HER2-) left breast cancer (diagnosed in September 2018) and a history of primary pulmonary adenocarcinoma, TTF-1 +, non-small cell lung carcinoma diagnosed in November 2021 status post 15 fractions RT  Recently patient began to have more dyspnea on exertion and chest pain  Recent PET/CT demonstrated a new right major fissure 1 cm nodule with mild FDG activity of uncertain clinical significance  There were additional new small pleural nodules along the right major fissure and a moderate to large pleural effusion with minimal FDG activity  As discussed above, patient is symptomatic  We discussed options  Patient and family demonstrated a very good understanding of the situation  The obvious concern is recurrence (lung > breast)  Patient is to go for a thoracentesis now - therapeutic and diagnostic  Cytology results will determine further work-up and treatment options  Mrs Neo Agudelo will need to make some decisions regarding possible treatment options if the cytology is positive  If the cytology is negative, patient can be reevaluated by IR for the possibility of a pleural biopsy  If the pleural effusion is recurrent/persistent, other options will need to be investigated (Pleurx catheter, pleurodesis)  Patient is to return in 2 weeks but this may change depending upon the above  Mrs Auguste/family know to call the hematology/oncology office if there are any other questions or concerns  Carefully review your medication list and verify that the list is accurate and up-to-date   Please call the hematology/oncology office if there are medications missing from the list, medications on the list that you are not currently taking or if there is a dosage or instruction that is different from how you're taking that medication  Patient goals and areas of care: Thoracentesis (diagnostic and therapeutic)  Barriers to care: None  Patient is able to self-care with help of family members  ______________________________________________________________________________________    Chief Complaint   Patient presents with   • Consult   • Concern for recurrent lung cancer     Oncology History   History of left breast cancer   8/10/2018 Biopsy    Left breast biopsy  Invasive mucinous  Grade 1      Her 2 1+     9/11/2018 Surgery    Left lumpectomy  Invasive mucinous carcinoma  Grade 1  1 1 cm in size  Margins negative      Her 2 1+  Stage 1A     Adenocarcinoma, lung, right ([facility] [address] )   11/2021 Initial Diagnosis    Adenocarcinoma, lung, right ([facility] [address] )     11/15/2021 Biopsy    Lung, Right Middle Lobe, mass:  - Adenocarcinoma  Comment: Immunohistochemistry is positive in the tumor cells for TTF-1 and negative for p40 and Gata3  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 a primary pulmonary adenocarcinoma  1/19/2022 - 2/8/2022 Radiation    Course: C1    Plan ID Energy Fractions Dose per Fraction (cGy) Dose Correction (cGy) Total Dose Delivered (cGy) Elapsed Days   RML 6X 15 / 15 400 0 6,000 20      Treatment dates:  1/19/2022 - 2/8/2022        History of Present Illness: 31-year-old female with distant history of left breast cancer, more recent diagnosis of early stage right middle lobe non-small cell lung carcinoma (TTF-1 + adenocarcinoma) status post RT  The patient has had more problems with shortness of breath and dyspnea on exertion  Repeat scans demonstrated a 1 cm right major fissure nodule seen on the December 2022 CAT scan and more recently on a PET/CT  Patient also had a moderate to large pleural effusion  Mrs   Auguste presents with her family  Patient states feeling +/-, still with dyspnea on exertion  Activities are extremely limited  Patient has occasional chest pain with activities, chest pain goes away with rest   No cough, sputum or hemoptysis  No fevers or signs of infection  Appetite is +/-, no nausea or vomiting   or GYN issues  No recent breast issues  Review of Systems   Constitutional: Positive for activity change and fatigue  HENT: Negative  Eyes: Negative  Respiratory: Negative          + Dyspnea on exertion   Cardiovascular: Positive for chest pain  Gastrointestinal: Negative  Endocrine: Negative  Genitourinary: Negative  Musculoskeletal: Negative  Skin: Negative  Allergic/Immunologic: Negative  Neurological: Negative  Hematological: Negative  Psychiatric/Behavioral: Negative  All other systems reviewed and are negative      Patient Active Problem List   Diagnosis   • Iliac artery stenosis, bilateral ([facility])   • GERD (gastroesophageal reflux disease)   • Arthritis   • Hypercholesterolemia   • Hypertension   • EKG abnormalities   • Anemia   • Neck pain   • Dysuria   • Chronic left shoulder pain   • Fall from other slipping, tripping, or stumbling   • Abnormal EKG   • Abnormal mammogram   • Atherosclerosis of native artery of extremity with intermittent claudication ([facility])   • Back pain   • Bilateral carotid artery stenosis   • Carotid stenosis, asymptomatic   • 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   • Chronic myofascial pain   • Chronic pain of lower extremity   • CKD (chronic kidney disease) stage 3, GFR 30-59 ml/min ([facility])   • Diabetic retinopathy ([facility] [address] )   • DMII (diabetes mellitus, type 2) ([facility])   • Gout   • Hypomagnesemia   • Atherosclerosis of other specified arteries   • Lymphadenopathy   • Nephrolithiasis   • Osteoporosis   • PAD (peripheral artery disease) ([address] )   • Primary generalized (osteo)arthritis   • Persistent proteinuria   • Renal cyst   • Vaginal prolapse   • Vitamin D deficiency   • Acquired complex cyst of kidney   • History of left breast cancer   • Stricture of artery (HCC)   • Claudication in peripheral vascular disease (HCC)   • Hemorrhoids   • Tinea unguium   • Diabetic neuropathy (HCC)   • Bradycardia   • Glottic insufficiency   • Reflux laryngitis   • Paresis of left vocal fold   • Muscle tension dysphonia   • Laryngocele   • Anterior glottic web   • Viral upper respiratory tract infection   • 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HCC)   • Renal artery stenosis (HCC)   • Left ear pain   • Cervical spine disease   • Osteopenia of multiple sites   • Type 2 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, bilateral (HCC)   • Lung mass   • CKD (chronic kidney disease)   • Adenocarcinoma, lung, right (HCC)   • Encounter for annual routine gynecological examination   • Encounter for screening mammogram for breast cancer   • History of cancer of left breast   • Dense breast tissue on mammogram   • Left leg swelling   • Hypercalcemia   • Primary osteoarthritis of left knee     Past Medical History:   Diagnosis Date   • Arthritis    • Back pain    • Benign essential hypertension     LAST ASSESSED: [id number]   • Carotid artery stenosis    • CKD (chronic kidney disease) stage 2, GFR 60-89 ml/min    • Constipation, chronic     "drinks prune juice"   • Diabetes mellitus (HCC)    • DMII (diabetes mellitus, type 2) ([facility])    • Epiglottitis     RESOLVED: [id number]   • Gastritis    • GERD (gastroesophageal reflux disease)    • Gout    • H/O blood clots     left lower leg,,,11 yrs ago   • History of stenosis of renal artery     RESOLVED: [id number]   • Hypercholesteremia    • Hypercholesterolemia    • Hyperlipidemia    • Hyperparathyroidism ([facility] [address] )    • Hypertension    • Iliac artery stenosis, bilateral (HCC)    • Kidney stone    • Lobular carcinoma ([facility] [address] ) 09/06/2017    Left   • Lung cancer ([facility] [address] )    • Mucinous carcinoma of breast, left ([facility] [address] ) 09/11/2018    Left   • Presence of pessary    • Renal artery stenosis (HCC)    • Renal disorder    • Renovascular hypertension     RESOLVED: [id number]   • Segmental and somatic dysfunction of cervical region     RESOLVED: 02AUG2017   • Segmental and somatic dysfunction of lumbar region     RESOLVED: 02AUG2017   • Segmental and somatic dysfunction of pelvic region     RESOLVED: 02AUG2017   • Segmental and somatic dysfunction of thoracic region     RESOLVED: 02AUG2017   • Somatic dysfunction of abdominal region     RESOLVED: 02AUG2017   • Somatic dysfunction of head region     RESOLVED: 02AUG2017   • Somatic dysfunction of lower extremities     RESOLVED: 02AUG2017   • Somatic dysfunction of rib region     RESOLVED: 02AUG2017   • Somatic dysfunction of thoracic region     RESOLVED: 02AUG2017   • Somatic dysfunction of upper extremities     RESOLVED: 02AUG2017   • Teeth missing      Past Surgical History:   Procedure Laterality Date   • ANGIOPLASTY / STENTING ILIAC     • BREAST BIOPSY Left 08/10/2018    U/S BX- mucinous ca   • BREAST BIOPSY Left 08/07/2017    U/S BX   • BREAST LUMPECTOMY Left 09/06/2017    LAST ASSESSED: [id number]   • BREAST LUMPECTOMY Left 09/11/2018   • BREAST SURGERY Left     biopsy   • CATARACT EXTRACTION Bilateral    • CHOLECYSTECTOMY     • COLONOSCOPY     • EYE SURGERY     • ILIAC VEIN ANGIOPLASTY / STENTING Right     INITIAL STENOSIS: ONSET: [id number]   • IR BIOPSY LUNG  11/15/2021   • KNEE ARTHROSCOPY Right    • KNEE SURGERY Right     ONSET: [id number]   • MAMMO NEEDLE LOCALIZATION LEFT (ALL INC) Left 9/11/2018   • MAMMO NEEDLE LOCALIZATION LEFT (ALL INC) Left 9/6/2017   • FL ESOPHAGOGASTRODUODENOSCOPY TRANSORAL DIAGNOSTIC N/A 7/26/2018    Procedure: ESOPHAGOGASTRODUODENOSCOPY (EGD); Surgeon: Maia Bustamante DO;  Location: [facility];   Service: Gastroenterology   • FL [address] EXC SILVA&/STRPG CORDS/EPIGL MCRSCP/TLSCP N/A 6/10/2016    Procedure: MICRO DIRECT LARYNGOSCOPY WITH VOCAL FOLD MASS EXCISION ;  Surgeon: Basia Muse MD;  Location: [facility];  Service: ENT   • VT MASTECTOMY PARTIAL Left 9/6/2017    Procedure: LUMPECTOMY BREAST NEEDLE LOCALIZED WITH FAXITRON NEEDLE @ 0830;  Surgeon: Roxana Estevez MD;  Location: [facility];  Service: General   • VT PERQ DEVICE PLACEMENT BREAST LOC 1ST LES W/GDNCE Left 9/11/2018    Procedure: BREAST LUMPECTOMY; BREAST NEEDLE LOCALIZATION (NEEDLE LOC AT 1330); Surgeon: Isa Batres MD;  Location: [facility];  Service: Surgical Oncology   • THROAT SURGERY      lump removed   • TUBAL LIGATION     • UPPER GASTROINTESTINAL ENDOSCOPY     • US GUIDANCE BREAST BIOPSY LEFT EACH ADDITIONAL Left 8/7/2017   • US GUIDED BREAST BIOPSY LEFT COMPLETE Left 8/10/2018   • US GUIDED BREAST BIOPSY LEFT COMPLETE Left 8/7/2017   • VASCULAR SURGERY       Family History   Problem Relation Age of Onset   • Heart disease Brother    • Heart disease Maternal Grandmother    • Diabetes Other         of other type with arthropathy, without long term current use of insulin   • Hypertension Other    • No Known Problems Mother    • Gout Family    • Rheum arthritis Family    • Lupus Family         systematic erythematosus   • Heart disease Family    • Stroke Family         syndrome   • Stroke Maternal Uncle         syndrome   • Stroke Maternal Aunt         syndrome   • No Known Problems Father    • No Known Problems Daughter    • No Known Problems Maternal Grandfather    • No Known Problems Paternal Grandmother    • No Known Problems Paternal Grandfather    • No Known Problems Daughter    • No Known Problems Daughter    • No Known Problems Maternal Aunt    • No Known Problems Maternal Aunt    • No Known Problems Maternal Aunt      Social History     Socioeconomic History   • Marital status:       Spouse name: Not on file   • Number of children: Not on file   • Years of education: Not on file   • Highest education level: Not on file   Occupational History   • Occupation: retired   Tobacco Use   • Smoking status: Former     Packs/day: 1 00     Years: 55 00     Pack years: 55 00     Types: Cigarettes     Start date: 80     Quit date:      Years since quittin 1   • Smokeless tobacco: Never   Vaping Use   • Vaping Use: Never used   Substance and Sexual Activity   • Alcohol use: Not Currently   • Drug use: No   • Sexual activity: Yes     Partners: Male   Other Topics Concern   • Not on file   Social History Narrative    Daily caffeine consumption, 1 serving a day      Social Determinants of Health     Financial Resource Strain: Low Risk    • Difficulty of Paying Living Expenses: Not hard at all   Food Insecurity: Not on file   Transportation Needs: No Transportation Needs   • Lack of Transportation (Medical): No   • Lack of Transportation (Non-Medical):  No   Physical Activity: Not on file   Stress: Not on file   Social Connections: Not on file   Intimate Partner Violence: Not on file   Housing Stability: Not on file       Current Outpatient Medications:   •  Acetaminophen 325 MG CAPS, Take 500 mg by mouth every 8 (eight) hours (Patient not taking: Reported on 2022), Disp: , Rfl:   •  aspirin (ADULT ASPIRIN EC LOW STRENGTH) 81 mg EC tablet, Take 1 tablet by mouth daily, Disp: , Rfl:   •  Blood Glucose Monitoring Suppl (FreeStyle Precision Jalen System) w/Device KIT, by Does not apply route 3 (three) times a day, Disp: 1 kit, Rfl: 0  •  famotidine (PEPCID) 20 mg tablet, Take 1 tablet (20 mg total) by mouth daily, Disp: 30 tablet, Rfl: 0  •  fluticasone (FLONASE) 50 mcg/act nasal spray, 2 sprays into each nostril daily, Disp: 16 g, Rfl: 2  •  gabapentin (Neurontin) 100 mg capsule, Take 1 capsule (100 mg total) by mouth 2 (two) times a day, Disp: 180 capsule, Rfl: 0  •  Lancets (ONETOUCH DELICA PLUS [id number]) MISC, 1 Device by Does not apply route 2 (two) times a day, Disp: 100 each, Rfl: 5  •  Lancets (ONETOUCH ULTRASOFT) lancets, by Other route 2 (two) times a day Use as instructed, Disp: 100 each, Rfl: 5  •  Lidocaine 4 % PTCH, Apply 1 patch topically daily, Disp: 20 patch, Rfl: 1  •  methylPREDNISolone 4 MG tablet therapy pack, Use as directed on package, Disp: 21 each, Rfl: 0  •  NIFEdipine ER (ADALAT CC) 30 MG 24 hr tablet, Take 1 tablet twice a day , Disp: 180 tablet, Rfl: 5  •  olopatadine (PATANOL) 0 1 % ophthalmic solution, Administer 1 drop to both eyes 2 (two) times a day, Disp: 5 mL, Rfl: 0  •  OneTouch Delica Lancets 40E MISC, Inject as directed 2 (two) times a day Use to test, Disp: 200 each, Rfl: 0  •  pantoprazole (PROTONIX) 40 mg tablet, Take 1 tablet (40 mg total) by mouth daily before breakfast, Disp: 30 tablet, Rfl: 0  •  pravastatin (PRAVACHOL) 40 mg tablet, Take 1 tablet (40 mg total) by mouth daily, Disp: 90 tablet, Rfl: 3  •  sucralfate (CARAFATE) 1 g tablet, Take 1 tablet (1 g total) by mouth 4 (four) times a day, Disp: 60 tablet, Rfl: 0  •  sucralfate (CARAFATE) 1 g/10 mL suspension, Take 10 mL (1 g total) by mouth 4 (four) times a day, Disp: 414 mL, Rfl: 0    Allergies   Allergen Reactions   • Clonidine Derivatives Swelling   • Diflucan [Fluconazole] Rash   • Flagyl [Metronidazole] Anaphylaxis   • Carvedilol Hives     And legs swelled   • Latex Rash   • Lipitor [Atorvastatin]      Legs swelled   • Other Palpitations     IV DYE   • Shingrix [Zoster Vac Recomb Adjuvanted] Hives       Vitals:    02/16/23 1521   BP: (!) 180/60   Pulse: 78   Resp: 16   Temp: (!) 97 4 °F (36 3 °C)   SpO2: 96%     Physical Exam  Constitutional:       Appearance: She is well-developed  Comments: But relatively well-nourished female, no respiratory distress, no signs of pain   HENT:      Head: Normocephalic and atraumatic  Right Ear: External ear normal       Left Ear: External ear normal    Eyes:      Conjunctiva/sclera: Conjunctivae normal       Pupils: Pupils are equal, round, and reactive to light  Cardiovascular:      Rate and Rhythm: Normal rate and regular rhythm        Heart sounds: Normal heart sounds  Pulmonary:      Effort: Pulmonary effort is normal       Breath sounds: Normal breath sounds  Comments: Fair entry bilaterally, slightly decreased on right, rales on right  Abdominal:      General: Bowel sounds are normal       Palpations: Abdomen is soft  Musculoskeletal:         General: Normal range of motion  Cervical back: Normal range of motion and neck supple  Skin:     General: Skin is warm  Neurological:      Mental Status: She is alert and oriented to person, place, and time  Deep Tendon Reflexes: Reflexes are normal and symmetric  Psychiatric:         Behavior: Behavior normal          Thought Content: Thought content normal          Judgment: Judgment normal      Extremities: 0-1+ bilateral lower extreme edema, no cords, pulses are 1+  Lymphatics: No adenopathy in the neck, supraclavicular region, axilla and groin bilaterally    Labs    2/11/2023 WBC = 7 83 hemoglobin = 12 1 hematocrit = 39 3 MCV = 90 platelet = 967 neutrophil = 62% lymphocyte = 27% monocyte = 10% eosinophil = 1% BUN = 35 creatinine = 1 57 GFR = 29 calcium = 9 3    Imaging    1/20/2023 PET/CT    IMPRESSION:     1  Patient's known right middle lobe lung cancer demonstrates near-complete to complete metabolic response to therapy      2   Patient's known new 1 0 cm right major fissural nodule on CT of chest dated 12/23/2022 demonstrates minimal to mild FDG activity and is of uncertain clinical significance with differential diagnosis including benign inflammatory nodule versus developing malignancy of low metabolic activity  There are additional new/ progressive small pleural nodules along the right major fissure which are beyond the limits of resolution for PET CT  There is a moderate to large pleural effusion with minimal to mild FDG activity  The overall constellation of findings is suspicious for developing pleural metastatic disease with possible malignant pleural effusion    Consider correlation with fluid/tissue sampling as clinically indicated      3  Additional scattered solid and groundglass appearing lung nodules involving both lungs without significant interval change and for which continued follow-up is recommended      4   Multinodular thyroid gland which can be further characterized with ultrasound as clinically indicated      Pathology    Case Report   Surgical Pathology Report                         Case: [id number]                                    Authorizing Provider: Laura Hayes MD    Collected:           11/15/2021 1545               Ordering Location:     [facility]      Received:            11/15/2021 [address] [place] [address]                                                    Pathologist:           Giana Medina MD                                                    Specimen:    Lung, Right Middle Lobe, RML Lung mass                                                     Final Diagnosis   A  Lung, Right Middle Lobe, mass:  - Adenocarcinoma  See comment      Comment: Immunohistochemistry is positive in the tumor cells for TTF-1 and negative for p40 and Gata3  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 a primary pulmonary adenocarcinoma     Electronically signed by Josselyn Steen MD on 11/17/2021 at  9:04 AM       Case Report   Surgical Pathology Report                         Case: [id number]                                    Authorizing Provider: Jayson Ren MD              Collected:           09/11/2018 1437               Ordering Location:     [facility]        Received:            09/11/2018 [address] [place] [address] Operating Room                                                       Pathologist:           Fay Guallpa MD                                                                  Specimens:   A) - Breast, Left, Left breast lumpectomy                                                            B) - Breast, Left, Left breast lumpectomy-anterior margin-green                                      C) - Breast, Left, Left breast lumpectomy-inferior margin-blue                                       D) - Breast, Left, Left breast lumpectomy-lateral margin-red                                         E) - Breast, Left, Left breast lumpectomy-medial margin-yellow                                       F) - Breast, Left, Left breast lumpectomy-superior margin-orange                           Final Diagnosis   MUCINOUS CARCINOMA OF BREAST STAGING SUMMARY (includes specimens A to F of this case and prior biopsy [id number])  1  Specimen Identification     - Procedure: lumpectomy     - Lymph Node Sampling: no     - Specimen Laterality: left     - Tumor Site (Quadrant; Position/o'clock): upper outer quadrant, 1:00 - 2:00   2  Invasive Tumor:     - Histologic Type: mucinous carcinoma     - Tumor Size (pT) [Size of largest invasive carcinoma]: 1 1 cm     -  Grade (G): Viet Histologic Score; Grade  I of III  * Glandular (Acinar) / Tubular Differentiation: 3       * Nuclear Pleomorphism: 1       * Mitotic Rate: 1     - Tumor Focality: single focus     - Macroscopic and Microscopic Extent of Tumor:       -- Skin: N/A       -- Nipple:N/A       -- Skeletal muscle: N/A  3  Lymphovascular invasion: absent  4  Dermal lymphovascular invasion: N/A  5  In situ tumor     - Ductal carcinoma in Situ (DCIS): absent   6  Margins:     - Invasive Carcinoma: negative     - DCIS: N/A   7  Lymph nodes (pN): no lymph node submitted  8  Treatment Effect, Response to Presurgical (Neoadjuvant) Therapy: no known presurgical therapy   9  Additional Pathologic Findings:     - Proliferative fibrocystic changes, including usual ductal hyperplasia, cysts, apocrine metaplasia, fibroadenomatous change, and duct ectasia       - Biopsy site changes  10  Microcalcifications: present and associated with benign breast parenchyma  11  Ancillary Studies: (performed on case [id number])     - Estrogen receptor: 100%, Strong, positive     - Progesterone receptor: 100%, strong, positive     - HER2 by IHC: 1+, negative     - Repeat HER2 testing (2013 ASCO/CAP Recommendations): Not indicated  - Best representative tumor block: A7, A8 and A9       -- Sufficient tumor present for          Agendia Mammaprint/Blueprint (1 cm2 of invasive tumor in aggregate): Yes  MI Profile/Foundation One (at least 5 x 5 mm of tumor): Yes  12  Clinical History: abnormal findings on diagnostic imaging of breast  13  Pathologic Stage Classification (pTNM, AJCC 8th Edition): pT1c, pNX   8th ed, AJCC Anatomic Stage: at least Ia  14  8th ed  AJCC Prognostic Stage (use AJCC update): IA        Final Diagnoses for each Specimen of this Case  A  Left breast, lumpectomy:  - Mucinous carcinoma, see synoptic report, above  - All lumpectomy margins are uninvolved by carcinoma      Comment: Intradepartmental consultation is in agreement       B  Left breast, anterior margin, excision:  - Benign breast parenchyma  - Negative for invasive carcinoma or carcinoma in situ      C  Left breast, inferior margin, excision:  - Benign breast parenchyma with proliferative fibrocystic changes, including usual ductal hyperplasia with apocrine metaplasia and duct ectasia  - Negative for invasive carcinoma or carcinoma in situ      D  Left breast, lateral margin, excision:  - Benign breast parenchyma  - Negative for invasive carcinoma or carcinoma in situ      E  Left breast, medial margin, excision:  - Benign breast parenchyma  - Microcalcifications are present and associated with benign glands  - Negative for invasive carcinoma or carcinoma in situ      F  Left breast, superior margin, excision:  - Benign breast parenchyma    - Negative for invasive carcinoma or carcinoma in situ     Electronically signed by Agusto Logan MD on 9/14/2018 at  3:17 PM

## 2023-02-16 NOTE — PROGRESS NOTES
Assessment  Diagnoses and all orders for this visit:    Primary osteoarthritis of left knee        Discussion and Plan:    The patient has an examination consistent with left knee osteoarthritis  I have discussed with the patient the pathophysiology of this diagnosis and reviewed how the examination correlates with this diagnosis  Surgical vs conservative treatment options were discussed at length to included CS injection, PT, un- brace, visco supplemenation and total knee arthroplasty  Patient has failed CS injection  Left knee visco injection was ordered today  We will contact the patient once approved  Subjective:   Patient ID: Charlie Bai is a 80 y o  female      HPI  Patient presents today for follow up of left knee pain  She was last seen 2/2/23 for left knee OA and received a CS injection  Patient states the injection did help briefly  She localizes her pain to the anteromedial aspect of the knee  Worse with WB activities, relieved by rest         The following portions of the patient's history were reviewed and updated as appropriate: allergies, current medications, past family history, past medical history, past social history, past surgical history and problem list     Review of Systems   Constitutional: Negative for chills and fever  HENT: Negative for drooling and sneezing  Eyes: Negative for redness  Respiratory: Negative for cough and wheezing  Gastrointestinal: Negative for nausea and vomiting  Musculoskeletal:        Please see ortho exam   Psychiatric/Behavioral: Negative for behavioral problems  The patient is not nervous/anxious          Objective:  /62 (BP Location: Left arm, Patient Position: Sitting, Cuff Size: Adult)   Pulse 87   Ht 5' 1" (1 549 m)   Wt 54 kg (119 lb)   LMP  (LMP Unknown)   BMI 22 48 kg/m²     Left Knee Exam      Tenderness   The patient is experiencing tenderness in the medial joint line, lateral joint line and pes anserinus      Range of Motion   The patient has normal left knee ROM     Tests   Varus: negative Valgus: negative     Other   Erythema: absent  Sensation: normal  Pulse: present  Swelling: none  Effusion: no effusion present     Comments:    Varus alignment       Physical Exam  Vitals reviewed  Constitutional:       Appearance: She is well-developed  Eyes:      Pupils: Pupils are equal, round, and reactive to light  Pulmonary:      Effort: Pulmonary effort is normal       Breath sounds: Normal breath sounds  Abdominal:      General: Abdomen is flat  There is no distension  Skin:     General: Skin is warm and dry  Neurological:      Mental Status: She is alert and oriented to person, place, and time  Psychiatric:         Behavior: Behavior normal          Thought Content: Thought content normal          Judgment: Judgment normal        I have personally reviewed pertinent films in PACS and my interpretation is as follows  Left knee x-rays demonstrates no fracture or dislocation, medial joint space narrowing       Scribe Attestation    I,:  Cass Recinos am acting as a scribe while in the presence of the attending physician :       I,:  Jennifer Lockett MD personally performed the services described in this documentation    as scribed in my presence :

## 2023-02-17 ENCOUNTER — PATIENT OUTREACH (OUTPATIENT)
Dept: HEMATOLOGY ONCOLOGY | Facility: CLINIC | Age: 88
End: 2023-02-17

## 2023-02-17 DIAGNOSIS — Z85.3 HISTORY OF LEFT BREAST CANCER: ICD-10-CM

## 2023-02-17 DIAGNOSIS — C34.91 ADENOCARCINOMA, LUNG, RIGHT (HCC): Primary | ICD-10-CM

## 2023-02-17 NOTE — PROGRESS NOTES
Initial Outreach: Called and spoke to patients daughter (Verified communication consent)  Reviewed upcoming appointments including date, time and location  Assessed for barriers of care  Patient resides with  and she also has her two daughter that help  They all help in transportation as they come along to her appointments  Denies symptoms of pain, chest pain or cough  Appetite is good  Denies nausea or vomiting  Denies constipation or diarrhea  She does have fatigue and dyspnea on exertion  Patient has a history of breast and lung cancer, concerns are for recurrence breast vs lung cancer  Patient is scheduled for a thoracentesis on Monday 2/20  Pending cytology will determine treatment options if positive result  We mutually agreed for me to follow up with her in about two weeks  Provided my direct phone number for questions or concerns moving forward  Patient was appreciative

## 2023-02-20 ENCOUNTER — HOSPITAL ENCOUNTER (OUTPATIENT)
Dept: RADIOLOGY | Facility: HOSPITAL | Age: 88
Discharge: HOME | End: 2023-02-20
Attending: STUDENT IN AN ORGANIZED HEALTH CARE EDUCATION/TRAINING PROGRAM | Admitting: RADIOLOGY

## 2023-02-20 ENCOUNTER — TELEPHONE (OUTPATIENT)
Dept: NEPHROLOGY | Facility: CLINIC | Age: 88
End: 2023-02-20

## 2023-02-20 VITALS
SYSTOLIC BLOOD PRESSURE: 172 MMHG | OXYGEN SATURATION: 98 % | RESPIRATION RATE: 18 BRPM | HEART RATE: 94 BPM | DIASTOLIC BLOOD PRESSURE: 74 MMHG

## 2023-02-20 DIAGNOSIS — C34.91 ADENOCARCINOMA, LUNG, RIGHT (HCC): ICD-10-CM

## 2023-02-20 LAB
APPEARANCE FLD: ABNORMAL
COLOR FLD: YELLOW
LDH FLD L TO P-CCNC: 289 U/L
LYMPHOCYTES NFR BLD AUTO: 54 %
MONO+MESO NFR FLD MANUAL: 3 %
MONOCYTES NFR BLD AUTO: 43 %
PH BODY FLUID: 7.5
SITE: ABNORMAL
TOTAL CELLS COUNTED SPEC: 100
WBC # FLD MANUAL: 2227 /UL

## 2023-02-20 NOTE — BRIEF OP NOTE (RAD/CATH)
IR THORACENTESIS Procedure Note    PATIENT NAME: Misha Clayton  : 1934  MRN: [id number]    Pre-op Diagnosis:   1  Adenocarcinoma, lung, right (HCC)      Post-op Diagnosis:   1   Adenocarcinoma, lung, right [facility])        Provider:   JARRED Mccarthy  Assistants:     No qualified resident was available, Resident is only observing    Estimated Blood Loss: none    Findings: 520 mL serous right sided pleural effusion    Specimens: sent for labs    Complications:  None immediate     Anesthesia: local    JARRED Mccarthy     Date: 2023  Time: 1:53 PM

## 2023-02-20 NOTE — TELEPHONE ENCOUNTER
Called patient and left a voicemail stating the following:    Patients kidney function stable and calcium is normal now  I asked the patient call back with further questions

## 2023-02-20 NOTE — SEDATION DOCUMENTATION
Right side thoracentesis performed by JARRED Persaud  520 ml of clear yellow fluid drained  Ultrasound guided  Pt tolerated well  Labs sent  Puncture site is CDI and covered with a band aid

## 2023-02-20 NOTE — TELEPHONE ENCOUNTER
----- Message from Attila Cutler MD sent at 2/19/2023  6:39 AM EST -----  Let them know kidney function stableand calcium normal now

## 2023-02-21 ENCOUNTER — PATIENT OUTREACH (OUTPATIENT)
Dept: CASE MANAGEMENT | Facility: HOSPITAL | Age: 88
End: 2023-02-21

## 2023-02-23 ENCOUNTER — ANESTHESIA (OUTPATIENT)
Dept: GASTROENTEROLOGY | Facility: HOSPITAL | Age: 88
End: 2023-02-23

## 2023-02-23 ENCOUNTER — ANESTHESIA EVENT (OUTPATIENT)
Dept: GASTROENTEROLOGY | Facility: HOSPITAL | Age: 88
End: 2023-02-23

## 2023-02-23 ENCOUNTER — HOSPITAL ENCOUNTER (OUTPATIENT)
Dept: GASTROENTEROLOGY | Facility: HOSPITAL | Age: 88
Setting detail: OUTPATIENT SURGERY
End: 2023-02-23
Attending: INTERNAL MEDICINE

## 2023-02-23 VITALS
DIASTOLIC BLOOD PRESSURE: 72 MMHG | TEMPERATURE: 96.2 F | OXYGEN SATURATION: 92 % | HEART RATE: 82 BPM | RESPIRATION RATE: 16 BRPM | SYSTOLIC BLOOD PRESSURE: 176 MMHG

## 2023-02-23 DIAGNOSIS — R10.13 EPIGASTRIC ABDOMINAL PAIN: ICD-10-CM

## 2023-02-23 DIAGNOSIS — K92.89 GAS BLOAT SYNDROME: ICD-10-CM

## 2023-02-23 LAB
BACTERIA SPEC BFLD CULT: NO GROWTH
GLUCOSE SERPL-MCNC: 126 MG/DL (ref 65–140)
GRAM STN SPEC: NORMAL
GRAM STN SPEC: NORMAL

## 2023-02-23 RX ORDER — PANTOPRAZOLE SODIUM 40 MG/1
TABLET, DELAYED RELEASE ORAL
Qty: 30 TABLET | Refills: 0 | Status: SHIPPED | OUTPATIENT
Start: 2023-02-23 | End: 2023-02-26 | Stop reason: SDUPTHER

## 2023-02-23 RX ORDER — PROPOFOL 10 MG/ML
INJECTION, EMULSION INTRAVENOUS AS NEEDED
Status: DISCONTINUED | OUTPATIENT
Start: 2023-02-23 | End: 2023-02-23

## 2023-02-23 RX ORDER — SODIUM CHLORIDE 9 MG/ML
INJECTION, SOLUTION INTRAVENOUS CONTINUOUS PRN
Status: DISCONTINUED | OUTPATIENT
Start: 2023-02-23 | End: 2023-02-23

## 2023-02-23 RX ORDER — LIDOCAINE HYDROCHLORIDE 10 MG/ML
INJECTION, SOLUTION EPIDURAL; INFILTRATION; INTRACAUDAL; PERINEURAL AS NEEDED
Status: DISCONTINUED | OUTPATIENT
Start: 2023-02-23 | End: 2023-02-23

## 2023-02-23 RX ADMIN — LIDOCAINE HYDROCHLORIDE 50 MG: 10 INJECTION, SOLUTION EPIDURAL; INFILTRATION; INTRACAUDAL; PERINEURAL at 12:06

## 2023-02-23 RX ADMIN — PROPOFOL 20 MG: 10 INJECTION, EMULSION INTRAVENOUS at 12:14

## 2023-02-23 RX ADMIN — PROPOFOL 10 MG: 10 INJECTION, EMULSION INTRAVENOUS at 12:27

## 2023-02-23 RX ADMIN — PROPOFOL 20 MG: 10 INJECTION, EMULSION INTRAVENOUS at 12:10

## 2023-02-23 RX ADMIN — PROPOFOL 10 MG: 10 INJECTION, EMULSION INTRAVENOUS at 12:30

## 2023-02-23 RX ADMIN — SODIUM CHLORIDE: 0.9 INJECTION, SOLUTION INTRAVENOUS at 11:50

## 2023-02-23 RX ADMIN — PROPOFOL 10 MG: 10 INJECTION, EMULSION INTRAVENOUS at 12:32

## 2023-02-23 RX ADMIN — PROPOFOL 20 MG: 10 INJECTION, EMULSION INTRAVENOUS at 12:18

## 2023-02-23 RX ADMIN — PROPOFOL 20 MG: 10 INJECTION, EMULSION INTRAVENOUS at 12:12

## 2023-02-23 RX ADMIN — PROPOFOL 20 MG: 10 INJECTION, EMULSION INTRAVENOUS at 12:22

## 2023-02-23 RX ADMIN — PROPOFOL 20 MG: 10 INJECTION, EMULSION INTRAVENOUS at 12:16

## 2023-02-23 RX ADMIN — PROPOFOL 80 MG: 10 INJECTION, EMULSION INTRAVENOUS at 12:06

## 2023-02-23 RX ADMIN — PROPOFOL 20 MG: 10 INJECTION, EMULSION INTRAVENOUS at 12:08

## 2023-02-23 RX ADMIN — PROPOFOL 20 MG: 10 INJECTION, EMULSION INTRAVENOUS at 12:20

## 2023-02-23 RX ADMIN — PROPOFOL 10 MG: 10 INJECTION, EMULSION INTRAVENOUS at 12:25

## 2023-02-23 NOTE — ANESTHESIA POSTPROCEDURE EVALUATION
Post-Op Assessment Note    CV Status:  Stable  Pain Score: 0    Pain management: adequate     Mental Status:  Awake and sleepy   Hydration Status:  Euvolemic   PONV Controlled:  Controlled   Airway Patency:  Patent      Post Op Vitals Reviewed: Yes      Staff: CRNA, Anesthesiologist         No notable events documented      BP  132/62   Temp      Pulse  73   Resp   14   SpO2   95

## 2023-02-23 NOTE — ANESTHESIA PREPROCEDURE EVALUATION
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 seen on imaging in the past and so a duplex ultrasound of her GI tract is ordered as well      I will also check her for H pylori  She was negative in 2016      She is on Protonix 40 mg daily so I will DC this and start her on omeprazole 40 mg daily which is more potent than Protonix      Will have her follow-up in a few months time         ______________________________________________________________________     HPI:  80year old female referred for abdominal pain with 5 lb weight loss for the past few months    Pain is worse after eating        Her family member who accompanies her says she has Celiac disease and was diagnosed by blood work        She is taking Protonix 40 mg daily          Relevant Problems   CARDIO   (+) Hypercholesterolemia   (+) Hypertension      ENDO   (+) DMII (diabetes mellitus, type 2) (HCC)   (+) Type 2 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, bilateral (HCC)      GI/HEPATIC   (+) GERD (gastroesophageal reflux disease)      /RENAL   (+) Acquired complex cyst of kidney   (+) CKD (chronic kidney disease)   (+) CKD (chronic kidney disease) stage 3, GFR 30-59 ml/min (HCC)   (+) Nephrolithiasis   (+) Renal cyst      HEMATOLOGY   (+) Anemia      MUSCULOSKELETAL   (+) Arthritis   (+) Back pain   (+) 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   (+) Gout   (+) Primary generalized (osteo)arthritis   (+) Primary osteoarthritis of left knee      NEURO/PSYCH   (+) 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)   (+) Chronic left shoulder pain   (+) Claudication in peripheral vascular disease (HCC)   (+) Diabetic neuropathy (HCC)   (+) History of cancer of left breast   (+) History of left breast cancer      PULMONARY   (+) Viral upper respiratory tract infection        Physical Exam    Airway    Mallampati score: III  TM Distance: >3 FB  Neck ROM: full     Dental       Cardiovascular  Cardiovascular exam normal    Pulmonary  Pulmonary exam normal     Other Findings      Iliac artery stenosis, bilateral ([facility])   GERD (gastroesophageal reflux disease)   Arthritis   Hypercholesterolemia   Hypertension   EKG abnormalities   Anemia   Neck pain   Dysuria   Chronic left shoulder pain   Fall from other slipping, tripping, or stumbling   Abnormal EKG   Abnormal mammogram   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)   Back pain   Bilateral carotid artery stenosis   Carotid stenosis, asymptomatic   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   Chronic myofascial pain   Chronic pain of lower extremity   CKD (chronic kidney disease) stage 3, GFR 30-59 ml/min ([facility])   Diabetic retinopathy ([facility])   DMII (diabetes mellitus, type 2) ([facility])   Gout   Hypomagnesemia   Atherosclerosis of other specified arteries   Lymphadenopathy   Nephrolithiasis   Osteoporosis   PAD (peripheral artery disease) ([facility] [address] )   Primary generalized (osteo)arthritis   Persistent proteinuria   Renal cyst   Vaginal prolapse   Vitamin D deficiency   Acquired complex cyst of kidney   History of left breast cancer   Stricture of artery (HCC)   Claudication in peripheral vascular disease ([facility])   Hemorrhoids   Tinea unguium   Diabetic neuropathy ([facility])   Bradycardia   Glottic insufficiency   Reflux laryngitis   Paresis of left vocal fold   Muscle tension dysphonia   Laryngocele   Anterior glottic web   Viral upper respiratory tract infection   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HCC)   Renal artery stenosis (HCC)   Left ear pain   Cervical spine disease   Osteopenia of multiple sites   Type 2 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, bilateral (HCC)   Lung mass   CKD (chronic kidney disease)   Adenocarcinoma, lung, right ([address] )   Encounter for annual routine gynecological examination   Encounter for screening mammogram for breast cancer   History of cancer of left breast   Dense breast tissue on mammogram   Left leg swelling   Hypercalcemia   Primary osteoarthritis of left knee       Stress test 2019    GATED SPECT:  The calculated left ventricular ejection fraction was 72 %  There was no diagnostic evidence for left ventricular regional abnormality      SUMMARY:  -  Stress results: There was resting hypertension with an appropriate blood pressure response to stress  There was no chest pain during stress  -  ECG conclusions: The stress ECG was equivocal for ischemia  -  Perfusion imaging: There were no perfusion defects   -  Gated SPECT: The calculated left ventricular ejection fraction was 72 %  There was no diagnostic evidence for left ventricular regional abnormality      IMPRESSIONS: Normal study after pharmacologic vasodilation without reproduction of symptoms  Myocardial perfusion imaging was normal at rest and with stress        Anesthesia Plan  ASA Score- 4     Anesthesia Type- IV sedation with anesthesia with ASA Monitors  Additional Monitors:   Airway Plan:           Plan Factors-Exercise tolerance (METS): <4 METS  Chart reviewed  EKG reviewed  Imaging results reviewed  Existing labs reviewed  Patient summary reviewed  Patient is not a current smoker  Induction- intravenous  Postoperative Plan-     Informed Consent- Anesthetic plan and risks discussed with patient  I personally reviewed this patient with the CRNA  Discussed and agreed on the Anesthesia Plan with the CRNA  Magdaleno Limon

## 2023-02-23 NOTE — H&P
History and Physical - [facility]  Nas Hawk 80 y o  female MRN: [id number]                  HPI: Nas Hawk is a 80y o  year old female who presents for abdominal epigastric pain  REVIEW OF SYSTEMS: Per the HPI, and otherwise unremarkable      Historical Information   Past Medical History:   Diagnosis Date   • Arthritis    • Back pain    • Benign essential hypertension     LAST ASSESSED: [id number]   • Carotid artery stenosis    • CKD (chronic kidney disease) stage 2, GFR 60-89 ml/min    • Constipation, chronic     "drinks prune juice"   • Diabetes mellitus (HCC)    • DMII (diabetes mellitus, type 2) ([facility])    • Epiglottitis     RESOLVED: [id number]   • Gastritis    • GERD (gastroesophageal reflux disease)    • Gout    • H/O blood clots     left lower leg,,,11 yrs ago   • History of stenosis of renal artery     RESOLVED: 01RPK1795   • Hypercholesteremia    • Hypercholesterolemia    • Hyperlipidemia    • Hyperparathyroidism ([facility] [address] )    • Hypertension    • Iliac artery stenosis, bilateral ([facility])    • Kidney stone    • Lobular carcinoma ([facility] [address] ) 09/06/2017    Left   • Lung cancer ([facility] [address] )    • Mucinous carcinoma of breast, 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    Left   • Presence of pessary    • Renal artery stenosis ([facility])    • Renal disorder    • Renovascular hypertension     RESOLVED: [id number]   • Segmental and somatic dysfunction of cervical region     RESOLVED: 30TQI0689   • Segmental and somatic dysfunction of lumbar region     RESOLVED: 50PKE0749   • Segmental and somatic dysfunction of pelvic region     RESOLVED: 57WHE5855   • Segmental and somatic dysfunction of thoracic region     RESOLVED: 26BCU9599   • Somatic dysfunction of abdominal region     RESOLVED: 92JEA2221   • Somatic dysfunction of head region     RESOLVED: 74TNB9206   • Somatic dysfunction of lower extremities     RESOLVED: 68NQK0790   • Somatic dysfunction of rib region     RESOLVED: 88XAT8024   • Somatic dysfunction of thoracic region RESOLVED: 57UKY7166   • Somatic dysfunction of upper extremities     RESOLVED: 85DIO4522   • Teeth missing      Past Surgical History:   Procedure Laterality Date   • ANGIOPLASTY / STENTING ILIAC     • BREAST BIOPSY Left 08/10/2018    U/S BX- mucinous ca   • BREAST BIOPSY Left 08/07/2017    U/S BX   • BREAST LUMPECTOMY Left 09/06/2017    LAST ASSESSED: [id number]   • BREAST LUMPECTOMY Left 09/11/2018   • BREAST SURGERY Left     biopsy   • CATARACT EXTRACTION Bilateral    • CHOLECYSTECTOMY     • COLONOSCOPY     • EYE SURGERY     • ILIAC VEIN ANGIOPLASTY / STENTING Right     INITIAL STENOSIS: ONSET: [id number]   • IR BIOPSY LUNG  11/15/2021   • IR THORACENTESIS  2/20/2023   • KNEE ARTHROSCOPY Right    • KNEE SURGERY Right     ONSET: 59NKX6827   • MAMMO NEEDLE LOCALIZATION LEFT (ALL INC) Left 9/11/2018   • MAMMO NEEDLE LOCALIZATION LEFT (ALL INC) Left 9/6/2017   • TN ESOPHAGOGASTRODUODENOSCOPY TRANSORAL DIAGNOSTIC N/A 7/26/2018    Procedure: ESOPHAGOGASTRODUODENOSCOPY (EGD); Surgeon: Savannah Weaver DO;  Location: [facility]; Service: Gastroenterology   • TN [address] EXC SILVA&/STRPG CORDS/EPIGL MCRSCP/TLSCP N/A 6/10/2016    Procedure: MICRO DIRECT LARYNGOSCOPY WITH VOCAL FOLD MASS EXCISION ;  Surgeon: Moses Santos MD;  [facility];  Service: ENT   • TN MASTECTOMY PARTIAL Left 9/6/2017    Procedure: LUMPECTOMY BREAST NEEDLE LOCALIZED WITH FAXITRON NEEDLE @ 0830;  Surgeon: Lin Barfield MD;  [facility];  Service: General   • TN PERQ DEVICE PLACEMENT BREAST LOC 1ST LES W/GDNCE Left 9/11/2018    Procedure: BREAST LUMPECTOMY; BREAST NEEDLE LOCALIZATION (NEEDLE LOC AT 1330);   Surgeon: Destinee Lim MD;  [facility];  Service: Surgical Oncology   • THROAT SURGERY      lump removed   • TUBAL LIGATION     • UPPER GASTROINTESTINAL ENDOSCOPY     • US GUIDANCE BREAST BIOPSY LEFT EACH ADDITIONAL Left 8/7/2017   • US GUIDED BREAST BIOPSY LEFT COMPLETE Left 8/10/2018   • US GUIDED BREAST BIOPSY LEFT COMPLETE Left 2017   • VASCULAR SURGERY       Social History   Social History     Substance and Sexual Activity   Alcohol Use Not Currently     Social History     Substance and Sexual Activity   Drug Use No     Social History     Tobacco Use   Smoking Status Former   • Packs/day: 1 00   • Years: 55 00   • Pack years: 55 00   • Types: Cigarettes   • Start date:    • Quit date:    • Years since quittin 1   Smokeless Tobacco Never     Family History   Problem Relation Age of Onset   • Heart disease Brother    • Heart disease Maternal Grandmother    • Diabetes Other         of other type with arthropathy, without long term current use of insulin   • Hypertension Other    • No Known Problems Mother    • Gout Family    • Rheum arthritis Family    • Lupus Family         systematic erythematosus   • Heart disease Family    • Stroke Family         syndrome   • Stroke Maternal Uncle         syndrome   • Stroke Maternal Aunt         syndrome   • No Known Problems Father    • No Known Problems Daughter    • No Known Problems Maternal Grandfather    • No Known Problems Paternal Grandmother    • No Known Problems Paternal Grandfather    • No Known Problems Daughter    • No Known Problems Daughter    • No Known Problems Maternal Aunt    • No Known Problems Maternal Aunt    • No Known Problems Maternal Aunt        Meds/Allergies       Current Outpatient Medications:   •  Acetaminophen 325 MG CAPS  •  aspirin (ADULT ASPIRIN EC LOW STRENGTH) 81 mg EC tablet  •  Blood Glucose Monitoring Suppl (FreeStyle Precision Jalen System) w/Device KIT  •  famotidine (PEPCID) 20 mg tablet  •  fluticasone (FLONASE) 50 mcg/act nasal spray  •  gabapentin (Neurontin) 100 mg capsule  •  Lancets (ONETOUCH DELICA PLUS [id number]) MISC  •  Lancets (ONETOUCH ULTRASOFT) lancets  •  Lidocaine 4 % PTCH  •  methylPREDNISolone 4 MG tablet therapy pack  •  NIFEdipine ER (ADALAT CC) 30 MG 24 hr tablet  •  olopatadine (PATANOL) 0 1 % ophthalmic solution  •  OneTouch Delica Lancets 02I MISC  •  pantoprazole (PROTONIX) 40 mg tablet  •  pravastatin (PRAVACHOL) 40 mg tablet  •  sucralfate (CARAFATE) 1 g tablet  •  sucralfate (CARAFATE) 1 g/10 mL suspension    Allergies   Allergen Reactions   • Clonidine Derivatives Swelling   • Diflucan [Fluconazole] Rash   • Flagyl [Metronidazole] Anaphylaxis   • Carvedilol Hives     And legs swelled   • Latex Rash   • Lipitor [Atorvastatin]      Legs swelled   • Other Palpitations     IV DYE   • Shingrix [Zoster Vac Recomb Adjuvanted] Hives       Objective     LMP  (LMP Unknown)       PHYSICAL EXAM    Gen: NAD  Head: NCAT  CV: RRR  CHEST: Clear  ABD: soft, NT/ND  EXT: no edema      ASSESSMENT/PLAN:  This is a 80y o  year old female here for EGD, and she is stable and optimized for her procedure

## 2023-02-26 DIAGNOSIS — K92.89 GAS BLOAT SYNDROME: ICD-10-CM

## 2023-02-27 RX ORDER — PANTOPRAZOLE SODIUM 40 MG/1
40 TABLET, DELAYED RELEASE ORAL DAILY
Qty: 30 TABLET | Refills: 0 | Status: SHIPPED | OUTPATIENT
Start: 2023-02-27

## 2023-02-28 ENCOUNTER — TELEPHONE (OUTPATIENT)
Dept: OTHER | Facility: OTHER | Age: 88
End: 2023-02-28

## 2023-02-28 NOTE — TELEPHONE ENCOUNTER
Received call from  [facility] post voicemail for biopsy results post EGD on 2/23/23  Patient is available and [facility] reports no  is needed   Please follow up with [facility]

## 2023-02-28 NOTE — TELEPHONE ENCOUNTER
I called and spoke with pts  stephanie    We went over results pt verbalized understanding no f u questions

## 2023-03-02 ENCOUNTER — PROCEDURE VISIT (OUTPATIENT)
Dept: OBGYN CLINIC | Facility: OTHER | Age: 88
End: 2023-03-02

## 2023-03-02 VITALS
DIASTOLIC BLOOD PRESSURE: 63 MMHG | SYSTOLIC BLOOD PRESSURE: 150 MMHG | WEIGHT: 116 LBS | HEIGHT: 61 IN | BODY MASS INDEX: 21.9 KG/M2 | HEART RATE: 98 BPM

## 2023-03-02 DIAGNOSIS — M17.12 PRIMARY OSTEOARTHRITIS OF LEFT KNEE: Primary | ICD-10-CM

## 2023-03-02 NOTE — RESULT ENCOUNTER NOTE
Inform patient via [employer]  Please review the pathology/lab result of further discussion  Copied from [employer] message :       Yola Lyons,     Your biopsies were unremarkable  The polyps were benign      Best regards,     Roger Finnegan MD

## 2023-03-02 NOTE — PROGRESS NOTES
Assessment  Diagnoses and all orders for this visit:    Primary osteoarthritis of left knee        Discussion and Plan:    · Patient provided with left knee Synvisc One injection today  · Tolerated well  · Ice today  · Activities as tolerated    Subjective:   Patient ID: Fernando Lundy is a 80 y o  female      HPI    Patient presents today for left knee Synvisc one injection    The following portions of the patient's history were reviewed and updated as appropriate: allergies, current medications, past family history, past medical history, past social history, past surgical history and problem list       Objective:  /63 (BP Location: Left arm, Patient Position: Sitting, Cuff Size: Adult)   Pulse 98   Ht 5' 1" (1 549 m) Comment: verbal  Wt 52 6 kg (116 lb)   LMP  (LMP Unknown)   BMI 21 92 kg/m²     Left Knee Exam      Tenderness   The patient is experiencing tenderness in the medial joint line, lateral joint line and pes anserinus      Range of Motion   The patient has normal left knee ROM       Tests   Varus: negative Valgus: negative     Other   Erythema: absent  Sensation: normal  Pulse: present  Swelling: none  Effusion: no effusion present     Comments:    Varus alignment     Large joint arthrocentesis: L knee  Universal Protocol:  Consent given by: patient  Patient understanding: patient states understanding of the procedure being performed  Site marked: the operative site was marked  Patient identity confirmed: verbally with patient    Supporting Documentation  Indications: pain   Procedure Details  Location: knee - L knee  Needle size: 22 G  Ultrasound guidance: no  Approach: lateral  Medications administered: 48 mg hylan 48 MG/6ML    Patient tolerance: patient tolerated the procedure well with no immediate complications  Dressing:  Sterile dressing applied        Scribe Attestation    I,:  Audra Leach am acting as a scribe while in the presence of the attending physician :       I,: Teri Clemons MD personally performed the services described in this documentation    as scribed in my presence :

## 2023-03-08 ENCOUNTER — PATIENT OUTREACH (OUTPATIENT)
Dept: CASE MANAGEMENT | Facility: HOSPITAL | Age: 88
End: 2023-03-08

## 2023-03-08 NOTE — PROGRESS NOTES
With : Miguel Angel Sanchez [phone]  MSW phoned and left a voice message for a call back  MSW awaits for return call  MSW will continue to follow

## 2023-03-10 ENCOUNTER — HOSPITAL ENCOUNTER (OUTPATIENT)
Dept: RADIOLOGY | Age: 88
Discharge: HOME | End: 2023-03-10

## 2023-03-10 DIAGNOSIS — Z78.0 MENOPAUSE: ICD-10-CM

## 2023-03-14 ENCOUNTER — OFFICE VISIT (OUTPATIENT)
Dept: HEMATOLOGY ONCOLOGY | Facility: CLINIC | Age: 88
End: 2023-03-14

## 2023-03-14 VITALS
BODY MASS INDEX: 21.9 KG/M2 | HEIGHT: 61 IN | HEART RATE: 80 BPM | DIASTOLIC BLOOD PRESSURE: 65 MMHG | WEIGHT: 116 LBS | OXYGEN SATURATION: 93 % | SYSTOLIC BLOOD PRESSURE: 140 MMHG | RESPIRATION RATE: 18 BRPM

## 2023-03-14 DIAGNOSIS — C34.91 ADENOCARCINOMA, LUNG, RIGHT (HCC): Primary | ICD-10-CM

## 2023-03-14 DIAGNOSIS — Z85.3 HISTORY OF LEFT BREAST CANCER: ICD-10-CM

## 2023-03-14 NOTE — PROGRESS NOTES
Gavi Saunders  1934  [address] [facility]  [address]   [facility] [id number]    DISCUSSION/SUMMARY:    66-year-old female with history of stage IA (pT1c, pN0, cM0, G1, ER+, AZ+, HER2-) left breast cancer (diagnosed in September 2018) and a history of primary pulmonary adenocarcinoma, TTF-1 +, non-small cell lung carcinoma diagnosed in November 2021 status post 15 fractions RT  Recently patient began to have more dyspnea on exertion and chest pain  Recent PET/CT demonstrated a new right major fissure 1 cm nodule with mild FDG activity of uncertain clinical significance  There were additional new small pleural nodules along the right major fissure and a moderate to large pleural effusion with minimal FDG activity  Because of the recent progressive respiratory symptoms, Mrs Selin Dc was sent for a therapeutic and diagnostic thoracentesis  Patient felt better after the thoracentesis but the respiratory issues are coming back  Additional thoracentesis may be indicated - patient has been started on ultrasounds every 2 weeks with thoracentesis as needed  The recent pleural fluid demonstrated TTF-1 + adenocarcinoma consistent with recurrent/metastatic non-small cell lung carcinoma  Mrs Selin Dc is 80years old, has other comorbidities and not the best performance status  This was discussed with the patient,  and daughter today  Patient's daughter is a (I believe) CNA demonstrated a very good understanding of the situation  She will speak with her other sister  Daughter understands that her mother has metastatic lung cancer - surgery and/or RT are not options at this time  We discussed palliative treatments including chemotherapy, immunotherapy etc   At this time, patient and family are not interested in these forms of treatment  Mrs Auguste been referred to palliative care for evaluation    Eventually patient/family would need to make decisions regarding end-of-life care and hospice     Patient is to return in 6 weeks but this may change depending upon the above  Mrs  Auguste/family know to call the hematology/oncology office if there are any other questions or concerns  Carefully review your medication list and verify that the list is accurate and up-to-date  Please call the hematology/oncology office if there are medications missing from the list, medications on the list that you are not currently taking or if there is a dosage or instruction that is different from how you're taking that medication  Patient goals and areas of care: Therapeutic thoracentesis, palliative care evaluation  Barriers to care: None  Patient is able to self-care with help of family members  ______________________________________________________________________________________    Chief Complaint   Patient presents with   • Follow-up   • Recurrent non-small cell lung carcinoma     Oncology History   History of left breast cancer   8/10/2018 Biopsy    Left breast biopsy  Invasive mucinous  Grade 1      Her 2 1+     9/11/2018 Surgery    Left lumpectomy  Invasive mucinous carcinoma  Grade 1  1 1 cm in size  Margins negative      Her 2 1+  Stage 1A     Adenocarcinoma, lung, right ([facility] [address] )   11/2021 Initial Diagnosis    Adenocarcinoma, lung, right ([facility] )     11/15/2021 Biopsy    Lung, Right Middle Lobe, mass:  - Adenocarcinoma  Comment: Immunohistochemistry is positive in the tumor cells for TTF-1 and negative for p40 and Gata3  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 a primary pulmonary adenocarcinoma       1/19/2022 - 2/8/2022 Radiation    Course: C1    Plan ID Energy Fractions Dose per Fraction (cGy) Dose Correction (cGy) Total Dose Delivered (cGy) Elapsed Days   RML 6X 15 / 15 400 0 6,000 20      Treatment dates:  1/19/2022 - 2/8/2022        History of Present Illness: 80-year-old female with distant history of left breast cancer, more recent diagnosis of early stage right middle lobe non-small cell lung carcinoma (TTF-1 + adenocarcinoma) status post RT  The patient has had more problems with shortness of breath and dyspnea on exertion recently  Repeat scans demonstrated a 1 cm right major fissure nodule seen on the December 2022 CAT scan and more recently on a PET/CT  Patient also had a moderate to large pleural effusion  Mrs  Auguste recently underwent thoracentesis  Patient states feeling better but she feels the fluid is reaccumulating  Activities are still limited  No shortness of breath at rest, + dyspnea on exertion  No pain control issues  No fevers, chills or sweats  Appetite is okay, weight is stable  No recent breast issues  Review of Systems   Constitutional: Positive for activity change and fatigue  HENT: Negative  Eyes: Negative  Respiratory: Negative          + Dyspnea on exertion   Cardiovascular: Positive for chest pain  Gastrointestinal: Negative  Endocrine: Negative  Genitourinary: Negative  Musculoskeletal: Negative  Skin: Negative  Allergic/Immunologic: Negative  Neurological: Negative  Hematological: Negative  Psychiatric/Behavioral: Negative  All other systems reviewed and are negative      Patient Active Problem List   Diagnosis   • Iliac artery stenosis, bilateral (HCC)   • GERD (gastroesophageal reflux disease)   • Arthritis   • Hypercholesterolemia   • Hypertension   • EKG abnormalities   • Anemia   • Neck pain   • Dysuria   • Chronic left shoulder pain   • Fall from other slipping, tripping, or stumbling   • Abnormal EKG   • Abnormal mammogram   • 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)   • Back pain   • Bilateral carotid artery stenosis   • Carotid stenosis, asymptomatic   • 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   • Chronic myofascial pain   • Chronic pain of lower extremity   • CKD (chronic kidney disease) stage 3, GFR 30-59 ml/min ([facility]   • Diabetic retinopathy (HCC)   • DMII (diabetes mellitus, type 2) ([facility])   • Gout   • Hypomagnesemia   • Atherosclerosis of other specified arteries   • Lymphadenopathy   • Nephrolithiasis   • Osteoporosis   • PAD (peripheral artery disease) ([facility])   • Primary generalized (osteo)arthritis   • Persistent proteinuria   • Renal cyst   • Vaginal prolapse   • Vitamin D deficiency   • Acquired complex cyst of kidney   • History of left breast cancer   • Stricture of artery ([facility])   • Claudication in peripheral vascular disease (HCC)   • Hemorrhoids   • Tinea unguium   • Diabetic neuropathy ([facility])   • Bradycardia   • Glottic insufficiency   • Reflux laryngitis   • Paresis of left vocal fold   • Muscle tension dysphonia   • Laryngocele   • Anterior glottic web   • Viral upper respiratory tract infection   • 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HCC)   • Renal artery stenosis (HCC)   • Left ear pain   • Cervical spine disease   • Osteopenia of multiple sites   • Type 2 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, bilateral ([facility])   • Lung mass   • CKD (chronic kidney disease)   • Adenocarcinoma, lung, right (HCC)   • Encounter for annual routine gynecological examination   • Encounter for screening mammogram for breast cancer   • History of cancer of left breast   • Dense breast tissue on mammogram   • Left leg swelling   • Hypercalcemia   • Primary osteoarthritis of left knee     Past Medical History:   Diagnosis Date   • Arthritis    • Back pain    • Benign essential hypertension     LAST ASSESSED: [id number]   • Carotid artery stenosis    • CKD (chronic kidney disease) stage 2, GFR 60-89 ml/min    • Constipation, chronic     "drinks prune juice"   • Diabetes mellitus ([facility] [address] [facility] )    • DMII (diabetes mellitus, type 2) ([facility] [address] )    • Epiglottitis     RESOLVED: [id number]   • Gastritis    • GERD (gastroesophageal reflux disease)    • Gout    • H/O blood clots     left lower leg,,,11 yrs ago   • History of stenosis of renal artery     RESOLVED: [id number]   • Hypercholesteremia    • Hypercholesterolemia    • Hyperlipidemia    • Hyperparathyroidism ([facility] [address] )    • Hypertension    • Iliac artery stenosis, bilateral (HCC)    • Kidney stone    • Lobular carcinoma ([facility] [address] ) 09/06/2017    Left   • Lung cancer ([facility] )    • Mucinous carcinoma of breast, 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/2018    Left   • Presence of pessary    • Renal artery stenosis (HCC)    • Renal disorder    • Renovascular hypertension     RESOLVED: [id number]   • Segmental and somatic dysfunction of cervical region     RESOLVED: 53BVW6519   • Segmental and somatic dysfunction of lumbar region     RESOLVED: 53EVS6812   • Segmental and somatic dysfunction of pelvic region     RESOLVED: 01NPC6793   • Segmental and somatic dysfunction of thoracic region     RESOLVED: 36IJF3696   • Somatic dysfunction of abdominal region     RESOLVED: 18BRL5967   • Somatic dysfunction of head region     RESOLVED: 16VKI4510   • Somatic dysfunction of lower extremities     RESOLVED: 77HLQ0809   • Somatic dysfunction of rib region     RESOLVED: 94WHI0387   • Somatic dysfunction of thoracic region     RESOLVED: 50OPI1740   • Somatic dysfunction of upper extremities     RESOLVED: 83LOP1046   • Teeth missing      Past Surgical History:   Procedure Laterality Date   • ANGIOPLASTY / STENTING ILIAC     • BREAST BIOPSY Left 08/10/2018    U/S BX- mucinous ca   • BREAST BIOPSY Left 08/07/2017    U/S BX   • BREAST LUMPECTOMY Left 09/06/2017    LAST ASSESSED: [id number]   • BREAST LUMPECTOMY Left 09/11/2018   • BREAST SURGERY Left     biopsy   • CATARACT EXTRACTION Bilateral    • CHOLECYSTECTOMY     • COLONOSCOPY     • EYE SURGERY     • ILIAC VEIN ANGIOPLASTY / STENTING Right     INITIAL STENOSIS: ONSET: [id number]   • IR BIOPSY LUNG  11/15/2021   • IR THORACENTESIS  2/20/2023   • KNEE ARTHROSCOPY Right • KNEE SURGERY Right     ONSET: [id number]   • MAMMO NEEDLE LOCALIZATION LEFT (ALL INC) Left 9/11/2018   • MAMMO NEEDLE LOCALIZATION LEFT (ALL INC) Left 9/6/2017   • AZ ESOPHAGOGASTRODUODENOSCOPY TRANSORAL DIAGNOSTIC N/A 7/26/2018    Procedure: ESOPHAGOGASTRODUODENOSCOPY (EGD); Surgeon: Jamar Toledo DO;  Location: [facility]; Service: Gastroenterology   • AZ [facility] EXC SILVA&/STRPG CORDS/EPIGL MCRSCP/TLSCP N/A 6/10/2016    Procedure: MICRO DIRECT LARYNGOSCOPY WITH VOCAL FOLD MASS EXCISION ;  Surgeon: Iesha Zapata MD;  [facility];  Service: ENT   • AZ MASTECTOMY PARTIAL Left 9/6/2017    Procedure: LUMPECTOMY BREAST NEEDLE LOCALIZED WITH FAXITRON NEEDLE @ 0830;  Surgeon: Celeste Jenkins MD;  Location: [facility];  Service: General   • AZ PERQ DEVICE PLACEMENT BREAST LOC 1ST LES W/GDNCE Left 9/11/2018    Procedure: BREAST LUMPECTOMY; BREAST NEEDLE LOCALIZATION (NEEDLE LOC AT 1330);   Surgeon: Hector Kamara MD;  Location[facility];  Service: Surgical Oncology   • THROAT SURGERY      lump removed   • TUBAL LIGATION     • UPPER GASTROINTESTINAL ENDOSCOPY     • US GUIDANCE BREAST BIOPSY LEFT EACH ADDITIONAL Left 8/7/2017   • US GUIDED BREAST BIOPSY LEFT COMPLETE Left 8/10/2018   • US GUIDED BREAST BIOPSY LEFT COMPLETE Left 8/7/2017   • VASCULAR SURGERY       Family History   Problem Relation Age of Onset   • Heart disease Brother    • Heart disease Maternal Grandmother    • Diabetes Other         of other type with arthropathy, without long term current use of insulin   • Hypertension Other    • No Known Problems Mother    • Gout Family    • Rheum arthritis Family    • Lupus Family         systematic erythematosus   • Heart disease Family    • Stroke Family         syndrome   • Stroke Maternal Uncle         syndrome   • Stroke Maternal Aunt         syndrome   • No Known Problems Father    • No Known Problems Daughter    • No Known Problems Maternal Grandfather    • No Known Problems Paternal Grandmother    • No Known Problems Paternal Grandfather    • No Known Problems Daughter    • No Known Problems Daughter    • No Known Problems Maternal Aunt    • No Known Problems Maternal Aunt    • No Known Problems Maternal Aunt      Social History     Socioeconomic History   • Marital status:      Spouse name: Not on file   • Number of children: Not on file   • Years of education: Not on file   • Highest education level: Not on file   Occupational History   • Occupation: retired   Tobacco Use   • Smoking status: Former     Packs/day: 1 00     Years: 55 00     Pack years: 55 00     Types: Cigarettes     Start date:      Quit date:      Years since quittin 2   • Smokeless tobacco: Never   Vaping Use   • Vaping Use: Never used   Substance and Sexual Activity   • Alcohol use: Not Currently   • Drug use: No   • Sexual activity: Yes     Partners: Male   Other Topics Concern   • Not on file   Social History Narrative    Daily caffeine consumption, 1 serving a day      Social Determinants of Health     Financial Resource Strain: Low Risk    • Difficulty of Paying Living Expenses: Not hard at all   Food Insecurity: Not on file   Transportation Needs: No Transportation Needs   • Lack of Transportation (Medical): No   • Lack of Transportation (Non-Medical):  No   Physical Activity: Not on file   Stress: Not on file   Social Connections: Not on file   Intimate Partner Violence: Not on file   Housing Stability: Not on file       Current Outpatient Medications:   •  Acetaminophen 325 MG CAPS, Take 500 mg by mouth every 8 (eight) hours, Disp: , Rfl:   •  aspirin (ADULT ASPIRIN EC LOW STRENGTH) 81 mg EC tablet, Take 1 tablet by mouth daily, Disp: , Rfl:   •  Blood Glucose Monitoring Suppl (FreeStyle Precision Jalen System) w/Device KIT, by Does not apply route 3 (three) times a day, Disp: 1 kit, Rfl: 0  •  famotidine (PEPCID) 20 mg tablet, Take 1 tablet (20 mg total) by mouth daily, Disp: 30 tablet, Rfl: 0  •  fluticasone (FLONASE) 50 mcg/act nasal spray, 2 sprays into each nostril daily, Disp: 16 g, Rfl: 2  •  gabapentin (Neurontin) 100 mg capsule, Take 1 capsule (100 mg total) by mouth 2 (two) times a day, Disp: 180 capsule, Rfl: 0  •  Lancets (Louise Parents) MISC, 1 Device by Does not apply route 2 (two) times a day, Disp: 100 each, Rfl: 5  •  Lancets (ONETOUCH ULTRASOFT) lancets, by Other route 2 (two) times a day Use as instructed, Disp: 100 each, Rfl: 5  •  Lidocaine 4 % PTCH, Apply 1 patch topically daily, Disp: 20 patch, Rfl: 1  •  NIFEdipine ER (ADALAT CC) 30 MG 24 hr tablet, Take 1 tablet twice a day , Disp: 180 tablet, Rfl: 5  •  olopatadine (PATANOL) 0 1 % ophthalmic solution, Administer 1 drop to both eyes 2 (two) times a day, Disp: 5 mL, Rfl: 0  •  OneTouch Delica Lancets 43B MISC, Inject as directed 2 (two) times a day Use to test, Disp: 200 each, Rfl: 0  •  pantoprazole (PROTONIX) 40 mg tablet, Take 1 tablet (40 mg total) by mouth daily, Disp: 30 tablet, Rfl: 0  •  pravastatin (PRAVACHOL) 40 mg tablet, Take 1 tablet (40 mg total) by mouth daily, Disp: 90 tablet, Rfl: 3  •  sucralfate (CARAFATE) 1 g tablet, Take 1 tablet (1 g total) by mouth 4 (four) times a day, Disp: 60 tablet, Rfl: 0  •  sucralfate (CARAFATE) 1 g/10 mL suspension, Take 10 mL (1 g total) by mouth 4 (four) times a day, Disp: 414 mL, Rfl: 0    Allergies   Allergen Reactions   • Clonidine Derivatives Swelling   • Diflucan [Fluconazole] Rash   • Flagyl [Metronidazole] Anaphylaxis   • Carvedilol Hives     And legs swelled   • Latex Rash   • Lipitor [Atorvastatin]      Legs swelled   • Other Palpitations     IV DYE   • Shingrix [Zoster Vac Recomb Adjuvanted] Hives       Vitals:    03/14/23 1511   BP: 140/65   Pulse: 80   Resp: 18   SpO2: 93%     Physical Exam  Constitutional:       Appearance: She is well-developed        Comments: But relatively well-nourished female, no respiratory distress, no signs of pain   HENT:      Head: Normocephalic and atraumatic  Right Ear: External ear normal       Left Ear: External ear normal    Eyes:      Conjunctiva/sclera: Conjunctivae normal       Pupils: Pupils are equal, round, and reactive to light  Cardiovascular:      Rate and Rhythm: Normal rate and regular rhythm  Heart sounds: Normal heart sounds  Pulmonary:      Effort: Pulmonary effort is normal       Breath sounds: Normal breath sounds  Comments: Fair entry bilaterally, slightly decreased on right, rales on right  Abdominal:      General: Bowel sounds are normal       Palpations: Abdomen is soft  Musculoskeletal:         General: Normal range of motion  Cervical back: Normal range of motion and neck supple  Skin:     General: Skin is warm  Neurological:      Mental Status: She is alert and oriented to person, place, and time  Deep Tendon Reflexes: Reflexes are normal and symmetric  Psychiatric:         Behavior: Behavior normal          Thought Content: Thought content normal          Judgment: Judgment normal      Extremities: 0-1+ bilateral lower extreme edema, no cords, pulses are 1+  Lymphatics: No adenopathy in the neck, supraclavicular region, axilla and groin bilaterally    Labs    2/11/2023 WBC = 7 83 hemoglobin = 12 1 hematocrit = 39 3 MCV = 90 platelet = 694 neutrophil = 62% lymphocyte = 27% monocyte = 10% eosinophil = 1% BUN = 35 creatinine = 1 57 GFR = 29 calcium = 9 3    Imaging    1/20/2023 PET/CT    IMPRESSION:     1  Patient's known right middle lobe lung cancer demonstrates near-complete to complete metabolic response to therapy      2   Patient's known new 1 0 cm right major fissural nodule on CT of chest dated 12/23/2022 demonstrates minimal to mild FDG activity and is of uncertain clinical significance with differential diagnosis including benign inflammatory nodule versus developing malignancy of low metabolic activity    There are additional new/ progressive small pleural nodules along the right major fissure which are beyond the limits of resolution for PET CT  There is a moderate to large pleural effusion with minimal to mild FDG activity  The overall constellation of findings is suspicious for developing pleural metastatic disease with possible malignant pleural effusion  Consider correlation with fluid/tissue sampling as clinically indicated      3  Additional scattered solid and groundglass appearing lung nodules involving both lungs without significant interval change and for which continued follow-up is recommended      4   Multinodular thyroid gland which can be further characterized with ultrasound as clinically indicated      Pathology    Case Report   Non-gynecologic Cytology                          Case: [id number]                                   Authorizing Provider: JARRED Glaser      Collected:           02/20/2023 1334               Ordering Location:     [facility]      Received:            02/20/2023 [address] [place] [address] Interventional                                                                              Radiology                                                                     Pathologist:           Mritha Dodson MD                                                                  Specimens:   A) - Thoracentesis                                                                                   B) - Thoracentesis, cell block                                                             Final Diagnosis   A  B  Thoracentesis,  (ThinPrep and cell block preparations):  Conclusive evidence of malignancy  Metastatic adenocarcinoma, consistent with lung primary  Satisfactory for evaluation      Comment: Immunohistochemistry was performed on cell block   The tumor cells are positive for MOC31, ALISA-EP4, TTF-1, napsin A and negative for D2-40, supporting the above diagnosis      Intradepartmental consultation is in agreement     Electronically signed by Agusto Logan MD on 2/23/2023 at 11:18 AM       Case Report   Surgical Pathology Report                         Case: [id number]                                    Authorizing Provider: Ale Bunch MD    Collected:           11/15/2021 1545               Ordering Location:     [facility]      Received:            11/15/2021 [address] [place] [address]                                                    Pathologist:           Daryl Briceño MD                                                    Specimen:    Lung, Right Middle Lobe, RML Lung mass                                                     Final Diagnosis   A  Lung, Right Middle Lobe, mass:  - Adenocarcinoma  See comment      Comment: Immunohistochemistry is positive in the tumor cells for TTF-1 and negative for p40 and Gata3  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 a primary pulmonary adenocarcinoma     Electronically signed by Isma Bowman MD on 11/17/2021 at  9:04 AM       Case Report   Surgical Pathology Report                         Case: [id number]                                    Authorizing Provider: Shelley Fisher MD              Collected:           09/11/2018 1437               Ordering Location:     [facility]        Received:            09/11/2018 [address] [place] [address] Operating Room                                                       Pathologist:           Agusto Logan MD                                                                  Specimens:   A) - Breast, Left, Left breast lumpectomy                                                            B) - Breast, Left, Left breast lumpectomy-anterior margin-green                                      C) - Breast, Left, Left breast lumpectomy-inferior margin-blue                                       D) - Breast, Left, Left breast lumpectomy-lateral margin-red                                         E) - Breast, Left, Left breast lumpectomy-medial margin-yellow                                       F) - Breast, Left, Left breast lumpectomy-superior margin-orange                           Final Diagnosis   MUCINOUS CARCINOMA OF BREAST STAGING SUMMARY (includes specimens A to F of this case and prior biopsy [id number])  1  Specimen Identification     - Procedure: lumpectomy     - Lymph Node Sampling: no     - Specimen Laterality: left     - Tumor Site (Quadrant; Position/o'clock): upper outer quadrant, 1:00 - 2:00   2  Invasive Tumor:     - Histologic Type: mucinous carcinoma     - Tumor Size (pT) [Size of largest invasive carcinoma]: 1 1 cm     -  Grade (G): Viet Histologic Score; Grade  I of III  * Glandular (Acinar) / Tubular Differentiation: 3       * Nuclear Pleomorphism: 1       * Mitotic Rate: 1     - Tumor Focality: single focus     - Macroscopic and Microscopic Extent of Tumor:       -- Skin: N/A       -- Nipple:N/A       -- Skeletal muscle: N/A  3  Lymphovascular invasion: absent  4  Dermal lymphovascular invasion: N/A  5  In situ tumor     - Ductal carcinoma in Situ (DCIS): absent   6  Margins:     - Invasive Carcinoma: negative     - DCIS: N/A   7  Lymph nodes (pN): no lymph node submitted  8  Treatment Effect, Response to Presurgical (Neoadjuvant) Therapy: no known presurgical therapy   9  Additional Pathologic Findings:     - Proliferative fibrocystic changes, including usual ductal hyperplasia, cysts, apocrine metaplasia, fibroadenomatous change, and duct ectasia  - Biopsy site changes  10  Microcalcifications: present and associated with benign breast parenchyma  11   Ancillary Studies: (performed on case [id number])     - Estrogen receptor: 100%, Strong, positive     - Progesterone receptor: 100%, strong, positive     - HER2 by IHC: 1+, negative     - Repeat HER2 testing (2013 ASCO/CAP Recommendations): Not indicated  - Best representative tumor block: A7, A8 and A9       -- Sufficient tumor present for          Agendia Mammaprint/Blueprint (1 cm2 of invasive tumor in aggregate): Yes  MI Profile/Foundation One (at least 5 x 5 mm of tumor): Yes  12  Clinical History: abnormal findings on diagnostic imaging of breast  13  Pathologic Stage Classification (pTNM, AJCC 8th Edition): pT1c, pNX   8th ed, AJCC Anatomic Stage: at least Ia  14  8th ed  AJCC Prognostic Stage (use AJCC update): IA        Final Diagnoses for each Specimen of this Case  A  Left breast, lumpectomy:  - Mucinous carcinoma, see synoptic report, above  - All lumpectomy margins are uninvolved by carcinoma      Comment: Intradepartmental consultation is in agreement       B  Left breast, anterior margin, excision:  - Benign breast parenchyma  - Negative for invasive carcinoma or carcinoma in situ      C  Left breast, inferior margin, excision:  - Benign breast parenchyma with proliferative fibrocystic changes, including usual ductal hyperplasia with apocrine metaplasia and duct ectasia  - Negative for invasive carcinoma or carcinoma in situ      D  Left breast, lateral margin, excision:  - Benign breast parenchyma  - Negative for invasive carcinoma or carcinoma in situ      E  Left breast, medial margin, excision:  - Benign breast parenchyma  - Microcalcifications are present and associated with benign glands  - Negative for invasive carcinoma or carcinoma in situ      F  Left breast, superior margin, excision:  - Benign breast parenchyma    - Negative for invasive carcinoma or carcinoma in situ       Electronically signed by Fredrick Hanson MD on 9/14/2018 at  3:17 PM

## 2023-03-16 ENCOUNTER — PATIENT OUTREACH (OUTPATIENT)
Dept: CASE MANAGEMENT | Facility: HOSPITAL | Age: 88
End: 2023-03-16

## 2023-03-16 NOTE — PROGRESS NOTES
Biopsychosocial and Barriers Assessment    Cancer Diagnosis: Lung Cancer   Home/Cell Phone: [employer]- [phone]   Emergency Contact: Junito Nolasco (Daughter) [phone] (Mobile)  Marital Status: Single however have been living with Macdonald Closs for several years  Interpretation concerns, speaks another language, preferred language: Cymraes   Cultural concerns: no   Ability to read or write: Icelandic     Caregiver/Support: SO-Ant and three daughters  Children: daughters- Hailee Burnham and Thoreau  Child/Elder care: no     Housing: apartment   Home Setup: 1st floor three steps in the floor and seven stair s  Lives With: - Macdonald Closs  Daily Living Activities: independent   Durable Medical Equipment: no   Ambulation: independent     [facility] co-pays with insurance: no   High co-pays with medication coverage: no   No medication coverage: no     Primary Care Provider: Codie Lucas MD  Hx of [address]: no   Hx of Short term rehab: no   Mental Health Hx: no   Substance Abuse Hx: no   Employment: retired    Status/Location: no   Ability to pay bills: yes  No barriers to paying monthly bills  POA/LW/AD: no   Agustin Goncalves is healthcare representative  MSW emailed advance directive form to [address]  Transportation Plan/Concerns:  Macdonald Closs or one of the the daughters transport to appointments  Estuardo is educated on [facility]  What do you know about your Cancer Diagnosis    What has your doctor told you about your cancer diagnosis: Lung Cancer     What has your doctor told you about your cancer treatment: patient is referred to Palliative care  What specific concerns do you have about your diagnosis and treatment: no concerns    Have you been made aware of any hair loss associated with treatment:    Additional Comments:    MSW phoned and spoke with Agustin Goncalves  MSW notified Norris Goncalves this MSW phoned and left a voice message with  on 3/8  Danilo Jeronimo states she will have have patient call back on a Monday or a Friday,  Danilo Jeronimo states she will be present when patient does call back  Danilo Jeronimo answered questions above and  Will have patient call back on a Monday or a Friday for DT screening  MSW emailed cancer support community calendar and advance directive form to Derrick Conway's e-mail [email or web address] is provided this MSW's phone number for future resource needs    MSW will continue to follow and Danilo Jeronimo is agreeable

## 2023-03-22 ENCOUNTER — OFFICE VISIT (OUTPATIENT)
Dept: GASTROENTEROLOGY | Facility: CLINIC | Age: 88
End: 2023-03-22

## 2023-03-22 VITALS
TEMPERATURE: 97.5 F | BODY MASS INDEX: 22.09 KG/M2 | HEIGHT: 61 IN | SYSTOLIC BLOOD PRESSURE: 152 MMHG | WEIGHT: 117 LBS | DIASTOLIC BLOOD PRESSURE: 60 MMHG

## 2023-03-22 DIAGNOSIS — K21.9 GERD (GASTROESOPHAGEAL REFLUX DISEASE): ICD-10-CM

## 2023-03-22 DIAGNOSIS — K59.00 CONSTIPATION, UNSPECIFIED CONSTIPATION TYPE: Primary | ICD-10-CM

## 2023-03-22 DIAGNOSIS — K92.89 GAS BLOAT SYNDROME: ICD-10-CM

## 2023-03-22 DIAGNOSIS — R07.89 CHEST TIGHTNESS: ICD-10-CM

## 2023-03-22 DIAGNOSIS — K21.9 GASTROESOPHAGEAL REFLUX DISEASE, UNSPECIFIED WHETHER ESOPHAGITIS PRESENT: ICD-10-CM

## 2023-03-22 RX ORDER — POLYETHYLENE GLYCOL 3350 17 G/17G
17 POWDER, FOR SOLUTION ORAL DAILY
Qty: 30 EACH | Refills: 5 | Status: SHIPPED | OUTPATIENT
Start: 2023-03-22

## 2023-03-22 RX ORDER — SUCRALFATE 1 G/1
1 TABLET ORAL 4 TIMES DAILY
Qty: 120 TABLET | Refills: 5 | Status: SHIPPED | OUTPATIENT
Start: 2023-03-22

## 2023-03-22 RX ORDER — PANTOPRAZOLE SODIUM 40 MG/1
40 TABLET, DELAYED RELEASE ORAL 2 TIMES DAILY
Qty: 60 TABLET | Refills: 5 | Status: SHIPPED | OUTPATIENT
Start: 2023-03-22

## 2023-03-22 RX ORDER — FAMOTIDINE 20 MG/1
20 TABLET, FILM COATED ORAL DAILY
Qty: 90 TABLET | Refills: 0 | Status: CANCELLED | OUTPATIENT
Start: 2023-03-22

## 2023-03-22 NOTE — PROGRESS NOTES
[facility] - Outpatient Follow-up Note  Beverly Mai 80 y o  female MRN: [id number]  Encounter: [id number]          ASSESSMENT AND PLAN:      1  Gastroesophageal reflux disease, unspecified whether esophagitis present  -We will change her Protonix and Pepcid to Protonix twice daily  -We will change her Carafate liquid to pill based form    2  Constipation, unspecified constipation type  -We will would recommend continuing fiber Gummies and also add a daily dose of MiraLAX daily  - polyethylene glycol (MIRALAX) 17 g packet; Take 17 g by mouth daily  Dispense: 30 each; Refill: 5    We will have her follow-up in a few months time  She is also going to start following with palliative care for metastatic lung cancer  She tells me she prefers to see me in a few months time as well as palliative care for other needs  Her son accompanied her to this appointment and their goals include just keeping her comfortable and not pursuing invasive treatments at this time which is not unreasonable given her advanced age     ______________________________________________________________________    SUBJECTIVE: Patient recently diagnosed with metastatic lung cancer  She is now having to follow with palliative care  Still complaining of gas bloating and abdominal discomfort  She is taking Carafate liquid but prefers Carafate pills  She also is taking once daily Protonix in the morning and Pepcid at night  She feels like the Protonix helps but the Pepcid does not help as much  She also complains of constipation  Is taking fiber Gummies but still only having a bowel movement every 3 days  She is straining as well  She has tried MiraLAX in the past and felt like it helped some but she did not take it regularly  REVIEW OF SYSTEMS IS OTHERWISE NEGATIVE      Historical Information   Past Medical History:   Diagnosis Date   • Arthritis    • Back pain    • Benign essential hypertension     LAST ASSESSED: [id number]   • Carotid artery stenosis    • CKD (chronic kidney disease) stage 2, GFR 60-89 ml/min    • Constipation, chronic     "drinks prune juice"   • Diabetes mellitus (HCC)    • DMII (diabetes mellitus, type 2) ([facility])    • Epiglottitis     RESOLVED: [id number]   • Gastritis    • GERD (gastroesophageal reflux disease)    • Gout    • H/O blood clots     left lower leg,,,11 yrs ago   • History of stenosis of renal artery     RESOLVED: [id number]   • Hypercholesteremia    • Hypercholesterolemia    • Hyperlipidemia    • Hyperparathyroidism ([facility] )    • Hypertension    • Iliac artery stenosis, 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[facility])    • Kidney stone    • Lobular carcinoma ([facility] ) 09/06/2017    Left   • Lung cancer ([facility] )    • Mucinous carcinoma of breast, 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   • Presence of pessary    • Renal artery stenosis ([facility])    • Renal disorder    • Renovascular hypertension     RESOLVED: [id number]   • Segmental and somatic dysfunction of cervical region     RESOLVED: 35UYC6322   • Segmental and somatic dysfunction of lumbar region     RESOLVED: 79ODG7743   • Segmental and somatic dysfunction of pelvic region     RESOLVED: 11BKV0622   • Segmental and somatic dysfunction of thoracic region     RESOLVED: 12FDB4167   • Somatic dysfunction of abdominal region     RESOLVED: 00KDX3094   • Somatic dysfunction of head region     RESOLVED: 54VJY6489   • Somatic dysfunction of lower extremities     RESOLVED: 50XWI4971   • Somatic dysfunction of rib region     RESOLVED: 95WOQ8994   • Somatic dysfunction of thoracic region     RESOLVED: 14NEM5194   • Somatic dysfunction of upper extremities     RESOLVED: 28LQE6824   • Teeth missing      Past Surgical History:   Procedure Laterality Date   • ANGIOPLASTY / STENTING ILIAC     • BREAST BIOPSY Left 08/10/2018    U/S BX- mucinous ca   • BREAST BIOPSY Left 08/07/2017    U/S BX   • BREAST LUMPECTOMY Left 09/06/2017    LAST ASSESSED: [id number]   • BREAST LUMPECTOMY Left 09/11/2018   • BREAST SURGERY Left     biopsy   • CATARACT EXTRACTION Bilateral    • CHOLECYSTECTOMY     • COLONOSCOPY     • EYE SURGERY     • ILIAC VEIN ANGIOPLASTY / STENTING Right     INITIAL STENOSIS: ONSET: [id number]   • IR BIOPSY LUNG  11/15/2021   • IR THORACENTESIS  2/20/2023   • KNEE ARTHROSCOPY Right    • KNEE SURGERY Right     ONSET: 20ZOK2361   • MAMMO NEEDLE LOCALIZATION LEFT (ALL INC) Left 9/11/2018   • MAMMO NEEDLE LOCALIZATION LEFT (ALL INC) Left 9/6/2017   • NH ESOPHAGOGASTRODUODENOSCOPY TRANSORAL DIAGNOSTIC N/A 7/26/2018    Procedure: ESOPHAGOGASTRODUODENOSCOPY (EGD); Surgeon: Kenzie Murillo DO;  [facility]: [facility]; Service: Gastroenterology   • NH [address] EXC SILVA&/STRPG CORDS/EPIGL MCRSCP/TLSCP N/A 6/10/2016    Procedure: MICRO DIRECT LARYNGOSCOPY WITH VOCAL FOLD MASS EXCISION ;  Surgeon: Caryle Billings, MD;  [facility];  Service: ENT   • NH MASTECTOMY PARTIAL Left 9/6/2017    Procedure: LUMPECTOMY BREAST NEEDLE LOCALIZED WITH FAXITRON NEEDLE @ 0830;  Surgeon: Sanchez Sahu MD;  [facility];  Service: General   • NH PERQ DEVICE PLACEMENT BREAST LOC 1ST LES W/GDNCE Left 9/11/2018    Procedure: BREAST LUMPECTOMY; BREAST NEEDLE LOCALIZATION (NEEDLE LOC AT 1330);   Surgeon: Primo Mcarthur MD;  [facility];  Service: Surgical Oncology   • THROAT SURGERY      lump removed   • TUBAL LIGATION     • UPPER GASTROINTESTINAL ENDOSCOPY     • US GUIDANCE BREAST BIOPSY LEFT EACH ADDITIONAL Left 8/7/2017   • US GUIDED BREAST BIOPSY LEFT COMPLETE Left 8/10/2018   • US GUIDED BREAST BIOPSY LEFT COMPLETE Left 8/7/2017   • VASCULAR SURGERY       Social History   Social History     Substance and Sexual Activity   Alcohol Use Not Currently     Social History     Substance and Sexual Activity   Drug Use No     Social History     Tobacco Use   Smoking Status Former   • Packs/day: 1 00   • Years: 55 00   • Pack years: 55 00   • Types: Cigarettes   • Start date: 1949   • Quit date: 2004   • Years since quitting: 19 2   Smokeless Tobacco Never     Family History   Problem Relation Age of Onset   • Heart disease Brother    • Heart disease Maternal Grandmother    • Diabetes Other         of other type with arthropathy, without long term current use of insulin   • Hypertension Other    • No Known Problems Mother    • Gout Family    • Rheum arthritis Family    • Lupus Family         systematic erythematosus   • Heart disease Family    • Stroke Family         syndrome   • Stroke Maternal Uncle         syndrome   • Stroke Maternal Aunt         syndrome   • No Known Problems Father    • No Known Problems Daughter    • No Known Problems Maternal Grandfather    • No Known Problems Paternal Grandmother    • No Known Problems Paternal Grandfather    • No Known Problems Daughter    • No Known Problems Daughter    • No Known Problems Maternal Aunt    • No Known Problems Maternal Aunt    • No Known Problems Maternal Aunt        Meds/Allergies       Current Outpatient Medications:   •  Acetaminophen 325 MG CAPS  •  aspirin (ADULT ASPIRIN EC LOW STRENGTH) 81 mg EC tablet  •  fluticasone (FLONASE) 50 mcg/act nasal spray  •  gabapentin (Neurontin) 100 mg capsule  •  Lancets (ONETOUCH ULTRASOFT) lancets  •  NIFEdipine ER (ADALAT CC) 30 MG 24 hr tablet  •  olopatadine (PATANOL) 0 1 % ophthalmic solution  •  OneTouch Delica Lancets 60S MISC  •  pantoprazole (PROTONIX) 40 mg tablet  •  polyethylene glycol (MIRALAX) 17 g packet  •  pravastatin (PRAVACHOL) 40 mg tablet  •  sucralfate (CARAFATE) 1 g tablet  •  Blood Glucose Monitoring Suppl (FreeStyle Precision Jalen System) w/Device KIT  •  Lancets (ONETOUCH DELICA PLUS [id number]) MISC  •  Lidocaine 4 % PTCH    Allergies   Allergen Reactions   • Clonidine Derivatives Swelling   • Diflucan [Fluconazole] Rash   • Flagyl [Metronidazole] Anaphylaxis   • Carvedilol Hives     And legs swelled   • Latex Rash   • Lipitor [Atorvastatin]      Legs swelled   • Other Palpitations     IV DYE   • Shingrix Corey & Corey Vac Recomb Adjuvanted] Hives           Objective     Blood pressure 152/60, temperature 97 5 °F (36 4 °C), temperature source Tympanic, height 5' 1" (1 549 m), weight 53 1 kg (117 lb), not currently breastfeeding  Body mass index is 22 11 kg/m²  PHYSICAL EXAM:      General Appearance:   Alert, cooperative, no distress   HEENT:   Normocephalic, atraumatic, anicteric      Neck:  Supple, symmetrical, trachea midline   Lungs:   Clear to auscultation bilaterally; no rales, rhonchi or wheezing; respirations unlabored    Heart[de-identified]   Regular rate and rhythm; no murmur, rub, or gallop  Abdomen:   Soft, non-tender, non-distended; normal bowel sounds; no masses, no organomegaly    Genitalia:   Deferred    Rectal:   Deferred    Extremities:  No cyanosis, clubbing or edema    Pulses:  2+ and symmetric    Skin:  No jaundice, rashes, or lesions    Lymph nodes:  No palpable cervical lymphadenopathy        Lab Results:   No visits with results within 1 Day(s) from this visit  Latest known visit with results is:   Hospital Outpatient Visit on 02/23/2023   Component Date Value   • POC Glucose 02/23/2023 126    • Case Report 02/23/2023                      Value:Surgical Pathology Report                         Case: [id number]                                   Authorizing Provider:  Arden Bland MD             Collected:           02/23/2023 1209              Ordering Location:     [facility]      Received:            02/23/2023 [place] [id number] Endoscopy                                                           Pathologist:           Aruna Vidales MD                                                                 Specimen:    Stomach, r/o h pylori                                                                     • Final Diagnosis 02/23/2023                      Value: This result contains rich text formatting which cannot be displayed here  • Additional Information 02/23/2023                      Value: This result contains rich text formatting which cannot be displayed here  • Gross Description 02/23/2023                      Value: This result contains rich text formatting which cannot be displayed here  • Case Report 02/23/2023                      Value:Surgical Pathology Report                         Case: [id number]                                   Authorizing Provider:  Juventino Kumari MD           Collected:           02/23/2023 1208              Ordering Location:     [address] [place] [address]      Received:            02/23/2023 [place] [address] Endoscopy                                                           Pathologist:           Eleazar Morgan MD                                                                 Specimens:   A) - Duodenum, r/o Celiac                                                                           B) - Polyp, Stomach/Small Intestine, gastric polyp x 2, cold snare                        • Final Diagnosis 02/23/2023                      Value: This result contains rich text formatting which cannot be displayed here  • Additional Information 02/23/2023                      Value: This result contains rich text formatting which cannot be displayed here  • Gross Description 02/23/2023                      Value: This result contains rich text formatting which cannot be displayed here  Radiology Results:   EGD    Result Date: 2/23/2023  Narrative: Table formatting from the original result was not included   [facility] [id number] [place] [address] Street: 2/23/23 PHYSICIAN(S): Attending: Steve Wesley MD Fellow: Juventino Kumari MD INDICATION: Epigastric abdominal pain POST-OP DIAGNOSIS: See the impression below  PREPROCEDURE: Informed consent was obtained for the procedure, including sedation  Risks of perforation, hemorrhage, adverse drug reaction and aspiration were discussed  The patient was placed in the left lateral decubitus position  Patient was explained about the risks and benefits of the procedure  Risks including but not limited to bleeding, infection, and perforation were explained in detail  Also explained about less than 100% sensitivity with the exam and other alternatives  PROCEDURE: EGD DETAILS OF PROCEDURE: Patient was taken to the procedure room where a time out was performed to confirm correct patient and correct procedure  The patient underwent monitored anesthesia care, which was administered by an anesthesia professional  The patient's blood pressure, heart rate, level of consciousness, respirations and oxygen were monitored throughout the procedure  The scope was advanced to the second part of the duodenum  Retroflexion was performed in the fundus  The patient experienced no blood loss  The procedure was not difficult  The patient tolerated the procedure well  There were no apparent complications  ANESTHESIA INFORMATION: ASA: IV Anesthesia Type: IV Sedation with Anesthesia MEDICATIONS: No administrations occurring from 1203 to 1233 on 02/23/23 FINDINGS: Ten or more sessile fundic gland polyps measuring from 4 mm up to 20 mm in the lesser curve of the stomach; completely removed en bloc by cold snare and retrieved specimen  Removed for just appearing polyps using cold snare  2 of them required retrieval net   Erythematous mucosa in the antrum, consistent with gastritis; performed cold forceps biopsy to rule out H  pylori Performed forceps biopsies to rule out celiac disease in the duodenum SPECIMENS: ID Type Source Tests Collected by Time Destination 1 : r/o Celiac Tissue Duodenum TISSUE EXAM Sonam Milner MD 2/23/2023 12:08 PM  2 : r/o h pylori Tissue Stomach Juany Jones MD 2/23/2023 12:09 PM  3 : gastric polyp x 2, cold snare Tissue Polyp, Stomach/Small Intestine TISSUE EXAM Keri Hardin MD 2/23/2023 12:33 PM      Impression: 10 or more fundic gland like polyps seen in the entire stomach  Size ranging from few millimeters to 20 mm  4 of the largest polyps were removed completely using cold snare  2 of the larger polyps required retrieval net for removal  Gastritis, biopsied  Normal-appearing duodenum, biopsied  RECOMMENDATION:  Await pathology results Continue GI follow-up  Resume diet  Patty Coello MD     DXA bone density spine hip and pelvis    Result Date: 3/13/2023  Narrative: DXA SCAN CLINICAL HISTORY:  31-year-old postmenopausal female  OTHER RISK FACTORS:  PPI medication  PHARMACOLOGIC THERAPY FOR OSTEOPOROSIS:  None currently, previously on Fosamax  TECHNIQUE: Bone densitometry was performed using a Hologic Horizon A  bone densitometer  Regions of interest appear properly placed  COMPARISON: 11/2/2020  RESULTS: LUMBAR SPINE Level: L1-L4 : BMD:  0 786  gm/cm2 T-score: -2 4 LEFT  TOTAL HIP: BMD:  0 761  gm/cm2 T-score:  -1 5 LEFT  FEMORAL NECK: BMD:  0 626  gm/cm2 T score: -2 0     Impression: 1  Low bone mass (osteopenia)  Based on the lumbar spine and left hip  2   Since a DXA study from 11/2/2020, there has been: No statistically significant change in bone mineral density at any of the evaluated skeletal sites  3   The 10 year risk of hip fracture is 2 5% with the 10 year risk of major osteoporotic fracture being 7 8% as calculated by the [facility] fracture risk assessment tool (FRAX, which is based on data generated by the [facility] for Metabolic Bone Diseases)   4   The current NOF guidelines recommend treating patients with a T-score of -2 5 or less in the lumbar spine or hips, or in post-menopausal women and men over the age of 48 with low bone mass (osteopenia) and a FRAX 10 year risk score of >3% for hip fracture and/or >20% for major osteoporotic fracture  5   The NOF recommends follow-up DXA in 1-2 years after initiating therapy for osteoporosis and every 2 years thereafter  More frequent evaluation is appropriate for patients with conditions associated with rapid bone loss, such as glucocorticoid therapy  The interval between DXA screenings may be longer for individuals without major risk factors and initial T-score in the normal or upper low bone mass range  The FRAX algorithm has certain limitations: -FRAX has not been validated in patients currently or previously treated with pharmacotherapy for osteoporosis  In such patients, clinical judgment must be exercised in interpreting FRAX scores  -Prior hip, vertebral and humeral fragility fractures appear to confer greater risk of subsequent fracture than fractures at other sites (this is especially true for individuals with severe vertebral fractures), but quantification of this incremental risk is not possible with FRAX  -FRAX underestimates fracture risk in patients with history of multiple fragility fractures  -FRAX may underestimate fracture risk in patients with history of frequent falls  -It is not appropriate to use FRAX to monitor treatment response  WHO CLASSIFICATION: Normal (a T-score of -1 0 or higher) Low bone mineral density (a T-score of less than -1 0 but higher than -2 5) Osteoporosis (a T-score of -2 5 or less) Severe osteoporosis (a T-score of -2 5 or less with a fragility fracture) LEAST SIGNIFICANT CHANGE (AT 95% C  I): Lumbar spine 0 036 g/cm2; 2 2% Total hip: 0 022 g/cm2; 2 6% Forearm: 0 017 g/cm2; 3 0%   Workstation performed: [id number]

## 2023-03-24 ENCOUNTER — TELEPHONE (OUTPATIENT)
Dept: HEMATOLOGY ONCOLOGY | Facility: CLINIC | Age: 88
End: 2023-03-24

## 2023-03-24 NOTE — TELEPHONE ENCOUNTER
Patient's SO called in with a question regarding patient  He wanted to make sure that patient is set up for thoracentesis as it was not listed in the mychart  I confirmed the 3/28 at 2pm appointment on my end  Confimed with  as well  Patient's  verbalized understanding and agreed to plan

## 2023-03-28 ENCOUNTER — HOSPITAL ENCOUNTER (OUTPATIENT)
Dept: RADIOLOGY | Facility: HOSPITAL | Age: 88
Discharge: HOME | End: 2023-03-28
Attending: INTERNAL MEDICINE

## 2023-03-28 VITALS — SYSTOLIC BLOOD PRESSURE: 192 MMHG | HEART RATE: 90 BPM | DIASTOLIC BLOOD PRESSURE: 84 MMHG | OXYGEN SATURATION: 95 %

## 2023-03-28 DIAGNOSIS — C34.91 ADENOCARCINOMA, LUNG, RIGHT (HCC): ICD-10-CM

## 2023-03-28 LAB
APPEARANCE FLD: CLEAR
COLOR FLD: YELLOW
HISTIOCYTES NFR FLD: 17 %
LYMPHOCYTES NFR BLD AUTO: 50 %
MONOCYTES NFR BLD AUTO: 30 %
NEUTS SEG NFR BLD AUTO: 3 %
SITE: NORMAL
TOTAL CELLS COUNTED SPEC: 100
WBC # FLD MANUAL: 1669 /UL

## 2023-03-28 RX ORDER — LIDOCAINE HYDROCHLORIDE 10 MG/ML
INJECTION, SOLUTION EPIDURAL; INFILTRATION; INTRACAUDAL; PERINEURAL AS NEEDED
Status: COMPLETED | OUTPATIENT
Start: 2023-03-28 | End: 2023-03-28

## 2023-03-28 RX ADMIN — LIDOCAINE HYDROCHLORIDE 10 ML: 10 INJECTION, SOLUTION EPIDURAL; INFILTRATION; INTRACAUDAL; PERINEURAL at 14:36

## 2023-03-28 NOTE — DISCHARGE INSTRUCTIONS
Thoracentesis   WHAT YOU NEED TO KNOW:   A thoracentesis is a procedure to remove extra fluid or air from between your lungs and your inner chest wall  Air or fluid buildup may make it hard for you to breathe  A thoracentesis allows your lungs to expand fully so you can breathe more easily  DISCHARGE INSTRUCTIONS:     Small amount of shoulder pain and bloody sputum is normal after a Thoracentesis  Rest:  Rest when you feel it is needed  Slowly start to do more each day  Return to your daily activities as directed  Resume your normal diet  Small sips of flat soda will help mild nausea  Do not smoke: If you smoke, it is never too late to quit  Ask for information about how to stop smoking if you need help  Contact Interventional Radiology at [phone] Antoinette PATIENTS: Contact Interventional Radiology at [phone]) Windy Billy PATIENTS: Contact Interventional Radiology at [phone]) if:   You have a fever  Your puncture site is red, warm, swollen, or draining pus  You have questions or concerns about your procedure, medicine, or care  Seek care immediately or call 911 if:   Severe chest pain with inspiration and shortness of breath    Large amounts of blood in your sputum    Follow up with your healthcare provider as directed

## 2023-03-28 NOTE — SEDATION DOCUMENTATION
Right-sided thoracentesis performed by CHUNG ROJAS without complications  820ml serous fluid removed  Tolerated well by patient

## 2023-03-29 NOTE — BRIEF OP NOTE (RAD/CATH)
IR RIGHT THORACENTESIS Procedure Note    PATIENT NAME: Jayro Combs  : 1934  MRN: [id number]    Pre-op Diagnosis:   1  Adenocarcinoma, lung, right (HCC)      Post-op Diagnosis:   1   Adenocarcinoma, lung, right [facility])        Provider:   Jayjay Boyer PA-C  Assistants:     No qualified resident was available, Resident is only observing    Estimated Blood Loss: Minimal    Findings: 820 mL of serous pleural fluid drained from right-sided thoracentesis    Specimens: Labs collected and sent    Complications: None immediately    Anesthesia: local    Jayjay Boyer PA-C     Date: 3/29/2023  Time: 2:43 PM

## 2023-03-31 ENCOUNTER — CONSULT (OUTPATIENT)
Dept: PALLIATIVE MEDICINE | Facility: CLINIC | Age: 88
End: 2023-03-31

## 2023-03-31 VITALS
TEMPERATURE: 97.2 F | OXYGEN SATURATION: 96 % | DIASTOLIC BLOOD PRESSURE: 52 MMHG | WEIGHT: 115.3 LBS | HEART RATE: 94 BPM | SYSTOLIC BLOOD PRESSURE: 133 MMHG | BODY MASS INDEX: 21.79 KG/M2

## 2023-03-31 DIAGNOSIS — R00.2 PALPITATIONS: ICD-10-CM

## 2023-03-31 DIAGNOSIS — C34.91 ADENOCARCINOMA, LUNG, RIGHT (HCC): ICD-10-CM

## 2023-03-31 DIAGNOSIS — K21.9 GERD (GASTROESOPHAGEAL REFLUX DISEASE): Primary | ICD-10-CM

## 2023-03-31 LAB
BACTERIA SPEC BFLD CULT: NO GROWTH
GRAM STN SPEC: NORMAL

## 2023-03-31 RX ORDER — METOCLOPRAMIDE 5 MG/1
5 TABLET ORAL
Qty: 90 TABLET | Refills: 0 | Status: SHIPPED | OUTPATIENT
Start: 2023-03-31

## 2023-03-31 NOTE — Clinical Note
Would you be able to place an Asept or indwelling pleural cath for this lady? She is just about ready for hospice cares, but not quite  Family are aware they would need to wait a few weeks for fluid to re accumulate before considering this

## 2023-03-31 NOTE — Clinical Note
Pt and family would wish to reconsider immunomonotherapy as was considered in your note  They wish to think on any option to extend her time until her next birthday, this September

## 2023-03-31 NOTE — PROGRESS NOTES
Outpatient Consultation - Palliative and Supportive Care   Karina Ivan 80 y o  female [id number]    Assessment & Plan  Problem List Items Addressed This Visit     Adenocarcinoma, lung, right (HCC)    GERD (gastroesophageal reflux disease) - Primary    Relevant Medications    metoclopramide (REGLAN) 5 mg tablet   Other Visit Diagnoses     Palpitations        Relevant Medications    metoprolol tartrate (LOPRESSOR) 25 mg tablet          Medications adjusted this encounter:  Requested Prescriptions     Signed Prescriptions Disp Refills   • metoclopramide (REGLAN) 5 mg tablet 90 tablet 0     Sig: Take 1 tablet (5 mg total) by mouth 3 (three) times a day before meals   • metoprolol tartrate (LOPRESSOR) 25 mg tablet 30 tablet 0     Sig: Take 0 5 tablets (12 5 mg total) by mouth every 12 (twelve) hours     No orders of the defined types were placed in this encounter  Medications Discontinued During This Encounter   Medication Reason   • Blood Glucose Monitoring Suppl (FreeStyle Precision Jalen System) w/Device KIT    • Lancets (ONETOUCH DELICA PLUS [id number]) MISC    • Lidocaine 4 % PTCH    - Referral back to Dr Dinah Estes to consider immuno-monotherapy  - will request Asept placement of IR provider   - Pt and family decline hospice option at this time    = Goal if for pt to live to see her birthday  = Pt is advised that her goal may be difficult to reach, and she should consider accelerating her birthday plans to this Spring/Summer  PPS: 61      Karina Ivan and her family were seen today for symptoms and planning cares related to above illnesses  Above orders were sent electronically, or provided in hardcopy in clinic  I have reviewed the patient's controlled substance dispensing history in the Prescription Drug Monitoring Program in compliance with the [facility] regulations before prescribing any controlled substances  We appreciate the referral, and wish for them to continue to follow with us    If there are questions or concerns, please contact us through our clinic/answering service 24 hours a day, seven days a week  Jocelin Sanz MD  [facility] Palliative and Supportive Care          Visit Information    Accompanied By: Family member    Source of History: Self, Family member    History Limitations: Language Barrier - circumvented quite well by multiple bilingual family, and my own Mongolian skills    Contacts:  Shanell tSein   Daughters Jomar Robertson, [address]    Chief Complaint  No chief complaint on file  History of Present Illness      Alexandrea Ornelas is a 80 y o  female who presents as a referral from Dr Marcello Wilkins of Med/Onc for primary palliative diagnosis of recurrent NSCLC, R side, diagnosed from pleural effusion from the R lung  There is also a h/o Stage Ia ER/WA+ her2- breast cancer on the Left  Consultation is requested for: advance care planning, emotional support in the setting of serious illness  Prior to our visit, Dr Mracello Wilkins has clearly and carefully reviewed multiple options with the family, and has documented that the patient's daughter led most of the discussion  Surgery and XRT are not appropriate for her disease, and the family seemed disinterested in chemo/immunotherapy options  It is not clear if Asept cath was considered, as the pt was noted to benefit from pleural drainage from a symptomatic standpoint  At length today we discussed her options, and the family were not aware that they were potentially able to access immunotherapy  Time was taken to consider the immunotherapy does not routinely robi great improvements in prognosis when offered as monotherapy, but it is relatively non=toxic  The family are hopeful for this being a helpful option, as well as an Asept placement, as she does feel much improved when her lungs are able to well inflate      Historical Data  Past Medical History:   Diagnosis Date   • Arthritis    • Back pain    • Benign essential "hypertension     LAST ASSESSED: [id number]   • Carotid artery stenosis    • CKD (chronic kidney disease) stage 2, GFR 60-89 ml/min    • Constipation, chronic     \"drinks prune juice\"   • Diabetes mellitus ([place] [address] )    • DMII (diabetes mellitus, type 2) ([facility])    • Epiglottitis     RESOLVED: [id number]   • Gastritis    • GERD (gastroesophageal reflux disease)    • Gout    • H/O blood clots     left lower leg,,,11 yrs ago   • History of stenosis of renal artery     RESOLVED: 30IRJ3494   • Hypercholesteremia    • Hypercholesterolemia    • Hyperlipidemia    • Hyperparathyroidism ([place] [address] )    • Hypertension    • Iliac artery stenosis, bilateral ([facility])    • Kidney stone    • Lobular carcinoma ([place] [address] ) 09/06/2017    Left   • Lung cancer ([place] [address] )    • Mucinous carcinoma of breast, 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    Left   • Presence of pessary    • Renal artery stenosis ([facility])    • Renal disorder    • Renovascular hypertension     RESOLVED: [id number]   • Segmental and somatic dysfunction of cervical region     RESOLVED: 81YWT1184   • Segmental and somatic dysfunction of lumbar region     RESOLVED: 02AUG2017   • Segmental and somatic dysfunction of pelvic region     RESOLVED: 12PEZ2277   • Segmental and somatic dysfunction of thoracic region     RESOLVED: 99NYU8149   • Somatic dysfunction of abdominal region     RESOLVED: 34IMR0606   • Somatic dysfunction of head region     RESOLVED: 60NCQ8341   • Somatic dysfunction of lower extremities     RESOLVED: 56UGK3333   • Somatic dysfunction of rib region     RESOLVED: 92COP1624   • Somatic dysfunction of thoracic region     RESOLVED: 27HAR7997   • Somatic dysfunction of upper extremities     RESOLVED: 85KEH7106   • Teeth missing      Past Surgical History:   Procedure Laterality Date   • ANGIOPLASTY / STENTING ILIAC     • BREAST BIOPSY Left 08/10/2018    U/S BX- mucinous ca   • BREAST BIOPSY Left 08/07/2017    U/S BX   • BREAST LUMPECTOMY Left 09/06/2017    LAST ASSESSED: [id number]   • BREAST LUMPECTOMY Left " 09/11/2018   • BREAST SURGERY Left     biopsy   • CATARACT EXTRACTION Bilateral    • CHOLECYSTECTOMY     • COLONOSCOPY     • EYE SURGERY     • ILIAC VEIN ANGIOPLASTY / STENTING Right     INITIAL STENOSIS: ONSET: [id number]   • IR BIOPSY LUNG  11/15/2021   • IR THORACENTESIS  2/20/2023   • IR THORACENTESIS  3/28/2023   • KNEE ARTHROSCOPY Right    • KNEE SURGERY Right     ONSET: [id number]   • MAMMO NEEDLE LOCALIZATION LEFT (ALL INC) Left 9/11/2018   • MAMMO NEEDLE LOCALIZATION LEFT (ALL INC) Left 9/6/2017   • AL ESOPHAGOGASTRODUODENOSCOPY TRANSORAL DIAGNOSTIC N/A 7/26/2018    Procedure: ESOPHAGOGASTRODUODENOSCOPY (EGD); Surgeon: González Odom DO;  Location: [facility]; Service: Gastroenterology   • AL [address] EXC SILVA&/STRPG CORDS/EPIGL MCRSCP/TLSCP N/A 6/10/2016    Procedure: MICRO DIRECT LARYNGOSCOPY WITH VOCAL FOLD MASS EXCISION ;  Surgeon: Renetta Gunderson MD;  [facility];  Service: ENT   • AL MASTECTOMY PARTIAL Left 9/6/2017    Procedure: LUMPECTOMY BREAST NEEDLE LOCALIZED WITH FAXITRON NEEDLE @ 0830;  Surgeon: Sanya Robles MD;  [facility];  Service: General   • AL PERQ DEVICE PLACEMENT BREAST LOC 1ST LES W/GDNCE Left 9/11/2018    Procedure: BREAST LUMPECTOMY; BREAST NEEDLE LOCALIZATION (NEEDLE LOC AT 1330); Surgeon: Johnathan Metz MD;  [facility];  Service: Surgical Oncology   • THROAT SURGERY      lump removed   • TUBAL LIGATION     • UPPER GASTROINTESTINAL ENDOSCOPY     • US GUIDANCE BREAST BIOPSY LEFT EACH ADDITIONAL Left 8/7/2017   • US GUIDED BREAST BIOPSY LEFT COMPLETE Left 8/10/2018   • US GUIDED BREAST BIOPSY LEFT COMPLETE Left 8/7/2017   • VASCULAR SURGERY       Social History     Socioeconomic History   • Marital status:       Spouse name: Not on file   • Number of children: Not on file   • Years of education: Not on file   • Highest education level: Not on file   Occupational History   • Occupation: retired   Tobacco Use   • Smoking status: Former     Packs/day: 1 00     Years: 55 00     Pack years: 55 00     Types: Cigarettes     Start date: 80     Quit date:      Years since quittin 2   • Smokeless tobacco: Never   Vaping Use   • Vaping Use: Never used   Substance and Sexual Activity   • Alcohol use: Not Currently   • Drug use: No   • Sexual activity: Yes     Partners: Male   Other Topics Concern   • Not on file   Social History Narrative    Daily caffeine consumption, 1 serving a day      Social Determinants of Health     Financial Resource Strain: Low Risk    • Difficulty of Paying Living Expenses: Not hard at all   Food Insecurity: Not on file   Transportation Needs: No Transportation Needs   • Lack of Transportation (Medical): No   • Lack of Transportation (Non-Medical):  No   Physical Activity: Not on file   Stress: Not on file   Social Connections: Not on file   Intimate Partner Violence: Not on file   Housing Stability: Not on file     Family History   Problem Relation Age of Onset   • Heart disease Brother    • Heart disease Maternal Grandmother    • Diabetes Other         of other type with arthropathy, without long term current use of insulin   • Hypertension Other    • No Known Problems Mother    • Gout Family    • Rheum arthritis Family    • Lupus Family         systematic erythematosus   • Heart disease Family    • Stroke Family         syndrome   • Stroke Maternal Uncle         syndrome   • Stroke Maternal Aunt         syndrome   • No Known Problems Father    • No Known Problems Daughter    • No Known Problems Maternal Grandfather    • No Known Problems Paternal Grandmother    • No Known Problems Paternal Grandfather    • No Known Problems Daughter    • No Known Problems Daughter    • No Known Problems Maternal Aunt    • No Known Problems Maternal Aunt    • No Known Problems Maternal Aunt      Allergies   Allergen Reactions   • Clonidine Derivatives Swelling   • Diflucan [Fluconazole] Rash   • Flagyl [Metronidazole] Anaphylaxis   • Carvedilol Hives And legs swelled   • Latex Rash   • Lipitor [Atorvastatin]      Legs swelled   • Other Palpitations     IV DYE   • Shingrix [Zoster Vac Recomb Adjuvanted] Hives     Current Outpatient Medications   Medication Sig Dispense Refill   • Acetaminophen 325 MG CAPS Take 500 mg by mouth every 8 (eight) hours     • aspirin (ADULT ASPIRIN EC LOW STRENGTH) 81 mg EC tablet Take 1 tablet by mouth daily     • fluticasone (FLONASE) 50 mcg/act nasal spray 2 sprays into each nostril daily 16 g 2   • gabapentin (Neurontin) 100 mg capsule Take 1 capsule (100 mg total) by mouth 2 (two) times a day 180 capsule 0   • metoclopramide (REGLAN) 5 mg tablet Take 1 tablet (5 mg total) by mouth 3 (three) times a day before meals 90 tablet 0   • metoprolol tartrate (LOPRESSOR) 25 mg tablet Take 0 5 tablets (12 5 mg total) by mouth every 12 (twelve) hours 30 tablet 0   • NIFEdipine ER (ADALAT CC) 30 MG 24 hr tablet Take 1 tablet twice a day  180 tablet 5   • pantoprazole (PROTONIX) 40 mg tablet Take 1 tablet (40 mg total) by mouth 2 (two) times a day 60 tablet 5   • polyethylene glycol (MIRALAX) 17 g packet Take 17 g by mouth daily 30 each 5   • pravastatin (PRAVACHOL) 40 mg tablet Take 1 tablet (40 mg total) by mouth daily 90 tablet 3   • sucralfate (CARAFATE) 1 g tablet Take 1 tablet (1 g total) by mouth 4 (four) times a day 120 tablet 5   • Lancets (ONETOUCH ULTRASOFT) lancets by Other route 2 (two) times a day Use as instructed 100 each 5   • olopatadine (PATANOL) 0 1 % ophthalmic solution Administer 1 drop to both eyes 2 (two) times a day 5 mL 0   • OneTouch Delica Lancets 42X MISC Inject as directed 2 (two) times a day Use to test 200 each 0     No current facility-administered medications for this visit  Review of Systems   Constitutional: Positive for decreased appetite and weight loss  Negative for weight gain  HENT: Negative for hoarse voice and nosebleeds      Eyes: Negative for vision loss in left eye and vision loss in right eye  Cardiovascular: Positive for chest pain (pressure, improved after thora)  Negative for dyspnea on exertion  Respiratory: Negative for cough and shortness of breath  Endocrine: Negative for polydipsia, polyphagia and polyuria  Skin: Negative for flushing and itching  Musculoskeletal: Negative for falls  Gastrointestinal: Positive for nausea and vomiting  Negative for anorexia and jaundice  Genitourinary: Negative for frequency  Neurological: Negative for dizziness  Psychiatric/Behavioral: Negative for depression and memory loss  The patient is not nervous/anxious  Vital Signs    /52 (BP Location: Left arm, Patient Position: Sitting, Cuff Size: Standard)   Pulse 94   Temp (!) 97 2 °F (36 2 °C) (Temporal)   Wt 52 3 kg (115 lb 4 8 oz)   LMP  (LMP Unknown)   SpO2 96%   BMI 21 79 kg/m²     Physical Exam and Objective Data  Physical Exam  Constitutional:       General: She is not in acute distress  Appearance: She is ill-appearing  She is not toxic-appearing or diaphoretic  Comments: frail   HENT:      Head: Normocephalic and atraumatic  Right Ear: External ear normal       Left Ear: External ear normal    Eyes:      General:         Right eye: No discharge  Left eye: No discharge  Conjunctiva/sclera: Conjunctivae normal       Pupils: Pupils are equal, round, and reactive to light  Neck:      Trachea: No tracheal deviation  Cardiovascular:      Rate and Rhythm: Regular rhythm  Tachycardia present  Pulmonary:      Effort: Pulmonary effort is normal  No respiratory distress  Breath sounds: No stridor  Abdominal:      General: There is no distension  Palpations: Abdomen is soft  Comments: scaphoid   Skin:     General: Skin is warm and dry  Coloration: Skin is not pale  Findings: No erythema or rash  Neurological:      General: No focal deficit present  Mental Status: She is oriented to person, place, and time  Mental status is at baseline  Cranial Nerves: No cranial nerve deficit  Psychiatric:         Mood and Affect: Mood normal          Behavior: Behavior normal          Thought Content: Thought content normal          Judgment: Judgment normal            Radiology and Laboratory:  I personally reviewed and interpreted the following results: none new    50+ minutes was spent face to face with Mustapha Fierro and her family with greater than 50% of the time spent in counseling or coordination of care including discussions of etiology of diagnosis, risks and benefits of treatment, treatment instructions, follow up requirements, risk factors and risk reduction of disease, patient and family counseling/involvement in care and compliance with treatment regimen  All of the patient's questions were answered during this discussion

## 2023-03-31 NOTE — LETTER
March 31, 2023     Patient: Bonnie Marquez  YOB: 1934  Date of Visit: 3/31/2023      To Whom it May Concern:    Bonnie Marquez is under my professional care  Lulu Peoples was seen in my office on 3/31/2023  Lulu Peoples has been newly diagnosed with recurrent and metastatic cancer, affecting the lungs and breathing, and requiring surgery to prolong her life and reduce her symptoms  Her daughter, Jyotsna Rubi, was required to be present at the bedside and in the recovery of the patient, and for this reason,  Cancelled her flight plans to [facility]  It is my medical opinion that this complication in the patient's health was unavoidable and not foreseeable; and also that Rhiannon's presence in her mother's care has been medically required  If you have any questions or concerns, please don't hesitate to call  If you require additional information, please submit a formal request of medical information with the patient's permission and consent           Sincerely,          Marylee Gaba, MD        CC: Bonnie Marquez

## 2023-04-05 ENCOUNTER — RA CDI HCC (OUTPATIENT)
Dept: OTHER | Facility: HOSPITAL | Age: 88
End: 2023-04-05

## 2023-04-05 NOTE — PROGRESS NOTES
[address]  coding opportunities     E11 51, N18 32, I73 9, E11 36, E11 [id number], E11 40, E11 29     Chart Reviewed number of suggestions sent to Provider: 7     Patients Insurance     Medicare Insurance: [employer]

## 2023-04-07 ENCOUNTER — OFFICE VISIT (OUTPATIENT)
Dept: FAMILY MEDICINE CLINIC | Facility: CLINIC | Age: 88
End: 2023-04-07

## 2023-04-07 VITALS
SYSTOLIC BLOOD PRESSURE: 160 MMHG | WEIGHT: 116 LBS | HEART RATE: 100 BPM | BODY MASS INDEX: 21.92 KG/M2 | RESPIRATION RATE: 16 BRPM | TEMPERATURE: 97.5 F | DIASTOLIC BLOOD PRESSURE: 60 MMHG

## 2023-04-07 DIAGNOSIS — N25.81 SECONDARY HYPERPARATHYROIDISM (HCC): ICD-10-CM

## 2023-04-07 DIAGNOSIS — Z79.4 TYPE 2 DIABETES MELLITUS WITH BOTH EYES AFFECTED BY MILD NONPROLIFERATIVE RETINOPATHY WITHOUT MACULAR EDEMA, WITH LONG-TERM CURRENT USE OF INSULIN (HCC): Primary | ICD-10-CM

## 2023-04-07 DIAGNOSIS — N93.9 VAGINAL BLEEDING: ICD-10-CM

## 2023-04-07 DIAGNOSIS — E87.6 HYPOKALEMIA: ICD-10-CM

## 2023-04-07 DIAGNOSIS — E11.3293 TYPE 2 DIABETES MELLITUS WITH BOTH EYES AFFECTED BY MILD NONPROLIFERATIVE RETINOPATHY WITHOUT MACULAR EDEMA, WITH LONG-TERM CURRENT USE OF INSULIN (HCC): Primary | ICD-10-CM

## 2023-04-07 PROBLEM — I74.3 EMBOLISM AND THROMBOSIS OF ARTERIES OF THE LOWER EXTREMITIES (HCC): Status: ACTIVE | Noted: 2023-04-07

## 2023-04-07 LAB — SL AMB POCT HEMOGLOBIN AIC: 6.4 (ref ?–6.5)

## 2023-04-07 RX ORDER — OCTISALATE, AVOBENZONE, HOMOSALATE, AND OCTOCRYLENE 29.4; 29.4; 49; 25.48 MG/ML; MG/ML; MG/ML; MG/ML
LOTION TOPICAL
COMMUNITY

## 2023-04-07 NOTE — PROGRESS NOTES
Bryce Flanagan 1934 female MRN: [id number]    [facility] PRACTICE OFFICE VISIT  [facility] - [facility]      ASSESSMENT/PLAN  Bryce Flanagan is a 80 y o  female presents to the office for    Diagnoses and all orders for this visit:    Type 2 diabetes mellitus with both eyes affected by mild nonproliferative retinopathy without macular edema, with long-term current use of insulin (HCC)  -     POCT hemoglobin A1c    Hypokalemia  -     Basic metabolic panel; Future    Secondary hyperparathyroidism ([address] )    Vaginal bleeding  -     Urine culture; Future    Other orders  -     Probiotic Product (Align) 4 MG CAPS; Take by mouth     Spoke personally with patient's palliative doctor while in the patient's room  We reviewed everything at this time  He is going to move forward with placing the IR consult given that the family is agreeable to it  We will repeat her potassium levels given that they were on the lower side of normal When he got diagnosed patient with vaginal bleeding likely secondary to atrophy currently being seen by the urologist; pessary was just removed    Patient knows that they can reach out to me at any time if they have any questions           Future Appointments   Date Time Provider Valdemar Crocker   4/11/2023  1:00 PM BE IR HLD 1 BE IR [address] [facility]   4/18/2023  3:00 PM JARRED iDggs SURG ONC ALL Practice-Onc   4/21/2023  2:40 PM Rocio Way MD [facility] Practice-Hos   4/27/2023  2:40 PM Riri Michael MD [facility] Practice-Wom   4/28/2023  4:20 PM Bayron Krishnan MD CARD BE Practice-Hea   5/2/2023  2:40 PM Sagrario Werner MD SAMANTHA ONC EAS Practice-Onc   5/8/2023  2:40 PM Sharon Madison MD [facility] FP Practice-[place]   5/17/2023  2:30 PM Mariaa Culver MD NEPH [facility]   9/5/2023  9:00 AM BE HV VASCULAR 1 BE HV Car [address]          SUBJECTIVE  CC: Follow-up and Diabetes      HPI:  Bryce Flanagan is a 80 y o  female who presents for a "follow-up appointment accompanied by her family and her daughters  Patient referred by Dr Wally Tyler patient was diagnosed with [address]; right side pleural effusion from the right lung  Patient does admit that she has chest discomfort on that side at times  Currently present at this time  Patient's family declined hospice  Patient's family wanted to proceed forward with possible IR drainage at home to help her with relief of this extra fluid  Patient going to be speaking with specialist about starting immuno monotherapy at least 3 treatments  Family has been very supportive with her and her lifelong partner has been with her making sure that she is never left alone  Patient does have a history of breast cancer on the left  Daughter started her on probiotics  Review of Systems   Constitutional: Positive for activity change, appetite change and fatigue  Negative for chills and fever  HENT: Negative for congestion  Respiratory: Negative for cough, chest tightness and shortness of breath  Cardiovascular: Positive for chest pain  Negative for leg swelling  Gastrointestinal: Negative for abdominal distention, abdominal pain, constipation, diarrhea, nausea and vomiting  All other systems reviewed and are negative        Historical Information   The patient history was reviewed as follows:  Past Medical History:   Diagnosis Date   • Arthritis    • Back pain    • Benign essential hypertension     LAST ASSESSED: [id number]   • Carotid artery stenosis    • CKD (chronic kidney disease) stage 2, GFR 60-89 ml/min    • Constipation, chronic     \"drinks prune juice\"   • Diabetes mellitus ([facility] )    • DMII (diabetes mellitus, type 2) ([facility])    • Epiglottitis     RESOLVED: [id number]   • Gastritis    • GERD (gastroesophageal reflux disease)    • Gout    • H/O blood clots     left lower leg,,,11 yrs ago   • History of stenosis of renal artery     RESOLVED: [id number]   • Hypercholesteremia    • Hypercholesterolemia    • " Hyperlipidemia    • Hyperparathyroidism ([facility] )    • Hypertension    • Iliac artery stenosis, bilateral (HCC)    • Kidney stone    • Lobular carcinoma ([facility] ) 09/06/2017    Left   • Lung cancer ([facility] )    • Mucinous carcinoma of breast, left ([facility] ) 09/11/2018    Left   • Presence of pessary    • Renal artery stenosis (HCC)    • Renal disorder    • Renovascular hypertension     RESOLVED: [id number]   • Segmental and somatic dysfunction of cervical region     RESOLVED: 15GIM2691   • Segmental and somatic dysfunction of lumbar region     RESOLVED: [id number]   • Segmental and somatic dysfunction of pelvic region     RESOLVED: 20LUN6888   • Segmental and somatic dysfunction of thoracic region     RESOLVED: 94MNV1899   • Somatic dysfunction of abdominal region     RESOLVED: [id number]   • Somatic dysfunction of head region     RESOLVED: [id number]   • Somatic dysfunction of lower extremities     RESOLVED: 02AUG2017   • Somatic dysfunction of rib region     RESOLVED: 58CHY0802   • Somatic dysfunction of thoracic region     RESOLVED: 02AUG2017   • Somatic dysfunction of upper extremities     RESOLVED: 23RGF9684   • Teeth missing          Medications:     Current Outpatient Medications:   •  Acetaminophen 325 MG CAPS, Take 500 mg by mouth every 8 (eight) hours, Disp: , Rfl:   •  aspirin (ADULT ASPIRIN EC LOW STRENGTH) 81 mg EC tablet, Take 1 tablet by mouth daily, Disp: , Rfl:   •  fluticasone (FLONASE) 50 mcg/act nasal spray, 2 sprays into each nostril daily, Disp: 16 g, Rfl: 2  •  gabapentin (Neurontin) 100 mg capsule, Take 1 capsule (100 mg total) by mouth 2 (two) times a day, Disp: 180 capsule, Rfl: 0  •  Lancets (ONETOUCH ULTRASOFT) lancets, by Other route 2 (two) times a day Use as instructed, Disp: 100 each, Rfl: 5  •  metoclopramide (REGLAN) 5 mg tablet, Take 1 tablet (5 mg total) by mouth 3 (three) times a day before meals, Disp: 90 tablet, Rfl: 0  •  NIFEdipine ER (ADALAT CC) 30 MG 24 hr tablet, Take 1 tablet twice a day  , Disp: 180 tablet, Rfl: 5  •  olopatadine (PATANOL) 0 1 % ophthalmic solution, Administer 1 drop to both eyes 2 (two) times a day, Disp: 5 mL, Rfl: 0  •  OneTouch Delica Lancets 79N MISC, Inject as directed 2 (two) times a day Use to test, Disp: 200 each, Rfl: 0  •  pantoprazole (PROTONIX) 40 mg tablet, Take 1 tablet (40 mg total) by mouth 2 (two) times a day, Disp: 60 tablet, Rfl: 5  •  polyethylene glycol (MIRALAX) 17 g packet, Take 17 g by mouth daily, Disp: 30 each, Rfl: 5  •  pravastatin (PRAVACHOL) 40 mg tablet, Take 1 tablet (40 mg total) by mouth daily, Disp: 90 tablet, Rfl: 3  •  Probiotic Product (Align) 4 MG CAPS, Take by mouth, Disp: , Rfl:   •  sucralfate (CARAFATE) 1 g tablet, Take 1 tablet (1 g total) by mouth 4 (four) times a day, Disp: 120 tablet, Rfl: 5  •  metoprolol tartrate (LOPRESSOR) 25 mg tablet, Take 0 5 tablets (12 5 mg total) by mouth every 12 (twelve) hours (Patient not taking: Reported on 4/7/2023), Disp: 30 tablet, Rfl: 0    Allergies   Allergen Reactions   • Clonidine Derivatives Swelling   • Diflucan [Fluconazole] Rash   • Flagyl [Metronidazole] Anaphylaxis   • Carvedilol Hives     And legs swelled   • Latex Rash   • Lipitor [Atorvastatin]      Legs swelled   • Other Palpitations     IV DYE   • Shingrix [Zoster Vac Recomb Adjuvanted] Hives       OBJECTIVE  Vitals:   Vitals:    04/07/23 1326   BP: 160/60   BP Location: Left arm   Patient Position: Sitting   Cuff Size: Standard   Pulse: 100   Resp: 16   Temp: 97 5 °F (36 4 °C)   Weight: 52 6 kg (116 lb)         Physical Exam  Vitals reviewed  Constitutional:       Appearance: She is well-developed  HENT:      Head: Normocephalic and atraumatic  Eyes:      Conjunctiva/sclera: Conjunctivae normal       Pupils: Pupils are equal, round, and reactive to light  Cardiovascular:      Rate and Rhythm: Normal rate and regular rhythm  Heart sounds: Normal heart sounds     Pulmonary:      Effort: Pulmonary effort is normal  No respiratory distress  Breath sounds: Normal breath sounds  Musculoskeletal:         General: Normal range of motion  Cervical back: Normal range of motion and neck supple  Comments: Discomfort over the right chest     Skin:     General: Skin is warm  Capillary Refill: Capillary refill takes less than 2 seconds  Neurological:      Mental Status: She is alert and oriented to person, place, and time                      Tatiana Oliveros MD,   [facility]  4/8/2023

## 2023-04-08 DIAGNOSIS — J30.9 ALLERGIC RHINITIS, UNSPECIFIED SEASONALITY, UNSPECIFIED TRIGGER: ICD-10-CM

## 2023-04-08 DIAGNOSIS — I16.0 HYPERTENSIVE URGENCY: ICD-10-CM

## 2023-04-08 RX ORDER — NIFEDIPINE 30 MG/1
TABLET, FILM COATED, EXTENDED RELEASE ORAL
Qty: 180 TABLET | Refills: 0 | Status: SHIPPED | OUTPATIENT
Start: 2023-04-08

## 2023-04-08 RX ORDER — FLUTICASONE PROPIONATE 50 MCG
2 SPRAY, SUSPENSION (ML) NASAL DAILY
Qty: 16 G | Refills: 0 | Status: SHIPPED | OUTPATIENT
Start: 2023-04-08

## 2023-04-14 DIAGNOSIS — I16.0 HYPERTENSIVE URGENCY: Primary | ICD-10-CM

## 2023-04-14 RX ORDER — CEPHALEXIN 500 MG/1
500 CAPSULE ORAL EVERY 12 HOURS SCHEDULED
Qty: 10 CAPSULE | Refills: 0 | Status: SHIPPED | OUTPATIENT
Start: 2023-04-14 | End: 2023-04-19

## 2023-04-21 ENCOUNTER — OFFICE VISIT (OUTPATIENT)
Dept: PALLIATIVE MEDICINE | Facility: CLINIC | Age: 88
End: 2023-04-21

## 2023-04-21 VITALS
DIASTOLIC BLOOD PRESSURE: 62 MMHG | BODY MASS INDEX: 21.45 KG/M2 | WEIGHT: 113.54 LBS | TEMPERATURE: 98 F | SYSTOLIC BLOOD PRESSURE: 120 MMHG | HEART RATE: 87 BPM | OXYGEN SATURATION: 96 %

## 2023-04-21 DIAGNOSIS — R64 MALIGNANT CACHEXIA (HCC): ICD-10-CM

## 2023-04-21 DIAGNOSIS — R91.8 LUNG MASS: Primary | ICD-10-CM

## 2023-04-21 RX ORDER — HYDROCORTISONE 10 MG/1
10 TABLET ORAL 2 TIMES DAILY PRN
Qty: 30 TABLET | Refills: 0 | Status: SHIPPED | OUTPATIENT
Start: 2023-04-21 | End: 2023-05-01

## 2023-04-21 NOTE — PROGRESS NOTES
Outpatient Follow-Up - Palliative and Supportive Care   David Oliveros 80 y o  female [id number]    Assessment & Plan  Problem List Items Addressed This Visit     Lung mass - Primary    Relevant Medications    hydrocortisone (CORTEF) 10 mg tablet   Other Visit Diagnoses     Malignant cachexia (HCC)        Relevant Medications    hydrocortisone (CORTEF) 10 mg tablet          Medications adjusted this encounter:  Requested Prescriptions     Signed Prescriptions Disp Refills   • hydrocortisone (CORTEF) 10 mg tablet 30 tablet 0     Sig: Take 1 tablet (10 mg total) by mouth 2 (two) times a day as needed (low energy, low blood pressure) [place] con el desayuno y el almuerzo para levante los preciones, y mejorar la [place]  No orders of the defined types were placed in this encounter  Medications Discontinued During This Encounter   Medication Reason   • metoprolol tartrate (LOPRESSOR) 25 mg tablet    - Trial hydrocort to improve appetite and BPs  Other mineralocorticoids are not well covered by insurance  - Pt and family agreeable to pleural cath placement; we agreed to wait until AFTER consultation with Med/Onc provider about his advice for immunotherapy and other possible treatments  David Oliveros was seen today for symptoms and planning cares related to above illnesses  I have reviewed the patient's controlled substance dispensing history in the Prescription Drug Monitoring Program in compliance with the [facility] regulations before prescribing any controlled substances  They are invited to continue to follow with us  If there are questions or concerns, please contact us through our clinic/answering service 24 hours a day, seven days a week      Noemy Unger MD  [facility]  [phone]      Visit Information    Accompanied By: Family member    Source of History: Self, Family member    History Limitations: pt speaks Lao with myself and her bilingual daughters "Contacts:  Ant Nino               Daughters Josi Larson, [address]    Chief Complaint  Chief Complaint   Patient presents with   • Follow-up       History of Present Illness      Jaime Howell is a 80 y o  female who presents in follow up of symptoms related to recurrent NSCLC, R side, asso with malignant pleural effusion  There is also a h/o 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  Pertinent issues include: symptom management, assessment of goals of care, disease process education and discussion of prognosis      since last visit, trialled metoprolol, and stopped when her BP dropped  She felt dizzy, and continues to have symptoms, even after ceasing this med  She has a poor appetite and low energy, and her family report that she is rather poor with her water intake  The family have discussed their misgivings about pleural cath, and -- given how uncomfortable the pleural effusions are -- the pt and family are agreeable to placement now  We agreed today to wait on this, as the family also have not yet had a chance to receive therapy recs from primary Onc provider, who was previously considering some degree of possible palliative immunotherapy  Should this reduce her effusion, it would be less advantageous to have an indwelling cath to the pleural area  Previously, we noted:     \"  Prior to our visit, Dr Yuliana Olivo has clearly and carefully reviewed multiple options with the family, and has documented that the patient's daughter led most of the discussion  Surgery and XRT are not appropriate for her disease, and the family seemed disinterested in chemo/immunotherapy options  It is not clear if Asept cath was considered, as the pt was noted to benefit from pleural drainage from a symptomatic standpoint        At length today we discussed her options, and the family were not aware that they were potentially able to access immunotherapy    Time was taken to consider the immunotherapy does not " "routinely robi great improvements in prognosis when offered as monotherapy, but it is relatively non=toxic  The family are hopeful for this being a helpful option, as well as an Asept placement, as she does feel much improved when her lungs are able to well inflate  \"    Past medical, surgical, social, and family histories are reviewed and pertinent updates are made  Review of Systems   Unable to perform ROS: other (pt rather markedly fatgued; speaks very little today)         Vital Signs    /62 (BP Location: Left arm, Patient Position: Sitting, Cuff Size: Standard)   Pulse 87   Temp 98 °F (36 7 °C) (Temporal)   Wt 51 5 kg (113 lb 8 6 oz)   LMP  (LMP Unknown)   SpO2 96%   BMI 21 45 kg/m²     Physical Exam and Objective Data  Physical Exam  Constitutional:       General: She is not in acute distress  Appearance: She is ill-appearing  She is not toxic-appearing or diaphoretic  Comments: frail   HENT:      Head: Normocephalic and atraumatic  Right Ear: External ear normal       Left Ear: External ear normal    Eyes:      General:         Right eye: No discharge  Left eye: No discharge  Conjunctiva/sclera: Conjunctivae normal       Pupils: Pupils are equal, round, and reactive to light  Neck:      Trachea: No tracheal deviation  Cardiovascular:      Rate and Rhythm: Normal rate and regular rhythm  Pulmonary:      Effort: Pulmonary effort is normal  No respiratory distress  Breath sounds: No stridor  Abdominal:      General: There is no distension  Palpations: Abdomen is soft  Skin:     General: Skin is warm and dry  Coloration: Skin is not pale  Findings: No erythema or rash  Neurological:      General: No focal deficit present  Mental Status: She is alert and oriented to person, place, and time  Mental status is at baseline  Cranial Nerves: No cranial nerve deficit     Psychiatric:         Mood and Affect: Mood normal          Behavior: " Behavior normal          Thought Content: Thought content normal          Judgment: Judgment normal            Radiology and Laboratory:  I personally reviewed and interpreted the following results: none new    35+ minutes was spent face to face with Medina Nunez and her family with greater than 50% of the time spent in counseling or coordination of care including discussions of etiology of diagnosis, risks and benefits of treatment, treatment instructions, follow up requirements and patient and family counseling/involvement in care  All of the patient's or agent's questions were answered during this discussion

## 2023-04-26 ENCOUNTER — HOSPITAL ENCOUNTER (OUTPATIENT)
Dept: RADIOLOGY | Facility: HOSPITAL | Age: 88
Discharge: HOME | End: 2023-04-26
Attending: INTERNAL MEDICINE | Admitting: STUDENT IN AN ORGANIZED HEALTH CARE EDUCATION/TRAINING PROGRAM

## 2023-04-26 VITALS
OXYGEN SATURATION: 96 % | DIASTOLIC BLOOD PRESSURE: 74 MMHG | HEART RATE: 88 BPM | RESPIRATION RATE: 18 BRPM | SYSTOLIC BLOOD PRESSURE: 175 MMHG

## 2023-04-26 DIAGNOSIS — C34.91 ADENOCARCINOMA, LUNG, RIGHT (HCC): ICD-10-CM

## 2023-04-26 NOTE — SEDATION DOCUMENTATION
Right side thoracentesis performed by Vania Morgan PA-C  600 ml of clear yellow ascites drained  Ultrasound guided  Pt tolerated well  Puncture site is CDI and covered with a band aid  AVS reviewed with pt upon dc  No labs ordered

## 2023-04-26 NOTE — DISCHARGE INSTRUCTIONS
Thoracentesis   WHAT YOU NEED TO KNOW:   A thoracentesis is a procedure to remove extra fluid or air from between your lungs and your inner chest wall  Air or fluid buildup may make it hard for you to breathe  A thoracentesis allows your lungs to expand fully so you can breathe more easily  DISCHARGE INSTRUCTIONS:     Small amount of shoulder pain and bloody sputum is normal after a Thoracentesis  Rest:  Rest when you feel it is needed  Slowly start to do more each day  Return to your daily activities as directed  Resume your normal diet  Small sips of flat soda will help mild nausea  Do not smoke: If you smoke, it is never too late to quit  Ask for information about how to stop smoking if you need help  Contact Interventional Radiology at [phone] [place] PATIENTS: Contact Interventional Radiology at [phone]) Chen Merino PATIENTS: Contact Interventional Radiology at [phone]) if:   You have a fever  Your puncture site is red, warm, swollen, or draining pus  You have questions or concerns about your procedure, medicine, or care  Seek care immediately or call 911 if:   Severe chest pain with inspiration and shortness of breath    Large amounts of blood in your sputum    Follow up with your healthcare provider as directed

## 2023-04-27 ENCOUNTER — OFFICE VISIT (OUTPATIENT)
Dept: OBGYN CLINIC | Facility: CLINIC | Age: 88
End: 2023-04-27

## 2023-04-27 VITALS — WEIGHT: 114.4 LBS | SYSTOLIC BLOOD PRESSURE: 138 MMHG | DIASTOLIC BLOOD PRESSURE: 76 MMHG | BODY MASS INDEX: 21.62 KG/M2

## 2023-04-27 DIAGNOSIS — N81.10 VAGINAL PROLAPSE: Primary | ICD-10-CM

## 2023-04-27 DIAGNOSIS — N95.0 POSTMENOPAUSAL BLEEDING: ICD-10-CM

## 2023-04-27 RX ORDER — ESTRADIOL 0.1 MG/G
1 CREAM VAGINAL DAILY
Qty: 42.5 G | Refills: 0 | Status: SHIPPED | OUTPATIENT
Start: 2023-04-27

## 2023-04-27 NOTE — PROGRESS NOTES
Assessment/Plan:    Postmenopausal bleeding  Ring with support  Has abrasions along posterior wall- suspect source of spotting  Discussed pessary holiday vs estrace daily for 6 wks  Reviewed with estrace daily if incomplete resolution of abrasion will need holiday  Reviewed if brown does not resolve with abrasion resolution she will need pelvic US and PMB worked up further  Diagnoses and all orders for this visit:    Vaginal prolapse  -     estradiol (ESTRACE VAGINAL) 0 1 mg/g vaginal cream; Insert 1 g into the vagina daily    Postmenopausal bleeding          Subjective:      Patient ID: Francesco Gilford is a 80 y o  female  79 yo here for problem visit with her    helped with some [place] interpretation and  Sahara Damon #[id number] also available  She reports two months of off and on brown spotting/discharge  No bright red or pink  Notes on tissue and underwear  Has had a pessary for 6 years and has never had this issue  She follows with an outside provider for pessary maintenance, last visit was 1 wk ago and they reported this symptom, provider reported no infection at that time  She uses the pessary for uterine prolapse and would be very bothered by a holiday  The following portions of the patient's history were reviewed and updated as appropriate: allergies, current medications, past family history, past medical history, past social history, past surgical history and problem list     Review of Systems   Constitutional: Negative for chills and fever  Respiratory: Negative for shortness of breath  Cardiovascular: Negative for chest pain  Gastrointestinal: Negative for abdominal pain, constipation, diarrhea and nausea  Genitourinary: Positive for vaginal bleeding and vaginal discharge  Negative for dyspareunia, dysuria, frequency, pelvic pain, urgency and vaginal pain           Objective:      /76 (BP Location: Left arm, Patient Position: Sitting, Cuff Size: Standard)   Wt 51 9 kg (114 lb 6 4 oz)   LMP  (LMP Unknown)   BMI 21 62 kg/m²          Physical Exam  Vitals reviewed  Constitutional:       General: She is not in acute distress  Appearance: She is well-developed  She is not diaphoretic  HENT:      Head: Normocephalic and atraumatic  Pulmonary:      Effort: Pulmonary effort is normal  No respiratory distress  Genitourinary:     Labia:         Right: No rash, tenderness, lesion or injury  Left: No rash, tenderness, lesion or injury  Musculoskeletal:      Cervical back: Normal range of motion  Neurological:      Mental Status: She is alert and oriented to person, place, and time  Psychiatric:         Behavior: Behavior normal          Thought Content:  Thought content normal          Judgment: Judgment normal

## 2023-04-27 NOTE — ASSESSMENT & PLAN NOTE
Ring with support  Has abrasions along posterior wall- suspect source of spotting  Discussed pessary holiday vs estrace daily for 6 wks  Reviewed with estrace daily if incomplete resolution of abrasion will need holiday  Reviewed if brown does not resolve with abrasion resolution she will need pelvic US and PMB worked up further

## 2023-04-28 ENCOUNTER — OFFICE VISIT (OUTPATIENT)
Dept: CARDIOLOGY CLINIC | Facility: CLINIC | Age: 88
End: 2023-04-28

## 2023-04-28 VITALS
BODY MASS INDEX: 21.54 KG/M2 | SYSTOLIC BLOOD PRESSURE: 128 MMHG | DIASTOLIC BLOOD PRESSURE: 46 MMHG | WEIGHT: 114 LBS | HEART RATE: 80 BPM | OXYGEN SATURATION: 98 %

## 2023-04-28 DIAGNOSIS — I65.23 BILATERAL CAROTID ARTERY STENOSIS: ICD-10-CM

## 2023-04-28 DIAGNOSIS — E78.00 HYPERCHOLESTEROLEMIA: ICD-10-CM

## 2023-04-28 DIAGNOSIS — R94.31 ABNORMAL EKG: ICD-10-CM

## 2023-04-28 DIAGNOSIS — R94.31 EKG ABNORMALITIES: ICD-10-CM

## 2023-04-28 DIAGNOSIS — I73.9 PAD (PERIPHERAL ARTERY DISEASE) (HCC): ICD-10-CM

## 2023-04-28 DIAGNOSIS — I10 PRIMARY HYPERTENSION: Primary | ICD-10-CM

## 2023-04-28 NOTE — PROGRESS NOTES
Cardiology Follow Up    Meghana Rodriguez  1934  [address] [facility] CATH LAB  [address]  [address]  [phone]    1  Primary hypertension        2  Bilateral carotid artery stenosis        3  PAD (peripheral artery disease) ([address] )        4  EKG abnormalities        5  Abnormal EKG        6  Hypercholesterolemia            Interval History: Cardiology follow-up  Patient was last seen on 12/21  Patient states that she has been feeling overall well  She did have a recent thoracentesis with improvement of her dyspnea  Denies any chest discomfort  Denies any focal neurological deficits  Not very ambulatory  She was found to have a lung mass  Recurrence of her non-small cell lung carcinoma  He has been complicated with pleural effusions requiring thoracentesis, cytology revealed malignant pleural effusion  Therapeutic options have been discussed, consideration for immunotherapy  Although my feeling is that herself and her  do not grasp the seriousness of her illness  She was recently seen by palliative care as well  She denies any syncope or presyncope  Lipids last checked last year total cholesterol 194, HDL 94 LDL of 82  Recent CT scan of the chest revealed lung mass with pleural effusion, there is moderate coronary calcifications noted  She does have chronic kidney disease, last creatinine of 1 2 GFR in the 30s  Denies any bleeding issues on chronic aspirin therapy  Compliant with low-sodium diet, blood pressures been well controlled      Patient Active Problem List   Diagnosis   • Iliac artery stenosis, bilateral (HCC)   • GERD (gastroesophageal reflux disease)   • Arthritis   • Hypercholesterolemia   • Hypertension   • EKG abnormalities   • Anemia   • Neck pain   • Dysuria   • Chronic left shoulder pain   • Fall from other slipping, tripping, or stumbling   • Abnormal EKG   • Abnormal mammogram   • 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)   • Back pain   • Bilateral carotid artery stenosis   • Carotid stenosis, asymptomatic   • 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   • Chronic myofascial pain   • Chronic pain of lower extremity   • CKD (chronic kidney disease) stage 3, GFR 30-59 ml/min ([facility])   • Diabetic retinopathy ([address] )   • DMII (diabetes mellitus, type 2) ([facility])   • Gout   • Hypomagnesemia   • Atherosclerosis of other specified arteries   • Lymphadenopathy   • Nephrolithiasis   • Osteoporosis   • PAD (peripheral artery disease) ([facility])   • Primary generalized (osteo)arthritis   • Persistent proteinuria   • Renal cyst   • Vaginal prolapse   • Vitamin D deficiency   • Acquired complex cyst of kidney   • History of left breast cancer   • Stricture of artery ([facility])   • Claudication in peripheral vascular disease ([facility])   • Hemorrhoids   • Tinea unguium   • Diabetic neuropathy ([facility])   • Bradycardia   • Glottic insufficiency   • Reflux laryngitis   • Paresis of left vocal fold   • Muscle tension dysphonia   • Laryngocele   • Anterior glottic web   • Viral upper respiratory tract infection   • 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HCC)   • Renal artery stenosis (HCC)   • Left ear pain   • Cervical spine disease   • Osteopenia of multiple sites   • Type 2 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, bilateral (HCC)   • Lung mass   • CKD (chronic kidney disease)   • Adenocarcinoma, lung, right (HCC)   • Encounter for annual routine gynecological examination   • Encounter for screening mammogram for breast cancer   • History of cancer of left breast   • Dense breast tissue on mammogram   • Left leg swelling   • Hypercalcemia   • Primary osteoarthritis of left knee   • Embolism and thrombosis of arteries of the lower extremities ([address] )   • Secondary hyperparathyroidism ([address] )   • Postmenopausal bleeding "    Past Medical History:   Diagnosis Date   • Arthritis    • Back pain    • Benign essential hypertension     LAST ASSESSED: [id number]   • Carotid artery stenosis    • CKD (chronic kidney disease) stage 2, GFR 60-89 ml/min    • Constipation, chronic     \"drinks prune juice\"   • Diabetes mellitus ([facility] )    • DMII (diabetes mellitus, type 2) ([facility])    • Epiglottitis     RESOLVED: [id number]   • Gastritis    • GERD (gastroesophageal reflux disease)    • Gout    • H/O blood clots     left lower leg,,,11 yrs ago   • History of stenosis of renal artery     RESOLVED: 27VOL9491   • Hypercholesteremia    • Hypercholesterolemia    • Hyperlipidemia    • Hyperparathyroidism ([place] [address] )    • Hypertension    • Iliac artery stenosis, bilateral (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)    • Renal disorder    • Renovascular hypertension     RESOLVED: [id number]   • Segmental and somatic dysfunction of cervical region     RESOLVED: 60ZQS1472   • Segmental and somatic dysfunction of lumbar region     RESOLVED: 70BPF1837   • Segmental and somatic dysfunction of pelvic region     RESOLVED: 20BLT3490   • Segmental and somatic dysfunction of thoracic region     RESOLVED: 20OXK6599   • Somatic dysfunction of abdominal region     RESOLVED: 99ZAL4657   • Somatic dysfunction of head region     RESOLVED: 04AXR7503   • Somatic dysfunction of lower extremities     RESOLVED: 61AAW7426   • Somatic dysfunction of rib region     RESOLVED: 22BKH1600   • Somatic dysfunction of thoracic region     RESOLVED: 91WUS3600   • Somatic dysfunction of upper extremities     RESOLVED: 04TVI3913   • Teeth missing      Social History     Socioeconomic History   • Marital status:       Spouse name: Not on file   • Number of children: Not on file   • Years of education: Not on file   • Highest " education level: Not on file   Occupational History   • Occupation: retired   Tobacco Use   • Smoking status: Former     Packs/day: 1 00     Years: 55 00     Pack years: 55 00     Types: Cigarettes     Start date:      Quit date:      Years since quittin 3   • Smokeless tobacco: Never   Vaping Use   • Vaping Use: Never used   Substance and Sexual Activity   • Alcohol use: Not Currently   • Drug use: No   • Sexual activity: Not Currently     Partners: Male   Other Topics Concern   • Not on file   Social History Narrative    Daily caffeine consumption, 1 serving a day      Social Determinants of Health     Financial Resource Strain: Low Risk    • Difficulty of Paying Living Expenses: Not hard at all   Food Insecurity: Not on file   Transportation Needs: No Transportation Needs   • Lack of Transportation (Medical): No   • Lack of Transportation (Non-Medical):  No   Physical Activity: Not on file   Stress: Not on file   Social Connections: Not on file   Intimate Partner Violence: Not on file   Housing Stability: Not on file      Family History   Problem Relation Age of Onset   • Heart disease Brother    • Heart disease Maternal Grandmother    • Diabetes Other         of other type with arthropathy, without long term current use of insulin   • Hypertension Other    • No Known Problems Mother    • Gout Family    • Rheum arthritis Family    • Lupus Family         systematic erythematosus   • Heart disease Family    • Stroke Family         syndrome   • Stroke Maternal Uncle         syndrome   • Stroke Maternal Aunt         syndrome   • No Known Problems Father    • No Known Problems Daughter    • No Known Problems Maternal Grandfather    • No Known Problems Paternal Grandmother    • No Known Problems Paternal Grandfather    • No Known Problems Daughter    • No Known Problems Daughter    • No Known Problems Maternal Aunt    • No Known Problems Maternal Aunt    • No Known Problems Maternal Aunt      Past Surgical History:   Procedure Laterality Date   • ANGIOPLASTY / STENTING ILIAC     • BREAST BIOPSY Left 08/10/2018    U/S BX- mucinous ca   • BREAST BIOPSY Left 08/07/2017    U/S BX   • BREAST LUMPECTOMY Left 09/06/2017    LAST ASSESSED: [id number]   • BREAST LUMPECTOMY Left 09/11/2018   • BREAST SURGERY Left     biopsy   • CATARACT EXTRACTION Bilateral    • CHOLECYSTECTOMY     • COLONOSCOPY     • EYE SURGERY     • ILIAC VEIN ANGIOPLASTY / STENTING Right     INITIAL STENOSIS: ONSET: 14UQP3279   • IR BIOPSY LUNG  11/15/2021   • IR THORACENTESIS  2/20/2023   • IR THORACENTESIS  3/28/2023   • IR THORACENTESIS  4/11/2023   • IR THORACENTESIS  4/19/2023   • KNEE ARTHROSCOPY Right    • KNEE SURGERY Right     ONSET: 27TQY9218   • MAMMO NEEDLE LOCALIZATION LEFT (ALL INC) Left 9/11/2018   • MAMMO NEEDLE LOCALIZATION LEFT (ALL INC) Left 9/6/2017   • IN ESOPHAGOGASTRODUODENOSCOPY TRANSORAL DIAGNOSTIC N/A 7/26/2018    Procedure: ESOPHAGOGASTRODUODENOSCOPY (EGD); Surgeon: Dao Marrero DO;  Location: [facility]; Service: Gastroenterology   • IN [facility] EXC SILVA&/STRPG CORDS/EPIGL MCRSCP/TLSCP N/A 6/10/2016    Procedure: MICRO DIRECT LARYNGOSCOPY WITH VOCAL FOLD MASS EXCISION ;  Surgeon: Reece Fajardo MD;  Location: [facility];  Service: ENT   • IN MASTECTOMY PARTIAL Left 9/6/2017    Procedure: LUMPECTOMY BREAST NEEDLE LOCALIZED WITH FAXITRON NEEDLE @ 0830;  Surgeon: Arsalan Saba MD;  Location[facility];  Service: General   • IN PERQ DEVICE PLACEMENT BREAST LOC 1ST LES W/GDNCE Left 9/11/2018    Procedure: BREAST LUMPECTOMY; BREAST NEEDLE LOCALIZATION (NEEDLE LOC AT 1330);   Surgeon: Maria A Bryan MD;  Location: [facility];  Service: Surgical Oncology   • THROAT SURGERY      lump removed   • TUBAL LIGATION     • UPPER GASTROINTESTINAL ENDOSCOPY     • US GUIDANCE BREAST BIOPSY LEFT EACH ADDITIONAL Left 8/7/2017   • US GUIDED BREAST BIOPSY LEFT COMPLETE Left 8/10/2018   • US GUIDED BREAST BIOPSY LEFT COMPLETE Left 8/7/2017   • VASCULAR SURGERY         Current Outpatient Medications:   •  Acetaminophen 325 MG CAPS, Take 500 mg by mouth every 8 (eight) hours, Disp: , Rfl:   •  aspirin (ADULT ASPIRIN EC LOW STRENGTH) 81 mg EC tablet, Take 1 tablet by mouth daily, Disp: , Rfl:   •  estradiol (ESTRACE VAGINAL) 0 1 mg/g vaginal cream, Insert 1 g into the vagina daily, Disp: 42 5 g, Rfl: 0  •  fluticasone (FLONASE) 50 mcg/act nasal spray, 2 sprays into each nostril daily, Disp: 16 g, Rfl: 0  •  gabapentin (Neurontin) 100 mg capsule, Take 1 capsule (100 mg total) by mouth 2 (two) times a day, Disp: 180 capsule, Rfl: 0  •  hydrocortisone (CORTEF) 10 mg tablet, Take 1 tablet (10 mg total) by mouth 2 (two) times a day as needed (low energy, low blood pressure) [place] con el desayuno y el almuerzo para levante los preciones, y mejorar la energia , Disp: 30 tablet, Rfl: 0  •  Lancets (ONETOUCH ULTRASOFT) lancets, by Other route 2 (two) times a day Use as instructed, Disp: 100 each, Rfl: 5  •  metoclopramide (REGLAN) 5 mg tablet, Take 1 tablet (5 mg total) by mouth 3 (three) times a day before meals, Disp: 90 tablet, Rfl: 0  •  NIFEdipine ER (ADALAT CC) 30 MG 24 hr tablet, Take 1 tablet twice a day , Disp: 180 tablet, Rfl: 0  •  olopatadine (PATANOL) 0 1 % ophthalmic solution, Administer 1 drop to both eyes 2 (two) times a day, Disp: 5 mL, Rfl: 0  •  OneTouch Delica Lancets 58F MISC, Inject as directed 2 (two) times a day Use to test, Disp: 200 each, Rfl: 0  •  pantoprazole (PROTONIX) 40 mg tablet, Take 1 tablet (40 mg total) by mouth 2 (two) times a day, Disp: 60 tablet, Rfl: 5  •  polyethylene glycol (MIRALAX) 17 g packet, Take 17 g by mouth daily, Disp: 30 each, Rfl: 5  •  pravastatin (PRAVACHOL) 40 mg tablet, Take 1 tablet (40 mg total) by mouth daily, Disp: 90 tablet, Rfl: 3  •  Probiotic Product (Align) 4 MG CAPS, Take by mouth, Disp: , Rfl:   •  sucralfate (CARAFATE) 1 g tablet, Take 1 tablet (1 g total) by mouth 4 (four) times a day, Disp: 120 tablet, Rfl: 5  Allergies   Allergen Reactions   • Clonidine Derivatives Swelling   • Diflucan [Fluconazole] Rash   • Flagyl [Metronidazole] Anaphylaxis   • Carvedilol Hives     And legs swelled   • Latex Rash   • Lipitor [Atorvastatin]      Legs swelled   • Metoprolol Dizziness     Low BP and orthostatic symptoms     • Other Palpitations     IV DYE   • Shingrix [Zoster Vac Recomb Adjuvanted] Hives       Labs:  Appointment on 04/10/2023   Component Date Value   • Sodium 04/10/2023 134 (L)    • Potassium 04/10/2023 4 3    • Chloride 04/10/2023 98    • CO2 04/10/2023 34 (H)    • ANION GAP 04/10/2023 2 (L)    • BUN 04/10/2023 28 (H)    • Creatinine 04/10/2023 1 31 (H)    • Glucose, Fasting 04/10/2023 165 (H)    • Calcium 04/10/2023 10 1    • eGFR 04/10/2023 36    • Urine Culture 04/10/2023 >100,000 cfu/ml Streptococcus anginosus (A)    • Urine Culture 04/10/2023 20,000-29,000 cfu/ml Lactobacillus species (A)    Office Visit on 04/07/2023   Component Date Value   • Hemoglobin A1C 04/07/2023 6 4    Hospital Outpatient Visit on 03/28/2023   Component Date Value   • Case Report 03/28/2023                      Value:Non-gynecologic Cytology                          Case: [id number]                                  Authorizing Provider:  Sofia Matthews MD           Collected:           03/28/2023 1443              Ordering Location:     [address] [place] [address]      Received:            03/28/2023 [facility]                                                                             Radiology                                                                    Pathologist:           Liss Blas MD                                                           Specimens:   A) - Pleural Fluid                                                                                  B) - Pleural Fluid, CELL BLOCK                                                            • Final Diagnosis 03/28/2023 Value:This result contains rich text formatting which cannot be displayed here  • Note 03/28/2023                      Value: This result contains rich text formatting which cannot be displayed here  • Gross Description 03/28/2023                      Value: This result contains rich text formatting which cannot be displayed here  • Additional Information 03/28/2023                      Value: This result contains rich text formatting which cannot be displayed here  • Body Fluid Culture, Ster* 03/28/2023 No growth    • Gram Stain Result 03/28/2023 No Polys or Bacteria seen    • Site 03/28/2023 Pleural fluid right    • Color, Fluid 03/28/2023 Yellow    • Clarity, Fluid 03/28/2023 Clear    • WBC, Fluid 03/28/2023 1,669    • Total Counted 03/28/2023 100    • Neutrophils % (Fluid) 03/28/2023 3    • Lymphs % (Fluid) 03/28/2023 50    • Histiocyte % (Fluid) 03/28/2023 17    • Monocytes % (Fluid) 03/28/2023 30    Hospital Outpatient Visit on 02/23/2023   Component Date Value   • POC Glucose 02/23/2023 126    • Case Report 02/23/2023                      Value:Surgical Pathology Report                         Case: [id number]                                   Authorizing Provider:  Jaime Shultz MD             Collected:           02/23/2023 1209              Ordering Location:     [address] [place] [address]      Received:            02/23/2023 [place] [id number] Endoscopy                                                           Pathologist:           Harmony Andrews MD                                                                 Specimen:    Stomach, r/o h pylori                                                                     • Final Diagnosis 02/23/2023                      Value: This result contains rich text formatting which cannot be displayed here     • Additional Information 02/23/2023                      Value: This result contains rich text formatting which cannot be displayed here  • Gross Description 02/23/2023                      Value: This result contains rich text formatting which cannot be displayed here  • Case Report 02/23/2023                      Value:Surgical Pathology Report                         Case: [id number]                                   Authorizing Provider:  Wai Munoz MD           Collected:           02/23/2023 1208              Ordering Location:     [address] [place] [address]      Received:            02/23/2023 [place] [id number] Endoscopy                                                           Pathologist:           Nanci Morgan MD                                                                 Specimens:   A) - Duodenum, r/o Celiac                                                                           B) - Polyp, Stomach/Small Intestine, gastric polyp x 2, cold snare                        • Final Diagnosis 02/23/2023                      Value: This result contains rich text formatting which cannot be displayed here  • Additional Information 02/23/2023                      Value: This result contains rich text formatting which cannot be displayed here  • Gross Description 02/23/2023                      Value: This result contains rich text formatting which cannot be displayed here     Hospital Outpatient Visit on 02/20/2023   Component Date Value   • Body Fluid Culture, Ster* 02/20/2023 No growth    • Gram Stain Result 02/20/2023 1+ Polys    • Gram Stain Result 02/20/2023 No bacteria seen    • PH BODY FLUID 02/20/2023 7 5    • Site 02/20/2023 Thoracentesis    • Color, Fluid 02/20/2023 Yellow    • Clarity, Fluid 02/20/2023 Hazy (A)    • WBC, Fluid 02/20/2023 2,227    • LD, Fluid 02/20/2023 289    • Case Report 02/20/2023                      Value:Non-gynecologic Cytology                          Case: [id number]                                  Authorizing Provider:  JARRED Crump      Collected:           02/20/2023 1334              Ordering Location:     [address] [place] [address] Road      Received:            02/20/2023   [address]                                                                             Radiology                                                                    Pathologist:           Marian James MD                                                                 Specimens:   A) - Thoracentesis                                                                                  B) - Thoracentesis, cell block                                                            • Final Diagnosis 02/20/2023                      Value: This result contains rich text formatting which cannot be displayed here  • Gross Description 02/20/2023                      Value: This result contains rich text formatting which cannot be displayed here  • Additional Information 02/20/2023                      Value: This result contains rich text formatting which cannot be displayed here     • Total Counted 02/20/2023 100    • Lymphs % (Fluid) 02/20/2023 54    • Mesothelial % (Fluid) 02/20/2023 3    • Monocytes % (Fluid) 02/20/2023 43    Admission on 02/11/2023, Discharged on 02/12/2023   Component Date Value   • Ventricular Rate 02/11/2023 100    • Atrial Rate 02/11/2023 100    • SD Interval 02/11/2023 176    • QRSD Interval 02/11/2023 82    • QT Interval 02/11/2023 312    • QTC Interval 02/11/2023 402    • P Axis 02/11/2023 85    • QRS Axis 02/11/2023 -14    • T Wave Axis 02/11/2023 92    • WBC 02/11/2023 7 83    • RBC 02/11/2023 4 35    • Hemoglobin 02/11/2023 12 1    • Hematocrit 02/11/2023 39 3    • MCV 02/11/2023 90    • MCH 02/11/2023 27 8    • MCHC 02/11/2023 30 8 (L)    • RDW 02/11/2023 13 6    • MPV 02/11/2023 11 7    • Platelets 46/04/8578 299    • nRBC 02/11/2023 0    • Neutrophils Relative 02/11/2023 62    • Immat GRANS % 02/11/2023 0    • Lymphocytes Relative 02/11/2023 27    • Monocytes Relative 02/11/2023 10    • Eosinophils Relative 02/11/2023 1    • Basophils Relative 02/11/2023 0    • Neutrophils Absolute 02/11/2023 4 79    • Immature Grans Absolute 02/11/2023 0 02    • Lymphocytes Absolute 02/11/2023 2 11    • Monocytes Absolute 02/11/2023 0 81    • Eosinophils Absolute 02/11/2023 0 08    • Basophils Absolute 02/11/2023 0 02    • Sodium 02/11/2023 138    • Potassium 02/11/2023 3 1 (L)    • Chloride 02/11/2023 99    • CO2 02/11/2023 32    • ANION GAP 02/11/2023 7    • BUN 02/11/2023 35 (H)    • Creatinine 02/11/2023 1 57 (H)    • Glucose 02/11/2023 180 (H)    • Calcium 02/11/2023 9 3    • eGFR 02/11/2023 29    • hs TnI 0hr 02/11/2023 17    • NT-proBNP 02/11/2023 458 (H)    • hs TnI 2hr 02/11/2023 20    • Delta 2hr hsTnI 02/11/2023 3    Hospital Outpatient Visit on 01/20/2023   Component Date Value   • POC Glucose 01/20/2023 151 (H)    Orders Only on 12/20/2022   Component Date Value   • Right Eye Diabetic Retin* 12/20/2022 Positive    • Left Eye Diabetic Retino* 12/20/2022 Positive    Appointment on 12/17/2022   Component Date Value   • Uric Acid 12/17/2022 6 7    • Sed Rate 12/17/2022 95 (H)    Appointment on 11/22/2022   Component Date Value   • IgA 11/22/2022 700 (H)    • Gliadin IgA 11/22/2022 9    • Gliadin IgG 11/22/2022 3    • Tissue Transglut Ab IGG 11/22/2022 <2    • TISSUE TRANSGLUTAMINASE * 11/22/2022 <2    • Endomysial IgA 11/22/2022 Negative    There may be more visits with results that are not included  Imaging: IR thoracentesis    Result Date: 4/21/2023  Narrative: PROCEDURE: Therapeutic right thoracentesis STAFF: Kolby Bryson PA-C COMPLICATIONS: None immediate  INDICATION: Symptomatic right pleural effusion   PROCEDURE: Using ultrasound guidance, the right pleural cavity was punctured and a catheter placed into the pleural cavity  A total of 750 mL of fluid was removed  The catheter was then removed  FINDINGS: Massive right pleural effusion  Becca pleural fluid was removed  Impression: Therapeutic right thoracentesis  Procedure was performed by: Julianna Francisco PA-C Supervising Physician: Dr Gilma Grider performed: [id number]     IR thoracentesis    Result Date: 4/13/2023  Narrative: PROCEDURE: Ultrasound guided right thoracentesis STAFF: Olivier Winn PA-C COMPLICATIONS: None immediate INDICATION: Right pleural effusion  Right lung adenocarcinoma  PROCEDURE: Using ultrasound guidance, the right pleural cavity was punctured and a catheter placed into the pleural cavity  A total of 950 milliliters of clear yellow fluid was removed  The catheter was then removed  FINDINGS: Right pleural effusion  950cc pleural fluid was removed  Impression: Right thoracentesis  Signed, performed, and dictated by Olivier Winn PA-C under the direct supervision of Dr Hassan Pencil  Workstation performed: [id number]       Review of Systems:  Review of Systems   Constitutional: Positive for activity change and fatigue  HENT: Negative for nosebleeds  Eyes: Negative for visual disturbance  Respiratory: Positive for shortness of breath  Negative for wheezing and stridor  Cardiovascular: Negative for chest pain, palpitations and leg swelling  Gastrointestinal: Negative for anal bleeding and blood in stool  Allergic/Immunologic: Positive for immunocompromised state  Neurological: Negative for syncope  Physical Exam:  Physical Exam  Vitals reviewed  Constitutional:       General: She is not in acute distress  Appearance: She is normal weight  She is ill-appearing (c chronically ill)  She is not toxic-appearing or diaphoretic  Neck:      Vascular: No carotid bruit     Cardiovascular:      Rate and Rhythm: Normal rate and regular rhythm  Heart sounds: Normal heart sounds  No murmur heard  No friction rub  No gallop  Pulmonary:      Effort: No respiratory distress  Breath sounds: No stridor  No wheezing or rhonchi  Comments: Decreased breath sounds at the right base with dullness  Musculoskeletal:      Right lower leg: No edema  Left lower leg: No edema  Skin:     General: Skin is warm and dry  Capillary Refill: Capillary refill takes less than 2 seconds  Coloration: Skin is not jaundiced or pale  Findings: No bruising or erythema  Neurological:      Mental Status: She is alert  Psychiatric:         Mood and Affect: Mood normal          Discussion/Summary:   Abnormal EKG, questionable septal MI  Probably positional, not as evident on follow-up EKG  Pharmacological stress test 2019 suggested no ischemia ejection fraction 72%, echocardiogram at that time revealed hyperdynamic left ventricular systolic function with borderline left ventricular hypertrophy there was mild mitral and tricuspid insufficiency with estimated normal pulmonary pressures suggested by Doppler criteria  , no new recommendations  Right lower lobe mass, biopsy positive for carcinoma  Positive  Uptake on PET  Malignant pleural effusions as described above  peripheral vascular disease, history of bilateral iliac stenting  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  Unchanged on duplex 2 years ago  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         This note was completed in part utilizing [employer] direct voice recognition software  Grammatical errors, random word insertion, spelling mistakes, and incomplete sentences may be an occasional consequence of the system secondary to software limitations, ambient noise and hardware issues  At the time of dictation, efforts were made to edit, clarify and /or correct errors    Please read the chart carefully and recognize, using context, where substitutions have occurred  If you have any questions or concerns about the context, text or information contained within the body of this dictation, please contact myself, the provider, for further clarification

## 2023-05-01 ENCOUNTER — TELEPHONE (OUTPATIENT)
Dept: HEMATOLOGY ONCOLOGY | Facility: MEDICAL CENTER | Age: 88
End: 2023-05-01

## 2023-05-01 DIAGNOSIS — R64 MALIGNANT CACHEXIA (HCC): ICD-10-CM

## 2023-05-01 DIAGNOSIS — R91.8 LUNG MASS: ICD-10-CM

## 2023-05-01 RX ORDER — HYDROCORTISONE 10 MG/1
TABLET ORAL
Qty: 30 TABLET | Refills: 0 | Status: SHIPPED | OUTPATIENT
Start: 2023-05-01

## 2023-05-01 NOTE — TELEPHONE ENCOUNTER
I spoke with Vincent Rdz from Radiology in regards to the patients thoracentesis that was completed on 4/26/23  I informed Vincent Rdz that it looks like its still pending and can we have a reading on it before the patients appt tomorrow 5/2/23  Vnicent Rdz states she will put it in to be read

## 2023-05-02 ENCOUNTER — TELEPHONE (OUTPATIENT)
Dept: PALLIATIVE MEDICINE | Facility: CLINIC | Age: 88
End: 2023-05-02

## 2023-05-02 ENCOUNTER — OFFICE VISIT (OUTPATIENT)
Dept: HEMATOLOGY ONCOLOGY | Facility: CLINIC | Age: 88
End: 2023-05-02

## 2023-05-02 VITALS
TEMPERATURE: 98 F | BODY MASS INDEX: 21.9 KG/M2 | OXYGEN SATURATION: 96 % | DIASTOLIC BLOOD PRESSURE: 60 MMHG | RESPIRATION RATE: 16 BRPM | WEIGHT: 116 LBS | SYSTOLIC BLOOD PRESSURE: 136 MMHG | HEART RATE: 89 BPM | HEIGHT: 61 IN

## 2023-05-02 DIAGNOSIS — G89.3 CANCER-RELATED PAIN: Primary | ICD-10-CM

## 2023-05-02 DIAGNOSIS — C34.91 ADENOCARCINOMA, LUNG, RIGHT (HCC): Primary | ICD-10-CM

## 2023-05-02 NOTE — PROGRESS NOTES
Dean Augustin  1934  [address] [facility] [address]  [address]   [place] [id number]    DISCUSSION/SUMMARY:    27-year-old female with history of stage IA (pT1c, pN0, cM0, G1, ER+, OR+, HER2-) left breast cancer (diagnosed in September 2018) and a history of primary pulmonary adenocarcinoma, TTF-1 +, non-small cell lung carcinoma diagnosed in November 2021 status post 15 fractions RT  Recently patient began to have more dyspnea on exertion and chest pain  Recent PET/CT demonstrated a new right major fissure 1 cm nodule with mild FDG activity of uncertain clinical significance  There were additional new small pleural nodules along the right major fissure and a moderate to large pleural effusion with minimal FDG activity  Prior diagnostic and therapeutic thoracentesis demonstrated TTF-1 + adenocarcinoma  At this time, Mrs Elijah Ny is to continue with the thoracentesis as needed  There is also consideration for a Pleur-evac catheter  Patient/family will monitor for worsening respiratory status  Mrs Auguset and her daughters understand that the metastatic/recurrent lung cancer is not curable  Previously patient was not sure what she wanted to do as far as treatment options  Patient is refusing chemotherapy (I agree with this) but is amendable to immunotherapy (at least trying it)  NCCN guidelines 3 2023 state that for patients with metastatic non-small cell lung carcinoma, adenocarcinoma, PDL 1 >50%, category 1 upfront treatment is pembrolizumab  For patients with PD-L1 between 1 and 49%, pembrolizumab alone is category 2B, useful in certain segments at this  As above, patient is refusing chemotherapy  Oncology will send for PD-L1  Literature was given to the patient/family today (in [place]) discussing pembrolizumab, and its benefits and potential side effects and toxicities  We also discussed the potential side effects    When the PDL results are known, nursing staff will speak with the patient's daughter  Patient was given a follow-up appointment for 4 weeks with this will change after the above results are available  Patient has been seen/evaluated by Dr David Barrett, palliative care; follow-ups are ongoing    Auguste/family know to call the hematology/oncology office if there are any other questions or concerns  Carefully review your medication list and verify that the list is accurate and up-to-date  Please call the hematology/oncology office if there are medications missing from the list, medications on the list that you are not currently taking or if there is a dosage or instruction that is different from how you're taking that medication  Patient goals and areas of care: Therapeutic thoracentesis, palliative care follow-up, PD-L1 evaluation   Barriers to care: None  Patient is able to self-care with help of family members  ______________________________________________________________________________________    Chief Complaint   Patient presents with    Follow-up     Adenocarcinoma, lung, right      Oncology History   History of left breast cancer   8/10/2018 Biopsy    Left breast biopsy  Invasive mucinous  Grade 1      Her 2 1+     9/11/2018 Surgery    Left lumpectomy  Invasive mucinous carcinoma  Grade 1  1 1 cm in size  Margins negative      Her 2 1+  Stage 1A     Adenocarcinoma, lung, right ([facility] [address] )   11/2021 Initial Diagnosis    Adenocarcinoma, lung, right ([facility] [address] )     11/15/2021 Biopsy    Lung, Right Middle Lobe, mass:  - Adenocarcinoma  Comment: Immunohistochemistry is positive in the tumor cells for TTF-1 and negative for p40 and Gata3  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 a primary pulmonary adenocarcinoma       1/19/2022 - 2/8/2022 Radiation    Course: C1    Plan ID Energy Fractions Dose per Fraction (cGy) Dose Correction (cGy) Total Dose Delivered (cGy) Elapsed Days   RML 6X 15 / 15 400 0 6,000 20      Treatment dates:  1/19/2022 - 2/8/2022        History of Present Illness: 71-year-old female with distant history of left breast cancer, more recent diagnosis of early stage right middle lobe non-small cell lung carcinoma (TTF-1 + adenocarcinoma) status post RT  The patient has had more problems with shortness of breath and dyspnea on exertion recently  Repeat scans demonstrated a 1 cm right major fissure nodule seen on the December 2022 CAT scan and more recently on a PET/CT  Patient also had a moderate to large pleural effusion  Recent cytology demonstrated recurrent/metastatic non-small cell lung carcinoma, adenocarcinoma  Patient returns for follow-up and is accompanied by her daughters  Mrs Auguste has been undergoing thoracentesis as needed  Patient states feeling better after the thoracentesis  Otherwise patient feels okay; no shortness of breath at rest, + dyspnea on exertion but the same as before  Fatigue is the same as before  No pain control issues  Patient has cough, dry, no sputum or hemoptysis  Activities are limited but the same as before  No GI or  issues  Appetite is okay, weight is stable  Review of Systems   Constitutional: Positive for activity change and fatigue  HENT: Negative  Eyes: Negative  Respiratory: Negative          + Dyspnea on exertion   Cardiovascular: Negative for chest pain  Gastrointestinal: Negative  Endocrine: Negative  Genitourinary: Negative  Musculoskeletal: Negative  Skin: Negative  Allergic/Immunologic: Negative  Neurological: Negative  Hematological: Negative  Psychiatric/Behavioral: Negative  All other systems reviewed and are negative      Patient Active Problem List   Diagnosis    Iliac artery stenosis, bilateral (HCC)    GERD (gastroesophageal reflux disease)    Arthritis    Hypercholesterolemia    Hypertension    EKG abnormalities    Anemia    Neck pain    Dysuria    Chronic left shoulder pain    Fall from other slipping, tripping, or stumbling    Abnormal EKG    Abnormal mammogram    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)    Back pain    Bilateral carotid artery stenosis    Carotid stenosis, asymptomatic    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    Chronic myofascial pain    Chronic pain of lower extremity    CKD (chronic kidney disease) stage 3, GFR 30-59 ml/min ([facility])    Diabetic retinopathy ([facility] )    DMII (diabetes mellitus, type 2) ([facility])    Gout    Hypomagnesemia    Atherosclerosis of other specified arteries    Lymphadenopathy    Nephrolithiasis    Osteoporosis    PAD (peripheral artery disease) ([facility])    Primary generalized (osteo)arthritis    Persistent proteinuria    Renal cyst    Vaginal prolapse    Vitamin D deficiency    Acquired complex cyst of kidney    History of left breast cancer    Stricture of artery ([facility])    Claudication in peripheral vascular disease ([facility])    Hemorrhoids    Tinea unguium    Diabetic neuropathy (HCC)    Bradycardia    Glottic insufficiency    Reflux laryngitis    Paresis of left vocal fold    Muscle tension dysphonia    Laryngocele    Anterior glottic web    Viral upper respiratory tract infection    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HCC)    Renal artery stenosis (HCC)    Left ear pain    Cervical spine disease    Osteopenia of multiple sites    Type 2 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, bilateral (HCC)    Lung mass    CKD (chronic kidney disease)    Adenocarcinoma, lung, right ([facility] [address] )    Encounter for annual routine gynecological examination    Encounter for screening mammogram for breast cancer    History of cancer of left breast    Dense breast tissue on mammogram    Left leg swelling    Hypercalcemia    Primary "osteoarthritis of left knee    Embolism and thrombosis of arteries of the lower extremities (HCC)    [facility]roidism ([facility]    Postmenopausal bleeding     Past Medical History:   Diagnosis Date    Arthritis     Back pain     Benign essential hypertension     LAST ASSESSED: [id number]    Carotid artery stenosis     CKD (chronic kidney disease) stage 2, GFR 60-89 ml/min     Constipation, chronic     \"drinks prune juice\"    Diabetes mellitus ([facility] )     DMII (diabetes mellitus, type 2) (HCC)     Epiglottitis     RESOLVED: [id number]    Gastritis     GERD (gastroesophageal reflux disease)     Gout     H/O blood clots     left lower leg,,,11 yrs ago    History of stenosis of renal artery     RESOLVED: [id number]    Hypercholesteremia     Hypercholesterolemia     Hyperlipidemia     Hyperparathyroidism ([facility] )     Hypertension     Iliac artery stenosis, bilateral (HCC)     Kidney stone     Lobular carcinoma ([place] [address] ) 09/06/2017    Left    Lung cancer ([place] [address] )     Mucinous carcinoma of breast, left ([place] [address] ) 09/11/2018    Left    Presence of pessary     Renal artery stenosis (HCC)     Renal disorder     Renovascular hypertension     RESOLVED: [id number]    Segmental and somatic dysfunction of cervical region     RESOLVED: [id number]    Segmental and somatic dysfunction of lumbar region     RESOLVED: [id number]    Segmental and somatic dysfunction of pelvic region     RESOLVED: [id number]    Segmental and somatic dysfunction of thoracic region     RESOLVED: 21IKH3250    Somatic dysfunction of abdominal region     RESOLVED: [id number]    Somatic dysfunction of head region     RESOLVED: 72QJN7106    Somatic dysfunction of lower extremities     RESOLVED: 10NQN9220    Somatic dysfunction of rib region     RESOLVED: [id number]    Somatic dysfunction of thoracic region     RESOLVED: [id number]    Somatic dysfunction of upper extremities     RESOLVED: 95QZW6747    Teeth missing      Past Surgical " History:   Procedure Laterality Date    ANGIOPLASTY / STENTING ILIAC      BREAST BIOPSY Left 08/10/2018    U/S BX- mucinous ca    BREAST BIOPSY Left 08/07/2017    U/S BX    BREAST LUMPECTOMY Left 09/06/2017    LAST ASSESSED: [id number]    BREAST LUMPECTOMY Left 09/11/2018    BREAST SURGERY Left     biopsy    CATARACT EXTRACTION Bilateral     CHOLECYSTECTOMY      COLONOSCOPY      EYE SURGERY      ILIAC VEIN ANGIOPLASTY / STENTING Right     INITIAL STENOSIS: ONSET: [id number]    IR BIOPSY LUNG  11/15/2021    IR THORACENTESIS  2/20/2023    IR THORACENTESIS  3/28/2023    IR THORACENTESIS  4/11/2023    IR THORACENTESIS  4/19/2023    IR THORACENTESIS  4/26/2023    KNEE ARTHROSCOPY Right     KNEE SURGERY Right     ONSET: [id number]    MAMMO NEEDLE LOCALIZATION LEFT (ALL INC) Left 9/11/2018    MAMMO NEEDLE LOCALIZATION LEFT (ALL INC) Left 9/6/2017    DC ESOPHAGOGASTRODUODENOSCOPY TRANSORAL DIAGNOSTIC N/A 7/26/2018    Procedure: ESOPHAGOGASTRODUODENOSCOPY (EGD); Surgeon: Tamara Velasco DO;  Location: [facility]; Service: Gastroenterology    DC [facility] EXC SILVA&/STRPG CORDS/EPIGL MCRSCP/TLSCP N/A 6/10/2016    Procedure: MICRO DIRECT LARYNGOSCOPY WITH VOCAL FOLD MASS EXCISION ;  Surgeon: Tejas Carter MD;  [facility];  Service: ENT    DC MASTECTOMY PARTIAL Left 9/6/2017    Procedure: LUMPECTOMY BREAST NEEDLE LOCALIZED WITH FAXITRON NEEDLE @ 0830;  Surgeon: Guero Guadarrama MD;  [facility];  Service: General    DC PERQ DEVICE PLACEMENT BREAST LOC 1ST LES W/GDNCE Left 9/11/2018    Procedure: BREAST LUMPECTOMY; BREAST NEEDLE LOCALIZATION (NEEDLE LOC AT 1330);   Surgeon: Andrew Rdz MD;  [facility];  Service: Surgical Oncology    THROAT SURGERY      lump removed    TUBAL LIGATION      UPPER GASTROINTESTINAL ENDOSCOPY      US GUIDANCE BREAST BIOPSY LEFT EACH ADDITIONAL Left 8/7/2017    US GUIDED BREAST BIOPSY LEFT COMPLETE Left 8/10/2018    US GUIDED BREAST BIOPSY LEFT COMPLETE Left 2017    VASCULAR SURGERY       Family History   Problem Relation Age of Onset    Heart disease Brother     Heart disease Maternal Grandmother     Diabetes Other         of other type with arthropathy, without long term current use of insulin    Hypertension Other     No Known Problems Mother     Gout Family     Rheum arthritis Family     Lupus Family         systematic erythematosus    Heart disease Family     Stroke Family         syndrome    Stroke Maternal Uncle         syndrome    Stroke Maternal Aunt         syndrome    No Known Problems Father     No Known Problems Daughter     No Known Problems Maternal Grandfather     No Known Problems Paternal Grandmother     No Known Problems Paternal Grandfather     No Known Problems Daughter     No Known Problems Daughter     No Known Problems Maternal Aunt     No Known Problems Maternal Aunt     No Known Problems Maternal Aunt      Social History     Socioeconomic History    Marital status:       Spouse name: Not on file    Number of children: Not on file    Years of education: Not on file    Highest education level: Not on file   Occupational History    Occupation: retired   Tobacco Use    Smoking status: Former     Packs/day: 1 00     Years: 55 00     Pack years: 55 00     Types: Cigarettes     Start date:      Quit date:      Years since quittin 3    Smokeless tobacco: Never   Vaping Use    Vaping Use: Never used   Substance and Sexual Activity    Alcohol use: Not Currently    Drug use: No    Sexual activity: Not Currently     Partners: Male   Other Topics Concern    Not on file   Social History Narrative    Daily caffeine consumption, 1 serving a day      Social Determinants of Health     Financial Resource Strain: Low Risk     Difficulty of Paying Living Expenses: Not hard at all   Food Insecurity: Not on file   Transportation Needs: No Transportation Needs    Lack of Transportation (Medical): No    Lack of Transportation (Non-Medical):  No   Physical Activity: Not on file   Stress: Not on file   Social Connections: Not on file   Intimate Partner Violence: Not on file   Housing Stability: Not on file       Current Outpatient Medications:     Acetaminophen 325 MG CAPS, Take 500 mg by mouth every 8 (eight) hours, Disp: , Rfl:     aspirin (ADULT ASPIRIN EC LOW STRENGTH) 81 mg EC tablet, Take 1 tablet by mouth daily, Disp: , Rfl:     estradiol (ESTRACE VAGINAL) 0 1 mg/g vaginal cream, Insert 1 g into the vagina daily, Disp: 42 5 g, Rfl: 0    fluticasone (FLONASE) 50 mcg/act nasal spray, 2 sprays into each nostril daily, Disp: 16 g, Rfl: 0    gabapentin (Neurontin) 100 mg capsule, Take 1 capsule (100 mg total) by mouth 2 (two) times a day, Disp: 180 capsule, Rfl: 0    hydrocortisone (CORTEF) 10 mg tablet, take 1 tablet by mouth twice a day if needed for LOW ENERGY AND LOW BLOOD PRESSURE-TOME CON EL DESAYUNO Y EL ALMUERZO PARA LEVANTE LOS PRECIONES, Y MEJORAR LA ENERGIA, Disp: 30 tablet, Rfl: 0    Lancets (ONETOUCH ULTRASOFT) lancets, by Other route 2 (two) times a day Use as instructed, Disp: 100 each, Rfl: 5    metoclopramide (REGLAN) 5 mg tablet, Take 1 tablet (5 mg total) by mouth 3 (three) times a day before meals, Disp: 90 tablet, Rfl: 0    NIFEdipine ER (ADALAT CC) 30 MG 24 hr tablet, Take 1 tablet twice a day , Disp: 180 tablet, Rfl: 0    olopatadine (PATANOL) 0 1 % ophthalmic solution, Administer 1 drop to both eyes 2 (two) times a day, Disp: 5 mL, Rfl: 0    OneTouch Delica Lancets 76E MISC, Inject as directed 2 (two) times a day Use to test, Disp: 200 each, Rfl: 0    pantoprazole (PROTONIX) 40 mg tablet, Take 1 tablet (40 mg total) by mouth 2 (two) times a day, Disp: 60 tablet, Rfl: 5    polyethylene glycol (MIRALAX) 17 g packet, Take 17 g by mouth daily, Disp: 30 each, Rfl: 5    pravastatin (PRAVACHOL) 40 mg tablet, Take 1 tablet (40 mg total) by mouth daily, Disp: 90 tablet, Rfl: 3    Probiotic Product (Align) 4 MG CAPS, Take by mouth, Disp: , Rfl:     sucralfate (CARAFATE) 1 g tablet, Take 1 tablet (1 g total) by mouth 4 (four) times a day (Patient not taking: Reported on 5/2/2023), Disp: 120 tablet, Rfl: 5    Allergies   Allergen Reactions    Clonidine Derivatives Swelling    Diflucan [Fluconazole] Rash    Flagyl [Metronidazole] Anaphylaxis    Carvedilol Hives     And legs swelled    Latex Rash    Lipitor [Atorvastatin]      Legs swelled    Metoprolol Dizziness     Low BP and orthostatic symptoms   Other Palpitations     IV DYE    Shingrix [Zoster Vac Recomb Adjuvanted] Hives       Vitals:    05/02/23 1433   BP: 136/60   Pulse: 89   Resp: 16   Temp: 98 °F (36 7 °C)   SpO2: 96%     Physical Exam  Constitutional:       Appearance: She is well-developed  Comments: But relatively well-nourished female, no respiratory distress, no signs of pain   HENT:      Head: Normocephalic and atraumatic  Right Ear: External ear normal       Left Ear: External ear normal    Eyes:      Conjunctiva/sclera: Conjunctivae normal       Pupils: Pupils are equal, round, and reactive to light  Cardiovascular:      Rate and Rhythm: Normal rate and regular rhythm  Heart sounds: Normal heart sounds  Pulmonary:      Effort: Pulmonary effort is normal       Breath sounds: Normal breath sounds  Comments: Fair entry bilaterally, slightly decreased on right, rales on right  Abdominal:      General: Bowel sounds are normal       Palpations: Abdomen is soft  Musculoskeletal:         General: Normal range of motion  Cervical back: Normal range of motion and neck supple  Skin:     General: Skin is warm  Neurological:      Mental Status: She is alert and oriented to person, place, and time  Deep Tendon Reflexes: Reflexes are normal and symmetric  Psychiatric:         Behavior: Behavior normal          Thought Content:  Thought content normal          Judgment: Judgment normal      Extremities: 0-1+ bilateral lower extreme edema, no cords, pulses are 1+  Lymphatics: No adenopathy in the neck, supraclavicular region, axilla and groin bilaterally    Labs    4/10/2023 BUN = 28 creatinine = 1 31    2/11/2023 WBC = 7 83 hemoglobin = 12 1 hematocrit = 39 3 MCV = 90 platelet = 762 neutrophil = 62% lymphocyte = 27% monocyte = 10% eosinophil = 1% BUN = 35 creatinine = 1 57 GFR = 29 calcium = 9 3    Imaging    1/20/2023 PET/CT    IMPRESSION:     1  Patient's known right middle lobe lung cancer demonstrates near-complete to complete metabolic response to therapy      2   Patient's known new 1 0 cm right major fissural nodule on CT of chest dated 12/23/2022 demonstrates minimal to mild FDG activity and is of uncertain clinical significance with differential diagnosis including benign inflammatory nodule versus developing malignancy of low metabolic activity  There are additional new/ progressive small pleural nodules along the right major fissure which are beyond the limits of resolution for PET CT  There is a moderate to large pleural effusion with minimal to mild FDG activity  The overall constellation of findings is suspicious for developing pleural metastatic disease with possible malignant pleural effusion  Consider correlation with fluid/tissue sampling as clinically indicated      3    Additional scattered solid and groundglass appearing lung nodules involving both lungs without significant interval change and for which continued follow-up is recommended      4   Multinodular thyroid gland which can be further characterized with ultrasound as clinically indicated      Pathology    Case Report   Non-gynecologic Cytology                          Case: [id number]                                   Authorizing Provider: JARRED Sanchez      Collected:           02/20/2023 1334               Ordering Location:     [address] [place] [address]      Received:            02/20/2023 1401                                      [facility]                                                                              Radiology                                                                     Pathologist:           Micaela Vicente MD                                                                  Specimens:   A) - Thoracentesis                                                                                   B) - Thoracentesis, cell block                                                             Final Diagnosis   A  B  Thoracentesis,  (ThinPrep and cell block preparations):  Conclusive evidence of malignancy  Metastatic adenocarcinoma, consistent with lung primary  Satisfactory for evaluation      Comment: Immunohistochemistry was performed on cell block  The tumor cells are positive for MOC31, ALISA-EP4, TTF-1, napsin A and negative for D2-40, supporting the above diagnosis      Intradepartmental consultation is in agreement       Electronically signed by Micaela Vicente MD on 2/23/2023 at 11:18 AM       Case Report   Surgical Pathology Report                         Case: [id number]                                    Authorizing Provider: Nataliya Kebede MD    Collected:           11/15/2021 [facility]               Ordering Location:     [facility]      Received:            11/15/2021 [address] [place] [address]                                                    Pathologist:           Burton Pittman MD                                                    Specimen:    Lung, Right Middle Lobe, RML Lung mass                                                     Final Diagnosis   A  Lung, Right Middle Lobe, mass:  - Adenocarcinoma  See comment      Comment: Immunohistochemistry is positive in the tumor cells for TTF-1 and negative for p40 and Gata3  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 a primary pulmonary adenocarcinoma  Electronically signed by Batsheva Turner MD on 11/17/2021 at  9:04 AM       Case Report   Surgical Pathology Report                         Case: [id number]                                    Authorizing Provider: Tiffany Bunch MD              Collected:           09/11/2018 1437               Ordering Location:     [facility]        Received:            09/11/2018 [address] [place] [address] Operating Room                                                       Pathologist:           Janie Cunningham MD                                                                  Specimens:   A) - Breast, Left, Left breast lumpectomy                                                            B) - Breast, Left, Left breast lumpectomy-anterior margin-green                                      C) - Breast, Left, Left breast lumpectomy-inferior margin-blue                                       D) - Breast, Left, Left breast lumpectomy-lateral margin-red                                         E) - Breast, Left, Left breast lumpectomy-medial margin-yellow                                       F) - Breast, Left, Left breast lumpectomy-superior margin-orange                           Final Diagnosis   MUCINOUS CARCINOMA OF BREAST STAGING SUMMARY (includes specimens A to F of this case and prior biopsy [id number])  1  Specimen Identification     - Procedure: lumpectomy     - Lymph Node Sampling: no     - Specimen Laterality: left     - Tumor Site (Quadrant; Position/o'clock): upper outer quadrant, 1:00 - 2:00   2  Invasive Tumor:     - Histologic Type: mucinous carcinoma     - Tumor Size (pT) [Size of largest invasive carcinoma]: 1 1 cm     -  Grade (G): Viet Histologic Score; Grade  I of III          * Glandular (Acinar) / Tubular Differentiation: 3       * Nuclear Pleomorphism: 1       * Mitotic Rate: 1     - Tumor Focality: single focus     - Macroscopic and Microscopic Extent of Tumor:       -- Skin: N/A       -- Nipple:N/A       -- Skeletal muscle: N/A  3  Lymphovascular invasion: absent  4  Dermal lymphovascular invasion: N/A  5  In situ tumor     - Ductal carcinoma in Situ (DCIS): absent   6  Margins:     - Invasive Carcinoma: negative     - DCIS: N/A   7  Lymph nodes (pN): no lymph node submitted  8  Treatment Effect, Response to Presurgical (Neoadjuvant) Therapy: no known presurgical therapy   9  Additional Pathologic Findings:     - Proliferative fibrocystic changes, including usual ductal hyperplasia, cysts, apocrine metaplasia, fibroadenomatous change, and duct ectasia  - Biopsy site changes  10  Microcalcifications: present and associated with benign breast parenchyma  11  Ancillary Studies: (performed on case [id number])     - Estrogen receptor: 100%, Strong, positive     - Progesterone receptor: 100%, strong, positive     - HER2 by IHC: 1+, negative     - Repeat HER2 testing (2013 ASCO/CAP Recommendations): Not indicated  - Best representative tumor block: A7, A8 and A9       -- Sufficient tumor present for          Agendia Mammaprint/Blueprint (1 cm2 of invasive tumor in aggregate): Yes  MI Profile/Foundation One (at least 5 x 5 mm of tumor): Yes  12  Clinical History: abnormal findings on diagnostic imaging of breast  13  Pathologic Stage Classification (pTNM, AJCC 8th Edition): pT1c, pNX   8th ed, AJCC Anatomic Stage: at least Ia  14  8th ed  AJCC Prognostic Stage (use AJCC update): IA        Final Diagnoses for each Specimen of this Case  A  Left breast, lumpectomy:  - Mucinous carcinoma, see synoptic report, above  - All lumpectomy margins are uninvolved by carcinoma      Comment: Intradepartmental consultation is in agreement       B  Left breast, anterior margin, excision:  - Benign breast parenchyma  - Negative for invasive carcinoma or carcinoma in situ      C   Left breast, inferior margin, excision:  - Benign breast parenchyma with proliferative fibrocystic changes, including usual ductal hyperplasia with apocrine metaplasia and duct ectasia  - Negative for invasive carcinoma or carcinoma in situ      D  Left breast, lateral margin, excision:  - Benign breast parenchyma  - Negative for invasive carcinoma or carcinoma in situ      E  Left breast, medial margin, excision:  - Benign breast parenchyma  - Microcalcifications are present and associated with benign glands  - Negative for invasive carcinoma or carcinoma in situ      F  Left breast, superior margin, excision:  - Benign breast parenchyma    - Negative for invasive carcinoma or carcinoma in situ       Electronically signed by Sancho Zamarripa MD on 9/14/2018 at  3:17 PM

## 2023-05-02 NOTE — TELEPHONE ENCOUNTER
dgt called office asking if provider could prescribe a pain medication for pt to take on day of and few days after her Thoracentesis  Scheduled every Wednesday with IR  Next one 5/3/23  Pt reports the pain is throbbing and very tender to touch

## 2023-05-03 ENCOUNTER — HOSPITAL ENCOUNTER (OUTPATIENT)
Dept: RADIOLOGY | Facility: HOSPITAL | Age: 88
Discharge: HOME | End: 2023-05-03
Attending: INTERNAL MEDICINE | Admitting: RADIOLOGY

## 2023-05-03 ENCOUNTER — DOCUMENTATION (OUTPATIENT)
Dept: HEMATOLOGY ONCOLOGY | Facility: MEDICAL CENTER | Age: 88
End: 2023-05-03

## 2023-05-03 VITALS
SYSTOLIC BLOOD PRESSURE: 189 MMHG | OXYGEN SATURATION: 99 % | HEART RATE: 94 BPM | RESPIRATION RATE: 18 BRPM | DIASTOLIC BLOOD PRESSURE: 80 MMHG

## 2023-05-03 DIAGNOSIS — C34.91 ADENOCARCINOMA, LUNG, RIGHT (HCC): ICD-10-CM

## 2023-05-03 RX ORDER — OXYCODONE HYDROCHLORIDE 5 MG/1
2.5 TABLET ORAL 2 TIMES DAILY PRN
Qty: 10 TABLET | Refills: 0 | Status: SHIPPED | OUTPATIENT
Start: 2023-05-03

## 2023-05-03 NOTE — TELEPHONE ENCOUNTER
5/3/2023 5:21 PM -  Pt is opioid naive  Pt saw Med/onc provider to start immunotherapy  Will therefore respect advice from DR Victoria of Med/Onc to withhold pleural catheter  Opioids in small dose may be used for procedural pain  Sent oxyIR 2 5mg BID PRN  Daughter Margo Villegas aware and in agreement

## 2023-05-03 NOTE — BRIEF OP NOTE (RAD/CATH)
Right IR THORACENTESIS Procedure Note    PATIENT NAME: Layo Peterson  : 1934  MRN: [id number]    Pre-op Diagnosis:   1  Adenocarcinoma, lung, right (HCC)      Post-op Diagnosis:   1  Adenocarcinoma, lung, right [facility])        Provider:  Landen Guaman PA-C    No qualified resident was available, Resident is only observing    Estimated Blood Loss: minimal    Findings: right sided thoracentesis  550cc clear yellow fluid removed       Specimens: none    Complications:  None immediate    Anesthesia: local    Landen Guaman PA-C     Date: 5/3/2023  Time: 2:31 PM

## 2023-05-03 NOTE — PROGRESS NOTES
Left voicemail letting patient know that her appointment with Dr Josh Major at [facility] has been scheduled for Thurs 6/1/23 at 11:20am   I asked for a call back to confirm

## 2023-05-03 NOTE — SEDATION DOCUMENTATION
Right side thoracentesis performed by Wallace Hwang PA-C  550 ml of clear yellow fluid drained  Ultrasound guided  No labs ordered  Pt tolerated well  Puncture site is CDI and covered with a band aid  AVS reviewed with pt upon dc

## 2023-05-05 ENCOUNTER — LAB REQUISITION (OUTPATIENT)
Dept: LAB | Facility: HOSPITAL | Age: 88
End: 2023-05-05

## 2023-05-05 ENCOUNTER — RA CDI HCC (OUTPATIENT)
Dept: OTHER | Facility: HOSPITAL | Age: 88
End: 2023-05-05

## 2023-05-05 DIAGNOSIS — C34.91 MALIGNANT NEOPLASM OF UNSPECIFIED PART OF RIGHT BRONCHUS OR LUNG (HCC): ICD-10-CM

## 2023-05-05 NOTE — PROGRESS NOTES
[facility]  coding opportunities     E11 40, E11 51, E11 36, E11 22, N18 32    [facility] [address]- Not suggested, excludes 1 for [address] 9     Chart Reviewed number of suggestions sent to Provider: 5     Patients Insurance     Medicare Insurance: [employer]

## 2023-05-08 ENCOUNTER — TELEPHONE (OUTPATIENT)
Dept: NEPHROLOGY | Facility: CLINIC | Age: 88
End: 2023-05-08

## 2023-05-08 ENCOUNTER — OFFICE VISIT (OUTPATIENT)
Dept: FAMILY MEDICINE CLINIC | Facility: CLINIC | Age: 88
End: 2023-05-08

## 2023-05-08 VITALS
BODY MASS INDEX: 21.9 KG/M2 | SYSTOLIC BLOOD PRESSURE: 140 MMHG | TEMPERATURE: 97.9 F | HEIGHT: 61 IN | DIASTOLIC BLOOD PRESSURE: 60 MMHG | OXYGEN SATURATION: 96 % | RESPIRATION RATE: 16 BRPM | WEIGHT: 116 LBS | HEART RATE: 88 BPM

## 2023-05-08 DIAGNOSIS — I10 ESSENTIAL HYPERTENSION: ICD-10-CM

## 2023-05-08 DIAGNOSIS — C34.91 ADENOCARCINOMA, LUNG, RIGHT (HCC): ICD-10-CM

## 2023-05-08 DIAGNOSIS — E11.3293 TYPE 2 DIABETES MELLITUS WITH BOTH EYES AFFECTED BY MILD NONPROLIFERATIVE RETINOPATHY WITHOUT MACULAR EDEMA, WITHOUT LONG-TERM CURRENT USE OF INSULIN (HCC): Primary | ICD-10-CM

## 2023-05-08 NOTE — PROGRESS NOTES
De Bundy 1934 female MRN: [id number]    [facility] PRACTICE OFFICE VISIT  [facility] - [facility]      ASSESSMENT/PLAN  De Bundy is a 80 y o  female presents to the office for    Diagnoses and all orders for this visit:    Type 2 diabetes mellitus with both eyes affected by mild nonproliferative retinopathy without macular edema, without long-term current use of insulin ([facility] [address] )    Essential hypertension  -     POCT ECG    Adenocarcinoma, lung, right ([address] )     Patient continues to have treatment for her adenocarcinoma of the lung does have some tenderness of the right lung however her main concern is her epigastric tenderness  Patient is following up with specialist   I recommend seeing if the oxycodone does help her if it does that I would like to maybe schedule oxycodone daily to try to see if this will help relieve her pain  Patient is to continue on Protonix twice a day  Have given a list to the daughters to be sure that they have reviewed them and make sure she is taking them appropriately  EKG was performed today which showed several PVCs  Reviewed with her cardiologist who states that it is stable from previous  Future Appointments   Date Time Provider [place]   5/17/2023  2:30 PM Evita Alexis MD [address]   5/26/2023  1:40 PM Merary Connor MD [facility]-Hos   6/1/2023 11:20 AM Byron Cantrell MD [facility]-Onc   6/15/2023  2:00 PM Jarrod Roberson MD [facility] Practice-Wom   7/11/2023  3:20 PM Casey Benitez MD [facility]   9/5/2023  9:00 AM [facility] VASCULAR [address] [facility] [address]          SUBJECTIVE  CC: Follow-up (Medications, weight )      HPI:  De Bundy is a 80 y o  female who presents for a follow-up appointment  Patient states that she has been doing okay  She has been trying to eat healthier    Patient states that her epigastric pain is the only thing that really hurts "her   She denies any shortness of breath  She is fatigued  Daughters are concerned that she might not taking all the medications appropriately  And on days that she feels good she skips doses of the Protonix  But they are not 100% sure  Patient is stating that she does take all her medications  Patient is working closely with palliative care today  Review of Systems   Constitutional: Positive for fatigue  Respiratory: Negative  Cardiovascular: Negative  Gastrointestinal: Positive for abdominal pain  Musculoskeletal: Positive for arthralgias         Historical Information   The patient history was reviewed as follows:  Past Medical History:   Diagnosis Date   • Arthritis    • Back pain    • Benign essential hypertension     LAST ASSESSED: [id number]   • Carotid artery stenosis    • CKD (chronic kidney disease) stage 2, GFR 60-89 ml/min    • Constipation, chronic     \"drinks prune juice\"   • Diabetes mellitus ([facility] )    • DMII (diabetes mellitus, type 2) ([facility])    • Epiglottitis     RESOLVED: [id number]   • Gastritis    • GERD (gastroesophageal reflux disease)    • Gout    • H/O blood clots     left lower leg,,,11 yrs ago   • History of stenosis of renal artery     RESOLVED: [id number]   • Hypercholesteremia    • Hypercholesterolemia    • Hyperlipidemia    • Hyperparathyroidism ([facility] )    • Hypertension    • Iliac artery stenosis, bilateral ([facility])    • Kidney stone    • Lobular carcinoma ([place] [address] ) 09/06/2017    Left   • Lung cancer ([place] [address] )    • Mucinous carcinoma of breast, 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    Left   • Presence of pessary    • Renal artery stenosis ([facility])    • Renal disorder    • Renovascular hypertension     RESOLVED: [id number]   • Segmental and somatic dysfunction of cervical region     RESOLVED: 72RMQ8046   • Segmental and somatic dysfunction of lumbar region     RESOLVED: 94ZJK9379   • Segmental and somatic dysfunction of pelvic region     RESOLVED: 51YZB2988   • Segmental and somatic dysfunction of " thoracic region     RESOLVED: 15UWF3630   • Somatic dysfunction of abdominal region     RESOLVED: 95OMR6961   • Somatic dysfunction of head region     RESOLVED: 20QZJ2570   • Somatic dysfunction of lower extremities     RESOLVED: 93OQF2996   • Somatic dysfunction of rib region     RESOLVED: 67OZD3073   • Somatic dysfunction of thoracic region     RESOLVED: 02AUG2017   • Somatic dysfunction of upper extremities     RESOLVED: 46YLD7755   • Teeth missing          Medications:     Current Outpatient Medications:   •  Acetaminophen 325 MG CAPS, Take 500 mg by mouth every 8 (eight) hours, Disp: , Rfl:   •  aspirin (ADULT ASPIRIN EC LOW STRENGTH) 81 mg EC tablet, Take 1 tablet by mouth daily, Disp: , Rfl:   •  estradiol (ESTRACE VAGINAL) 0 1 mg/g vaginal cream, Insert 1 g into the vagina daily, Disp: 42 5 g, Rfl: 0  •  gabapentin (Neurontin) 100 mg capsule, Take 1 capsule (100 mg total) by mouth 2 (two) times a day, Disp: 180 capsule, Rfl: 0  •  Lancets (ONETOUCH ULTRASOFT) lancets, by Other route 2 (two) times a day Use as instructed, Disp: 100 each, Rfl: 5  •  metoclopramide (REGLAN) 5 mg tablet, Take 1 tablet (5 mg total) by mouth 3 (three) times a day before meals, Disp: 90 tablet, Rfl: 0  •  NIFEdipine ER (ADALAT CC) 30 MG 24 hr tablet, Take 1 tablet twice a day , Disp: 180 tablet, Rfl: 0  •  olopatadine (PATANOL) 0 1 % ophthalmic solution, Administer 1 drop to both eyes 2 (two) times a day, Disp: 5 mL, Rfl: 0  •  OneTouch Delica Lancets 67M MISC, Inject as directed 2 (two) times a day Use to test, Disp: 200 each, Rfl: 0  •  oxyCODONE (Roxicodone) 5 immediate release tablet, Take 0 5 tablets (2 5 mg total) by mouth 2 (two) times a day as needed (procedure pain, cancer pain) Max Daily Amount: 5 mg, Disp: 10 tablet, Rfl: 0  •  pantoprazole (PROTONIX) 40 mg tablet, Take 1 tablet (40 mg total) by mouth 2 (two) times a day, Disp: 60 tablet, Rfl: 5  •  pravastatin (PRAVACHOL) 40 mg tablet, Take 1 tablet (40 mg total) by "mouth daily, Disp: 90 tablet, Rfl: 3  •  Probiotic Product (Align) 4 MG CAPS, Take by mouth, Disp: , Rfl:   •  fluticasone (FLONASE) 50 mcg/act nasal spray, 2 sprays into each nostril daily, Disp: 16 g, Rfl: 0  •  hydrocortisone (CORTEF) 10 mg tablet, take 1 tablet by mouth twice a day if needed for LOW ENERGY AND LOW BLOOD PRESSURE-TOME CON EL DESAYUNO Y EL ALMUERZO PARA LEVANTE LOS PRECIONES, Y MEJORAR LA ENERGIA (Patient not taking: Reported on 5/8/2023), Disp: 30 tablet, Rfl: 0  •  polyethylene glycol (MIRALAX) 17 g packet, Take 17 g by mouth daily (Patient not taking: Reported on 5/8/2023), Disp: 30 each, Rfl: 5    Allergies   Allergen Reactions   • Clonidine Derivatives Swelling   • Diflucan [Fluconazole] Rash   • Flagyl [Metronidazole] Anaphylaxis   • Carvedilol Hives     And legs swelled   • Latex Rash   • Lipitor [Atorvastatin]      Legs swelled   • Metoprolol Dizziness     Low BP and orthostatic symptoms  • Other Palpitations     IV DYE   • Shingrix [Zoster Vac Recomb Adjuvanted] Hives       OBJECTIVE  Vitals:   Vitals:    05/08/23 1439   BP: 140/60   BP Location: Left arm   Patient Position: Sitting   Cuff Size: Standard   Pulse: 88   Resp: 16   Temp: 97 9 °F (36 6 °C)   SpO2: 96%   Weight: 52 6 kg (116 lb)   Height: 5' 1\" (1 549 m)         Physical Exam  Vitals reviewed  Constitutional:       Appearance: She is well-developed  HENT:      Head: Normocephalic and atraumatic  Eyes:      Conjunctiva/sclera: Conjunctivae normal       Pupils: Pupils are equal, round, and reactive to light  Cardiovascular:      Rate and Rhythm: Normal rate and regular rhythm  Heart sounds: Normal heart sounds  Pulmonary:      Effort: Pulmonary effort is normal  No respiratory distress  Breath sounds: Normal breath sounds  Abdominal:      Tenderness: There is abdominal tenderness (epigastric tendrness)  Musculoskeletal:         General: Normal range of motion        Cervical back: Normal range of motion " and neck supple  Skin:     General: Skin is warm  Capillary Refill: Capillary refill takes less than 2 seconds  Neurological:      Mental Status: She is alert and oriented to person, place, and time                      Karli Marcum MD,   [facility]  5/15/2023

## 2023-05-09 DIAGNOSIS — J30.9 ALLERGIC RHINITIS, UNSPECIFIED SEASONALITY, UNSPECIFIED TRIGGER: ICD-10-CM

## 2023-05-09 RX ORDER — FLUTICASONE PROPIONATE 50 MCG
2 SPRAY, SUSPENSION (ML) NASAL DAILY
Qty: 16 G | Refills: 0 | Status: SHIPPED | OUTPATIENT
Start: 2023-05-09

## 2023-05-10 ENCOUNTER — HOSPITAL ENCOUNTER (OUTPATIENT)
Dept: RADIOLOGY | Facility: HOSPITAL | Age: 88
Discharge: HOME | End: 2023-05-10
Attending: INTERNAL MEDICINE

## 2023-05-10 VITALS
RESPIRATION RATE: 18 BRPM | DIASTOLIC BLOOD PRESSURE: 64 MMHG | HEART RATE: 89 BPM | OXYGEN SATURATION: 99 % | SYSTOLIC BLOOD PRESSURE: 153 MMHG

## 2023-05-10 DIAGNOSIS — C34.91 ADENOCARCINOMA, LUNG, RIGHT (HCC): ICD-10-CM

## 2023-05-10 NOTE — SEDATION DOCUMENTATION
Right side thoracentesis performed by Yessica Paris PA-C  600 ml of clear yellow fluid drained  Ultrasound guided  No labs ordered  Pt tolerated well  Puncture site is CDI and covered with a band aid  AVS reviewed with pt upon dc

## 2023-05-10 NOTE — DISCHARGE INSTRUCTIONS
Thoracentesis   WHAT YOU NEED TO KNOW:   A thoracentesis is a procedure to remove extra fluid or air from between your lungs and your inner chest wall  Air or fluid buildup may make it hard for you to breathe  A thoracentesis allows your lungs to expand fully so you can breathe more easily  DISCHARGE INSTRUCTIONS:     Small amount of shoulder pain and bloody sputum is normal after a Thoracentesis  Rest:  Rest when you feel it is needed  Slowly start to do more each day  Return to your daily activities as directed  Resume your normal diet  Small sips of flat soda will help mild nausea  Do not smoke: If you smoke, it is never too late to quit  Ask for information about how to stop smoking if you need help  Contact Interventional Radiology at [phone] [place] PATIENTS: Contact Interventional Radiology at [phone]) [place] PATIENTS: Contact Interventional Radiology at [phone]) if:   You have a fever  Your puncture site is red, warm, swollen, or draining pus  You have questions or concerns about your procedure, medicine, or care  Seek care immediately or call 911 if:   Severe chest pain with inspiration and shortness of breath    Large amounts of blood in your sputum    Follow up with your healthcare provider as directed

## 2023-05-10 NOTE — BRIEF OP NOTE (RAD/CATH)
Right IR THORACENTESIS Procedure Note    PATIENT NAME: Ida Chinchilla  : 1934  MRN: [id number]    Pre-op Diagnosis:   1  Adenocarcinoma, lung, right (HCC)      Post-op Diagnosis:   1  Adenocarcinoma, lung, right [facility])        Provider:  Joan Wise PA-C    No qualified resident was available, Resident is only observing    Estimated Blood Loss: minimal    Findings: right sided thoracentesis  600cc clear yellow fluid removed       Specimens: none    Complications:  None immediate    Anesthesia: local    Joan Wise PA-C     Date: 5/10/2023  Time: 11:58 AM

## 2023-05-13 DIAGNOSIS — R64 MALIGNANT CACHEXIA (HCC): ICD-10-CM

## 2023-05-13 DIAGNOSIS — R91.8 LUNG MASS: ICD-10-CM

## 2023-05-15 RX ORDER — HYDROCORTISONE 10 MG/1
TABLET ORAL
Qty: 30 TABLET | Refills: 0 | Status: SHIPPED | OUTPATIENT
Start: 2023-05-15 | End: 2023-05-26

## 2023-05-16 DIAGNOSIS — G89.3 CANCER-RELATED PAIN: ICD-10-CM

## 2023-05-16 RX ORDER — OXYCODONE HYDROCHLORIDE 5 MG/1
2.5 TABLET ORAL 2 TIMES DAILY PRN
Qty: 10 TABLET | Refills: 0 | Status: SHIPPED | OUTPATIENT
Start: 2023-05-16 | End: 2023-05-26 | Stop reason: SDUPTHER

## 2023-05-17 ENCOUNTER — OFFICE VISIT (OUTPATIENT)
Dept: NEPHROLOGY | Facility: CLINIC | Age: 88
End: 2023-05-17

## 2023-05-17 ENCOUNTER — HOSPITAL ENCOUNTER (OUTPATIENT)
Dept: RADIOLOGY | Facility: HOSPITAL | Age: 88
Discharge: HOME | End: 2023-05-17
Attending: INTERNAL MEDICINE

## 2023-05-17 VITALS
HEIGHT: 61 IN | BODY MASS INDEX: 21.34 KG/M2 | HEART RATE: 80 BPM | WEIGHT: 113 LBS | SYSTOLIC BLOOD PRESSURE: 148 MMHG | DIASTOLIC BLOOD PRESSURE: 60 MMHG

## 2023-05-17 VITALS
OXYGEN SATURATION: 99 % | HEART RATE: 91 BPM | SYSTOLIC BLOOD PRESSURE: 169 MMHG | DIASTOLIC BLOOD PRESSURE: 73 MMHG | RESPIRATION RATE: 16 BRPM

## 2023-05-17 DIAGNOSIS — R80.1 PERSISTENT PROTEINURIA: ICD-10-CM

## 2023-05-17 DIAGNOSIS — N18.9 CHRONIC KIDNEY DISEASE, UNSPECIFIED CKD STAGE: Primary | ICD-10-CM

## 2023-05-17 DIAGNOSIS — C34.91 ADENOCARCINOMA, LUNG, RIGHT (HCC): ICD-10-CM

## 2023-05-17 RX ORDER — CALCITRIOL 0.25 UG/1
0.25 CAPSULE, LIQUID FILLED ORAL 2 TIMES WEEKLY
Qty: 24 CAPSULE | Refills: 3 | Status: SHIPPED | OUTPATIENT
Start: 2023-05-18

## 2023-05-17 NOTE — SEDATION DOCUMENTATION
Right side thoracentesis performed by Loren Chen PA-C  490 ml of clear yellow fluid drained  Ultrasound guided, no labs ordered  Puncture site is CDI and covered with a band aid  AVS reviewed with pt upon dc 
no

## 2023-05-17 NOTE — PATIENT INSTRUCTIONS
You are here for follow-up your creatinine which is the blood test for the kidney function is 1 3 which is stable over the last couple years so it is not gotten worse  Given that there was protein in the urine it is likely due to diabetes but again you are doing good with your blood sugars blood pressure is up and down but acceptable cholesterol treatment and things are stable  No changes in medications    Your calcium levels been normal and you been taking the calcitriol couple times a week so I sent in a new prescription you can continue  You do not need to take the over-the-counter vitamin D or extra calcium so will not go high  You did tell me that you are having fluid drained off and I hope you continue to do well and feel well and things stay good

## 2023-05-17 NOTE — PROGRESS NOTES
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 but now is just being monitored and has no symptoms but is on palliative care  She has no complaints and is tolerating her medications  Her son tells me that at times he is taking too much nifedipine her blood pressures and that she is mostly taking it once a day    ASSESSMENT/PLAN:  1  Renal    Patient is chronic kidney disease with proteinuria due to diabetic nephropathy  Her creatinine is 1 3 which is stable over couple years without significant progression  Hemoglobin A1c is 6 4 which is excellent  Blood pressure is acceptable but as I stated it fluctuates and the patient's son will contact me if there is issues  For now things are stable so continue medications  Continue glycemic control targeting A1c less than 7%  No changes to medications  Labs and follow-up in 6 months and she was told to call if there is any problems or concerns before next visit  SUBJECTIVE:  Review of Systems   Constitutional: Negative for chills, decreased appetite, diaphoresis, fever and malaise/fatigue  HENT: Negative  Eyes: Negative  Cardiovascular: Negative for chest pain, dyspnea on exertion, leg swelling and orthopnea  Respiratory: Negative  Negative for cough, shortness of breath, sputum production and wheezing  Gastrointestinal: Negative for abdominal pain, diarrhea, nausea and vomiting  Genitourinary: Negative  Neurological: Negative for dizziness, focal weakness, headaches and weakness  Psychiatric/Behavioral: Negative for altered mental status, hallucinations and hypervigilance  The patient is not nervous/anxious          OBJECTIVE:  Current Weight: "Weight - Scale: 51 3 kg (113 lb)  [email or web address] com:     Blood pressure 148/60, pulse 80, height 5' 1\" (1 549 m), weight 51 3 kg (113 lb), not currently breastfeeding  , Body mass index is 21 35 kg/m²  [unfilled]    Physical Exam: /60 (BP Location: Left arm, Patient Position: Sitting, Cuff Size: Standard)   Pulse 80   Ht 5' 1\" (1 549 m)   Wt 51 3 kg (113 lb)   LMP  (LMP Unknown)   BMI 21 35 kg/m²   Physical Exam  Constitutional:       General: She is not in acute distress  Appearance: She is not toxic-appearing or diaphoretic  HENT:      Head: Normocephalic and atraumatic  Nose: Nose normal       Mouth/Throat:      Mouth: Mucous membranes are moist    Eyes:      General: No scleral icterus  Extraocular Movements: Extraocular movements intact  Cardiovascular:      Rate and Rhythm: Normal rate and regular rhythm  Heart sounds: No friction rub  No gallop  Comments: No pretibial edema  Pulmonary:      Effort: Pulmonary effort is normal  No respiratory distress  Breath sounds: No wheezing, rhonchi or rales  Comments: Good air movement to bases bilaterally  Abdominal:      General: Bowel sounds are normal  There is no distension  Palpations: Abdomen is soft  Tenderness: There is no abdominal tenderness  There is no rebound  Musculoskeletal:      Cervical back: Normal range of motion and neck supple  Neurological:      General: No focal deficit present  Mental Status: She is alert and oriented to person, place, and time  Mental status is at baseline  Psychiatric:         Mood and Affect: Mood normal          Behavior: Behavior normal          Thought Content:  Thought content normal          Judgment: Judgment normal          Medications:    Current Outpatient Medications:   •  Acetaminophen 325 MG CAPS, Take 500 mg by mouth every 8 (eight) hours, Disp: , Rfl:   •  aspirin (ADULT ASPIRIN EC LOW STRENGTH) 81 mg EC tablet, Take 1 tablet by " mouth daily, Disp: , Rfl:   •  [START ON 5/18/2023] calcitriol (ROCALTROL) 0 25 mcg capsule, Take 1 capsule (0 25 mcg total) by mouth 2 (two) times a week, Disp: 24 capsule, Rfl: 3  •  estradiol (ESTRACE VAGINAL) 0 1 mg/g vaginal cream, Insert 1 g into the vagina daily, Disp: 42 5 g, Rfl: 0  •  fluticasone (FLONASE) 50 mcg/act nasal spray, 2 sprays into each nostril daily, Disp: 16 g, Rfl: 0  •  gabapentin (Neurontin) 100 mg capsule, Take 1 capsule (100 mg total) by mouth 2 (two) times a day, Disp: 180 capsule, Rfl: 0  •  hydrocortisone (CORTEF) 10 mg tablet, take 1 tablet by mouth twice a day if needed for LOW ENERGY AND LOW BLOOD PRESSURE-TOME CON EL DESAYUNO Y EL ALMUERZO PARA LEVANTE LOS PRECIONES, Y MEJORAR LA ENERGIA, Disp: 30 tablet, Rfl: 0  •  Lancets (ONETOUCH ULTRASOFT) lancets, by Other route 2 (two) times a day Use as instructed, Disp: 100 each, Rfl: 5  •  metoclopramide (REGLAN) 5 mg tablet, Take 1 tablet (5 mg total) by mouth 3 (three) times a day before meals, Disp: 90 tablet, Rfl: 0  •  NIFEdipine ER (ADALAT CC) 30 MG 24 hr tablet, Take 1 tablet twice a day , Disp: 180 tablet, Rfl: 0  •  olopatadine (PATANOL) 0 1 % ophthalmic solution, Administer 1 drop to both eyes 2 (two) times a day, Disp: 5 mL, Rfl: 0  •  OneTouch Delica Lancets 46L MISC, Inject as directed 2 (two) times a day Use to test, Disp: 200 each, Rfl: 0  •  oxyCODONE (Roxicodone) 5 immediate release tablet, Take 0 5 tablets (2 5 mg total) by mouth 2 (two) times a day as needed (cancer pain) Max Daily Amount: 5 mg, Disp: 10 tablet, Rfl: 0  •  pantoprazole (PROTONIX) 40 mg tablet, Take 1 tablet (40 mg total) by mouth 2 (two) times a day, Disp: 60 tablet, Rfl: 5  •  pravastatin (PRAVACHOL) 40 mg tablet, Take 1 tablet (40 mg total) by mouth daily, Disp: 90 tablet, Rfl: 3  •  Probiotic Product (Align) 4 MG CAPS, Take by mouth, Disp: , Rfl:   •  polyethylene glycol (MIRALAX) 17 g packet, Take 17 g by mouth daily (Patient not taking: Reported on 5/8/2023), Disp: 30 each, Rfl: 5    Laboratory Results:  Lab Results   Component Value Date    WBC 7 83 02/11/2023    HGB 12 1 02/11/2023    HCT 39 3 02/11/2023    MCV 90 02/11/2023     02/11/2023     Lab Results   Component Value Date    SODIUM 134 (L) 04/10/2023    K 4 3 04/10/2023    CL 98 04/10/2023    CO2 34 (H) 04/10/2023    BUN 28 (H) 04/10/2023    CREATININE 1 31 (H) 04/10/2023    GLUC 180 (H) 02/11/2023    CALCIUM 10 1 04/10/2023     Lab Results   Component Value Date    CALCIUM 10 1 04/10/2023    PHOS 4 2 (H) 04/08/2021     No results found for: LABPROT

## 2023-05-24 ENCOUNTER — HOSPITAL ENCOUNTER (OUTPATIENT)
Dept: RADIOLOGY | Facility: HOSPITAL | Age: 88
Discharge: HOME | End: 2023-05-24
Attending: INTERNAL MEDICINE

## 2023-05-24 VITALS
HEART RATE: 91 BPM | SYSTOLIC BLOOD PRESSURE: 167 MMHG | OXYGEN SATURATION: 99 % | DIASTOLIC BLOOD PRESSURE: 79 MMHG | RESPIRATION RATE: 18 BRPM

## 2023-05-24 DIAGNOSIS — C34.91 ADENOCARCINOMA, LUNG, RIGHT (HCC): ICD-10-CM

## 2023-05-24 NOTE — SEDATION DOCUMENTATION
Right side thoracentesis performed by Shamar Nolasco PA-C  650 ml of clear yellow fluid drained  Ultrasound guided  No labs ordered  Pt tolerated well  Puncture site is CDI and covered with a band aid

## 2023-05-26 ENCOUNTER — OFFICE VISIT (OUTPATIENT)
Dept: PALLIATIVE MEDICINE | Facility: CLINIC | Age: 88
End: 2023-05-26

## 2023-05-26 VITALS
WEIGHT: 114.64 LBS | SYSTOLIC BLOOD PRESSURE: 130 MMHG | DIASTOLIC BLOOD PRESSURE: 60 MMHG | HEART RATE: 86 BPM | BODY MASS INDEX: 21.66 KG/M2 | RESPIRATION RATE: 18 BRPM | OXYGEN SATURATION: 95 % | TEMPERATURE: 97.5 F

## 2023-05-26 DIAGNOSIS — G89.3 CANCER-RELATED PAIN: ICD-10-CM

## 2023-05-26 DIAGNOSIS — C34.91 ADENOCARCINOMA, LUNG, RIGHT (HCC): Primary | ICD-10-CM

## 2023-05-26 RX ORDER — OXYCODONE HYDROCHLORIDE 5 MG/1
2.5 TABLET ORAL 2 TIMES DAILY PRN
Qty: 15 TABLET | Refills: 0 | Status: SHIPPED | OUTPATIENT
Start: 2023-05-26

## 2023-05-26 RX ORDER — MIRTAZAPINE 15 MG/1
15 TABLET, FILM COATED ORAL
Qty: 30 TABLET | Refills: 0 | Status: SHIPPED | OUTPATIENT
Start: 2023-05-26

## 2023-05-26 NOTE — PROGRESS NOTES
Outpatient Follow-Up - Palliative and Supportive Care   Karishma Scanlon 80 y o  female [id number]    Assessment & Plan  Problem List Items Addressed This Visit     Adenocarcinoma, lung, right ([address] ) - Primary   Other Visit Diagnoses     Cancer-related pain        Relevant Medications    oxyCODONE (Roxicodone) 5 immediate release tablet    mirtazapine (REMERON) 15 mg tablet          Medications adjusted this encounter:  Requested Prescriptions     Signed Prescriptions Disp Refills   • oxyCODONE (Roxicodone) 5 immediate release tablet 15 tablet 0     Sig: Take 0 5 tablets (2 5 mg total) by mouth 2 (two) times a day as needed (cancer pain) Max Daily Amount: 5 mg   • mirtazapine (REMERON) 15 mg tablet 30 tablet 0     Sig: Take 1 tablet (15 mg total) by mouth daily at bedtime     No orders of the defined types were placed in this encounter  Medications Discontinued During This Encounter   Medication Reason   • estradiol (ESTRACE VAGINAL) 0 1 mg/g vaginal cream    • metoclopramide (REGLAN) 5 mg tablet    • hydrocortisone (CORTEF) 10 mg tablet    • oxyCODONE (Roxicodone) 5 immediate release tablet Reorder   - Trial hydrocort not helpful to boost BP nor appetite  - Trial mirtazapine for appetite  - Pt and family agreeable to pleural cath placement; we agreed to wait until AFTER consultation with Med/Onc provider about his advice for immunotherapy and other possible treatments  Karishma Scanlon was seen today for symptoms and planning cares related to above illnesses  I have reviewed the patient's controlled substance dispensing history in the Prescription Drug Monitoring Program in compliance with the [facility] regulations before prescribing any controlled substances  They are invited to continue to follow with us  If there are questions or concerns, please contact us through our clinic/answering service 24 hours a day, seven days a week      Mary Henriquez MD  [address] [place] [address]'s Palliative and Supportive Care        Visit Information    Accompanied By: Family member    Source of History: Self, Family member    History Limitations: pt speaks Yakut with myself and her bilingual daughters    Contacts:  Nile Rob               Daughters Edvin Liberty, [address]    Chief Complaint  No chief complaint on file  History of Present Illness      Stephon Rowley is a 80 y o  female who presents in follow up of symptoms related to recurrent NSCLC, R side, asso with malignant pleural effusion  There is also a h/o Stage Ia ER/AZ+ her2- breast cancer on the Left  Pertinent issues include: symptom management, assessment of goals of care, disease process education and discussion of prognosis      since last visit, she has trialled hydrocort, and stopped this as she stated she felt dizzy on this medicine, too  Her intake remains poor, and energy low  She continues to drain her R lung once a week, and the family are agreeable to deferral of pleural cath placement while they consider immunotherapy possibility with Dr Valente Thomas of Med/Onc  She is rarely using oxyIR, and finding this helpful  We advised a bowel regimen for her as well, given family's report of rather marked constipation  Previously, trialled metoprolol, and stopped when her BP dropped  She felt dizzy, and continues to have symptoms, even after ceasing this med  She has a poor appetite and low energy, and her family report that she is rather poor with her water intake  The family have discussed their misgivings about pleural cath, and -- given how uncomfortable the pleural effusions are -- the pt and family are agreeable to placement now  We agreed today to wait on this, as the family also have not yet had a chance to receive therapy recs from primary Onc provider, who was previously considering some degree of possible palliative immunotherapy    Should this reduce her effusion, it would be less advantageous to have an indwelling cath "to the pleural area  Previously, we noted:     \"  Prior to our visit, Dr Franklin Franz has clearly and carefully reviewed multiple options with the family, and has documented that the patient's daughter led most of the discussion  Surgery and XRT are not appropriate for her disease, and the family seemed disinterested in chemo/immunotherapy options  It is not clear if Asept cath was considered, as the pt was noted to benefit from pleural drainage from a symptomatic standpoint        At length today we discussed her options, and the family were not aware that they were potentially able to access immunotherapy  Time was taken to consider the immunotherapy does not routinely robi great improvements in prognosis when offered as monotherapy, but it is relatively non=toxic  The family are hopeful for this being a helpful option, as well as an Asept placement, as she does feel much improved when her lungs are able to well inflate  \"    Past medical, surgical, social, and family histories are reviewed and pertinent updates are made  Review of Systems   Constitutional: Positive for decreased appetite and weight loss  Negative for weight gain  HENT: Negative for hoarse voice and nosebleeds  Eyes: Negative for vision loss in left eye and vision loss in right eye  Cardiovascular: Negative for chest pain and dyspnea on exertion  Respiratory: Negative for cough and shortness of breath  Endocrine: Negative for polydipsia, polyphagia and polyuria  Skin: Negative for flushing and itching  Musculoskeletal: Negative for falls  Gastrointestinal: Positive for nausea and vomiting  Negative for anorexia and jaundice  Genitourinary: Negative for frequency  Neurological: Negative for dizziness  Psychiatric/Behavioral: Negative for depression and memory loss  The patient is not nervous/anxious            Vital Signs    /60 (BP Location: Left arm, Patient Position: Sitting, Cuff Size: Standard)   Pulse 86   " Temp 97 5 °F (36 4 °C) (Temporal)   Resp 18   Wt 52 kg (114 lb 10 2 oz)   LMP  (LMP Unknown)   SpO2 95%   BMI 21 66 kg/m²     Physical Exam and Objective Data  Physical Exam  Constitutional:       General: She is not in acute distress  Appearance: She is ill-appearing  She is not toxic-appearing or diaphoretic  Comments: frail   HENT:      Head: Normocephalic and atraumatic  Right Ear: External ear normal       Left Ear: External ear normal    Eyes:      General:         Right eye: No discharge  Left eye: No discharge  Conjunctiva/sclera: Conjunctivae normal       Pupils: Pupils are equal, round, and reactive to light  Neck:      Trachea: No tracheal deviation  Cardiovascular:      Rate and Rhythm: Regular rhythm  Tachycardia present  Pulmonary:      Effort: Pulmonary effort is normal  No respiratory distress  Breath sounds: No stridor  Abdominal:      General: There is no distension  Palpations: Abdomen is soft  Musculoskeletal:      Comments: Diffuse lean body mass loss - hollowed cheeks and temples; wasting interosseous muscles   Skin:     General: Skin is warm and dry  Coloration: Skin is not pale  Findings: No erythema or rash  Neurological:      General: No focal deficit present  Mental Status: She is alert and oriented to person, place, and time  Mental status is at baseline  Cranial Nerves: No cranial nerve deficit  Psychiatric:         Mood and Affect: Mood normal          Behavior: Behavior normal          Thought Content:  Thought content normal          Judgment: Judgment normal            Radiology and Laboratory:  I personally reviewed and interpreted the following results: none new    35+ minutes was spent face to face with Radha Omalley and her family with greater than 50% of the time spent in counseling or coordination of care including discussions of etiology of diagnosis, risks and benefits of treatment, treatment instructions, follow up requirements and patient and family counseling/involvement in care  All of the patient's or agent's questions were answered during this discussion

## 2023-05-29 ENCOUNTER — PATIENT OUTREACH (OUTPATIENT)
Dept: CASE MANAGEMENT | Facility: HOSPITAL | Age: 88
End: 2023-05-29

## 2023-05-31 ENCOUNTER — HOSPITAL ENCOUNTER (OUTPATIENT)
Dept: RADIOLOGY | Facility: HOSPITAL | Age: 88
Discharge: HOME | End: 2023-05-31
Attending: INTERNAL MEDICINE

## 2023-05-31 VITALS
SYSTOLIC BLOOD PRESSURE: 181 MMHG | RESPIRATION RATE: 18 BRPM | DIASTOLIC BLOOD PRESSURE: 72 MMHG | HEART RATE: 86 BPM | OXYGEN SATURATION: 97 %

## 2023-05-31 DIAGNOSIS — C34.91 ADENOCARCINOMA, LUNG, RIGHT (HCC): ICD-10-CM

## 2023-05-31 RX ORDER — LIDOCAINE HYDROCHLORIDE 10 MG/ML
INJECTION, SOLUTION EPIDURAL; INFILTRATION; INTRACAUDAL; PERINEURAL AS NEEDED
Status: COMPLETED | OUTPATIENT
Start: 2023-05-31 | End: 2023-05-31

## 2023-05-31 RX ADMIN — LIDOCAINE HYDROCHLORIDE 10 ML: 10 INJECTION, SOLUTION EPIDURAL; INFILTRATION; INTRACAUDAL; PERINEURAL at 12:25

## 2023-05-31 NOTE — SEDATION DOCUMENTATION
Right thoracentesis by SHAHEEN Chandler  250cc of serosangineous fluid removed from Right lung  Patient tolerated procedure well  Bandaid placed on site  AVS given to patient and  both verbalize understanding  Patient d/c to home

## 2023-05-31 NOTE — DISCHARGE INSTRUCTIONS
Thoracentesis   WHAT YOU NEED TO KNOW:   A thoracentesis is a procedure to remove extra fluid or air from between your lungs and your inner chest wall  Air or fluid buildup may make it hard for you to breathe  A thoracentesis allows your lungs to expand fully so you can breathe more easily  DISCHARGE INSTRUCTIONS:     Small amount of shoulder pain and bloody sputum is normal after a Thoracentesis  Rest:  Rest when you feel it is needed  Slowly start to do more each day  Return to your daily activities as directed  Resume your normal diet  Small sips of flat soda will help mild nausea  Do not smoke: If you smoke, it is never too late to quit  Ask for information about how to stop smoking if you need help  Contact Interventional Radiology at [phone] [facility] PATIENTS: Contact Interventional Radiology at [phone]) [place] PATIENTS: Contact Interventional Radiology at [phone]) if:   You have a fever  Your puncture site is red, warm, swollen, or draining pus  You have questions or concerns about your procedure, medicine, or care  Seek care immediately or call 911 if:   Severe chest pain with inspiration and shortness of breath    Large amounts of blood in your sputum    Follow up with your healthcare provider as directed

## 2023-06-01 ENCOUNTER — OFFICE VISIT (OUTPATIENT)
Dept: HEMATOLOGY ONCOLOGY | Facility: CLINIC | Age: 88
End: 2023-06-01

## 2023-06-01 ENCOUNTER — TELEPHONE (OUTPATIENT)
Dept: HEMATOLOGY ONCOLOGY | Facility: CLINIC | Age: 88
End: 2023-06-01

## 2023-06-01 VITALS
DIASTOLIC BLOOD PRESSURE: 50 MMHG | TEMPERATURE: 97.1 F | OXYGEN SATURATION: 97 % | SYSTOLIC BLOOD PRESSURE: 150 MMHG | HEART RATE: 91 BPM | BODY MASS INDEX: 21.34 KG/M2 | RESPIRATION RATE: 16 BRPM | HEIGHT: 61 IN | WEIGHT: 113 LBS

## 2023-06-01 DIAGNOSIS — C34.91 ADENOCARCINOMA, LUNG, RIGHT (HCC): Primary | ICD-10-CM

## 2023-06-01 NOTE — PROGRESS NOTES
Michelle Cristobal  1934  [address] [facility]  [address] [facility] [id number]    DISCUSSION/SUMMARY:    60-year-old female with history of stage IA (pT1c, pN0, cM0, G1, ER+, OR+, HER2-) left breast cancer (diagnosed in September 2018) and a history of primary pulmonary adenocarcinoma, TTF-1 +, non-small cell lung carcinoma diagnosed in November 2021 status post 15 fractions RT  Recently patient began to have more dyspnea on exertion and chest pain  Recent PET/CT demonstrated a new right major fissure 1 cm nodule with mild FDG activity of uncertain clinical significance  There were additional new small pleural nodules along the right major fissure and a moderate to large pleural effusion with minimal FDG activity  Prior diagnostic and therapeutic thoracentesis demonstrated TTF-1 + adenocarcinoma  At this time, Mrs Lacie King is to continue with the thoracentesis as needed  Pleur-evac insertion is on hold for the time being  Pittore status is stable  Mrs Auguste and her daughters understand that the metastatic/recurrent lung cancer is not curable  Previously patient was not sure what she wanted to do as far as treatment options  Patient is refusing chemotherapy but is amendable to immunotherapy (at least trying it)  NCCN guidelines 3 2023 state that for patients with metastatic non-small cell lung carcinoma, adenocarcinoma, PDL 1 >50%, category 1 upfront treatment is pembrolizumab  For patients with PD-L1 between 1 and 49%, pembrolizumab alone is category 2B, useful in certain segments at this  As above, patient is refusing chemotherapy  PD-L1 analysis is in process  Once the results are available, medical oncology will contact the patient/family to discuss options  At this time, patient is to return in 4 weeks but this may change depending upon the results  No recent blood work  This will need to be rechecked    If patient is a candidate for the pembrolizumab, blood work will be routine  If not, medical oncology will order a repeat panel  Patient has been seen/evaluated by Dr Edmundo Fuller, palliative care; follow-ups are ongoing  Mrs Auguste/family know to call the hematology/oncology office if there are any other questions or concerns  Carefully review your medication list and verify that the list is accurate and up-to-date  Please call the hematology/oncology office if there are medications missing from the list, medications on the list that you are not currently taking or if there is a dosage or instruction that is different from how you're taking that medication  Patient goals and areas of care: Therapeutic thoracentesis, palliative care follow-up, PD-L1 evaluation still pending  Barriers to care: None  Patient is able to self-care with help of family members  ______________________________________________________________________________________    Chief Complaint   Patient presents with   • Follow-up     Oncology History   History of left breast cancer   8/10/2018 Biopsy    Left breast biopsy  Invasive mucinous  Grade 1      Her 2 1+     9/11/2018 Surgery    Left lumpectomy  Invasive mucinous carcinoma  Grade 1  1 1 cm in size  Margins negative      Her 2 1+  Stage 1A     Adenocarcinoma, lung, right ([facility])   11/2021 Initial Diagnosis    Adenocarcinoma, lung, right ([facility])     11/15/2021 Biopsy    Lung, Right Middle Lobe, mass:  - Adenocarcinoma  Comment: Immunohistochemistry is positive in the tumor cells for TTF-1 and negative for p40 and Gata3  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 a primary pulmonary adenocarcinoma       1/19/2022 - 2/8/2022 Radiation    Course: C1    Plan ID Energy Fractions Dose per Fraction (cGy) Dose Correction (cGy) Total Dose Delivered (cGy) Elapsed Days   RML 6X 15 / 15 400 0 6,000 20 Treatment dates:  1/19/2022 - 2/8/2022        History of Present Illness: 66-year-old female with distant history of left breast cancer, more recent diagnosis of early stage right middle lobe non-small cell lung carcinoma (TTF-1 + adenocarcinoma) status post RT  The patient has had more problems with shortness of breath and dyspnea on exertion recently  Repeat scans demonstrated a 1 cm right major fissure nodule seen on the December 2022 CAT scan and more recently on a PET/CT  Patient also had a moderate to large pleural effusion  Recent cytology demonstrated recurrent/metastatic non-small cell lung carcinoma, adenocarcinoma  Patient returns for follow-up and is accompanied by her daughters  Mrs Auguste has been undergoing thoracentesis as needed  Patient states feeling better as compared to before  Thoracentesis fluid seems to be of less volume more recently  No shortness of breath at rest, + dyspnea on exertion  No pain control issues, no chest pain  No fevers or signs of infection  Appetite is okay, weight is stable  Activities are limited  Review of Systems   Constitutional: Positive for activity change and fatigue  HENT: Negative  Eyes: Negative  Respiratory: Negative          + Dyspnea on exertion   Cardiovascular: Negative for chest pain  Gastrointestinal: Negative  Endocrine: Negative  Genitourinary: Negative  Musculoskeletal: Negative  Skin: Negative  Allergic/Immunologic: Negative  Neurological: Negative  Hematological: Negative  Psychiatric/Behavioral: Negative  All other systems reviewed and are negative      Patient Active Problem List   Diagnosis   • Iliac artery stenosis, bilateral (HCC)   • GERD (gastroesophageal reflux disease)   • Arthritis   • Hypercholesterolemia   • Hypertension   • EKG abnormalities   • Anemia   • Neck pain   • Dysuria   • Chronic left shoulder pain   • Fall from other slipping, tripping, or stumbling   • Abnormal EKG   • Abnormal mammogram   • 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)   • Back pain   • Bilateral carotid artery stenosis   • Carotid stenosis, asymptomatic   • 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   • Chronic myofascial pain   • Chronic pain of lower extremity   • CKD (chronic kidney disease) stage 3, GFR 30-59 ml/min ([facility])   • Diabetic retinopathy ([address] )   • DMII (diabetes mellitus, type 2) ([facility])   • Gout   • Hypomagnesemia   • Atherosclerosis of other specified arteries   • Lymphadenopathy   • Nephrolithiasis   • Osteoporosis   • PAD (peripheral artery disease) ([facility])   • Primary generalized (osteo)arthritis   • Persistent proteinuria   • Renal cyst   • Vaginal prolapse   • Vitamin D deficiency   • Acquired complex cyst of kidney   • History of left breast cancer   • Stricture of artery ([facility])   • Claudication in peripheral vascular disease ([facility])   • Hemorrhoids   • Tinea unguium   • Diabetic neuropathy ([facility])   • Bradycardia   • Glottic insufficiency   • Reflux laryngitis   • Paresis of left vocal fold   • Muscle tension dysphonia   • Laryngocele   • Anterior glottic web   • Viral upper respiratory tract infection   • 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HCC)   • Renal artery stenosis (HCC)   • Left ear pain   • Cervical spine disease   • Osteopenia of multiple sites   • Type 2 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, bilateral (HCC)   • Lung mass   • CKD (chronic kidney disease)   • Adenocarcinoma, lung, right (HCC)   • Encounter for annual routine gynecological examination   • Encounter for screening mammogram for breast cancer   • History of cancer of left breast   • Dense breast tissue on mammogram   • Left leg swelling   • Hypercalcemia   • Primary osteoarthritis of left knee   • Embolism and thrombosis of arteries of the lower extremities ([address] )   • Secondary hyperparathyroidism ([address] )   • Postmenopausal "bleeding     Past Medical History:   Diagnosis Date   • Arthritis    • Back pain    • Benign essential hypertension     LAST ASSESSED: [id number]   • Carotid artery stenosis    • CKD (chronic kidney disease) stage 2, GFR 60-89 ml/min    • Constipation, chronic     \"drinks prune juice\"   • Diabetes mellitus ([facility] )    • DMII (diabetes mellitus, type 2) ([facility])    • Epiglottitis     RESOLVED: [id number]   • Gastritis    • GERD (gastroesophageal reflux disease)    • Gout    • H/O blood clots     left lower leg,,,11 yrs ago   • History of stenosis of renal artery     RESOLVED: [id number]   • Hypercholesteremia    • Hypercholesterolemia    • Hyperlipidemia    • Hyperparathyroidism ([place] [address] )    • Hypertension    • Iliac artery stenosis, bilateral ([facility])    • Kidney stone    • Lobular carcinoma ([place] [address] ) 09/06/2017    Left   • Lung cancer ([place] [address] )    • Mucinous carcinoma of breast, left ([place] [address] ) 09/11/2018    Left   • Presence of pessary    • Renal artery stenosis ([facility])    • Renal disorder    • Renovascular hypertension     RESOLVED: [id number]   • Segmental and somatic dysfunction of cervical region     RESOLVED: 95UHV7698   • Segmental and somatic dysfunction of lumbar region     RESOLVED: 42BUE7167   • Segmental and somatic dysfunction of pelvic region     RESOLVED: 21VCU7823   • Segmental and somatic dysfunction of thoracic region     RESOLVED: 24IZQ2286   • Somatic dysfunction of abdominal region     RESOLVED: 43ULI6424   • Somatic dysfunction of head region     RESOLVED: 00VCF0196   • Somatic dysfunction of lower extremities     RESOLVED: 91UDJ2935   • Somatic dysfunction of rib region     RESOLVED: 80JUF6850   • Somatic dysfunction of thoracic region     RESOLVED: 51KQV2625   • Somatic dysfunction of upper extremities     RESOLVED: 76CTE3404   • Teeth missing      Past Surgical History:   Procedure Laterality Date   • ANGIOPLASTY / STENTING ILIAC     • BREAST BIOPSY Left 08/10/2018    U/S BX- mucinous ca   • BREAST BIOPSY Left 08/07/2017 " U/S BX   • BREAST LUMPECTOMY Left 09/06/2017    LAST ASSESSED: [id number]   • BREAST LUMPECTOMY Left 09/11/2018   • BREAST SURGERY Left     biopsy   • CATARACT EXTRACTION Bilateral    • CHOLECYSTECTOMY     • COLONOSCOPY     • EYE SURGERY     • ILIAC VEIN ANGIOPLASTY / STENTING Right     INITIAL STENOSIS: ONSET: [id number]   • IR BIOPSY LUNG  11/15/2021   • IR THORACENTESIS  2/20/2023   • IR THORACENTESIS  3/28/2023   • IR THORACENTESIS  4/11/2023   • IR THORACENTESIS  4/19/2023   • IR THORACENTESIS  4/26/2023   • IR THORACENTESIS  5/3/2023   • IR THORACENTESIS  5/10/2023   • IR THORACENTESIS  5/17/2023   • KNEE ARTHROSCOPY Right    • KNEE SURGERY Right     ONSET: [id number]   • MAMMO NEEDLE LOCALIZATION LEFT (ALL INC) Left 9/11/2018   • MAMMO NEEDLE LOCALIZATION LEFT (ALL INC) Left 9/6/2017   • NM ESOPHAGOGASTRODUODENOSCOPY TRANSORAL DIAGNOSTIC N/A 7/26/2018    Procedure: ESOPHAGOGASTRODUODENOSCOPY (EGD); Surgeon: Estela Grider DO;  Location: [facility]; Service: Gastroenterology   • NM [address] EXC SILVA&/STRPG CORDS/EPIGL MCRSCP/TLSCP N/A 6/10/2016    Procedure: MICRO DIRECT LARYNGOSCOPY WITH VOCAL FOLD MASS EXCISION ;  Surgeon: Latrice Putnam MD;  [facility];  Service: ENT   • NM MASTECTOMY PARTIAL Left 9/6/2017    Procedure: LUMPECTOMY BREAST NEEDLE LOCALIZED WITH FAXITRON NEEDLE @ 0830;  Surgeon: Anjana Martin MD;  Location[facility];  Service: General   • NM PERQ DEVICE PLACEMENT BREAST LOC 1ST LES W/GDNCE Left 9/11/2018    Procedure: BREAST LUMPECTOMY; BREAST NEEDLE LOCALIZATION (NEEDLE LOC AT 1330);   Surgeon: Genet Stewart MD;  [facility];  Service: Surgical Oncology   • THROAT SURGERY      lump removed   • TUBAL LIGATION     • UPPER GASTROINTESTINAL ENDOSCOPY     • US GUIDANCE BREAST BIOPSY LEFT EACH ADDITIONAL Left 8/7/2017   • US GUIDED BREAST BIOPSY LEFT COMPLETE Left 8/10/2018   • US GUIDED BREAST BIOPSY LEFT COMPLETE Left 8/7/2017   • VASCULAR SURGERY       Family History Problem Relation Age of Onset   • Heart disease Brother    • Heart disease Maternal Grandmother    • Diabetes Other         of other type with arthropathy, without long term current use of insulin   • Hypertension Other    • No Known Problems Mother    • Gout Family    • Rheum arthritis Family    • Lupus Family         systematic erythematosus   • Heart disease Family    • Stroke Family         syndrome   • Stroke Maternal Uncle         syndrome   • Stroke Maternal Aunt         syndrome   • No Known Problems Father    • No Known Problems Daughter    • No Known Problems Maternal Grandfather    • No Known Problems Paternal Grandmother    • No Known Problems Paternal Grandfather    • No Known Problems Daughter    • No Known Problems Daughter    • No Known Problems Maternal Aunt    • No Known Problems Maternal Aunt    • No Known Problems Maternal Aunt      Social History     Socioeconomic History   • Marital status:       Spouse name: Not on file   • Number of children: Not on file   • Years of education: Not on file   • Highest education level: Not on file   Occupational History   • Occupation: retired   Tobacco Use   • Smoking status: Former     Packs/day: 1 00     Years: 55 00     Total pack years: 55 00     Types: Cigarettes     Start date: 80     Quit date:      Years since quittin 4   • Smokeless tobacco: Never   Vaping Use   • Vaping Use: Never used   Substance and Sexual Activity   • Alcohol use: Not Currently   • Drug use: No   • Sexual activity: Not Currently     Partners: Male   Other Topics Concern   • Not on file   Social History Narrative    Daily caffeine consumption, 1 serving a day      Social Determinants of Health     Financial Resource Strain: Low Risk  (10/23/2022)    Overall Financial Resource Strain (CARDIA)    • Difficulty of Paying Living Expenses: Not hard at all   Food Insecurity: No Food Insecurity (2021)    Hunger Vital Sign    • Worried About Running Out of Food in the Last Year: Never true    • Ran Out of Food in the Last Year: Never true   Transportation Needs: No Transportation Needs (10/23/2022)    PRAPARE - Transportation    • Lack of Transportation (Medical): No    • Lack of Transportation (Non-Medical): No   Physical Activity: Inactive (6/20/2019)    Exercise Vital Sign    • Days of Exercise per Week: 0 days    • Minutes of Exercise per Session: 0 min   Stress: No Stress Concern Present (6/20/2019)    [address]    • Feeling of Stress :  Only a little   Social Connections: Unknown (6/20/2019)    Social Connection and Isolation Panel [NHANES]    • Frequency of Communication with Friends and Family: More than three times a week    • Frequency of Social Gatherings with Friends and Family: More than three times a week    • Attends Synagogue Services: Not on file    • Active Member of Clubs or Organizations: Not on file    • Attends Club or Organization Meetings: Not on file    • Marital Status: Not on file   Intimate Partner Violence: Not on file   Housing Stability: Not on file       Current Outpatient Medications:   •  Acetaminophen 325 MG CAPS, Take 500 mg by mouth every 8 (eight) hours, Disp: , Rfl:   •  aspirin (ADULT ASPIRIN EC LOW STRENGTH) 81 mg EC tablet, Take 1 tablet by mouth daily, Disp: , Rfl:   •  calcitriol (ROCALTROL) 0 25 mcg capsule, Take 1 capsule (0 25 mcg total) by mouth 2 (two) times a week, Disp: 24 capsule, Rfl: 3  •  fluticasone (FLONASE) 50 mcg/act nasal spray, 2 sprays into each nostril daily, Disp: 16 g, Rfl: 0  •  gabapentin (Neurontin) 100 mg capsule, Take 1 capsule (100 mg total) by mouth 2 (two) times a day, Disp: 180 capsule, Rfl: 0  •  Lancets (ONETOUCH ULTRASOFT) lancets, by Other route 2 (two) times a day Use as instructed, Disp: 100 each, Rfl: 5  •  mirtazapine (REMERON) 15 mg tablet, Take 1 tablet (15 mg total) by mouth daily at bedtime, Disp: 30 tablet, Rfl: 0  • NIFEdipine ER (ADALAT CC) 30 MG 24 hr tablet, Take 1 tablet twice a day , Disp: 180 tablet, Rfl: 0  •  olopatadine (PATANOL) 0 1 % ophthalmic solution, Administer 1 drop to both eyes 2 (two) times a day, Disp: 5 mL, Rfl: 0  •  OneTouch Delica Lancets 19T MISC, Inject as directed 2 (two) times a day Use to test, Disp: 200 each, Rfl: 0  •  oxyCODONE (Roxicodone) 5 immediate release tablet, Take 0 5 tablets (2 5 mg total) by mouth 2 (two) times a day as needed (cancer pain) Max Daily Amount: 5 mg, Disp: 15 tablet, Rfl: 0  •  pantoprazole (PROTONIX) 40 mg tablet, Take 1 tablet (40 mg total) by mouth 2 (two) times a day, Disp: 60 tablet, Rfl: 5  •  polyethylene glycol (MIRALAX) 17 g packet, Take 17 g by mouth daily, Disp: 30 each, Rfl: 5  •  pravastatin (PRAVACHOL) 40 mg tablet, Take 1 tablet (40 mg total) by mouth daily, Disp: 90 tablet, Rfl: 3  •  Probiotic Product (Align) 4 MG CAPS, Take by mouth, Disp: , Rfl:   No current facility-administered medications for this visit  Allergies   Allergen Reactions   • Clonidine Derivatives Swelling   • Diflucan [Fluconazole] Rash   • Flagyl [Metronidazole] Anaphylaxis   • Carvedilol Hives     And legs swelled   • Latex Rash   • Lipitor [Atorvastatin]      Legs swelled   • Metoprolol Dizziness     Low BP and orthostatic symptoms  • Other Palpitations     IV DYE   • Shingrix [Zoster Vac Recomb Adjuvanted] Hives       Vitals:    06/01/23 1132   BP: 150/50   Pulse: 91   Resp: 16   Temp: (!) 97 1 °F (36 2 °C)   SpO2: 97%     Physical Exam  Constitutional:       Appearance: She is well-developed  Comments: But relatively well-nourished female, no respiratory distress, no signs of pain   HENT:      Head: Normocephalic and atraumatic  Right Ear: External ear normal       Left Ear: External ear normal    Eyes:      Conjunctiva/sclera: Conjunctivae normal       Pupils: Pupils are equal, round, and reactive to light     Cardiovascular:      Rate and Rhythm: Normal rate and regular rhythm  Heart sounds: Normal heart sounds  Pulmonary:      Effort: Pulmonary effort is normal       Breath sounds: Normal breath sounds  Comments: Fair entry bilaterally, slightly decreased on right, rales on right  Abdominal:      General: Bowel sounds are normal       Palpations: Abdomen is soft  Musculoskeletal:         General: Normal range of motion  Cervical back: Normal range of motion and neck supple  Skin:     General: Skin is warm  Neurological:      Mental Status: She is alert and oriented to person, place, and time  Deep Tendon Reflexes: Reflexes are normal and symmetric  Psychiatric:         Behavior: Behavior normal          Thought Content: Thought content normal          Judgment: Judgment normal      Extremities: 0-1+ bilateral lower extreme edema, no cords, pulses are 1+  Lymphatics: No adenopathy in the neck, supraclavicular region, axilla and groin bilaterally    Labs    4/10/2023 BUN = 28 creatinine = 1 31    2/11/2023 WBC = 7 83 hemoglobin = 12 1 hematocrit = 39 3 MCV = 90 platelet = 274 neutrophil = 62% lymphocyte = 27% monocyte = 10% eosinophil = 1% BUN = 35 creatinine = 1 57 GFR = 29 calcium = 9 3    Imaging    1/20/2023 PET/CT    IMPRESSION:     1  Patient's known right middle lobe lung cancer demonstrates near-complete to complete metabolic response to therapy      2   Patient's known new 1 0 cm right major fissural nodule on CT of chest dated 12/23/2022 demonstrates minimal to mild FDG activity and is of uncertain clinical significance with differential diagnosis including benign inflammatory nodule versus developing malignancy of low metabolic activity  There are additional new/ progressive small pleural nodules along the right major fissure which are beyond the limits of resolution for PET CT  There is a moderate to large pleural effusion with minimal to mild FDG activity    The overall constellation of findings is suspicious for developing pleural metastatic disease with possible malignant pleural effusion  Consider correlation with fluid/tissue sampling as clinically indicated      3  Additional scattered solid and groundglass appearing lung nodules involving both lungs without significant interval change and for which continued follow-up is recommended      4   Multinodular thyroid gland which can be further characterized with ultrasound as clinically indicated      Pathology    Case Report   Non-gynecologic Cytology                          Case: [id number]                                   Authorizing Provider: JARRED Wilcox      Collected:           02/20/2023 1334               Ordering Location:     [facility]      Received:            02/20/2023 [address] [place] [address] Interventional                                                                              Radiology                                                                     Pathologist:           Hussein Ariza MD                                                                  Specimens:   A) - Thoracentesis                                                                                   B) - Thoracentesis, cell block                                                             Final Diagnosis   A  B  Thoracentesis,  (ThinPrep and cell block preparations):  Conclusive evidence of malignancy  Metastatic adenocarcinoma, consistent with lung primary  Satisfactory for evaluation      Comment: Immunohistochemistry was performed on cell block   The tumor cells are positive for MOC31, ALISA-EP4, TTF-1, napsin A and negative for D2-40, supporting the above diagnosis      Intradepartmental consultation is in agreement       Electronically signed by Hussein Ariza MD on 2/23/2023 at 11:18 AM       Case Report   Surgical Pathology Report                         Case: [id number]                                    Authorizing Provider: Rosa Childs Denver Chew, MD    Collected:           11/15/2021 1545               Ordering Location:     [facility]      Received:            11/15/2021 [address]                                    [id number]                                                    Pathologist:           Bre Wills MD                                                    Specimen:    Lung, Right Middle Lobe, RML Lung mass                                                     Final Diagnosis   A  Lung, Right Middle Lobe, mass:  - Adenocarcinoma  See comment      Comment: Immunohistochemistry is positive in the tumor cells for TTF-1 and negative for p40 and Gata3  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        Received:            09/11/2018 1514                                      [facility]                                                       Pathologist:           Lo Preciado MD                                                                  Specimens:   A) - Breast, Left, Left breast lumpectomy                                                            B) - Breast, Left, Left breast lumpectomy-anterior margin-green                                      C) - Breast, Left, Left breast lumpectomy-inferior margin-blue                                       D) - Breast, Left, Left breast lumpectomy-lateral margin-red                                         E) - Breast, Left, Left breast lumpectomy-medial margin-yellow                                       F) - Breast, Left, Left breast lumpectomy-superior margin-orange                           Final Diagnosis   MUCINOUS CARCINOMA OF BREAST STAGING SUMMARY (includes specimens A to F of this case and prior biopsy [id number])  1  Specimen Identification     - Procedure: lumpectomy     - Lymph Node Sampling: no     - Specimen Laterality: left     - Tumor Site (Quadrant; Position/o'clock): upper outer quadrant, 1:00 - 2:00   2  Invasive Tumor:     - Histologic Type: mucinous carcinoma     - Tumor Size (pT) [Size of largest invasive carcinoma]: 1 1 cm     -  Grade (G): Viet Histologic Score; Grade  I of III  * Glandular (Acinar) / Tubular Differentiation: 3       * Nuclear Pleomorphism: 1       * Mitotic Rate: 1     - Tumor Focality: single focus     - Macroscopic and Microscopic Extent of Tumor:       -- Skin: N/A       -- Nipple:N/A       -- Skeletal muscle: N/A  3  Lymphovascular invasion: absent  4  Dermal lymphovascular invasion: N/A  5  In situ tumor     - Ductal carcinoma in Situ (DCIS): absent   6  Margins:     - Invasive Carcinoma: negative     - DCIS: N/A   7  Lymph nodes (pN): no lymph node submitted  8  Treatment Effect, Response to Presurgical (Neoadjuvant) Therapy: no known presurgical therapy   9  Additional Pathologic Findings:     - Proliferative fibrocystic changes, including usual ductal hyperplasia, cysts, apocrine metaplasia, fibroadenomatous change, and duct ectasia  - Biopsy site changes  10  Microcalcifications: present and associated with benign breast parenchyma  11  Ancillary Studies: (performed on case [id number])     - Estrogen receptor: 100%, Strong, positive     - Progesterone receptor: 100%, strong, positive     - HER2 by IHC: 1+, negative     - Repeat HER2 testing (2013 ASCO/CAP Recommendations): Not indicated        - Best representative tumor block: A7, A8 and A9       -- Sufficient tumor present for          Agendia Mammaprint/Blueprint (1 cm2 of invasive tumor in aggregate): Yes           MI Profile/Foundation One (at least 5 x 5 mm of tumor): Yes  12  Clinical History: abnormal findings on diagnostic imaging of breast  13  Pathologic Stage Classification (pTNM, AJCC 8th Edition): pT1c, pNX   8th ed, AJCC Anatomic Stage: at least Ia  14  8th ed  AJCC Prognostic Stage (use AJCC update): IA        Final Diagnoses for each Specimen of this Case  A  Left breast, lumpectomy:  - Mucinous carcinoma, see synoptic report, above  - All lumpectomy margins are uninvolved by carcinoma      Comment: Intradepartmental consultation is in agreement       B  Left breast, anterior margin, excision:  - Benign breast parenchyma  - Negative for invasive carcinoma or carcinoma in situ      C  Left breast, inferior margin, excision:  - Benign breast parenchyma with proliferative fibrocystic changes, including usual ductal hyperplasia with apocrine metaplasia and duct ectasia  - Negative for invasive carcinoma or carcinoma in situ      D  Left breast, lateral margin, excision:  - Benign breast parenchyma  - Negative for invasive carcinoma or carcinoma in situ      E  Left breast, medial margin, excision:  - Benign breast parenchyma  - Microcalcifications are present and associated with benign glands  - Negative for invasive carcinoma or carcinoma in situ      F  Left breast, superior margin, excision:  - Benign breast parenchyma    - Negative for invasive carcinoma or carcinoma in situ       Electronically signed by Norah Alford MD on 9/14/2018 at  3:17 PM

## 2023-06-01 NOTE — TELEPHONE ENCOUNTER
Called patients home phone, spoke with Sally Mcfarlane (significant other), and advised of appointment found for Lena's 4 week follow up Tuesday 6/27/2023 at 3:00pm  Jeremi Hanson schedule is very full and had to move other appointments around for this opening

## 2023-06-07 ENCOUNTER — HOSPITAL ENCOUNTER (OUTPATIENT)
Dept: RADIOLOGY | Facility: HOSPITAL | Age: 88
Discharge: HOME | End: 2023-06-07
Attending: INTERNAL MEDICINE
Payer: COMMERCIAL

## 2023-06-07 VITALS
OXYGEN SATURATION: 98 % | SYSTOLIC BLOOD PRESSURE: 201 MMHG | RESPIRATION RATE: 18 BRPM | HEART RATE: 95 BPM | DIASTOLIC BLOOD PRESSURE: 81 MMHG

## 2023-06-07 DIAGNOSIS — C34.91 ADENOCARCINOMA, LUNG, RIGHT (HCC): ICD-10-CM

## 2023-06-07 PROCEDURE — 32555 ASPIRATE PLEURA W/ IMAGING: CPT

## 2023-06-07 PROCEDURE — 32555 ASPIRATE PLEURA W/ IMAGING: CPT | Performed by: PHYSICIAN ASSISTANT

## 2023-06-07 NOTE — BRIEF OP NOTE (RAD/CATH)
Right IR THORACENTESIS Procedure Note    PATIENT NAME: Paul Davis  : 1934  MRN: [id number]    Pre-op Diagnosis:   1  Adenocarcinoma, lung, right (HCC)      Post-op Diagnosis:   1  Adenocarcinoma, lung, right [facility])        Provider:  Clare Boone PA-C    No qualified resident was available, Resident is only observing    Estimated Blood Loss: minimal    Findings: right sided thoracentesis  120cc david fluid removed  Patient has been having decrease in pleural fluid each week  Discussed with   Will schedule patient for every two weeks for thoracentesis       Specimens: none    Complications:  None immediate    Anesthesia: local    Clare Boone PA-C     Date: 2023  Time: 2:26 PM

## 2023-06-07 NOTE — SEDATION DOCUMENTATION
Right side thoracentesis performed by Margie Eric PA-C  120 ml of david colored fluid drained  Ultrasound guided  No labs ordered  Puncture site is CDI and covered with a band aid  Pt tolerated well

## 2023-06-16 ENCOUNTER — TELEPHONE (OUTPATIENT)
Dept: PULMONOLOGY | Facility: CLINIC | Age: 88
End: 2023-06-16

## 2023-06-16 LAB — SCAN RESULT: NORMAL

## 2023-06-21 ENCOUNTER — TELEPHONE (OUTPATIENT)
Dept: NEPHROLOGY | Facility: CLINIC | Age: 88
End: 2023-06-21

## 2023-06-21 ENCOUNTER — HOSPITAL ENCOUNTER (OUTPATIENT)
Dept: RADIOLOGY | Facility: HOSPITAL | Age: 88
Discharge: HOME | End: 2023-06-21
Attending: INTERNAL MEDICINE
Payer: COMMERCIAL

## 2023-06-21 VITALS — HEART RATE: 97 BPM | SYSTOLIC BLOOD PRESSURE: 199 MMHG | OXYGEN SATURATION: 96 % | DIASTOLIC BLOOD PRESSURE: 83 MMHG

## 2023-06-21 DIAGNOSIS — C34.91 ADENOCARCINOMA, LUNG, RIGHT (HCC): ICD-10-CM

## 2023-06-21 PROCEDURE — 76604 US EXAM CHEST: CPT | Performed by: NURSE PRACTITIONER

## 2023-06-21 PROCEDURE — 76937 US GUIDE VASCULAR ACCESS: CPT

## 2023-06-21 NOTE — BRIEF OP NOTE (RAD/CATH)
IR PROCEDURE NOT PERFORMED Procedure Note    PATIENT NAME: [place]  : 1934  MRN: [id number]    Pre-op Diagnosis:   1  Adenocarcinoma, lung, right (HCC)      Post-op Diagnosis:   1  Adenocarcinoma, lung, right [facility])        Provider:   Marylee Pepper  Assistants:     No qualified resident was available, Resident is only observing    Estimated Blood Loss: none     Findings: trace right sided pleural effusion, no thoracentesis performed    Specimens: none    Complications:  none    Anesthesia: none    Trace pleural effusion seen  Discussed with  and patient, gets thoracentesis every 2 weeks  Recommended to come back in 2 weeks and if there still isn't enough fluid can move to PRN thoras  They are in agreement with this plan      JARRED Perales     Date: 2023  Time: 2:10 PM

## 2023-06-22 DIAGNOSIS — G89.3 CANCER-RELATED PAIN: ICD-10-CM

## 2023-06-22 RX ORDER — MIRTAZAPINE 15 MG/1
TABLET, FILM COATED ORAL
Qty: 30 TABLET | Refills: 0 | Status: SHIPPED | OUTPATIENT
Start: 2023-06-22 | End: 2023-06-30 | Stop reason: SDUPTHER

## 2023-06-25 PROBLEM — Z96.0 VAGINAL PESSARY PRESENT: Status: ACTIVE | Noted: 2023-06-25

## 2023-06-25 NOTE — PROGRESS NOTES
Assessment/Plan:  Resolution of vaginal bleeding due to erosion from the pessary  Discussed that she does not need to see both us and Dr Bebeto Rivera  I do not think he would perform breast exams  She was offered to continue care here  RTO 3 mos       Diagnoses and all orders for this visit:    Vaginal prolapse    Vaginal pessary present    Pessary maintenance    History of cancer of left breast    Adenocarcinoma, lung, right (HCC)              Subjective:        Patient ID: Sylvia Steen is a 80 y o  female  Mrs Jerardo Hess presents for a follow-up after experiencing vaginal bleeding due to a pessary in April  She declined pelvic rest from the pessary and instead used Estrace vaginal cream nightly for 6 weeks  Bleeding stopped within 3 to 4 days after initiating the Estrace  She has no complaints regarding the pessary  She has been seeing Dr Wilton Marshall, a urogynecologist, every 3 months  She believes she was seen last month  There appears to be no concerns  She no longer needs to see the breast surgeon nor does she need to have mammograms due to her advanced age  She is still being treated for lung cancer        The following portions of the patient's history were reviewed and updated as appropriate: She  has a past medical history of Arthritis, Back pain, Benign essential hypertension, Carotid artery stenosis, CKD (chronic kidney disease) stage 2, GFR 60-89 ml/min, Constipation, chronic, Diabetes mellitus ([address] ), DMII (diabetes mellitus, type 2) ([address] ), Epiglottitis, Gastritis, GERD (gastroesophageal reflux disease), Gout, H/O blood clots, History of stenosis of renal artery, Hypercholesteremia, Hypercholesterolemia, Hyperlipidemia, Hyperparathyroidism ([address] ), Hypertension, Iliac artery stenosis, bilateral ([address] ), Kidney stone, Lobular carcinoma ([address] ) (09/06/2017), Lung cancer ([address] ), Mucinous carcinoma of breast, 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), Presence of pessary, Renal artery stenosis ([address] ), Renal disorder, Renovascular hypertension, Segmental and somatic dysfunction of cervical region, Segmental and somatic dysfunction of lumbar region, Segmental and somatic dysfunction of pelvic region, Segmental and somatic dysfunction of thoracic region, Somatic dysfunction of abdominal region, Somatic dysfunction of head region, Somatic dysfunction of lower extremities, Somatic dysfunction of rib region, Somatic dysfunction of thoracic region, Somatic dysfunction of upper extremities, and Teeth missing    Patient Active Problem List    Diagnosis Date Noted   • Vaginal pessary present 06/25/2023   • Postmenopausal bleeding 04/27/2023   • Embolism and thrombosis of arteries of the lower extremities ([address] ) 04/07/2023   • Secondary hyperparathyroidism ([address] ) 04/07/2023   • Primary osteoarthritis of left knee 02/02/2023   • Hypercalcemia 11/17/2022   • Left leg swelling 10/25/2022   • Encounter for annual routine gynecological examination 08/30/2022   • Encounter for screening mammogram for breast cancer 08/30/2022   • History of cancer of left breast 08/30/2022   • Dense breast tissue on mammogram 08/30/2022   • CKD (chronic kidney disease) 11/03/2021   • Adenocarcinoma, lung, right ([address] ) 11/2021   • Lung mass 10/14/2021   • Type 2 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, bilateral ([address] ) 09/29/2021   • Osteopenia of multiple sites 11/04/2020   • Cervical spine disease 01/16/2020   • Left ear pain 12/11/2019   • 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   • Renal artery stenosis ([address] ) 11/12/2019   • Viral upper respiratory tract infection 10/18/2019   • Glottic insufficiency 07/20/2019   • Reflux laryngitis 07/20/2019   • Paresis of left vocal fold 07/20/2019   • Muscle tension dysphonia 07/20/2019   • Laryngocele 07/20/2019   • 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   • Bradycardia 06/16/2019   • Tinea unguium 06/13/2019   • Diabetic neuropathy ([address] ) 06/13/2019   • Hemorrhoids 06/04/2019   • Claudication in peripheral vascular disease ([place] [address] ) 05/17/2019   • Stricture of artery ([place] [address] ) 04/22/2019   • History of left breast cancer 08/15/2018   • Acquired complex cyst of kidney 05/10/2018   • Chronic left shoulder pain 04/04/2018   • Dysuria 03/16/2018   • Bilateral carotid artery stenosis 01/10/2018   • Gout 10/24/2017   • Neck pain 08/15/2017   • 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 08/02/2017   • Primary generalized (osteo)arthritis 08/02/2017   • Abnormal mammogram 07/20/2017   • Chronic myofascial pain 04/26/2017   • CKD (chronic kidney disease) stage 3, GFR 30-59 ml/min ([facility]) 03/24/2017   • PAD (peripheral artery disease) ([place] [address] ) 01/30/2017   • Persistent proteinuria 09/22/2016   • Chronic pain of lower extremity 09/16/2016   • Anemia 09/03/2016   • EKG abnormalities 09/01/2016   • Abnormal EKG 08/09/2016   • Iliac artery stenosis, bilateral ([facility])    • GERD (gastroesophageal reflux disease)    • Arthritis    • Hypercholesterolemia    • Hypertension    • Hypomagnesemia 03/10/2016   • Renal cyst 10/15/2015   • Carotid stenosis, asymptomatic 05/19/2015   • Atherosclerosis of native artery of extremity with intermittent claudication ([place] [address] ) 05/07/2015   • Vaginal prolapse 08/04/2014   • Back pain 06/30/2014   • Nephrolithiasis 09/04/2013   • Diabetic retinopathy ([place] [address] ) 07/30/2013   • Vitamin D deficiency 07/30/2013   • Atherosclerosis of other specified arteries 06/11/2013   • Lymphadenopathy 06/10/2013   • Fall from other slipping, tripping, or stumbling 01/24/2013   • DMII (diabetes mellitus, type 2) ([place] [address] ) 09/24/2012   • Osteoporosis 09/21/2012   PMH:  Menarche 14  G3, P3  TL  DM  HTN  Hyperlipidemia  DVT '11  Left breast cancer - lumpectomy 9/17  Monilia 4/18, 12/19  Vaginal Prolapse - Pessary ~ '17, ? UroGyn -yes Osie Chuck  Lung Ca 12/21  Celiac disease  She  has a past surgical history that includes Angioplasty / stenting iliac; Cholecystectomy; Tubal ligation;  Eye surgery; Vascular surgery; Throat surgery; pr largsc exc ana&/strpg cords/epigl mcrscp/tlscp (N/A, 6/10/2016); Cataract extraction (Bilateral); Colonoscopy; Knee arthroscopy (Right); Knee surgery (Right); Iliac vein angioplasty / stenting (Right); pr esophagogastroduodenoscopy transoral diagnostic (N/A, 7/26/2018); US guided breast biopsy left complete (Left, 8/10/2018); Mammo needle localization left (all inc) (Left, 9/11/2018); pr perq device placement breast loc 1st les w/gdnce (Left, 9/11/2018); US guidance breast biopsy left each additional (Left, 8/7/2017); US guided breast biopsy left complete (Left, 8/7/2017); Mammo needle localization left (all inc) (Left, 9/6/2017); Upper gastrointestinal endoscopy; pr mastectomy partial (Left, 9/6/2017); Breast surgery (Left); Breast biopsy (Left, 08/10/2018); Breast biopsy (Left, 08/07/2017); Breast lumpectomy (Left, 09/06/2017); Breast lumpectomy (Left, 09/11/2018); IR biopsy lung (11/15/2021); IR thoracentesis (2/20/2023); IR thoracentesis (3/28/2023); IR thoracentesis (4/11/2023); IR thoracentesis (4/19/2023); IR thoracentesis (4/26/2023); IR thoracentesis (5/3/2023); IR thoracentesis (5/10/2023); IR thoracentesis (5/17/2023); IR thoracentesis (5/24/2023); IR thoracentesis (5/31/2023); and IR thoracentesis (6/7/2023)  Her family history includes Diabetes in her other; Gout in her family; Heart disease in her brother, family, and maternal grandmother; Hypertension in her other; Lupus in her family; No Known Problems in her daughter, daughter, daughter, father, maternal aunt, maternal aunt, maternal aunt, maternal grandfather, mother, paternal grandfather, and paternal grandmother; Rheum arthritis in her family; Stroke in her family, maternal aunt, and maternal uncle  She  reports that she quit smoking about 19 years ago  Her smoking use included cigarettes  She started smoking about 74 years ago  She has a 55 00 pack-year smoking history   She has never used smokeless tobacco  She reports that she does not currently use alcohol  She reports that she does not use drugs  Current Outpatient Medications   Medication Sig Dispense Refill   • Acetaminophen 325 MG CAPS Take 500 mg by mouth every 8 (eight) hours     • aspirin (ADULT ASPIRIN EC LOW STRENGTH) 81 mg EC tablet Take 1 tablet by mouth daily     • calcitriol (ROCALTROL) 0 25 mcg capsule Take 1 capsule (0 25 mcg total) by mouth 2 (two) times a week 24 capsule 3   • fluticasone (FLONASE) 50 mcg/act nasal spray 2 sprays into each nostril daily 16 g 0   • gabapentin (Neurontin) 100 mg capsule Take 1 capsule (100 mg total) by mouth 2 (two) times a day 180 capsule 0   • Lancets (ONETOUCH ULTRASOFT) lancets by Other route 2 (two) times a day Use as instructed 100 each 5   • mirtazapine (REMERON) 15 mg tablet take 1 tablet by mouth at bedtime 30 tablet 0   • NIFEdipine ER (ADALAT CC) 30 MG 24 hr tablet Take 1 tablet twice a day  180 tablet 0   • olopatadine (PATANOL) 0 1 % ophthalmic solution Administer 1 drop to both eyes 2 (two) times a day 5 mL 0   • OneTouch Delica Lancets 12U MISC Inject as directed 2 (two) times a day Use to test 200 each 0   • oxyCODONE (Roxicodone) 5 immediate release tablet Take 0 5 tablets (2 5 mg total) by mouth 2 (two) times a day as needed (cancer pain) Max Daily Amount: 5 mg 15 tablet 0   • pantoprazole (PROTONIX) 40 mg tablet Take 1 tablet (40 mg total) by mouth 2 (two) times a day 60 tablet 5   • polyethylene glycol (MIRALAX) 17 g packet Take 17 g by mouth daily 30 each 5   • pravastatin (PRAVACHOL) 40 mg tablet Take 1 tablet (40 mg total) by mouth daily 90 tablet 3   • Probiotic Product (Align) 4 MG CAPS Take by mouth       No current facility-administered medications for this visit       Current Outpatient Medications on File Prior to Visit   Medication Sig   • Acetaminophen 325 MG CAPS Take 500 mg by mouth every 8 (eight) hours   • aspirin (ADULT ASPIRIN EC LOW STRENGTH) 81 mg EC tablet Take 1 tablet by mouth daily   • calcitriol (ROCALTROL) 0 25 mcg capsule Take 1 capsule (0 25 mcg total) by mouth 2 (two) times a week   • fluticasone (FLONASE) 50 mcg/act nasal spray 2 sprays into each nostril daily   • gabapentin (Neurontin) 100 mg capsule Take 1 capsule (100 mg total) by mouth 2 (two) times a day   • Lancets (ONETOUCH ULTRASOFT) lancets by Other route 2 (two) times a day Use as instructed   • mirtazapine (REMERON) 15 mg tablet take 1 tablet by mouth at bedtime   • NIFEdipine ER (ADALAT CC) 30 MG 24 hr tablet Take 1 tablet twice a day  • olopatadine (PATANOL) 0 1 % ophthalmic solution Administer 1 drop to both eyes 2 (two) times a day   • OneTouch Delica Lancets 60D MISC Inject as directed 2 (two) times a day Use to test   • oxyCODONE (Roxicodone) 5 immediate release tablet Take 0 5 tablets (2 5 mg total) by mouth 2 (two) times a day as needed (cancer pain) Max Daily Amount: 5 mg   • pantoprazole (PROTONIX) 40 mg tablet Take 1 tablet (40 mg total) by mouth 2 (two) times a day   • polyethylene glycol (MIRALAX) 17 g packet Take 17 g by mouth daily   • pravastatin (PRAVACHOL) 40 mg tablet Take 1 tablet (40 mg total) by mouth daily   • Probiotic Product (Align) 4 MG CAPS Take by mouth     No current facility-administered medications on file prior to visit  She is allergic to clonidine derivatives, diflucan [fluconazole], flagyl [metronidazole], carvedilol, latex, lipitor [atorvastatin], metoprolol, other, and shingrix [zoster vac recomb adjuvanted]       Review of Systems   Constitutional: Negative for activity change, appetite change, fatigue and unexpected weight change  Eyes: Negative for visual disturbance  Respiratory: Positive for shortness of breath  Negative for cough, chest tightness and wheezing  Cardiovascular: Negative for chest pain, palpitations and leg swelling  Breast: Patient denies tenderness, nipple discharge, masses, or erythema     Gastrointestinal: Negative for abdominal distention, abdominal pain, blood in stool, constipation, diarrhea, nausea and vomiting  Heartburn   Endocrine: Negative for cold intolerance and heat intolerance  Genitourinary: Negative for decreased urine volume, difficulty urinating, dysuria, frequency, hematuria, menstrual problem, pelvic pain, urgency, vaginal bleeding, vaginal discharge and vaginal pain  Occasional urgency   Musculoskeletal: Positive for arthralgias (Knees)  Skin: Negative for rash  Neurological: Negative for weakness, light-headedness, numbness and headaches  Hematological: Does not bruise/bleed easily  Psychiatric/Behavioral: Negative for agitation, behavioral problems and sleep disturbance  The patient is not nervous/anxious  Objective:    Vitals:    06/26/23 1419   BP: 130/50   BP Location: Right arm   Patient Position: Sitting   Cuff Size: Standard   Weight: 50 6 kg (111 lb 9 6 oz)   Height: 5' (1 524 m)            Physical Exam  Vitals and nursing note reviewed  Exam conducted with a chaperone present  Constitutional:       Appearance: Normal appearance  She is normal weight  Eyes:      General: No scleral icterus  Right eye: No discharge  Left eye: No discharge  Extraocular Movements: Extraocular movements intact  Pulmonary:      Effort: Pulmonary effort is normal  No respiratory distress  Chest:   Breasts:     Right: Normal       Left: Normal           Comments: Status post left lumpectomy  Incision below the left nipple as well  Radiation tattoos present  Abdominal:      General: Abdomen is flat  There is no distension  Palpations: Abdomen is soft  Tenderness: There is no abdominal tenderness  There is no right CVA tenderness, left CVA tenderness or guarding  Genitourinary:     General: Normal vulva  Exam position: Lithotomy position  Comments: The pessary was removed and cleaned    Inspection of the vagina reveals a mild cystocele and rectocele along with some uterine prolapse  There is physiologic vaginal atrophy  There is some residual vaginal erosion posteriorly  There is no granulation tissue active bleeding  The uterus is small and nontender  There are no adnexal masses appreciated  the pessary was replaced  Lymphadenopathy:      Upper Body:      Right upper body: No supraclavicular, axillary or pectoral adenopathy  Left upper body: No supraclavicular, axillary or pectoral adenopathy  Neurological:      Mental Status: She is alert  Psychiatric:         Mood and Affect: Mood normal          Behavior: Behavior normal          Thought Content:  Thought content normal          Judgment: Judgment normal

## 2023-06-26 ENCOUNTER — OFFICE VISIT (OUTPATIENT)
Dept: OBGYN CLINIC | Facility: CLINIC | Age: 88
End: 2023-06-26
Payer: COMMERCIAL

## 2023-06-26 VITALS
WEIGHT: 111.6 LBS | SYSTOLIC BLOOD PRESSURE: 130 MMHG | DIASTOLIC BLOOD PRESSURE: 50 MMHG | HEIGHT: 60 IN | BODY MASS INDEX: 21.91 KG/M2

## 2023-06-26 DIAGNOSIS — Z85.3 HISTORY OF CANCER OF LEFT BREAST: ICD-10-CM

## 2023-06-26 DIAGNOSIS — N81.10 VAGINAL PROLAPSE: Primary | ICD-10-CM

## 2023-06-26 DIAGNOSIS — Z96.0 VAGINAL PESSARY PRESENT: ICD-10-CM

## 2023-06-26 DIAGNOSIS — C34.91 ADENOCARCINOMA, LUNG, RIGHT (HCC): ICD-10-CM

## 2023-06-26 DIAGNOSIS — Z46.89 PESSARY MAINTENANCE: ICD-10-CM

## 2023-06-26 PROCEDURE — 99213 OFFICE O/P EST LOW 20 MIN: CPT | Performed by: OBSTETRICS & GYNECOLOGY

## 2023-06-27 ENCOUNTER — OFFICE VISIT (OUTPATIENT)
Dept: HEMATOLOGY ONCOLOGY | Facility: CLINIC | Age: 88
End: 2023-06-27
Payer: COMMERCIAL

## 2023-06-27 ENCOUNTER — PATIENT OUTREACH (OUTPATIENT)
Dept: CASE MANAGEMENT | Facility: HOSPITAL | Age: 88
End: 2023-06-27

## 2023-06-27 VITALS
HEART RATE: 94 BPM | TEMPERATURE: 98 F | RESPIRATION RATE: 16 BRPM | BODY MASS INDEX: 22.28 KG/M2 | SYSTOLIC BLOOD PRESSURE: 156 MMHG | DIASTOLIC BLOOD PRESSURE: 60 MMHG | WEIGHT: 113.5 LBS | HEIGHT: 60 IN | OXYGEN SATURATION: 95 %

## 2023-06-27 DIAGNOSIS — C34.91 ADENOCARCINOMA, LUNG, RIGHT (HCC): Primary | ICD-10-CM

## 2023-06-27 PROCEDURE — 99215 OFFICE O/P EST HI 40 MIN: CPT | Performed by: INTERNAL MEDICINE

## 2023-06-27 NOTE — PROGRESS NOTES
Abner Webster  1934  [address] [facility]  [address] [facility] [place] [address]    DISCUSSION/SUMMARY:    63-year-old female with history of stage IA (pT1c, pN0, cM0, G1, ER+, KY+, HER2-) left breast cancer (diagnosed in September 2018) and a history of primary pulmonary adenocarcinoma, TTF-1 +, non-small cell lung carcinoma diagnosed in November 2021 status post 15 fractions RT  Recently patient began to have more dyspnea on exertion and chest pain  Recent PET/CT demonstrated a new right major fissure 1 cm nodule with mild FDG activity of uncertain clinical significance  There were additional new small pleural nodules along the right major fissure and a moderate to large pleural effusion with minimal FDG activity  Prior diagnostic and therapeutic thoracentesis demonstrated TTF-1 + adenocarcinoma  At this time, Mrs Abena Gibson is to continue with the thoracentesis as needed  Pleur-evac insertion is on hold for the time being  As above, patient has gone 3 weeks without need for thoracentesis  We rediscussed other treatment options  Recent [facility] results became available; patient has PD-L1 IHC of 30%, positive  We discussed pembrolizumab at length, potential benefits versus side effects and toxicities  Patient/family were given literature in Maced[place] to review  Mrs Abena Gibson is to go home and make some decisions (on whether to begin pembrolizumab or not)  If patient decides to begin, she will need baseline repeat scans  NCCN guidelines 3 2023 state that for patients with metastatic non-small cell lung carcinoma, adenocarcinoma, PDL 1 >50%, category 1 upfront treatment is pembrolizumab  For patients with PD-L1 between 1 and 49%, pembrolizumab alone is category 2B, useful in certain segments at this  As above, patient is refusing chemotherapy      At this time, patient is to return in 4 weeks but this may change depending upon decisions patient makes  No recent blood work  This will need to be rechecked  If patient is a candidate for the pembrolizumab, blood work will be routine  If not, medical oncology will order a repeat panel  Patient has been seen/evaluated by Dr Yunier Villa, palliative care; follow-ups are ongoing  Mrs Auguste/family know to call the hematology/oncology office if there are any other questions or concerns  Carefully review your medication list and verify that the list is accurate and up-to-date  Please call the hematology/oncology office if there are medications missing from the list, medications on the list that you are not currently taking or if there is a dosage or instruction that is different from how you're taking that medication  Patient goals and areas of care: Therapeutic thoracentesis as needed, make decisions regarding pembrolizumab  Barriers to care: None  Patient is able to self-care with help of family members  ______________________________________________________________________________________    Chief Complaint   Patient presents with   • Follow-up     Adenocarcinoma, lung, right      Oncology History   History of left breast cancer   8/10/2018 Biopsy    Left breast biopsy  Invasive mucinous  Grade 1      Her 2 1+     9/11/2018 Surgery    Left lumpectomy  Invasive mucinous carcinoma  Grade 1  1 1 cm in size  Margins negative      Her 2 1+  Stage 1A     Adenocarcinoma, lung, right ([facility] [address] )   11/2021 Initial Diagnosis    Adenocarcinoma, lung, right ([facility] [address] )     11/15/2021 Biopsy    Lung, Right Middle Lobe, mass:  - Adenocarcinoma  Comment: Immunohistochemistry is positive in the tumor cells for TTF-1 and negative for p40 and Gata3  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 a primary pulmonary adenocarcinoma       1/19/2022 - 2/8/2022 Radiation    Course: C1    Plan ID Energy Fractions Dose per Fraction (cGy) Dose Correction (cGy) Total Dose Delivered (cGy) Elapsed Days   RML 6X 15 / 15 400 0 6,000 20      Treatment dates:  1/19/2022 - 2/8/2022        History of Present Illness: 80-year-old female with distant history of left breast cancer, more recent diagnosis of early stage right middle lobe non-small cell lung carcinoma (TTF-1 + adenocarcinoma) status post RT  The patient has had more problems with shortness of breath and dyspnea on exertion recently  Repeat scans demonstrated a 1 cm right major fissure nodule seen on the December 2022 CAT scan and more recently on a PET/CT  Patient also had a moderate to large pleural effusion  Recent cytology demonstrated recurrent/metastatic non-small cell lung carcinoma, adenocarcinoma  Patient returns for follow-up and is accompanied by her daughters  Mrs Auguste has been undergoing thoracentesis as needed  Patient states feeling pretty good, about the same as before  Last thoracentesis was 3 weeks ago  No shortness of breath at rest, minimal dyspnea on exertion  No pain control issues  No cough, sputum or hemoptysis  Appetite is okay, no nausea or vomiting  No fevers, no infections  Activities are limited but the same as before  Review of Systems   Constitutional: Positive for activity change and fatigue  HENT: Negative  Eyes: Negative  Respiratory: Negative          + Dyspnea on exertion   Cardiovascular: Negative for chest pain  Gastrointestinal: Negative  Endocrine: Negative  Genitourinary: Negative  Musculoskeletal: Negative  Skin: Negative  Allergic/Immunologic: Negative  Neurological: Negative  Hematological: Negative  Psychiatric/Behavioral: Negative  All other systems reviewed and are negative      Patient Active Problem List   Diagnosis   • Iliac artery stenosis, bilateral (HCC)   • GERD (gastroesophageal reflux disease)   • Arthritis   • Hypercholesterolemia   • Hypertension   • EKG abnormalities • Anemia   • Neck pain   • Dysuria   • Chronic left shoulder pain   • Fall from other slipping, tripping, or stumbling   • Abnormal EKG   • Abnormal mammogram   • Atherosclerosis of native artery of extremity with intermittent claudication ([facility])   • Back pain   • Bilateral carotid artery stenosis   • Carotid stenosis, asymptomatic   • 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   • Chronic myofascial pain   • Chronic pain of lower extremity   • CKD (chronic kidney disease) stage 3, GFR 30-59 ml/min (formerly [facility])   • Diabetic retinopathy ([facility] [address] )   • DMII (diabetes mellitus, type 2) (formerly [facility])   • Gout   • Hypomagnesemia   • Atherosclerosis of other specified arteries   • Lymphadenopathy   • Nephrolithiasis   • Osteoporosis   • PAD (peripheral artery disease) (formerly [facility])   • Primary generalized (osteo)arthritis   • Persistent proteinuria   • Renal cyst   • Vaginal prolapse   • Vitamin D deficiency   • Acquired complex cyst of kidney   • History of left breast cancer   • Stricture of artery (formerly [facility])   • Claudication in peripheral vascular disease (formerly [facility])   • Hemorrhoids   • Tinea unguium   • Diabetic neuropathy ([facility])   • Bradycardia   • Glottic insufficiency   • Reflux laryngitis   • Paresis of left vocal fold   • Muscle tension dysphonia   • Laryngocele   • Anterior glottic web   • Viral upper respiratory tract infection   • 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[facility])   • Renal artery stenosis (formerly [facility])   • Left ear pain   • Cervical spine disease   • Osteopenia of multiple sites   • Type 2 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, bilateral (formerly [facility])   • Lung mass   • CKD (chronic kidney disease)   • Adenocarcinoma, lung, right (formerly [facility])   • Encounter for annual routine gynecological examination   • Encounter for screening mammogram for breast cancer   • History of cancer of left breast   • Dense breast tissue on mammogram   • Left leg swelling   • Hypercalcemia   • Primary osteoarthritis of "left knee   • Embolism and thrombosis of arteries of the lower extremities ([facility])   • Secondary hyperparathyroidism ([place] [address] )   • Postmenopausal bleeding   • Vaginal pessary present     Past Medical History:   Diagnosis Date   • Arthritis    • Back pain    • Benign essential hypertension     LAST ASSESSED: [id number]   • Carotid artery stenosis    • CKD (chronic kidney disease) stage 2, GFR 60-89 ml/min    • Constipation, chronic     \"drinks prune juice\"   • Diabetes mellitus ([place] [address] )    • DMII (diabetes mellitus, type 2) ([facility])    • Epiglottitis     RESOLVED: [id number]   • Gastritis    • GERD (gastroesophageal reflux disease)    • Gout    • H/O blood clots     left lower leg,,,11 yrs ago   • History of stenosis of renal artery     RESOLVED: [id number]   • Hypercholesteremia    • Hypercholesterolemia    • Hyperlipidemia    • Hyperparathyroidism ([place] [address] )    • Hypertension    • Iliac artery stenosis, bilateral ([facility])    • Kidney stone    • Lobular carcinoma ([place] [address] ) 09/06/2017    Left   • Lung cancer ([place] [address] )    • Mucinous carcinoma of breast, left ([place] [address] ) 09/11/2018    Left   • Presence of pessary    • Renal artery stenosis ([facility])    • Renal disorder    • Renovascular hypertension     RESOLVED: [id number]   • Segmental and somatic dysfunction of cervical region     RESOLVED: 64ILP0088   • Segmental and somatic dysfunction of lumbar region     RESOLVED: 44JTC9932   • Segmental and somatic dysfunction of pelvic region     RESOLVED: 89PRV1159   • Segmental and somatic dysfunction of thoracic region     RESOLVED: 83ICP5830   • Somatic dysfunction of abdominal region     RESOLVED: 28TNX9778   • Somatic dysfunction of head region     RESOLVED: 68QXR2110   • Somatic dysfunction of lower extremities     RESOLVED: 13EVK8647   • Somatic dysfunction of rib region     RESOLVED: 64HQU1736   • Somatic dysfunction of thoracic region     RESOLVED: 48NFV3483   • Somatic dysfunction of upper extremities     RESOLVED: 90KSA1896   • Teeth missing      Past " Surgical History:   Procedure Laterality Date   • ANGIOPLASTY / STENTING ILIAC     • BREAST BIOPSY Left 08/10/2018    U/S BX- mucinous ca   • BREAST BIOPSY Left 08/07/2017    U/S BX   • BREAST LUMPECTOMY Left 09/06/2017    LAST ASSESSED: [id number]   • BREAST LUMPECTOMY Left 09/11/2018   • BREAST SURGERY Left     biopsy   • CATARACT EXTRACTION Bilateral    • CHOLECYSTECTOMY     • COLONOSCOPY     • EYE SURGERY     • ILIAC VEIN ANGIOPLASTY / STENTING Right     INITIAL STENOSIS: ONSET: [id number]   • IR BIOPSY LUNG  11/15/2021   • IR THORACENTESIS  2/20/2023   • IR THORACENTESIS  3/28/2023   • IR THORACENTESIS  4/11/2023   • IR THORACENTESIS  4/19/2023   • IR THORACENTESIS  4/26/2023   • IR THORACENTESIS  5/3/2023   • IR THORACENTESIS  5/10/2023   • IR THORACENTESIS  5/17/2023   • IR THORACENTESIS  5/24/2023   • IR THORACENTESIS  5/31/2023   • IR THORACENTESIS  6/7/2023   • KNEE ARTHROSCOPY Right    • KNEE SURGERY Right     ONSET: [id number]   • MAMMO NEEDLE LOCALIZATION LEFT (ALL INC) Left 9/11/2018   • MAMMO NEEDLE LOCALIZATION LEFT (ALL INC) Left 9/6/2017   • NH ESOPHAGOGASTRODUODENOSCOPY TRANSORAL DIAGNOSTIC N/A 7/26/2018    Procedure: ESOPHAGOGASTRODUODENOSCOPY (EGD); Surgeon: Giselle Storey DO;  Location: [facility]; Service: Gastroenterology   • NH [address] EXC SILVA&/STRPG CORDS/EPIGL MCRSCP/TLSCP N/A 6/10/2016    Procedure: MICRO DIRECT LARYNGOSCOPY WITH VOCAL FOLD MASS EXCISION ;  Surgeon: Bhavin Fischer MD;  Location: [facility];  Service: ENT   • NH MASTECTOMY PARTIAL Left 9/6/2017    Procedure: LUMPECTOMY BREAST NEEDLE LOCALIZED WITH FAXITRON NEEDLE @ 0830;  Surgeon: Carlos Manuel Garcia MD;  Location: [facility];  Service: General   • NH PERQ DEVICE PLACEMENT BREAST LOC 1ST LES W/GDNCE Left 9/11/2018    Procedure: BREAST LUMPECTOMY; BREAST NEEDLE LOCALIZATION (NEEDLE LOC AT 1330);   Surgeon: Alvarado Tapia MD;  [facility];  Service: Surgical Oncology   • THROAT SURGERY      lump removed   • TUBAL LIGATION • UPPER GASTROINTESTINAL ENDOSCOPY     • US GUIDANCE BREAST BIOPSY LEFT EACH ADDITIONAL Left 2017   • US GUIDED BREAST BIOPSY LEFT COMPLETE Left 8/10/2018   • US GUIDED BREAST BIOPSY LEFT COMPLETE Left 2017   • VASCULAR SURGERY       Family History   Problem Relation Age of Onset   • Heart disease Brother    • Heart disease Maternal Grandmother    • Diabetes Other         of other type with arthropathy, without long term current use of insulin   • Hypertension Other    • No Known Problems Mother    • Gout Family    • Rheum arthritis Family    • Lupus Family         systematic erythematosus   • Heart disease Family    • Stroke Family         syndrome   • Stroke Maternal Uncle         syndrome   • Stroke Maternal Aunt         syndrome   • No Known Problems Father    • No Known Problems Daughter    • No Known Problems Maternal Grandfather    • No Known Problems Paternal Grandmother    • No Known Problems Paternal Grandfather    • No Known Problems Daughter    • No Known Problems Daughter    • No Known Problems Maternal Aunt    • No Known Problems Maternal Aunt    • No Known Problems Maternal Aunt      Social History     Socioeconomic History   • Marital status:       Spouse name: Not on file   • Number of children: Not on file   • Years of education: Not on file   • Highest education level: Not on file   Occupational History   • Occupation: retired   Tobacco Use   • Smoking status: Former     Packs/day: 1 00     Years: 55 00     Total pack years: 55 00     Types: Cigarettes     Start date: 80     Quit date:      Years since quittin 4   • Smokeless tobacco: Never   Vaping Use   • Vaping Use: Never used   Substance and Sexual Activity   • Alcohol use: Not Currently   • Drug use: No   • Sexual activity: Not Currently     Partners: Male   Other Topics Concern   • Not on file   Social History Narrative    Daily caffeine consumption, 1 serving a day      Social Determinants of Health     Financial Resource Strain: Low Risk  (10/23/2022)    Overall Financial Resource Strain (CARDIA)    • Difficulty of Paying Living Expenses: Not hard at all   Food Insecurity: No Food Insecurity (2/24/2021)    Hunger Vital Sign    • Worried About Running Out of Food in the Last Year: Never true    • Ran Out of Food in the Last Year: Never true   Transportation Needs: No Transportation Needs (10/23/2022)    PRAPARE - Transportation    • Lack of Transportation (Medical): No    • Lack of Transportation (Non-Medical): No   Physical Activity: Inactive (6/20/2019)    Exercise Vital Sign    • Days of Exercise per Week: 0 days    • Minutes of Exercise per Session: 0 min   Stress: No Stress Concern Present (6/20/2019)    [address]    • Feeling of Stress :  Only a little   Social Connections: Unknown (6/20/2019)    Social Connection and Isolation Panel [NHANES]    • Frequency of Communication with Friends and Family: More than three times a week    • Frequency of Social Gatherings with Friends and Family: More than three times a week    • Attends Church Services: Not on file    • Active Member of Clubs or Organizations: Not on file    • Attends Club or Organization Meetings: Not on file    • Marital Status: Not on file   Intimate Partner Violence: Not on file   Housing Stability: Not on file       Current Outpatient Medications:   •  Acetaminophen 325 MG CAPS, Take 500 mg by mouth every 8 (eight) hours, Disp: , Rfl:   •  aspirin (ADULT ASPIRIN EC LOW STRENGTH) 81 mg EC tablet, Take 1 tablet by mouth daily, Disp: , Rfl:   •  calcitriol (ROCALTROL) 0 25 mcg capsule, Take 1 capsule (0 25 mcg total) by mouth 2 (two) times a week, Disp: 24 capsule, Rfl: 3  •  fluticasone (FLONASE) 50 mcg/act nasal spray, 2 sprays into each nostril daily, Disp: 16 g, Rfl: 0  •  gabapentin (Neurontin) 100 mg capsule, Take 1 capsule (100 mg total) by mouth 2 (two) times a day, Disp: 180 capsule, Rfl: 0  •  Lancets (ONETOUCH ULTRASOFT) lancets, by Other route 2 (two) times a day Use as instructed, Disp: 100 each, Rfl: 5  •  mirtazapine (REMERON) 15 mg tablet, take 1 tablet by mouth at bedtime, Disp: 30 tablet, Rfl: 0  •  NIFEdipine ER (ADALAT CC) 30 MG 24 hr tablet, Take 1 tablet twice a day , Disp: 180 tablet, Rfl: 0  •  olopatadine (PATANOL) 0 1 % ophthalmic solution, Administer 1 drop to both eyes 2 (two) times a day, Disp: 5 mL, Rfl: 0  •  OneTouch Delica Lancets 85U MISC, Inject as directed 2 (two) times a day Use to test, Disp: 200 each, Rfl: 0  •  oxyCODONE (Roxicodone) 5 immediate release tablet, Take 0 5 tablets (2 5 mg total) by mouth 2 (two) times a day as needed (cancer pain) Max Daily Amount: 5 mg, Disp: 15 tablet, Rfl: 0  •  pantoprazole (PROTONIX) 40 mg tablet, Take 1 tablet (40 mg total) by mouth 2 (two) times a day, Disp: 60 tablet, Rfl: 5  •  polyethylene glycol (MIRALAX) 17 g packet, Take 17 g by mouth daily, Disp: 30 each, Rfl: 5  •  pravastatin (PRAVACHOL) 40 mg tablet, Take 1 tablet (40 mg total) by mouth daily, Disp: 90 tablet, Rfl: 3  •  Probiotic Product (Align) 4 MG CAPS, Take by mouth, Disp: , Rfl:     Allergies   Allergen Reactions   • Clonidine Derivatives Swelling   • Diflucan [Fluconazole] Rash   • Flagyl [Metronidazole] Anaphylaxis   • Carvedilol Hives     And legs swelled   • Latex Rash   • Lipitor [Atorvastatin]      Legs swelled   • Metoprolol Dizziness     Low BP and orthostatic symptoms  • Other Palpitations     IV DYE   • Shingrix [Zoster Vac Recomb Adjuvanted] Hives       Vitals:    06/27/23 1440   BP: 156/60   Pulse: 94   Resp: 16   Temp: 98 °F (36 7 °C)   SpO2: 95%     Physical Exam  Constitutional:       Appearance: She is well-developed  Comments: But relatively well-nourished female, no respiratory distress, no signs of pain   HENT:      Head: Normocephalic and atraumatic        Right Ear: External ear normal       Left Ear: External ear normal  Eyes:      Conjunctiva/sclera: Conjunctivae normal       Pupils: Pupils are equal, round, and reactive to light  Cardiovascular:      Rate and Rhythm: Normal rate and regular rhythm  Heart sounds: Normal heart sounds  Pulmonary:      Effort: Pulmonary effort is normal       Breath sounds: Normal breath sounds  Comments: Fair entry bilaterally, slightly decreased on right, rales on right  Abdominal:      General: Bowel sounds are normal       Palpations: Abdomen is soft  Musculoskeletal:         General: Normal range of motion  Cervical back: Normal range of motion and neck supple  Skin:     General: Skin is warm  Neurological:      Mental Status: She is alert and oriented to person, place, and time  Deep Tendon Reflexes: Reflexes are normal and symmetric  Psychiatric:         Behavior: Behavior normal          Thought Content: Thought content normal          Judgment: Judgment normal      Extremities: 0-1+ bilateral lower extreme edema, no cords, pulses are 1+  Lymphatics: No adenopathy in the neck, supraclavicular region, axilla and groin bilaterally    Labs    4/10/2023 BUN = 28 creatinine = 1 31    2/11/2023 WBC = 7 83 hemoglobin = 12 1 hematocrit = 39 3 MCV = 90 platelet = 885 neutrophil = 62% lymphocyte = 27% monocyte = 10% eosinophil = 1% BUN = 35 creatinine = 1 57 GFR = 29 calcium = 9 3    Imaging    1/20/2023 PET/CT    IMPRESSION:     1  Patient's known right middle lobe lung cancer demonstrates near-complete to complete metabolic response to therapy      2   Patient's known new 1 0 cm right major fissural nodule on CT of chest dated 12/23/2022 demonstrates minimal to mild FDG activity and is of uncertain clinical significance with differential diagnosis including benign inflammatory nodule versus developing malignancy of low metabolic activity    There are additional new/ progressive small pleural nodules along the right major fissure which are beyond the limits of resolution for PET CT  There is a moderate to large pleural effusion with minimal to mild FDG activity  The overall constellation of findings is suspicious for developing pleural metastatic disease with possible malignant pleural effusion  Consider correlation with fluid/tissue sampling as clinically indicated      3  Additional scattered solid and groundglass appearing lung nodules involving both lungs without significant interval change and for which continued follow-up is recommended      4   Multinodular thyroid gland which can be further characterized with ultrasound as clinically indicated      Pathology        Case Report   Non-gynecologic Cytology                          Case: [id number]                                   Authorizing Provider: Eustace Landau, CRNP      Collected:           02/20/2023 1334               Ordering Location:     [facility]      Received:            02/20/2023 [address] [place] [address] Interventional                                                                              Radiology                                                                     Pathologist:           Sanjiv Arreola MD                                                                  Specimens:   A) - Thoracentesis                                                                                   B) - Thoracentesis, cell block                                                             Final Diagnosis   A  B  Thoracentesis,  (ThinPrep and cell block preparations):  Conclusive evidence of malignancy  Metastatic adenocarcinoma, consistent with lung primary  Satisfactory for evaluation      Comment: Immunohistochemistry was performed on cell block   The tumor cells are positive for MOC31, ALISA-EP4, TTF-1, napsin A and negative for D2-40, supporting the above diagnosis      Intradepartmental consultation is in agreement       Electronically signed by Sanjiv Arreola MD on 2/23/2023 at 11:18 AM       Case Report   Surgical Pathology Report                         Case: [id number]                                    Authorizing Provider: Jacinta Barajas MD    Collected:           11/15/2021 1545               Ordering Location:     [facility]      Received:            11/15/2021 1559                                    [address] [facility]                                                    Pathologist:           Miguel Stein MD                                                    Specimen:    Lung, Right Middle Lobe, RML Lung mass                                                     Final Diagnosis   A  Lung, Right Middle Lobe, mass:  - Adenocarcinoma  See comment      Comment: Immunohistochemistry is positive in the tumor cells for TTF-1 and negative for p40 and Gata3  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 a primary pulmonary adenocarcinoma     Electronically signed by Hyacinth Cline MD on 11/17/2021 at  9:04 AM       Case Report   Surgical Pathology Report                         Case: [id number]                                    Authorizing Provider: Sandra Yo MD              Collected:           09/11/2018 1437               Ordering Location:     [facility]        Received:            09/11/2018 [facility]                                      [facility]                                                       Pathologist:           Tristin Dang MD                                                                  Specimens:   A) - Breast, Left, Left breast lumpectomy                                                            B) - Breast, Left, Left breast lumpectomy-anterior margin-green                                      C) - Breast, Left, Left breast lumpectomy-inferior margin-blue                                       D) - Breast, Left, Left breast lumpectomy-lateral margin-red                                         E) - Breast, Left, Left breast lumpectomy-medial margin-yellow                                       F) - Breast, Left, Left breast lumpectomy-superior margin-orange                           Final Diagnosis   MUCINOUS CARCINOMA OF BREAST STAGING SUMMARY (includes specimens A to F of this case and prior biopsy [id number])  1  Specimen Identification     - Procedure: lumpectomy     - Lymph Node Sampling: no     - Specimen Laterality: left     - Tumor Site (Quadrant; Position/o'clock): upper outer quadrant, 1:00 - 2:00   2  Invasive Tumor:     - Histologic Type: mucinous carcinoma     - Tumor Size (pT) [Size of largest invasive carcinoma]: 1 1 cm     -  Grade (G): [place] Histologic Score; Grade  I of III  * Glandular (Acinar) / Tubular Differentiation: 3       * Nuclear Pleomorphism: 1       * Mitotic Rate: 1     - Tumor Focality: single focus     - Macroscopic and Microscopic Extent of Tumor:       -- Skin: N/A       -- Nipple:N/A       -- Skeletal muscle: N/A  3  Lymphovascular invasion: absent  4  Dermal lymphovascular invasion: N/A  5  In situ tumor     - Ductal carcinoma in Situ (DCIS): absent   6  Margins:     - Invasive Carcinoma: negative     - DCIS: N/A   7  Lymph nodes (pN): no lymph node submitted  8  Treatment Effect, Response to Presurgical (Neoadjuvant) Therapy: no known presurgical therapy   9  Additional Pathologic Findings:     - Proliferative fibrocystic changes, including usual ductal hyperplasia, cysts, apocrine metaplasia, fibroadenomatous change, and duct ectasia  - Biopsy site changes  10  Microcalcifications: present and associated with benign breast parenchyma  11  Ancillary Studies: (performed on case [id number])     - Estrogen receptor: 100%, Strong, positive     - Progesterone receptor: 100%, strong, positive     - HER2 by IHC: 1+, negative     - Repeat HER2 testing (2013 ASCO/CAP Recommendations):  Not indicated  - Best representative tumor block: A7, A8 and A9       -- Sufficient tumor present for          Agendia Mammaprint/Blueprint (1 cm2 of invasive tumor in aggregate): Yes  MI Profile/Foundation One (at least 5 x 5 mm of tumor): Yes  12  Clinical History: abnormal findings on diagnostic imaging of breast  13  Pathologic Stage Classification (pTNM, AJCC 8th Edition): pT1c, pNX   8th ed, AJCC Anatomic Stage: at least Ia  14  8th ed  AJCC Prognostic Stage (use AJCC update): IA        Final Diagnoses for each Specimen of this Case  A  Left breast, lumpectomy:  - Mucinous carcinoma, see synoptic report, above  - All lumpectomy margins are uninvolved by carcinoma      Comment: Intradepartmental consultation is in agreement       B  Left breast, anterior margin, excision:  - Benign breast parenchyma  - Negative for invasive carcinoma or carcinoma in situ      C  Left breast, inferior margin, excision:  - Benign breast parenchyma with proliferative fibrocystic changes, including usual ductal hyperplasia with apocrine metaplasia and duct ectasia  - Negative for invasive carcinoma or carcinoma in situ      D  Left breast, lateral margin, excision:  - Benign breast parenchyma  - Negative for invasive carcinoma or carcinoma in situ      E  Left breast, medial margin, excision:  - Benign breast parenchyma  - Microcalcifications are present and associated with benign glands  - Negative for invasive carcinoma or carcinoma in situ      F  Left breast, superior margin, excision:  - Benign breast parenchyma    - Negative for invasive carcinoma or carcinoma in situ       Electronically signed by Cely Bob MD on 9/14/2018 at  3:17 PM

## 2023-06-27 NOTE — PROGRESS NOTES
LSW contacted pt's dtr, Joe Ruvalcaba this morning t follow up with patient and offer support and/or any resources she may need  Joe Ruvalcaba reports patient is doing okay  She has good days and some bad days  Her pain is better controlled with palliative care  She still able to ambulate independently and she still loves to cook  Sometimes she gets SOB and then takes a break  Patient lives with her S/O Kale Oro and has three supportive daughters  Pt is not in any need of [facility] resources at this time  Patient has a follow up appointment this afternoon with Dr Wai CAIW provided emotional support  LSW will close the case at this time unless there is a need that soul arise

## 2023-06-30 ENCOUNTER — TELEPHONE (OUTPATIENT)
Dept: PALLIATIVE MEDICINE | Facility: CLINIC | Age: 88
End: 2023-06-30

## 2023-06-30 ENCOUNTER — OFFICE VISIT (OUTPATIENT)
Dept: PALLIATIVE MEDICINE | Facility: CLINIC | Age: 88
End: 2023-06-30

## 2023-06-30 VITALS
SYSTOLIC BLOOD PRESSURE: 140 MMHG | HEIGHT: 60 IN | DIASTOLIC BLOOD PRESSURE: 52 MMHG | TEMPERATURE: 97.5 F | BODY MASS INDEX: 22.25 KG/M2 | HEART RATE: 83 BPM | OXYGEN SATURATION: 94 % | WEIGHT: 113.32 LBS

## 2023-06-30 DIAGNOSIS — G89.3 CANCER-RELATED PAIN: ICD-10-CM

## 2023-06-30 RX ORDER — OXYCODONE HYDROCHLORIDE 5 MG/1
2.5 TABLET ORAL 2 TIMES DAILY PRN
Qty: 20 TABLET | Refills: 0 | Status: SHIPPED | OUTPATIENT
Start: 2023-06-30

## 2023-06-30 RX ORDER — MIRTAZAPINE 15 MG/1
15-30 TABLET, FILM COATED ORAL
Qty: 60 TABLET | Refills: 0 | Status: SHIPPED | OUTPATIENT
Start: 2023-06-30

## 2023-06-30 NOTE — PROGRESS NOTES
Outpatient Follow-Up - Palliative and Supportive Care   Virgilio Maharaj 80 y.o. female [id number]    Assessment & Plan  Problem List Items Addressed This Visit    None  Visit Diagnoses     Cancer-related pain        Relevant Medications    mirtazapine (REMERON) 15 mg tablet    oxyCODONE (Roxicodone) 5 immediate release tablet          Medications adjusted this encounter:  Requested Prescriptions     Signed Prescriptions Disp Refills   • mirtazapine (REMERON) 15 mg tablet 60 tablet 0     Sig: Take 1-2 tablets (15-30 mg total) by mouth daily at bedtime   • oxyCODONE (Roxicodone) 5 immediate release tablet 20 tablet 0     Sig: Take 0.5 tablets (2.5 mg total) by mouth 2 (two) times a day as needed (cancer pain) Max Daily Amount: 5 mg     No orders of the defined types were placed in this encounter. Medications Discontinued During This Encounter   Medication Reason   • oxyCODONE (Roxicodone) 5 immediate release tablet Reorder   • mirtazapine (REMERON) 15 mg tablet Reorder   - Continue mirtazapine for appetite at increase dose. Trial increase in dose for somnolence. = Hydrocortisone tried and failed her.   = Family prefer to avoid olanzapine for somnolence side effects.  - Pleural effusion decreasing with good Med/Onc treatments. Virgilio Maharaj was seen today for symptoms and planning cares related to above illnesses. I have reviewed the patient's controlled substance dispensing history in the Prescription Drug Monitoring Program in compliance with the [facility] regulations before prescribing any controlled substances. They are invited to continue to follow with us. If there are questions or concerns, please contact us through our clinic/answering service 24 hours a day, seven days a week.     Charito Romero MD  [address] [facility]  [phone]      Visit Information    Accompanied By: Family member    Source of History: Self, Family member    History Limitations: pt speaks Lao with myself and her bilingual daughters    Contacts:  Yumiko Del Rio               Daughters Rell Preston, [address]    Chief Complaint  Chief Complaint   Patient presents with   • Follow-up       History of Present Illness      Ivania Long is a 80 y.o. female who presents in follow up of symptoms related to recurrent NSCLC, R side, asso with malignant pleural effusion. 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 Pertinent issues include: symptom management, assessment of goals of care, disease process education and discussion of prognosis      Since last visit, pt and family note that her appetite and wt have stabilized, though not improved, and she is quite tired. Discussion is held re: alternative appetite stimulants, which have either failed her or would be asso with worse somnolence. Family advised on behavioral strategies for increased caloric intake, and they are willing to trial this, as well as mirtazapine at increased dose. Continues rare use of oxyIR, with good effect. Previously, trialled metoprolol, and stopped when her BP dropped. She felt dizzy, and continues to have symptoms, even after ceasing this med. She has a poor appetite and low energy, and her family report that she is rather poor with her water intake. The family have discussed their misgivings about pleural cath, and -- given how uncomfortable the pleural effusions are -- the pt and family are agreeable to placement now. We agreed today to wait on this, as the family also have not yet had a chance to receive therapy recs from primary Onc provider, who was previously considering some degree of possible palliative immunotherapy. Should this reduce her effusion, it would be less advantageous to have an indwelling cath to the pleural area.       Previously, we noted:     "  Prior to our visit, Dr. Tess Bowman has clearly and carefully reviewed multiple options with the family, and has documented that the patient's daughter led most of the discussion. Surgery and XRT are not appropriate for her disease."    Past medical, surgical, social, and family histories are reviewed and pertinent updates are made. Review of Systems   Constitutional: Positive for decreased appetite and weight loss. Negative for weight gain. HENT: Negative for hoarse voice and nosebleeds. Eyes: Negative for vision loss in left eye and vision loss in right eye. Cardiovascular: Negative for chest pain and dyspnea on exertion. Respiratory: Negative for cough and shortness of breath. Endocrine: Negative for polydipsia, polyphagia and polyuria. Skin: Negative for flushing and itching. Musculoskeletal: Negative for falls. Gastrointestinal: Positive for nausea and vomiting. Negative for anorexia and jaundice. Genitourinary: Negative for frequency. Neurological: Negative for dizziness. Psychiatric/Behavioral: Negative for depression and memory loss. The patient is not nervous/anxious. Vital Signs    /52 (BP Location: Left arm, Patient Position: Sitting, Cuff Size: Standard)   Pulse 83   Temp 97.5 °F (36.4 °C) (Temporal)   Ht 5' (1.524 m)   Wt 51.4 kg (113 lb 5.1 oz)   LMP  (LMP Unknown)   SpO2 94%   BMI 22.13 kg/m²     Physical Exam and Objective Data  Physical Exam  Constitutional:       General: She is not in acute distress. Appearance: She is ill-appearing. She is not toxic-appearing or diaphoretic. Comments: frail   HENT:      Head: Normocephalic and atraumatic. Right Ear: External ear normal.      Left Ear: External ear normal.   Eyes:      General:         Right eye: No discharge. Left eye: No discharge. Conjunctiva/sclera: Conjunctivae normal.      Pupils: Pupils are equal, round, and reactive to light. Neck:      Trachea: No tracheal deviation. Cardiovascular:      Rate and Rhythm: Regular rhythm. Tachycardia present.    Pulmonary:      Effort: Pulmonary effort is normal. No respiratory distress. Breath sounds: No stridor. Abdominal:      General: There is no distension. Palpations: Abdomen is soft. Skin:     General: Skin is warm and dry. Coloration: Skin is pale. Findings: No erythema or rash. Neurological:      General: No focal deficit present. Mental Status: She is oriented to person, place, and time. Mental status is at baseline. Cranial Nerves: No cranial nerve deficit. Psychiatric:         Mood and Affect: Mood normal.         Behavior: Behavior normal.         Thought Content: Thought content normal.         Judgment: Judgment normal.           Radiology and Laboratory:  I personally reviewed and interpreted the following results: none new    35+ minutes was spent face to face with Joceline Raygoza and her family with greater than 50% of the time spent in counseling or coordination of care including: chart review; symptom pursuit; supportive listening; anticipatory guidance. .  All of the patient's or agent's questions were answered during this discussion.

## 2023-07-03 LAB
LEFT EYE DIABETIC RETINOPATHY: NORMAL
RIGHT EYE DIABETIC RETINOPATHY: NORMAL

## 2023-07-05 ENCOUNTER — HOSPITAL ENCOUNTER (OUTPATIENT)
Dept: RADIOLOGY | Facility: HOSPITAL | Age: 88
Discharge: HOME | End: 2023-07-05
Attending: INTERNAL MEDICINE
Payer: COMMERCIAL

## 2023-07-05 VITALS
OXYGEN SATURATION: 96 % | DIASTOLIC BLOOD PRESSURE: 75 MMHG | HEART RATE: 87 BPM | RESPIRATION RATE: 18 BRPM | SYSTOLIC BLOOD PRESSURE: 174 MMHG

## 2023-07-05 DIAGNOSIS — G89.3 CANCER-RELATED PAIN: ICD-10-CM

## 2023-07-05 DIAGNOSIS — C34.91 ADENOCARCINOMA, LUNG, RIGHT (HCC): ICD-10-CM

## 2023-07-05 PROCEDURE — 76937 US GUIDE VASCULAR ACCESS: CPT

## 2023-07-05 PROCEDURE — 76604 US EXAM CHEST: CPT | Performed by: PHYSICIAN ASSISTANT

## 2023-07-05 NOTE — BRIEF OP NOTE (RAD/CATH)
IR PROCEDURE NOT PERFORMED Procedure Note    PATIENT NAME: Rocio Little  : 1934  MRN: [id number]    Pre-op Diagnosis:   1. Adenocarcinoma, lung, right (HCC)      Post-op Diagnosis:   1. Adenocarcinoma, lung, right [facility])        Provider:   Alexandre Walker PA-C  Assistants:     No qualified resident was available, Resident is only observing    Estimated Blood Loss: None    Findings: Limited ultrasound of the right pleural space to assess for pleural effusion. Insufficient fluid and no safe window for thoracentesis. No thoracentesis performed.     Specimens: None    Complications:  None immediately    Anesthesia: none    Alexandre Walker PA-C     Date: 2023  Time: 1:59 PM

## 2023-07-10 ENCOUNTER — OFFICE VISIT (OUTPATIENT)
Dept: FAMILY MEDICINE CLINIC | Facility: CLINIC | Age: 88
End: 2023-07-10
Payer: COMMERCIAL

## 2023-07-10 VITALS
BODY MASS INDEX: 21.77 KG/M2 | TEMPERATURE: 97 F | WEIGHT: 110.9 LBS | SYSTOLIC BLOOD PRESSURE: 130 MMHG | DIASTOLIC BLOOD PRESSURE: 62 MMHG | HEART RATE: 82 BPM | OXYGEN SATURATION: 96 % | RESPIRATION RATE: 16 BRPM | HEIGHT: 60 IN

## 2023-07-10 DIAGNOSIS — E11.3293 TYPE 2 DIABETES MELLITUS WITH BOTH EYES AFFECTED BY MILD NONPROLIFERATIVE RETINOPATHY WITHOUT MACULAR EDEMA, WITHOUT LONG-TERM CURRENT USE OF INSULIN (HCC): Primary | ICD-10-CM

## 2023-07-10 DIAGNOSIS — I10 ESSENTIAL HYPERTENSION: ICD-10-CM

## 2023-07-10 DIAGNOSIS — G89.3 CANCER-RELATED PAIN: ICD-10-CM

## 2023-07-10 LAB — SL AMB POCT HEMOGLOBIN AIC: 7 (ref ?–6.5)

## 2023-07-10 PROCEDURE — 99214 OFFICE O/P EST MOD 30 MIN: CPT | Performed by: FAMILY MEDICINE

## 2023-07-10 PROCEDURE — 83036 HEMOGLOBIN GLYCOSYLATED A1C: CPT | Performed by: FAMILY MEDICINE

## 2023-07-10 RX ORDER — TRAMADOL HYDROCHLORIDE 50 MG/1
50 TABLET ORAL EVERY 8 HOURS PRN
Qty: 30 TABLET | Refills: 0 | Status: SHIPPED | OUTPATIENT
Start: 2023-07-10

## 2023-07-10 NOTE — PROGRESS NOTES
Eri Stout 1934 female MRN: [id number]    [facility] OFFICE VISIT  [facility]      ASSESSMENT/PLAN  Eri Stout is a 80 y.o. female presents to the office for    Diagnoses and all orders for this visit:    Type 2 diabetes mellitus with both eyes affected by mild nonproliferative retinopathy without macular edema, without long-term current use of insulin (HCC)  -     POCT hemoglobin A1c  -     Comprehensive metabolic panel; Future  -     Hemoglobin A1C; Future    Cancer-related pain  -     traMADol (Ultram) 50 mg tablet; Take 1 tablet (50 mg total) by mouth every 8 (eight) hours as needed for moderate pain    Essential hypertension  -     POCT ECG       Type 2 diabetes mellitus currently slightly above goal.  At this time we will not restart medication recommend that the patient continue mirtazapine 15 mg. Recommend that the patient start tramadol as needed given that she has generalized pains at times. She continues to be seeing palliative and oncology           Future Appointments   Date Time Provider [address]   7/19/2023  1:00 PM BE IR HLD 1 BE [facility]   7/31/2023  2:20 PM Letty Navarro MD [facility]   8/1/2023  3:20 PM Escobar Sexton MD HEM ONC [facility]   8/2/2023  1:00 PM BE IR HLD 1 BE IR [address]   8/16/2023  1:00 PM BE IR HLD 1 BE [facility]ITAL   8/30/2023  1:00 PM BE IR HLD 1 BE IR [facility]   9/5/2023  9:00 AM BE HV VASCULAR 1 BE HV Car [address]   9/12/2023  3:00 PM MD SAI Ocampo [facility]   9/13/2023  1:00 PM BE IR HLD 1 BE IR BE HOSPITAL   10/16/2023  2:30 PM Daniela Simon MD [facility]   6/28/2024  1:40 PM [facility]          SUBJECTIVE  CC: Follow-up      HPI:  Eri Stout is a 80 y.o. female who presents for follow-up appointment.   Patient states that she has been doing very well with her fluid removal she has not required any.  Patient states that she continues to be having back pain as well as left flank pain. She states that its been persistent coming and going and that sometimes she can even eat from the pain. Patient has not seen another cardiologist.  Patient is seeing palliative as well as oncology  Review of Systems   Constitutional: Negative for activity change, appetite change, chills, fatigue and fever. HENT: Negative for congestion. Respiratory: Negative for cough, chest tightness and shortness of breath. Cardiovascular: Negative for chest pain and leg swelling. Gastrointestinal: Negative for abdominal distention, abdominal pain, constipation, diarrhea, nausea and vomiting. All other systems reviewed and are negative.       Historical Information   The patient history was reviewed as follows:  Past Medical History:   Diagnosis Date   • Arthritis    • Back pain    • Benign essential hypertension     LAST ASSESSED: [id number]   • Carotid artery stenosis    • CKD (chronic kidney disease) stage 2, GFR 60-89 ml/min    • Constipation, chronic     "drinks prune juice"   • Diabetes mellitus (HCC)    • DMII (diabetes mellitus, type 2) (HCC)    • Epiglottitis     RESOLVED: [id number]   • Gastritis    • GERD (gastroesophageal reflux disease)    • Gout    • H/O blood clots     left lower leg,,,11 yrs ago   • History of stenosis of renal artery     RESOLVED: [id number]   • Hypercholesteremia    • Hypercholesterolemia    • Hyperlipidemia    • Hyperparathyroidism ([address])    • Hypertension    • Iliac artery stenosis, bilateral (HCC)    • Kidney stone    • Lobular carcinoma ([address]) 09/06/2017    Left   • Lung cancer ([address])    • Mucinous carcinoma of breast, 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   • Presence of pessary    • Renal artery stenosis (HCC)    • Renal disorder    • Renovascular hypertension     RESOLVED: [id number]   • Segmental and somatic dysfunction of cervical region     RESOLVED: [id number]   • Segmental and somatic dysfunction of lumbar region     RESOLVED: 02AUG2017   • Segmental and somatic dysfunction of pelvic region     RESOLVED: [id number]   • Segmental and somatic dysfunction of thoracic region     RESOLVED: [id number]   • Somatic dysfunction of abdominal region     RESOLVED: 02AUG2017   • Somatic dysfunction of head region     RESOLVED: 02AUG2017   • Somatic dysfunction of lower extremities     RESOLVED: 02AUG2017   • Somatic dysfunction of rib region     RESOLVED: [id number]   • Somatic dysfunction of thoracic region     RESOLVED: 02AUG2017   • Somatic dysfunction of upper extremities     RESOLVED: 02AUG2017   • Teeth missing          Medications:     Current Outpatient Medications:   •  Acetaminophen 325 MG CAPS, Take 500 mg by mouth every 8 (eight) hours, Disp: , Rfl:   •  aspirin (ADULT ASPIRIN EC LOW STRENGTH) 81 mg EC tablet, Take 1 tablet by mouth daily, Disp: , Rfl:   •  calcitriol (ROCALTROL) 0.25 mcg capsule, Take 1 capsule (0.25 mcg total) by mouth 2 (two) times a week, Disp: 24 capsule, Rfl: 3  •  fluticasone (FLONASE) 50 mcg/act nasal spray, 2 sprays into each nostril daily, Disp: 16 g, Rfl: 0  •  gabapentin (Neurontin) 100 mg capsule, Take 1 capsule (100 mg total) by mouth 2 (two) times a day, Disp: 180 capsule, Rfl: 0  •  Lancets (ONETOUCH ULTRASOFT) lancets, by Other route 2 (two) times a day Use as instructed, Disp: 100 each, Rfl: 5  •  mirtazapine (REMERON) 15 mg tablet, Take 1-2 tablets (15-30 mg total) by mouth daily at bedtime, Disp: 60 tablet, Rfl: 0  •  olopatadine (PATANOL) 0.1 % ophthalmic solution, Administer 1 drop to both eyes 2 (two) times a day, Disp: 5 mL, Rfl: 0  •  OneTouch Delica Lancets 45F MISC, Inject as directed 2 (two) times a day Use to test, Disp: 200 each, Rfl: 0  •  oxyCODONE (Roxicodone) 5 immediate release tablet, Take 0.5 tablets (2.5 mg total) by mouth 2 (two) times a day as needed (cancer pain) Max Daily Amount: 5 mg, Disp: 20 tablet, Rfl: 0  •  polyethylene glycol (MIRALAX) 17 g packet, Take 17 g by mouth daily, Disp: 30 each, Rfl: 5  •  Probiotic Product (Align) 4 MG CAPS, Take by mouth, Disp: , Rfl:   •  traMADol (Ultram) 50 mg tablet, Take 1 tablet (50 mg total) by mouth every 8 (eight) hours as needed for moderate pain, Disp: 30 tablet, Rfl: 0  •  NIFEdipine ER (ADALAT CC) 30 MG 24 hr tablet, Take 1 tablet twice a day., Disp: 180 tablet, Rfl: 3  •  pantoprazole (PROTONIX) 40 mg tablet, Take 1 tablet (40 mg total) by mouth 2 (two) times a day, Disp: 60 tablet, Rfl: 1  •  pravastatin (PRAVACHOL) 40 mg tablet, Take 1 tablet (40 mg total) by mouth daily, Disp: 90 tablet, Rfl: 3    Allergies   Allergen Reactions   • Clonidine Derivatives Swelling   • Diflucan [Fluconazole] Rash   • Flagyl [Metronidazole] Anaphylaxis   • Carvedilol Hives     And legs swelled   • Latex Rash   • Lipitor [Atorvastatin]      Legs swelled   • Metoprolol Dizziness     Low BP and orthostatic symptoms. • Other Palpitations     IV DYE   • Shingrix [Zoster Vac Recomb Adjuvanted] Hives       OBJECTIVE  Vitals:   Vitals:    07/10/23 1429   BP: 130/62   BP Location: Left arm   Patient Position: Sitting   Cuff Size: Standard   Pulse: 82   Resp: 16   Temp: (!) 97 °F (36.1 °C)   TempSrc: Temporal   SpO2: 96%   Weight: 50.3 kg (110 lb 14.4 oz)   Height: 5' (1.524 m)         Physical Exam  Vitals reviewed. Constitutional:       Appearance: She is well-developed. HENT:      Head: Normocephalic and atraumatic. Eyes:      Conjunctiva/sclera: Conjunctivae normal.      Pupils: Pupils are equal, round, and reactive to light. Cardiovascular:      Rate and Rhythm: Normal rate and regular rhythm. Heart sounds: Normal heart sounds. Pulmonary:      Effort: Pulmonary effort is normal. No respiratory distress. Breath sounds: Normal breath sounds. Musculoskeletal:         General: Normal range of motion. Cervical back: Normal range of motion and neck supple. Skin:     General: Skin is warm.       Capillary Refill: Capillary refill takes less than 2 seconds. Neurological:      Mental Status: She is alert and oriented to person, place, and time.                     Shasta Rico MD,   [facility]  7/12/2023

## 2023-07-11 PROCEDURE — 93000 ELECTROCARDIOGRAM COMPLETE: CPT | Performed by: FAMILY MEDICINE

## 2023-07-12 DIAGNOSIS — E78.5 HYPERLIPIDEMIA, UNSPECIFIED HYPERLIPIDEMIA TYPE: ICD-10-CM

## 2023-07-12 DIAGNOSIS — K92.89 GAS BLOAT SYNDROME: ICD-10-CM

## 2023-07-12 DIAGNOSIS — I16.0 HYPERTENSIVE URGENCY: ICD-10-CM

## 2023-07-12 RX ORDER — PANTOPRAZOLE SODIUM 40 MG/1
40 TABLET, DELAYED RELEASE ORAL 2 TIMES DAILY
Qty: 60 TABLET | Refills: 1 | Status: SHIPPED | OUTPATIENT
Start: 2023-07-12 | End: 2023-09-05

## 2023-07-12 RX ORDER — NIFEDIPINE 30 MG/1
TABLET, FILM COATED, EXTENDED RELEASE ORAL
Qty: 180 TABLET | Refills: 3 | Status: SHIPPED | OUTPATIENT
Start: 2023-07-12 | End: 2023-07-18

## 2023-07-12 RX ORDER — PRAVASTATIN SODIUM 40 MG
40 TABLET ORAL DAILY
Qty: 90 TABLET | Refills: 3 | Status: SHIPPED | OUTPATIENT
Start: 2023-07-12 | End: 2023-09-27

## 2023-07-17 DIAGNOSIS — I16.0 HYPERTENSIVE URGENCY: ICD-10-CM

## 2023-07-18 ENCOUNTER — HOSPITAL ENCOUNTER (OUTPATIENT)
Dept: RADIOLOGY | Facility: HOSPITAL | Age: 88
Discharge: HOME | End: 2023-07-18
Attending: INTERNAL MEDICINE | Admitting: STUDENT IN AN ORGANIZED HEALTH CARE EDUCATION/TRAINING PROGRAM
Payer: COMMERCIAL

## 2023-07-18 ENCOUNTER — HOSPITAL ENCOUNTER (OUTPATIENT)
Dept: RADIOLOGY | Facility: HOSPITAL | Age: 88
Discharge: HOME | End: 2023-07-18
Payer: COMMERCIAL

## 2023-07-18 VITALS
SYSTOLIC BLOOD PRESSURE: 205 MMHG | HEART RATE: 83 BPM | OXYGEN SATURATION: 94 % | RESPIRATION RATE: 18 BRPM | DIASTOLIC BLOOD PRESSURE: 115 MMHG

## 2023-07-18 DIAGNOSIS — C34.91 ADENOCARCINOMA, LUNG, RIGHT (HCC): ICD-10-CM

## 2023-07-18 PROCEDURE — 32555 ASPIRATE PLEURA W/ IMAGING: CPT

## 2023-07-18 PROCEDURE — 71045 X-RAY EXAM CHEST 1 VIEW: CPT

## 2023-07-18 RX ORDER — LIDOCAINE WITH 8.4% SOD BICARB 0.9%(10ML)
SYRINGE (ML) INJECTION AS NEEDED
Status: COMPLETED | OUTPATIENT
Start: 2023-07-18 | End: 2023-07-18

## 2023-07-18 RX ORDER — NIFEDIPINE 30 MG/1
TABLET, FILM COATED, EXTENDED RELEASE ORAL
Qty: 180 TABLET | Refills: 3 | Status: SHIPPED | OUTPATIENT
Start: 2023-07-18

## 2023-07-18 RX ADMIN — Medication 10 ML: at 11:24

## 2023-07-18 NOTE — BRIEF OP NOTE (RAD/CATH)
IR THORACENTESIS Procedure Note    PATIENT NAME: Vincent Knapp  : 1934  MRN: [id number]    Pre-op Diagnosis:   1. Adenocarcinoma, lung, right (HCC)      Post-op Diagnosis:   1. Adenocarcinoma, lung, right [facility])        Provider:   Queta Yanez PA-C    Estimated Blood Loss: none    Findings: R thoracentesis, 100mL serosanguinous    Specimens: none    Complications:  None immediate    Due to serosanguinous output, CXR obtained. Patient asymmptomatic.  No obvious acute findings on cxr    Anesthesia: local    Queta Yanez PA-C     Date: 2023  Time: 11:49 AM

## 2023-07-19 RX ORDER — MIRTAZAPINE 30 MG/1
30 TABLET, FILM COATED ORAL
Qty: 30 TABLET | Refills: 0 | Status: SHIPPED | OUTPATIENT
Start: 2023-07-19

## 2023-07-21 ENCOUNTER — TELEPHONE (OUTPATIENT)
Dept: NEPHROLOGY | Facility: CLINIC | Age: 88
End: 2023-07-21

## 2023-07-26 DIAGNOSIS — E11.51 TYPE 2 DIABETES MELLITUS WITH DIABETIC PERIPHERAL ANGIOPATHY WITHOUT GANGRENE, WITHOUT LONG-TERM CURRENT USE OF INSULIN (HCC): ICD-10-CM

## 2023-07-27 RX ORDER — GABAPENTIN 100 MG/1
100 CAPSULE ORAL 2 TIMES DAILY
Qty: 180 CAPSULE | Refills: 0 | Status: SHIPPED | OUTPATIENT
Start: 2023-07-27

## 2023-07-30 ENCOUNTER — HOSPITAL ENCOUNTER (EMERGENCY)
Facility: HOSPITAL | Age: 88
Discharge: HOME | End: 2023-07-30
Attending: EMERGENCY MEDICINE
Payer: COMMERCIAL

## 2023-07-30 ENCOUNTER — APPOINTMENT (EMERGENCY)
Dept: RADIOLOGY | Facility: HOSPITAL | Age: 88
End: 2023-07-30
Payer: COMMERCIAL

## 2023-07-30 VITALS
DIASTOLIC BLOOD PRESSURE: 85 MMHG | RESPIRATION RATE: 20 BRPM | SYSTOLIC BLOOD PRESSURE: 207 MMHG | HEART RATE: 70 BPM | TEMPERATURE: 97.9 F | OXYGEN SATURATION: 94 %

## 2023-07-30 DIAGNOSIS — R07.9 CHEST PAIN: Primary | ICD-10-CM

## 2023-07-30 DIAGNOSIS — R06.02 SOB (SHORTNESS OF BREATH): ICD-10-CM

## 2023-07-30 LAB
2HR DELTA HS TROPONIN: 2 NG/L
4HR DELTA HS TROPONIN: 2 NG/L
ALBUMIN SERPL BCP-MCNC: 2.9 G/DL (ref 3.5–5)
ALP SERPL-CCNC: 98 U/L (ref 46–116)
ALT SERPL W P-5'-P-CCNC: 13 U/L (ref 12–78)
ANION GAP SERPL CALCULATED.3IONS-SCNC: 5 MMOL/L
AST SERPL W P-5'-P-CCNC: 19 U/L (ref 5–45)
BACTERIA UR QL AUTO: ABNORMAL /HPF
BASOPHILS # BLD AUTO: 0.02 THOUSANDS/ÂΜL (ref 0–0.1)
BASOPHILS NFR BLD AUTO: 0 % (ref 0–1)
BILIRUB SERPL-MCNC: 0.28 MG/DL (ref 0.2–1)
BILIRUB UR QL STRIP: NEGATIVE
BUN SERPL-MCNC: 24 MG/DL (ref 5–25)
CALCIUM ALBUM COR SERPL-MCNC: 10.5 MG/DL (ref 8.3–10.1)
CALCIUM SERPL-MCNC: 9.6 MG/DL (ref 8.3–10.1)
CARDIAC TROPONIN I PNL SERPL HS: 12 NG/L
CARDIAC TROPONIN I PNL SERPL HS: 14 NG/L
CARDIAC TROPONIN I PNL SERPL HS: 14 NG/L
CHLORIDE SERPL-SCNC: 94 MMOL/L (ref 96–108)
CLARITY UR: CLEAR
CO2 SERPL-SCNC: 35 MMOL/L (ref 21–32)
COLOR UR: YELLOW
CREAT SERPL-MCNC: 1.27 MG/DL (ref 0.6–1.3)
D DIMER PPP FEU-MCNC: 3.88 UG/ML FEU
EOSINOPHIL # BLD AUTO: 0.02 THOUSAND/ÂΜL (ref 0–0.61)
EOSINOPHIL NFR BLD AUTO: 0 % (ref 0–6)
ERYTHROCYTE [DISTWIDTH] IN BLOOD BY AUTOMATED COUNT: 13.9 % (ref 11.6–15.1)
GFR SERPL CREATININE-BSD FRML MDRD: 37 ML/MIN/1.73SQ M
GLUCOSE SERPL-MCNC: 140 MG/DL (ref 65–140)
GLUCOSE UR STRIP-MCNC: NEGATIVE MG/DL
HCT VFR BLD AUTO: 37.2 % (ref 34.8–46.1)
HGB BLD-MCNC: 11.7 G/DL (ref 11.5–15.4)
HGB UR QL STRIP.AUTO: ABNORMAL
IMM GRANULOCYTES # BLD AUTO: 0.01 THOUSAND/UL (ref 0–0.2)
IMM GRANULOCYTES NFR BLD AUTO: 0 % (ref 0–2)
KETONES UR STRIP-MCNC: NEGATIVE MG/DL
LEUKOCYTE ESTERASE UR QL STRIP: ABNORMAL
LYMPHOCYTES # BLD AUTO: 1.58 THOUSANDS/ÂΜL (ref 0.6–4.47)
LYMPHOCYTES NFR BLD AUTO: 25 % (ref 14–44)
MCH RBC QN AUTO: 27.7 PG (ref 26.8–34.3)
MCHC RBC AUTO-ENTMCNC: 31.5 G/DL (ref 31.4–37.4)
MCV RBC AUTO: 88 FL (ref 82–98)
MONOCYTES # BLD AUTO: 0.46 THOUSAND/ÂΜL (ref 0.17–1.22)
MONOCYTES NFR BLD AUTO: 7 % (ref 4–12)
NEUTROPHILS # BLD AUTO: 4.14 THOUSANDS/ÂΜL (ref 1.85–7.62)
NEUTS SEG NFR BLD AUTO: 68 % (ref 43–75)
NITRITE UR QL STRIP: NEGATIVE
NON-SQ EPI CELLS URNS QL MICRO: ABNORMAL /HPF
NRBC BLD AUTO-RTO: 0 /100 WBCS
PH UR STRIP.AUTO: 7.5 [PH] (ref 4.5–8)
PLATELET # BLD AUTO: 440 THOUSANDS/UL (ref 149–390)
PMV BLD AUTO: 10 FL (ref 8.9–12.7)
POTASSIUM SERPL-SCNC: 3.5 MMOL/L (ref 3.5–5.3)
PROT SERPL-MCNC: 8.7 G/DL (ref 6.4–8.4)
PROT UR STRIP-MCNC: ABNORMAL MG/DL
RBC # BLD AUTO: 4.22 MILLION/UL (ref 3.81–5.12)
RBC #/AREA URNS AUTO: ABNORMAL /HPF
SODIUM SERPL-SCNC: 134 MMOL/L (ref 135–147)
SP GR UR STRIP.AUTO: 1.02 (ref 1–1.03)
UROBILINOGEN UR QL STRIP.AUTO: 0.2 E.U./DL
WBC # BLD AUTO: 6.23 THOUSAND/UL (ref 4.31–10.16)
WBC #/AREA URNS AUTO: ABNORMAL /HPF

## 2023-07-30 PROCEDURE — 85025 COMPLETE CBC W/AUTO DIFF WBC: CPT

## 2023-07-30 PROCEDURE — 36415 COLL VENOUS BLD VENIPUNCTURE: CPT

## 2023-07-30 PROCEDURE — 96374 THER/PROPH/DIAG INJ IV PUSH: CPT

## 2023-07-30 PROCEDURE — 99285 EMERGENCY DEPT VISIT HI MDM: CPT

## 2023-07-30 PROCEDURE — 81001 URINALYSIS AUTO W/SCOPE: CPT

## 2023-07-30 PROCEDURE — 93005 ELECTROCARDIOGRAM TRACING: CPT

## 2023-07-30 PROCEDURE — G1004 CDSM NDSC: HCPCS

## 2023-07-30 PROCEDURE — 80053 COMPREHEN METABOLIC PANEL: CPT

## 2023-07-30 PROCEDURE — 85379 FIBRIN DEGRADATION QUANT: CPT

## 2023-07-30 PROCEDURE — 96361 HYDRATE IV INFUSION ADD-ON: CPT

## 2023-07-30 PROCEDURE — 84484 ASSAY OF TROPONIN QUANT: CPT

## 2023-07-30 PROCEDURE — 99285 EMERGENCY DEPT VISIT HI MDM: CPT | Performed by: EMERGENCY MEDICINE

## 2023-07-30 PROCEDURE — 71275 CT ANGIOGRAPHY CHEST: CPT

## 2023-07-30 PROCEDURE — 96376 TX/PRO/DX INJ SAME DRUG ADON: CPT

## 2023-07-30 RX ORDER — IODIXANOL 320 MG/ML
60 INJECTION, SOLUTION INTRAVASCULAR
Status: COMPLETED | OUTPATIENT
Start: 2023-07-30 | End: 2023-07-30

## 2023-07-30 RX ORDER — HYDROMORPHONE HCL/PF 1 MG/ML
0.5 SYRINGE (ML) INJECTION ONCE
Status: COMPLETED | OUTPATIENT
Start: 2023-07-30 | End: 2023-07-30

## 2023-07-30 RX ADMIN — HYDROMORPHONE HYDROCHLORIDE 0.5 MG: 1 INJECTION, SOLUTION INTRAMUSCULAR; INTRAVENOUS; SUBCUTANEOUS at 22:45

## 2023-07-30 RX ADMIN — SODIUM CHLORIDE 500 ML: 0.9 INJECTION, SOLUTION INTRAVENOUS at 15:56

## 2023-07-30 RX ADMIN — HYDROMORPHONE HYDROCHLORIDE 0.5 MG: 1 INJECTION, SOLUTION INTRAMUSCULAR; INTRAVENOUS; SUBCUTANEOUS at 15:54

## 2023-07-30 RX ADMIN — IODIXANOL 60 ML: 320 INJECTION, SOLUTION INTRAVASCULAR at 20:41

## 2023-07-30 NOTE — ED PROVIDER NOTES
History  Chief Complaint   Patient presents with   • Shortness of Breath     Pt reports SOB, nausea, fatigue, "lump feeling" in right flank. Denies F/V/D. Reports mild chest pressure     HPI  Patient is a 80-year-old female presenting with shortness of breath, nausea, fatigue, pain in right flank for the past 24 hours in the setting of lung cancer that requires frequent IR drainage for right-sided pleural effusions. Patient denies any fever/chills, cough, hemoptysis, substernal chest pain, diaphoresis, lightheadedness, dizziness. Patient has a home pain regimen that involves prescribed oxy, however patient does not like taking the Oxy as it makes her nauseous. Patient is only taking Tylenol for pain at home  -Given patient's primary Romansh-speaking, translation services were offered however patient preferred to have family translate for her. Patient was accompanied by daughter. Prior to Admission Medications   Prescriptions Last Dose Informant Patient Reported? Taking?    Acetaminophen 325 MG CAPS  Spouse/Significant Other Yes No   Sig: Take 500 mg by mouth every 8 (eight) hours   Lancets (ONETOUCH ULTRASOFT) lancets  Spouse/Significant Other No No   Sig: by Other route 2 (two) times a day Use as instructed   NIFEdipine ER (ADALAT CC) 30 MG 24 hr tablet   No No   Sig: take 1 tablet by mouth twice a day   OneTouch Delica Lancets 72L MISC  Spouse/Significant Other No No   Sig: Inject as directed 2 (two) times a day Use to test   Probiotic Product (Align) 4 MG CAPS  Spouse/Significant Other Yes No   Sig: Take by mouth   aspirin (ADULT ASPIRIN EC LOW STRENGTH) 81 mg EC tablet  Spouse/Significant Other Yes No   Sig: Take 1 tablet by mouth daily   calcitriol (ROCALTROL) 0.25 mcg capsule  Spouse/Significant Other No No   Sig: Take 1 capsule (0.25 mcg total) by mouth 2 (two) times a week   fluticasone (FLONASE) 50 mcg/act nasal spray  Spouse/Significant Other No No   Si sprays into each nostril daily   gabapentin (NEURONTIN) 100 mg capsule   No No   Sig: take 1 capsule by mouth twice a day   mirtazapine (REMERON) 30 mg tablet   No No   Sig: Take 1 tablet (30 mg total) by mouth daily at bedtime   olopatadine (PATANOL) 0.1 % ophthalmic solution  Spouse/Significant Other No No   Sig: Administer 1 drop to both eyes 2 (two) times a day   oxyCODONE (Roxicodone) 5 immediate release tablet   No No   Sig: Take 0.5 tablets (2.5 mg total) by mouth 2 (two) times a day as needed (cancer pain) Max Daily Amount: 5 mg   pantoprazole (PROTONIX) 40 mg tablet   No No   Sig: Take 1 tablet (40 mg total) by mouth 2 (two) times a day   polyethylene glycol (MIRALAX) 17 g packet  Spouse/Significant Other No No   Sig: Take 17 g by mouth daily   pravastatin (PRAVACHOL) 40 mg tablet   No No   Sig: Take 1 tablet (40 mg total) by mouth daily   traMADol (Ultram) 50 mg tablet   No No   Sig: Take 1 tablet (50 mg total) by mouth every 8 (eight) hours as needed for moderate pain      Facility-Administered Medications: None       Past Medical History:   Diagnosis Date   • Arthritis    • Back pain    • Benign essential hypertension     LAST ASSESSED: [id number]   • Carotid artery stenosis    • CKD (chronic kidney disease) stage 2, GFR 60-89 ml/min    • Constipation, chronic     "drinks prune juice"   • Diabetes mellitus (HCC)    • DMII (diabetes mellitus, type 2) (HCC)    • Epiglottitis     RESOLVED: [id number]   • Gastritis    • GERD (gastroesophageal reflux disease)    • Gout    • H/O blood clots     left lower leg,,,11 yrs ago   • History of stenosis of renal artery     RESOLVED: [id number]   • Hypercholesteremia    • Hypercholesterolemia    • Hyperlipidemia    • Hyperparathyroidism ([address])    • Hypertension    • Iliac artery stenosis, bilateral (HCC)    • Kidney stone    • Lobular carcinoma ([address]) 09/06/2017    Left   • Lung cancer ([address])    • Mucinous carcinoma of breast, 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   • Presence of pessary    • Renal artery stenosis (HCC)    • Renal disorder    • Renovascular hypertension     RESOLVED: [id number]   • Segmental and somatic dysfunction of cervical region     RESOLVED: [id number]   • Segmental and somatic dysfunction of lumbar region     RESOLVED: 02AUG2017   • Segmental and somatic dysfunction of pelvic region     RESOLVED: 02AUG2017   • Segmental and somatic dysfunction of thoracic region     RESOLVED: 02AUG2017   • Somatic dysfunction of abdominal region     RESOLVED: 02AUG2017   • Somatic dysfunction of head region     RESOLVED: 02AUG2017   • Somatic dysfunction of lower extremities     RESOLVED: 02AUG2017   • Somatic dysfunction of rib region     RESOLVED: 02AUG2017   • Somatic dysfunction of thoracic region     RESOLVED: 02AUG2017   • Somatic dysfunction of upper extremities     RESOLVED: 02AUG2017   • Teeth missing        Past Surgical History:   Procedure Laterality Date   • ANGIOPLASTY / STENTING ILIAC     • BREAST BIOPSY Left 08/10/2018    U/S BX- mucinous ca   • BREAST BIOPSY Left 08/07/2017    U/S BX   • BREAST LUMPECTOMY Left 09/06/2017    LAST ASSESSED: [id number]   • BREAST LUMPECTOMY Left 09/11/2018   • BREAST SURGERY Left     biopsy   • CATARACT EXTRACTION Bilateral    • CHOLECYSTECTOMY     • COLONOSCOPY     • EYE SURGERY     • ILIAC VEIN ANGIOPLASTY / STENTING Right     INITIAL STENOSIS: ONSET: [id number]   • IR BIOPSY LUNG  11/15/2021   • IR THORACENTESIS  2/20/2023   • IR THORACENTESIS  3/28/2023   • IR THORACENTESIS  4/11/2023   • IR THORACENTESIS  4/19/2023   • IR THORACENTESIS  4/26/2023   • IR THORACENTESIS  5/3/2023   • IR THORACENTESIS  5/10/2023   • IR THORACENTESIS  5/17/2023   • IR THORACENTESIS  5/24/2023   • IR THORACENTESIS  5/31/2023   • IR THORACENTESIS  6/7/2023   • IR THORACENTESIS  7/18/2023   • KNEE ARTHROSCOPY Right    • KNEE SURGERY Right     ONSET: [id number]   • MAMMO NEEDLE LOCALIZATION LEFT (ALL INC) Left 9/11/2018   • MAMMO NEEDLE LOCALIZATION LEFT (ALL INC) Left 9/6/2017   • MN ESOPHAGOGASTRODUODENOSCOPY TRANSORAL DIAGNOSTIC N/A 7/26/2018    Procedure: ESOPHAGOGASTRODUODENOSCOPY (EGD); Surgeon: Rodolfo Krishna DO;  Location: [facility]; Service: Gastroenterology   •  [place] EXC SILVA&/STRPG CORDS/EPIGL MCRSCP/TLSCP N/A 6/10/2016    Procedure: MICRO DIRECT LARYNGOSCOPY WITH VOCAL FOLD MASS EXCISION ;  Surgeon: Michelle Singer MD;  [facility];  Service: ENT   • NM MASTECTOMY PARTIAL Left 9/6/2017    Procedure: LUMPECTOMY BREAST NEEDLE LOCALIZED WITH FAXITRON NEEDLE @ 0830;  Surgeon: Denise Jones MD;  Location[facility];  Service: General   • NM PERQ DEVICE PLACEMENT BREAST LOC 1ST LES W/GDNCE Left 9/11/2018    Procedure: BREAST LUMPECTOMY; BREAST NEEDLE LOCALIZATION (NEEDLE LOC AT 1330);   Surgeon: Raul Jaimes MD;  Location[facility];  Service: Surgical Oncology   • THROAT SURGERY      lump removed   • TUBAL LIGATION     • UPPER GASTROINTESTINAL ENDOSCOPY     • US GUIDANCE BREAST BIOPSY LEFT EACH ADDITIONAL Left 8/7/2017   • US GUIDED BREAST BIOPSY LEFT COMPLETE Left 8/10/2018   • US GUIDED BREAST BIOPSY LEFT COMPLETE Left 8/7/2017   • VASCULAR SURGERY         Family History   Problem Relation Age of Onset   • Heart disease Brother    • Heart disease Maternal Grandmother    • Diabetes Other         of other type with arthropathy, without long term current use of insulin   • Hypertension Other    • No Known Problems Mother    • Gout Family    • Rheum arthritis Family    • Lupus Family         systematic erythematosus   • Heart disease Family    • Stroke Family         syndrome   • Stroke Maternal Uncle         syndrome   • Stroke Maternal Aunt         syndrome   • No Known Problems Father    • No Known Problems Daughter    • No Known Problems Maternal Grandfather    • No Known Problems Paternal Grandmother    • No Known Problems Paternal Grandfather    • No Known Problems Daughter    • No Known Problems Daughter    • No Known Problems Maternal Aunt    • No Known Problems Maternal Aunt    • No Known Problems Maternal Aunt      I have reviewed and agree with the history as documented. E-Cigarette/Vaping   • E-Cigarette Use Never User      E-Cigarette/Vaping Substances   • Nicotine No    • THC No    • CBD No    • Flavoring No    • Other No    • Unknown No      Social History     Tobacco Use   • Smoking status: Former     Packs/day: 1.00     Years: 55.00     Total pack years: 55.00     Types: Cigarettes     Start date: 80     Quit date:      Years since quittin.5   • Smokeless tobacco: Never   Vaping Use   • Vaping Use: Never used   Substance Use Topics   • Alcohol use: Not Currently   • Drug use: No        Review of Systems   Constitutional: Negative for chills, diaphoresis and fever. Generalized weakness. HENT: Negative. Eyes: Negative. Respiratory: Positive for shortness of breath. Negative for cough, wheezing and stridor. Cardiovascular: Negative for palpitations. Right-sided lower chest pain   Gastrointestinal: Positive for nausea. Negative for abdominal pain, diarrhea and vomiting. Endocrine: Negative. Genitourinary: Negative. Musculoskeletal: Negative. Skin: Negative. Neurological: Negative. Hematological: Negative. Psychiatric/Behavioral: Negative.         Physical Exam  ED Triage Vitals   Temperature Pulse Respirations Blood Pressure SpO2   23 1444 23 1444 23 1444 23 1444 23 1444   97.9 °F (36.6 °C) 86 22 (!) 209/73 98 %      Temp Source Heart Rate Source Patient Position - Orthostatic VS BP Location FiO2 (%)   23 1444 23 1444 23 1600 23 1444 --   Oral Monitor Sitting Left arm       Pain Score       23 1554       6             Orthostatic Vital Signs  Vitals:    23 1444 23 1600 23 1700   BP: (!) 209/73 (!) 211/87 (!) 207/85   Pulse: 86 72 70   Patient Position - Orthostatic VS:  Sitting Sitting       Physical Exam  Vitals and nursing note reviewed. Constitutional:       Appearance: She is well-developed and normal weight. Comments: Patient is chronically ill-appearing. HENT:      Head: Normocephalic and atraumatic. Mouth/Throat:      Mouth: Mucous membranes are moist.      Pharynx: Oropharynx is clear. Eyes:      Extraocular Movements: Extraocular movements intact. Pupils: Pupils are equal, round, and reactive to light. Cardiovascular:      Rate and Rhythm: Normal rate and regular rhythm. Pulses: Normal pulses. Heart sounds: Normal heart sounds. Pulmonary:      Effort: Pulmonary effort is normal.      Breath sounds: Examination of the right-lower field reveals decreased breath sounds. Decreased breath sounds present. No wheezing, rhonchi or rales. Comments: Notably decreased breath sounds on the right lower chest.  Abdominal:      General: Bowel sounds are normal.      Palpations: Abdomen is soft. Musculoskeletal:         General: Normal range of motion. Cervical back: Normal range of motion and neck supple. Skin:     General: Skin is warm and dry. Capillary Refill: Capillary refill takes less than 2 seconds. Neurological:      General: No focal deficit present. Mental Status: She is alert and oriented to person, place, and time.    Psychiatric:         Mood and Affect: Mood normal.         Behavior: Behavior normal.         ED Medications  Medications   HYDROmorphone (DILAUDID) injection 0.5 mg (0.5 mg Intravenous Given 7/30/23 1554)   sodium chloride 0.9 % bolus 500 mL (0 mL Intravenous Stopped 7/30/23 1656)   iodixanol (VISIPAQUE) 320 MG/ML injection 60 mL (60 mL Intravenous Given 7/30/23 2041)   HYDROmorphone (DILAUDID) injection 0.5 mg (0.5 mg Intravenous Given 7/30/23 2245)       Diagnostic Studies  Results Reviewed     Procedure Component Value Units Date/Time    HS Troponin I 4hr [[id number]]  (Normal) Collected: 07/30/23 1956    Lab Status: Final result Specimen: Blood from Arm, Left Updated: 07/30/23 2031     hs TnI 4hr 14 ng/L      Delta 4hr hsTnI 2 ng/L     D-dimer, quantitative [[id number]]  (Abnormal) Collected: 07/30/23 1802    Lab Status: Final result Specimen: Blood from Arm, Left Updated: 07/30/23 1844     D-Dimer, Quant 3.88 ug/ml FEU     Narrative: In the evaluation for possible pulmonary embolism, in the appropriate (Well's Score of 4 or less) patient, the age adjusted d-dimer cutoff for this patient can be calculated as:    Age x 0.01 (in ug/mL) for Age-adjusted D-dimer exclusion threshold for a patient over 50 years.     HS Troponin I 2hr [[id number]]  (Normal) Collected: 07/30/23 1802    Lab Status: Final result Specimen: Blood from Arm, Left Updated: 07/30/23 1842     hs TnI 2hr 14 ng/L      Delta 2hr hsTnI 2 ng/L     Urine Microscopic [[id number]]  (Abnormal) Collected: 07/30/23 1804    Lab Status: Final result Specimen: Urine, Clean Catch Updated: 07/30/23 1830     RBC, UA None Seen /hpf      WBC, UA 2-4 /hpf      Epithelial Cells Occasional /hpf      Bacteria, UA None Seen /hpf     Urine Macroscopic, POC [[id number]]  (Abnormal) Collected: 07/30/23 1804    Lab Status: Final result Specimen: Urine Updated: 07/30/23 1806     Color, UA Yellow     Clarity, UA Clear     pH, UA 7.5     Leukocytes, UA Trace     Nitrite, UA Negative     Protein,  (2+) mg/dl      Glucose, UA Negative mg/dl      Ketones, UA Negative mg/dl      Urobilinogen, UA 0.2 E.U./dl      Bilirubin, UA Negative     Occult Blood, UA Trace     Specific Gravity, UA 1.020    Narrative:      CLINITEK RESULT    HS Troponin 0hr (reflex protocol) [[id number]]  (Normal) Collected: 07/30/23 1556    Lab Status: Final result Specimen: Blood from Arm, Left Updated: 07/30/23 1635     hs TnI 0hr 12 ng/L     Comprehensive metabolic panel [[id number]]  (Abnormal) Collected: 07/30/23 1556    Lab Status: Final result Specimen: Blood from Arm, Left Updated: 07/30/23 1626     Sodium 134 mmol/L      Potassium 3.5 mmol/L Chloride 94 mmol/L      CO2 35 mmol/L      ANION GAP 5 mmol/L      BUN 24 mg/dL      Creatinine 1.27 mg/dL      Glucose 140 mg/dL      Calcium 9.6 mg/dL      Corrected Calcium 10.5 mg/dL      AST 19 U/L      ALT 13 U/L      Alkaline Phosphatase 98 U/L      Total Protein 8.7 g/dL      Albumin 2.9 g/dL      Total Bilirubin 0.28 mg/dL      eGFR 37 ml/min/1.73sq m     Narrative:      National Kidney Disease Foundation guidelines for Chronic Kidney Disease (CKD):   •  Stage 1 with normal or high GFR (GFR > 90 mL/min/1.73 square meters)  •  Stage 2 Mild CKD (GFR = 60-89 mL/min/1.73 square meters)  •  Stage 3A Moderate CKD (GFR = 45-59 mL/min/1.73 square meters)  •  Stage 3B Moderate CKD (GFR = 30-44 mL/min/1.73 square meters)  •  Stage 4 Severe CKD (GFR = 15-29 mL/min/1.73 square meters)  •  Stage 5 End Stage CKD (GFR <15 mL/min/1.73 square meters)  Note: GFR calculation is accurate only with a steady state creatinine    CBC and differential [[id number]]  (Abnormal) Collected: 07/30/23 1556    Lab Status: Final result Specimen: Blood from Arm, Left Updated: 07/30/23 1610     WBC 6.23 Thousand/uL      RBC 4.22 Million/uL      Hemoglobin 11.7 g/dL      Hematocrit 37.2 %      MCV 88 fL      MCH 27.7 pg      MCHC 31.5 g/dL      RDW 13.9 %      MPV 10.0 fL      Platelets 671 Thousands/uL      nRBC 0 /100 WBCs      Neutrophils Relative 68 %      Immat GRANS % 0 %      Lymphocytes Relative 25 %      Monocytes Relative 7 %      Eosinophils Relative 0 %      Basophils Relative 0 %      Neutrophils Absolute 4.14 Thousands/µL      Immature Grans Absolute 0.01 Thousand/uL      Lymphocytes Absolute 1.58 Thousands/µL      Monocytes Absolute 0.46 Thousand/µL      Eosinophils Absolute 0.02 Thousand/µL      Basophils Absolute 0.02 Thousands/µL                  CTA ED chest PE Study   Final Result by Marianne Kaiser DO (07/30 2156)      No evidence of pulmonary artery embolus.       Progression of disease in the right lung and mediastinum as described above. Possible immediate                  Workstation performed: [id number]               Procedures  Procedures      ED Course  ED Course as of 07/31/23 2041   Sun Jul 30, 2023   1640 hs TnI 0hr: 12   1859 D-Dimer, Quant(!): 3.88               Identification of Seniors at [facility] Most Recent Value   (ISAR) Identification of Seniors at Risk    Before the illness or injury that brought you to the Emergency, did you need someone to help you on a regular basis? 1 Filed at: 07/30/2023 1446   In the last 24 hours, have you needed more help than usual? 1 Filed at: 07/30/2023 1446   Have you been hospitalized for one or more nights during the past 6 months? 1 Filed at: 07/30/2023 1446   In general, do you see well? 1 Filed at: 07/30/2023 1446   In general, do you have serious problems with your memory? 0 Filed at: 07/30/2023 1446   Do you take more than three different medications every day? 1 Filed at: 07/30/2023 1446   ISAR Score 5 Filed at: 07/30/2023 1446                        Wells' Criteria for PE    Flowsheet Row Most Recent Value   Wells' Criteria for PE    Clinical signs and symptoms of DVT 0 Filed at: 07/30/2023 1533   PE is primary diagnosis or equally likely 3 Filed at: 07/30/2023 1533   HR >100 0 Filed at: 07/30/2023 1533   Immobilization at least 3 days or Surgery in the previous 4 weeks 0 Filed at: 07/30/2023 1533   Previous, objectively diagnosed PE or DVT 0 Filed at: 07/30/2023 1533   Hemoptysis 0 Filed at: 07/30/2023 1533   Malignancy with treatment within 6 months or palliative 1 Filed at: 07/30/2023 1533   Wells' Criteria Total 4 Filed at: 07/30/2023 1533            Medical Decision Making  Patient is 59-year-old female presenting for shortness of breath and right-sided lower chest pain.   DDx: Postprocedure pain, PE, malignancy progression, pneumonia, pneumothorax, ACS, MI, UTI  Based on patient presentation physical exam findings, will rule out PE with CT PE study given pleuritic right-sided lower chest pain and shortness of breath generalized weakness. However patient was concerned about CT PE study and contrast, after discussion mutual decision making agreed upon D-dimer test first and then CT PE after with IV fluid administration. We will also obtain cardiac work-up, UA to evaluate for UTI. If all lab work and imaging negative, most likely diagnosis is postprocedural pain from recently performed IR thoracentesis on right side near where full right chest pain is located. Dilaudid given for pain control.  -Lab work-up and imaging studies negative for any acute abnormalities aside from continued malignancy progression. Will discharge at this time strict return precautions, recommend home medication/narcotic pain regimen readjustment, patient has appoint with pain management tomorrow. Recommend follow-up at this appointment for further adjustments of her medication regimen. Patient understands and agrees. Ready for discharge. Chest pain: acute illness or injury  SOB (shortness of breath): acute illness or injury  Amount and/or Complexity of Data Reviewed  Labs: ordered. Decision-making details documented in ED Course. Radiology: ordered. Risk  Prescription drug management. Disposition  Final diagnoses:   Chest pain   SOB (shortness of breath)     Time reflects when diagnosis was documented in both MDM as applicable and the Disposition within this note     Time User Action Codes Description Comment    7/30/2023 10:16 PM Lyndy Beverage Add [R07.9] Chest pain     7/30/2023 10:16 PM Lyndy Beverage Add [R06.02] SOB (shortness of breath)       ED Disposition     ED Disposition   Discharge    Condition   Stable    Date/Time   Sun Jul 30, 2023 10:16 PM    Comment   Iraida Krishnan discharge to home/self care.                Follow-up Information     Follow up With Specialties Details Why Contact Info Additional [address] [facility] Emergency Department Emergency Medicine Go to  If symptoms worsen [address] [id number]  [facility] Emergency Department, [address], [place], [place], [facility]    Dl Schwartz MD Family Medicine Go to  If symptoms worsen [address] [facility] [address]  [phone]             Discharge Medication List as of 7/30/2023 10:17 PM      CONTINUE these medications which have NOT CHANGED    Details   Acetaminophen 325 MG CAPS Take 500 mg by mouth every 8 (eight) hours, Starting Tue 8/15/2017, Historical Med      aspirin (ADULT ASPIRIN EC LOW STRENGTH) 81 mg EC tablet Take 1 tablet by mouth daily, Historical Med      calcitriol (ROCALTROL) 0.25 mcg capsule Take 1 capsule (0.25 mcg total) by mouth 2 (two) times a week, Starting Thu 5/18/2023, Normal      fluticasone (FLONASE) 50 mcg/act nasal spray 2 sprays into each nostril daily, Starting Tue 5/9/2023, Normal      gabapentin (NEURONTIN) 100 mg capsule take 1 capsule by mouth twice a day, Starting Thu 7/27/2023, Normal      !! Lancets (ONETOUCH ULTRASOFT) lancets by Other route 2 (two) times a day Use as instructed, Starting Mon 7/13/2020, Normal      mirtazapine (REMERON) 30 mg tablet Take 1 tablet (30 mg total) by mouth daily at bedtime, Starting Wed 7/19/2023, Normal      NIFEdipine ER (ADALAT CC) 30 MG 24 hr tablet take 1 tablet by mouth twice a day, Normal      olopatadine (PATANOL) 0.1 % ophthalmic solution Administer 1 drop to both eyes 2 (two) times a day, Starting Fri 10/18/2019, Normal      !!  OneTouch Delica Lancets 69D MISC Inject as directed 2 (two) times a day Use to test, Starting Thu 7/9/2020, Normal      oxyCODONE (Roxicodone) 5 immediate release tablet Take 0.5 tablets (2.5 mg total) by mouth 2 (two) times a day as needed (cancer pain) Max Daily Amount: 5 mg, Starting Fri 6/30/2023, Normal      pantoprazole (PROTONIX) 40 mg tablet Take 1 tablet (40 mg total) by mouth 2 (two) times a day, Starting Wed 7/12/2023, Normal      polyethylene glycol (MIRALAX) 17 g packet Take 17 g by mouth daily, Starting Wed 3/22/2023, Normal      pravastatin (PRAVACHOL) 40 mg tablet Take 1 tablet (40 mg total) by mouth daily, Starting Wed 7/12/2023, Normal      Probiotic Product (Align) 4 MG CAPS Take by mouth, Historical Med      traMADol (Ultram) 50 mg tablet Take 1 tablet (50 mg total) by mouth every 8 (eight) hours as needed for moderate pain, Starting Mon 7/10/2023, Normal       !! - Potential duplicate medications found. Please discuss with provider. No discharge procedures on file. PDMP Review       Value Time User    PDMP Reviewed  Yes 7/31/2023  3:11 PM Sharad Vizcarra MD           ED Provider  Attending physically available and evaluated Trinidad Robles. I managed the patient along with the ED Attending.     Electronically Signed by         Christophe Maldonado MD  07/31/23 7252       Christophe Maldonado MD  07/31/23 2295

## 2023-07-30 NOTE — ED ATTENDING ATTESTATION
7/30/2023  I, Tresa Ferrari MD, saw and evaluated the patient. I have discussed the patient with the resident/non-physician practitioner and agree with the resident's/non-physician practitioner's findings, Plan of Care, and MDM as documented in the resident's/non-physician practitioner's note, except where noted. All available labs and Radiology studies were reviewed. I was present for key portions of any procedure(s) performed by the resident/non-physician practitioner and I was immediately available to provide assistance. At this point I agree with the current assessment done in the Emergency Department. I have conducted an independent evaluation of this patient a history and physical is as follows:    80-year-old woman with lung cancer and pleural effusions requiring recurrent IR drainage presenting with dyspnea and fatigue. She is chronically ill-appearing. No increased work of breathing. We will get EKG, labs, imaging.     ED Course         Critical Care Time  Procedures

## 2023-07-31 ENCOUNTER — OFFICE VISIT (OUTPATIENT)
Dept: PALLIATIVE MEDICINE | Facility: CLINIC | Age: 88
End: 2023-07-31
Payer: COMMERCIAL

## 2023-07-31 VITALS
DIASTOLIC BLOOD PRESSURE: 64 MMHG | TEMPERATURE: 98.4 F | RESPIRATION RATE: 16 BRPM | BODY MASS INDEX: 20.52 KG/M2 | OXYGEN SATURATION: 91 % | WEIGHT: 108.69 LBS | HEIGHT: 61 IN | HEART RATE: 94 BPM | SYSTOLIC BLOOD PRESSURE: 134 MMHG

## 2023-07-31 DIAGNOSIS — Z51.5 PALLIATIVE CARE BY SPECIALIST: ICD-10-CM

## 2023-07-31 DIAGNOSIS — J43.9 EMPHYSEMA OF LUNG (HCC): ICD-10-CM

## 2023-07-31 DIAGNOSIS — C34.91 ADENOCARCINOMA, LUNG, RIGHT (HCC): Primary | ICD-10-CM

## 2023-07-31 DIAGNOSIS — G89.3 CANCER-RELATED PAIN: ICD-10-CM

## 2023-07-31 LAB
ATRIAL RATE: 85 BPM
P AXIS: 75 DEGREES
PR INTERVAL: 146 MS
QRS AXIS: -10 DEGREES
QRSD INTERVAL: 86 MS
QT INTERVAL: 386 MS
QTC INTERVAL: 459 MS
T WAVE AXIS: 89 DEGREES
VENTRICULAR RATE: 85 BPM

## 2023-07-31 PROCEDURE — 93010 ELECTROCARDIOGRAM REPORT: CPT | Performed by: INTERNAL MEDICINE

## 2023-07-31 PROCEDURE — 99214 OFFICE O/P EST MOD 30 MIN: CPT | Performed by: FAMILY MEDICINE

## 2023-07-31 RX ORDER — HYDROMORPHONE HYDROCHLORIDE 2 MG/1
2 TABLET ORAL EVERY 4 HOURS PRN
Qty: 20 TABLET | Refills: 0 | Status: SHIPPED | OUTPATIENT
Start: 2023-07-31

## 2023-07-31 RX ORDER — ALBUTEROL SULFATE 90 UG/1
2 AEROSOL, METERED RESPIRATORY (INHALATION) EVERY 6 HOURS PRN
Qty: 8.5 G | Refills: 0 | Status: SHIPPED | OUTPATIENT
Start: 2023-07-31

## 2023-07-31 NOTE — PROGRESS NOTES
Outpatient Follow-Up - Palliative and Supportive Care   Ivelisse Centeno 80 y.o. 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    Relevant Medications    albuterol (ProAir HFA) 90 mcg/act inhaler    HYDROmorphone (Dilaudid) 2 mg tablet   Other Visit Diagnoses     Emphysema of lung (HCC)        Relevant Medications    albuterol (ProAir HFA) 90 mcg/act inhaler    Cancer-related pain        Relevant Medications    HYDROmorphone (Dilaudid) 2 mg tablet    Palliative care by specialist        Relevant Medications    HYDROmorphone (Dilaudid) 2 mg tablet          Medications adjusted this encounter:  Requested Prescriptions     Signed Prescriptions Disp Refills   • albuterol (ProAir HFA) 90 mcg/act inhaler 8.5 g 0     Sig: Inhale 2 puffs every 6 (six) hours as needed for wheezing or shortness of breath para tos, o un siento de falta el aire   • HYDROmorphone (Dilaudid) 2 mg tablet 20 tablet 0     Sig: Take 1 tablet (2 mg total) by mouth every 4 (four) hours as needed (cancer pain, insomnia) [place] dianna tableta para delaney, o antes de dormir si tiene insomnia. Cuando tome neli pastilla, NO TOME el oxycodone NI el tramadol. Max Daily Amount: 12 mg     No orders of the defined types were placed in this encounter. There are no discontinued medications. - Continue mirtazapine for appetite at increase dose. Trial increase in dose for somnolence. = Hydrocortisone tried and failed her.   = Family prefer to avoid olanzapine for somnolence side effects.  - Pleural effusion recurring despite good Med/Onc treatments. Ivelisse Centeno was seen today for symptoms and planning cares related to above illnesses. I have reviewed the patient's controlled substance dispensing history in the Prescription Drug Monitoring Program in compliance with the [facility] regulations before prescribing any controlled substances.     They are invited to continue to follow with us. If there are questions or concerns, please contact us through our clinic/answering service 24 hours a day, seven days a week. Isaías Davison MD  [facility]  [phone]      Visit Information    Accompanied By: Family member    Source of History: Self, Family member    History Limitations: pt speaks Hebrew with myself and her bilingual daughters    Contacts:  [place]               Daughters Herrera Ba, [address]    Chief Complaint  No chief complaint on file. History of Present Illness      Liv Trujillo is a 80 y.o. female who presents in follow up of symptoms related to recurrent NSCLC, R side, asso with malignant pleural effusion. 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 Pertinent issues include: symptom management, assessment of goals of care, disease process education and discussion of prognosis      Since last visit, pt has noted worse fatigue, and occ chest heaviness. We reviewed her scans and found several loculated pockets of fluid. Deferred management of these to Med/Onc, but it appear that some of these are large enough to tap. Separately, pt endorsees ongoing fatigue and malaise, and feels ambivalent about treatment options. We frankly confront her mortality today, and she is tearful and vulnerable with her daughter. She is not excited to skip treatments and consider comfort only, but she is not eager to continue with her current quality of life. We agreed to continue with treatments and f/up as planned, and appreciate the guidance of Dr. Juan Grubbs. In June, pt's appetite and wt have stabilized, though not improved, and she is quite tired. Discussion is held re: alternative appetite stimulants, which have either failed her or would be asso with worse somnolence.   Family advised on behavioral strategies for increased caloric intake, and they are willing to trial this, as well as mirtazapine at increased dose.      Previously, trialled metoprolol, and stopped when her BP dropped. She felt dizzy, and continues to have symptoms, even after ceasing this med. She has a poor appetite and low energy, and her family report that she is rather poor with her water intake. The family have discussed their misgivings about pleural cath, and -- given how uncomfortable the pleural effusions are -- the pt and family are agreeable to placement now. We agreed today to wait on this, as the family also have not yet had a chance to receive therapy recs from primary Onc provider, who was previously considering some degree of possible palliative immunotherapy. Should this reduce her effusion, it would be less advantageous to have an indwelling cath to the pleural area. Previously, we noted:     "Prior to our visit, Dr. Willie Henderson has clearly and carefully reviewed multiple options with the family, and has documented that the patient's daughter led most of the discussion. Surgery and XRT are not appropriate for her disease."    Past medical, surgical, social, and family histories are reviewed and pertinent updates are made. Review of Systems   Constitutional: Positive for decreased appetite and weight loss. Negative for weight gain. HENT: Negative for hoarse voice and nosebleeds. Eyes: Negative for vision loss in left eye and vision loss in right eye. Cardiovascular: Negative for chest pain and dyspnea on exertion. Respiratory: Negative for cough and shortness of breath. Endocrine: Negative for polydipsia, polyphagia and polyuria. Skin: Negative for flushing and itching. Musculoskeletal: Negative for falls. Gastrointestinal: Positive for nausea and vomiting. Negative for anorexia and jaundice. Genitourinary: Negative for frequency. Neurological: Negative for dizziness. Psychiatric/Behavioral: Negative for depression and memory loss. The patient is not nervous/anxious.           Vital Signs    /64 (BP Location: Left arm, Patient Position: Sitting, Cuff Size: Standard)   Pulse 94   Temp 98.4 °F (36.9 °C) (Temporal)   Resp 16   Ht 5' 1" (1.549 m)   Wt 49.3 kg (108 lb 11 oz)   LMP  (LMP Unknown)   SpO2 91%   BMI 20.54 kg/m²     Physical Exam and Objective Data  Physical Exam  Constitutional:       General: She is not in acute distress. Appearance: She is ill-appearing. She is not toxic-appearing or diaphoretic. Comments: frail   HENT:      Head: Normocephalic and atraumatic. Right Ear: External ear normal.      Left Ear: External ear normal.   Eyes:      General:         Right eye: No discharge. Left eye: No discharge. Conjunctiva/sclera: Conjunctivae normal.      Pupils: Pupils are equal, round, and reactive to light. Neck:      Trachea: No tracheal deviation. Cardiovascular:      Rate and Rhythm: Regular rhythm. Tachycardia present. Pulmonary:      Effort: Pulmonary effort is normal. No respiratory distress. Breath sounds: No stridor. Abdominal:      General: There is no distension. Palpations: Abdomen is soft. Skin:     General: Skin is warm and dry. Coloration: Skin is not pale. Findings: No erythema or rash. Neurological:      General: No focal deficit present. Mental Status: She is alert and oriented to person, place, and time. Mental status is at baseline. Cranial Nerves: No cranial nerve deficit. Radiology and Laboratory:  I personally reviewed and interpreted the following results: personal review of imaging with family in room. 35+ minutes was spent face to face with Simone Ellis and her family with greater than 50% of the time spent in counseling or coordination of care including: chart review; symptom pursuit; supportive listening; anticipatory guidance. .  All of the patient's or agent's questions were answered during this discussion.

## 2023-07-31 NOTE — DISCHARGE INSTRUCTIONS
Please follow up with your primary care physician for further management of your symptoms. Continue home pain regimen as prescribed.

## 2023-08-01 ENCOUNTER — OFFICE VISIT (OUTPATIENT)
Dept: HEMATOLOGY ONCOLOGY | Facility: MEDICAL CENTER | Age: 88
End: 2023-08-01
Payer: COMMERCIAL

## 2023-08-01 VITALS
TEMPERATURE: 96.8 F | RESPIRATION RATE: 16 BRPM | OXYGEN SATURATION: 92 % | BODY MASS INDEX: 20.69 KG/M2 | SYSTOLIC BLOOD PRESSURE: 150 MMHG | HEIGHT: 61 IN | WEIGHT: 109.6 LBS | HEART RATE: 87 BPM | DIASTOLIC BLOOD PRESSURE: 68 MMHG

## 2023-08-01 DIAGNOSIS — Z85.3 HISTORY OF LEFT BREAST CANCER: ICD-10-CM

## 2023-08-01 DIAGNOSIS — C34.91 ADENOCARCINOMA, LUNG, RIGHT (HCC): Primary | ICD-10-CM

## 2023-08-01 PROCEDURE — 99214 OFFICE O/P EST MOD 30 MIN: CPT | Performed by: INTERNAL MEDICINE

## 2023-08-01 NOTE — PROGRESS NOTES
Liv Cassandra  1934  1 [facility] HEMATOLOGY ONCOLOGY SPECIALISTS [place]  [place] [address] [facility] [id number]    DISCUSSION/SUMMARY:    80-year-old female with history of stage IA (pT1c, pN0, cM0, G1, ER+, IL+, HER2-) left breast cancer (diagnosed in September 2018) and a history of primary pulmonary adenocarcinoma, TTF-1 +, non-small cell lung carcinoma diagnosed in November 2021 status post 15 fractions RT. Recently patient began to have more dyspnea on exertion and chest pain. Recent PET/CT demonstrated a new right major fissure 1 cm nodule with mild FDG activity of uncertain clinical significance. There were additional new small pleural nodules along the right major fissure and a moderate to large pleural effusion with minimal FDG activity. Prior diagnostic and therapeutic thoracentesis demonstrated TTF-1 + adenocarcinoma. At this time, Mrs. Leary Said is to continue with the thoracentesis as needed. Pleur-evac insertion is on hold for the time being. The thoracentesis interval seems to have increased recently (obviously good). Patient was recently seen in the emergency room with shortness of breath but there was no evidence of PE, recurrent fluid collection or infection. Patient was discharged back home. We rediscussed other treatment options. Recent [facility] results became available; patient has PD-L1 IHC of 30%, positive. We discussed pembrolizumab at length, potential benefits versus side effects and toxicities. Patient/family were given literature in Sinhala to review. Mrs. Auguste still has not made a decision about this. If patient decides to begin, she will need baseline repeat scans. NCCN guidelines 3.2023 state that for patients with metastatic non-small cell lung carcinoma, adenocarcinoma, PDL 1 >50%, category 1 upfront treatment is pembrolizumab.   For patients with PD-L1 between 1 and 49%, pembrolizumab alone is category 2B, useful in certain segments at this. As above, patient is refusing any form of chemotherapy. At this time, patient is to return in 6 weeks but this may change if patient makes a decision regarding the immunotherapy. Patient has been seen/evaluated by Dr. June Hutton, palliative care; follow-ups are ongoing. Mrs. Auguste/family know to call the hematology/oncology office if there are any other questions or concerns. Carefully review your medication list and verify that the list is accurate and up-to-date. Please call the hematology/oncology office if there are medications missing from the list, medications on the list that you are not currently taking or if there is a dosage or instruction that is different from how you're taking that medication. Patient goals and areas of care: Therapeutic thoracentesis as needed, make decisions regarding pembrolizumab  Barriers to care: None  Patient is able to self-care with help of family members  ______________________________________________________________________________________    Chief Complaint   Patient presents with   • Follow-up   • Recurrent/metastatic non-small cell lung carcinoma     Oncology History   History of left breast cancer   8/10/2018 Biopsy    Left breast biopsy  Invasive mucinous  Grade 1      Her 2 1+     9/11/2018 Surgery    Left lumpectomy  Invasive mucinous carcinoma  Grade 1  1.1 cm in size  Margins negative      Her 2 1+  Stage 1A     Adenocarcinoma, lung, right ([address])   11/2021 Initial Diagnosis    Adenocarcinoma, lung, right ([address])     11/15/2021 Biopsy    Lung, Right Middle Lobe, mass:  - Adenocarcinoma. Comment: Immunohistochemistry is positive in the tumor cells for TTF-1 and negative for p40 and Gata3. 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 a primary pulmonary adenocarcinoma.      1/19/2022 - 2/8/2022 Radiation    Course: C1    Plan ID Energy Fractions Dose per Fraction (cGy) Dose Correction (cGy) Total Dose Delivered (cGy) Elapsed Days   RML 6X 15 / 15 400 0 6,000 20      Treatment dates:  1/19/2022 - 2/8/2022        History of Present Illness: 44-year-old female with distant history of left breast cancer, more recent diagnosis of early stage right middle lobe non-small cell lung carcinoma (TTF-1 + adenocarcinoma) status post RT. The patient has had more problems with shortness of breath and dyspnea on exertion recently. Repeat scans demonstrated a 1 cm right major fissure nodule seen on the December 2022 CAT scan and more recently on a PET/CT. Patient also had a moderate to large pleural effusion. Recent cytology demonstrated recurrent/metastatic non-small cell lung carcinoma, adenocarcinoma. Patient returns for follow-up and is accompanied by her daughter and . Mrs. Auguste has been undergoing thoracentesis as needed. Patient was recently seen in the emergency room for chest pain and shortness of breath. Work-up did not demonstrate any abnormality. Patient states feeling back to baseline now. Activities are limited but the same as before. No fevers or signs of infection. No pain control issues. Appetite is okay, weight is stable. Activities are limited but the same as before. Review of Systems   Constitutional: Positive for activity change and fatigue. HENT: Negative. Eyes: Negative. Respiratory: Negative.         + Dyspnea on exertion   Cardiovascular: Negative for chest pain. Gastrointestinal: Negative. Endocrine: Negative. Genitourinary: Negative. Musculoskeletal: Negative. Skin: Negative. Allergic/Immunologic: Negative. Neurological: Negative. Hematological: Negative. Psychiatric/Behavioral: Negative. All other systems reviewed and are negative.     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   • Chronic myofascial pain   • Chronic pain of lower extremity   • CKD (chronic kidney disease) stage 3, GFR 30-59 ml/min ([facility])   • Diabetic retinopathy ([address])   • DMII (diabetes mellitus, type 2) ([facility])   • Gout   • Hypomagnesemia   • Atherosclerosis of other specified arteries   • Lymphadenopathy   • Nephrolithiasis   • Osteoporosis   • PAD (peripheral artery disease) ([facility])   • Primary generalized (osteo)arthritis   • Persistent proteinuria   • Renal cyst   • Vaginal prolapse   • Vitamin D deficiency   • Acquired complex cyst of kidney   • History of left breast cancer   • Stricture of artery ([facility])   • Claudication in peripheral vascular disease ([facility])   • Hemorrhoids   • Tinea unguium   • Diabetic neuropathy ([facility])   • Bradycardia   • Glottic insufficiency   • Reflux laryngitis   • Paresis of left vocal fold   • Muscle tension dysphonia   • Laryngocele   • Anterior glottic web   • Viral upper respiratory tract infection   • 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[facility])   • Renal artery stenosis ([facility])   • Left ear pain   • Cervical spine disease   • Osteopenia of multiple sites   • Type 2 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, bilateral ([facility])   • Lung mass   • CKD (chronic kidney disease)   • Adenocarcinoma, lung, right ([facility])   • Encounter for annual routine gynecological examination   • Encounter for screening mammogram for breast cancer   • History of cancer of left breast   • Dense breast tissue on mammogram   • Left leg swelling   • Hypercalcemia   • Primary osteoarthritis of left knee   • Embolism and thrombosis of arteries of the lower extremities ([facility])   • Secondary hyperparathyroidism ([address])   • Postmenopausal bleeding   • Vaginal pessary present     Past Medical History:   Diagnosis Date   • Arthritis    • Back pain    • Benign essential hypertension     LAST ASSESSED: [id number]   • Carotid artery stenosis    • CKD (chronic kidney disease) stage 2, GFR 60-89 ml/min    • Constipation, chronic     "drinks prune juice"   • Diabetes mellitus (HCC)    • DMII (diabetes mellitus, type 2) ([facility])    • Epiglottitis     RESOLVED: [id number]   • Gastritis    • GERD (gastroesophageal reflux disease)    • Gout    • H/O blood clots     left lower leg,,,11 yrs ago   • History of stenosis of renal artery     RESOLVED: [id number]   • Hypercholesteremia    • Hypercholesterolemia    • Hyperlipidemia    • Hyperparathyroidism ([address])    • Hypertension    • Iliac artery stenosis, bilateral ([facility])    • Kidney stone    • Lobular carcinoma ([address]) 09/06/2017    Left   • Lung cancer ([address])    • Mucinous carcinoma of breast, 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   • Presence of pessary    • Renal artery stenosis ([facility])    • Renal disorder    • Renovascular hypertension     RESOLVED: [id number]   • Segmental and somatic dysfunction of cervical region     RESOLVED: [id number]   • Segmental and somatic dysfunction of lumbar region     RESOLVED: [id number]   • Segmental and somatic dysfunction of pelvic region     RESOLVED: 31YDJ1572   • Segmental and somatic dysfunction of thoracic region     RESOLVED: 49LGP5446   • Somatic dysfunction of abdominal region     RESOLVED: 86UZT3392   • Somatic dysfunction of head region     RESOLVED: 07MQT7716   • Somatic dysfunction of lower extremities     RESOLVED: 56NBG9344   • Somatic dysfunction of rib region     RESOLVED: 31ABB6731   • Somatic dysfunction of thoracic region     RESOLVED: 73LPZ3823   • Somatic dysfunction of upper extremities RESOLVED: 39KHZ3113   • Teeth missing      Past Surgical History:   Procedure Laterality Date   • ANGIOPLASTY / STENTING ILIAC     • BREAST BIOPSY Left 08/10/2018    U/S BX- mucinous ca   • BREAST BIOPSY Left 08/07/2017    U/S BX   • BREAST LUMPECTOMY Left 09/06/2017    LAST ASSESSED: [id number]   • BREAST LUMPECTOMY Left 09/11/2018   • BREAST SURGERY Left     biopsy   • CATARACT EXTRACTION Bilateral    • CHOLECYSTECTOMY     • COLONOSCOPY     • EYE SURGERY     • ILIAC VEIN ANGIOPLASTY / STENTING Right     INITIAL STENOSIS: ONSET: [id number]   • IR BIOPSY LUNG  11/15/2021   • IR THORACENTESIS  2/20/2023   • IR THORACENTESIS  3/28/2023   • IR THORACENTESIS  4/11/2023   • IR THORACENTESIS  4/19/2023   • IR THORACENTESIS  4/26/2023   • IR THORACENTESIS  5/3/2023   • IR THORACENTESIS  5/10/2023   • IR THORACENTESIS  5/17/2023   • IR THORACENTESIS  5/24/2023   • IR THORACENTESIS  5/31/2023   • IR THORACENTESIS  6/7/2023   • IR THORACENTESIS  7/18/2023   • KNEE ARTHROSCOPY Right    • KNEE SURGERY Right     ONSET: [id number]   • MAMMO NEEDLE LOCALIZATION LEFT (ALL INC) Left 9/11/2018   • MAMMO NEEDLE LOCALIZATION LEFT (ALL INC) Left 9/6/2017   • CO ESOPHAGOGASTRODUODENOSCOPY TRANSORAL DIAGNOSTIC N/A 7/26/2018    Procedure: ESOPHAGOGASTRODUODENOSCOPY (EGD); Surgeon: Monica Mitchell DO;  Location: [facility]; Service: Gastroenterology   •  [place] EXC SILVA&/STRPG CORDS/EPIGL MCRSCP/TLSCP N/A 6/10/2016    Procedure: MICRO DIRECT LARYNGOSCOPY WITH VOCAL FOLD MASS EXCISION ;  Surgeon: Dennie Phalen, MD;  Location[facility];  Service: ENT   • CO MASTECTOMY PARTIAL Left 9/6/2017    Procedure: LUMPECTOMY BREAST NEEDLE LOCALIZED WITH FAXITRON NEEDLE @ 0830;  Surgeon: Margie Joshua MD;  Location: [facility];  Service: General   • CO PERQ DEVICE PLACEMENT BREAST LOC 1ST LES W/GDNCE Left 9/11/2018    Procedure: BREAST LUMPECTOMY; BREAST NEEDLE LOCALIZATION (NEEDLE LOC AT 1330);   Surgeon: Giovanny Rubin MD;  Location[facility]; Service: Surgical Oncology   • THROAT SURGERY      lump removed   • TUBAL LIGATION     • UPPER GASTROINTESTINAL ENDOSCOPY     • US GUIDANCE BREAST BIOPSY LEFT EACH ADDITIONAL Left 2017   • US GUIDED BREAST BIOPSY LEFT COMPLETE Left 8/10/2018   • US GUIDED BREAST BIOPSY LEFT COMPLETE Left 2017   • VASCULAR SURGERY       Family History   Problem Relation Age of Onset   • Heart disease Brother    • Heart disease Maternal Grandmother    • Diabetes Other         of other type with arthropathy, without long term current use of insulin   • Hypertension Other    • No Known Problems Mother    • Gout Family    • Rheum arthritis Family    • Lupus Family         systematic erythematosus   • Heart disease Family    • Stroke Family         syndrome   • Stroke Maternal Uncle         syndrome   • Stroke Maternal Aunt         syndrome   • No Known Problems Father    • No Known Problems Daughter    • No Known Problems Maternal Grandfather    • No Known Problems Paternal Grandmother    • No Known Problems Paternal Grandfather    • No Known Problems Daughter    • No Known Problems Daughter    • No Known Problems Maternal Aunt    • No Known Problems Maternal Aunt    • No Known Problems Maternal Aunt      Social History     Socioeconomic History   • Marital status:       Spouse name: Not on file   • Number of children: Not on file   • Years of education: Not on file   • Highest education level: Not on file   Occupational History   • Occupation: retired   Tobacco Use   • Smoking status: Former     Packs/day: 1.00     Years: 55.00     Total pack years: 55.00     Types: Cigarettes     Start date:      Quit date:      Years since quittin.5   • Smokeless tobacco: Never   Vaping Use   • Vaping Use: Never used   Substance and Sexual Activity   • Alcohol use: Not Currently   • Drug use: No   • Sexual activity: Not Currently     Partners: Male   Other Topics Concern   • Not on file   Social History Narrative    Daily caffeine consumption, 1 serving a day      Social Determinants of Health     Financial Resource Strain: Low Risk  (10/23/2022)    Overall Financial Resource Strain (CARDIA)    • Difficulty of Paying Living Expenses: Not hard at all   Food Insecurity: No Food Insecurity (2/24/2021)    Hunger Vital Sign    • Worried About Running Out of Food in the Last Year: Never true    • Ran Out of Food in the Last Year: Never true   Transportation Needs: No Transportation Needs (10/23/2022)    PRAPARE - Transportation    • Lack of Transportation (Medical): No    • Lack of Transportation (Non-Medical): No   Physical Activity: Inactive (6/20/2019)    Exercise Vital Sign    • Days of Exercise per Week: 0 days    • Minutes of Exercise per Session: 0 min   Stress: No Stress Concern Present (6/20/2019)    [facility]    • Feeling of Stress :  Only a little   Social Connections: Unknown (6/20/2019)    Social Connection and Isolation Panel [NHANES]    • Frequency of Communication with Friends and Family: More than three times a week    • Frequency of Social Gatherings with Friends and Family: More than three times a week    • Attends Yazidi Services: Not on file    • Active Member of Clubs or Organizations: Not on file    • Attends Club or Organization Meetings: Not on file    • Marital Status: Not on file   Intimate Partner Violence: Not on file   Housing Stability: Not on file       Current Outpatient Medications:   •  Acetaminophen 325 MG CAPS, Take 500 mg by mouth every 8 (eight) hours, Disp: , Rfl:   •  albuterol (ProAir HFA) 90 mcg/act inhaler, Inhale 2 puffs every 6 (six) hours as needed for wheezing or shortness of breath para tos, o un siento de falta el aire, Disp: 8.5 g, Rfl: 0  •  aspirin (ADULT ASPIRIN EC LOW STRENGTH) 81 mg EC tablet, Take 1 tablet by mouth daily, Disp: , Rfl:   •  calcitriol (ROCALTROL) 0.25 mcg capsule, Take 1 capsule (0.25 mcg total) by mouth 2 (two) times a week, Disp: 24 capsule, Rfl: 3  •  fluticasone (FLONASE) 50 mcg/act nasal spray, 2 sprays into each nostril daily, Disp: 16 g, Rfl: 0  •  gabapentin (NEURONTIN) 100 mg capsule, take 1 capsule by mouth twice a day, Disp: 180 capsule, Rfl: 0  •  HYDROmorphone (Dilaudid) 2 mg tablet, Take 1 tablet (2 mg total) by mouth every 4 (four) hours as needed (cancer pain, insomnia) [address] dianna tableta para delaney, o antes de dormir si tiene insomnia. Cuando tome neli pastilla, NO TOME el oxycodone NI el tramadol.  Max Daily Amount: 12 mg, Disp: 20 tablet, Rfl: 0  •  Lancets (ONETOUCH ULTRASOFT) lancets, by Other route 2 (two) times a day Use as instructed, Disp: 100 each, Rfl: 5  •  mirtazapine (REMERON) 30 mg tablet, Take 1 tablet (30 mg total) by mouth daily at bedtime, Disp: 30 tablet, Rfl: 0  •  NIFEdipine ER (ADALAT CC) 30 MG 24 hr tablet, take 1 tablet by mouth twice a day, Disp: 180 tablet, Rfl: 3  •  olopatadine (PATANOL) 0.1 % ophthalmic solution, Administer 1 drop to both eyes 2 (two) times a day, Disp: 5 mL, Rfl: 0  •  OneTouch Delica Lancets 62X MISC, Inject as directed 2 (two) times a day Use to test, Disp: 200 each, Rfl: 0  •  oxyCODONE (Roxicodone) 5 immediate release tablet, Take 0.5 tablets (2.5 mg total) by mouth 2 (two) times a day as needed (cancer pain) Max Daily Amount: 5 mg, Disp: 20 tablet, Rfl: 0  •  pantoprazole (PROTONIX) 40 mg tablet, Take 1 tablet (40 mg total) by mouth 2 (two) times a day, Disp: 60 tablet, Rfl: 1  •  polyethylene glycol (MIRALAX) 17 g packet, Take 17 g by mouth daily, Disp: 30 each, Rfl: 5  •  pravastatin (PRAVACHOL) 40 mg tablet, Take 1 tablet (40 mg total) by mouth daily, Disp: 90 tablet, Rfl: 3  •  Probiotic Product (Align) 4 MG CAPS, Take by mouth, Disp: , Rfl:   •  traMADol (Ultram) 50 mg tablet, Take 1 tablet (50 mg total) by mouth every 8 (eight) hours as needed for moderate pain, Disp: 30 tablet, Rfl: 0    Allergies   Allergen Reactions   • Clonidine Derivatives Swelling   • Diflucan [Fluconazole] Rash   • Flagyl [Metronidazole] Anaphylaxis   • Carvedilol Hives     And legs swelled   • Latex Rash   • Lipitor [Atorvastatin]      Legs swelled   • Metoprolol Dizziness     Low BP and orthostatic symptoms. • Other Palpitations     IV DYE   • Shingrix [Zoster Vac Recomb Adjuvanted] Hives       Vitals:    08/01/23 1608   BP: 150/68   Pulse: 87   Resp: 16   Temp: (!) 96.8 °F (36 °C)   SpO2: 92%     Physical Exam  Constitutional:       Appearance: She is well-developed. Comments: But relatively well-nourished female, no respiratory distress, no signs of pain   HENT:      Head: Normocephalic and atraumatic. Right Ear: External ear normal.      Left Ear: External ear normal.   Eyes:      Conjunctiva/sclera: Conjunctivae normal.      Pupils: Pupils are equal, round, and reactive to light. Cardiovascular:      Rate and Rhythm: Normal rate and regular rhythm. Heart sounds: Normal heart sounds. Pulmonary:      Effort: Pulmonary effort is normal.      Breath sounds: Normal breath sounds. Comments: Fair entry bilaterally, slightly decreased on right, rales on right  Abdominal:      General: Bowel sounds are normal.      Palpations: Abdomen is soft. Musculoskeletal:         General: Normal range of motion. Cervical back: Normal range of motion and neck supple. Skin:     General: Skin is warm. Neurological:      Mental Status: She is alert and oriented to person, place, and time. Deep Tendon Reflexes: Reflexes are normal and symmetric. Psychiatric:         Behavior: Behavior normal.         Thought Content:  Thought content normal.         Judgment: Judgment normal.     Extremities: 0-1+ bilateral lower extreme edema, no cords, pulses are 1+  Lymphatics: No adenopathy in the neck, supraclavicular region, axilla and groin bilaterally    Labs    7/30/2023 WBC = 6.23 hemoglobin = 11.7 hematocrit = 37.2 platelet = 695 neutrophil = 68% D-dimer = 3.88 BUN = 24 creatinine = 1.27 calcium = 9.6 LFTs WNL    4/10/2023 BUN = 28 creatinine = 1.31  2/11/2023 WBC = 7.83 hemoglobin = 12.1 hematocrit = 39.3 MCV = 90 platelet = 566 neutrophil = 62% lymphocyte = 27% monocyte = 10% eosinophil = 1% BUN = 35 creatinine = 1.57 GFR = 29 calcium = 9.3    Imaging    1/20/2023 PET/CT    IMPRESSION:     1. Patient's known right middle lobe lung cancer demonstrates near-complete to complete metabolic response to therapy.     2.  Patient's known new 1.0 cm right major fissural nodule on CT of chest dated 12/23/2022 demonstrates minimal to mild FDG activity and is of uncertain clinical significance with differential diagnosis including benign inflammatory nodule versus developing malignancy of low metabolic activity. There are additional new/ progressive small pleural nodules along the right major fissure which are beyond the limits of resolution for PET CT. There is a moderate to large pleural effusion with minimal to mild FDG activity. The overall constellation of findings is suspicious for developing pleural metastatic disease with possible malignant pleural effusion. Consider correlation with fluid/tissue sampling as clinically indicated.     3.   Additional scattered solid and groundglass appearing lung nodules involving both lungs without significant interval change and for which continued follow-up is recommended.     4.  Multinodular thyroid gland which can be further characterized with ultrasound as clinically indicated.     Pathology        Case Report   Non-gynecologic Cytology                          Case: [id number]                                   Authorizing Provider: JARRED Ely      Collected:           02/20/2023 1334               Ordering Location:     [facility]      Received:            02/20/2023 [address] [place] [address]                                                                              Radiology                                                                     Pathologist:           Saintclair Sensing, MD                                                                  Specimens:   A) - Thoracentesis                                                                                   B) - Thoracentesis, cell block                                                             Final Diagnosis   A. B. Thoracentesis,  (ThinPrep and cell block preparations):  Conclusive evidence of malignancy. Metastatic adenocarcinoma, consistent with lung primary. Satisfactory for evaluation.     Comment: Immunohistochemistry was performed on cell block. The tumor cells are positive for MOC31, ALISA-EP4, TTF-1, napsin A and negative for D2-40, supporting the above diagnosis.     Intradepartmental consultation is in agreement.      Electronically signed by Saintclair Sensing, MD on 2/23/2023 at 11:18 AM       Case Report   Surgical Pathology Report                         Case: [id number]                                    Authorizing Provider: Arlin Brody MD    Collected:           11/15/2021 1545               Ordering Location:     [facility]      Received:            11/15/2021 [address] [place] [address]                                                    Pathologist:           Kendra Juares MD                                                    Specimen:    Lung, Right Middle Lobe, RML Lung mass                                                     Final Diagnosis   A. Lung, Right Middle Lobe, mass:  - Adenocarcinoma. See comment.     Comment: Immunohistochemistry is positive in the tumor cells for TTF-1 and negative for p40 and Gata3. 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 a primary pulmonary adenocarcinoma.    Electronically signed by Sabrina Lopez MD on 11/17/2021 at  9:04 AM Case Report   Surgical Pathology Report                         Case: [id number]                                    Authorizing Provider: Giselle Cortes MD              Collected:           09/11/2018 1437               Ordering Location:     [address] [place] [address]        Received:            09/11/2018 [address] [place] [address] Operating Room                                                       Pathologist:           Stacey Bourne MD                                                                  Specimens:   A) - Breast, Left, Left breast lumpectomy                                                            B) - Breast, Left, Left breast lumpectomy-anterior margin-green                                      C) - Breast, Left, Left breast lumpectomy-inferior margin-blue                                       D) - Breast, Left, Left breast lumpectomy-lateral margin-red                                         E) - Breast, Left, Left breast lumpectomy-medial margin-yellow                                       F) - Breast, Left, Left breast lumpectomy-superior margin-orange                           Final Diagnosis   MUCINOUS CARCINOMA OF BREAST STAGING SUMMARY (includes specimens A to F of this case and prior biopsy [id number])  1. Specimen Identification     - Procedure: lumpectomy     - Lymph Node Sampling: no     - Specimen Laterality: left     - Tumor Site (Quadrant; Position/o'clock): upper outer quadrant, 1:00 - 2:00   2. Invasive Tumor:     - Histologic Type: mucinous carcinoma     - Tumor Size (pT) [Size of largest invasive carcinoma]: 1.1 cm     -  Grade (G): [place] Histologic Score; Grade  I of III. * Glandular (Acinar) / Tubular Differentiation: 3       * Nuclear Pleomorphism: 1       * Mitotic Rate: 1     - Tumor Focality: single focus     - Macroscopic and Microscopic Extent of Tumor:       -- Skin: N/A       -- Nipple:N/A       -- Skeletal muscle: N/A  3.  Lymphovascular invasion: absent  4. Dermal lymphovascular invasion: N/A  5. In situ tumor     - Ductal carcinoma in Situ (DCIS): absent   6. Margins:     - Invasive Carcinoma: negative     - DCIS: N/A   7. Lymph nodes (pN): no lymph node submitted. 8. Treatment Effect, Response to Presurgical (Neoadjuvant) Therapy: no known presurgical therapy   9. Additional Pathologic Findings:     - Proliferative fibrocystic changes, including usual ductal hyperplasia, cysts, apocrine metaplasia, fibroadenomatous change, and duct ectasia. - Biopsy site changes. 10. Microcalcifications: present and associated with benign breast parenchyma  11. Ancillary Studies: (performed on case [id number])     - Estrogen receptor: 100%, Strong, positive     - Progesterone receptor: 100%, strong, positive     - HER2 by IHC: 1+, negative     - Repeat HER2 testing (2013 ASCO/CAP Recommendations): Not indicated. - Best representative tumor block: A7, A8 and A9       -- Sufficient tumor present for          Agendia Mammaprint/Blueprint (1 cm2 of invasive tumor in aggregate): Yes. MI Profile/Foundation One (at least 5 x 5 mm of tumor): Yes. 12. Clinical History: abnormal findings on diagnostic imaging of breast  13. Pathologic Stage Classification (pTNM, AJCC 8th Edition): pT1c, pNX.  8th ed, AJCC Anatomic Stage: at least Ia. 14. 8th ed. AJCC Prognostic Stage (use AJCC update): IA        Final Diagnoses for each Specimen of this Case  A. Left breast, lumpectomy:  - Mucinous carcinoma, see synoptic report, above. - All lumpectomy margins are uninvolved by carcinoma.     Comment: Intradepartmental consultation is in agreement.      B. Left breast, anterior margin, excision:  - Benign breast parenchyma. - Negative for invasive carcinoma or carcinoma in situ.     C.  Left breast, inferior margin, excision:  - Benign breast parenchyma with proliferative fibrocystic changes, including usual ductal hyperplasia with apocrine metaplasia and duct ectasia. - Negative for invasive carcinoma or carcinoma in situ.     D. Left breast, lateral margin, excision:  - Benign breast parenchyma. - Negative for invasive carcinoma or carcinoma in situ.     E. Left breast, medial margin, excision:  - Benign breast parenchyma. - Microcalcifications are present and associated with benign glands. - Negative for invasive carcinoma or carcinoma in situ.     F. Left breast, superior margin, excision:  - Benign breast parenchyma.   - Negative for invasive carcinoma or carcinoma in situ.      Electronically signed by Princess Mayorga MD on 9/14/2018 at  3:17 PM

## 2023-08-02 ENCOUNTER — HOSPITAL ENCOUNTER (OUTPATIENT)
Dept: RADIOLOGY | Facility: HOSPITAL | Age: 88
Discharge: HOME | End: 2023-08-02
Payer: COMMERCIAL

## 2023-08-02 VITALS — SYSTOLIC BLOOD PRESSURE: 185 MMHG | HEART RATE: 78 BPM | OXYGEN SATURATION: 96 % | DIASTOLIC BLOOD PRESSURE: 77 MMHG

## 2023-08-02 DIAGNOSIS — C34.91 ADENOCARCINOMA, LUNG, RIGHT (HCC): ICD-10-CM

## 2023-08-02 PROCEDURE — 32555 ASPIRATE PLEURA W/ IMAGING: CPT

## 2023-08-02 PROCEDURE — 32555 ASPIRATE PLEURA W/ IMAGING: CPT | Performed by: PHYSICIAN ASSISTANT

## 2023-08-02 NOTE — BRIEF OP NOTE (RAD/CATH)
IR Right THORACENTESIS Procedure Note    PATIENT NAME: Ivelisse Centeno  : 1934  MRN: [id number]    Pre-op Diagnosis:   1. Adenocarcinoma, lung, right (HCC)      Post-op Diagnosis:   1. Adenocarcinoma, lung, right [facility])        Provider:   Dortha Peabody, PA-C  Assistants:     No qualified resident was available, Resident is only observing    Estimated Blood Loss: None    Findings: Ultrasound of right pleural space with small pocket of pleural effusion.   50 mL of dark sanguinous fluid drained from right-sided thoracentesis    Specimens: None    Complications: None immediately    Anesthesia: local    Dortha Peabody, PA-C     Date: 2023  Time: 2:06 PM

## 2023-08-09 ENCOUNTER — TELEPHONE (OUTPATIENT)
Dept: FAMILY MEDICINE CLINIC | Facility: CLINIC | Age: 88
End: 2023-08-09

## 2023-08-09 NOTE — TELEPHONE ENCOUNTER
Patient's daughter says she misplaced name and # of Dr you referred her to for medical marijuana for lung cancer. Please call daughter to discuss, as they want to know if this is best option.

## 2023-08-10 ENCOUNTER — TELEPHONE (OUTPATIENT)
Dept: FAMILY MEDICINE CLINIC | Facility: CLINIC | Age: 88
End: 2023-08-10

## 2023-08-10 ENCOUNTER — OFFICE VISIT (OUTPATIENT)
Dept: FAMILY MEDICINE CLINIC | Facility: CLINIC | Age: 88
End: 2023-08-10
Payer: COMMERCIAL

## 2023-08-10 VITALS
RESPIRATION RATE: 18 BRPM | SYSTOLIC BLOOD PRESSURE: 180 MMHG | TEMPERATURE: 97.3 F | BODY MASS INDEX: 24 KG/M2 | HEART RATE: 83 BPM | DIASTOLIC BLOOD PRESSURE: 50 MMHG | WEIGHT: 127 LBS | OXYGEN SATURATION: 100 %

## 2023-08-10 DIAGNOSIS — G89.3 CANCER-RELATED PAIN: ICD-10-CM

## 2023-08-10 DIAGNOSIS — C34.91 ADENOCARCINOMA, LUNG, RIGHT (HCC): Primary | ICD-10-CM

## 2023-08-10 PROCEDURE — 99213 OFFICE O/P EST LOW 20 MIN: CPT | Performed by: FAMILY MEDICINE

## 2023-08-10 NOTE — TELEPHONE ENCOUNTER
[address] [place] on pt and 's # to change today's appointment to virtual unless they have already left.

## 2023-08-10 NOTE — PROGRESS NOTES
Neelima Hitchcock 1934 female MRN: [id number]    [facility] PRACTICE OFFICE VISIT  [facility] - [facility]      ASSESSMENT/PLAN  Neelima Hitchcock is a 80 y.o. female presents to the office for    Diagnoses and all orders for this visit:    Adenocarcinoma, lung, right ([address])    Cancer-related pain       Unfortunately patient has not been taking any of her hydromorphone felt like it was making her too drowsy. At this time we will be scheduling oxycodone 5 mg twice a day  I have personally messaged palliative care to see if were able to come up with a better solution of her pain. We have also discussed in detail that we will be meeting with the hematologist to see what her prognosis is. Patient is willing to start immunotherapy at this time      Depression Screening and Follow-up Plan: Patient's depression screening was positive with a PHQ-2 score of 3. Their PHQ-9 score was 8. Patient assessed for underlying major depression. Brief counseling provided and recommend additional follow-up/re-evaluation next office visit.            Future Appointments   Date Time Provider [address]   8/14/2023  3:45 PM Garry Moore MD [facility]   8/16/2023  1:00 PM BE IR HLD 1 BE [facility]   8/30/2023  1:00 PM BE IR HLD 1 BE IR [address]   9/1/2023  2:20 PM Jie Benjamin MD [facility]-Hos   9/5/2023  9:00 AM BE HV VASCULAR 1 BE HV Car [address]   9/12/2023  3:00 PM Sloane Arzate MD [facility]-W[facility]   9/13/2023  1:00 PM BE IR HLD 1 BE IR BE HOSPITAL   9/18/2023  2:20 PM Kimberlee Leos MD HEM ONC [facility]-Onc   10/16/2023  2:30 PM Garry Moore MD [address]   10/18/2023  3:00 PM Desire Belle MD [facility]   11/16/2023  4:00 PM Marely Savage MD CARD BE Practice-[facility]   12/19/2023  4:00 PM Remington Ayoub MD [facility]   6/28/2024  1:40 PM Sloane Arzate MD [facility]          SUBJECTIVE  CC: Back Pain (Mid back pain radiating to the front, started as right flank pain )      HPI:  Amarilis Patel is a 80 y.o. female who presents for. An acute appointment. Patient states that she woke up with severe flank pain as well as epigastric pain. Patient states that she is unable to tolerate any food and has been feeling very ill.  who is currently right next to her stated that she has not been able to take these medications given that they make her too drowsy. Has not taken the oxycodone either given that he is worried about the long-term side effects       Review of Systems   Constitutional: Positive for activity change, appetite change, fatigue and unexpected weight change. Musculoskeletal: Positive for arthralgias and back pain.        Historical Information   The patient history was reviewed as follows:  Past Medical History:   Diagnosis Date   • Arthritis    • Back pain    • Benign essential hypertension     LAST ASSESSED: [id number]   • Carotid artery stenosis    • CKD (chronic kidney disease) stage 2, GFR 60-89 ml/min    • Constipation, chronic     "drinks prune juice"   • Diabetes mellitus (HCC)    • DMII (diabetes mellitus, type 2) (HCC)    • Epiglottitis     RESOLVED: [id number]   • Gastritis    • GERD (gastroesophageal reflux disease)    • Gout    • H/O blood clots     left lower leg,,,11 yrs ago   • History of stenosis of renal artery     RESOLVED: [id number]   • Hypercholesteremia    • Hypercholesterolemia    • Hyperlipidemia    • Hyperparathyroidism ([address])    • Hypertension    • Iliac artery stenosis, bilateral (HCC)    • Kidney stone    • Lobular carcinoma ([address]) 09/06/2017    Left   • Lung cancer ([address])    • Mucinous carcinoma of breast, 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   • Presence of pessary    • Renal artery stenosis (HCC)    • Renal disorder    • Renovascular hypertension     RESOLVED: [id number]   • Segmental and somatic dysfunction of cervical region     RESOLVED: [id number]   • Segmental and somatic dysfunction of lumbar region     RESOLVED: 02AUG2017   • Segmental and somatic dysfunction of pelvic region     RESOLVED: 02AUG2017   • Segmental and somatic dysfunction of thoracic region     RESOLVED: 02AUG2017   • Somatic dysfunction of abdominal region     RESOLVED: 02AUG2017   • Somatic dysfunction of head region     RESOLVED: 02AUG2017   • Somatic dysfunction of lower extremities     RESOLVED: 02AUG2017   • Somatic dysfunction of rib region     RESOLVED: [id number]   • Somatic dysfunction of thoracic region     RESOLVED: 02AUG2017   • Somatic dysfunction of upper extremities     RESOLVED: [id number]   • Teeth missing          Medications:     Current Outpatient Medications:   •  Acetaminophen 325 MG CAPS, Take 500 mg by mouth every 8 (eight) hours, Disp: , Rfl:   •  albuterol (ProAir HFA) 90 mcg/act inhaler, Inhale 2 puffs every 6 (six) hours as needed for wheezing or shortness of breath para tos, o un siento de falta el aire, Disp: 8.5 g, Rfl: 0  •  aspirin (ADULT ASPIRIN EC LOW STRENGTH) 81 mg EC tablet, Take 1 tablet by mouth daily, Disp: , Rfl:   •  calcitriol (ROCALTROL) 0.25 mcg capsule, Take 1 capsule (0.25 mcg total) by mouth 2 (two) times a week, Disp: 24 capsule, Rfl: 3  •  fluticasone (FLONASE) 50 mcg/act nasal spray, 2 sprays into each nostril daily, Disp: 16 g, Rfl: 0  •  gabapentin (NEURONTIN) 100 mg capsule, take 1 capsule by mouth twice a day, Disp: 180 capsule, Rfl: 0  •  HYDROmorphone (Dilaudid) 2 mg tablet, Take 1 tablet (2 mg total) by mouth every 4 (four) hours as needed (cancer pain, insomnia) [place] dianna tableta para delaney, o antes de dormir si tiene insomnia. Cuando tome neli pastilla, NO TOME el oxycodone NI el tramadol.  Max Daily Amount: 12 mg, Disp: 20 tablet, Rfl: 0  •  Lancets (ONETOUCH ULTRASOFT) lancets, by Other route 2 (two) times a day Use as instructed, Disp: 100 each, Rfl: 5  •  NIFEdipine ER (ADALAT CC) 30 MG 24 hr tablet, take 1 tablet by mouth twice a day, Disp: 180 tablet, Rfl: 3  •  olopatadine (PATANOL) 0.1 % ophthalmic solution, Administer 1 drop to both eyes 2 (two) times a day, Disp: 5 mL, Rfl: 0  •  OneTouch Delica Lancets 69F MISC, Inject as directed 2 (two) times a day Use to test, Disp: 200 each, Rfl: 0  •  pantoprazole (PROTONIX) 40 mg tablet, Take 1 tablet (40 mg total) by mouth 2 (two) times a day, Disp: 60 tablet, Rfl: 1  •  pravastatin (PRAVACHOL) 40 mg tablet, Take 1 tablet (40 mg total) by mouth daily, Disp: 90 tablet, Rfl: 3  •  Probiotic Product (Align) 4 MG CAPS, Take by mouth, Disp: , Rfl:   •  mirtazapine (REMERON) 30 mg tablet, Take 1 tablet (30 mg total) by mouth daily at bedtime (Patient not taking: Reported on 8/10/2023), Disp: 30 tablet, Rfl: 0  •  oxyCODONE (ROXICODONE) 10 MG TABS, Take 1 tablet (10 mg total) by mouth 2 (two) times a day as needed for moderate pain Max Daily Amount: 20 mg, Disp: 60 tablet, Rfl: 0  •  oxyCODONE (Roxicodone) 5 immediate release tablet, Take 0.5 tablets (2.5 mg total) by mouth 2 (two) times a day as needed (cancer pain) Max Daily Amount: 5 mg (Patient not taking: Reported on 8/10/2023), Disp: 20 tablet, Rfl: 0  •  polyethylene glycol (MIRALAX) 17 g packet, Take 17 g by mouth daily (Patient not taking: Reported on 8/10/2023), Disp: 30 each, Rfl: 5  •  traMADol (Ultram) 50 mg tablet, Take 1 tablet (50 mg total) by mouth every 8 (eight) hours as needed for moderate pain (Patient not taking: Reported on 8/10/2023), Disp: 30 tablet, Rfl: 0    Allergies   Allergen Reactions   • Clonidine Derivatives Swelling   • Diflucan [Fluconazole] Rash   • Flagyl [Metronidazole] Anaphylaxis   • Carvedilol Hives     And legs swelled   • Latex Rash   • Lipitor [Atorvastatin]      Legs swelled   • Metoprolol Dizziness     Low BP and orthostatic symptoms.    • Other Palpitations     IV DYE   • Shingrix [Zoster Vac Recomb Adjuvanted] Hives       OBJECTIVE  Vitals:   Vitals:    08/10/23 1314   BP: (!) 180/50   BP Location: Left arm   Patient Position: Sitting   Cuff Size: Standard   Pulse: 83   Resp: 18   Temp: (!) 97.3 °F (36.3 °C)   SpO2: 100%   Weight: 57.6 kg (127 lb)         Physical Exam  Vitals reviewed. Constitutional:       Appearance: She is well-developed. She is ill-appearing. HENT:      Head: Normocephalic and atraumatic. Eyes:      Conjunctiva/sclera: Conjunctivae normal.      Pupils: Pupils are equal, round, and reactive to light. Cardiovascular:      Rate and Rhythm: Normal rate and regular rhythm. Heart sounds: Normal heart sounds. Pulmonary:      Effort: Pulmonary effort is normal. No respiratory distress. Breath sounds: Normal breath sounds. Musculoskeletal:         General: Tenderness present. Cervical back: Normal range of motion and neck supple. Skin:     General: Skin is warm. Capillary Refill: Capillary refill takes less than 2 seconds. Neurological:      Mental Status: She is alert and oriented to person, place, and time.                     Martin Bowers MD,   [facility]  8/12/2023

## 2023-08-11 DIAGNOSIS — G89.3 CANCER-RELATED PAIN: Primary | ICD-10-CM

## 2023-08-11 RX ORDER — OXYCODONE HYDROCHLORIDE 10 MG/1
10 TABLET ORAL 2 TIMES DAILY PRN
Qty: 60 TABLET | Refills: 0 | Status: SHIPPED | OUTPATIENT
Start: 2023-08-11 | End: 2023-09-01

## 2023-08-13 DIAGNOSIS — G89.3 CANCER-RELATED PAIN: ICD-10-CM

## 2023-08-14 ENCOUNTER — OFFICE VISIT (OUTPATIENT)
Dept: FAMILY MEDICINE CLINIC | Facility: CLINIC | Age: 88
End: 2023-08-14
Payer: COMMERCIAL

## 2023-08-14 VITALS
SYSTOLIC BLOOD PRESSURE: 172 MMHG | RESPIRATION RATE: 16 BRPM | WEIGHT: 109.2 LBS | BODY MASS INDEX: 20.62 KG/M2 | HEART RATE: 80 BPM | HEIGHT: 61 IN | OXYGEN SATURATION: 92 % | TEMPERATURE: 97.7 F | DIASTOLIC BLOOD PRESSURE: 54 MMHG

## 2023-08-14 DIAGNOSIS — I10 ESSENTIAL HYPERTENSION: ICD-10-CM

## 2023-08-14 DIAGNOSIS — C34.91 ADENOCARCINOMA, LUNG, RIGHT (HCC): ICD-10-CM

## 2023-08-14 DIAGNOSIS — G89.3 CANCER-RELATED PAIN: Primary | ICD-10-CM

## 2023-08-14 PROCEDURE — 99214 OFFICE O/P EST MOD 30 MIN: CPT | Performed by: FAMILY MEDICINE

## 2023-08-14 RX ORDER — MIRTAZAPINE 30 MG/1
30 TABLET, FILM COATED ORAL
Qty: 30 TABLET | Refills: 0 | Status: SHIPPED | OUTPATIENT
Start: 2023-08-14

## 2023-08-14 NOTE — PROGRESS NOTES
Vincent Knapp 1934 female MRN: [id number]    [facility] PRACTICE OFFICE VISIT  [facility] - [facility]      ASSESSMENT/PLAN  Vincent Knapp is a 80 y.o. female presents to the office for    Diagnoses and all orders for this visit:    Cancer-related pain    Adenocarcinoma, lung, right ([address])    Essential hypertension       Cancer related pain  Discussed with the patient that there is no longer any treatment for the cancer  We discussed options between palliative and hospice  Patient would like to proceed with palliative care  We had Dr. Garcia Hwang conferenced into her call today; to help guide the family  Patient stated that she wanted to continue to be full code; and treatment with hospitalizations if needed  We currently have her controlled on oxycodone 10 mg twice daily as needed. Recommend scheduling at least 1 dose in the a.m.   Monitor weight  If weight continues to decrease recommend mirtazapine 30 mg at bedtime patient states she has not been taking this medication;  Continue supplement shakes  Recommend COVID booster  Hypertension we will currently stay on medications on the list no changes to be made         Future Appointments   Date Time Provider [address]   8/16/2023  1:00 PM BE IR HLD [address]   8/30/2023  1:00 PM BE IR HLD 1 BE IR [address]   9/1/2023  2:20 PM Arely Donohue MD [facility]   9/5/2023  9:00 AM BE HV VASCULAR 1 BE HV Car [address]   9/12/2023  3:00 PM Stephanie Valerio MD [facility]   9/13/2023  1:00 PM BE IR HLD 1 BE IR BE HOSPITAL   9/18/2023  2:20 PM Armani Peñaloza MD HEM ONC [facility]On[facility]   10/16/2023  2:30 PM Josephine Mooney MD [facility]   10/18/2023  3:00 PM Cm Rider MD [facility]   11/16/2023  4:00 PM Nina John MD CARD BE Practice-[facility]   12/19/2023  4:00 PM Nae Raymond MD [facility]   6/28/2024  1:40 PM Stephanie Valerio MD [address] SUBJECTIVE  CC: Follow-up      HPI:  Yodit Pemberton is a 80 y.o. female who presents for a follow-up on blood pressure as well as recent visit at her last appointment. 2 daughters are present in person; significant other in person; and 1 daughter over [address]. Patient states that she has been controlled with the pain with the oxycodone scheduled only in the morning. States she has not been needing the nighttime medication. Patient's main concern is that she does not have an appetite and does not want to eat. Review of Systems   Constitutional: Positive for activity change, appetite change, fatigue and unexpected weight change. Negative for chills and fever. HENT: Negative for congestion. Respiratory: Negative for cough, chest tightness and shortness of breath. Cardiovascular: Negative for chest pain and leg swelling. Gastrointestinal: Negative for abdominal distention, abdominal pain, constipation, diarrhea, nausea and vomiting. Musculoskeletal: Positive for back pain. All other systems reviewed and are negative.       Historical Information   The patient history was reviewed as follows:  Past Medical History:   Diagnosis Date   • Arthritis    • Back pain    • Benign essential hypertension     LAST ASSESSED: [id number]   • Carotid artery stenosis    • CKD (chronic kidney disease) stage 2, GFR 60-89 ml/min    • Constipation, chronic     "drinks prune juice"   • Diabetes mellitus (HCC)    • DMII (diabetes mellitus, type 2) (HCC)    • Epiglottitis     RESOLVED: [id number]   • Gastritis    • GERD (gastroesophageal reflux disease)    • Gout    • H/O blood clots     left lower leg,,,11 yrs ago   • History of stenosis of renal artery     RESOLVED: [id number]   • Hypercholesteremia    • Hypercholesterolemia    • Hyperlipidemia    • Hyperparathyroidism ([address])    • Hypertension    • Iliac artery stenosis, bilateral (HCC)    • Kidney stone    • Lobular carcinoma ([address]) 09/06/2017    Left   • Lung cancer 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   • Presence of pessary    • Renal artery stenosis (HCC)    • Renal disorder    • Renovascular hypertension     RESOLVED: [id number]   • Segmental and somatic dysfunction of cervical region     RESOLVED: 02AUG2017   • Segmental and somatic dysfunction of lumbar region     RESOLVED: 02AUG2017   • Segmental and somatic dysfunction of pelvic region     RESOLVED: 02AUG2017   • Segmental and somatic dysfunction of thoracic region     RESOLVED: 02AUG2017   • Somatic dysfunction of abdominal region     RESOLVED: 02AUG2017   • Somatic dysfunction of head region     RESOLVED: 02AUG2017   • Somatic dysfunction of lower extremities     RESOLVED: 02AUG2017   • Somatic dysfunction of rib region     RESOLVED: 02AUG2017   • Somatic dysfunction of thoracic region     RESOLVED: 02AUG2017   • Somatic dysfunction of upper extremities     RESOLVED: 02AUG2017   • Teeth missing          Medications:     Current Outpatient Medications:   •  Acetaminophen 325 MG CAPS, Take 500 mg by mouth every 8 (eight) hours, Disp: , Rfl:   •  albuterol (ProAir HFA) 90 mcg/act inhaler, Inhale 2 puffs every 6 (six) hours as needed for wheezing or shortness of breath para tos, o un siento de falta el aire, Disp: 8.5 g, Rfl: 0  •  aspirin (ADULT ASPIRIN EC LOW STRENGTH) 81 mg EC tablet, Take 1 tablet by mouth daily, Disp: , Rfl:   •  calcitriol (ROCALTROL) 0.25 mcg capsule, Take 1 capsule (0.25 mcg total) by mouth 2 (two) times a week, Disp: 24 capsule, Rfl: 3  •  fluticasone (FLONASE) 50 mcg/act nasal spray, 2 sprays into each nostril daily, Disp: 16 g, Rfl: 0  •  gabapentin (NEURONTIN) 100 mg capsule, take 1 capsule by mouth twice a day, Disp: 180 capsule, Rfl: 0  •  HYDROmorphone (Dilaudid) 2 mg tablet, Take 1 tablet (2 mg total) by mouth every 4 (four) hours as needed (cancer pain, insomnia) [place] dianna tableta para delaney, o antes de dormir si tiene insomnia.   Cuando tome [place] pastilla, NO TOME el oxycodone NI el tramadol.  Max Daily Amount: 12 mg, Disp: 20 tablet, Rfl: 0  •  Lancets (ONETOUCH ULTRASOFT) lancets, by Other route 2 (two) times a day Use as instructed, Disp: 100 each, Rfl: 5  •  mirtazapine (REMERON) 30 mg tablet, take 1 tablet by mouth daily at bedtime, Disp: 30 tablet, Rfl: 0  •  NIFEdipine ER (ADALAT CC) 30 MG 24 hr tablet, take 1 tablet by mouth twice a day, Disp: 180 tablet, Rfl: 3  •  olopatadine (PATANOL) 0.1 % ophthalmic solution, Administer 1 drop to both eyes 2 (two) times a day, Disp: 5 mL, Rfl: 0  •  OneTouch Delica Lancets 73K MISC, Inject as directed 2 (two) times a day Use to test, Disp: 200 each, Rfl: 0  •  oxyCODONE (ROXICODONE) 10 MG TABS, Take 1 tablet (10 mg total) by mouth 2 (two) times a day as needed for moderate pain Max Daily Amount: 20 mg, Disp: 60 tablet, Rfl: 0  •  pantoprazole (PROTONIX) 40 mg tablet, Take 1 tablet (40 mg total) by mouth 2 (two) times a day, Disp: 60 tablet, Rfl: 1  •  pravastatin (PRAVACHOL) 40 mg tablet, Take 1 tablet (40 mg total) by mouth daily, Disp: 90 tablet, Rfl: 3  •  Probiotic Product (Align) 4 MG CAPS, Take by mouth, Disp: , Rfl:   •  oxyCODONE (Roxicodone) 5 immediate release tablet, Take 0.5 tablets (2.5 mg total) by mouth 2 (two) times a day as needed (cancer pain) Max Daily Amount: 5 mg (Patient not taking: Reported on 8/10/2023), Disp: 20 tablet, Rfl: 0  •  polyethylene glycol (MIRALAX) 17 g packet, Take 17 g by mouth daily (Patient not taking: Reported on 8/10/2023), Disp: 30 each, Rfl: 5  •  traMADol (Ultram) 50 mg tablet, Take 1 tablet (50 mg total) by mouth every 8 (eight) hours as needed for moderate pain (Patient not taking: Reported on 8/10/2023), Disp: 30 tablet, Rfl: 0    Allergies   Allergen Reactions   • Clonidine Derivatives Swelling   • Diflucan [Fluconazole] Rash   • Flagyl [Metronidazole] Anaphylaxis   • Carvedilol Hives     And legs swelled   • Latex Rash   • Lipitor [Atorvastatin]      Legs swelled • Metoprolol Dizziness     Low BP and orthostatic symptoms. • Other Palpitations     IV DYE   • Shingrix [Zoster Vac Recomb Adjuvanted] Hives       OBJECTIVE  Vitals:   Vitals:    08/14/23 1537   BP: (!) 172/54   BP Location: Left arm   Patient Position: Sitting   Cuff Size: Standard   Pulse: 80   Resp: 16   Temp: 97.7 °F (36.5 °C)   SpO2: 92%   Weight: 49.5 kg (109 lb 3.2 oz)   Height: 5' 1" (1.549 m)         Physical Exam  Constitutional:       Appearance: Normal appearance. Pulmonary:      Effort: Pulmonary effort is normal.   Musculoskeletal:         General: Normal range of motion. Neurological:      General: No focal deficit present. Mental Status: She is alert and oriented to person, place, and time. Psychiatric:         Mood and Affect: Mood normal.         Behavior: Behavior normal.         Thought Content:  Thought content normal.         Judgment: Judgment normal.                    Daniela Simon MD,   [facility]  8/14/2023

## 2023-08-15 ENCOUNTER — TELEPHONE (OUTPATIENT)
Dept: PALLIATIVE MEDICINE | Facility: CLINIC | Age: 88
End: 2023-08-15

## 2023-08-16 ENCOUNTER — HOSPITAL ENCOUNTER (OUTPATIENT)
Dept: RADIOLOGY | Facility: HOSPITAL | Age: 88
Discharge: HOME | End: 2023-08-16
Attending: INTERNAL MEDICINE
Payer: COMMERCIAL

## 2023-08-16 VITALS
SYSTOLIC BLOOD PRESSURE: 168 MMHG | HEART RATE: 89 BPM | OXYGEN SATURATION: 92 % | RESPIRATION RATE: 16 BRPM | DIASTOLIC BLOOD PRESSURE: 93 MMHG

## 2023-08-16 DIAGNOSIS — C34.91 ADENOCARCINOMA, LUNG, RIGHT (HCC): ICD-10-CM

## 2023-08-16 PROCEDURE — 32555 ASPIRATE PLEURA W/ IMAGING: CPT

## 2023-08-16 RX ORDER — LIDOCAINE HYDROCHLORIDE 10 MG/ML
INJECTION, SOLUTION EPIDURAL; INFILTRATION; INTRACAUDAL; PERINEURAL AS NEEDED
Status: COMPLETED | OUTPATIENT
Start: 2023-08-16 | End: 2023-08-16

## 2023-08-16 RX ADMIN — LIDOCAINE HYDROCHLORIDE 10 ML: 10 INJECTION, SOLUTION EPIDURAL; INFILTRATION; INTRACAUDAL; PERINEURAL at 13:24

## 2023-08-16 NOTE — DISCHARGE INSTRUCTIONS
Thoracentesis   WHAT YOU NEED TO KNOW:   A thoracentesis is a procedure to remove extra fluid or air from between your lungs and your inner chest wall. Air or fluid buildup may make it hard for you to breathe. A thoracentesis allows your lungs to expand fully so you can breathe more easily. DISCHARGE INSTRUCTIONS:     Small amount of shoulder pain and bloody sputum is normal after a Thoracentesis. Rest:  Rest when you feel it is needed. Slowly start to do more each day. Return to your daily activities as directed. Resume your normal diet. Small sips of flat soda will help mild nausea. Do not smoke: If you smoke, it is never too late to quit. Ask for information about how to stop smoking if you need help. Contact Interventional Radiology at [phone] [place] PATIENTS: Contact Interventional Radiology at [phone]) Jose Hallman PATIENTS: Contact Interventional Radiology at [phone]) if:   You have a fever. Your puncture site is red, warm, swollen, or draining pus. You have questions or concerns about your procedure, medicine, or care. Seek care immediately or call 911 if:   Severe chest pain with inspiration and shortness of breath    Large amounts of blood in your sputum    Follow up with your healthcare provider as directed.

## 2023-08-16 NOTE — SEDATION DOCUMENTATION
Right thoracentesis performed by Rudy Cook PA-C. 60 cc of dark sanguinous fluid obtained. No labs ordered. Patient tolerated well.

## 2023-08-16 NOTE — BRIEF OP NOTE (RAD/CATH)
IR Right THORACENTESIS Procedure Note    PATIENT NAME: Iraida Krishnan  : 1934  MRN: [id number]    Pre-op Diagnosis:   1. Adenocarcinoma, lung, right (HCC)      Post-op Diagnosis:   1.  Adenocarcinoma, lung, right [facility])        Provider:   Horace Atwood PA-C  Assistants:     No qualified resident was available, Resident is only observing    Estimated Blood Loss: None    Findings: 60mL dark sanguinous fluid drained from right-sided thoracentesis    Specimens: None    Complications:  None immediately    Anesthesia: local    Horace Atwood PA-C     Date: 2023  Time: 1:39 PM

## 2023-08-21 DIAGNOSIS — J02.9 SORE THROAT: Primary | ICD-10-CM

## 2023-08-21 RX ORDER — AMOXICILLIN 500 MG/1
500 CAPSULE ORAL EVERY 12 HOURS SCHEDULED
Qty: 14 CAPSULE | Refills: 0 | Status: SHIPPED | OUTPATIENT
Start: 2023-08-21 | End: 2023-08-28

## 2023-08-21 NOTE — PROGRESS NOTES
Patient and  called stating her daughter got her sick. She started with a sore throat, and cough. Covid negative. No SOB or CP.  Patient was hoping for abx rather then traveling to the office

## 2023-08-29 ENCOUNTER — LAB (OUTPATIENT)
Dept: LAB | Facility: CLINIC | Age: 88
End: 2023-08-29
Payer: COMMERCIAL

## 2023-08-29 DIAGNOSIS — M79.671 RIGHT FOOT PAIN: ICD-10-CM

## 2023-08-29 DIAGNOSIS — N18.30 STAGE 3 CHRONIC KIDNEY DISEASE, UNSPECIFIED WHETHER STAGE 3A OR 3B CKD (HCC): ICD-10-CM

## 2023-08-29 DIAGNOSIS — L53.9 REDNESS: ICD-10-CM

## 2023-08-29 DIAGNOSIS — I16.0 HYPERTENSIVE URGENCY: ICD-10-CM

## 2023-08-29 DIAGNOSIS — L53.9 REDNESS: Primary | ICD-10-CM

## 2023-08-29 DIAGNOSIS — M79.671 RIGHT FOOT PAIN: Primary | ICD-10-CM

## 2023-08-29 LAB
ANION GAP SERPL CALCULATED.3IONS-SCNC: 10 MMOL/L
BACTERIA UR QL AUTO: ABNORMAL /HPF
BILIRUB UR QL STRIP: NEGATIVE
BUN SERPL-MCNC: 22 MG/DL (ref 5–25)
CALCIUM SERPL-MCNC: 9.4 MG/DL (ref 8.4–10.2)
CHLORIDE SERPL-SCNC: 91 MMOL/L (ref 96–108)
CLARITY UR: CLEAR
CO2 SERPL-SCNC: 34 MMOL/L (ref 21–32)
COLOR UR: ABNORMAL
CREAT SERPL-MCNC: 1.07 MG/DL (ref 0.6–1.3)
GFR SERPL CREATININE-BSD FRML MDRD: 46 ML/MIN/1.73SQ M
GLUCOSE SERPL-MCNC: 174 MG/DL (ref 65–140)
GLUCOSE UR STRIP-MCNC: ABNORMAL MG/DL
HGB UR QL STRIP.AUTO: NEGATIVE
HYALINE CASTS #/AREA URNS LPF: ABNORMAL /LPF
KETONES UR STRIP-MCNC: NEGATIVE MG/DL
LEUKOCYTE ESTERASE UR QL STRIP: ABNORMAL
NITRITE UR QL STRIP: NEGATIVE
NON-SQ EPI CELLS URNS QL MICRO: ABNORMAL /HPF
PH UR STRIP.AUTO: 7 [PH]
POTASSIUM SERPL-SCNC: 4 MMOL/L (ref 3.5–5.3)
PROT UR STRIP-MCNC: ABNORMAL MG/DL
PTH-INTACT SERPL-MCNC: 74.6 PG/ML (ref 12–88)
RBC #/AREA URNS AUTO: ABNORMAL /HPF
SODIUM SERPL-SCNC: 135 MMOL/L (ref 135–147)
SP GR UR STRIP.AUTO: 1.01 (ref 1–1.03)
URATE SERPL-MCNC: 7.7 MG/DL (ref 2–7.5)
UROBILINOGEN UR STRIP-ACNC: <2 MG/DL
WBC #/AREA URNS AUTO: ABNORMAL /HPF

## 2023-08-29 PROCEDURE — 80048 BASIC METABOLIC PNL TOTAL CA: CPT

## 2023-08-29 PROCEDURE — 83970 ASSAY OF PARATHORMONE: CPT

## 2023-08-29 PROCEDURE — 36415 COLL VENOUS BLD VENIPUNCTURE: CPT

## 2023-08-29 PROCEDURE — 84550 ASSAY OF BLOOD/URIC ACID: CPT

## 2023-08-29 PROCEDURE — 81001 URINALYSIS AUTO W/SCOPE: CPT

## 2023-08-29 RX ORDER — METHYLPREDNISOLONE 4 MG/1
TABLET ORAL
Qty: 21 EACH | Refills: 0 | Status: SHIPPED | OUTPATIENT
Start: 2023-08-29

## 2023-08-29 NOTE — PROGRESS NOTES
Family called concerned about the foot pain. Picture showed redness. Uric acid, and doopler ordered.  Recommend Vascular eval. Recommend seeing me tomorrow in person

## 2023-08-31 ENCOUNTER — HOSPITAL ENCOUNTER (OUTPATIENT)
Dept: NON INVASIVE DIAGNOSTICS | Facility: CLINIC | Age: 88
Discharge: HOME | End: 2023-08-31
Payer: COMMERCIAL

## 2023-08-31 DIAGNOSIS — L53.9 REDNESS: ICD-10-CM

## 2023-08-31 DIAGNOSIS — M79.671 RIGHT FOOT PAIN: ICD-10-CM

## 2023-08-31 PROCEDURE — 93926 LOWER EXTREMITY STUDY: CPT

## 2023-09-01 ENCOUNTER — OFFICE VISIT (OUTPATIENT)
Dept: PALLIATIVE MEDICINE | Facility: CLINIC | Age: 88
End: 2023-09-01
Payer: COMMERCIAL

## 2023-09-01 VITALS
SYSTOLIC BLOOD PRESSURE: 142 MMHG | OXYGEN SATURATION: 95 % | TEMPERATURE: 97.3 F | HEIGHT: 61 IN | DIASTOLIC BLOOD PRESSURE: 62 MMHG | BODY MASS INDEX: 20.35 KG/M2 | HEART RATE: 79 BPM | WEIGHT: 107.81 LBS | RESPIRATION RATE: 16 BRPM

## 2023-09-01 DIAGNOSIS — K59.00 CONSTIPATION, UNSPECIFIED CONSTIPATION TYPE: ICD-10-CM

## 2023-09-01 DIAGNOSIS — G89.3 CANCER-RELATED PAIN: ICD-10-CM

## 2023-09-01 PROCEDURE — 99214 OFFICE O/P EST MOD 30 MIN: CPT | Performed by: FAMILY MEDICINE

## 2023-09-01 RX ORDER — PREDNISONE 10 MG/1
10 TABLET ORAL 2 TIMES DAILY PRN
Qty: 20 TABLET | Refills: 0 | Status: SHIPPED | OUTPATIENT
Start: 2023-09-01

## 2023-09-01 RX ORDER — OXYCODONE HYDROCHLORIDE 10 MG/1
10-15 TABLET ORAL 3 TIMES DAILY PRN
Qty: 90 TABLET | Refills: 0 | Status: SHIPPED | OUTPATIENT
Start: 2023-09-01

## 2023-09-01 RX ORDER — POLYETHYLENE GLYCOL 3350 17 G/17G
17 POWDER, FOR SOLUTION ORAL DAILY
Qty: 30 EACH | Refills: 11 | Status: SHIPPED | OUTPATIENT
Start: 2023-09-01

## 2023-09-01 NOTE — Clinical Note
Really appreciate your leadership on this lady's case. As we move forward, will think about medical support. I'm not sure that thoras will be as useful as initially, but willing to consider for symptom relief, if cough or dyspnea develop. Pls keep me in the loop.

## 2023-09-01 NOTE — PROGRESS NOTES
Outpatient Follow-Up - Palliative and Supportive Care   Elvira Arnold 80 y.o. female [id number]    Assessment & Plan  Problem List Items Addressed This Visit    None  Visit Diagnoses     Cancer-related pain        Relevant Medications    oxyCODONE (ROXICODONE) 10 MG TABS    predniSONE 10 mg tablet    Constipation, unspecified constipation type        Relevant Medications    predniSONE 10 mg tablet    polyethylene glycol (MIRALAX) 17 g packet          Medications adjusted this encounter:  Requested Prescriptions     Signed Prescriptions Disp Refills   • oxyCODONE (ROXICODONE) 10 MG TABS 90 tablet 0     Sig: Take 1-1.5 tablets (10-15 mg total) by mouth 3 (three) times a day as needed for moderate pain Two [place], o dianna y [place], jeancarlos veces por brittany para delaney. No mas que 3 tabletas por brittany. Max Daily Amount: 45 mg   • predniSONE 10 mg tablet 20 tablet 0     Sig: Take 1 tablet (10 mg total) by mouth 2 (two) times a day as needed (low energy, severe nausea, gout) Si necesite para fatiga, nausea muy harman, o inflammacion   • polyethylene glycol (MIRALAX) 17 g packet 30 each 11     Sig: Take 17 g by mouth daily Por favor, tome cada brittany para prevenir estranamiento     No orders of the defined types were placed in this encounter. Medications Discontinued During This Encounter   Medication Reason   • oxyCODONE (Roxicodone) 5 immediate release tablet    • HYDROmorphone (Dilaudid) 2 mg tablet    • mirtazapine (REMERON) 30 mg tablet    • traMADol (Ultram) 50 mg tablet    • polyethylene glycol (MIRALAX) 17 g packet Reorder   • oxyCODONE (ROXICODONE) 10 MG TABS       - Continue with steroids for malignant fatigue, cachexia, appetite stimulation. = Stopped mirtazapine; not helpful for appetite nor sleep. = Hydrocortisone tried and failed her.   = Family prefer to avoid olanzapine for somnolence side effects.  - Pleural effusion will continue to recur since pt has not been offered ongoing Med/Onc treatments.   - Pt will follow with myself for support, and PCP Dr. Sylvia Clement for ongoing cares. = Pt and family not ready for hospice; would continue to use hospital for emergencies. Chris Forrest was seen today for symptoms and planning cares related to above illnesses. I have reviewed the patient's controlled substance dispensing history in the Prescription Drug Monitoring Program in compliance with the [facility] regulations before prescribing any controlled substances. They are invited to continue to follow with us. If there are questions or concerns, please contact us through our clinic/answering service 24 hours a day, seven days a week. Robbie Herring MD  [facility] Palliative and Supportive Care  [phone]      Visit Information    Accompanied By: Family member    Source of History: Self, Family member    History Limitations: pt speaks Arabic with myself and her bilingual daughters    Contacts:  [place]               Daughters Taya Downing, [address]    Chief Complaint  Chief Complaint   Patient presents with   • Follow-up       History of Present Illness      Chris Forrest is a 80 y.o. female who presents in follow up of symptoms related to recurrent NSCLC, R side, asso with malignant pleural effusion. There is also a h/o 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. Pertinent issues include: symptom management, assessment of goals of care, disease process education and discussion of prognosis      Since last visit, pt has seen Dr. Sylvia Clement, and a yordy conversation was held with her and the family and the patient that Dr. Joel Marc will no longer offer treatments. Pt's immunotherapy targets are not favorable, and after further w/up and consideration of her case, risks of treatment outweigh benefits. Instead, the pt and family reinforce to me today that she has actually felt better since stopping treatment. Importantly, a medrol dosepak from Dr. Sylvia Celment has been helpful for energy and appetite.   We agreed that -- when the dosepak finishes -- the pt may now continue on steroids, given that she is no longer going to be taking immunotherapy. Overall, the family hope to continue with good qualify of life and good medical support, without use of hospice, for the near future. We agreed that -- so long as the patient has reasonable function -- this is a good plan. Likely, we will need to reevaluate her treatment approach when the patient loses ambulatory capacity, or experiences significant other symptoms. As needed the patient might continue with thoras, but her most recent studies suggest that her fluid pockets are quite loculated, and may not be so amenable to drainage. Thankfully, the pt's respiratory symptoms are mild at this time. In June, pt's appetite and wt have stabilized, though not improved, and she is quite tired. Discussion is held re: alternative appetite stimulants, which have either failed her or would be asso with worse somnolence. Family advised on behavioral strategies for increased caloric intake, and they are willing to trial this, as well as mirtazapine at increased dose. Previously, trialled metoprolol, and stopped when her BP dropped. She felt dizzy, and continues to have symptoms, even after ceasing this med. She has a poor appetite and low energy, and her family report that she is rather poor with her water intake. Past medical, surgical, social, and family histories are reviewed and pertinent updates are made. Review of Systems   Constitutional: Positive for decreased appetite and weight loss. Negative for weight gain. HENT: Negative for hoarse voice and nosebleeds. Eyes: Negative for vision loss in left eye and vision loss in right eye. Cardiovascular: Negative for chest pain and dyspnea on exertion. Respiratory: Negative for cough and shortness of breath. Endocrine: Negative for polydipsia, polyphagia and polyuria.    Skin: Negative for flushing and itching. Musculoskeletal: Negative for falls. Gastrointestinal: Positive for nausea and vomiting. Negative for anorexia and jaundice. Genitourinary: Negative for frequency. Neurological: Negative for dizziness. Psychiatric/Behavioral: Negative for depression and memory loss. The patient is not nervous/anxious. Vital Signs    /62 (BP Location: Left arm, Patient Position: Sitting, Cuff Size: Standard)   Pulse 79   Temp (!) 97.3 °F (36.3 °C) (Tympanic)   Resp 16   Ht 5' 1" (1.549 m)   Wt 48.9 kg (107 lb 12.9 oz)   LMP  (LMP Unknown)   SpO2 95%   BMI 20.37 kg/m²     Physical Exam and Objective Data  Physical Exam  Constitutional:       General: She is not in acute distress. Appearance: She is ill-appearing. She is not toxic-appearing or diaphoretic. Comments: frail   HENT:      Head: Normocephalic and atraumatic. Right Ear: External ear normal.      Left Ear: External ear normal.   Eyes:      General:         Right eye: No discharge. Left eye: No discharge. Conjunctiva/sclera: Conjunctivae normal.      Pupils: Pupils are equal, round, and reactive to light. Neck:      Trachea: No tracheal deviation. Cardiovascular:      Rate and Rhythm: Regular rhythm. Tachycardia present. Pulmonary:      Effort: Pulmonary effort is normal. No respiratory distress. Breath sounds: No stridor. Abdominal:      General: There is no distension. Palpations: Abdomen is soft. Skin:     General: Skin is warm and dry. Findings: No erythema or rash. Neurological:      General: No focal deficit present. Mental Status: She is alert and oriented to person, place, and time. Mental status is at baseline. Cranial Nerves: No cranial nerve deficit. Psychiatric:         Mood and Affect: Mood normal.         Behavior: Behavior normal.         Thought Content: Thought content normal.         Judgment: Judgment normal.           Radiology and Laboratory:   I personally reviewed and interpreted the following results: personal review of imaging with family in room. 35+ minutes was spent face to face with Kristie Last and her family with greater than 50% of the time spent in counseling or coordination of care including: chart review; symptom pursuit; supportive listening; anticipatory guidance. end-of-life consideration including hospice vs usual cares . All of the patient's or agent's questions were answered during this discussion.

## 2023-09-03 DIAGNOSIS — K92.89 GAS BLOAT SYNDROME: ICD-10-CM

## 2023-09-05 ENCOUNTER — HOSPITAL ENCOUNTER (OUTPATIENT)
Dept: NON INVASIVE DIAGNOSTICS | Facility: CLINIC | Age: 88
Discharge: HOME | End: 2023-09-05
Payer: COMMERCIAL

## 2023-09-05 DIAGNOSIS — I65.23 CAROTID STENOSIS, ASYMPTOMATIC, BILATERAL: ICD-10-CM

## 2023-09-05 PROCEDURE — 93880 EXTRACRANIAL BILAT STUDY: CPT

## 2023-09-05 PROCEDURE — 93880 EXTRACRANIAL BILAT STUDY: CPT | Performed by: SURGERY

## 2023-09-05 RX ORDER — PANTOPRAZOLE SODIUM 40 MG/1
40 TABLET, DELAYED RELEASE ORAL 2 TIMES DAILY
Qty: 60 TABLET | Refills: 1 | Status: SHIPPED | OUTPATIENT
Start: 2023-09-05

## 2023-09-06 DIAGNOSIS — C34.91 ADENOCARCINOMA, LUNG, RIGHT (HCC): ICD-10-CM

## 2023-09-06 DIAGNOSIS — K92.89 GAS BLOAT SYNDROME: ICD-10-CM

## 2023-09-06 DIAGNOSIS — N18.9 CHRONIC KIDNEY DISEASE, UNSPECIFIED CKD STAGE: ICD-10-CM

## 2023-09-06 DIAGNOSIS — J43.9 EMPHYSEMA OF LUNG (HCC): ICD-10-CM

## 2023-09-07 ENCOUNTER — HOSPITAL ENCOUNTER (OUTPATIENT)
Dept: RADIOLOGY | Facility: HOSPITAL | Age: 88
Discharge: HOME | End: 2023-09-07
Attending: INTERNAL MEDICINE
Payer: COMMERCIAL

## 2023-09-07 VITALS
DIASTOLIC BLOOD PRESSURE: 75 MMHG | OXYGEN SATURATION: 94 % | RESPIRATION RATE: 16 BRPM | SYSTOLIC BLOOD PRESSURE: 193 MMHG | HEART RATE: 86 BPM

## 2023-09-07 DIAGNOSIS — C34.91 ADENOCARCINOMA, LUNG, RIGHT (HCC): ICD-10-CM

## 2023-09-07 PROCEDURE — 76604 US EXAM CHEST: CPT | Performed by: NURSE PRACTITIONER

## 2023-09-07 RX ORDER — ALBUTEROL SULFATE 90 UG/1
2 AEROSOL, METERED RESPIRATORY (INHALATION) EVERY 6 HOURS PRN
Qty: 8.5 G | Refills: 0 | Status: SHIPPED | OUTPATIENT
Start: 2023-09-07

## 2023-09-07 RX ORDER — PANTOPRAZOLE SODIUM 40 MG/1
40 TABLET, DELAYED RELEASE ORAL 2 TIMES DAILY
Qty: 60 TABLET | Refills: 0 | OUTPATIENT
Start: 2023-09-07

## 2023-09-07 RX ORDER — CALCITRIOL 0.25 UG/1
0.25 CAPSULE, LIQUID FILLED ORAL 2 TIMES WEEKLY
Qty: 24 CAPSULE | Refills: 0 | Status: SHIPPED | OUTPATIENT
Start: 2023-09-07

## 2023-09-07 NOTE — DISCHARGE INSTRUCTIONS
Thoracentesis   WHAT YOU NEED TO KNOW:   A thoracentesis is a procedure to remove extra fluid or air from between your lungs and your inner chest wall. Air or fluid buildup may make it hard for you to breathe. A thoracentesis allows your lungs to expand fully so you can breathe more easily. DISCHARGE INSTRUCTIONS:     Small amount of shoulder pain and bloody sputum is normal after a Thoracentesis. Rest:  Rest when you feel it is needed. Slowly start to do more each day. Return to your daily activities as directed. Resume your normal diet. Small sips of flat soda will help mild nausea. Do not smoke: If you smoke, it is never too late to quit. Ask for information about how to stop smoking if you need help. Contact Interventional Radiology at [phone] [facility] PATIENTS: Contact Interventional Radiology at [phone]) [facility] PATIENTS: Contact Interventional Radiology at [phone]) if:   You have a fever. Your puncture site is red, warm, swollen, or draining pus. You have questions or concerns about your procedure, medicine, or care. Seek care immediately or call 911 if:   Severe chest pain with inspiration and shortness of breath    Large amounts of blood in your sputum    Follow up with your healthcare provider as directed.

## 2023-09-07 NOTE — BRIEF OP NOTE (RAD/CATH)
IR THORACENTESIS Procedure Note    PATIENT NAME: Sanya Tinoco  : 1934  MRN: [id number]    Pre-op Diagnosis:   1. Adenocarcinoma, lung, right (HCC)      Post-op Diagnosis:   1. Adenocarcinoma, lung, right [facility])        Provider:   JARRED Vasquez  Assistants:     No qualified resident was available, Resident is only observing    Estimated Blood Loss: none     Findings: very small right pleural effusion, risks outweigh benefits, discussed with patient and  at bedside.  Agreed to defer at this time    Specimens: none    Complications:  none    Anesthesia: none    JARRED Vasquez     Date: 2023  Time: 1:37 PM

## 2023-09-08 ENCOUNTER — TELEPHONE (OUTPATIENT)
Dept: VASCULAR SURGERY | Facility: CLINIC | Age: 88
End: 2023-09-08

## 2023-09-08 NOTE — TELEPHONE ENCOUNTER
Is patient having any new motor/sensory loss to the foot or any redness or warmth? Her most recent LEADs indicate stable severe PAD in her RLE. I agree that if pain is severe and she is unable to be evaluated in the office, she should present to the ED for evaluation.

## 2023-09-08 NOTE — TELEPHONE ENCOUNTER
No motor or sensory loss. No redness or warmth. Recommendation was made for pt to report to the ED for an evaluation.

## 2023-09-11 ENCOUNTER — TELEPHONE (OUTPATIENT)
Dept: FAMILY MEDICINE CLINIC | Facility: CLINIC | Age: 88
End: 2023-09-11

## 2023-09-11 DIAGNOSIS — E11.9 TYPE 2 DIABETES MELLITUS WITHOUT COMPLICATION, WITHOUT LONG-TERM CURRENT USE OF INSULIN (HCC): Primary | ICD-10-CM

## 2023-09-11 RX ORDER — GLIMEPIRIDE 1 MG/1
0.5 TABLET ORAL
Qty: 30 TABLET | Refills: 5 | Status: SHIPPED | OUTPATIENT
Start: 2023-09-11 | End: 2023-10-11

## 2023-09-12 NOTE — TELEPHONE ENCOUNTER
Spoke to family to see how she is doing.  given prednisone of gout. Unable to tolerate metformin. Started on Glim 1/2 tablet. Recommend not skipping meals. Patient is to stop shrimp from her diet. Low Purine diet discussed.

## 2023-09-13 ENCOUNTER — TELEPHONE (OUTPATIENT)
Dept: OBGYN CLINIC | Facility: CLINIC | Age: 88
End: 2023-09-13

## 2023-09-20 ENCOUNTER — TELEPHONE (OUTPATIENT)
Dept: FAMILY MEDICINE CLINIC | Facility: CLINIC | Age: 88
End: 2023-09-20

## 2023-09-20 ENCOUNTER — TELEPHONE (OUTPATIENT)
Dept: CARDIOLOGY CLINIC | Facility: CLINIC | Age: 88
End: 2023-09-20

## 2023-09-20 NOTE — TELEPHONE ENCOUNTER
I called and spoke with Cheo Zuñiga. They do not want to see Dr. Hu Anders anymore and they have an appt with Dr. Kike Jason  on Nov 16th.

## 2023-09-20 NOTE — TELEPHONE ENCOUNTER
Please call patient at 11 to see how the bp is doing with medication if CP improved    Spoke to patient about her BP being elevated and CP.  If it worsen to please go to the ED> Increase nifedipine to 60mg in the am and 30mg in the pm. Originally on 30mg BID. 198/60s bill

## 2023-09-20 NOTE — TELEPHONE ENCOUNTER
Dr. Jeremi Lorenz asked me to call Dr. Johnathan Machuca office to see if they could please reach out to [facility]. [facility] does not speak a lot of english. While Dr. Jeremi Lorenz was in [facility], [facility] was too. She was admitted to the hospital in [facility] for chest pains    She has since returned and message Dr. Jeremi Lorenz to let her know that she is still experiencing High BP and chest pains. Called Dr. Huma Cates office and spoke with Lisa Posey. She will have one of her coworkers call her. Lisa Posey does not speak Moroccan.

## 2023-09-20 NOTE — TELEPHONE ENCOUNTER
I called office and was able to get appt changed to Friday 09/29/23 in [address] [place] [address]

## 2023-09-20 NOTE — TELEPHONE ENCOUNTER
Do you Mind calling  [facility]ild[facility] Dominick, and advising him that cardiology will be callin them.  That we spoke to them already

## 2023-09-21 NOTE — TELEPHONE ENCOUNTER
Spoke with Elvis Robles pt having no chest pain.   BP 6:30 am 183/54  87  BP 9:30 am 152/58  83  BP 12:30 pm 133/49  80  Gave pt nifedipine 60mg this morning

## 2023-09-25 ENCOUNTER — NURSE TRIAGE (OUTPATIENT)
Age: 88
End: 2023-09-25

## 2023-09-25 ENCOUNTER — HOSPITAL ENCOUNTER (INPATIENT)
Facility: HOSPITAL | Age: 88
LOS: 2 days | Discharge: HOME HEALTH | End: 2023-09-27
Attending: EMERGENCY MEDICINE | Admitting: INTERNAL MEDICINE
Payer: COMMERCIAL

## 2023-09-25 ENCOUNTER — APPOINTMENT (EMERGENCY)
Dept: RADIOLOGY | Facility: HOSPITAL | Age: 88
End: 2023-09-25
Payer: COMMERCIAL

## 2023-09-25 DIAGNOSIS — C34.91 ADENOCARCINOMA, LUNG, RIGHT (HCC): ICD-10-CM

## 2023-09-25 DIAGNOSIS — Z99.81 HYPOXEMIA REQUIRING SUPPLEMENTAL OXYGEN: Primary | ICD-10-CM

## 2023-09-25 DIAGNOSIS — G89.3 CANCER-RELATED PAIN: ICD-10-CM

## 2023-09-25 DIAGNOSIS — C34.91 ADENOCARCINOMA, LUNG, RIGHT (HCC): Primary | ICD-10-CM

## 2023-09-25 DIAGNOSIS — Z51.5 HOSPICE CARE: ICD-10-CM

## 2023-09-25 DIAGNOSIS — R09.02 HYPOXEMIA REQUIRING SUPPLEMENTAL OXYGEN: Primary | ICD-10-CM

## 2023-09-25 DIAGNOSIS — R09.02 HYPOXIA: ICD-10-CM

## 2023-09-25 DIAGNOSIS — K59.00 CONSTIPATION, UNSPECIFIED CONSTIPATION TYPE: ICD-10-CM

## 2023-09-25 PROBLEM — R06.02 SHORTNESS OF BREATH: Status: ACTIVE | Noted: 2023-09-25

## 2023-09-25 PROBLEM — I16.0 HYPERTENSIVE URGENCY: Status: ACTIVE | Noted: 2023-09-25

## 2023-09-25 PROBLEM — Z46.89 PESSARY MAINTENANCE: Status: ACTIVE | Noted: 2023-09-25

## 2023-09-25 PROBLEM — Z79.899 CURRENT USE OF ESTROGEN THERAPY: Status: ACTIVE | Noted: 2023-09-25

## 2023-09-25 LAB
ALBUMIN SERPL BCP-MCNC: 2.9 G/DL (ref 3.5–5)
ALP SERPL-CCNC: 62 U/L (ref 34–104)
ALT SERPL W P-5'-P-CCNC: 11 U/L (ref 7–52)
ANION GAP SERPL CALCULATED.3IONS-SCNC: 7 MMOL/L
AST SERPL W P-5'-P-CCNC: 24 U/L (ref 13–39)
BASOPHILS # BLD AUTO: 0.01 THOUSANDS/ÂΜL (ref 0–0.1)
BASOPHILS NFR BLD AUTO: 0 % (ref 0–1)
BILIRUB SERPL-MCNC: 0.26 MG/DL (ref 0.2–1)
BUN SERPL-MCNC: 23 MG/DL (ref 5–25)
CALCIUM ALBUM COR SERPL-MCNC: 9.5 MG/DL (ref 8.3–10.1)
CALCIUM SERPL-MCNC: 8.6 MG/DL (ref 8.4–10.2)
CHLORIDE SERPL-SCNC: 86 MMOL/L (ref 96–108)
CO2 SERPL-SCNC: 35 MMOL/L (ref 21–32)
CREAT SERPL-MCNC: 1.09 MG/DL (ref 0.6–1.3)
EOSINOPHIL # BLD AUTO: 0.01 THOUSAND/ÂΜL (ref 0–0.61)
EOSINOPHIL NFR BLD AUTO: 0 % (ref 0–6)
ERYTHROCYTE [DISTWIDTH] IN BLOOD BY AUTOMATED COUNT: 14.3 % (ref 11.6–15.1)
GFR SERPL CREATININE-BSD FRML MDRD: 45 ML/MIN/1.73SQ M
GLUCOSE SERPL-MCNC: 118 MG/DL (ref 65–140)
HCT VFR BLD AUTO: 29.3 % (ref 34.8–46.1)
HGB BLD-MCNC: 9.5 G/DL (ref 11.5–15.4)
IMM GRANULOCYTES # BLD AUTO: 0.02 THOUSAND/UL (ref 0–0.2)
IMM GRANULOCYTES NFR BLD AUTO: 0 % (ref 0–2)
LYMPHOCYTES # BLD AUTO: 1.02 THOUSANDS/ÂΜL (ref 0.6–4.47)
LYMPHOCYTES NFR BLD AUTO: 19 % (ref 14–44)
MCH RBC QN AUTO: 27.1 PG (ref 26.8–34.3)
MCHC RBC AUTO-ENTMCNC: 32.4 G/DL (ref 31.4–37.4)
MCV RBC AUTO: 84 FL (ref 82–98)
MONOCYTES # BLD AUTO: 0.6 THOUSAND/ÂΜL (ref 0.17–1.22)
MONOCYTES NFR BLD AUTO: 11 % (ref 4–12)
NEUTROPHILS # BLD AUTO: 3.6 THOUSANDS/ÂΜL (ref 1.85–7.62)
NEUTS SEG NFR BLD AUTO: 70 % (ref 43–75)
NRBC BLD AUTO-RTO: 0 /100 WBCS
PLATELET # BLD AUTO: 412 THOUSANDS/UL (ref 149–390)
PMV BLD AUTO: 10.2 FL (ref 8.9–12.7)
POTASSIUM SERPL-SCNC: 4.1 MMOL/L (ref 3.5–5.3)
PROT SERPL-MCNC: 7.3 G/DL (ref 6.4–8.4)
RBC # BLD AUTO: 3.51 MILLION/UL (ref 3.81–5.12)
SODIUM SERPL-SCNC: 128 MMOL/L (ref 135–147)
WBC # BLD AUTO: 5.26 THOUSAND/UL (ref 4.31–10.16)

## 2023-09-25 PROCEDURE — 96360 HYDRATION IV INFUSION INIT: CPT

## 2023-09-25 PROCEDURE — G1004 CDSM NDSC: HCPCS

## 2023-09-25 PROCEDURE — 36415 COLL VENOUS BLD VENIPUNCTURE: CPT

## 2023-09-25 PROCEDURE — 99223 1ST HOSP IP/OBS HIGH 75: CPT | Performed by: INTERNAL MEDICINE

## 2023-09-25 PROCEDURE — 80053 COMPREHEN METABOLIC PANEL: CPT

## 2023-09-25 PROCEDURE — 99285 EMERGENCY DEPT VISIT HI MDM: CPT

## 2023-09-25 PROCEDURE — 71275 CT ANGIOGRAPHY CHEST: CPT

## 2023-09-25 PROCEDURE — 85025 COMPLETE CBC W/AUTO DIFF WBC: CPT

## 2023-09-25 RX ORDER — PANTOPRAZOLE SODIUM 40 MG/1
40 TABLET, DELAYED RELEASE ORAL ONCE
Status: COMPLETED | OUTPATIENT
Start: 2023-09-25 | End: 2023-09-25

## 2023-09-25 RX ORDER — PRAVASTATIN SODIUM 40 MG
40 TABLET ORAL
Status: DISCONTINUED | OUTPATIENT
Start: 2023-09-26 | End: 2023-09-27 | Stop reason: HOSPADM

## 2023-09-25 RX ORDER — PREDNISONE 10 MG/1
10 TABLET ORAL 2 TIMES DAILY PRN
Status: DISCONTINUED | OUTPATIENT
Start: 2023-09-25 | End: 2023-09-27 | Stop reason: HOSPADM

## 2023-09-25 RX ORDER — POLYETHYLENE GLYCOL 3350 17 G/17G
17 POWDER, FOR SOLUTION ORAL DAILY
Status: DISCONTINUED | OUTPATIENT
Start: 2023-09-26 | End: 2023-09-27 | Stop reason: HOSPADM

## 2023-09-25 RX ORDER — HYDRALAZINE HYDROCHLORIDE 20 MG/ML
5 INJECTION INTRAMUSCULAR; INTRAVENOUS EVERY 6 HOURS PRN
Status: DISCONTINUED | OUTPATIENT
Start: 2023-09-25 | End: 2023-09-27 | Stop reason: HOSPADM

## 2023-09-25 RX ORDER — NIFEDIPINE 30 MG/1
30 TABLET, EXTENDED RELEASE ORAL ONCE
Status: COMPLETED | OUTPATIENT
Start: 2023-09-25 | End: 2023-09-25

## 2023-09-25 RX ORDER — CALCITRIOL 0.25 UG/1
0.25 CAPSULE, LIQUID FILLED ORAL 2 TIMES WEEKLY
Status: DISCONTINUED | OUTPATIENT
Start: 2023-09-28 | End: 2023-09-27 | Stop reason: HOSPADM

## 2023-09-25 RX ORDER — ENOXAPARIN SODIUM 100 MG/ML
30 INJECTION SUBCUTANEOUS DAILY
Status: DISCONTINUED | OUTPATIENT
Start: 2023-09-26 | End: 2023-09-27 | Stop reason: HOSPADM

## 2023-09-25 RX ORDER — NIFEDIPINE 30 MG/1
30 TABLET, EXTENDED RELEASE ORAL 2 TIMES DAILY
Status: DISCONTINUED | OUTPATIENT
Start: 2023-09-26 | End: 2023-09-27 | Stop reason: HOSPADM

## 2023-09-25 RX ORDER — ALBUTEROL SULFATE 90 UG/1
2 AEROSOL, METERED RESPIRATORY (INHALATION) EVERY 6 HOURS PRN
Status: DISCONTINUED | OUTPATIENT
Start: 2023-09-25 | End: 2023-09-27 | Stop reason: HOSPADM

## 2023-09-25 RX ORDER — FLUTICASONE PROPIONATE 50 MCG
2 SPRAY, SUSPENSION (ML) NASAL DAILY
Status: DISCONTINUED | OUTPATIENT
Start: 2023-09-26 | End: 2023-09-27 | Stop reason: HOSPADM

## 2023-09-25 RX ORDER — OXYCODONE HYDROCHLORIDE 10 MG/1
10 TABLET ORAL EVERY 6 HOURS PRN
Status: DISCONTINUED | OUTPATIENT
Start: 2023-09-25 | End: 2023-09-26

## 2023-09-25 RX ORDER — ONDANSETRON 2 MG/ML
4 INJECTION INTRAMUSCULAR; INTRAVENOUS EVERY 6 HOURS PRN
Status: DISCONTINUED | OUTPATIENT
Start: 2023-09-25 | End: 2023-09-27 | Stop reason: HOSPADM

## 2023-09-25 RX ORDER — KETOTIFEN FUMARATE 0.35 MG/ML
1 SOLUTION/ DROPS OPHTHALMIC 2 TIMES DAILY
Status: DISCONTINUED | OUTPATIENT
Start: 2023-09-25 | End: 2023-09-27 | Stop reason: HOSPADM

## 2023-09-25 RX ORDER — ACETAMINOPHEN 325 MG/1
975 TABLET ORAL EVERY 8 HOURS SCHEDULED
Status: DISCONTINUED | OUTPATIENT
Start: 2023-09-25 | End: 2023-09-27 | Stop reason: HOSPADM

## 2023-09-25 RX ORDER — GABAPENTIN 100 MG/1
100 CAPSULE ORAL 2 TIMES DAILY
Status: DISCONTINUED | OUTPATIENT
Start: 2023-09-25 | End: 2023-09-27 | Stop reason: HOSPADM

## 2023-09-25 RX ORDER — GABAPENTIN 100 MG/1
100 CAPSULE ORAL ONCE
Status: COMPLETED | OUTPATIENT
Start: 2023-09-25 | End: 2023-09-25

## 2023-09-25 RX ORDER — PANTOPRAZOLE SODIUM 40 MG/1
40 TABLET, DELAYED RELEASE ORAL 2 TIMES DAILY
Status: DISCONTINUED | OUTPATIENT
Start: 2023-09-25 | End: 2023-09-27 | Stop reason: HOSPADM

## 2023-09-25 RX ADMIN — NIFEDIPINE 30 MG: 30 TABLET, FILM COATED, EXTENDED RELEASE ORAL at 20:35

## 2023-09-25 RX ADMIN — PANTOPRAZOLE SODIUM 40 MG: 40 TABLET, DELAYED RELEASE ORAL at 18:37

## 2023-09-25 RX ADMIN — GABAPENTIN 100 MG: 100 CAPSULE ORAL at 18:37

## 2023-09-25 RX ADMIN — SODIUM CHLORIDE 500 ML: 0.9 INJECTION, SOLUTION INTRAVENOUS at 17:31

## 2023-09-25 RX ADMIN — OXYCODONE HYDROCHLORIDE 15 MG: 10 TABLET ORAL at 18:37

## 2023-09-25 RX ADMIN — IOHEXOL 85 ML: 350 INJECTION, SOLUTION INTRAVENOUS at 18:18

## 2023-09-25 NOTE — ED ATTENDING ATTESTATION
9/25/2023  I, Dheeraj Inman MD, saw and evaluated the patient. I have discussed the patient with the resident/non-physician practitioner and agree with the resident's/non-physician practitioner's findings, Plan of Care, and MDM as documented in the resident's/non-physician practitioner's note, except where noted. All available labs and Radiology studies were reviewed. I was present for key portions of any procedure(s) performed by the resident/non-physician practitioner and I was immediately available to provide assistance. At this point I agree with the current assessment done in the Emergency Department. I have conducted an independent evaluation of this patient a history and physical is as follows: Pt with stage 4 lung cancer, history of resolved breast ca Pt ahd sob and low sats Pt started with sob 2 weeks ago worse past few days sat to 79%. No chest pain taking po well no fevers.  No cough Heart reg lungs decreased L base abd soft nontender ext and MDM: will do ct PE new oxygen requirement  ED Course         Critical Care Time  Procedures

## 2023-09-25 NOTE — TELEPHONE ENCOUNTER
Patient with stage 4 lung cancer c/o decreased oxygen levels for the past few days. Patient's family states her O2 levels are usually in the low 90's and over the past few days her levels have been dropping as low as the high 70's. Patient's current O2 is 84 on RA, patient does not use oxygen. Patient c/o SOB and feeling tightness in her chest. Advised to take patient to the ED. Family states they want to know what Dr. Sabrina Alberts recommends before taking her to the ED. Please follow up as soon as possible. Reason for Disposition  • Oxygen level (e.g., pulse oximetry) 90 percent or lower    Answer Assessment - Initial Assessment Questions  1. MAIN CONCERN OR SYMPTOM: "What's your main concern?" (e.g., low oxygen level, breathing difficulty) "What question do you have?"      Low oxygen level     2. ONSET: "When did the symptoms start?"       Past few weeks- but over the past few days they have been worsening    3. OXYGEN THERAPY:     - "Do you currently use home oxygen?"     - If Yes, ask: "What is your oxygen source?" (e.g., O2 tank, O2 concentrator). - If Yes, ask: "How do you get the oxygen?" (e.g., nasal prongs, face mask). - If Yes, ask: "How much oxygen are you supposed to use?" (e.g., 1-2 L NC)     Patient is not on oxygen at home    4. OXYGEN EQUIPMENT:  "Are you having any trouble with your oxygen equipment?"  (e.g., cannula, mask, tubing, tank, concentrator)     N/A    5. PULSE OXIMETER:     - "Do you have a pulse oximeter (pulse ox)?"      - If Yes, ask: "Where do you place the probe?" (e.g., fingertip, ear lobe)     84    8.  BREATHING DIFFICULTY: "Are you having any difficulty breathing?" If Yes, ask: "How bad is it?"  (e.g., none, mild, moderate, severe)     - MODERATE: SOB at rest, SOB with minimal exertion and prefers to sit, cannot lie down flat, speaks in phrases, mild retractions, audible wheezing, pulse 100-120.            9. OTHER SYMPTOMS: "Do you have any other symptoms?" (e.g., fever, change in sputum)      Tightness in her chest    10.  SMOKING: "Do you smoke currently?" "Is there anyone that smokes around you?"  (Note: smoking around oxygen is dangerous!)        Denies    Inhaler  Not on any oxygen    Protocols used: OXYGEN MONITORING AND HYPOXIA-ADULT-AH

## 2023-09-25 NOTE — TELEPHONE ENCOUNTER
Regarding: OXYGEN LEVEL DROPPING  ----- Message from Javid Garcia sent at 9/25/2023  1:09 PM EDT -----  Pt's SO reaching out bc her oxygen levels have been dropping into the 80s, high 70s.   Please reach out and further advise

## 2023-09-26 ENCOUNTER — HOSPICE ADMISSION (OUTPATIENT)
Dept: HOME HOSPICE | Facility: HOSPICE | Age: 88
End: 2023-09-26
Payer: MEDICARE

## 2023-09-26 ENCOUNTER — HOME CARE VISIT (OUTPATIENT)
Dept: HOME HOSPICE | Facility: HOSPICE | Age: 88
End: 2023-09-26
Payer: MEDICARE

## 2023-09-26 PROBLEM — Z71.89 GOALS OF CARE, COUNSELING/DISCUSSION: Status: ACTIVE | Noted: 2023-09-26

## 2023-09-26 LAB
ANION GAP SERPL CALCULATED.3IONS-SCNC: 9 MMOL/L
BUN SERPL-MCNC: 19 MG/DL (ref 5–25)
CALCIUM SERPL-MCNC: 9.1 MG/DL (ref 8.4–10.2)
CHLORIDE SERPL-SCNC: 90 MMOL/L (ref 96–108)
CO2 SERPL-SCNC: 34 MMOL/L (ref 21–32)
CREAT SERPL-MCNC: 1.04 MG/DL (ref 0.6–1.3)
ERYTHROCYTE [DISTWIDTH] IN BLOOD BY AUTOMATED COUNT: 13.9 % (ref 11.6–15.1)
GFR SERPL CREATININE-BSD FRML MDRD: 47 ML/MIN/1.73SQ M
GLUCOSE SERPL-MCNC: 120 MG/DL (ref 65–140)
HCT VFR BLD AUTO: 33.8 % (ref 34.8–46.1)
HGB BLD-MCNC: 10.8 G/DL (ref 11.5–15.4)
MCH RBC QN AUTO: 26.9 PG (ref 26.8–34.3)
MCHC RBC AUTO-ENTMCNC: 32 G/DL (ref 31.4–37.4)
MCV RBC AUTO: 84 FL (ref 82–98)
PLATELET # BLD AUTO: 419 THOUSANDS/UL (ref 149–390)
PMV BLD AUTO: 9.5 FL (ref 8.9–12.7)
POTASSIUM SERPL-SCNC: 3.4 MMOL/L (ref 3.5–5.3)
RBC # BLD AUTO: 4.01 MILLION/UL (ref 3.81–5.12)
SODIUM SERPL-SCNC: 133 MMOL/L (ref 135–147)
WBC # BLD AUTO: 6.11 THOUSAND/UL (ref 4.31–10.16)

## 2023-09-26 PROCEDURE — 85027 COMPLETE CBC AUTOMATED: CPT | Performed by: INTERNAL MEDICINE

## 2023-09-26 PROCEDURE — 99223 1ST HOSP IP/OBS HIGH 75: CPT | Performed by: PHYSICIAN ASSISTANT

## 2023-09-26 PROCEDURE — 80048 BASIC METABOLIC PNL TOTAL CA: CPT | Performed by: INTERNAL MEDICINE

## 2023-09-26 PROCEDURE — 36415 COLL VENOUS BLD VENIPUNCTURE: CPT | Performed by: INTERNAL MEDICINE

## 2023-09-26 PROCEDURE — 99232 SBSQ HOSP IP/OBS MODERATE 35: CPT | Performed by: NURSE PRACTITIONER

## 2023-09-26 RX ORDER — POTASSIUM CHLORIDE 20 MEQ/1
40 TABLET, EXTENDED RELEASE ORAL ONCE
Status: COMPLETED | OUTPATIENT
Start: 2023-09-26 | End: 2023-09-26

## 2023-09-26 RX ORDER — OXYCODONE HYDROCHLORIDE 10 MG/1
10 TABLET ORAL EVERY 4 HOURS PRN
Status: DISCONTINUED | OUTPATIENT
Start: 2023-09-26 | End: 2023-09-27 | Stop reason: HOSPADM

## 2023-09-26 RX ADMIN — ASPIRIN 81 MG: 81 TABLET, COATED ORAL at 08:24

## 2023-09-26 RX ADMIN — HYDRALAZINE HYDROCHLORIDE 5 MG: 20 INJECTION, SOLUTION INTRAMUSCULAR; INTRAVENOUS at 04:42

## 2023-09-26 RX ADMIN — POLYETHYLENE GLYCOL 3350 17 G: 17 POWDER, FOR SOLUTION ORAL at 10:01

## 2023-09-26 RX ADMIN — NIFEDIPINE 30 MG: 30 TABLET, FILM COATED, EXTENDED RELEASE ORAL at 21:41

## 2023-09-26 RX ADMIN — KETOTIFEN FUMARATE 1 DROP: 0.25 SOLUTION/ DROPS OPHTHALMIC at 21:42

## 2023-09-26 RX ADMIN — KETOTIFEN FUMARATE 1 DROP: 0.25 SOLUTION/ DROPS OPHTHALMIC at 08:25

## 2023-09-26 RX ADMIN — PRAVASTATIN SODIUM 40 MG: 40 TABLET ORAL at 17:28

## 2023-09-26 RX ADMIN — PANTOPRAZOLE SODIUM 40 MG: 40 TABLET, DELAYED RELEASE ORAL at 17:28

## 2023-09-26 RX ADMIN — POTASSIUM CHLORIDE 40 MEQ: 1500 TABLET, EXTENDED RELEASE ORAL at 08:56

## 2023-09-26 RX ADMIN — OXYCODONE HYDROCHLORIDE 10 MG: 10 TABLET ORAL at 21:47

## 2023-09-26 RX ADMIN — ENOXAPARIN SODIUM 30 MG: 30 INJECTION SUBCUTANEOUS at 08:25

## 2023-09-26 RX ADMIN — GABAPENTIN 100 MG: 100 CAPSULE ORAL at 21:41

## 2023-09-26 RX ADMIN — PHENOL 1 SPRAY: 1.5 LIQUID ORAL at 20:03

## 2023-09-26 RX ADMIN — ACETAMINOPHEN 975 MG: 325 TABLET, FILM COATED ORAL at 21:40

## 2023-09-26 RX ADMIN — OXYCODONE HYDROCHLORIDE 15 MG: 5 TABLET ORAL at 10:00

## 2023-09-26 RX ADMIN — FLUTICASONE PROPIONATE 2 SPRAY: 50 SPRAY, METERED NASAL at 08:55

## 2023-09-26 RX ADMIN — NIFEDIPINE 30 MG: 30 TABLET, FILM COATED, EXTENDED RELEASE ORAL at 08:24

## 2023-09-26 RX ADMIN — GABAPENTIN 100 MG: 100 CAPSULE ORAL at 08:24

## 2023-09-26 NOTE — ASSESSMENT & PLAN NOTE
· Sodium 128 on admission, suspect in setting of lung malignancy  · Place on fluid restriction, recheck BMP in a.m.

## 2023-09-26 NOTE — ASSESSMENT & PLAN NOTE
Follows with  [facility] (Dr. Delinda Dubin), s/p radiation additionally receives thoracentesis as needed for shortness of breath. Additionally follows with  [facility] palliative care, has declined hospice previously  · Per outpatient documentation patient not candidate for further treatments. · CTA PE study as above revealing progression of malignancy as described below. · Discussed CTA findings with /daughter Zoraida, they are aware of lack of treatment options. Wish to pursue hospice cares at this time and focus on keeping patient home and comfortable.    · For cancer related pain PDMP reviewed, continue home pain management regimen

## 2023-09-26 NOTE — ASSESSMENT & PLAN NOTE
-200 throughout ED evaluation, persistent despite resumption of patient's home dose nifedipine. Patient otherwise asymptomatic  · Continue nifedipine 30 mg twice daily   · Continue home pain regimen for any cancer related pain.     · PRN Hydralazine

## 2023-09-26 NOTE — PLAN OF CARE
Problem: MOBILITY - ADULT  Goal: Maintain or return to baseline ADL function  Description: INTERVENTIONS:  -  Assess patient's ability to carry out ADLs; assess patient's baseline for ADL function and identify physical deficits which impact ability to perform ADLs (bathing, care of mouth/teeth, toileting, grooming, dressing, etc.)  - Assess/evaluate cause of self-care deficits   - Assess range of motion  - Assess patient's mobility; develop plan if impaired  - Assess patient's need for assistive devices and provide as appropriate  - Encourage maximum independence but intervene and supervise when necessary  - Involve family in performance of ADLs  - Assess for home care needs following discharge   - Consider OT consult to assist with ADL evaluation and planning for discharge  - Provide patient education as appropriate  Outcome: Progressing     Problem: SAFETY ADULT  Goal: Maintain or return to baseline ADL function  Description: INTERVENTIONS:  -  Assess patient's ability to carry out ADLs; assess patient's baseline for ADL function and identify physical deficits which impact ability to perform ADLs (bathing, care of mouth/teeth, toileting, grooming, dressing, etc.)  - Assess/evaluate cause of self-care deficits   - Assess range of motion  - Assess patient's mobility; develop plan if impaired  - Assess patient's need for assistive devices and provide as appropriate  - Encourage maximum independence but intervene and supervise when necessary  - Involve family in performance of ADLs  - Assess for home care needs following discharge   - Consider OT consult to assist with ADL evaluation and planning for discharge  - Provide patient education as appropriate  Outcome: Progressing     Problem: Knowledge Deficit  Goal: Patient/family/caregiver demonstrates understanding of disease process, treatment plan, medications, and discharge instructions  Description: Complete learning assessment and assess knowledge base.  Interventions:  - Provide teaching at level of understanding  - Provide teaching via preferred learning methods  Outcome: Progressing

## 2023-09-26 NOTE — ASSESSMENT & PLAN NOTE
Sodium 128 on admission, 133 today.  Likely in setting of lung CA  · Continue FR  · Monitor as needed

## 2023-09-26 NOTE — ASSESSMENT & PLAN NOTE
· -200 throughout ED evaluation, persistent despite resumption of patient's home dose nifedipine. Patient otherwise asymptomatic  · Continue nifedipine 30 mg twice daily and monitor blood pressure per protocol. Continue home pain regimen for any cancer related pain.   Can provide as needed IV antihypertensive if persistent despite these measures

## 2023-09-26 NOTE — ASSESSMENT & PLAN NOTE
Lab Results   Component Value Date    HGBA1C 7.0 (A) 07/10/2023     · Well-controlled as outpatient  · Hold glimepiride while inpatient, continue correctional insulin with Accu-Cheks, carb controlled diet  · Hypoglycemia protocol

## 2023-09-26 NOTE — CASE MANAGEMENT
Case Management Assessment & Discharge Planning Note    Patient name Mike Ramey [facility] MRN [id number]  : 1934 Date 2023       Current Admission Date: 2023  Current Admission Diagnosis:Shortness of breath   Patient Active Problem List    Diagnosis Date Noted   • Goals of care, counseling/discussion 2023   • Pessary maintenance 2023   • Current use of estrogen therapy 2023   • Shortness of breath 2023   • Hypertensive urgency 2023   • Vaginal pessary present 2023   • Postmenopausal bleeding 2023   • Embolism and thrombosis of arteries of the lower extremities ([address]) 2023   • Secondary hyperparathyroidism ([address]) 2023   • Primary osteoarthritis of left knee 2023   • Hypercalcemia 2022   • Left leg swelling 10/25/2022   • Encounter for annual routine gynecological examination 2022   • Encounter for screening mammogram for breast cancer 2022   • History of cancer of left breast 2022   • Dense breast tissue on mammogram 2022   • CKD (chronic kidney disease) 2021   • Adenocarcinoma, lung, right ([address]) 2021   • Lung mass 10/14/2021   • Type 2 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, bilateral ([address]) 2021   • Osteopenia of multiple sites 2020   • Cervical spine disease 2020   • Left ear pain 2019   • 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   • Renal artery stenosis ([address]) 2019   • Viral upper respiratory tract infection 10/18/2019   • Glottic insufficiency 2019   • Reflux laryngitis 2019   • Paresis of left vocal fold 2019   • Muscle tension dysphonia 2019   • Laryngocele 2019   • Anterior glottic web 2019   • Bradycardia 2019   • Tinea unguium 2019   • Diabetic neuropathy ([address]) 2019   • Hemorrhoids 2019   • Claudication in peripheral vascular disease ([address]) 05/17/2019   • Stricture of artery ([address]) 04/22/2019   • History of left breast cancer 08/15/2018   • Acquired complex cyst of kidney 05/10/2018   • Chronic left shoulder pain 04/04/2018   • Dysuria 03/16/2018   • Bilateral carotid artery stenosis 01/10/2018   • Gout 10/24/2017   • Neck pain 08/15/2017   • 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 08/02/2017   • Primary generalized (osteo)arthritis 08/02/2017   • Abnormal mammogram 07/20/2017   • Chronic myofascial pain 04/26/2017   • CKD (chronic kidney disease) stage 3, GFR 30-59 ml/min ([facility]) 03/24/2017   • PAD (peripheral artery disease) ([address]) 01/30/2017   • Persistent proteinuria 09/22/2016   • Chronic pain of lower extremity 09/16/2016   • Anemia 09/03/2016   • EKG abnormalities 09/01/2016   • Hyponatremia 08/31/2016   • Abnormal EKG 08/09/2016   • Iliac artery stenosis, bilateral (HCC)    • GERD (gastroesophageal reflux disease)    • Arthritis    • Hypercholesterolemia    • Hypertension    • Hypomagnesemia 03/10/2016   • Renal cyst 10/15/2015   • Carotid stenosis, asymptomatic 05/19/2015   • Atherosclerosis of native artery of extremity with intermittent claudication ([address]) 05/07/2015   • Vaginal prolapse 08/04/2014   • Back pain 06/30/2014   • Nephrolithiasis 09/04/2013   • Diabetic retinopathy ([address]) 07/30/2013   • Vitamin D deficiency 07/30/2013   • Atherosclerosis of other specified arteries 06/11/2013   • Lymphadenopathy 06/10/2013   • Fall from other slipping, tripping, or stumbling 01/24/2013   • DMII (diabetes mellitus, type 2) ([address]) 09/24/2012   • Osteoporosis 09/21/2012      LOS (days): 1  Geometric Mean LOS (GMLOS) (days):   Days to GMLOS:     OBJECTIVE:    Risk of Unplanned Readmission Score: 20.99         Current admission status: Inpatient  Referral Reason: Hospice    Preferred Pharmacy:   [address] [place] [address]  [facility] [address]  Phone: [phone] Fax: [facility], [address] [place], [place] [address] [facility] [address]  Phone: [phone] Fax: [phone]    Primary Care Provider: Amaya Sosa MD    Primary Insurance: Esperanza Gino [facility] REP  Secondary Insurance: [address] [employer]    There are no active Health Care Proxies on file. Readmission Root Cause  30 Day Readmission: No    Patient Information  Admitted from[de-identified] Home  Mental Status: Alert  During Assessment patient was accompanied by: Spouse, Daughter  Assessment information provided by[de-identified] Spouse, Daughter  Primary Caregiver: Family  Caregiver's Name[de-identified] General August  Caregiver's Relationship to Patient[de-identified] Family Member  Caregiver's Telephone Number[de-identified] [phone]  Support Systems: Spouse/significant other, Children, Family members  [facility]: [address] [place] [address] do you live in?: [facility] entry access options.  Select all that apply.: Stairs  Number of steps to enter home.: 8  Type of Current Residence: Apartment  Floor Level: 2  Upon entering residence, is there a bedroom on the main floor (no further steps)?: Yes  Upon entering residence, is there a bathroom on the main floor (no further steps)?: Yes  In the last 12 months, was there a time when you were not able to pay the mortgage or rent on time?: No  In the last 12 months, how many places have you lived?: 1  In the last 12 months, was there a time when you did not have a steady place to sleep or slept in a shelter (including now)?: No  Homeless/housing insecurity resource given?: N/A  Living Arrangements: Lives w/ Spouse/significant other    Activities of Daily Living Prior to Admission  Functional Status: Assistance  Completes ADLs independently?: Yes  Ambulates independently?: Yes  Does patient use assisted devices?: No  Does patient currently own DME?: Yes  What DME does the patient currently own?: Other (Comment) (Transport chair)  Does patient have a history of Outpatient Therapy (PT/OT)?: No  Does the patient have a history of Short-Term Rehab?: No  Does patient have a history of HHC?: No  Does patient currently have [address]?: No    Patient Information Continued  Income Source: Pension/prison  Does patient have prescription coverage?: Yes  Within the past 12 months, you worried that your food would run out before you got the money to buy more.: Never true  Within the past 12 months, the food you bought just didn't last and you didn't have money to get more.: Never true  Food insecurity resource given?: N/A  Does patient receive dialysis treatments?: No  Does patient have a history of substance abuse?: No  Does patient have a history of Mental Health Diagnosis?: No    Means of Transportation  Means of Transport to Appts[de-identified] Family transport  In the past 12 months, has lack of transportation kept you from medical appointments or from getting medications?: No  In the past 12 months, has lack of transportation kept you from meetings, work, or from getting things needed for daily living?: No  Was application for public transport provided?: N/A        DISCHARGE DETAILS:    Discharge planning discussed with[de-identified] Daughter, , and patient at bedside  Freedom of Choice: Yes     CM contacted family/caregiver?: Yes    Contacts  Patient Contacts: Gracia Ashley  Relationship to Patient[de-identified] Family  Contact Method: In Person  Reason/Outcome: Discharge Planning, Referral, Continuity of Care      Other Referral/Resources/Interventions Provided:  Interventions: Hospice    Additional Comments: CM and CM Student met with patient, daughter, and  Linda Mendez [phone]) at bedside. Per family, they are interested in pursuing hospice at this time, however daughter states she needs to speak to her siblings. CM discussed sending hospice referrals at this time. Family agreeable to referral to [facility].   Family states that they are interested in home hospice. Of note, family states they live in a second-floor apartment and it is 8 steps to enter. Family states there is no room for a hospital bed in the apartment. Family interested in further discussion with hospice liasion. Family states no POA at this time. CM continues to follow.

## 2023-09-26 NOTE — H&P
[facility]  H&P  Name: Ingrid Ibrahim 80 y.o. female I MRN: [id number]  Unit/Bed#: [facility] I Date of Admission: 9/25/2023   Date of Service: 9/25/2023 I Hospital Day: 0      Assessment/Plan   * Shortness of breath  Assessment & Plan  · Presenting from home with worsening shortness of breath, noted hypoxic during ED evaluation requiring 2 LNC to maintain appropriate saturations, additionally with improvement in symptoms  · CTA PE study was pursued negative for pulmonary embolism but concerning for increase in size of right middle lobe mass lesion and loculated pleural effusion, additionally with increased size of subpleural right lung nodules and right lower lobe spiculated pulmonary nodule concerning for progression. · Suspect symptomatology secondary to progression of malignancy, see plan as per "adenocarcinoma, lung, right"  · Continue supplemental oxygen as needed to maintain SaO2 at least 88%. Incentive spirometry, pulmonary toileting  · Perform home oxygen assessment prior to discharge    Adenocarcinoma, lung, right [facility])  Assessment & Plan  · Follows with [facility] oncology (Dr. [place]), s/p radiation additionally receives thoracentesis as needed for shortness of breath. Additionally follows with [facility] palliative care, has declined hospice previously  · Per outpatient documentation patient not candidate for further treatments. · CTA PE study as above revealing progression of malignancy as described below.   · Family requesting oncology consultation given progression, additionally given history we will request palliative care consultation for assistance with symptom management/assessment goals of care given progression (from brief discussion patient/family not yet interested in hospice, accepting of medical management with limits of DNR/DNI)    Hyponatremia  Assessment & Plan  · Sodium 128 on admission, suspect in setting of lung malignancy  · Place on fluid restriction, recheck BMP in a.m. Hypertensive urgency  Assessment & Plan  · -200 throughout ED evaluation, persistent despite resumption of patient's home dose nifedipine. Patient otherwise asymptomatic  · Continue nifedipine 30 mg twice daily and monitor blood pressure per protocol. Continue home pain regimen for any cancer related pain. Can provide as needed IV antihypertensive if persistent despite these measures    Type 2 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, bilateral (HCC)  Assessment & Plan  Lab Results   Component Value Date    HGBA1C 7.0 (A) 07/10/2023       No results for input(s): "POCGLU" in the last 72 hours. Blood Sugar Average: Last 72 hrs:  · Well-controlled as outpatient  · Hold glimepiride while inpatient, start correctional insulin with Accu-Cheks, carb controlled diet  · Initiate hypoglycemia protocol         VTE Prophylaxis: Enoxaparin (Lovenox)  / sequential compression device   Code Status: Level 3 - DNAR and DNI  POLST: POLST form is not discussed and not completed at this time. Discussion with family:  and daughter at bedside    Anticipated Length of Stay:  Patient will be admitted on an Inpatient basis with an anticipated length of stay of greater than 2 midnights. Justification for Hospital Stay: Please see detailed plans noted above. Chief Complaint:     Shortness of breath  History of Present Illness:  Kamini Irizarry is a 80 y.o. female who has a past medical history seen for adenocarcinoma of the right lung s/p prior radiation therapy following with [facility] oncology/palliative care and per outpatient records no longer amenable to treatment, type 2 diabetes on Amaryl, hypertension presenting with worsening shortness of breath.   Please note patient is primarily Tongan speaking, interpretation is provided by patient's daughter (present at bedside) after patient/daughter declining use of [employer]  service. For the past 2 weeks patient has noted increasing shortness of breath primarily with exertion, however noted acceleration of the symptoms over the past 2-3 days with desaturations today at home apparently as low as SaO2 88%. Patient denied any fever/chills, known sick contacts/recent travel, cough/wheezing, chest pain/pressure, abdominal pain/nausea/vomiting/diarrhea, worsening edema, or other systemic symptoms. Today patient's family contacted her PCP given the low oxygen saturation given degree of symptoms, and was advised to bring patient to ED for further evaluation. During ED evaluation she was noted hypoxic on arrival requiring 2 LNC to maintain appropriate saturations. She underwent CTA PE study which was negative for pulmonary embolism but with findings concerning for progression of malignancy. Given new oxygen requirement she is admitted for further management. Currently she is lying in bed in no acute distress and comfortable appearing, noting improvement following supplemental oxygen. Patient and family and brief goals of care discussion agreeable to DNR/DNI status at this time, however would like oncology/palliative care input before deciding next steps.     Review of Systems:    Constitutional:  Denies fever or chills   Eyes:  Denies change in visual acuity   HENT:  Denies nasal congestion or sore throat   Respiratory:  Denies cough but reports shortness of breath  Cardiovascular:  Denies chest pain or edema   GI:  Denies abdominal pain, nausea, vomiting, bloody stools or diarrhea   :  Denies dysuria   Musculoskeletal:  Denies back pain or joint pain   Integument:  Denies rash   Neurologic:  Denies headache, focal weakness or sensory changes   Endocrine:  Denies polyuria or polydipsia   Lymphatic:  Denies swollen glands   Psychiatric:  Denies depression or anxiety     Past Medical and Surgical History:   Past Medical History:   Diagnosis Date   • Arthritis    • Back pain    • Benign essential hypertension     LAST ASSESSED: [id number]   • Carotid artery stenosis    • CKD (chronic kidney disease) stage 2, GFR 60-89 ml/min    • Constipation, chronic     "drinks prune juice"   • Diabetes mellitus (HCC)    • DMII (diabetes mellitus, type 2) ([facility])    • Epiglottitis     RESOLVED: [id number]   • Gastritis    • GERD (gastroesophageal reflux disease)    • Gout    • H/O blood clots     left lower leg,,,11 yrs ago   • History of stenosis of renal artery     RESOLVED: [id number]   • Hypercholesteremia    • Hypercholesterolemia    • Hyperlipidemia    • Hyperparathyroidism ([address])    • Hypertension    • Iliac artery stenosis, bilateral ([facility])    • Kidney stone    • Lobular carcinoma ([address]) 09/06/2017    Left   • Lung cancer ([address])    • Mucinous carcinoma of breast, 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   • Presence of pessary    • Renal artery stenosis ([facility])    • Renal disorder    • Renovascular hypertension     RESOLVED: [id number]   • Segmental and somatic dysfunction of cervical region     RESOLVED: 27WLR7390   • Segmental and somatic dysfunction of lumbar region     RESOLVED: 56DZU9587   • Segmental and somatic dysfunction of pelvic region     RESOLVED: 68OAD3936   • Segmental and somatic dysfunction of thoracic region     RESOLVED: 03ERW8204   • Somatic dysfunction of abdominal region     RESOLVED: 14QHZ9348   • Somatic dysfunction of head region     RESOLVED: 21ACO9222   • Somatic dysfunction of lower extremities     RESOLVED: 39YRC5417   • Somatic dysfunction of rib region     RESOLVED: 17YWH0253   • Somatic dysfunction of thoracic region     RESOLVED: 55JAE2865   • Somatic dysfunction of upper extremities     RESOLVED: 06LTE6619   • Teeth missing      Past Surgical History:   Procedure Laterality Date   • ANGIOPLASTY / STENTING ILIAC     • BREAST BIOPSY Left 08/10/2018    U/S BX- mucinous ca   • BREAST BIOPSY Left 08/07/2017    U/S BX   • BREAST LUMPECTOMY Left 09/06/2017    LAST ASSESSED: [id number]   • BREAST LUMPECTOMY Left 09/11/2018   • BREAST SURGERY Left     biopsy   • CATARACT EXTRACTION Bilateral    • CHOLECYSTECTOMY     • COLONOSCOPY     • EYE SURGERY     • ILIAC VEIN ANGIOPLASTY / STENTING Right     INITIAL STENOSIS: ONSET: [id number]   • IR BIOPSY LUNG  11/15/2021   • IR THORACENTESIS  2/20/2023   • IR THORACENTESIS  3/28/2023   • IR THORACENTESIS  4/11/2023   • IR THORACENTESIS  4/19/2023   • IR THORACENTESIS  4/26/2023   • IR THORACENTESIS  5/3/2023   • IR THORACENTESIS  5/10/2023   • IR THORACENTESIS  5/17/2023   • IR THORACENTESIS  5/24/2023   • IR THORACENTESIS  5/31/2023   • IR THORACENTESIS  6/7/2023   • IR THORACENTESIS  7/18/2023   • IR THORACENTESIS  8/2/2023   • IR THORACENTESIS  8/16/2023   • KNEE ARTHROSCOPY Right    • KNEE SURGERY Right     ONSET: [id number]   • MAMMO NEEDLE LOCALIZATION LEFT (ALL INC) Left 9/11/2018   • MAMMO NEEDLE LOCALIZATION LEFT (ALL INC) Left 9/6/2017   • TX ESOPHAGOGASTRODUODENOSCOPY TRANSORAL DIAGNOSTIC N/A 7/26/2018    Procedure: ESOPHAGOGASTRODUODENOSCOPY (EGD); Surgeon: Trip Andersen DO;  Location: [facility]; Service: Gastroenterology   •  [place] EXC SILVA&/STRPG CORDS/EPIGL MCRSCP/TLSCP N/A 6/10/2016    Procedure: MICRO DIRECT LARYNGOSCOPY WITH VOCAL FOLD MASS EXCISION ;  Surgeon: Kari Terry MD;  [facility];  Service: ENT   • TX MASTECTOMY PARTIAL Left 9/6/2017    Procedure: LUMPECTOMY BREAST NEEDLE LOCALIZED WITH FAXITRON NEEDLE @ 0830;  Surgeon: Mariano Buckner MD;  Location[facility];  Service: General   • TX PERQ DEVICE PLACEMENT BREAST LOC 1ST LES W/GDNCE Left 9/11/2018    Procedure: BREAST LUMPECTOMY; BREAST NEEDLE LOCALIZATION (NEEDLE LOC AT 1330);   Surgeon: Rommel Ding MD;  Location[facility];  Service: Surgical Oncology   • THROAT SURGERY      lump removed   • TUBAL LIGATION     • UPPER GASTROINTESTINAL ENDOSCOPY     • US GUIDANCE BREAST BIOPSY LEFT EACH ADDITIONAL Left 8/7/2017   • US GUIDED BREAST BIOPSY LEFT COMPLETE Left 8/10/2018   • US GUIDED BREAST BIOPSY LEFT COMPLETE Left 8/7/2017   • VASCULAR SURGERY         Meds/Allergies:    Current Facility-Administered Medications:   •  acetaminophen (TYLENOL) tablet 975 mg, 975 mg, Oral, Q8H [address], Tristian Arshad, DO  •  albuterol (PROVENTIL HFA,VENTOLIN HFA) inhaler 2 puff, 2 puff, Inhalation, Q6H PRN, Tristian Arshad, DO  •  [START ON 9/26/2023] aspirin (ECOTRIN LOW STRENGTH) EC tablet 81 mg, 81 mg, Oral, Daily, Tristian Arshad, DO  •  [START ON 9/28/2023] calcitriol (ROCALTROL) capsule 0.25 mcg, 0.25 mcg, Oral, Once per day on Mon Thu, Peter Marroquin, DO  •  [START ON 9/26/2023] enoxaparin (LOVENOX) subcutaneous injection 30 mg, 30 mg, Subcutaneous, Daily, Tristian Arshad, DO  •  [START ON 9/26/2023] fluticasone (FLONASE) 50 mcg/act nasal spray 2 spray, 2 spray, Nasal, Daily, Tristian Arshad, DO  •  gabapentin (NEURONTIN) capsule 100 mg, 100 mg, Oral, BID, Tristian Arshad, DO  •  hydrALAZINE (APRESOLINE) injection 5 mg, 5 mg, Intravenous, Q6H PRN, Tristian Arshad, DO  •  ketotifen (ZADITOR) 0.025 % ophthalmic solution 1 drop, 1 drop, Both Eyes, BID, Tristian Arshad, DO  •  [START ON 9/26/2023] NIFEdipine (PROCARDIA XL) 24 hr tablet 30 mg, 30 mg, Oral, BID, Tristian Arshad, DO  •  ondansetron (ZOFRAN) injection 4 mg, 4 mg, Intravenous, Q6H PRN, Tristian Arshad, DO  •  oxyCODONE (ROXICODONE) immediate release tablet 10 mg, 10 mg, Oral, Q6H PRN, Tristian Arshad, DO  •  oxyCODONE (ROXICODONE) IR tablet 15 mg, 15 mg, Oral, Q6H PRN, Tristian Arshad, DO  •  pantoprazole (PROTONIX) EC tablet 40 mg, 40 mg, Oral, BID, Tristian Arshad DO  •  phenol (CHLORASEPTIC) 1.4 % mucosal liquid 1 spray, 1 spray, Mouth/Throat, Q2H PRN, Tristian Arshad DO  •  [START ON 9/26/2023] polyethylene glycol (MIRALAX) packet 17 g, 17 g, Oral, Daily, Tristian Arshad DO  •  [START ON 9/26/2023] pravastatin (PRAVACHOL) tablet 40 mg, 40 mg, Oral, Daily With Dinner, Tristian Arshad DO  •  predniSONE tablet 10 mg, 10 mg, Oral, BID PRN, Karl Mejias DO Cara    Current Outpatient Medications:   •  Acetaminophen 325 MG CAPS, Take 500 mg by mouth every 8 (eight) hours, Disp: , Rfl:   •  albuterol (ProAir HFA) 90 mcg/act inhaler, Inhale 2 puffs every 6 (six) hours as needed for wheezing or shortness of breath para tos, o un siento de falta el aire, Disp: 8.5 g, Rfl: 0  •  aspirin (ADULT ASPIRIN EC LOW STRENGTH) 81 mg EC tablet, Take 1 tablet by mouth daily, Disp: , Rfl:   •  calcitriol (ROCALTROL) 0.25 mcg capsule, Take 1 capsule (0.25 mcg total) by mouth 2 (two) times a week, Disp: 24 capsule, Rfl: 0  •  fluticasone (FLONASE) 50 mcg/act nasal spray, 2 sprays into each nostril daily, Disp: 16 g, Rfl: 0  •  gabapentin (NEURONTIN) 100 mg capsule, take 1 capsule by mouth twice a day, Disp: 180 capsule, Rfl: 0  •  glimepiride (AMARYL) 1 mg tablet, Take 0.5 tablets (0.5 mg total) by mouth daily with breakfast, Disp: 30 tablet, Rfl: 5  •  Lancets (ONETOUCH ULTRASOFT) lancets, by Other route 2 (two) times a day Use as instructed, Disp: 100 each, Rfl: 5  •  methylPREDNISolone 4 MG tablet therapy pack, Use as directed on package, Disp: 21 each, Rfl: 0  •  NIFEdipine ER (ADALAT CC) 30 MG 24 hr tablet, take 1 tablet by mouth twice a day, Disp: 180 tablet, Rfl: 3  •  olopatadine (PATANOL) 0.1 % ophthalmic solution, Administer 1 drop to both eyes 2 (two) times a day, Disp: 5 mL, Rfl: 0  •  OneTouch Delica Lancets 25B MISC, Inject as directed 2 (two) times a day Use to test, Disp: 200 each, Rfl: 0  •  oxyCODONE (ROXICODONE) 10 MG TABS, Take 1-1.5 tablets (10-15 mg total) by mouth 3 (three) times a day as needed for moderate pain [place], o dianna y [place], jeancarlos veces por brittany para delaney. No mas que 3 tabletas por brittany.  Max Daily Amount: 45 mg, Disp: 90 tablet, Rfl: 0  •  pantoprazole (PROTONIX) 40 mg tablet, take 1 tablet by mouth twice a day, Disp: 60 tablet, Rfl: 1  •  polyethylene glycol (MIRALAX) 17 g packet, Take 17 g by mouth daily Por favor, jacob talley brittany para prevenir estranamiento, Disp: 30 each, Rfl: 11  •  pravastatin (PRAVACHOL) 40 mg tablet, Take 1 tablet (40 mg total) by mouth daily, Disp: 90 tablet, Rfl: 3  •  predniSONE 10 mg tablet, Take 1 tablet (10 mg total) by mouth 2 (two) times a day as needed (low energy, severe nausea, gout) Si necesite para fatiga, nausea muy harman, o inflammacion, Disp: 20 tablet, Rfl: 0  •  Probiotic Product (Align) 4 MG CAPS, Take by mouth, Disp: , Rfl:     Allergies: Allergies   Allergen Reactions   • Clonidine Derivatives Swelling   • Diflucan [Fluconazole] Rash   • Flagyl [Metronidazole] Anaphylaxis   • Carvedilol Hives     And legs swelled   • Latex Rash   • Lipitor [Atorvastatin]      Legs swelled   • Metoprolol Dizziness     Low BP and orthostatic symptoms. • Other Palpitations     IV DYE   • Shingrix [Zoster Vac Recomb Adjuvanted] Hives     History:  Marital Status:       Substance Use History:   Social History     Substance and Sexual Activity   Alcohol Use Not Currently     Social History     Tobacco Use   Smoking Status Former   • Packs/day: 1.00   • Years: 55.00   • Total pack years: 55.00   • Types: Cigarettes   • Start date:    • Quit date:    • Years since quittin.7   Smokeless Tobacco Never     Social History     Substance and Sexual Activity   Drug Use No       Family History:  Family History   Problem Relation Age of Onset   • Heart disease Brother    • Heart disease Maternal Grandmother    • Diabetes Other         of other type with arthropathy, without long term current use of insulin   • Hypertension Other    • No Known Problems Mother    • Gout Family    • Rheum arthritis Family    • Lupus Family         systematic erythematosus   • Heart disease Family    • Stroke Family         syndrome   • Stroke Maternal Uncle         syndrome   • Stroke Maternal Aunt         syndrome   • No Known Problems Father    • No Known Problems Daughter    • No Known Problems Maternal Grandfather    • No Known Problems Paternal Grandmother    • No Known Problems Paternal Grandfather    • No Known Problems Daughter    • No Known Problems Daughter    • No Known Problems Maternal Aunt    • No Known Problems Maternal Aunt    • No Known Problems Maternal Aunt        Physical Exam:     Vitals:   Blood Pressure: (!) 179/76 (09/25/23 1553)  Pulse: 75 (09/25/23 1553)  Temperature: 99.1 °F (37.3 °C) (09/25/23 1553)  Temp Source: Oral (09/25/23 1553)  Respirations: 22 (09/25/23 1553)  SpO2: 99 % (09/25/23 1553)    Constitutional: Frail and cachectic appearing with diffusely diminished muscle mass, no acute distress, non-toxic appearance   Eyes:  PERRL, conjunctiva normal   HENT:  Atraumatic, external ears normal, nose normal, oropharynx moist, no pharyngeal exudates. Neck- normal range of motion, no tenderness, supple   Respiratory:  No respiratory distress, decreased breath sounds throughout right lung fields, bibasilar rales, no wheezing   Cardiovascular:  Normal rate, normal rhythm, no murmurs, no gallops, no rubs   GI:  Soft, nondistended, normal bowel sounds, nontender, no organomegaly, no mass, no rebound, no guarding   :  No costovertebral angle tenderness   Musculoskeletal:  No edema, no tenderness, no deformities. Back- no tenderness  Integument:  Well hydrated, no rash   Lymphatic:  No lymphadenopathy noted   Neurologic:  Alert &awake, communicative, CN 2-12 normal, normal motor function, normal sensory function, no focal deficits noted   Psychiatric:  Speech and behavior appropriate       Lab Results: I have personally reviewed pertinent reports.       Results from last 7 days   Lab Units 09/25/23  1731   WBC Thousand/uL 5.26   HEMOGLOBIN g/dL 9.5*   HEMATOCRIT % 29.3*   PLATELETS Thousands/uL 412*   NEUTROS PCT % 70   LYMPHS PCT % 19   MONOS PCT % 11   EOS PCT % 0     Results from last 7 days   Lab Units 09/25/23  1731   POTASSIUM mmol/L 4.1   CHLORIDE mmol/L 86*   CO2 mmol/L 35*   BUN mg/dL 23   CREATININE mg/dL 1.09   CALCIUM mg/dL 8.6   ALK PHOS U/L 62   ALT U/L 11   AST U/L 24           Imaging: I have personally reviewed pertinent reports. CTA ED chest PE Study    Result Date: 9/25/2023  Narrative: CTA - CHEST WITH IV CONTRAST - PULMONARY ANGIOGRAM INDICATION:   Pulmonary embolism (PE) suspected hx lung CA on right, hypoxia. COMPARISON: CT chest 7/30/2023 TECHNIQUE: CTA examination of the chest was performed using angiographic technique according to a protocol specifically tailored to evaluate for pulmonary embolism. Multiplanar 2D reformatted images were created from the source data. In addition, coronal 3D MIP postprocessing was performed on the acquisition scanner. Radiation dose length product (DLP) for this visit:  176.66 mGy-cm . This examination, like all CT scans performed in the [facility], was performed utilizing techniques to minimize radiation dose exposure, including the use of iterative  reconstruction and automated exposure control. IV Contrast:  85 mL of iohexol (OMNIPAQUE) FINDINGS: PULMONARY ARTERIAL TREE:  No pulmonary embolus is seen. LUNGS: The combination of right middle lobe mass lesion and surrounding loculated pleural effusion is collectively increased in size compared to 7/30/2023, currently measuring 7.4 x 5.5 cm, previously 5.0 x 4.4 cm. Spiculated lower lobe pulmonary nodule measures up to 0.8 cm, previously 0.7 cm on 7/30/2023 and 0.6 cm on 2/12/2023. There is a right upper lobe subpleural anterior pulmonary nodule measuring 0.7 cm (6/36), previously 0.5 cm on 7/30/2023 and 0.2 cm on 2/12/2023. Adjacent smaller nodular opacities are seen on current examination, few of which are also seen on 7/30/2023. Similar subpleural 4 mm pulmonary nodule more superiorly (series 6 image 27), previously measuring 2 mm on 7/30/2023. . . There is no tracheal or endobronchial lesion. PLEURA: There is nodular thickening of the right pleural, increased since 7/30/2023.  Loculated right pleural effusion is seen, predominantly near the right middle lobe and in the posterior aspect of the right lower lobe. HEART/GREAT VESSELS:  Unremarkable for patient's age. No thoracic aortic aneurysm. MEDIASTINUM AND MARY: Enlarged precarinal lymph node measuring 1.3 cm, and previously measuring 0.9 cm on 2/12/2023. There is likely an enlarged retroperitoneal lymph node as well, measuring 1.6 cm in short axis (series 2 image 102) Prominent 1.0 cm right hilar lymph node (series 2 image 100) stable since 7/30/2023. CHEST WALL AND LOWER NECK:   There is interval increase in the size of right thyroid nodule compared to 7/30/2023, currently measuring 1.8 cm (series 2 image 8). Additional 2.1 cm left thyroid nodule. VISUALIZED STRUCTURES IN THE UPPER ABDOMEN: A 1.3 cm high attenuation lesion is partially visualized in the left kidney (series 2 image 199), stable since PET/CT 1/20/2023 OSSEOUS STRUCTURES:  No acute fracture or destructive osseous lesion. Impression: No acute pulmonary embolism Interval increase in the size of combination of right middle lobe mass lesion and loculated pleural effusion with increased nodular thickening of the right pleura, increased size of multiple subpleural right lung nodules and dominant right lower lobe spiculated pulmonary nodule. Multiple enlarged mediastinal lymph nodes are seen, grossly stable in size since 7/30/2023, however increased in size from 2/12/2023. Overall findings are compatible with progression of malignant disease Interval increase in the size of right thyroid lobe nodule compared to 7/30/2023, currently measuring 1.8 cm. Additional 2.1 cm left thyroid nodule. Nonemergent ultrasound is recommended for further assessment. A 1.3 cm high attenuation lesion in the left kidney, stable in size since PET/CT 1/20/2023 and can represent either a complicated cyst or solid renal neoplasm. Initial further assessment with renal ultrasound is recommended.  The study was marked in [facility] for immediate notification. Workstation performed: [id number]     Transthoracic Echo (TTE) Complete    Result Date: 9/16/2023  Narrative: <! [CDATA[[facility] Artemio Lenz N E Ryan Bucio Phone: [phone] -------------------------------------------------------------------------------- Transthoracic Echocardiogram M-mode, complete 2D, complete spectral Doppler, and color Doppler Patient:  Elyse Main  MRN:  [id number] [facility])  Account:  [de-identified]  Accession:  [id number]  Patient location:   Study status:  Routine  Facility:  [facility]  Study date:  09/15/2023  YOB: 1934  Age:  80 year(s)  Birth gender:  Female  Patient status:   Height:  155 cm (61 in)  Weight:  48 kg (105.8 lb)  BSA:  1.44 m²  BMI:  20 kg/m²  HR:   BP:  140/64  Reading physician: Sandra Alfaro Sonographer: Nitza Capone RDCS OSIRIS Referring physician: Al Steele Ordering physician: Al Steele Purpose of study: Chest Pain. -------------------------------------------------------------------------------- Summary: 1. Left ventricle: The cavity size is normal. Systolic function is normal. The estimated ejection fraction is 55-60%. Wall motion is normal; there are no regional wall motion abnormalities. 2.  Mitral valve: 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 mild stenosis. 3.  Left atrium: The atrium is mildly dilated. 4.  Pulmonary arteries: There is mild pulmonary hypertension noted. The RVSP is 38 mm Hg. 5.  Note: Parasternal images added after apical images. -------------------------------------------------------------------------------- ECG: Normal sinus rhythm. -------------------------------------------------------------------------------- Study data: Study status: Routine. Procedure: Image quality was poor. The study was technically limited due to poor acoustic window availability.  Scanning was performed from the apical and subcostal acoustic windows. Satisfactory imaging could not be obtained from the low parasternal acoustic window(s). -------------------------------------------------------------------------------- Left ventricle: The cavity size is normal. There is no evidence of hypertrophy, wall thickness is normal. Systolic function is normal. The estimated ejection fraction is 55-60%. Wall motion is normal; there are no regional wall motion abnormalities. Diastolic dysfunction present but unable to assess severity. Right ventricle: The cavity size is normal. Systolic function is normal. Left atrium: The atrium is mildly dilated. Right atrium: The atrium is normal in size. Aortic valve: The valve is structurally normal. The valve is trileaflet. Cusp separation is normal. There is no stenosis. There is no regurgitation. Mitral valve: The leaflets are moderately calcified. 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 mild stenosis. There is trivial regurgitation. Tricuspid valve: The valve is structurally normal. Leaflet separation is normal. There is no evidence for stenosis. There is mild regurgitation. Pulmonic valve: The valve is structurally normal. There is no evidence for stenosis. There is no regurgitation. Pericardium: A trivial pericardial effusion is identified. Aorta: Aortic root: The aortic root is normal-sized. Pulmonary arteries: There is mild pulmonary hypertension noted. The RVSP is 38 mm Hg. Systemic veins: Inferior vena cava:  The IVC is normal-sized. -------------------------------------------------------------------------------- Measurements  Left ventricle  Value   Ref   EF   54  %  ----   SV   40  ml  ----   SV/bsa   27.8  ml/m²  ----   JOEL  ?  4.1  cm  3.8 - 5.2   ESD  ?  3.0  cm  2.2 - 3.5   FS  ?  27  %  27 - 45   PW, ED  ?  0.6  cm  0.6 - 0.9   IVS/PW, ED   1.24   ----   PW/ID, ED   0.15   ----   EF, 1-p A4C  ?  43  %  46 - 78   E', lat raiza, TDI  ?  6.4  cm/sec  ?10.0   E/e', lat raiza, TDI  ?  15   ?13   E', med raiza, TDI  ?  6.5  cm/sec  ?7.0   E/e', med raiza, TDI   15   ----   E/e', avg, TDI  ?  15   ?14   LVOT  Value   Ref   Diam   1.8  cm  ----   Peak ashley, S   1.04  m/sec  ----   Mean ashley, S   0.73  m/sec  ----   VTI, S   24.2  cm  ----   Peak grad, S   4  mm Hg  ----   Mean grad, S   2  mm Hg  ----   SV/bsa   42  ml/m²  ----   Ventricular septum  Value   Ref   IVS, ED  ?  0.7  cm  0.6 - 0.9   Right ventricle  Value   Ref   JOEL minor ax, A4C base  ?  2.8  cm  2.5 - 4.1   JOEL minor ax, A4C mid  ?  2.3  cm  1.9 - 3.5   JOEL major ax, A4C  ?  5.1  cm  5.9 - 8.3   TAPSE, MM  ?  1.8  cm  ?1.7   Left atrium  Value   Ref   SI dim, A4C   4.8  cm  ----   Vol, ES, 1-p A4C  ?  47  ml  22 - 52   Vol, ES, 1-p A2C  ?  58  ml  22 - 52   Vol, ES, 2-p   52  ml  ----   Vol/bsa, ES, 2-p  ?  36  ml/m²  16 - 34   Right atrium  Value   Ref   RA ID, major   3.8  cm  ----   Area, ES, A4C  ?  8  cm²  10 - 18   Vol, ES, 1-p A4C   13  ml  ----   Aortic valve  Value   Ref   Peak v, S  ?  1.3  m/sec  ?2.5   Mean v, S   0.94  m/sec  ----   VTI, S   27.6  cm  ----   Mean grad, S   4  mm Hg  ----   Peak grad, S   7  mm Hg  ----   LVOT/AV, VTI ratio   0.88   ----   WILLAM, VTI   2.2  cm²  ----   WILLAM/bsa, VTI   1.55  cm²/m²  ----   LVOT/AV, Vpeak ratio   0.78   ----   WILLAM, Vmax   2.0  cm²  ----   LVOT/AV, Vmean ratio   0.77   ----   WILLAM, Vmean   2.0  cm²  ----   Mitral valve  Value   Ref   Peak ashley   2.15  m/sec  ----   Mean v, D   1.2  m/sec  ----   Peak E   0.99  m/sec  ----   Peak A   1.68  m/sec  ----   MiV/LVOT VTI   1.6   ----   Decel time   180  ms  ----   Mean grad, D   7  mm Hg  ---- Peak E/A ratio   0.6   ----   MVA, LVOT cont   1.6  cm²  ----   Pulmonic valve  Value   Ref   Peak v, S   1.5  m/sec  ----   Peak grad, S   9  mm Hg  ----   Tricuspid valve  Value   Ref   TR peak v  ?  3  m/sec  ?2.8   Peak RV-RA grad, S   35  mm Hg  ----   Pulmonary artery  Value   Ref   Pressure, S   38  mm Hg  ----   Inferior vena cava  Value   Ref   Diam  ?  1.6  cm  ?2.1  Legend: ? and ? izabela values outside specified reference range. ? marks values inside specified reference range. Prepared and electronically signed by [facility] 09/16/2023 12:44]]>    CT VENOGRAM CHEST w/ contrast    Result Date: 9/14/2023  Narrative:  [place] Duty:  CTA CHEST FOR PULMONARY EMBOLUS INDICATION: Chest pain COMPARISON: Chest x-ray performed earlier today TECHNIQUE: A CTA study of the chest was performed using contiguous axial cuts obtained from the thoracic inlet to the lung bases following the administration of intravenous contrast. A total of 80 mL of Isovue 370 was infused with bolus tracking to the pulmonary arteries. Coronal and sagittal reformations are provided. 2D MIP reformations were performed. Up-to-date CT equipment and radiation dose reduction techniques were employed. FINDINGS: PULMONARY ARTERIES: No pulmonary embolus. LUNGS AND PLEURA: There is collapse of the right middle lobe. There is a 0.9 x 2.4 cm nodule abutting the right major fissure. Uncertain if this is a separate nodule or associated with atelectasis, given close proximity. Right lower lobe nodule measures 8 mm. Series 4, image 24. There are moderately sized loculated right-sided pleural effusions. There is circumferential thickening of the right pleura, predominantly in the right upper chest. Moderate emphysema. MEDIASTINUM: There is a hypodense mass located posterior to the right thyroid lobe that measures 2.0 x 3.2 cm. Left thyroid lobe nodule measures 2.2 x 1.2 cm.  LYMPH NODES: No adenopathy. HEART: The left ventricle appears hypertrophic. No pericardial effusion. AORTA AND GREAT VESSELS: No aneurysm or dissection. UPPER ABDOMEN: Portions of the abdomen included are normal. SOFT TISSUE: Normal. OSSEOUS STRUCTURES: No acute fracture or destructive lesion. Impression:   1.  No evidence of pulmonary embolus.   2.  There are moderately sized loculated right-sided pleural effusions with circumferential right pleural thickening. Findings could be infectious or neoplastic. Recommend fluid sampling for further evaluation. 3. Nodules within the right lower lobe and abutting the right major fissure may also be infectious or neoplastic. 4. There is a hypodense mass located posterior to the thyroid that measures up to 3.2 cm and a left thyroid nodule that measures up to 2.2 cm. A thyroid ultrasound is recommended for further evaluation. 5. The left ventricle appears hypertrophic. Echo is recommended. Created by: Zeus Manning MD Signed by: Zeus Manning MD Signed on: 9/14/2023 11:25 EDT Location: [id number]       XR chest 1 view    Result Date: 9/14/2023  Narrative:  [facility] Labs: SINGLE VIEW CHEST INDICATION: Chest pain COMPARISON: None FINDINGS: LUNGS/PLEURA: There are airspace opacities within the right upper lobe. There is right middle lobe volume loss. Trace right-sided pleural effusion. The left lung is grossly clear. HEART/MEDIASTINUM: Normal in size and contour. SUPPORT DEVICES: None. PNEUMOTHORAX: None. OSSEOUS STRUCTURES: No acute fracture or destructive lesion. ADDITIONAL FINDINGS: None. Impression: Findings concerning for right upper lobe pneumonia with a trace right pleural effusion. Created by: Zeus Manning MD Signed by: Zeus Manning MD Signed on: 9/14/2023 9:50 EDT Location: [id number]       IR procedure not performed    Result Date: 9/8/2023  Narrative: Limited ultrasound evaluation of the chest wall for thoracentesis planning. Clinical History: Recurrent pleural effusions.  Adenocarcinoma of the right lung. Procedure: After explaining the risks and benefits of the procedure to the patient, informed consent was obtained. Ultrasound was used for focused evaluation of the chest wall for thoracentesis planning. No significant fluid was identified. Representative images were obtained of the small right-sided pleural effusion. No thoracentesis was performed    Impression: Impression: Limited ultrasound of the chest wall for thoracentesis planning. No significant ascites identified therefore no paracentesis was performed. Signed, performed, and dictated by JARRED Oro under the supervision of Dr. Katerina Archer. Workstation performed: [id number]     VAS carotid complete study    Result Date: 9/5/2023  Narrative:  [facility] REPORT [facility]INICAL: Indications:  Yearly surveillance of carotid artery disease. Patient reports pain in the left neck. Operative History: 2016-09-02 Renal angiogram without intervention 2010-01-07 Right PTA VIDA & EIA with stents 2010-01-07 Left PTA EIA with stent 2010-01-07 Left Transluminal placement of iliac stent, percutaneous 2010-01-07 Right Transluminal placement of iliac stent, percutaneous 2010-01-07 Right Transluminal placement of iliac stent, percutaneous Risk Factors: HTN, Diabetes, Hyperlipidemia, CKD, PAD and previous smoking (quit >10yrs ago). Clinical Right Pressure: Pt refused, Left Pressure: 158/60 mm Hg. FINDINGS:  Right        Impression  PSV  EDV (cm/s)  Direction of Flow  Ratio  Dist. ICA                112          20                      1.11  Mid. ICA                 181          26                      1.79  Prox.  ICA    70%+        248          41                      2.46  Dist CCA                 112          13                            Mid CCA                  101          15                      1.07  Prox CCA                  94          11                      0.95  Ext Carotid  Moderate    207           0                      2.05 Prox Vert                 98          10  Antegrade                 Subclavian               218           0                            Innominate                99          20                             Left         Impression  PSV  EDV (cm/s)  Direction of Flow  Ratio  Dist. ICA                105          22                      1.31  Mid. ICA                 105          20                      1.31  Prox. ICA    1 - 49%     140          21                      1.74  Dist CCA                  89          13                            Mid CCA                   80          11                      0.95  Prox CCA                  84          12                            Ext Carotid  Severe      356           0                      4.43  Prox Vert                 65          10  Antegrade                 Subclavian               235           8                               CONCLUSION:  Impression RIGHT: There is >70% stenosis in the internal carotid artery. Plaque is heterogenous and irregular. There is a moderate stenosis in the external carotid artery. Vertebral artery flow is antegrade. There is no significant subclavian artery disease. LEFT: There is <50% stenosis in the internal carotid artery. Plaque is heterogenous and irregular. There is a severe stenosis in the external carotid artery. Vertebral artery flow is antegrade. There is no significant subclavian artery disease. Compared to previous study on 8/31/2022, there is progression of disease in the right ICA. SIGNATURE: Electronically Signed by: Amparo Hagan on 2023-09-05 12:09:42 PM    VAS lower limb arterial duplex, limited/unilateral    Result Date: 9/3/2023  Narrative:  [facility] REPORT CLINICAL: Indications: Patient presents with left foot redness and pain. She has known PAD.  Operative History: 2016-09-02 Renal angiogram without intervention 2010-01-07 Right PTA VIDA & EIA with stents 2010-01-07 Left PTA EIA with stent 2010-01-07 Left Transluminal placement of iliac stent, percutaneous 2010-01-07 Right Transluminal placement of iliac stent, percutaneous 2010-01-07 Right Transluminal placement of iliac stent, percutaneous Risk Factors The patient has history of HTN, Diabetes, Hyperlipidemia, CKD, PAD and previous smoking (quit >10yrs ago). Clinical Right Pressure: Patient refused, Left Pressure:  169/ mm Hg. FINDINGS:  Left                   Impression  PSV (cm/s)  EDV  Common Femoral Artery  50-75%             349    0  Prox Profunda                             174    0  Prox SFA                                  135    0  Mid SFA                50-75%             310    0  Dist SFA                                  221    0  Proximal Pop                               84    0  Distal Pop                                 86    0  Tibioperoneal                              57    0  Prox Post Tibial                           30    2  Dist Post Tibial       Occluded             0    0  Prox. Ant. Tibial                          53    0  Dist. Ant. Tibial                          63    0  Dist Peroneal                              28    0     CONCLUSION:  Impression: RIGHT LOWER LIMB: Ankle/Brachial index: 0.59, which is in the severe disease category. (Prior: ) Metatarsal pressure of 48 mmHg Great toe pressure of 38 mmHg, below the diabetic healing range. PVR/ PPG tracings are dampened. LEFT LOWER LIMB: Evaluation shows 50-75% stenosis in the common femoral and mid superficial femoral arteries. High grade stenosis vs occlusion of the distal posterior tibial artery. Ankle/Brachial index: 0.67, which is in the moderate disease category. (Prior: ) Metatarsal pressure of 97 mmHg Great toe pressure of 84 mmHg, within the diabetic healing range. PVR/ PPG tracings are dampened. Compared to previous study on 10/10/2022, the left distal PTA appears occluded.   SIGNATURE: Electronically Signed by: Nikos Francis MD on 2023-09-03 04:53:49 PM        ** Please Note: Dragon 360 Dictation voice to text software was used in the creation of this document.  **

## 2023-09-26 NOTE — ASSESSMENT & PLAN NOTE
Lab Results   Component Value Date    HGBA1C 7.0 (A) 07/10/2023       No results for input(s): "POCGLU" in the last 72 hours.     Blood Sugar Average: Last 72 hrs:  · Well-controlled as outpatient  · Hold glimepiride while inpatient, start correctional insulin with Accu-Cheks, carb controlled diet  · Initiate hypoglycemia protocol

## 2023-09-26 NOTE — ASSESSMENT & PLAN NOTE
· Follows with [facility] oncology (Dr. Nedra Fitzgerald), s/p radiation additionally receives thoracentesis as needed for shortness of breath. Additionally follows with [facility] palliative care, has declined hospice previously  · Per outpatient documentation patient not candidate for further treatments. · CTA PE study as above revealing progression of malignancy as described below.   · Family requesting oncology consultation given progression, additionally given history we will request palliative care consultation for assistance with symptom management/assessment goals of care given progression (from brief discussion patient/family not yet interested in hospice, accepting of medical management with limits of DNR/DNI)  · For cancer related pain PDMP reviewed, continue home pain management regimen

## 2023-09-26 NOTE — ASSESSMENT & PLAN NOTE
Presenting from home with worsening shortness of breath, noted hypoxic during ED evaluation requiring 2 LNC to maintain appropriate saturations, additionally with improvement in symptoms  · CTA PE study was pursued negative for pulmonary embolism but concerning for increase in size of right middle lobe mass lesion and loculated pleural effusion, additionally with increased size of subpleural right lung nodules and right lower lobe spiculated pulmonary nodule concerning for progression. · Suspect symptomatology secondary to progression of malignancy/recurrent effusion. Patient does get thoracentesis outpatient as needed. · [address] [place] discussions held with  and daughter Trent Hernandez at bedside, wish to transition to hospice cares at this time. Okay for hospice consult. · Continue supplemental oxygen, this makes patient feel better. We will coordinate for home under hospice benefit.

## 2023-09-26 NOTE — ASSESSMENT & PLAN NOTE
· Presenting from home with worsening shortness of breath, noted hypoxic during ED evaluation requiring 2 LNC to maintain appropriate saturations, additionally with improvement in symptoms  · CTA PE study was pursued negative for pulmonary embolism but concerning for increase in size of right middle lobe mass lesion and loculated pleural effusion, additionally with increased size of subpleural right lung nodules and right lower lobe spiculated pulmonary nodule concerning for progression. · Suspect symptomatology secondary to progression of malignancy, see plan as per "adenocarcinoma, lung, right"  · Continue supplemental oxygen as needed to maintain SaO2 at least 88%.   Incentive spirometry, pulmonary toileting  · Perform home oxygen assessment prior to discharge

## 2023-09-26 NOTE — HOSPICE NOTE
HOSPICE REFERRAL RECEIVED. PATIENT APPROVED FOR [facility] HOME HOSPICE SERVICES BY DR [address] [facility]. LIAISON MET WITH PT AND DTRS BAMBI AND KENZIE AND DTR JASPER AND S/O TIA VIA TC. EXPLAINED HOSPICE SERVICES AND ANSWERED QUESTIONS. PT FAMILY WOULD LIKE TO DISCUSS TOGETHER AND WERE AGREEABLE TO FOLLOW UP FROM LIAISON TOMORROW. PT TENTATIVELY PLACED ON SCHEDULE FOR SOC WITH HOSPICE ON THURSDAY. LIAISON TO FOLLOW UP TOMORROW.      UPDATED CM FITO BROWN VIA TT.

## 2023-09-26 NOTE — PROGRESS NOTES
[address] [facility]  Progress Note  Name: Jorge Travis  MRN: [id number]  Unit/Bed#: [facility] I Date of Admission: 9/25/2023   Date of Service: 9/26/2023 I Hospital Day: 1    Assessment/Plan   * Shortness of breath  Assessment & Plan  Presenting from home with worsening shortness of breath, noted hypoxic during ED evaluation requiring 2 LNC to maintain appropriate saturations, additionally with improvement in symptoms  · CTA PE study was pursued negative for pulmonary embolism but concerning for increase in size of right middle lobe mass lesion and loculated pleural effusion, additionally with increased size of subpleural right lung nodules and right lower lobe spiculated pulmonary nodule concerning for progression. · Suspect symptomatology secondary to progression of malignancy/recurrent effusion. Patient does get thoracentesis outpatient as needed. · [address] [place] [place] discussions held with  and daughter Michelle Ortiz at bedside, wish to transition to hospice cares at this time. Okay for hospice consult. · Continue supplemental oxygen, this makes patient feel better. We will coordinate for home under hospice benefit. Adenocarcinoma, lung, right [facility])  Assessment & Plan  Follows with [facility]'s oncology (Dr. Delinda Dubin), s/p radiation additionally receives thoracentesis as needed for shortness of breath. Additionally follows with [facility]'s palliative care, has declined hospice previously  · Per outpatient documentation patient not candidate for further treatments. · CTA PE study as above revealing progression of malignancy as described below. · Discussed CTA findings with /daughter Zoraida, they are aware of lack of treatment options. Wish to pursue hospice cares at this time and focus on keeping patient home and comfortable.    · For cancer related pain PDMP reviewed, continue home pain management regimen    Type 2 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, bilateral (HCC)  Assessment & Plan  Lab Results   Component Value Date    HGBA1C 7.0 (A) 07/10/2023     · Well-controlled as outpatient  · Hold glimepiride while inpatient, continue correctional insulin with Accu-Cheks, carb controlled diet  · Hypoglycemia protocol    Hypertensive urgency  Assessment & Plan  -200 throughout ED evaluation, persistent despite resumption of patient's home dose nifedipine. Patient otherwise asymptomatic  · Continue nifedipine 30 mg twice daily   · Continue home pain regimen for any cancer related pain. · PRN Hydralazine     Goals of care, counseling/discussion  Assessment & Plan  · As above  · In process of transitioning to hospice cares     Hyponatremia  Assessment & Plan  Sodium 128 on admission, 133 today. Likely in setting of lung CA  · Continue FR  · Monitor as needed          VTE Pharmacologic Prophylaxis: VTE Score: 5 Moderate Risk (Score 3-4) - Pharmacological DVT Prophylaxis Ordered: enoxaparin (Lovenox). Patient Centered Rounds: I performed bedside rounds with nursing staff today. Discussions with Specialists or Other Care Team Provider: nursing, case management, palliative     Education and Discussions with Family / Patient: Updated  ( and daughter) at bedside. Yumiko Bradford    Total Time Spent on Date of Encounter in care of patient: 40 mins. This time was spent on one or more of the following: performing physical exam; counseling and coordination of care; obtaining or reviewing history; documenting in the medical record; reviewing/ordering tests, medications or procedures; communicating with other healthcare professionals and discussing with patient's family/caregivers.     Current Length of Stay: 1 day(s)  Current Patient Status: Inpatient   Certification Statement: The patient will continue to require additional inpatient hospital stay due to symptom management/discharge planning  Discharge Plan: Anticipate discharge tomorrow to home with home services. Code Status: Level 3 - DNAR and DNI    Subjective:   No current complaints, feels better with oxygen on. ROS is mainly provided by daughter. States patient has had a declining functional status over last several weeks as well as poor appetite. Intermittent pain controlled by oral Oxycodone at home. Also with intermittent trouble sleeping at home, did not do well on Remeron or Zyprexa. Objective:     Vitals:   Temp (24hrs), Av.1 °F (37.3 °C), Min:99.1 °F (37.3 °C), Max:99.1 °F (37.3 °C)    Temp:  [99.1 °F (37.3 °C)] 99.1 °F (37.3 °C)  HR:  [75-91] 86  Resp:  [18-22] 18  BP: (179-229)/(76-92) 192/79  SpO2:  [96 %-100 %] 96 %  There is no height or weight on file to calculate BMI. Input and Output Summary (last 24 hours): Intake/Output Summary (Last 24 hours) at 2023 0849  Last data filed at 2023 1948  Gross per 24 hour   Intake 500 ml   Output 900 ml   Net -400 ml       Physical Exam:   Physical Exam  Vitals and nursing note reviewed. Constitutional:       General: She is not in acute distress. Appearance: She is cachectic. Cardiovascular:      Rate and Rhythm: Normal rate. Pulmonary:      Effort: No respiratory distress. Breath sounds: Decreased breath sounds present. Abdominal:      Tenderness: There is no abdominal tenderness. Musculoskeletal:         General: No swelling. Skin:     General: Skin is warm. Coloration: Skin is pale. Neurological:      Mental Status: She is alert. Mental status is at baseline.    Psychiatric:         Mood and Affect: Mood normal.          Additional Data:     Labs:  Results from last 7 days   Lab Units 23  0611 23  1731   WBC Thousand/uL 6.11 5.26   HEMOGLOBIN g/dL 10.8* 9.5*   HEMATOCRIT % 33.8* 29.3*   PLATELETS Thousands/uL 419* 412*   NEUTROS PCT %  --  70   LYMPHS PCT %  --  19   MONOS PCT %  --  11   EOS PCT %  --  0     Results from last 7 days   Lab Units 23  3539 09/25/23  1731   SODIUM mmol/L 133* 128*   POTASSIUM mmol/L 3.4* 4.1   CHLORIDE mmol/L 90* 86*   CO2 mmol/L 34* 35*   BUN mg/dL 19 23   CREATININE mg/dL 1.04 1.09   ANION GAP mmol/L 9 7   CALCIUM mg/dL 9.1 8.6   ALBUMIN g/dL  --  2.9*   TOTAL BILIRUBIN mg/dL  --  0.26   ALK PHOS U/L  --  62   ALT U/L  --  11   AST U/L  --  24   GLUCOSE RANDOM mg/dL 120 118                       Lines/Drains:  Invasive Devices     Peripheral Intravenous Line  Duration           Peripheral IV 09/25/23 Left Antecubital <1 day                      Imaging: Reviewed radiology reports from this admission including: chest CT scan    Recent Cultures (last 7 days):         Last 24 Hours Medication List:   Current Facility-Administered Medications   Medication Dose Route Frequency Provider Last Rate   • acetaminophen  975 mg Oral [facility] Corinna Alvine, DO     • albuterol  2 puff Inhalation Q6H PRN Corinna Alvine, DO     • aspirin  81 mg Oral Daily Corinna Alvine, DO     • [START ON 9/28/2023] calcitriol  0.25 mcg Oral Once per day on Mon Thu Corinna Alvine, DO     • enoxaparin  30 mg Subcutaneous Daily Corinna Alvine, DO     • fluticasone  2 spray Nasal Daily Corinna Alvine, DO     • gabapentin  100 mg Oral BID Corinna Alvine, DO     • hydrALAZINE  5 mg Intravenous Q6H PRN Corinna Alvine, DO     • ketotifen  1 drop Both Eyes BID Corinna Alvine, DO     • NIFEdipine  30 mg Oral BID Corinna Alvine, DO     • ondansetron  4 mg Intravenous Q6H PRN Corinna Alvine, DO     • oxyCODONE  10 mg Oral Q6H PRN Corinna Alvine, DO     • oxyCODONE  15 mg Oral Q6H PRN Corinna Alvine, DO     • pantoprazole  40 mg Oral BID Corinna Alvine, DO     • phenol  1 spray Mouth/Throat Q2H PRN Corinna Alvine, DO     • polyethylene glycol  17 g Oral Daily Corinna Alvine, DO     • potassium chloride  40 mEq Oral Once Becca JARRED Sosa     • pravastatin  40 mg Oral Daily With Zulma Ruiz, DO     • predniSONE  10 mg Oral BID PRN Corinna Royal DO          Today, Patient Was Seen By: JARRED Joiner    **Please Note: This note may have been constructed using a voice recognition system. **

## 2023-09-26 NOTE — CONSULTS
Consultation - Palliative and Supportive Care   Gayle Heredia 80 y.o. female [id number]    Patient Active Problem List   Diagnosis   • Iliac artery stenosis, bilateral ([facility])   • GERD (gastroesophageal reflux disease)   • Arthritis   • Hypercholesterolemia   • Hypertension   • Hyponatremia   • EKG abnormalities   • Anemia   • Neck pain   • Dysuria   • Chronic left shoulder pain   • Fall from other slipping, tripping, or stumbling   • Abnormal EKG   • Abnormal mammogram   • Atherosclerosis of native artery of extremity with intermittent claudication ([facility])   • Back pain   • Bilateral carotid artery stenosis   • Carotid stenosis, asymptomatic   • 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   • Chronic myofascial pain   • Chronic pain of lower extremity   • CKD (chronic kidney disease) stage 3, GFR 30-59 ml/min ([facility])   • Diabetic retinopathy ([address])   • DMII (diabetes mellitus, type 2) ([facility])   • Gout   • Hypomagnesemia   • Atherosclerosis of other specified arteries   • Lymphadenopathy   • Nephrolithiasis   • Osteoporosis   • PAD (peripheral artery disease) ([address])   • Primary generalized (osteo)arthritis   • Persistent proteinuria   • Renal cyst   • Vaginal prolapse   • Vitamin D deficiency   • Acquired complex cyst of kidney   • History of left breast cancer   • Stricture of artery ([facility])   • Claudication in peripheral vascular disease ([facility])   • Hemorrhoids   • Tinea unguium   • Diabetic neuropathy ([facility])   • Bradycardia   • Glottic insufficiency   • Reflux laryngitis   • Paresis of left vocal fold   • Muscle tension dysphonia   • Laryngocele   • Anterior glottic web   • Viral upper respiratory tract infection   • 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[facility])   • Renal artery stenosis ([facility])   • Left ear pain   • Cervical spine disease   • Osteopenia of multiple sites   • Type 2 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, bilateral ([facility])   • Lung mass   • CKD (chronic kidney disease)   • Adenocarcinoma, lung, right ([address])   • Encounter for annual routine gynecological examination   • Encounter for screening mammogram for breast cancer   • History of cancer of left breast   • Dense breast tissue on mammogram   • Left leg swelling   • Hypercalcemia   • Primary osteoarthritis of left knee   • Embolism and thrombosis of arteries of the lower extremities (HCC)   • Secondary hyperparathyroidism ([address])   • Postmenopausal bleeding   • Vaginal pessary present   • Pessary maintenance   • Current use of estrogen therapy   • Shortness of breath   • Hypertensive urgency     Active issues specifically addressed today include:   • Palliative care encounter  • Goals of care discussion  • Adenocarcinoma of the right lung  • Cancer related pain  • Dyspnea  • insomnia    Plan:  1. Symptom management -   • Acetaminophen 975 mg p.o. every 8 hours scheduled  • Gabapentin 100 mg twice daily  • Oxy IR 10 to 15 mg p.o. every 6 hours as needed moderate to severe pain --> increased to Q4H PRN  • Bowel regimen: MiraLAX daily    2. Goals -   • Ongoing minimally invasive medical management with limit of DNR/DNI  • Discussed hospice at length today. Hospice liaison to return tomorrow after family talk privately. Though during time of encounter their goals appear to be in line with comfort focused care and pursuit of hospice at home. • While patient was drowsy and did not participate in the entirety of the conversation she was able to express understanding and agreement with home hospice. Family states that her verbalized wishes have been consistent with comfort focused care prior to admission and that she has expressed wishes not to return back to hospital for some time. Code Status: DNR - Level 3   Decisional apparatus:  Patient is marginally competent on my exam today.   If competence is lost, patient's substitute decision maker would default to spouse Teagan Rowley) and 3 daughters from prior union Nae Mace Zoraida and Estelle) by [place] Act 169. Advance Directive / Living Will / POLST:  None on file    3. Social support  • Patient is supported by spouse, Shirley Small, who can provide patient with 24/7 supervision  • Daughter, Louisa Butler, lives local and has experience as a health aid  • Daughters, Kathleen Ren and Wong Sanders, also available for support    4. Follow-up  • Palliative care is available to write for hospice medications should the patient and family confirm desire for hospice upon discharge. • Page with any unmanaged symptoms while admitted. I have reviewed the patient's controlled substance dispensing history in the Prescription Drug Monitoring Program in compliance with the [facility] regulations before prescribing any controlled substances. We appreciate the invitation to be involved in this patient's care. We will continue to follow. Please do not hesitate to reach our on call provider through our clinic answering service at [phone] should you have acute symptom control concerns. Steve Singh PA-C  Palliative and Supportive Care  Clinic/Answering Service: [phone]  You can find me on [employer]! IDENTIFICATION:  Inpatient consult to Palliative Care  Consult performed by: Steve Singh PA-C  Consult ordered by: Alexander Tao DO        Physician Requesting Consult: Alexander Tao DO  Reason for Consult / Principal Problem: goals of care, continuity  Hx and PE limited by: drowsiness. Family declined  but were able to provide interpretation themselves. HISTORY OF PRESENT ILLNESS:       Sanya Tinoco is a 80 y.o. female with adenocarcinoma of the right lung s/p chemo/XRT, DM2, HTN, [facility]25 with increasing dyspnea over 2 weeks. Patient was hypoxic at home and called PCP who advised ED. CT negative for PE, but with enlarging right lung tumor consistent with progression of disease. Oncology consult pending.  Palliative consulted for support, continuity, and ongoing [employer] [facility] discussion. Patient is known to Dr. Keisha Marsh and was last seen on 9/1:  "Yanira Gaxiola is a 80 y.o. female who presents in follow up of symptoms related to recurrent NSCLC, R side, asso with malignant pleural effusion.  Irma Manley is also a h/o Stage Ia ER/WA+ her2- breast cancer on the Left. Pertinent issues include: symptom management, assessment of goals of care, disease process education and discussion of prognosis       Since last visit, pt has seen Dr. Cassandra Galvan, and a yordy conversation was held with her and the family and the patient that Dr. Rayne Potter will no longer offer treatments. Pt's immunotherapy targets are not favorable, and after further w/up and consideration of her case, risks of treatment outweigh benefits. Instead, the pt and family reinforce to me today that she has actually felt better since stopping treatment. Importantly, a medrol dosepak from Dr. Cassandra Galvan has been helpful for energy and appetite. We agreed that -- when the dosepak finishes -- the pt may now continue on steroids, given that she is no longer going to be taking immunotherapy.       Overall, the family hope to continue with good qualify of life and good medical support, without use of hospice, for the near future. We agreed that -- so long as the patient has reasonable function -- this is a good plan. Likely, we will need to reevaluate her treatment approach when the patient loses ambulatory capacity, or experiences significant other symptoms. As needed the patient might continue with thoras, but her most recent studies suggest that her fluid pockets are quite loculated, and may not be so amenable to drainage. Thankfully, the pt's respiratory symptoms are mild at this time.       In June, pt's appetite and wt have stabilized, though not improved, and she is quite tired. Discussion is held re: alternative appetite stimulants, which have either failed her or would be asso with worse somnolence.   Family advised on behavioral strategies for increased caloric intake, and they are willing to trial this, as well as mirtazapine at increased dose.       Previously, trialled metoprolol, and stopped when her BP dropped. She felt dizzy, and continues to have symptoms, even after ceasing this med. She has a poor appetite and low energy, and her family report that she is rather poor with her water intake."    Patient was able to make a trip to [place] with her family to celebrate with her birthday. Unfortunately she was hospitalized for part of it due to diastolic hypotension. They are glad to have made the trip and also to get her home. Family feels her appetite has been slowly declining along with her strength and energy. Discussed CT results and patient's previously expressed wishes to go home and stay home. Introduced hospice and discussed expectations/model of care which they will continue to consider. Mina Umanzor denies pain or discomfort during time of encounter with exception to feeling tired from a poor night's sleep in the ER. Review of Systems   Reason unable to perform ROS: limited by patient's drowsiness. Constitutional: Positive for decreased appetite. Respiratory: Negative for shortness of breath. Musculoskeletal:        Denies pain   Gastrointestinal: Negative for vomiting. Neurological: Positive for excessive daytime sleepiness. Psychiatric/Behavioral: The patient has insomnia.         Past Medical History:   Diagnosis Date   • Arthritis    • Back pain    • Benign essential hypertension     LAST ASSESSED: [id number]   • Carotid artery stenosis    • CKD (chronic kidney disease) stage 2, GFR 60-89 ml/min    • Constipation, chronic     "drinks prune juice"   • Diabetes mellitus ([facility])    • DMII (diabetes mellitus, type 2) ([facility])    • Epiglottitis     RESOLVED: [id number]   • Gastritis    • GERD (gastroesophageal reflux disease)    • Gout    • H/O blood clots     left lower leg,,,11 yrs ago   • History of stenosis of renal artery     RESOLVED: [id number]   • Hypercholesteremia    • Hypercholesterolemia    • Hyperlipidemia    • Hyperparathyroidism ([address])    • Hypertension    • Iliac artery stenosis, bilateral (HCC)    • Kidney stone    • Lobular carcinoma ([address]) 09/06/2017    Left   • Lung cancer ([address])    • Mucinous carcinoma of breast, 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   • Presence of pessary    • Renal artery stenosis (HCC)    • Renal disorder    • Renovascular hypertension     RESOLVED: 63XYH1506   • Segmental and somatic dysfunction of cervical region     RESOLVED: 89GLH7559   • Segmental and somatic dysfunction of lumbar region     RESOLVED: [id number]   • Segmental and somatic dysfunction of pelvic region     RESOLVED: 51SRL3426   • Segmental and somatic dysfunction of thoracic region     RESOLVED: 87NCR3676   • Somatic dysfunction of abdominal region     RESOLVED: 87PVS0657   • Somatic dysfunction of head region     RESOLVED: 82JNE9551   • Somatic dysfunction of lower extremities     RESOLVED: 73GEO0133   • Somatic dysfunction of rib region     RESOLVED: 04QVW9400   • Somatic dysfunction of thoracic region     RESOLVED: 55XSS0182   • Somatic dysfunction of upper extremities     RESOLVED: 89KFU4108   • Teeth missing      Past Surgical History:   Procedure Laterality Date   • ANGIOPLASTY / STENTING ILIAC     • BREAST BIOPSY Left 08/10/2018    U/S BX- mucinous ca   • BREAST BIOPSY Left 08/07/2017    U/S BX   • BREAST LUMPECTOMY Left 09/06/2017    LAST ASSESSED: [id number]   • BREAST LUMPECTOMY Left 09/11/2018   • BREAST SURGERY Left     biopsy   • CATARACT EXTRACTION Bilateral    • CHOLECYSTECTOMY     • COLONOSCOPY     • EYE SURGERY     • ILIAC VEIN ANGIOPLASTY / STENTING Right     INITIAL STENOSIS: ONSET: [id number]   • IR BIOPSY LUNG  11/15/2021   • IR THORACENTESIS  2/20/2023   • IR THORACENTESIS  3/28/2023   • IR THORACENTESIS  4/11/2023   • IR THORACENTESIS  4/19/2023   • IR THORACENTESIS  4/26/2023   • IR THORACENTESIS  5/3/2023   • IR THORACENTESIS  5/10/2023   • IR THORACENTESIS  5/17/2023   • IR THORACENTESIS  5/24/2023   • IR THORACENTESIS  5/31/2023   • IR THORACENTESIS  6/7/2023   • IR THORACENTESIS  7/18/2023   • IR THORACENTESIS  8/2/2023   • IR THORACENTESIS  8/16/2023   • KNEE ARTHROSCOPY Right    • KNEE SURGERY Right     ONSET: [id number]   • MAMMO NEEDLE LOCALIZATION LEFT (ALL INC) Left 9/11/2018   • MAMMO NEEDLE LOCALIZATION LEFT (ALL INC) Left 9/6/2017   • NV ESOPHAGOGASTRODUODENOSCOPY TRANSORAL DIAGNOSTIC N/A 7/26/2018    Procedure: ESOPHAGOGASTRODUODENOSCOPY (EGD); Surgeon: Tonie Reilly DO;  Location: [facility]; Service: Gastroenterology   • NV [facility] EXC SILVA&/STRPG CORDS/EPIGL MCRSCP/TLSCP N/A 6/10/2016    Procedure: MICRO DIRECT LARYNGOSCOPY WITH VOCAL FOLD MASS EXCISION ;  Surgeon: Nas Diaz MD;  [facility];  Service: ENT   • NV MASTECTOMY PARTIAL Left 9/6/2017    Procedure: LUMPECTOMY BREAST NEEDLE LOCALIZED WITH FAXITRON NEEDLE @ 0830;  Surgeon: Bard Walt MD;  [facility];  Service: General   • NV PERQ DEVICE PLACEMENT BREAST LOC 1ST LES W/GDNCE Left 9/11/2018    Procedure: BREAST LUMPECTOMY; BREAST NEEDLE LOCALIZATION (NEEDLE LOC AT 1330); Surgeon: Jemma Guzman MD;  [facility];  Service: Surgical Oncology   • THROAT SURGERY      lump removed   • TUBAL LIGATION     • UPPER GASTROINTESTINAL ENDOSCOPY     • US GUIDANCE BREAST BIOPSY LEFT EACH ADDITIONAL Left 8/7/2017   • US GUIDED BREAST BIOPSY LEFT COMPLETE Left 8/10/2018   • US GUIDED BREAST BIOPSY LEFT COMPLETE Left 8/7/2017   • VASCULAR SURGERY       Social History     Socioeconomic History   • Marital status:       Spouse name: Not on file   • Number of children: Not on file   • Years of education: Not on file   • Highest education level: Not on file   Occupational History   • Occupation: retired   Tobacco Use   • Smoking status: Former     Packs/day: 1.00     Years: 55.00     Total pack years: 55.00     Types: Cigarettes     Start date: 80     Quit date: 2004     Years since quittin.7   • Smokeless tobacco: Never   Vaping Use   • Vaping Use: Never used   Substance and Sexual Activity   • Alcohol use: Not Currently   • Drug use: No   • Sexual activity: Not Currently     Partners: Male   Other Topics Concern   • Not on file   Social History Narrative    Daily caffeine consumption, 1 serving a day      Social Determinants of Health     Financial Resource Strain: Low Risk  (10/23/2022)    Overall Financial Resource Strain (CARDIA)    • Difficulty of Paying Living Expenses: Not hard at all   Food Insecurity: No Food Insecurity (2021)    Hunger Vital Sign    • Worried About Running Out of Food in the Last Year: Never true    • Ran Out of Food in the Last Year: Never true   Transportation Needs: No Transportation Needs (10/23/2022)    PRAPARE - Transportation    • Lack of Transportation (Medical): No    • Lack of Transportation (Non-Medical): No   Physical Activity: Inactive (2019)    Exercise Vital Sign    • Days of Exercise per Week: 0 days    • Minutes of Exercise per Session: 0 min   Stress: No Stress Concern Present (2019)    [address] [place] [address]    • Feeling of Stress :  Only a little   Social Connections: Unknown (2019)    Social Connection and Isolation Panel [NHANES]    • Frequency of Communication with Friends and Family: More than three times a week    • Frequency of Social Gatherings with Friends and Family: More than three times a week    • Attends Islam Services: Not on file    • Active Member of Clubs or Organizations: Not on file    • Attends Club or Organization Meetings: Not on file    • Marital Status: Not on file   Intimate Partner Violence: Not on file   Housing Stability: Not on file     Family History   Problem Relation Age of Onset   • Heart disease Brother    • Heart disease Maternal Grandmother • Diabetes Other         of other type with arthropathy, without long term current use of insulin   • Hypertension Other    • No Known Problems Mother    • Gout Family    • Rheum arthritis Family    • Lupus Family         systematic erythematosus   • Heart disease Family    • Stroke Family         syndrome   • Stroke Maternal Uncle         syndrome   • Stroke Maternal Aunt         syndrome   • No Known Problems Father    • No Known Problems Daughter    • No Known Problems Maternal Grandfather    • No Known Problems Paternal Grandmother    • No Known Problems Paternal Grandfather    • No Known Problems Daughter    • No Known Problems Daughter    • No Known Problems Maternal Aunt    • No Known Problems Maternal Aunt    • No Known Problems Maternal Aunt        MEDICATIONS / ALLERGIES:    all current active meds have been reviewed    Allergies   Allergen Reactions   • Clonidine Derivatives Swelling   • Diflucan [Fluconazole] Rash   • Flagyl [Metronidazole] Anaphylaxis   • Carvedilol Hives     And legs swelled   • Latex Rash   • Lipitor [Atorvastatin]      Legs swelled   • Metoprolol Dizziness     Low BP and orthostatic symptoms. • Other Palpitations     IV DYE   • Shingrix [Zoster Vac Recomb Adjuvanted] Hives       OBJECTIVE:    Physical Exam  Physical Exam  Constitutional:       General: She is not in acute distress. Appearance: She is ill-appearing. HENT:      Head: Atraumatic. Eyes:      Conjunctiva/sclera: Conjunctivae normal.   Cardiovascular:      Rate and Rhythm: Normal rate. Pulmonary:      Effort: No respiratory distress. Comments: Weaned off of O2. On room air  Abdominal:      General: There is no distension. Tenderness: There is no guarding. Musculoskeletal:         General: No swelling. Skin:     General: Skin is warm and dry.    Neurological:      Comments: Awakens to voice, answers simple yes/no questions but difficult to sustain wakefulness   Psychiatric:      Comments: Calm, no distress         Lab Results:  Results from last 7 days   Lab Units 09/26/23  0611 09/25/23  1731   WBC Thousand/uL 6.11 5.26   HEMOGLOBIN g/dL 10.8* 9.5*   HEMATOCRIT % 33.8* 29.3*   PLATELETS Thousands/uL 419* 412*   NEUTROS PCT %  --  70   MONOS PCT %  --  11   EOS PCT %  --  0     Results from last 7 days   Lab Units 09/26/23  0611 09/25/23  1731   POTASSIUM mmol/L 3.4* 4.1   CHLORIDE mmol/L 90* 86*   CO2 mmol/L 34* 35*   BUN mg/dL 19 23   CREATININE mg/dL 1.04 1.09   CALCIUM mg/dL 9.1 8.6   ALK PHOS U/L  --  62   ALT U/L  --  11   AST U/L  --  24         Imaging Studies: Reviewed pertinent studies  EKG, Pathology, and Other Studies: reviewed pertinent studies    Counseling / Coordination of Care    Total floor / unit time spent today 75+ minutes. Greater than 50% of total time was spent with the patient and / or family counseling and / or coordination of care. A description of the counseling / coordination of care: time spent from approximately 1:15-2:15 in patient's room discussing [facility], symptom management, providing support.       This note was not shared with the patient due to privacy exception: note includes other individuals

## 2023-09-26 NOTE — PROGRESS NOTES
-- Patient:  -- MRN: [id number]  -- Aidin Request ID: [id number]  -- Level of care reserved: Hospice  -- Partner Reserved: [facility], [place]OLV[place]ERTO, [address] [phone]  -- Clinical needs requested:  -- Geography searched: [phone]  -- Start of Service:  -- Request sent: 9:11am EDT on 9/26/2023 by Layman Leventhal  -- Partner reserved: 10:31am EDT on 9/26/2023 by Elodia Rhoades  -- Choice list shared: 10:31am EDT on 9/26/2023 by Elodia Rhoades

## 2023-09-26 NOTE — UTILIZATION REVIEW
Initial Clinical Review    Admission: Date/Time/Statement:   Admission Orders (From admission, onward)       Ordered        09/25/23 2128  INPATIENT ADMISSION  Once                          Orders Placed This Encounter   Procedures    INPATIENT ADMISSION     Standing Status:   Standing     Number of Occurrences:   1     Order Specific Question:   Level of Care     Answer:   Med Surg [16]     Order Specific Question:   Estimated length of stay     Answer:   More than 2 Midnights     Order Specific Question:   Certification     Answer:   I certify that inpatient services are medically necessary for this patient for a duration of greater than two midnights. See H&P and MD Progress Notes for additional information about the patient's course of treatment. ED Arrival Information       Expected   9/25/2023     Arrival   9/25/2023 15:52    Acuity   Urgent              Means of arrival   Ambulance    Escorted by   [facility]    Service   Hospitalist    Admission type   Emergency              Arrival complaint   SOB             Chief Complaint   Patient presents with    Shortness of Breath     Patient arrives VIA EMS after feeling SOB at home, at 89% on home monitor . Patient with lung cancer        Initial Presentation: 80 y.o. female w/pmhx R lung ca s/p radiation, htn to ED from home by EMS Admitted as inpatient due to shortness of breath, hyponatremia, hypertensive urgency, and suspected lung cancer progression. Presented with 2 weeks increasing sob on exertion, worse over prior 2-3 days. Desat to 88% at home pta. On arrival, hypoxic, requiring 2li o2 to maintain o2 sat, PE study negative but showed progressive malignancy. Exam reveals bibasilar rales, decreased R sided breath sounds. Frail, cachectic, diffusely diminished muscle mass. Continue O2 to keep sat 88% or >, start fluid restriction, recheck labs in am, analgesics, prn IV antihypertensives, consult oncology and palliative care.     Date: 9/26   Day 2: remains on o2 for comfort, decreased breath sounds, pale, na improved, hyponatremia likely due to lung ca. Decision to consult hospice due to lack of treatment options. Family wishes to keep patient comfortable. Daughter added that patient had declining functional status over last several weeks with poor appetite. Having difficulty sleeping and using oxycodone at home for pain. Hypokalemic, with repletion given. Date: 9/27    Day 3: Has surpassed a 2nd midnight with active treatments and services, which include 3 doses oxycodone since last evening for pain control, coordination of hospice services, and obtaining needed medical equipment. Requires 2li oxygen for comfort.        ED Triage Vitals   Temperature Pulse Respirations Blood Pressure SpO2   09/25/23 1553 09/25/23 1553 09/25/23 1553 09/25/23 1553 09/25/23 1553   99.1 °F (37.3 °C) 75 22 (!) 179/76 99 %      Temp Source Heart Rate Source Patient Position - Orthostatic VS BP Location FiO2 (%)   09/25/23 1553 09/26/23 0530 09/26/23 0615 09/26/23 0615 --   Oral Monitor Lying Right arm       Pain Score       09/26/23 0949       6          Wt Readings from Last 1 Encounters:   09/01/23 48.9 kg (107 lb 12.9 oz)     Additional Vital Signs:   Date/Time Temp Pulse Resp BP MAP (mmHg) SpO2 Calculated FIO2 (%) - Nasal Cannula Nasal Cannula O2 Flow Rate (L/min) O2 Device Patient Position - Orthostatic VS   09/26/23 0949 98.6 °F (37 °C) 65 18 189/56 Abnormal  100 -- -- -- -- Lying   09/26/23 09:40:54 -- 76 -- 189/56 Abnormal  100 96 % -- -- -- --   09/26/23 0830 -- 86 18 192/79 Abnormal  114 96 % 28 2 L/min Nasal cannula --   09/26/23 0615 -- 91 -- 195/82 Abnormal  118 100 % -- -- None (Room air) Lying   09/26/23 0530 -- 88 -- 205/85 Abnormal  122 100 % -- -- -- --   09/26/23 0442 -- -- -- 229/92 Abnormal  -- -- -- -- -- --     Pertinent Labs/Diagnostic Test Results:   CTA ED chest PE Study   Final Result by Palmira Winchester MD (09/25 1903)      No acute pulmonary embolism      Interval increase in the size of combination of right middle lobe mass lesion and loculated pleural effusion with increased nodular thickening of the right pleura, increased size of multiple subpleural right lung nodules and dominant right lower lobe    spiculated pulmonary nodule. Multiple enlarged mediastinal lymph nodes are seen, grossly stable in size since 7/30/2023, however increased in size from 2/12/2023. Overall findings are compatible with progression of malignant disease      Interval increase in the size of right thyroid lobe nodule compared to 7/30/2023, currently measuring 1.8 cm. Additional 2.1 cm left thyroid nodule. Nonemergent ultrasound is recommended for further assessment. A 1.3 cm high attenuation lesion in the left kidney, stable in size since PET/CT 1/20/2023 and can represent either a complicated cyst or solid renal neoplasm. Initial further assessment with renal ultrasound is recommended. The study was marked in [facility] for immediate notification.                   Workstation performed: [id number]               Results from last 7 days   Lab Units 09/26/23  0611 09/25/23  1731   WBC Thousand/uL 6.11 5.26   HEMOGLOBIN g/dL 10.8* 9.5*   HEMATOCRIT % 33.8* 29.3*   PLATELETS Thousands/uL 419* 412*   NEUTROS ABS Thousands/µL  --  3.60         Results from last 7 days   Lab Units 09/26/23  0611 09/25/23  1731   SODIUM mmol/L 133* 128*   POTASSIUM mmol/L 3.4* 4.1   CHLORIDE mmol/L 90* 86*   CO2 mmol/L 34* 35*   ANION GAP mmol/L 9 7   BUN mg/dL 19 23   CREATININE mg/dL 1.04 1.09   EGFR ml/min/1.73sq m 47 45   CALCIUM mg/dL 9.1 8.6     Results from last 7 days   Lab Units 09/25/23  1731   AST U/L 24   ALT U/L 11   ALK PHOS U/L 62   TOTAL PROTEIN g/dL 7.3   ALBUMIN g/dL 2.9*   TOTAL BILIRUBIN mg/dL 0.26         Results from last 7 days   Lab Units 09/26/23  0611 09/25/23  1731   GLUCOSE RANDOM mg/dL 120 118     ED Treatment:   Medication Administration from 09/25/2023 1508 to 09/26/2023 0936         Date/Time Order Dose Route Action Comments     09/25/2023 1731 EDT sodium chloride 0.9 % bolus 500 mL 500 mL Intravenous New Bag --     09/25/2023 2035 EDT NIFEdipine (PROCARDIA XL) 24 hr tablet 30 mg 30 mg Oral Given BP: 205/86     09/25/2023 1837 EDT pantoprazole (PROTONIX) EC tablet 40 mg 40 mg Oral Given --     09/25/2023 1837 EDT gabapentin (NEURONTIN) capsule 100 mg 100 mg Oral Given --     09/25/2023 1837 EDT oxyCODONE (ROXICODONE) IR tablet 15 mg 15 mg Oral Given --     09/25/2023 1818 EDT iohexol (OMNIPAQUE) 350 MG/ML injection (SINGLE-DOSE) 85 mL 85 mL Intravenous Given --     09/26/2023 0824 EDT aspirin (ECOTRIN LOW STRENGTH) EC tablet 81 mg 81 mg Oral Given --     09/26/2023 0855 EDT fluticasone (FLONASE) 50 mcg/act nasal spray 2 spray 2 spray Nasal Given --     09/26/2023 0824 EDT gabapentin (NEURONTIN) capsule 100 mg 100 mg Oral Given --     09/26/2023 0824 EDT NIFEdipine (PROCARDIA XL) 24 hr tablet 30 mg 30 mg Oral Given bp 192/79     09/26/2023 0825 EDT ketotifen (ZADITOR) 0.025 % ophthalmic solution 1 drop 1 drop Both Eyes Given --     09/26/2023 0825 EDT enoxaparin (LOVENOX) subcutaneous injection 30 mg 30 mg Subcutaneous Given --     09/26/2023 0442 EDT hydrALAZINE (APRESOLINE) injection 5 mg 5 mg Intravenous Given --     09/26/2023 0856 EDT potassium chloride (K-DUR,KLOR-CON) CR tablet 40 mEq 40 mEq Oral Given --          Past Medical History:   Diagnosis Date    Arthritis     Back pain     Benign essential hypertension     LAST ASSESSED: [id number]    Carotid artery stenosis     CKD (chronic kidney disease) stage 2, GFR 60-89 ml/min     Constipation, chronic     "drinks prune juice"    Diabetes mellitus (HCC)     DMII (diabetes mellitus, type 2) ([facility])     Epiglottitis     RESOLVED: [id number]    Gastritis     GERD (gastroesophageal reflux disease)     Gout     H/O blood clots     left lower leg,,,11 yrs ago    History of stenosis of renal artery     RESOLVED: [id number] Hypercholesteremia     Hypercholesterolemia     Hyperlipidemia     Hyperparathyroidism ([address])     Hypertension     Iliac artery stenosis, bilateral (HCC)     Kidney stone     Lobular carcinoma ([address]) 09/06/2017    Left    Lung cancer ([address])     Mucinous carcinoma of breast, 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    Presence of pessary     Renal artery stenosis (HCC)     Renal disorder     Renovascular hypertension     RESOLVED: [id number]    Segmental and somatic dysfunction of cervical region     RESOLVED: 03ACD4576    Segmental and somatic dysfunction of lumbar region     RESOLVED: 64HCE1452    Segmental and somatic dysfunction of pelvic region     RESOLVED: [id number]    Segmental and somatic dysfunction of thoracic region     RESOLVED: 41IUW0525    Somatic dysfunction of abdominal region     RESOLVED: 64ATW8438    Somatic dysfunction of head region     RESOLVED: 34RPY3839    Somatic dysfunction of lower extremities     RESOLVED: 65KFU2497    Somatic dysfunction of rib region     RESOLVED: 18BNE3380    Somatic dysfunction of thoracic region     RESOLVED: 10VIB6816    Somatic dysfunction of upper extremities     RESOLVED: 79RGO5073    Teeth missing      Present on Admission:   Adenocarcinoma, lung, right ([address])   Shortness of breath   Hypertensive urgency   Hyponatremia   Type 2 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, bilateral (HCC)      Admitting Diagnosis: SOB (shortness of breath) [R06.02]  Hypoxemia requiring supplemental oxygen [R09.02, Z99.81]  Adenocarcinoma, lung, right (HCC) [C34.91]  Age/Sex: 80 y.o. female  Admission Orders:  Scheduled Medications: See ED meds  acetaminophen, 975 mg, Oral, Q8H [facility]  aspirin, 81 mg, Oral, Daily  [START ON 9/28/2023] calcitriol, 0.25 mcg, Oral, Once per day on Mon Thu  enoxaparin, 30 mg, Subcutaneous, Daily  fluticasone, 2 spray, Nasal, Daily  gabapentin, 100 mg, Oral, BID  ketotifen, 1 drop, Both Eyes, BID  NIFEdipine, 30 mg, Oral, BID  pantoprazole, 40 mg, Oral, BID  polyethylene glycol, 17 g, Oral, Daily  pravastatin, 40 mg, Oral, Daily With Dinner      Continuous IV Infusions: none     PRN Meds: See ED meds  albuterol, 2 puff, Inhalation, Q6H PRN  hydrALAZINE, 5 mg, Intravenous, Q6H PRN  ondansetron, 4 mg, Intravenous, Q6H PRN  oxyCODONE, 10 mg, Oral, Q6H PRN  oxyCODONE, 15 mg, Oral, Q6H PRN x 1 [email or web address]  phenol, 1 spray, Mouth/Throat, Q2H PRN  predniSONE, 10 mg, Oral, BID PRN    OOB as shila  SCD  1500ml fluid restriction, house diet    IP CONSULT TO PALLIATIVE CARE  IP CONSULT TO HOSPICE    Network Utilization Review Department  ATTENTION: Please call with any questions or concerns to [phone] and carefully listen to the prompts so that you are directed to the right person. All voicemails are confidential.  Jace Mancini all requests for admission clinical reviews, approved or denied determinations and any other requests to dedicated fax number below belonging to the campus where the patient is receiving treatment.  List of dedicated fax numbers for the Facilities:  [place] DENIALS (Administrative/Medical Necessity) [phone]   [address] (Maternity/NICU/Pediatrics) [phone]   [address] [place] [address] [phone]   [facility] [address] [facility] [phone]   [address] [facility] [address] [facility] [id number] [place] [address] [phone]   [address] [address] [place] [address] [phone]

## 2023-09-27 VITALS
HEART RATE: 85 BPM | OXYGEN SATURATION: 98 % | RESPIRATION RATE: 18 BRPM | TEMPERATURE: 99 F | SYSTOLIC BLOOD PRESSURE: 179 MMHG | DIASTOLIC BLOOD PRESSURE: 59 MMHG

## 2023-09-27 PROCEDURE — 99232 SBSQ HOSP IP/OBS MODERATE 35: CPT | Performed by: STUDENT IN AN ORGANIZED HEALTH CARE EDUCATION/TRAINING PROGRAM

## 2023-09-27 PROCEDURE — 99285 EMERGENCY DEPT VISIT HI MDM: CPT | Performed by: EMERGENCY MEDICINE

## 2023-09-27 PROCEDURE — 99239 HOSP IP/OBS DSCHRG MGMT >30: CPT | Performed by: NURSE PRACTITIONER

## 2023-09-27 RX ORDER — OXYCODONE HYDROCHLORIDE 10 MG/1
TABLET ORAL
Qty: 60 TABLET | Refills: 0 | Status: SHIPPED | OUTPATIENT
Start: 2023-09-27 | End: 2023-09-27 | Stop reason: SDUPTHER

## 2023-09-27 RX ORDER — LORAZEPAM 2 MG/ML
1 CONCENTRATE ORAL EVERY 2 HOUR PRN
Qty: 30 ML | Refills: 0 | Status: SHIPPED | OUTPATIENT
Start: 2023-09-27 | End: 2023-09-27 | Stop reason: SDUPTHER

## 2023-09-27 RX ORDER — HALOPERIDOL 2 MG/ML
1 SOLUTION ORAL EVERY 6 HOURS PRN
Qty: 15 ML | Refills: 0 | Status: SHIPPED | OUTPATIENT
Start: 2023-09-27 | End: 2023-09-27 | Stop reason: SDUPTHER

## 2023-09-27 RX ORDER — BISACODYL 10 MG
10 SUPPOSITORY, RECTAL RECTAL DAILY PRN
Qty: 12 SUPPOSITORY | Refills: 0 | Status: SHIPPED | OUTPATIENT
Start: 2023-09-27

## 2023-09-27 RX ORDER — LORAZEPAM 2 MG/ML
1 CONCENTRATE ORAL EVERY 2 HOUR PRN
Qty: 30 ML | Refills: 0 | Status: SHIPPED | OUTPATIENT
Start: 2023-09-27 | End: 2023-09-29

## 2023-09-27 RX ORDER — PROCHLORPERAZINE MALEATE 10 MG
10 TABLET ORAL EVERY 6 HOURS PRN
Qty: 30 TABLET | Refills: 0 | Status: SHIPPED | OUTPATIENT
Start: 2023-09-27 | End: 2023-09-27 | Stop reason: SDUPTHER

## 2023-09-27 RX ORDER — MORPHINE SULFATE 100 MG/5ML
5 SOLUTION, CONCENTRATE ORAL EVERY 2 HOUR PRN
Qty: 30 ML | Refills: 0 | Status: SHIPPED | OUTPATIENT
Start: 2023-09-27 | End: 2023-09-27 | Stop reason: SDUPTHER

## 2023-09-27 RX ORDER — BISACODYL 10 MG
10 SUPPOSITORY, RECTAL RECTAL DAILY PRN
Qty: 12 SUPPOSITORY | Refills: 0 | Status: SHIPPED | OUTPATIENT
Start: 2023-09-27 | End: 2023-09-27 | Stop reason: SDUPTHER

## 2023-09-27 RX ORDER — PROCHLORPERAZINE MALEATE 10 MG
10 TABLET ORAL EVERY 6 HOURS PRN
Qty: 30 TABLET | Refills: 0 | Status: SHIPPED | OUTPATIENT
Start: 2023-09-27

## 2023-09-27 RX ORDER — OXYCODONE HYDROCHLORIDE 10 MG/1
TABLET ORAL
Qty: 60 TABLET | Refills: 0 | Status: SHIPPED | OUTPATIENT
Start: 2023-09-27 | End: 2023-09-29

## 2023-09-27 RX ORDER — HALOPERIDOL 2 MG/ML
1 SOLUTION ORAL EVERY 6 HOURS PRN
Qty: 15 ML | Refills: 0 | Status: SHIPPED | OUTPATIENT
Start: 2023-09-27

## 2023-09-27 RX ORDER — MORPHINE SULFATE 100 MG/5ML
5 SOLUTION, CONCENTRATE ORAL EVERY 2 HOUR PRN
Qty: 30 ML | Refills: 0 | Status: SHIPPED | OUTPATIENT
Start: 2023-09-27

## 2023-09-27 RX ADMIN — ASPIRIN 81 MG: 81 TABLET, COATED ORAL at 08:50

## 2023-09-27 RX ADMIN — ENOXAPARIN SODIUM 30 MG: 30 INJECTION SUBCUTANEOUS at 08:50

## 2023-09-27 RX ADMIN — POLYETHYLENE GLYCOL 3350 17 G: 17 POWDER, FOR SOLUTION ORAL at 08:50

## 2023-09-27 RX ADMIN — ACETAMINOPHEN 975 MG: 325 TABLET, FILM COATED ORAL at 13:56

## 2023-09-27 RX ADMIN — OXYCODONE HYDROCHLORIDE 15 MG: 10 TABLET ORAL at 15:20

## 2023-09-27 RX ADMIN — GABAPENTIN 100 MG: 100 CAPSULE ORAL at 08:50

## 2023-09-27 RX ADMIN — KETOTIFEN FUMARATE 1 DROP: 0.25 SOLUTION/ DROPS OPHTHALMIC at 08:52

## 2023-09-27 RX ADMIN — OXYCODONE HYDROCHLORIDE 15 MG: 10 TABLET ORAL at 07:21

## 2023-09-27 RX ADMIN — NIFEDIPINE 30 MG: 30 TABLET, FILM COATED, EXTENDED RELEASE ORAL at 08:52

## 2023-09-27 RX ADMIN — PRAVASTATIN SODIUM 40 MG: 40 TABLET ORAL at 16:42

## 2023-09-27 RX ADMIN — PANTOPRAZOLE SODIUM 40 MG: 40 TABLET, DELAYED RELEASE ORAL at 08:50

## 2023-09-27 NOTE — HOSPICE NOTE
Liaison spoke with pt and ptr dtr, Son Hayden, via TC and they expressed they are in agreement with proceeding with hospice services at home. Oxygen ordered for delivery today by 1630. Liaison spoke with DME company and they confirmed oxygen will be delivered by that time. Liaison notified Son Hayden and pt s/o, Teagan Rowley, of DME delivery. Pt wishes to return home today instead of tomorrow. Liaison requested JULIETA Loya set up transport home for after 1630. Pt will need OOH DNR signed for transport home. Pt family wishes to sign consents once pt is home. SOC arranged with hospice for tomorrow. JULIETA Loya updated via TT and will reach out to palliative team for comfort medications.

## 2023-09-27 NOTE — PROGRESS NOTES
Progress note - Palliative and Supportive Care   Joanna Andino 80 y.o. female [id number]    Patient Active Problem List   Diagnosis   • Iliac artery stenosis, bilateral ([facility])   • GERD (gastroesophageal reflux disease)   • Arthritis   • Hypercholesterolemia   • Hypertension   • Hyponatremia   • EKG abnormalities   • Anemia   • Neck pain   • Dysuria   • Chronic left shoulder pain   • Fall from other slipping, tripping, or stumbling   • Abnormal EKG   • Abnormal mammogram   • Atherosclerosis of native artery of extremity with intermittent claudication ([facility])   • Back pain   • Bilateral carotid artery stenosis   • Carotid stenosis, asymptomatic   • 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   • Chronic myofascial pain   • Chronic pain of lower extremity   • CKD (chronic kidney disease) stage 3, GFR 30-59 ml/min ([facility])   • Diabetic retinopathy ([address])   • DMII (diabetes mellitus, type 2) ([facility])   • Gout   • Hypomagnesemia   • Atherosclerosis of other specified arteries   • Lymphadenopathy   • Nephrolithiasis   • Osteoporosis   • PAD (peripheral artery disease) ([address])   • Primary generalized (osteo)arthritis   • Persistent proteinuria   • Renal cyst   • Vaginal prolapse   • Vitamin D deficiency   • Acquired complex cyst of kidney   • History of left breast cancer   • Stricture of artery ([facility])   • Claudication in peripheral vascular disease ([facility])   • Hemorrhoids   • Tinea unguium   • Diabetic neuropathy ([facility])   • Bradycardia   • Glottic insufficiency   • Reflux laryngitis   • Paresis of left vocal fold   • Muscle tension dysphonia   • Laryngocele   • Anterior glottic web   • Viral upper respiratory tract infection   • 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[facility])   • Renal artery stenosis ([facility])   • Left ear pain   • Cervical spine disease   • Osteopenia of multiple sites   • Type 2 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, bilateral ([facility])   • Lung mass   • CKD (chronic kidney disease)   • Adenocarcinoma, lung, right ([address])   • Encounter for annual routine gynecological examination   • Encounter for screening mammogram for breast cancer   • History of cancer of left breast   • Dense breast tissue on mammogram   • Left leg swelling   • Hypercalcemia   • Primary osteoarthritis of left knee   • Embolism and thrombosis of arteries of the lower extremities (HCC)   • Secondary hyperparathyroidism ([address])   • Postmenopausal bleeding   • Vaginal pessary present   • Pessary maintenance   • Current use of estrogen therapy   • Shortness of breath   • Hypertensive urgency   • Goals of care, counseling/discussion     Active issues specifically addressed today include:   • Palliative care encounter  • Goals of care discussion  • Adenocarcinoma of the right lung  • Cancer related pain  • Dyspnea  • Insomnia    Plan:  1. Symptom management -   • Acetaminophen 975 mg p.o. every 8 hours scheduled  • Gabapentin 100 mg twice daily  • Oxy IR 10 to 15 mg p.o. every 4 hours as needed moderate to severe pain  • Total MMEs over 24h: 37.5  • Bowel regimen: MiraLAX daily     2. Goals -   • Ongoing minimally invasive medical management with limit of DNR/DNI  • Out of hospital DNR form signed with patient and daughter Richard Null   • Reviewed plan for home hospice with family. Patient and family were seen by hospice liaison on 9/26 with plan for discharge to home hospice today. Code Status: DNR - Level 3              Decisional apparatus:  Patient is marginally competent on my exam today. If competence is lost, patient's substitute decision maker would default to spouse Dick Lugo) and 3 daughters from prior union (Celia Burden, and Nile Osborn) by [place] Act 169. Advance Directive / Living Will / POLST:  None on file     3.  Social support  • Patient is supported by spouse, Dick Lugo, who can provide patient with 24/7 supervision  • Daughter, Richard Null, lives local and has experience as a health aid  • Daughters, Eufemia Angeles and Marichuy Dozier, also available for support     4. Follow-up  • Palliative care is available to write for hospice medications upon discharge. • Page with any unmanaged symptoms while admitted. Interval history:       Patient and family seen by hospice liaison on 9/26 with plan for discharge to home hospice today. The patient was resting on my arrival. Patient's family reports that patient's pain is better controlled with the adjustments to q4h PRN dosing and that she is comfortable. Review of Systems   Unable to perform ROS: Other   Patient sleeping. ROS performed as above with daughter Taurus Limon.         MEDICATIONS / ALLERGIES:     current meds:   Current Facility-Administered Medications   Medication Dose Route Frequency   • acetaminophen (TYLENOL) tablet 975 mg  975 mg Oral Q8H [address]   • albuterol (PROVENTIL HFA,VENTOLIN HFA) inhaler 2 puff  2 puff Inhalation Q6H PRN   • aspirin (ECOTRIN LOW STRENGTH) EC tablet 81 mg  81 mg Oral Daily   • [START ON 9/28/2023] calcitriol (ROCALTROL) capsule 0.25 mcg  0.25 mcg Oral Once per day on Mon Thu   • enoxaparin (LOVENOX) subcutaneous injection 30 mg  30 mg Subcutaneous Daily   • fluticasone (FLONASE) 50 mcg/act nasal spray 2 spray  2 spray Nasal Daily   • gabapentin (NEURONTIN) capsule 100 mg  100 mg Oral BID   • hydrALAZINE (APRESOLINE) injection 5 mg  5 mg Intravenous Q6H PRN   • ketotifen (ZADITOR) 0.025 % ophthalmic solution 1 drop  1 drop Both Eyes BID   • NIFEdipine (PROCARDIA XL) 24 hr tablet 30 mg  30 mg Oral BID   • ondansetron (ZOFRAN) injection 4 mg  4 mg Intravenous Q6H PRN   • oxyCODONE (ROXICODONE) immediate release tablet 10 mg  10 mg Oral Q4H PRN   • oxyCODONE (ROXICODONE) IR tablet 15 mg  15 mg Oral Q4H PRN   • pantoprazole (PROTONIX) EC tablet 40 mg  40 mg Oral BID   • phenol (CHLORASEPTIC) 1.4 % mucosal liquid 1 spray  1 spray Mouth/Throat Q2H PRN   • polyethylene glycol (MIRALAX) packet 17 g  17 g Oral Daily   • pravastatin (PRAVACHOL) tablet 40 mg  40 mg Oral Daily With Dinner   • predniSONE tablet 10 mg  10 mg Oral BID PRN       Allergies   Allergen Reactions   • Clonidine Derivatives Swelling   • Diflucan [Fluconazole] Rash   • Flagyl [Metronidazole] Anaphylaxis   • Carvedilol Hives     And legs swelled   • Latex Rash   • Lipitor [Atorvastatin]      Legs swelled   • Metoprolol Dizziness     Low BP and orthostatic symptoms. • Other Palpitations     IV DYE   • Shingrix [Zoster Vac Recomb Adjuvanted] Hives       OBJECTIVE:    Physical Exam  Physical Exam  Vitals reviewed. Constitutional:       General: She is not in acute distress. Appearance: She is ill-appearing. HENT:      Head: Normocephalic and atraumatic. Cardiovascular:      Rate and Rhythm: Normal rate. Pulses: Normal pulses. Pulmonary:      Effort: Pulmonary effort is normal.   Abdominal:      General: Abdomen is flat. Palpations: Abdomen is soft. Skin:     General: Skin is warm and dry. Neurological:      Mental Status: Mental status is at baseline. Lab Results: I have personally reviewed pertinent labs. Imaging Studies: No new imaging studies  EKG, Pathology, and Other Studies: N/a     Counseling / Coordination of Care    Total floor / unit time spent today 20 minutes. Greater than 50% of total time was spent with the patient and / or family counseling and / or coordination of care. A description of the counseling / coordination of care: symptom management.

## 2023-09-27 NOTE — NURSING NOTE
Patient  AvS reviewed with patient and family and  Medication perceptions send to home [place] pharmacy .

## 2023-09-27 NOTE — CASE MANAGEMENT
Case Management Discharge Planning Note    Patient name Willie Morelos  Location [address] [facility] 764/-82 MRN [id number]  : 1934 Date 2023       Current Admission Date: 2023  Current Admission Diagnosis:Shortness of breath   Patient Active Problem List    Diagnosis Date Noted   • Goals of care, counseling/discussion 2023   • Pessary maintenance 2023   • Current use of estrogen therapy 2023   • Shortness of breath 2023   • Hypertensive urgency 2023   • Vaginal pessary present 2023   • Postmenopausal bleeding 2023   • Embolism and thrombosis of arteries of the lower extremities ([address]) 2023   • Secondary hyperparathyroidism ([address]) 2023   • Primary osteoarthritis of left knee 2023   • Hypercalcemia 2022   • Left leg swelling 10/25/2022   • Encounter for annual routine gynecological examination 2022   • Encounter for screening mammogram for breast cancer 2022   • History of cancer of left breast 2022   • Dense breast tissue on mammogram 2022   • CKD (chronic kidney disease) 2021   • Adenocarcinoma, lung, right ([address]) 2021   • Lung mass 10/14/2021   • Type 2 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, bilateral ([address]) 2021   • Osteopenia of multiple sites 2020   • Cervical spine disease 2020   • Left ear pain 2019   • 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   • Renal artery stenosis ([address]) 2019   • Viral upper respiratory tract infection 10/18/2019   • Glottic insufficiency 2019   • Reflux laryngitis 2019   • Paresis of left vocal fold 2019   • Muscle tension dysphonia 2019   • Laryngocele 2019   • Anterior glottic web 2019   • Bradycardia 2019   • Tinea unguium 2019   • Diabetic neuropathy ([address]) 2019   • Hemorrhoids 2019   • Claudication in peripheral vascular disease ([address] [facility]) 05/17/2019   • Stricture of artery ([address]) 04/22/2019   • History of left breast cancer 08/15/2018   • Acquired complex cyst of kidney 05/10/2018   • Chronic left shoulder pain 04/04/2018   • Dysuria 03/16/2018   • Bilateral carotid artery stenosis 01/10/2018   • Gout 10/24/2017   • Neck pain 08/15/2017   • 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 08/02/2017   • Primary generalized (osteo)arthritis 08/02/2017   • Abnormal mammogram 07/20/2017   • Chronic myofascial pain 04/26/2017   • CKD (chronic kidney disease) stage 3, GFR 30-59 ml/min ([facility]) 03/24/2017   • PAD (peripheral artery disease) ([address]) 01/30/2017   • Persistent proteinuria 09/22/2016   • Chronic pain of lower extremity 09/16/2016   • Anemia 09/03/2016   • EKG abnormalities 09/01/2016   • Hyponatremia 08/31/2016   • Abnormal EKG 08/09/2016   • Iliac artery stenosis, bilateral (HCC)    • GERD (gastroesophageal reflux disease)    • Arthritis    • Hypercholesterolemia    • Hypertension    • Hypomagnesemia 03/10/2016   • Renal cyst 10/15/2015   • Carotid stenosis, asymptomatic 05/19/2015   • Atherosclerosis of native artery of extremity with intermittent claudication ([address]) 05/07/2015   • Vaginal prolapse 08/04/2014   • Back pain 06/30/2014   • Nephrolithiasis 09/04/2013   • Diabetic retinopathy ([address]) 07/30/2013   • Vitamin D deficiency 07/30/2013   • Atherosclerosis of other specified arteries 06/11/2013   • Lymphadenopathy 06/10/2013   • Fall from other slipping, tripping, or stumbling 01/24/2013   • DMII (diabetes mellitus, type 2) ([address]) 09/24/2012   • Osteoporosis 09/21/2012      LOS (days): 2  Geometric Mean LOS (GMLOS) (days): 3.20  Days to GMLOS:1.4     OBJECTIVE:  Risk of Unplanned Readmission Score: 24.39         Current admission status: Inpatient   Preferred Pharmacy:   [address] [place] [address] 1530 [facility], 10 [address] [place] [address]  Marisel Alejandro [facility] [address]  Phone: [phone] Fax: [phone]    [id number] Solis Kaden, 8105 [facility] [address] 4864 [facility] 3104 Latonia Guidry  Phone: [phone] Fax: [phone]    [address] [facility], 401 [facility] 3690 [facility] [address] 3690 [facility] [address]888 [facility] [address]  Phone: [phone] Fax: [phone]    Primary Care Provider: Amelie Alberts MD    Primary Insurance: [facility]  Secondary Insurance: [facility]    DISCHARGE DETAILS:        Other Referral/Resources/Interventions Provided:  Interventions: DME  Referral Comments: Daughter Taurus Limon confirmed that oxygen was delivered to t he home. Scripts in [address] and she will p/u.  Hospice Liason aware of p/u time today and will see pt tomorrow at home.

## 2023-09-27 NOTE — UTILIZATION REVIEW
NOTIFICATION OF INPATIENT ADMISSION   AUTHORIZATION REQUEST   SERVICING FACILITY:   [facility]  Address: [address] [place] [address], [address] [facility] [address] [address]  Tax ID: [id number]  NPI: [id number] ATTENDING PROVIDER:  Attending Name and NPI#: Flaquito Leon Md [[id number]]  Address: [address] [place] [address]  Phone: [phone]   ADMISSION INFORMATION:  Place of Service: [address] [place] [address]  Place of Service Code: 21  Inpatient Admission Date/Time: 9/25/23  9:28 PM  Discharge Date/Time: No discharge date for patient encounter. Admitting Diagnosis Code/Description:  SOB (shortness of breath) [R06.02]  Hypoxemia requiring supplemental oxygen [R09.02, Z99.81]  Adenocarcinoma, lung, right ([address]) [C34.91]     UTILIZATION REVIEW CONTACT:  Patty Lundy Utilization   Network Utilization Review Department  Phone: [phone]  Fax: [phone]  Email: [email or web address]  Contact for approvals/pending authorizations, clinical reviews, and discharge. PHYSICIAN ADVISORY SERVICES:  Medical Necessity Denial & [email or web address] Review  Phone: [phone]  Fax: [phone]  Email: [email or web address]

## 2023-09-27 NOTE — PLAN OF CARE
Problem: MOBILITY - ADULT  Goal: Maintain or return to baseline ADL function  Description: INTERVENTIONS:  -  Assess patient's ability to carry out ADLs; assess patient's baseline for ADL function and identify physical deficits which impact ability to perform ADLs (bathing, care of mouth/teeth, toileting, grooming, dressing, etc.)  - Assess/evaluate cause of self-care deficits   - Assess range of motion  - Assess patient's mobility; develop plan if impaired  - Assess patient's need for assistive devices and provide as appropriate  - Encourage maximum independence but intervene and supervise when necessary  - Involve family in performance of ADLs  - Assess for home care needs following discharge   - Consider OT consult to assist with ADL evaluation and planning for discharge  - Provide patient education as appropriate  Outcome: Progressing  Goal: Maintains/Returns to pre admission functional level  Description: INTERVENTIONS:  - Perform BMAT or MOVE assessment daily.   - Set and communicate daily mobility goal to care team and patient/family/caregiver. - Collaborate with rehabilitation services on mobility goals if consulted  - Perform Range of Motion times a day. - Reposition patient every  hours.   - Dangle patient  times a day  - Stand patient  times a day  - Ambulate patient times a day  - Out of bed to chair times a day   - Out of bed for meals times a day  - Out of bed for toileting  - Record patient progress and toleration of activity level   Outcome: Progressing     Problem: PAIN - ADULT  Goal: Verbalizes/displays adequate comfort level or baseline comfort level  Description: Interventions:  - Encourage patient to monitor pain and request assistance  - Assess pain using appropriate pain scale  - Administer analgesics based on type and severity of pain and evaluate response  - Implement non-pharmacological measures as appropriate and evaluate response  - Consider cultural and social influences on pain and pain management  - Notify physician/advanced practitioner if interventions unsuccessful or patient reports new pain  Outcome: Progressing     Problem: INFECTION - ADULT  Goal: Absence or prevention of progression during hospitalization  Description: INTERVENTIONS:  - Assess and monitor for signs and symptoms of infection  - Monitor lab/diagnostic results  - Monitor all insertion sites, i.e. indwelling lines, tubes, and drains  - Monitor endotracheal if appropriate and nasal secretions for changes in amount and color  - [place] appropriate cooling/warming therapies per order  - Administer medications as ordered  - Instruct and encourage patient and family to use good hand hygiene technique  - Identify and instruct in appropriate isolation precautions for identified infection/condition  Outcome: Progressing  Goal: Absence of fever/infection during neutropenic period  Description: INTERVENTIONS:  - Monitor WBC    Outcome: Progressing     Problem: SAFETY ADULT  Goal: Maintain or return to baseline ADL function  Description: INTERVENTIONS:  -  Assess patient's ability to carry out ADLs; assess patient's baseline for ADL function and identify physical deficits which impact ability to perform ADLs (bathing, care of mouth/teeth, toileting, grooming, dressing, etc.)  - Assess/evaluate cause of self-care deficits   - Assess range of motion  - Assess patient's mobility; develop plan if impaired  - Assess patient's need for assistive devices and provide as appropriate  - Encourage maximum independence but intervene and supervise when necessary  - Involve family in performance of ADLs  - Assess for home care needs following discharge   - Consider OT consult to assist with ADL evaluation and planning for discharge  - Provide patient education as appropriate  Outcome: Progressing  Goal: Maintains/Returns to pre admission functional level  Description: INTERVENTIONS:  - Perform BMAT or MOVE assessment daily.   - Set and communicate daily mobility goal to care team and patient/family/caregiver. - Collaborate with rehabilitation services on mobility goals if consulted  - Perform Range of Motion times a day. - Reposition patient every hours.   - Dangle patient  times a day  - Stand patient  times a day  - Ambulate patient  times a day  - Out of bed to chair  times a day   - Out of bed for meals  times a day  - Out of bed for toileting  - Record patient progress and toleration of activity level   Outcome: Progressing  Goal: Patient will remain free of falls  Description: INTERVENTIONS:  - Educate patient/family on patient safety including physical limitations  - Instruct patient to call for assistance with activity   - Consult OT/PT to assist with strengthening/mobility   - Keep Call bell within reach  - Keep bed low and locked with side rails adjusted as appropriate  - Keep care items and personal belongings within reach  - Initiate and maintain comfort rounds  - Make Fall Risk Sign visible to staff  - Offer Toileting every  Hours, in advance of need  - Initiate/Maintain alarm  - Obtain necessary fall risk management equipment:   - Apply yellow socks and bracelet for high fall risk patients  - Consider moving patient to room near nurses station  Outcome: Progressing     Problem: DISCHARGE PLANNING  Goal: Discharge to home or other facility with appropriate resources  Description: INTERVENTIONS:  - Identify barriers to discharge w/patient and caregiver  - Arrange for needed discharge resources and transportation as appropriate  - Identify discharge learning needs (meds, wound care, etc.)  - Arrange for interpretive services to assist at discharge as needed  - Refer to Case Management Department for coordinating discharge planning if the patient needs post-hospital services based on physician/advanced practitioner order or complex needs related to functional status, cognitive ability, or social support system  Outcome: Progressing Problem: Knowledge Deficit  Goal: Patient/family/caregiver demonstrates understanding of disease process, treatment plan, medications, and discharge instructions  Description: Complete learning assessment and assess knowledge base.   Interventions:  - Provide teaching at level of understanding  - Provide teaching via preferred learning methods  Outcome: Progressing

## 2023-09-27 NOTE — CASE MANAGEMENT
Case Management Discharge Planning Note    Patient name Selam Darden  Location [address] [facility] 764/-50 MRN [id number]  : 1934 Date 2023       Current Admission Date: 2023  Current Admission Diagnosis:Shortness of breath   Patient Active Problem List    Diagnosis Date Noted   • Goals of care, counseling/discussion 2023   • Pessary maintenance 2023   • Current use of estrogen therapy 2023   • Shortness of breath 2023   • Hypertensive urgency 2023   • Vaginal pessary present 2023   • Postmenopausal bleeding 2023   • Embolism and thrombosis of arteries of the lower extremities ([address]) 2023   • Secondary hyperparathyroidism ([address]) 2023   • Primary osteoarthritis of left knee 2023   • Hypercalcemia 2022   • Left leg swelling 10/25/2022   • Encounter for annual routine gynecological examination 2022   • Encounter for screening mammogram for breast cancer 2022   • History of cancer of left breast 2022   • Dense breast tissue on mammogram 2022   • CKD (chronic kidney disease) 2021   • Adenocarcinoma, lung, right ([address]) 2021   • Lung mass 10/14/2021   • Type 2 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, bilateral ([address]) 2021   • Osteopenia of multiple sites 2020   • Cervical spine disease 2020   • Left ear pain 2019   • 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   • Renal artery stenosis ([address]) 2019   • Viral upper respiratory tract infection 10/18/2019   • Glottic insufficiency 2019   • Reflux laryngitis 2019   • Paresis of left vocal fold 2019   • Muscle tension dysphonia 2019   • Laryngocele 2019   • Anterior glottic web 2019   • Bradycardia 2019   • Tinea unguium 2019   • Diabetic neuropathy ([address]) 2019   • Hemorrhoids 2019   • Claudication in peripheral vascular disease ([address] [facility]) 05/17/2019   • Stricture of artery ([address]) 04/22/2019   • History of left breast cancer 08/15/2018   • Acquired complex cyst of kidney 05/10/2018   • Chronic left shoulder pain 04/04/2018   • Dysuria 03/16/2018   • Bilateral carotid artery stenosis 01/10/2018   • Gout 10/24/2017   • Neck pain 08/15/2017   • 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 08/02/2017   • Primary generalized (osteo)arthritis 08/02/2017   • Abnormal mammogram 07/20/2017   • Chronic myofascial pain 04/26/2017   • CKD (chronic kidney disease) stage 3, GFR 30-59 ml/min ([facility]) 03/24/2017   • PAD (peripheral artery disease) ([address]) 01/30/2017   • Persistent proteinuria 09/22/2016   • Chronic pain of lower extremity 09/16/2016   • Anemia 09/03/2016   • EKG abnormalities 09/01/2016   • Hyponatremia 08/31/2016   • Abnormal EKG 08/09/2016   • Iliac artery stenosis, bilateral (HCC)    • GERD (gastroesophageal reflux disease)    • Arthritis    • Hypercholesterolemia    • Hypertension    • Hypomagnesemia 03/10/2016   • Renal cyst 10/15/2015   • Carotid stenosis, asymptomatic 05/19/2015   • Atherosclerosis of native artery of extremity with intermittent claudication ([address]) 05/07/2015   • Vaginal prolapse 08/04/2014   • Back pain 06/30/2014   • Nephrolithiasis 09/04/2013   • Diabetic retinopathy ([address]) 07/30/2013   • Vitamin D deficiency 07/30/2013   • Atherosclerosis of other specified arteries 06/11/2013   • Lymphadenopathy 06/10/2013   • Fall from other slipping, tripping, or stumbling 01/24/2013   • DMII (diabetes mellitus, type 2) ([address]) 09/24/2012   • Osteoporosis 09/21/2012      LOS (days): 2  Geometric Mean LOS (GMLOS) (days): 3.20  Days to GMLOS:1.5     OBJECTIVE:  Risk of Unplanned Readmission Score: 20.1         Current admission status: Inpatient   Preferred Pharmacy:   [address] [place] [address] 1530 [facility], 10 [address] [place] [address]  Marisel Alejandro [facility] [address]  Phone: [phone] Fax: [phone]    [id number] Rajeev Baker, 8105 [facility] [address] [place] [address]  Phone: [phone] Fax: [phone]    Primary Care Provider: Amaya Sosa MD    Primary Insurance: [facility] RE[facility]  Secondary Insurance: [address]    DISCHARGE DETAILS:               Other Referral/Resources/Interventions Provided:  Interventions: Hospice  Referral Comments: Pt approved for home hospice and per [facility] provider Denisha Pimentel NP pt is cleared to be dc to home pneidng Oxygen delivery. TT Dinesh Melgar who states DME due to arrive by 4:30 pm today. Met with pt and dtr Zoraida at bedside, both agreeable with BLS transport to home today preferred and understand awaiting delivery of DME, her stepfather will call when delivered. OOH DNR in pt's chart for Palliative to sign/have dtr sign and dtr Zoraida requests scripts be sent to [address]. TT [facility] provider with this info as well as Palliative Dr Delaney Shannon. Transport secured BLS for [employer] at 1700 today, dtr Zoraida nad pt made aware and agreeable. Dr Delaney Shannon made aware and [facility] provider and nurse Tyrell Osorio. Transport folder given to nurse [facility]. Per Dtr Alix Causey 24/7 care will be provided by pt's spouse and her daughters.          Treatment Team Recommendation: Home, Hospice (SL: hospice)  Discharge Destination Plan[de-identified] Home, Hospice

## 2023-09-27 NOTE — CASE MANAGEMENT
Case Management Discharge Planning Note    Patient name Brad Castillo  Location [address] [facility] 764/-90 MRN [id number]  : 1934 Date 2023       Current Admission Date: 2023  Current Admission Diagnosis:Shortness of breath   Patient Active Problem List    Diagnosis Date Noted   • Goals of care, counseling/discussion 2023   • Pessary maintenance 2023   • Current use of estrogen therapy 2023   • Shortness of breath 2023   • Hypertensive urgency 2023   • Vaginal pessary present 2023   • Postmenopausal bleeding 2023   • Embolism and thrombosis of arteries of the lower extremities ([address]) 2023   • Secondary hyperparathyroidism ([address]) 2023   • Primary osteoarthritis of left knee 2023   • Hypercalcemia 2022   • Left leg swelling 10/25/2022   • Encounter for annual routine gynecological examination 2022   • Encounter for screening mammogram for breast cancer 2022   • History of cancer of left breast 2022   • Dense breast tissue on mammogram 2022   • CKD (chronic kidney disease) 2021   • Adenocarcinoma, lung, right ([address]) 2021   • Lung mass 10/14/2021   • Type 2 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, bilateral ([address]) 2021   • Osteopenia of multiple sites 2020   • Cervical spine disease 2020   • Left ear pain 2019   • 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   • Renal artery stenosis ([address]) 2019   • Viral upper respiratory tract infection 10/18/2019   • Glottic insufficiency 2019   • Reflux laryngitis 2019   • Paresis of left vocal fold 2019   • Muscle tension dysphonia 2019   • Laryngocele 2019   • Anterior glottic web 2019   • Bradycardia 2019   • Tinea unguium 2019   • Diabetic neuropathy ([address]) 2019   • Hemorrhoids 2019   • Claudication in peripheral vascular disease ([address] [facility]) 05/17/2019   • Stricture of artery ([address]) 04/22/2019   • History of left breast cancer 08/15/2018   • Acquired complex cyst of kidney 05/10/2018   • Chronic left shoulder pain 04/04/2018   • Dysuria 03/16/2018   • Bilateral carotid artery stenosis 01/10/2018   • Gout 10/24/2017   • Neck pain 08/15/2017   • 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 08/02/2017   • Primary generalized (osteo)arthritis 08/02/2017   • Abnormal mammogram 07/20/2017   • Chronic myofascial pain 04/26/2017   • CKD (chronic kidney disease) stage 3, GFR 30-59 ml/min ([facility]) 03/24/2017   • PAD (peripheral artery disease) ([address]) 01/30/2017   • Persistent proteinuria 09/22/2016   • Chronic pain of lower extremity 09/16/2016   • Anemia 09/03/2016   • EKG abnormalities 09/01/2016   • Hyponatremia 08/31/2016   • Abnormal EKG 08/09/2016   • Iliac artery stenosis, bilateral (HCC)    • GERD (gastroesophageal reflux disease)    • Arthritis    • Hypercholesterolemia    • Hypertension    • Hypomagnesemia 03/10/2016   • Renal cyst 10/15/2015   • Carotid stenosis, asymptomatic 05/19/2015   • Atherosclerosis of native artery of extremity with intermittent claudication ([address]) 05/07/2015   • Vaginal prolapse 08/04/2014   • Back pain 06/30/2014   • Nephrolithiasis 09/04/2013   • Diabetic retinopathy ([address]) 07/30/2013   • Vitamin D deficiency 07/30/2013   • Atherosclerosis of other specified arteries 06/11/2013   • Lymphadenopathy 06/10/2013   • Fall from other slipping, tripping, or stumbling 01/24/2013   • DMII (diabetes mellitus, type 2) ([address]) 09/24/2012   • Osteoporosis 09/21/2012      LOS (days): 2  Geometric Mean LOS (GMLOS) (days): 3.20  Days to GMLOS:1.6     OBJECTIVE:  Risk of Unplanned Readmission Score: 20.25         Current admission status: Inpatient   Preferred Pharmacy:   Racquel Gurrola 1530 [facility], [address] [place], [place] [address]liane RHODA Alejandro [facility] [address]  Phone: [phone] Fax: [phone]    [id number] Gillian Mathew, 8105 [facility] [address] [facility] Perez Guidry  Phone: [phone] Fax: [phone]    Primary Care Provider: Suzy Rivera MD    Primary Insurance: [facility] REP  Secondary Insurance: Dia Bullock    DISCHARGE DETAILS:           Other Referral/Resources/Interventions Provided:  Interventions: Hospice  Referral Comments: received TT from [address] [place] [facility] provider, Cody Branham regarding pt cleared to dc home pending home oxygen and Hospice services. TT Hospice liason Meryl Cheadle and per note yesterday she will follow up with family today as they wanted to discuss home with hospice services.          Treatment Team Recommendation: Hospice  Discharge Destination Plan[de-identified] Hospice

## 2023-09-27 NOTE — DISCHARGE SUMMARY
Discharge Summary - [facility]    Patient Information: Joanna Andino 80 y.o. female MRN: [id number]  Unit/Bed#: -01 Encounter: [id number]    Discharging Physician / Practitioner: Meghan Quintanilla  PCP: Flower Boyer MD  Admission Date: 9/25/2023  Discharge Date: 09/27/23    Reason for Admission: SOB, progression of adenocarcinoma    Discharge Diagnoses:     Principal Problem:    Shortness of breath  Active Problems:    Adenocarcinoma, lung, right (HCC)    Type 2 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, bilateral ([address])    Hypertensive urgency    Hyponatremia    Goals of care, counseling/discussion  Resolved Problems:    * No resolved hospital problems. *      Consultations During Hospital Stay:  · Palliative care  · Hospice  · CM    Procedures Performed:     · none    Significant Findings / Test Results:     · CT chest PE study: Negative PE. Interval increase in the size of combination of right middle lobe mass lesion and loculated pleural effusion with increased nodular thickening of the right pleura, increased size of multiple subpleural right lung nodules and dominant right lower lobe spiculated pulmonary nodule. Multiple enlarged mediastinal lymph nodes are seen, grossly stable in size since 7/30/2023, however increased in size from 2/12/2023. Overall findings are compatible with progression of malignant disease. Interval increase in the size of right thyroid lobe nodule compared to 7/30/2023, currently measuring 1.8 cm. Additional 2.1 cm left thyroid nodule. Nonemergent ultrasound is recommended for further assessment. A 1.3 cm high attenuation lesion in the left kidney, stable in size since PET/CT 1/20/2023 and can represent either a complicated cyst or solid renal neoplasm. Initial further assessment with renal ultrasound is recommended.     Incidental Findings:   · none    Test Results Pending at Discharge (will require follow up): · none     Outpatient Tests Requested:  · Outpatient f/u with home hospice team    Complications:  None     Hospital Course:     Quinn Sorto is a 80 y.o. female patient with a PMHX of adenocarcinoma and associated pleural effusion, type 2 diabetes, hypertension, who originally presented to the hospital on 9/25/2023 due to SOB. CT with evidence of worsening disease. Unfortunately, patient is not a candidate for any further treatments as per outpatient oncology team.  Goals of care were discussed with patient's family and ultimately they have decided to pursue comfort cares. Patient will transition home today with plans for home hospice. Case management has arranged for the services and oxygen will be delivered today for patient's comfort. Condition at Discharge: fair     Discharge Day Visit / Exam:     Subjective:  No complaints. Vitals: Blood Pressure: 154/95 (09/27/23 0842)  Pulse: 71 (09/27/23 0842)  Temperature: 98.6 °F (37 °C) (09/27/23 0842)  Temp Source: Oral (09/26/23 0949)  Respirations: 18 (09/26/23 0949)  SpO2: 96 % (09/27/23 0842)     Exam:   Physical Exam  Vitals and nursing note reviewed. Constitutional:       Comments: On 2L NC   Cardiovascular:      Rate and Rhythm: Normal rate. Pulmonary:      Effort: No respiratory distress. Musculoskeletal:         General: No swelling. Skin:     General: Skin is warm. Neurological:      Mental Status: She is alert. Mental status is at baseline. Discussion with Family: patient and daughter Jemima Corpus     Discharge instructions/Information to patient and family:   See after visit summary for information provided to patient and family. Provisions for Follow-Up Care:  See after visit summary for information related to follow-up care and any pertinent home health orders.       Disposition:     Home with VNA Services (Reminder: Complete face to face encounter)    For Discharges to [facility]:   · Not Applicable to this Patient - Not Applicable to this Patient    Planned Readmission: no     Discharge Statement:  I spent 40 minutes discharging the patient. This time was spent on the day of discharge. I had direct contact with the patient on the day of discharge. Greater than 50% of the total time was spent examining patient, answering all patient questions, arranging and discussing plan of care with patient as well as directly providing post-discharge instructions. Additional time then spent on discharge activities. Discharge Medications:  See after visit summary for reconciled discharge medications provided to patient and family.       ** Please Note: This note has been constructed using a voice recognition system **

## 2023-09-28 ENCOUNTER — HOME CARE VISIT (OUTPATIENT)
Dept: HOME HOSPICE | Facility: HOSPICE | Age: 88
End: 2023-09-28
Payer: MEDICARE

## 2023-09-28 ENCOUNTER — TRANSITIONAL CARE MANAGEMENT (OUTPATIENT)
Dept: FAMILY MEDICINE CLINIC | Facility: CLINIC | Age: 88
End: 2023-09-28

## 2023-09-28 ENCOUNTER — HOME CARE VISIT (OUTPATIENT)
Dept: HOME HEALTH SERVICES | Facility: HOME HEALTHCARE | Age: 88
End: 2023-09-28
Payer: MEDICARE

## 2023-09-28 VITALS
SYSTOLIC BLOOD PRESSURE: 138 MMHG | BODY MASS INDEX: 20.2 KG/M2 | WEIGHT: 107 LBS | DIASTOLIC BLOOD PRESSURE: 62 MMHG | TEMPERATURE: 98.9 F | HEIGHT: 61 IN | RESPIRATION RATE: 18 BRPM | HEART RATE: 84 BPM

## 2023-09-28 PROCEDURE — 10330057 MEDICATION, GENERAL

## 2023-09-28 PROCEDURE — G0155 HHCP-SVS OF CSW,EA 15 MIN: HCPCS

## 2023-09-28 PROCEDURE — G0299 HHS/HOSPICE OF RN EA 15 MIN: HCPCS

## 2023-09-29 ENCOUNTER — HOME CARE VISIT (OUTPATIENT)
Dept: HOME HEALTH SERVICES | Facility: HOME HEALTHCARE | Age: 88
End: 2023-09-29
Payer: MEDICARE

## 2023-09-29 ENCOUNTER — HOME CARE VISIT (OUTPATIENT)
Dept: HOME HOSPICE | Facility: HOSPICE | Age: 88
End: 2023-09-29
Payer: MEDICARE

## 2023-09-29 PROCEDURE — G0299 HHS/HOSPICE OF RN EA 15 MIN: HCPCS

## 2023-09-29 NOTE — ED PROVIDER NOTES
History  Chief Complaint   Patient presents with   • Shortness of Breath     Patient arrives VIA EMS after feeling SOB at home, at 89% on home monitor . Patient with lung cancer      HPI   ED Course as of 09/28/23 2146   [facility] Sep 25, 2023   1650 Patient is an 79 yo F with PMHx stage 4 right lung cancer with mets to lymph nodes, PAD with stents, recent CTA with ?blocked carotid, DM2, recent normal echo, who presents to the ED for episodes of hypoxia at home on pulse ox and SOB for two weeks that have worsened over the past three days. Patient's daughter at bedside reports readings of 79% on home monitor with increased work of breathing, called nurse triage who advised her to come to the ED. Patient is eating and drinking well. Not currently on any supplemental or home O2. Denies any chest pain, palpitations, fevers or chills, focal deficits, cough, congestion, known sick contacts, or any other complaints or concerns at this time. 1655 ASSESSMENT: Patient is a 80 y.o. female who presents with hx lung cancer with mets no longer undergoing treatment with worsening hypoxia. DDX includes but not limited to: pleural effusion, PE, progression of lung cancer, pneumonia, deconditioning. PLAN: CBC, CMP, CTA PE study. Placed on monitor, continuous pulse oximetry, placed on 4 L nasal cannula. 1756 Hemoglobin(!): 9.5  Decreased from baseline 11-12 2003 CTA ED chest PE Study  No acute pulmonary embolism     Interval increase in the size of combination of right middle lobe mass lesion and loculated pleural effusion with increased nodular thickening of the right pleura, increased size of multiple subpleural right lung nodules and dominant right lower lobe   spiculated pulmonary nodule. Multiple enlarged mediastinal lymph nodes are seen, grossly stable in size since 7/30/2023, however increased in size from 2/12/2023.  Overall findings are compatible with progression of malignant disease     Interval increase in the size of right thyroid lobe nodule compared to 2023, currently measuring 1.8 cm. Additional 2.1 cm left thyroid nodule. Nonemergent ultrasound is recommended for further assessment.     A 1.3 cm high attenuation lesion in the left kidney, stable in size since PET/CT 2023 and can represent either a complicated cyst or solid renal neoplasm. Initial further assessment with renal ultrasound is recommended. 2124 Discussed results and plan with patient, , and daughter at bedside. Advised on need for admission at this time. All questions answered. Patient expressed verbal understanding and is agreeable with plan for admission. Case discussed with CONCHIS who will admit patient for hypoxemia requiring supplemental oxygen. Prior to Admission Medications   Prescriptions Last Dose Informant Patient Reported? Taking?    Acetaminophen 325 MG CAPS Past Week Spouse/Significant Other Yes Yes   Sig: Take 500 mg by mouth every 8 (eight) hours   Lancets (ONETOUCH ULTRASOFT) lancets Past Week Spouse/Significant Other No Yes   Sig: by Other route 2 (two) times a day Use as instructed   NIFEdipine ER (ADALAT CC) 30 MG 24 hr tablet  Spouse/Significant Other No No   Sig: take 1 tablet by mouth twice a day   OneTouch Delica Lancets 07V MISC Past Week Spouse/Significant Other No Yes   Sig: Inject as directed 2 (two) times a day Use to test   Probiotic Product (Align) 4 MG CAPS Past Week Spouse/Significant Other Yes Yes   Sig: Take by mouth   albuterol (ProAir HFA) 90 mcg/act inhaler Past Week  No Yes   Sig: Inhale 2 puffs every 6 (six) hours as needed for wheezing or shortness of breath para tos, o un siento de falta el aire   calcitriol (ROCALTROL) 0.25 mcg capsule Past Week  No Yes   Sig: Take 1 capsule (0.25 mcg total) by mouth 2 (two) times a week   fluticasone (FLONASE) 50 mcg/act nasal spray 2023 Spouse/Significant Other No Yes   Si sprays into each nostril daily   gabapentin (NEURONTIN) 100 mg capsule 2023 Spouse/Significant Other No Yes   Sig: take 1 capsule by mouth twice a day   glimepiride (AMARYL) 1 mg tablet Past Week  No Yes   Sig: Take 0.5 tablets (0.5 mg total) by mouth daily with breakfast   methylPREDNISolone 4 MG tablet therapy pack Not Taking  No No   Sig: Use as directed on package   Patient not taking: Reported on 9/26/2023   olopatadine (PATANOL) 0.1 % ophthalmic solution Past Week Spouse/Significant Other No Yes   Sig: Administer 1 drop to both eyes 2 (two) times a day   oxyCODONE (ROXICODONE) 10 MG TABS 9/25/2023  No Yes   Sig: Take 1-1.5 tablets (10-15 mg total) by mouth 3 (three) times a day as needed for moderate pain Two [facility], o dianna y [place], jeancarlos veces por brittany para delaney. No mas que 3 tabletas por brittany.  Max Daily Amount: 45 mg   pantoprazole (PROTONIX) 40 mg tablet 9/26/2023  No Yes   Sig: take 1 tablet by mouth twice a day   polyethylene glycol (MIRALAX) 17 g packet 9/26/2023  No Yes   Sig: Take 17 g by mouth daily Por favor, tome cada brittany para prevenir estranamiento   pravastatin (PRAVACHOL) 40 mg tablet Past Week Spouse/Significant Other No Yes   Sig: Take 1 tablet (40 mg total) by mouth daily   predniSONE 10 mg tablet Not Taking  No No   Sig: Take 1 tablet (10 mg total) by mouth 2 (two) times a day as needed (low energy, severe nausea, gout) Si necesite para fatiga, nausea muy harman, o inflammacion   Patient not taking: Reported on 9/28/2023      Facility-Administered Medications: None       Past Medical History:   Diagnosis Date   • Arthritis    • Back pain    • Benign essential hypertension     LAST ASSESSED: [id number]   • Carotid artery stenosis    • CKD (chronic kidney disease) stage 2, GFR 60-89 ml/min    • Constipation, chronic     "drinks prune juice"   • Diabetes mellitus (HCC)    • DMII (diabetes mellitus, type 2) ([facility])    • Epiglottitis     RESOLVED: [id number]   • Gastritis    • GERD (gastroesophageal reflux disease)    • Gout    • H/O blood clots     left lower leg,,,11 yrs ago • History of stenosis of renal artery     RESOLVED: [id number]   • Hypercholesteremia    • Hypercholesterolemia    • Hyperlipidemia    • Hyperparathyroidism ([address])    • Hypertension    • Iliac artery stenosis, bilateral (HCC)    • Kidney stone    • Lobular carcinoma ([address]) 09/06/2017    Left   • Lung cancer ([address])    • Mucinous carcinoma of breast, 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   • Presence of pessary    • Renal artery stenosis (HCC)    • Renal disorder    • Renovascular hypertension     RESOLVED: [id number]   • Segmental and somatic dysfunction of cervical region     RESOLVED: [id number]   • Segmental and somatic dysfunction of lumbar region     RESOLVED: [id number]   • Segmental and somatic dysfunction of pelvic region     RESOLVED: [id number]   • Segmental and somatic dysfunction of thoracic region     RESOLVED: [id number]   • Somatic dysfunction of abdominal region     RESOLVED: 97MGR9103   • Somatic dysfunction of head region     RESOLVED: 77ORO4013   • Somatic dysfunction of lower extremities     RESOLVED: [id number]   • Somatic dysfunction of rib region     RESOLVED: [id number]   • Somatic dysfunction of thoracic region     RESOLVED: 70POI8969   • Somatic dysfunction of upper extremities     RESOLVED: [id number]   • Teeth missing        Past Surgical History:   Procedure Laterality Date   • ANGIOPLASTY / STENTING ILIAC     • BREAST BIOPSY Left 08/10/2018    U/S BX- mucinous ca   • BREAST BIOPSY Left 08/07/2017    U/S BX   • BREAST LUMPECTOMY Left 09/06/2017    LAST ASSESSED: [id number]   • BREAST LUMPECTOMY Left 09/11/2018   • BREAST SURGERY Left     biopsy   • CATARACT EXTRACTION Bilateral    • CHOLECYSTECTOMY     • COLONOSCOPY     • EYE SURGERY     • ILIAC VEIN ANGIOPLASTY / STENTING Right     INITIAL STENOSIS: ONSET: [id number]   • IR BIOPSY LUNG  11/15/2021   • IR THORACENTESIS  2/20/2023   • IR THORACENTESIS  3/28/2023   • IR THORACENTESIS  4/11/2023   • IR THORACENTESIS  4/19/2023   • IR THORACENTESIS  4/26/2023 • IR THORACENTESIS  5/3/2023   • IR THORACENTESIS  5/10/2023   • IR THORACENTESIS  5/17/2023   • IR THORACENTESIS  5/24/2023   • IR THORACENTESIS  5/31/2023   • IR THORACENTESIS  6/7/2023   • IR THORACENTESIS  7/18/2023   • IR THORACENTESIS  8/2/2023   • IR THORACENTESIS  8/16/2023   • KNEE ARTHROSCOPY Right    • KNEE SURGERY Right     ONSET: [id number]   • MAMMO NEEDLE LOCALIZATION LEFT (ALL INC) Left 9/11/2018   • MAMMO NEEDLE LOCALIZATION LEFT (ALL INC) Left 9/6/2017   • ID ESOPHAGOGASTRODUODENOSCOPY TRANSORAL DIAGNOSTIC N/A 7/26/2018    Procedure: ESOPHAGOGASTRODUODENOSCOPY (EGD); Surgeon: Charlotte Ba DO;  Location: [facility]; Service: Gastroenterology   •  [place] EXC SILVA&/STRPG CORDS/EPIGL MCRSCP/TLSCP N/A 6/10/2016    Procedure: MICRO DIRECT LARYNGOSCOPY WITH VOCAL FOLD MASS EXCISION ;  Surgeon: Jacqueline Suresh MD;  [facility];  Service: ENT   • ID MASTECTOMY PARTIAL Left 9/6/2017    Procedure: LUMPECTOMY BREAST NEEDLE LOCALIZED WITH FAXITRON NEEDLE @ 0830;  Surgeon: José Manuel Booth MD;  Location[facility];  Service: General   • ID PERQ DEVICE PLACEMENT BREAST LOC 1ST LES W/GDNCE Left 9/11/2018    Procedure: BREAST LUMPECTOMY; BREAST NEEDLE LOCALIZATION (NEEDLE LOC AT 1330);   Surgeon: Bj Lam MD;  [facility];  Service: Surgical Oncology   • THROAT SURGERY      lump removed   • TUBAL LIGATION     • UPPER GASTROINTESTINAL ENDOSCOPY     • US GUIDANCE BREAST BIOPSY LEFT EACH ADDITIONAL Left 8/7/2017   • US GUIDED BREAST BIOPSY LEFT COMPLETE Left 8/10/2018   • US GUIDED BREAST BIOPSY LEFT COMPLETE Left 8/7/2017   • VASCULAR SURGERY         Family History   Problem Relation Age of Onset   • Heart disease Brother    • Heart disease Maternal Grandmother    • Diabetes Other         of other type with arthropathy, without long term current use of insulin   • Hypertension Other    • No Known Problems Mother    • Gout Family    • Rheum arthritis Family    • Lupus Family systematic erythematosus   • Heart disease Family    • Stroke Family         syndrome   • Stroke Maternal Uncle         syndrome   • Stroke Maternal Aunt         syndrome   • No Known Problems Father    • No Known Problems Daughter    • No Known Problems Maternal Grandfather    • No Known Problems Paternal Grandmother    • No Known Problems Paternal Grandfather    • No Known Problems Daughter    • No Known Problems Daughter    • No Known Problems Maternal Aunt    • No Known Problems Maternal Aunt    • No Known Problems Maternal Aunt      I have reviewed and agree with the history as documented. E-Cigarette/Vaping   • E-Cigarette Use Never User      E-Cigarette/Vaping Substances   • Nicotine No    • THC No    • CBD No    • Flavoring No    • Other No    • Unknown No      Social History     Tobacco Use   • Smoking status: Former     Packs/day: 1.00     Years: 55.00     Total pack years: 55.00     Types: Cigarettes     Start date:      Quit date:      Years since quittin.   • Smokeless tobacco: Never   Vaping Use   • Vaping Use: Never used   Substance Use Topics   • Alcohol use: Not Currently   • Drug use: No        Review of Systems   Constitutional: Positive for appetite change and fatigue. Negative for fever. Respiratory: Positive for cough and shortness of breath. Cardiovascular: Negative for chest pain. Gastrointestinal: Negative for abdominal pain. All other systems reviewed and are negative.       Physical Exam  ED Triage Vitals   Temperature Pulse Respirations Blood Pressure SpO2   23 1553 23 1553 23 1553 23 1553 23 1553   99.1 °F (37.3 °C) 75 22 (!) 179/76 99 %      Temp Source Heart Rate Source Patient Position - Orthostatic VS BP Location FiO2 (%)   23 1553 23 0530 23 0615 23 0615 --   Oral Monitor Lying Right arm       Pain Score       23 0949       6             Orthostatic Vital Signs  Vitals:    23 0246 23 8695 09/27/23 1327 09/27/23 1542   BP: 155/51 154/95 (!) 176/57 (!) 179/59   Pulse: 65 71 85    Patient Position - Orthostatic VS:           Physical Exam  Vitals and nursing note reviewed. Constitutional:       General: She is not in acute distress. Appearance: She is cachectic. Interventions: Nasal cannula in place. HENT:      Head: Normocephalic and atraumatic. Mouth/Throat:      Mouth: Mucous membranes are moist.   Eyes:      General: No scleral icterus. Conjunctiva/sclera: Conjunctivae normal.   Cardiovascular:      Rate and Rhythm: Normal rate and regular rhythm. Heart sounds: Normal heart sounds. Pulmonary:      Effort: Pulmonary effort is normal. Tachypnea present. No respiratory distress. Breath sounds: Decreased breath sounds (L>R) present. Abdominal:      Palpations: Abdomen is soft. Tenderness: There is no abdominal tenderness. There is no guarding or rebound. Musculoskeletal:         General: No deformity. Normal range of motion. Cervical back: Normal range of motion and neck supple. Skin:     General: Skin is warm. Capillary Refill: Capillary refill takes less than 2 seconds. Findings: No rash. Neurological:      Mental Status: She is alert. Mental status is at baseline.    Psychiatric:         Mood and Affect: Mood normal.         Behavior: Behavior normal.         ED Medications  Medications   sodium chloride 0.9 % bolus 500 mL (0 mL Intravenous Stopped 9/25/23 1900)   NIFEdipine (PROCARDIA XL) 24 hr tablet 30 mg (30 mg Oral Given 9/25/23 2035)   pantoprazole (PROTONIX) EC tablet 40 mg (40 mg Oral Given 9/25/23 1837)   gabapentin (NEURONTIN) capsule 100 mg (100 mg Oral Given 9/25/23 1837)   oxyCODONE (ROXICODONE) IR tablet 15 mg (15 mg Oral Given 9/25/23 1837)   iohexol (OMNIPAQUE) 350 MG/ML injection (SINGLE-DOSE) 85 mL (85 mL Intravenous Given 9/25/23 1818)   potassium chloride (K-DUR,KLOR-CON) CR tablet 40 mEq (40 mEq Oral Given 9/26/23 8895)       Diagnostic Studies  Results Reviewed     Procedure Component Value Units Date/Time    Basic metabolic panel [[id number]]  (Abnormal) Collected: 09/26/23 0611    Lab Status: Final result Specimen: Blood from Arm, Left Updated: 09/26/23 0659     Sodium 133 mmol/L      Potassium 3.4 mmol/L      Chloride 90 mmol/L      CO2 34 mmol/L      ANION GAP 9 mmol/L      BUN 19 mg/dL      Creatinine 1.04 mg/dL      Glucose 120 mg/dL      Calcium 9.1 mg/dL      eGFR 47 ml/min/1.73sq m     Narrative:      [place] guidelines for Chronic Kidney Disease (CKD):   •  Stage 1 with normal or high GFR (GFR > 90 mL/min/1.73 square meters)  •  Stage 2 Mild CKD (GFR = 60-89 mL/min/1.73 square meters)  •  Stage 3A Moderate CKD (GFR = 45-59 mL/min/1.73 square meters)  •  Stage 3B Moderate CKD (GFR = 30-44 mL/min/1.73 square meters)  •  Stage 4 Severe CKD (GFR = 15-29 mL/min/1.73 square meters)  •  Stage 5 End Stage CKD (GFR <15 mL/min/1.73 square meters)  Note: GFR calculation is accurate only with a steady state creatinine    CBC (With Platelets) [[id number]]  (Abnormal) Collected: 09/26/23 0611    Lab Status: Final result Specimen: Blood from Arm, Left Updated: 09/26/23 0622     WBC 6.11 Thousand/uL      RBC 4.01 Million/uL      Hemoglobin 10.8 g/dL      Hematocrit 33.8 %      MCV 84 fL      MCH 26.9 pg      MCHC 32.0 g/dL      RDW 13.9 %      Platelets 083 Thousands/uL      MPV 9.5 fL     Comprehensive metabolic panel [[id number]]  (Abnormal) Collected: 09/25/23 1731    Lab Status: Final result Specimen: Blood from Arm, Left Updated: 09/25/23 1801     Sodium 128 mmol/L      Potassium 4.1 mmol/L      Chloride 86 mmol/L      CO2 35 mmol/L      ANION GAP 7 mmol/L      BUN 23 mg/dL      Creatinine 1.09 mg/dL      Glucose 118 mg/dL      Calcium 8.6 mg/dL      Corrected Calcium 9.5 mg/dL      AST 24 U/L      ALT 11 U/L      Alkaline Phosphatase 62 U/L      Total Protein 7.3 g/dL      Albumin 2.9 g/dL      Total Bilirubin 0.26 mg/dL      eGFR 45 ml/min/1.73sq m     Narrative:      [place] guidelines for Chronic Kidney Disease (CKD):   •  Stage 1 with normal or high GFR (GFR > 90 mL/min/1.73 square meters)  •  Stage 2 Mild CKD (GFR = 60-89 mL/min/1.73 square meters)  •  Stage 3A Moderate CKD (GFR = 45-59 mL/min/1.73 square meters)  •  Stage 3B Moderate CKD (GFR = 30-44 mL/min/1.73 square meters)  •  Stage 4 Severe CKD (GFR = 15-29 mL/min/1.73 square meters)  •  Stage 5 End Stage CKD (GFR <15 mL/min/1.73 square meters)  Note: GFR calculation is accurate only with a steady state creatinine    CBC and differential [[id number]]  (Abnormal) Collected: 09/25/23 1731    Lab Status: Final result Specimen: Blood from Arm, Left Updated: 09/25/23 1745     WBC 5.26 Thousand/uL      RBC 3.51 Million/uL      Hemoglobin 9.5 g/dL      Hematocrit 29.3 %      MCV 84 fL      MCH 27.1 pg      MCHC 32.4 g/dL      RDW 14.3 %      MPV 10.2 fL      Platelets 110 Thousands/uL      nRBC 0 /100 WBCs      Neutrophils Relative 70 %      Immat GRANS % 0 %      Lymphocytes Relative 19 %      Monocytes Relative 11 %      Eosinophils Relative 0 %      Basophils Relative 0 %      Neutrophils Absolute 3.60 Thousands/µL      Immature Grans Absolute 0.02 Thousand/uL      Lymphocytes Absolute 1.02 Thousands/µL      Monocytes Absolute 0.60 Thousand/µL      Eosinophils Absolute 0.01 Thousand/µL      Basophils Absolute 0.01 Thousands/µL                  CTA ED chest PE Study   Final Result by Francis Lilly MD (09/25 1903)      No acute pulmonary embolism      Interval increase in the size of combination of right middle lobe mass lesion and loculated pleural effusion with increased nodular thickening of the right pleura, increased size of multiple subpleural right lung nodules and dominant right lower lobe    spiculated pulmonary nodule.  Multiple enlarged mediastinal lymph nodes are seen, grossly stable in size since 7/30/2023, however increased in size from 2/12/2023. Overall findings are compatible with progression of malignant disease      Interval increase in the size of right thyroid lobe nodule compared to 7/30/2023, currently measuring 1.8 cm. Additional 2.1 cm left thyroid nodule. Nonemergent ultrasound is recommended for further assessment. A 1.3 cm high attenuation lesion in the left kidney, stable in size since PET/CT 1/20/2023 and can represent either a complicated cyst or solid renal neoplasm. Initial further assessment with renal ultrasound is recommended. The study was marked in [facility] for immediate notification. Workstation performed: [id number]               Procedures  Procedures      ED Course  ED Course as of 09/28/23 2146   [facility] Sep 25, 2023   1650 Patient is an 81 yo F with PMHx stage 4 right lung cancer with mets to lymph nodes, PAD with stents, recent CTA with ?blocked carotid, DM2, recent normal echo, who presents to the ED for episodes of hypoxia at home on pulse ox and SOB for two weeks that have worsened over the past three days. Patient's daughter at bedside reports readings of 79% on home monitor with increased work of breathing, called nurse triage who advised her to come to the ED. Patient is eating and drinking well. Not currently on any supplemental or home O2. Denies any chest pain, palpitations, fevers or chills, focal deficits, cough, congestion, known sick contacts, or any other complaints or concerns at this time. 1655 ASSESSMENT: Patient is a 80 y.o. female who presents with hx lung cancer with mets no longer undergoing treatment with worsening hypoxia. DDX includes but not limited to: pleural effusion, PE, progression of lung cancer, pneumonia, deconditioning. PLAN: CBC, CMP, CTA PE study. Placed on monitor, continuous pulse oximetry, placed on 4 L nasal cannula.    1756 Hemoglobin(!): 9.5  Decreased from baseline 11-12 2003 CTA ED chest PE Study  No acute pulmonary embolism     Interval increase in the size of combination of right middle lobe mass lesion and loculated pleural effusion with increased nodular thickening of the right pleura, increased size of multiple subpleural right lung nodules and dominant right lower lobe   spiculated pulmonary nodule. Multiple enlarged mediastinal lymph nodes are seen, grossly stable in size since 7/30/2023, however increased in size from 2/12/2023. Overall findings are compatible with progression of malignant disease     Interval increase in the size of right thyroid lobe nodule compared to 7/30/2023, currently measuring 1.8 cm. Additional 2.1 cm left thyroid nodule. Nonemergent ultrasound is recommended for further assessment.     A 1.3 cm high attenuation lesion in the left kidney, stable in size since PET/CT 1/20/2023 and can represent either a complicated cyst or solid renal neoplasm. Initial further assessment with renal ultrasound is recommended. 2124 Discussed results and plan with patient, , and daughter at bedside. Advised on need for admission at this time. All questions answered. Patient expressed verbal understanding and is agreeable with plan for admission. Case discussed with LOVELYIM who will admit patient for hypoxemia requiring supplemental oxygen.                                  Sung Gemma' Criteria for PE    Flowsheet Row Most Recent Value   Wells' Criteria for PE    Clinical signs and symptoms of DVT 0 Filed at: 09/25/2023 1700   PE is primary diagnosis or equally likely 3 Filed at: 09/25/2023 1700   HR >100 0 Filed at: 09/25/2023 1700   Immobilization at least 3 days or Surgery in the previous 4 weeks 1.5 Filed at: 09/25/2023 1700   Previous, objectively diagnosed PE or DVT 1.5 Filed at: 09/25/2023 1700   Hemoptysis 0 Filed at: 09/25/2023 1700   Malignancy with treatment within 6 months or palliative 1 Filed at: 09/25/2023 1700   Wells' Criteria Total 7 Filed at: 09/25/2023 1700            Medical Decision Making  Hypoxemia requiring supplemental oxygen: acute illness or injury  Amount and/or Complexity of Data Reviewed  Independent Historian: caregiver and spouse  Labs: ordered. Decision-making details documented in ED Course. Radiology: ordered. Decision-making details documented in ED Course. Risk  Prescription drug management. Decision regarding hospitalization. See ED course above for additional details including HPI, MDM, plan and disposition. Disposition  Final diagnoses:   Hypoxemia requiring supplemental oxygen     Time reflects when diagnosis was documented in both MDM as applicable and the Disposition within this note     Time User Action Codes Description Comment    9/25/2023  9:27 PM Christen Drummond Add [R09.02,  Z99.81] Hypoxemia requiring supplemental oxygen     9/25/2023  9:52 PM Edd RENDON Add [C34.91] Adenocarcinoma, lung, right ([address])     9/27/2023 12:22 PM Macario  M Add [G89.3] Cancer-related pain     9/27/2023 12:22 PM Macario  M Add [K59.00] Constipation, unspecified constipation type     9/27/2023  1:16 PM Toni Zurita Add [Z51.5] Hospice care       ED Disposition     ED Disposition   Admit    Condition   Stable    Date/Time   Mon Sep 25, 2023 2127    Comment   Case was discussed with CONCHIS and the patient's admission status was agreed to be Admission Status: inpatient status to the service of Dr. Belinda Andersen.            Follow-up Information     Follow up With Specialties Details Why Contact Info    Marshal Burgos MD Family Medicine Follow up As needed [address] [place], [place] [address]vd. [address], [address] [address] [address]  [address] [address]  [phone]            Discharge Medication List as of 9/27/2023  4:11 PM      CONTINUE these medications which have CHANGED    Details   bisacodyl (DULCOLAX) 10 mg suppository Insert 1 suppository (10 mg total) into the rectum daily as needed for constipation Insert 1 suppository rectally daily as needed PRN constipation, Starting Wed 9/27/2023, Normal      haloperidol (HALDOL) oral concentrated solution Take 0.5 mL (1 mg total) by mouth every 6 (six) hours as needed (nausea, vomiting, hiccups, agitation), Starting Wed 9/27/2023, Normal      LORazepam (ATIVAN) 2 mg/mL concentrated solution Take 0.5 mL (1 mg total) by mouth every 2 (two) hours as needed for anxiety, Starting Wed 9/27/2023, Normal      Morphine Sulfate, Concentrate, 20 mg/mL concentrated solution Take 0.25 mL (5 mg total) by mouth every 2 (two) hours as needed for severe pain Max Daily Amount: 60 mg, Starting Wed 9/27/2023, Normal      oxyCODONE (ROXICODONE) 10 MG TABS Take 1 tab every 4 hours as needed for moderate pain OR 1.5 tabs every 4 hours as needed for severe pain, Normal      prochlorperazine (COMPAZINE) 10 mg tablet Take 1 tablet (10 mg total) by mouth every 6 (six) hours as needed for nausea or vomiting, Starting Wed 9/27/2023, Normal         CONTINUE these medications which have NOT CHANGED    Details   Acetaminophen 325 MG CAPS Take 500 mg by mouth every 8 (eight) hours, Starting Tue 8/15/2017, Historical Med      albuterol (ProAir HFA) 90 mcg/act inhaler Inhale 2 puffs every 6 (six) hours as needed for wheezing or shortness of breath para tos, o un siento de [employer], Starting Thu 9/7/2023, Normal      calcitriol (ROCALTROL) 0.25 mcg capsule Take 1 capsule (0.25 mcg total) by mouth 2 (two) times a week, Starting Thu 9/7/2023, Normal      gabapentin (NEURONTIN) 100 mg capsule take 1 capsule by mouth twice a day, Starting Thu 7/27/2023, Normal      !! Lancets (ONETOUCH ULTRASOFT) lancets by Other route 2 (two) times a day Use as instructed, Starting Mon 7/13/2020, Normal      olopatadine (PATANOL) 0.1 % ophthalmic solution Administer 1 drop to both eyes 2 (two) times a day, Starting Fri 10/18/2019, Normal      !!  OneTouch Delica Lancets 11L MISC Inject as directed 2 (two) times a day Use to test, Starting Thu 7/9/2020, Normal      polyethylene glycol (MIRALAX) 17 g packet Take 17 g by mouth daily Por favor, tome cada brittany para prevenir estranamiento, Starting Fri 9/1/2023, Normal      aspirin (ADULT ASPIRIN EC LOW STRENGTH) 81 mg EC tablet Take 1 tablet by mouth daily, Historical Med      NIFEdipine ER (ADALAT CC) 30 MG 24 hr tablet take 1 tablet by mouth twice a day, Normal      predniSONE 10 mg tablet Take 1 tablet (10 mg total) by mouth 2 (two) times a day as needed (low energy, severe nausea, gout) Si necesite para fatiga, nausea muy harman, o inflammacion, Starting Fri 9/1/2023, Normal       !! - Potential duplicate medications found. Please discuss with provider. STOP taking these medications       fluticasone (FLONASE) 50 mcg/act nasal spray Comments:   Reason for Stopping:         glimepiride (AMARYL) 1 mg tablet Comments:   Reason for Stopping:         pantoprazole (PROTONIX) 40 mg tablet Comments:   Reason for Stopping:         pravastatin (PRAVACHOL) 40 mg tablet Comments:   Reason for Stopping:         Probiotic Product (Align) 4 MG CAPS Comments:   Reason for Stopping:         methylPREDNISolone 4 MG tablet therapy pack Comments:   Reason for Stopping:             Outpatient Discharge Orders   Discharge Diet     Activity as tolerated     Call provider for:  severe uncontrolled pain     Call provider for:  difficulty breathing, headache or visual disturbances       PDMP Review       Value Time User    PDMP Reviewed  Yes 9/25/2023 10:57 PM Mau Ann DO           ED Provider  Attending physically available and evaluated Rianna Man. I managed the patient along with the ED Attending.     Electronically Signed by         Chelita Liz,   09/29/23 [address],   09/29/23 0003

## 2023-09-29 NOTE — UTILIZATION REVIEW
NOTIFICATION OF ADMISSION DISCHARGE   This is a Notification of Discharge from [facility]. Please be advised that this patient has been discharge from our facility. Below you will find the admission and discharge date and time including the patient’s disposition. UTILIZATION REVIEW CONTACT:  Paula Sharma  Utilization   Network Utilization Review Department  Phone: [phone] x carefully listen to the prompts. All voicemails are confidential.  Email: [email or web address]     ADMISSION INFORMATION  PRESENTATION DATE: 9/25/2023  4:08 PM  OBERVATION ADMISSION DATE:   INPATIENT ADMISSION DATE: 9/25/23  9:28 PM   DISCHARGE DATE: 9/27/2023  5:49 PM   DISPOSITION:Home with [address] INFORMATION:  Send all requests for admission clinical reviews, approved or denied determinations and any other requests to dedicated fax number below belonging to the campus where the patient is receiving treatment.  List of dedicated fax numbers:  [place] DENIALS (Administrative/Medical Necessity) [phone] [address] [facility] (Maternity/NICU/Pediatrics) [phone]   [facility] [phone]   [facility] [phone] [address] [facility] [address] [phone]   [address] [place] [address] [phone]   [facility] [phone]   [address] [place] [address] [address] [facility] [phone]   [address] [facility] [phone] [address] [facility] [phone] [address] [facility] [address] [phone]

## 2023-10-01 ENCOUNTER — HOME CARE VISIT (OUTPATIENT)
Dept: HOME HEALTH SERVICES | Facility: HOME HEALTHCARE | Age: 88
End: 2023-10-01
Payer: MEDICARE

## 2023-10-01 PROCEDURE — G0156 HHCP-SVS OF AIDE,EA 15 MIN: HCPCS

## 2023-10-02 ENCOUNTER — HOME CARE VISIT (OUTPATIENT)
Dept: HOME HEALTH SERVICES | Facility: HOME HEALTHCARE | Age: 88
End: 2023-10-02
Payer: MEDICARE

## 2023-10-02 VITALS — DIASTOLIC BLOOD PRESSURE: 60 MMHG | HEART RATE: 72 BPM | RESPIRATION RATE: 20 BRPM | SYSTOLIC BLOOD PRESSURE: 180 MMHG

## 2023-10-02 PROCEDURE — G0299 HHS/HOSPICE OF RN EA 15 MIN: HCPCS

## 2023-10-03 ENCOUNTER — HOME CARE VISIT (OUTPATIENT)
Dept: HOME HEALTH SERVICES | Facility: HOME HEALTHCARE | Age: 88
End: 2023-10-03
Payer: MEDICARE

## 2023-10-03 PROCEDURE — G0156 HHCP-SVS OF AIDE,EA 15 MIN: HCPCS

## 2023-10-04 ENCOUNTER — HOME CARE VISIT (OUTPATIENT)
Dept: HOME HOSPICE | Facility: HOSPICE | Age: 88
End: 2023-10-04
Payer: MEDICARE

## 2023-10-04 ENCOUNTER — HOME CARE VISIT (OUTPATIENT)
Dept: HOME HEALTH SERVICES | Facility: HOME HEALTHCARE | Age: 88
End: 2023-10-04
Payer: MEDICARE

## 2023-10-04 PROCEDURE — 10330057 MEDICATION, GENERAL

## 2023-10-04 PROCEDURE — G0299 HHS/HOSPICE OF RN EA 15 MIN: HCPCS

## 2023-10-05 ENCOUNTER — HOME CARE VISIT (OUTPATIENT)
Dept: HOME HOSPICE | Facility: HOSPICE | Age: 88
End: 2023-10-05
Payer: MEDICARE

## 2023-10-05 ENCOUNTER — HOME CARE VISIT (OUTPATIENT)
Dept: HOME HEALTH SERVICES | Facility: HOME HEALTHCARE | Age: 88
End: 2023-10-05
Payer: MEDICARE

## 2023-10-05 PROCEDURE — G0156 HHCP-SVS OF AIDE,EA 15 MIN: HCPCS

## 2023-10-06 ENCOUNTER — HOME CARE VISIT (OUTPATIENT)
Dept: HOME HEALTH SERVICES | Facility: HOME HEALTHCARE | Age: 88
End: 2023-10-06
Payer: MEDICARE

## 2023-10-06 PROCEDURE — G0299 HHS/HOSPICE OF RN EA 15 MIN: HCPCS

## 2023-10-09 ENCOUNTER — HOME CARE VISIT (OUTPATIENT)
Dept: HOME HEALTH SERVICES | Facility: HOME HEALTHCARE | Age: 88
End: 2023-10-09
Payer: MEDICARE

## 2023-10-09 VITALS — RESPIRATION RATE: 16 BRPM | DIASTOLIC BLOOD PRESSURE: 80 MMHG | SYSTOLIC BLOOD PRESSURE: 140 MMHG | HEART RATE: 122 BPM

## 2023-10-09 PROCEDURE — G0299 HHS/HOSPICE OF RN EA 15 MIN: HCPCS

## 2023-10-10 ENCOUNTER — HOME CARE VISIT (OUTPATIENT)
Dept: HOME HEALTH SERVICES | Facility: HOME HEALTHCARE | Age: 88
End: 2023-10-10
Payer: MEDICARE

## 2023-10-10 PROCEDURE — G0156 HHCP-SVS OF AIDE,EA 15 MIN: HCPCS

## 2023-10-11 ENCOUNTER — HOME CARE VISIT (OUTPATIENT)
Dept: HOME HEALTH SERVICES | Facility: HOME HEALTHCARE | Age: 88
End: 2023-10-11
Payer: MEDICARE

## 2023-10-11 ENCOUNTER — HOME CARE VISIT (OUTPATIENT)
Dept: HOME HOSPICE | Facility: HOSPICE | Age: 88
End: 2023-10-11
Payer: MEDICARE

## 2023-10-11 PROCEDURE — G0299 HHS/HOSPICE OF RN EA 15 MIN: HCPCS

## 2023-10-12 ENCOUNTER — HOME CARE VISIT (OUTPATIENT)
Dept: HOME HOSPICE | Facility: HOSPICE | Age: 88
End: 2023-10-12
Payer: MEDICARE

## 2023-10-13 ENCOUNTER — HOME CARE VISIT (OUTPATIENT)
Dept: HOME HEALTH SERVICES | Facility: HOME HEALTHCARE | Age: 88
End: 2023-10-13
Payer: MEDICARE

## 2023-10-13 VITALS — SYSTOLIC BLOOD PRESSURE: 170 MMHG | HEART RATE: 83 BPM | DIASTOLIC BLOOD PRESSURE: 70 MMHG | RESPIRATION RATE: 18 BRPM

## 2023-10-13 PROCEDURE — G0156 HHCP-SVS OF AIDE,EA 15 MIN: HCPCS

## 2023-10-13 PROCEDURE — G0299 HHS/HOSPICE OF RN EA 15 MIN: HCPCS

## 2023-10-16 ENCOUNTER — HOME CARE VISIT (OUTPATIENT)
Dept: HOME HEALTH SERVICES | Facility: HOME HEALTHCARE | Age: 88
End: 2023-10-16
Payer: MEDICARE

## 2023-10-16 VITALS — DIASTOLIC BLOOD PRESSURE: 60 MMHG | SYSTOLIC BLOOD PRESSURE: 160 MMHG | RESPIRATION RATE: 16 BRPM | HEART RATE: 80 BPM

## 2023-10-16 PROCEDURE — G0299 HHS/HOSPICE OF RN EA 15 MIN: HCPCS

## 2023-10-17 ENCOUNTER — HOME CARE VISIT (OUTPATIENT)
Dept: HOME HEALTH SERVICES | Facility: HOME HEALTHCARE | Age: 88
End: 2023-10-17
Payer: MEDICARE

## 2023-10-17 PROCEDURE — G0156 HHCP-SVS OF AIDE,EA 15 MIN: HCPCS

## 2023-10-18 ENCOUNTER — HOME CARE VISIT (OUTPATIENT)
Dept: HOME HEALTH SERVICES | Facility: HOME HEALTHCARE | Age: 88
End: 2023-10-18
Payer: MEDICARE

## 2023-10-18 PROCEDURE — G0299 HHS/HOSPICE OF RN EA 15 MIN: HCPCS

## 2023-10-20 ENCOUNTER — HOME CARE VISIT (OUTPATIENT)
Dept: HOME HEALTH SERVICES | Facility: HOME HEALTHCARE | Age: 88
End: 2023-10-20
Payer: MEDICARE

## 2023-10-20 PROCEDURE — G0299 HHS/HOSPICE OF RN EA 15 MIN: HCPCS

## 2023-10-23 ENCOUNTER — HOME CARE VISIT (OUTPATIENT)
Dept: HOME HOSPICE | Facility: HOSPICE | Age: 88
End: 2023-10-23
Payer: MEDICARE

## 2023-10-23 ENCOUNTER — HOME CARE VISIT (OUTPATIENT)
Dept: HOME HEALTH SERVICES | Facility: HOME HEALTHCARE | Age: 88
End: 2023-10-23
Payer: MEDICARE

## 2023-10-23 VITALS — HEART RATE: 88 BPM | SYSTOLIC BLOOD PRESSURE: 170 MMHG | DIASTOLIC BLOOD PRESSURE: 70 MMHG | RESPIRATION RATE: 16 BRPM

## 2023-10-23 PROCEDURE — G0299 HHS/HOSPICE OF RN EA 15 MIN: HCPCS

## 2023-10-25 ENCOUNTER — HOME CARE VISIT (OUTPATIENT)
Dept: HOME HOSPICE | Facility: HOSPICE | Age: 88
End: 2023-10-25
Payer: MEDICARE

## 2023-10-25 ENCOUNTER — HOME CARE VISIT (OUTPATIENT)
Dept: HOME HEALTH SERVICES | Facility: HOME HEALTHCARE | Age: 88
End: 2023-10-25
Payer: MEDICARE

## 2023-10-25 PROCEDURE — G0299 HHS/HOSPICE OF RN EA 15 MIN: HCPCS

## 2023-10-25 NOTE — HOSPICE
family wishes to discontinue hha visits at this time. patient is not needing right now.  will let us know if this changes.

## 2023-10-27 ENCOUNTER — HOME CARE VISIT (OUTPATIENT)
Dept: HOME HEALTH SERVICES | Facility: HOME HEALTHCARE | Age: 88
End: 2023-10-27
Payer: MEDICARE

## 2023-10-27 ENCOUNTER — HOME CARE VISIT (OUTPATIENT)
Dept: HOME HOSPICE | Facility: HOSPICE | Age: 88
End: 2023-10-27
Payer: MEDICARE

## 2023-10-27 PROCEDURE — G0299 HHS/HOSPICE OF RN EA 15 MIN: HCPCS

## 2023-10-30 ENCOUNTER — HOME CARE VISIT (OUTPATIENT)
Dept: HOME HEALTH SERVICES | Facility: HOME HEALTHCARE | Age: 88
End: 2023-10-30
Payer: MEDICARE

## 2023-10-30 PROCEDURE — G0300 HHS/HOSPICE OF LPN EA 15 MIN: HCPCS

## 2023-11-01 ENCOUNTER — HOME CARE VISIT (OUTPATIENT)
Dept: HOME HOSPICE | Facility: HOSPICE | Age: 88
End: 2023-11-01
Payer: MEDICARE

## 2023-11-03 ENCOUNTER — HOME CARE VISIT (OUTPATIENT)
Dept: HOME HEALTH SERVICES | Facility: HOME HEALTHCARE | Age: 88
End: 2023-11-03
Payer: MEDICARE

## 2023-11-03 PROCEDURE — G0299 HHS/HOSPICE OF RN EA 15 MIN: HCPCS

## 2023-11-06 ENCOUNTER — HOME CARE VISIT (OUTPATIENT)
Dept: HOME HOSPICE | Facility: HOSPICE | Age: 88
End: 2023-11-06
Payer: MEDICARE

## 2023-11-06 ENCOUNTER — HOME CARE VISIT (OUTPATIENT)
Dept: HOME HEALTH SERVICES | Facility: HOME HEALTHCARE | Age: 88
End: 2023-11-06
Payer: MEDICARE

## 2023-11-06 VITALS — RESPIRATION RATE: 16 BRPM | DIASTOLIC BLOOD PRESSURE: 50 MMHG | HEART RATE: 88 BPM | SYSTOLIC BLOOD PRESSURE: 180 MMHG

## 2023-11-06 PROCEDURE — G0299 HHS/HOSPICE OF RN EA 15 MIN: HCPCS

## 2023-11-06 PROCEDURE — G0155 HHCP-SVS OF CSW,EA 15 MIN: HCPCS

## 2023-11-08 ENCOUNTER — HOME CARE VISIT (OUTPATIENT)
Dept: HOME HEALTH SERVICES | Facility: HOME HEALTHCARE | Age: 88
End: 2023-11-08
Payer: MEDICARE

## 2023-11-08 PROCEDURE — G0300 HHS/HOSPICE OF LPN EA 15 MIN: HCPCS

## 2023-11-10 ENCOUNTER — HOME CARE VISIT (OUTPATIENT)
Dept: HOME HEALTH SERVICES | Facility: HOME HEALTHCARE | Age: 88
End: 2023-11-10
Payer: MEDICARE

## 2023-11-10 PROCEDURE — G0299 HHS/HOSPICE OF RN EA 15 MIN: HCPCS

## 2023-11-13 ENCOUNTER — HOME CARE VISIT (OUTPATIENT)
Dept: HOME HEALTH SERVICES | Facility: HOME HEALTHCARE | Age: 88
End: 2023-11-13
Payer: MEDICARE

## 2023-11-13 VITALS — HEART RATE: 88 BPM | SYSTOLIC BLOOD PRESSURE: 202 MMHG | DIASTOLIC BLOOD PRESSURE: 60 MMHG | RESPIRATION RATE: 14 BRPM

## 2023-11-13 PROCEDURE — G0299 HHS/HOSPICE OF RN EA 15 MIN: HCPCS

## 2023-11-15 ENCOUNTER — HOME CARE VISIT (OUTPATIENT)
Dept: HOME HEALTH SERVICES | Facility: HOME HEALTHCARE | Age: 88
End: 2023-11-15
Payer: MEDICARE

## 2023-11-15 VITALS — OXYGEN SATURATION: 97 % | DIASTOLIC BLOOD PRESSURE: 60 MMHG | SYSTOLIC BLOOD PRESSURE: 172 MMHG | HEART RATE: 78 BPM

## 2023-11-15 PROCEDURE — G0300 HHS/HOSPICE OF LPN EA 15 MIN: HCPCS

## 2023-11-17 ENCOUNTER — HOME CARE VISIT (OUTPATIENT)
Dept: HOME HEALTH SERVICES | Facility: HOME HEALTHCARE | Age: 88
End: 2023-11-17
Payer: MEDICARE

## 2023-11-17 PROCEDURE — G0300 HHS/HOSPICE OF LPN EA 15 MIN: HCPCS

## 2023-11-18 VITALS — SYSTOLIC BLOOD PRESSURE: 160 MMHG | DIASTOLIC BLOOD PRESSURE: 62 MMHG

## 2023-11-20 ENCOUNTER — HOME CARE VISIT (OUTPATIENT)
Dept: HOME HEALTH SERVICES | Facility: HOME HEALTHCARE | Age: 88
End: 2023-11-20
Payer: MEDICARE

## 2023-11-20 VITALS — HEART RATE: 88 BPM | RESPIRATION RATE: 14 BRPM | DIASTOLIC BLOOD PRESSURE: 58 MMHG | SYSTOLIC BLOOD PRESSURE: 180 MMHG

## 2023-11-20 PROCEDURE — G0299 HHS/HOSPICE OF RN EA 15 MIN: HCPCS

## 2023-11-22 ENCOUNTER — HOME CARE VISIT (OUTPATIENT)
Dept: HOME HEALTH SERVICES | Facility: HOME HEALTHCARE | Age: 88
End: 2023-11-22
Payer: MEDICARE

## 2023-11-22 PROCEDURE — G0299 HHS/HOSPICE OF RN EA 15 MIN: HCPCS

## 2023-11-24 ENCOUNTER — HOME CARE VISIT (OUTPATIENT)
Dept: HOME HEALTH SERVICES | Facility: HOME HEALTHCARE | Age: 88
End: 2023-11-24
Payer: MEDICARE

## 2023-11-24 PROCEDURE — G0299 HHS/HOSPICE OF RN EA 15 MIN: HCPCS

## 2023-11-27 ENCOUNTER — HOME CARE VISIT (OUTPATIENT)
Dept: HOME HEALTH SERVICES | Facility: HOME HEALTHCARE | Age: 88
End: 2023-11-27
Payer: MEDICARE

## 2023-11-27 VITALS — SYSTOLIC BLOOD PRESSURE: 190 MMHG | DIASTOLIC BLOOD PRESSURE: 70 MMHG | RESPIRATION RATE: 22 BRPM | HEART RATE: 100 BPM

## 2023-11-27 PROCEDURE — G0299 HHS/HOSPICE OF RN EA 15 MIN: HCPCS

## 2023-11-29 ENCOUNTER — HOME CARE VISIT (OUTPATIENT)
Dept: HOME HOSPICE | Facility: HOSPICE | Age: 88
End: 2023-11-29
Payer: MEDICARE

## 2023-11-29 ENCOUNTER — HOME CARE VISIT (OUTPATIENT)
Dept: HOME HEALTH SERVICES | Facility: HOME HEALTHCARE | Age: 88
End: 2023-11-29
Payer: MEDICARE

## 2023-11-29 PROCEDURE — G0299 HHS/HOSPICE OF RN EA 15 MIN: HCPCS

## 2023-11-29 PROCEDURE — G0155 HHCP-SVS OF CSW,EA 15 MIN: HCPCS

## 2023-12-01 ENCOUNTER — HOME CARE VISIT (OUTPATIENT)
Dept: HOME HEALTH SERVICES | Facility: HOME HEALTHCARE | Age: 88
End: 2023-12-01
Payer: MEDICARE

## 2023-12-01 PROCEDURE — G0299 HHS/HOSPICE OF RN EA 15 MIN: HCPCS

## 2023-12-04 ENCOUNTER — HOME CARE VISIT (OUTPATIENT)
Dept: HOME HEALTH SERVICES | Facility: HOME HEALTHCARE | Age: 88
End: 2023-12-04
Payer: MEDICARE

## 2023-12-04 PROCEDURE — G0300 HHS/HOSPICE OF LPN EA 15 MIN: HCPCS

## 2023-12-06 ENCOUNTER — HOME CARE VISIT (OUTPATIENT)
Dept: HOME HEALTH SERVICES | Facility: HOME HEALTHCARE | Age: 88
End: 2023-12-06
Payer: MEDICARE

## 2023-12-06 PROCEDURE — G0299 HHS/HOSPICE OF RN EA 15 MIN: HCPCS

## 2023-12-08 ENCOUNTER — HOME CARE VISIT (OUTPATIENT)
Dept: HOME HEALTH SERVICES | Facility: HOME HEALTHCARE | Age: 88
End: 2023-12-08
Payer: MEDICARE

## 2023-12-08 PROCEDURE — G0300 HHS/HOSPICE OF LPN EA 15 MIN: HCPCS

## 2023-12-11 ENCOUNTER — HOME CARE VISIT (OUTPATIENT)
Dept: HOME HEALTH SERVICES | Facility: HOME HEALTHCARE | Age: 88
End: 2023-12-11
Payer: MEDICARE

## 2023-12-11 PROCEDURE — G0300 HHS/HOSPICE OF LPN EA 15 MIN: HCPCS

## 2023-12-13 ENCOUNTER — HOME CARE VISIT (OUTPATIENT)
Dept: HOME HEALTH SERVICES | Facility: HOME HEALTHCARE | Age: 88
End: 2023-12-13
Payer: MEDICARE

## 2023-12-13 VITALS — DIASTOLIC BLOOD PRESSURE: 82 MMHG | RESPIRATION RATE: 16 BRPM | SYSTOLIC BLOOD PRESSURE: 160 MMHG | HEART RATE: 72 BPM

## 2023-12-13 PROCEDURE — G0299 HHS/HOSPICE OF RN EA 15 MIN: HCPCS

## 2023-12-15 ENCOUNTER — HOME CARE VISIT (OUTPATIENT)
Dept: HOME HEALTH SERVICES | Facility: HOME HEALTHCARE | Age: 88
End: 2023-12-15
Payer: MEDICARE

## 2023-12-15 VITALS — DIASTOLIC BLOOD PRESSURE: 58 MMHG | SYSTOLIC BLOOD PRESSURE: 162 MMHG

## 2023-12-15 PROCEDURE — G0300 HHS/HOSPICE OF LPN EA 15 MIN: HCPCS

## 2023-12-18 ENCOUNTER — HOME CARE VISIT (OUTPATIENT)
Dept: HOME HEALTH SERVICES | Facility: HOME HEALTHCARE | Age: 88
End: 2023-12-18
Payer: MEDICARE

## 2023-12-18 ENCOUNTER — HOME CARE VISIT (OUTPATIENT)
Dept: HOME HOSPICE | Facility: HOSPICE | Age: 88
End: 2023-12-18
Payer: MEDICARE

## 2023-12-18 VITALS
DIASTOLIC BLOOD PRESSURE: 60 MMHG | HEART RATE: 75 BPM | RESPIRATION RATE: 18 BRPM | SYSTOLIC BLOOD PRESSURE: 160 MMHG | OXYGEN SATURATION: 96 %

## 2023-12-18 PROCEDURE — G0300 HHS/HOSPICE OF LPN EA 15 MIN: HCPCS

## 2023-12-19 ENCOUNTER — TELEPHONE (OUTPATIENT)
Dept: NEPHROLOGY | Facility: CLINIC | Age: 88
End: 2023-12-19

## 2023-12-19 NOTE — TELEPHONE ENCOUNTER
Spoke with patients  and let her know that we are canceling today's appointment as the patient is on hospice.

## 2023-12-20 ENCOUNTER — HOME CARE VISIT (OUTPATIENT)
Dept: HOME HEALTH SERVICES | Facility: HOME HEALTHCARE | Age: 88
End: 2023-12-20
Payer: MEDICARE

## 2023-12-20 VITALS — RESPIRATION RATE: 20 BRPM | HEART RATE: 88 BPM | DIASTOLIC BLOOD PRESSURE: 78 MMHG | SYSTOLIC BLOOD PRESSURE: 158 MMHG

## 2023-12-20 PROCEDURE — G0299 HHS/HOSPICE OF RN EA 15 MIN: HCPCS

## 2023-12-22 ENCOUNTER — HOME CARE VISIT (OUTPATIENT)
Dept: HOME HEALTH SERVICES | Facility: HOME HEALTHCARE | Age: 88
End: 2023-12-22
Payer: MEDICARE

## 2023-12-22 PROCEDURE — G0300 HHS/HOSPICE OF LPN EA 15 MIN: HCPCS

## 2023-12-27 ENCOUNTER — HOME CARE VISIT (OUTPATIENT)
Dept: HOME HEALTH SERVICES | Facility: HOME HEALTHCARE | Age: 88
End: 2023-12-27
Payer: MEDICARE

## 2023-12-27 VITALS
HEART RATE: 69 BPM | OXYGEN SATURATION: 99 % | DIASTOLIC BLOOD PRESSURE: 60 MMHG | SYSTOLIC BLOOD PRESSURE: 170 MMHG | RESPIRATION RATE: 20 BRPM

## 2023-12-27 PROCEDURE — G0300 HHS/HOSPICE OF LPN EA 15 MIN: HCPCS

## 2023-12-29 ENCOUNTER — HOME CARE VISIT (OUTPATIENT)
Dept: HOME HEALTH SERVICES | Facility: HOME HEALTHCARE | Age: 88
End: 2023-12-29
Payer: MEDICARE

## 2023-12-29 ENCOUNTER — HOME CARE VISIT (OUTPATIENT)
Dept: HOME HOSPICE | Facility: HOSPICE | Age: 88
End: 2023-12-29
Payer: MEDICARE

## 2023-12-29 VITALS
DIASTOLIC BLOOD PRESSURE: 58 MMHG | OXYGEN SATURATION: 100 % | TEMPERATURE: 99.2 F | HEART RATE: 86 BPM | SYSTOLIC BLOOD PRESSURE: 162 MMHG

## 2023-12-29 PROCEDURE — G0299 HHS/HOSPICE OF RN EA 15 MIN: HCPCS

## 2024-01-01 ENCOUNTER — HOME CARE VISIT (OUTPATIENT)
Dept: HOME HOSPICE | Facility: HOSPICE | Age: 89
End: 2024-01-01
Payer: MEDICARE

## 2024-01-01 ENCOUNTER — HOME CARE VISIT (OUTPATIENT)
Dept: HOME HEALTH SERVICES | Facility: HOME HEALTHCARE | Age: 89
End: 2024-01-01
Payer: MEDICARE

## 2024-01-01 VITALS — RESPIRATION RATE: 22 BRPM | HEART RATE: 120 BPM

## 2024-01-01 PROCEDURE — G0299 HHS/HOSPICE OF RN EA 15 MIN: HCPCS

## 2024-01-02 NOTE — TELEPHONE ENCOUNTER
Appointment Cancellation Or Reschedule     Person calling in patient   Provider Ean Diez   Office Visit Date and Time 10/14 @ 3pm   Office Visit Location [place]   Did patient want to reschedule their office appointment? If so, when was it scheduled to? Yes 10/21 @ 3pm   Is this patient calling to reschedule an infusion appointment? no   When is their next infusion appointment? n/a   Is this patient a Chemo patient? no   Reason for Cancellation or Reschedule Provider not in office 10/14     If the patient is a treatment patient, please route this to the office nurse  If the patient is not on treatment, please route to the office MA 
Opt out

## 2024-01-03 ENCOUNTER — HOME CARE VISIT (OUTPATIENT)
Dept: HOME HOSPICE | Facility: HOSPICE | Age: 89
End: 2024-01-03
Payer: MEDICARE

## 2024-01-03 ENCOUNTER — HOME CARE VISIT (OUTPATIENT)
Dept: HOME HEALTH SERVICES | Facility: HOME HEALTHCARE | Age: 89
End: 2024-01-03
Payer: MEDICARE

## 2024-01-03 VITALS
RESPIRATION RATE: 18 BRPM | SYSTOLIC BLOOD PRESSURE: 150 MMHG | OXYGEN SATURATION: 100 % | HEART RATE: 75 BPM | DIASTOLIC BLOOD PRESSURE: 80 MMHG

## 2024-01-03 PROCEDURE — G0300 HHS/HOSPICE OF LPN EA 15 MIN: HCPCS

## 2024-01-05 ENCOUNTER — HOME CARE VISIT (OUTPATIENT)
Dept: HOME HEALTH SERVICES | Facility: HOME HEALTHCARE | Age: 89
End: 2024-01-05
Payer: MEDICARE

## 2024-01-05 VITALS — DIASTOLIC BLOOD PRESSURE: 84 MMHG | SYSTOLIC BLOOD PRESSURE: 164 MMHG

## 2024-01-05 PROCEDURE — G0299 HHS/HOSPICE OF RN EA 15 MIN: HCPCS

## 2024-01-08 ENCOUNTER — HOME CARE VISIT (OUTPATIENT)
Dept: HOME HEALTH SERVICES | Facility: HOME HEALTHCARE | Age: 89
End: 2024-01-08
Payer: MEDICARE

## 2024-01-08 VITALS — RESPIRATION RATE: 16 BRPM | SYSTOLIC BLOOD PRESSURE: 162 MMHG | DIASTOLIC BLOOD PRESSURE: 82 MMHG | HEART RATE: 88 BPM

## 2024-01-08 PROCEDURE — G0299 HHS/HOSPICE OF RN EA 15 MIN: HCPCS

## 2024-01-10 ENCOUNTER — HOME CARE VISIT (OUTPATIENT)
Dept: HOME HEALTH SERVICES | Facility: HOME HEALTHCARE | Age: 89
End: 2024-01-10
Payer: MEDICARE

## 2024-01-10 VITALS — RESPIRATION RATE: 32 BRPM | DIASTOLIC BLOOD PRESSURE: 72 MMHG | HEART RATE: 88 BPM | SYSTOLIC BLOOD PRESSURE: 140 MMHG

## 2024-01-10 PROCEDURE — G0299 HHS/HOSPICE OF RN EA 15 MIN: HCPCS

## 2024-01-12 ENCOUNTER — HOME CARE VISIT (OUTPATIENT)
Dept: HOME HEALTH SERVICES | Facility: HOME HEALTHCARE | Age: 89
End: 2024-01-12
Payer: MEDICARE

## 2024-01-12 PROCEDURE — G0299 HHS/HOSPICE OF RN EA 15 MIN: HCPCS

## 2024-01-14 ENCOUNTER — HOME CARE VISIT (OUTPATIENT)
Dept: HOME HOSPICE | Facility: HOSPICE | Age: 89
End: 2024-01-14
Payer: MEDICARE

## 2024-01-14 PROCEDURE — G0299 HHS/HOSPICE OF RN EA 15 MIN: HCPCS

## 2024-01-14 NOTE — Clinical Note
Hello PRN vs made last night on pt. She had a very hard area in her RLQ that caused severe pain. PRN vs made at 1058pm. PTs last dose of oxy was at 845pm and cg instructed to administer meds now. Abdomen was soft and hyperactive. I thought maybe she has hernia on RLQ and gas trapped and now gas resolved...pt hr reduced from 130 to 100 during sn vs. CG administered med and will place heating pad on pt abdomen. Cg will call for any changes or concerns.

## 2024-01-15 ENCOUNTER — HOME CARE VISIT (OUTPATIENT)
Dept: HOME HEALTH SERVICES | Facility: HOME HEALTHCARE | Age: 89
End: 2024-01-15
Payer: MEDICARE

## 2024-01-15 ENCOUNTER — HOME CARE VISIT (OUTPATIENT)
Dept: HOME HOSPICE | Facility: HOSPICE | Age: 89
End: 2024-01-15
Payer: MEDICARE

## 2024-01-15 VITALS — SYSTOLIC BLOOD PRESSURE: 150 MMHG | DIASTOLIC BLOOD PRESSURE: 60 MMHG | HEART RATE: 92 BPM | RESPIRATION RATE: 16 BRPM

## 2024-01-15 PROCEDURE — G0155 HHCP-SVS OF CSW,EA 15 MIN: HCPCS

## 2024-01-15 PROCEDURE — G0299 HHS/HOSPICE OF RN EA 15 MIN: HCPCS

## 2024-01-17 ENCOUNTER — HOME CARE VISIT (OUTPATIENT)
Dept: HOME HEALTH SERVICES | Facility: HOME HEALTHCARE | Age: 89
End: 2024-01-17
Payer: MEDICARE

## 2024-01-17 VITALS — RESPIRATION RATE: 44 BRPM | TEMPERATURE: 99.1 F | HEART RATE: 160 BPM

## 2024-01-17 PROCEDURE — G0299 HHS/HOSPICE OF RN EA 15 MIN: HCPCS

## 2024-01-19 ENCOUNTER — HOME CARE VISIT (OUTPATIENT)
Dept: HOME HEALTH SERVICES | Facility: HOME HEALTHCARE | Age: 89
End: 2024-01-19
Payer: MEDICARE

## 2024-01-19 PROCEDURE — G0299 HHS/HOSPICE OF RN EA 15 MIN: HCPCS

## 2024-01-22 ENCOUNTER — HOME CARE VISIT (OUTPATIENT)
Dept: HOME HEALTH SERVICES | Facility: HOME HEALTHCARE | Age: 89
End: 2024-01-22
Payer: MEDICARE

## 2024-01-22 PROCEDURE — G0300 HHS/HOSPICE OF LPN EA 15 MIN: HCPCS

## 2024-01-23 NOTE — HOSPICE
Hello PRN vs made last night on pt. She had a very hard area in her RLQ that caused severe pain. PRN vs made at 1058pm. PTs last dose of oxy was at 845pm and cg instructed to administer meds now. Abdomen was soft and hyperactive. I thought maybe she has hernia on RLQ and gas trapped and now gas resolved...pt hr reduced from 130 to 100 during sn vs. CG administered med and will place heating pad on pt abdomen. Cg will call for any changes or concerns. TT to hospice with update and triage aware of visit

## 2024-01-24 ENCOUNTER — HOME CARE VISIT (OUTPATIENT)
Dept: HOME HEALTH SERVICES | Facility: HOME HEALTHCARE | Age: 89
End: 2024-01-24
Payer: MEDICARE

## 2024-01-24 VITALS — HEART RATE: 100 BPM | SYSTOLIC BLOOD PRESSURE: 180 MMHG | RESPIRATION RATE: 18 BRPM | DIASTOLIC BLOOD PRESSURE: 90 MMHG

## 2024-01-24 PROCEDURE — G0299 HHS/HOSPICE OF RN EA 15 MIN: HCPCS

## 2024-01-26 ENCOUNTER — HOME CARE VISIT (OUTPATIENT)
Dept: HOME HEALTH SERVICES | Facility: HOME HEALTHCARE | Age: 89
End: 2024-01-26
Payer: MEDICARE

## 2024-01-26 VITALS — DIASTOLIC BLOOD PRESSURE: 60 MMHG | SYSTOLIC BLOOD PRESSURE: 160 MMHG

## 2024-01-26 PROCEDURE — G0300 HHS/HOSPICE OF LPN EA 15 MIN: HCPCS

## 2024-01-29 ENCOUNTER — HOME CARE VISIT (OUTPATIENT)
Dept: HOME HEALTH SERVICES | Facility: HOME HEALTHCARE | Age: 89
End: 2024-01-29
Payer: MEDICARE

## 2024-01-29 ENCOUNTER — HOME CARE VISIT (OUTPATIENT)
Dept: HOME HOSPICE | Facility: HOSPICE | Age: 89
End: 2024-01-29
Payer: MEDICARE

## 2024-01-29 VITALS — SYSTOLIC BLOOD PRESSURE: 160 MMHG | HEART RATE: 115 BPM | DIASTOLIC BLOOD PRESSURE: 90 MMHG | RESPIRATION RATE: 16 BRPM

## 2024-01-29 PROCEDURE — G0299 HHS/HOSPICE OF RN EA 15 MIN: HCPCS

## 2024-01-31 ENCOUNTER — HOME CARE VISIT (OUTPATIENT)
Dept: HOME HEALTH SERVICES | Facility: HOME HEALTHCARE | Age: 89
End: 2024-01-31
Payer: MEDICARE

## 2024-01-31 VITALS — SYSTOLIC BLOOD PRESSURE: 235 MMHG | DIASTOLIC BLOOD PRESSURE: 74 MMHG

## 2024-01-31 PROCEDURE — G0299 HHS/HOSPICE OF RN EA 15 MIN: HCPCS

## 2024-02-02 ENCOUNTER — HOME CARE VISIT (OUTPATIENT)
Dept: HOME HEALTH SERVICES | Facility: HOME HEALTHCARE | Age: 89
End: 2024-02-02
Payer: MEDICARE

## 2024-02-02 VITALS — DIASTOLIC BLOOD PRESSURE: 70 MMHG | SYSTOLIC BLOOD PRESSURE: 160 MMHG

## 2024-02-02 PROCEDURE — G0300 HHS/HOSPICE OF LPN EA 15 MIN: HCPCS

## 2024-02-05 ENCOUNTER — HOME CARE VISIT (OUTPATIENT)
Dept: HOME HEALTH SERVICES | Facility: HOME HEALTHCARE | Age: 89
End: 2024-02-05
Payer: MEDICARE

## 2024-02-05 VITALS — HEART RATE: 84 BPM | SYSTOLIC BLOOD PRESSURE: 230 MMHG | DIASTOLIC BLOOD PRESSURE: 40 MMHG | TEMPERATURE: 98.5 F

## 2024-02-05 PROCEDURE — G0299 HHS/HOSPICE OF RN EA 15 MIN: HCPCS

## 2024-02-06 ENCOUNTER — HOME CARE VISIT (OUTPATIENT)
Dept: HOME HOSPICE | Facility: HOSPICE | Age: 89
End: 2024-02-06
Payer: MEDICARE

## 2024-02-06 PROCEDURE — G0155 HHCP-SVS OF CSW,EA 15 MIN: HCPCS

## 2024-02-07 ENCOUNTER — HOME CARE VISIT (OUTPATIENT)
Dept: HOME HEALTH SERVICES | Facility: HOME HEALTHCARE | Age: 89
End: 2024-02-07
Payer: MEDICARE

## 2024-02-07 VITALS — DIASTOLIC BLOOD PRESSURE: 60 MMHG | SYSTOLIC BLOOD PRESSURE: 160 MMHG

## 2024-02-07 PROCEDURE — G0300 HHS/HOSPICE OF LPN EA 15 MIN: HCPCS

## 2024-02-09 ENCOUNTER — HOME CARE VISIT (OUTPATIENT)
Dept: HOME HEALTH SERVICES | Facility: HOME HEALTHCARE | Age: 89
End: 2024-02-09
Payer: MEDICARE

## 2024-02-09 VITALS — HEART RATE: 74 BPM

## 2024-02-09 PROCEDURE — G0299 HHS/HOSPICE OF RN EA 15 MIN: HCPCS

## 2024-02-09 RX ADMIN — Medication 10 MG: at 13:26

## 2024-02-10 ENCOUNTER — HOME CARE VISIT (OUTPATIENT)
Dept: HOME HOSPICE | Facility: HOSPICE | Age: 89
End: 2024-02-10
Payer: MEDICARE

## 2024-02-11 ENCOUNTER — HOME CARE VISIT (OUTPATIENT)
Dept: HOME HEALTH SERVICES | Facility: HOME HEALTHCARE | Age: 89
End: 2024-02-11
Payer: MEDICARE

## 2024-02-11 VITALS
SYSTOLIC BLOOD PRESSURE: 150 MMHG | RESPIRATION RATE: 22 BRPM | HEART RATE: 78 BPM | DIASTOLIC BLOOD PRESSURE: 80 MMHG | TEMPERATURE: 98 F

## 2024-02-11 PROCEDURE — G0299 HHS/HOSPICE OF RN EA 15 MIN: HCPCS

## 2024-02-12 ENCOUNTER — HOME CARE VISIT (OUTPATIENT)
Dept: HOME HEALTH SERVICES | Facility: HOME HEALTHCARE | Age: 89
End: 2024-02-12
Payer: MEDICARE

## 2024-02-12 VITALS — DIASTOLIC BLOOD PRESSURE: 60 MMHG | SYSTOLIC BLOOD PRESSURE: 160 MMHG | RESPIRATION RATE: 22 BRPM

## 2024-02-12 PROCEDURE — G0300 HHS/HOSPICE OF LPN EA 15 MIN: HCPCS

## 2024-02-14 ENCOUNTER — HOME CARE VISIT (OUTPATIENT)
Dept: HOME HEALTH SERVICES | Facility: HOME HEALTHCARE | Age: 89
End: 2024-02-14
Payer: MEDICARE

## 2024-02-14 VITALS — RESPIRATION RATE: 16 BRPM | HEART RATE: 72 BPM | SYSTOLIC BLOOD PRESSURE: 210 MMHG | DIASTOLIC BLOOD PRESSURE: 70 MMHG

## 2024-02-14 PROCEDURE — G0299 HHS/HOSPICE OF RN EA 15 MIN: HCPCS

## 2024-02-16 ENCOUNTER — HOME CARE VISIT (OUTPATIENT)
Dept: HOME HEALTH SERVICES | Facility: HOME HEALTHCARE | Age: 89
End: 2024-02-16
Payer: MEDICARE

## 2024-02-16 PROCEDURE — G0299 HHS/HOSPICE OF RN EA 15 MIN: HCPCS

## 2024-02-18 ENCOUNTER — HOME CARE VISIT (OUTPATIENT)
Dept: HOME HEALTH SERVICES | Facility: HOME HEALTHCARE | Age: 89
End: 2024-02-18
Payer: MEDICARE

## 2024-02-18 ENCOUNTER — HOME CARE VISIT (OUTPATIENT)
Dept: HOME HOSPICE | Facility: HOSPICE | Age: 89
End: 2024-02-18
Payer: MEDICARE

## 2024-02-18 VITALS
HEART RATE: 80 BPM | RESPIRATION RATE: 32 BRPM | SYSTOLIC BLOOD PRESSURE: 150 MMHG | TEMPERATURE: 97.8 F | DIASTOLIC BLOOD PRESSURE: 68 MMHG

## 2024-02-18 PROCEDURE — G0299 HHS/HOSPICE OF RN EA 15 MIN: HCPCS

## 2024-02-18 RX ADMIN — Medication 20 MG: at 09:50

## 2024-02-20 ENCOUNTER — HOME CARE VISIT (OUTPATIENT)
Dept: HOME HEALTH SERVICES | Facility: HOME HEALTHCARE | Age: 89
End: 2024-02-20
Payer: MEDICARE

## 2024-02-20 VITALS — SYSTOLIC BLOOD PRESSURE: 166 MMHG | HEART RATE: 110 BPM | DIASTOLIC BLOOD PRESSURE: 60 MMHG | RESPIRATION RATE: 16 BRPM

## 2024-02-20 PROCEDURE — G0299 HHS/HOSPICE OF RN EA 15 MIN: HCPCS

## 2024-02-21 PROBLEM — J06.9 VIRAL UPPER RESPIRATORY TRACT INFECTION: Status: RESOLVED | Noted: 2019-10-18 | Resolved: 2024-02-21

## 2024-02-21 PROBLEM — Z01.419 ENCOUNTER FOR ANNUAL ROUTINE GYNECOLOGICAL EXAMINATION: Status: RESOLVED | Noted: 2022-08-30 | Resolved: 2024-02-21

## 2024-02-22 ENCOUNTER — HOME CARE VISIT (OUTPATIENT)
Dept: HOME HEALTH SERVICES | Facility: HOME HEALTHCARE | Age: 89
End: 2024-02-22
Payer: MEDICARE

## 2024-02-22 PROCEDURE — G0299 HHS/HOSPICE OF RN EA 15 MIN: HCPCS

## 2024-02-27 ENCOUNTER — HOME CARE VISIT (OUTPATIENT)
Dept: HOME HEALTH SERVICES | Facility: HOME HEALTHCARE | Age: 89
End: 2024-02-27
Payer: MEDICARE

## 2024-02-27 ENCOUNTER — HOME CARE VISIT (OUTPATIENT)
Dept: HOME HOSPICE | Facility: HOSPICE | Age: 89
End: 2024-02-27
Payer: MEDICARE

## 2024-02-27 VITALS — DIASTOLIC BLOOD PRESSURE: 50 MMHG | SYSTOLIC BLOOD PRESSURE: 180 MMHG | HEART RATE: 88 BPM | RESPIRATION RATE: 16 BRPM

## 2024-02-27 PROCEDURE — G0155 HHCP-SVS OF CSW,EA 15 MIN: HCPCS

## 2024-02-27 PROCEDURE — G0299 HHS/HOSPICE OF RN EA 15 MIN: HCPCS

## 2024-02-28 ENCOUNTER — HOME CARE VISIT (OUTPATIENT)
Dept: HOME HEALTH SERVICES | Facility: HOME HEALTHCARE | Age: 89
End: 2024-02-28
Payer: MEDICARE

## 2024-02-28 VITALS — SYSTOLIC BLOOD PRESSURE: 220 MMHG | DIASTOLIC BLOOD PRESSURE: 80 MMHG | RESPIRATION RATE: 56 BRPM | HEART RATE: 128 BPM

## 2024-02-28 PROCEDURE — G0299 HHS/HOSPICE OF RN EA 15 MIN: HCPCS

## 2024-02-29 ENCOUNTER — HOME CARE VISIT (OUTPATIENT)
Dept: HOME HEALTH SERVICES | Facility: HOME HEALTHCARE | Age: 89
End: 2024-02-29
Payer: MEDICARE

## 2024-02-29 PROCEDURE — G0299 HHS/HOSPICE OF RN EA 15 MIN: HCPCS

## 2024-03-01 ENCOUNTER — HOME CARE VISIT (OUTPATIENT)
Dept: HOME HEALTH SERVICES | Facility: HOME HEALTHCARE | Age: 89
End: 2024-03-01
Payer: MEDICARE

## 2024-03-01 PROCEDURE — G0299 HHS/HOSPICE OF RN EA 15 MIN: HCPCS

## 2024-03-04 ENCOUNTER — HOME CARE VISIT (OUTPATIENT)
Dept: HOME HEALTH SERVICES | Facility: HOME HEALTHCARE | Age: 89
End: 2024-03-04
Payer: MEDICARE

## 2024-03-04 VITALS — DIASTOLIC BLOOD PRESSURE: 70 MMHG | SYSTOLIC BLOOD PRESSURE: 170 MMHG | RESPIRATION RATE: 24 BRPM | HEART RATE: 100 BPM

## 2024-03-04 PROCEDURE — G0299 HHS/HOSPICE OF RN EA 15 MIN: HCPCS

## 2024-03-05 ENCOUNTER — HOME CARE VISIT (OUTPATIENT)
Dept: HOME HEALTH SERVICES | Facility: HOME HEALTHCARE | Age: 89
End: 2024-03-05
Payer: MEDICARE

## 2024-03-05 PROCEDURE — G0299 HHS/HOSPICE OF RN EA 15 MIN: HCPCS

## 2024-03-06 ENCOUNTER — HOME CARE VISIT (OUTPATIENT)
Dept: HOME HOSPICE | Facility: HOSPICE | Age: 89
End: 2024-03-06
Payer: MEDICARE

## 2024-03-06 ENCOUNTER — HOME CARE VISIT (OUTPATIENT)
Dept: HOME HEALTH SERVICES | Facility: HOME HEALTHCARE | Age: 89
End: 2024-03-06
Payer: MEDICARE

## 2024-03-06 VITALS — RESPIRATION RATE: 40 BRPM | HEART RATE: 122 BPM

## 2024-03-06 PROCEDURE — G0299 HHS/HOSPICE OF RN EA 15 MIN: HCPCS

## 2024-03-07 ENCOUNTER — HOME CARE VISIT (OUTPATIENT)
Dept: HOME HOSPICE | Facility: HOSPICE | Age: 89
End: 2024-03-07
Payer: MEDICARE

## 2024-03-07 ENCOUNTER — HOME CARE VISIT (OUTPATIENT)
Dept: HOME HEALTH SERVICES | Facility: HOME HEALTHCARE | Age: 89
End: 2024-03-07
Payer: MEDICARE

## 2024-03-07 PROCEDURE — G0299 HHS/HOSPICE OF RN EA 15 MIN: HCPCS

## 2024-03-08 ENCOUNTER — HOME CARE VISIT (OUTPATIENT)
Dept: HOME HEALTH SERVICES | Facility: HOME HEALTHCARE | Age: 89
End: 2024-03-08
Payer: MEDICARE

## 2024-03-08 ENCOUNTER — HOME CARE VISIT (OUTPATIENT)
Dept: HOME HOSPICE | Facility: HOSPICE | Age: 89
End: 2024-03-08
Payer: MEDICARE

## 2024-03-08 VITALS — RESPIRATION RATE: 28 BRPM | HEART RATE: 130 BPM

## 2024-03-08 PROCEDURE — G0299 HHS/HOSPICE OF RN EA 15 MIN: HCPCS

## 2024-03-14 ENCOUNTER — HOME CARE VISIT (OUTPATIENT)
Dept: HOME HOSPICE | Facility: HOSPICE | Age: 89
End: 2024-03-14
Payer: MEDICARE

## 2024-03-15 ENCOUNTER — HOME CARE VISIT (OUTPATIENT)
Dept: HOME HOSPICE | Facility: HOSPICE | Age: 89
End: 2024-03-15
Payer: MEDICARE

## (undated) DEVICE — SUT MONOCRYL 4-0 PS-2 18 IN Y496G

## (undated) DEVICE — LIGHT HANDLE COVER SLEEVE DISP BLUE STELLAR

## (undated) DEVICE — INSULATED BLADE ELECTRODE: Brand: EDGE

## (undated) DEVICE — SUT VICRYL 2-0 SH 27 IN UNDYED J417H

## (undated) DEVICE — SUT VICRYL 3-0 SH 27 IN J416H

## (undated) DEVICE — TUBING SUCTION 5MM X 12 FT

## (undated) DEVICE — 2963 MEDIPORE SOFT CLOTH TAPE 3 IN X 10 YD 12 RLS/CS: Brand: 3M™ MEDIPORE™

## (undated) DEVICE — JP PERF DRN SIL FLT 10MM FULL: Brand: CARDINAL HEALTH

## (undated) DEVICE — INTENDED FOR TISSUE SEPARATION, AND OTHER PROCEDURES THAT REQUIRE A SHARP SURGICAL BLADE TO PUNCTURE OR CUT.: Brand: BARD-PARKER SAFETY BLADES SIZE 15, STERILE

## (undated) DEVICE — GAUZE SPONGES,16 PLY: Brand: CURITY

## (undated) DEVICE — 3M™ STERI-STRIP™ REINFORCED ADHESIVE SKIN CLOSURES, R1547, 1/2 IN X 4 IN (12 MM X 100 MM), 6 STRIPS/ENVELOPE: Brand: 3M™ STERI-STRIP™

## (undated) DEVICE — NEEDLE 25G X 1 1/2

## (undated) DEVICE — MARGIN MARKER SET

## (undated) DEVICE — VIAL DECANTER

## (undated) DEVICE — DRAPE PROBE NEO-PROBE/ULTRASOUND

## (undated) DEVICE — ADHESIVE SKN CLSR HISTOACRYL FLEX 0.5ML LF

## (undated) DEVICE — GLOVE SRG BIOGEL 6.5

## (undated) DEVICE — MEDI-VAC YANK SUCT HNDL W/TPRD BULBOUS TIP: Brand: CARDINAL HEALTH

## (undated) DEVICE — DRAPE EQUIPMENT RF WAND

## (undated) DEVICE — SCD SEQUENTIAL COMPRESSION COMFORT SLEEVE MEDIUM KNEE LENGTH: Brand: KENDALL SCD

## (undated) DEVICE — TELFA NON-ADHERENT ABSORBENT DRESSING: Brand: TELFA

## (undated) DEVICE — GLOVE INDICATOR PI UNDERGLOVE SZ 6.5 BLUE

## (undated) DEVICE — PENCIL ELECTROSURG E-Z CLEAN -0035H

## (undated) DEVICE — CHLORAPREP HI-LITE 26ML ORANGE

## (undated) DEVICE — PAD GROUNDING ADULT

## (undated) DEVICE — REM POLYHESIVE ADULT PATIENT RETURN ELECTRODE: Brand: VALLEYLAB

## (undated) DEVICE — STRL UNIVERSAL MINOR GENERAL: Brand: CARDINAL HEALTH

## (undated) DEVICE — GLOVE INDICATOR PI UNDERGLOVE SZ 7 BLUE

## (undated) DEVICE — LIGACLIP MCA MULTIPLE CLIP APPLIERS, 20 MEDIUM CLIPS: Brand: LIGACLIP

## (undated) DEVICE — SUT MONOCRYL 5-0 PC-2 18 IN Y495G

## (undated) DEVICE — SMOKE EVACUATION TUBING WITH 8 IN INTEGRAL WAND AND SPONGE GUARD: Brand: BUFFALO FILTER

## (undated) DEVICE — BETHLEHEM UNIVERSAL MINOR GEN: Brand: CARDINAL HEALTH

## (undated) DEVICE — 3M™ STERI-STRIP™ COMPOUND BENZOIN TINCTURE 40 BAGS/CARTON 4 CARTONS/CASE C1544: Brand: 3M™ STERI-STRIP™

## (undated) DEVICE — PLUMEPEN PRO 10FT